# Patient Record
Sex: MALE | Race: BLACK OR AFRICAN AMERICAN | NOT HISPANIC OR LATINO | Employment: UNEMPLOYED | URBAN - METROPOLITAN AREA
[De-identification: names, ages, dates, MRNs, and addresses within clinical notes are randomized per-mention and may not be internally consistent; named-entity substitution may affect disease eponyms.]

---

## 2021-02-12 ENCOUNTER — HOSPITAL ENCOUNTER (INPATIENT)
Facility: HOSPITAL | Age: 41
LOS: 52 days | Discharge: HOME | DRG: 100 | End: 2021-04-05
Attending: EMERGENCY MEDICINE | Admitting: INTERNAL MEDICINE
Payer: MEDICAID

## 2021-02-12 DIAGNOSIS — I42.9 CARDIOMYOPATHY, UNSPECIFIED TYPE: ICD-10-CM

## 2021-02-12 DIAGNOSIS — R07.89 CHEST DISCOMFORT: ICD-10-CM

## 2021-02-12 DIAGNOSIS — R41.82 ALTERED MENTAL STATUS: ICD-10-CM

## 2021-02-12 DIAGNOSIS — R41.82 ALTERED MENTAL STATUS, UNSPECIFIED ALTERED MENTAL STATUS TYPE: ICD-10-CM

## 2021-02-12 DIAGNOSIS — Z23 NEED FOR VACCINATION: ICD-10-CM

## 2021-02-12 DIAGNOSIS — E43 SEVERE MALNUTRITION: ICD-10-CM

## 2021-02-12 DIAGNOSIS — E08.00 DIABETES MELLITUS DUE TO UNDERLYING CONDITION WITH HYPEROSMOLARITY WITHOUT COMA, WITHOUT LONG-TERM CURRENT USE OF INSULIN: ICD-10-CM

## 2021-02-12 DIAGNOSIS — Z86.73 HISTORY OF STROKE: ICD-10-CM

## 2021-02-12 DIAGNOSIS — E87.8 ELECTROLYTE ABNORMALITY: ICD-10-CM

## 2021-02-12 DIAGNOSIS — I95.9 HYPOTENSION, UNSPECIFIED HYPOTENSION TYPE: ICD-10-CM

## 2021-02-12 DIAGNOSIS — E13.9 SECONDARY DIABETES MELLITUS: ICD-10-CM

## 2021-02-12 DIAGNOSIS — R07.9 CHEST PAIN: ICD-10-CM

## 2021-02-12 DIAGNOSIS — F10.10 ETOH ABUSE: ICD-10-CM

## 2021-02-12 DIAGNOSIS — N30.00 ACUTE CYSTITIS WITHOUT HEMATURIA: ICD-10-CM

## 2021-02-12 DIAGNOSIS — G93.40 ENCEPHALOPATHY: ICD-10-CM

## 2021-02-12 DIAGNOSIS — T68.XXXA HYPOTHERMIA: ICD-10-CM

## 2021-02-12 DIAGNOSIS — R94.31 QT PROLONGATION: ICD-10-CM

## 2021-02-12 DIAGNOSIS — G93.40 ACUTE ENCEPHALOPATHY: ICD-10-CM

## 2021-02-12 DIAGNOSIS — R56.9 SEIZURE: Primary | ICD-10-CM

## 2021-02-12 DIAGNOSIS — G40.919 REFRACTORY SEIZURE: ICD-10-CM

## 2021-02-12 DIAGNOSIS — R06.02 SHORTNESS OF BREATH: ICD-10-CM

## 2021-02-12 PROBLEM — J42 CHRONIC BRONCHITIS: Status: ACTIVE | Noted: 2021-02-12

## 2021-02-12 PROBLEM — E11.9 DIABETES: Status: ACTIVE | Noted: 2021-02-12

## 2021-02-12 PROBLEM — E87.20 LACTIC ACIDOSIS: Status: ACTIVE | Noted: 2021-02-12

## 2021-02-12 PROBLEM — I10 HYPERTENSION: Status: ACTIVE | Noted: 2021-02-12

## 2021-02-12 PROBLEM — J40 BRONCHITIS: Status: ACTIVE | Noted: 2021-02-12

## 2021-02-12 LAB
ALBUMIN SERPL BCP-MCNC: 2.4 G/DL (ref 3.5–5.2)
ALLENS TEST: ABNORMAL
ALP SERPL-CCNC: 172 U/L (ref 55–135)
ALT SERPL W/O P-5'-P-CCNC: 51 U/L (ref 10–44)
AMMONIA PLAS-SCNC: 13 UMOL/L (ref 10–50)
AMPHET+METHAMPHET UR QL: NEGATIVE
ANION GAP SERPL CALC-SCNC: 18 MMOL/L (ref 8–16)
ASCENDING AORTA: 2.75 CM
AST SERPL-CCNC: 111 U/L (ref 10–40)
AV INDEX (PROSTH): 1.43
AV MEAN GRADIENT: 2 MMHG
AV PEAK GRADIENT: 3 MMHG
AV VALVE AREA: 4.52 CM2
AV VELOCITY RATIO: 0.92
BACTERIA #/AREA URNS AUTO: ABNORMAL /HPF
BARBITURATES UR QL SCN>200 NG/ML: NEGATIVE
BASOPHILS # BLD AUTO: 0.03 K/UL (ref 0–0.2)
BASOPHILS NFR BLD: 0.3 % (ref 0–1.9)
BENZODIAZ UR QL SCN>200 NG/ML: NEGATIVE
BILIRUB SERPL-MCNC: 1 MG/DL (ref 0.1–1)
BILIRUB UR QL STRIP: NEGATIVE
BNP SERPL-MCNC: 211 PG/ML (ref 0–99)
BUN SERPL-MCNC: 5 MG/DL (ref 6–30)
BUN SERPL-MCNC: 6 MG/DL (ref 6–20)
BZE UR QL SCN: NEGATIVE
CALCIUM SERPL-MCNC: 8.2 MG/DL (ref 8.7–10.5)
CANNABINOIDS UR QL SCN: NEGATIVE
CHLORIDE SERPL-SCNC: 104 MMOL/L (ref 95–110)
CHLORIDE SERPL-SCNC: 97 MMOL/L (ref 95–110)
CK SERPL-CCNC: 103 U/L (ref 20–200)
CLARITY UR REFRACT.AUTO: ABNORMAL
CO2 SERPL-SCNC: 20 MMOL/L (ref 23–29)
COLOR UR AUTO: YELLOW
CREAT SERPL-MCNC: 0.3 MG/DL (ref 0.5–1.4)
CREAT SERPL-MCNC: 0.6 MG/DL (ref 0.5–1.4)
CREAT UR-MCNC: 63 MG/DL (ref 23–375)
CTP QC/QA: YES
CV ECHO LV RWT: 0.27 CM
DIFFERENTIAL METHOD: ABNORMAL
DOP CALC AO PEAK VEL: 0.83 M/S
DOP CALC AO VTI: 11.18 CM
DOP CALC LVOT AREA: 3.2 CM2
DOP CALC LVOT DIAMETER: 2.01 CM
DOP CALC LVOT PEAK VEL: 0.76 M/S
DOP CALC LVOT STROKE VOLUME: 50.55 CM3
DOP CALCLVOT PEAK VEL VTI: 15.94 CM
ECHO LV POSTERIOR WALL: 0.62 CM (ref 0.6–1.1)
EOSINOPHIL # BLD AUTO: 0 K/UL (ref 0–0.5)
EOSINOPHIL NFR BLD: 0 % (ref 0–8)
ERYTHROCYTE [DISTWIDTH] IN BLOOD BY AUTOMATED COUNT: 15.5 % (ref 11.5–14.5)
EST. GFR  (AFRICAN AMERICAN): >60 ML/MIN/1.73 M^2
EST. GFR  (NON AFRICAN AMERICAN): >60 ML/MIN/1.73 M^2
ESTIMATED AVG GLUCOSE: 151 MG/DL (ref 68–131)
ETHANOL SERPL-MCNC: <10 MG/DL
ETHANOL UR-MCNC: <10 MG/DL
FOLATE SERPL-MCNC: 7.5 NG/ML (ref 4–24)
FRACTIONAL SHORTENING: 10 % (ref 28–44)
GLUCOSE SERPL-MCNC: 130 MG/DL (ref 70–110)
GLUCOSE SERPL-MCNC: 132 MG/DL (ref 70–110)
GLUCOSE UR QL STRIP: NEGATIVE
HAV IGM SERPL QL IA: NEGATIVE
HBA1C MFR BLD: 6.9 % (ref 4–5.6)
HBV CORE IGM SERPL QL IA: NEGATIVE
HBV SURFACE AG SERPL QL IA: NEGATIVE
HCO3 UR-SCNC: 26.3 MMOL/L (ref 24–28)
HCT VFR BLD AUTO: 38 % (ref 40–54)
HCT VFR BLD CALC: 40 %PCV (ref 36–54)
HCV AB SERPL QL IA: NEGATIVE
HGB BLD-MCNC: 12.9 G/DL (ref 14–18)
HGB UR QL STRIP: NEGATIVE
HIV 1+2 AB+HIV1 P24 AG SERPL QL IA: NEGATIVE
HYALINE CASTS UR QL AUTO: 37 /LPF
IMM GRANULOCYTES # BLD AUTO: 0.1 K/UL (ref 0–0.04)
IMM GRANULOCYTES NFR BLD AUTO: 1.1 % (ref 0–0.5)
INTERVENTRICULAR SEPTUM: 0.68 CM (ref 0.6–1.1)
KETONES UR QL STRIP: ABNORMAL
LA MAJOR: 2.66 CM
LA MINOR: 3.01 CM
LA WIDTH: 3.16 CM
LACTATE SERPL-SCNC: 1.7 MMOL/L (ref 0.5–2.2)
LACTATE SERPL-SCNC: 10.4 MMOL/L (ref 0.5–2.2)
LACTATE SERPL-SCNC: 3.7 MMOL/L (ref 0.5–2.2)
LEFT ATRIUM SIZE: 2.69 CM
LEFT ATRIUM VOLUME: 20.41 CM3
LEFT INTERNAL DIMENSION IN SYSTOLE: 4.11 CM (ref 2.1–4)
LEFT VENTRICLE DIASTOLIC VOLUME: 96.8 ML
LEFT VENTRICLE SYSTOLIC VOLUME: 74.46 ML
LEFT VENTRICULAR INTERNAL DIMENSION IN DIASTOLE: 4.59 CM (ref 3.5–6)
LEFT VENTRICULAR MASS: 90.15 G
LEUKOCYTE ESTERASE UR QL STRIP: ABNORMAL
LIPASE SERPL-CCNC: 15 U/L (ref 4–60)
LYMPHOCYTES # BLD AUTO: 1.4 K/UL (ref 1–4.8)
LYMPHOCYTES NFR BLD: 14.7 % (ref 18–48)
MAGNESIUM SERPL-MCNC: 1.6 MG/DL (ref 1.6–2.6)
MCH RBC QN AUTO: 33.2 PG (ref 27–31)
MCHC RBC AUTO-ENTMCNC: 33.9 G/DL (ref 32–36)
MCV RBC AUTO: 98 FL (ref 82–98)
METHADONE UR QL SCN>300 NG/ML: NEGATIVE
MICROSCOPIC COMMENT: ABNORMAL
MONOCYTES # BLD AUTO: 0.6 K/UL (ref 0.3–1)
MONOCYTES NFR BLD: 6.7 % (ref 4–15)
NEUTROPHILS # BLD AUTO: 7.1 K/UL (ref 1.8–7.7)
NEUTROPHILS NFR BLD: 77.2 % (ref 38–73)
NITRITE UR QL STRIP: POSITIVE
NON-SQ EPI CELLS #/AREA URNS AUTO: <1 /HPF
NRBC BLD-RTO: 0 /100 WBC
OPIATES UR QL SCN: NEGATIVE
PCO2 BLDA: 61.5 MMHG (ref 35–45)
PCP UR QL SCN>25 NG/ML: NEGATIVE
PH SMN: 7.24 [PH] (ref 7.35–7.45)
PH UR STRIP: 6 [PH] (ref 5–8)
PHOSPHATE SERPL-MCNC: 5.7 MG/DL (ref 2.7–4.5)
PLATELET # BLD AUTO: 252 K/UL (ref 150–350)
PMV BLD AUTO: 9.6 FL (ref 9.2–12.9)
PO2 BLDA: 27 MMHG (ref 40–60)
POC BE: -1 MMOL/L
POC IONIZED CALCIUM: 0.7 MMOL/L (ref 1.06–1.42)
POC SATURATED O2: 39 % (ref 95–100)
POC TCO2 (MEASURED): 17 MMOL/L (ref 23–29)
POC TCO2: 28 MMOL/L (ref 24–29)
POCT GLUCOSE: 64 MG/DL (ref 70–110)
POCT GLUCOSE: 78 MG/DL (ref 70–110)
POTASSIUM BLD-SCNC: 5.2 MMOL/L (ref 3.5–5.1)
POTASSIUM SERPL-SCNC: 5.3 MMOL/L (ref 3.5–5.1)
PROCALCITONIN SERPL IA-MCNC: 0.08 NG/ML
PROT SERPL-MCNC: 5.8 G/DL (ref 6–8.4)
PROT UR QL STRIP: ABNORMAL
RA MAJOR: 2.89 CM
RA PRESSURE: 3 MMHG
RA WIDTH: 2.63 CM
RBC # BLD AUTO: 3.89 M/UL (ref 4.6–6.2)
RBC #/AREA URNS AUTO: 2 /HPF (ref 0–4)
RIGHT VENTRICULAR END-DIASTOLIC DIMENSION: 2.92 CM
SAMPLE: ABNORMAL
SAMPLE: ABNORMAL
SARS-COV-2 RDRP RESP QL NAA+PROBE: NEGATIVE
SINUS: 2.69 CM
SITE: ABNORMAL
SODIUM BLD-SCNC: 126 MMOL/L (ref 136–145)
SODIUM SERPL-SCNC: 135 MMOL/L (ref 136–145)
SP GR UR STRIP: 1.01 (ref 1–1.03)
STJ: 3.19 CM
TOXICOLOGY INFORMATION: NORMAL
TROPONIN I SERPL DL<=0.01 NG/ML-MCNC: 0.15 NG/ML (ref 0–0.03)
TROPONIN I SERPL DL<=0.01 NG/ML-MCNC: 0.51 NG/ML (ref 0–0.03)
TROPONIN I SERPL DL<=0.01 NG/ML-MCNC: 0.76 NG/ML (ref 0–0.03)
TROPONIN I SERPL DL<=0.01 NG/ML-MCNC: 0.76 NG/ML (ref 0–0.03)
TSH SERPL DL<=0.005 MIU/L-ACNC: 3.19 UIU/ML (ref 0.4–4)
URN SPEC COLLECT METH UR: ABNORMAL
VIT B12 SERPL-MCNC: 1008 PG/ML (ref 210–950)
WBC # BLD AUTO: 9.16 K/UL (ref 3.9–12.7)
WBC #/AREA URNS AUTO: >100 /HPF (ref 0–5)
WBC CLUMPS UR QL AUTO: ABNORMAL
YEAST UR QL AUTO: ABNORMAL

## 2021-02-12 PROCEDURE — 83690 ASSAY OF LIPASE: CPT

## 2021-02-12 PROCEDURE — 63600175 PHARM REV CODE 636 W HCPCS: Performed by: EMERGENCY MEDICINE

## 2021-02-12 PROCEDURE — 82607 VITAMIN B-12: CPT

## 2021-02-12 PROCEDURE — 25000003 PHARM REV CODE 250: Performed by: EMERGENCY MEDICINE

## 2021-02-12 PROCEDURE — 82140 ASSAY OF AMMONIA: CPT

## 2021-02-12 PROCEDURE — 83605 ASSAY OF LACTIC ACID: CPT | Mod: 91

## 2021-02-12 PROCEDURE — 20600001 HC STEP DOWN PRIVATE ROOM

## 2021-02-12 PROCEDURE — 93010 ELECTROCARDIOGRAM REPORT: CPT | Mod: ,,, | Performed by: INTERNAL MEDICINE

## 2021-02-12 PROCEDURE — 82077 ASSAY SPEC XCP UR&BREATH IA: CPT

## 2021-02-12 PROCEDURE — 25000003 PHARM REV CODE 250: Performed by: STUDENT IN AN ORGANIZED HEALTH CARE EDUCATION/TRAINING PROGRAM

## 2021-02-12 PROCEDURE — 63600175 PHARM REV CODE 636 W HCPCS: Performed by: STUDENT IN AN ORGANIZED HEALTH CARE EDUCATION/TRAINING PROGRAM

## 2021-02-12 PROCEDURE — 93010 EKG 12-LEAD: ICD-10-PCS | Mod: ,,, | Performed by: INTERNAL MEDICINE

## 2021-02-12 PROCEDURE — 87040 BLOOD CULTURE FOR BACTERIA: CPT

## 2021-02-12 PROCEDURE — 84484 ASSAY OF TROPONIN QUANT: CPT

## 2021-02-12 PROCEDURE — 86703 HIV-1/HIV-2 1 RESULT ANTBDY: CPT

## 2021-02-12 PROCEDURE — 85025 COMPLETE CBC W/AUTO DIFF WBC: CPT

## 2021-02-12 PROCEDURE — 95714 VEEG EA 12-26 HR UNMNTR: CPT

## 2021-02-12 PROCEDURE — 80053 COMPREHEN METABOLIC PANEL: CPT

## 2021-02-12 PROCEDURE — 80307 DRUG TEST PRSMV CHEM ANLYZR: CPT

## 2021-02-12 PROCEDURE — 83605 ASSAY OF LACTIC ACID: CPT

## 2021-02-12 PROCEDURE — 84145 PROCALCITONIN (PCT): CPT

## 2021-02-12 PROCEDURE — 99900035 HC TECH TIME PER 15 MIN (STAT)

## 2021-02-12 PROCEDURE — 99291 PR CRITICAL CARE, E/M 30-74 MINUTES: ICD-10-PCS | Mod: ,,, | Performed by: EMERGENCY MEDICINE

## 2021-02-12 PROCEDURE — U0002 COVID-19 LAB TEST NON-CDC: HCPCS | Performed by: EMERGENCY MEDICINE

## 2021-02-12 PROCEDURE — 87522 HEPATITIS C REVRS TRNSCRPJ: CPT

## 2021-02-12 PROCEDURE — 96365 THER/PROPH/DIAG IV INF INIT: CPT | Mod: 59

## 2021-02-12 PROCEDURE — 80321 ALCOHOLS BIOMARKERS 1OR 2: CPT

## 2021-02-12 PROCEDURE — 80047 BASIC METABLC PNL IONIZED CA: CPT

## 2021-02-12 PROCEDURE — 84100 ASSAY OF PHOSPHORUS: CPT

## 2021-02-12 PROCEDURE — 96360 HYDRATION IV INFUSION INIT: CPT | Mod: 59

## 2021-02-12 PROCEDURE — 84443 ASSAY THYROID STIM HORMONE: CPT

## 2021-02-12 PROCEDURE — 83036 HEMOGLOBIN GLYCOSYLATED A1C: CPT

## 2021-02-12 PROCEDURE — 93005 ELECTROCARDIOGRAM TRACING: CPT

## 2021-02-12 PROCEDURE — 96366 THER/PROPH/DIAG IV INF ADDON: CPT | Mod: 59

## 2021-02-12 PROCEDURE — 83735 ASSAY OF MAGNESIUM: CPT

## 2021-02-12 PROCEDURE — 81001 URINALYSIS AUTO W/SCOPE: CPT

## 2021-02-12 PROCEDURE — 82803 BLOOD GASES ANY COMBINATION: CPT

## 2021-02-12 PROCEDURE — 82550 ASSAY OF CK (CPK): CPT

## 2021-02-12 PROCEDURE — 80074 ACUTE HEPATITIS PANEL: CPT

## 2021-02-12 PROCEDURE — 84484 ASSAY OF TROPONIN QUANT: CPT | Mod: 91

## 2021-02-12 PROCEDURE — 51702 INSERT TEMP BLADDER CATH: CPT

## 2021-02-12 PROCEDURE — 86480 TB TEST CELL IMMUN MEASURE: CPT

## 2021-02-12 PROCEDURE — 86592 SYPHILIS TEST NON-TREP QUAL: CPT

## 2021-02-12 PROCEDURE — 95700 EEG CONT REC W/VID EEG TECH: CPT

## 2021-02-12 PROCEDURE — 99291 CRITICAL CARE FIRST HOUR: CPT | Mod: ,,, | Performed by: EMERGENCY MEDICINE

## 2021-02-12 PROCEDURE — 99291 CRITICAL CARE FIRST HOUR: CPT | Mod: 25

## 2021-02-12 PROCEDURE — 25000003 PHARM REV CODE 250: Performed by: INTERNAL MEDICINE

## 2021-02-12 PROCEDURE — 87086 URINE CULTURE/COLONY COUNT: CPT

## 2021-02-12 PROCEDURE — 84425 ASSAY OF VITAMIN B-1: CPT

## 2021-02-12 PROCEDURE — 82746 ASSAY OF FOLIC ACID SERUM: CPT

## 2021-02-12 PROCEDURE — 83880 ASSAY OF NATRIURETIC PEPTIDE: CPT

## 2021-02-12 RX ORDER — PROCHLORPERAZINE MALEATE 5 MG
5 TABLET ORAL 3 TIMES DAILY PRN
Status: DISCONTINUED | OUTPATIENT
Start: 2021-02-12 | End: 2021-02-18

## 2021-02-12 RX ORDER — FOLIC ACID 1 MG/1
1 TABLET ORAL DAILY
Status: DISCONTINUED | OUTPATIENT
Start: 2021-02-12 | End: 2021-02-18

## 2021-02-12 RX ORDER — GLUCAGON 1 MG
1 KIT INJECTION
Status: DISCONTINUED | OUTPATIENT
Start: 2021-02-12 | End: 2021-02-23

## 2021-02-12 RX ORDER — TALC
6 POWDER (GRAM) TOPICAL NIGHTLY PRN
Status: DISCONTINUED | OUTPATIENT
Start: 2021-02-12 | End: 2021-02-18

## 2021-02-12 RX ORDER — ACETAMINOPHEN 325 MG/1
650 TABLET ORAL EVERY 8 HOURS PRN
Status: DISCONTINUED | OUTPATIENT
Start: 2021-02-12 | End: 2021-02-12

## 2021-02-12 RX ORDER — IBUPROFEN 200 MG
16 TABLET ORAL
Status: DISCONTINUED | OUTPATIENT
Start: 2021-02-12 | End: 2021-02-18

## 2021-02-12 RX ORDER — PROMETHAZINE HYDROCHLORIDE 12.5 MG/1
12.5 TABLET ORAL EVERY 6 HOURS PRN
Status: DISCONTINUED | OUTPATIENT
Start: 2021-02-12 | End: 2021-02-12

## 2021-02-12 RX ORDER — ONDANSETRON 2 MG/ML
4 INJECTION INTRAMUSCULAR; INTRAVENOUS EVERY 8 HOURS PRN
Status: DISCONTINUED | OUTPATIENT
Start: 2021-02-12 | End: 2021-02-12

## 2021-02-12 RX ORDER — ACETAMINOPHEN 325 MG/1
650 TABLET ORAL EVERY 6 HOURS PRN
Status: DISCONTINUED | OUTPATIENT
Start: 2021-02-12 | End: 2021-02-18

## 2021-02-12 RX ORDER — MAGNESIUM SULFATE HEPTAHYDRATE 40 MG/ML
2 INJECTION, SOLUTION INTRAVENOUS ONCE
Status: DISCONTINUED | OUTPATIENT
Start: 2021-02-12 | End: 2021-02-12

## 2021-02-12 RX ORDER — ACETAMINOPHEN 325 MG/1
650 TABLET ORAL EVERY 4 HOURS PRN
Status: DISCONTINUED | OUTPATIENT
Start: 2021-02-12 | End: 2021-02-12

## 2021-02-12 RX ORDER — VANCOMYCIN HCL IN 5 % DEXTROSE 1G/250ML
1000 PLASTIC BAG, INJECTION (ML) INTRAVENOUS ONCE
Status: COMPLETED | OUTPATIENT
Start: 2021-02-12 | End: 2021-02-12

## 2021-02-12 RX ORDER — IBUPROFEN 200 MG
24 TABLET ORAL
Status: DISCONTINUED | OUTPATIENT
Start: 2021-02-12 | End: 2021-02-18

## 2021-02-12 RX ORDER — SODIUM CHLORIDE 9 MG/ML
INJECTION, SOLUTION INTRAVENOUS CONTINUOUS
Status: DISCONTINUED | OUTPATIENT
Start: 2021-02-12 | End: 2021-02-13

## 2021-02-12 RX ORDER — ONDANSETRON 8 MG/1
8 TABLET, ORALLY DISINTEGRATING ORAL EVERY 8 HOURS PRN
Status: DISCONTINUED | OUTPATIENT
Start: 2021-02-12 | End: 2021-02-12

## 2021-02-12 RX ORDER — MAGNESIUM SULFATE HEPTAHYDRATE 40 MG/ML
2 INJECTION, SOLUTION INTRAVENOUS ONCE
Status: COMPLETED | OUTPATIENT
Start: 2021-02-12 | End: 2021-02-12

## 2021-02-12 RX ORDER — THIAMINE HCL 100 MG
100 TABLET ORAL DAILY
Status: DISCONTINUED | OUTPATIENT
Start: 2021-02-12 | End: 2021-02-13

## 2021-02-12 RX ORDER — MUPIROCIN 20 MG/G
OINTMENT TOPICAL 2 TIMES DAILY
Status: DISPENSED | OUTPATIENT
Start: 2021-02-12 | End: 2021-02-17

## 2021-02-12 RX ORDER — LORAZEPAM 1 MG/1
2 TABLET ORAL EVERY 4 HOURS PRN
Status: DISCONTINUED | OUTPATIENT
Start: 2021-02-12 | End: 2021-02-13

## 2021-02-12 RX ORDER — SODIUM CHLORIDE 0.9 % (FLUSH) 0.9 %
10 SYRINGE (ML) INJECTION
Status: DISCONTINUED | OUTPATIENT
Start: 2021-02-12 | End: 2021-04-05 | Stop reason: HOSPADM

## 2021-02-12 RX ORDER — ONDANSETRON 4 MG/1
4 TABLET, ORALLY DISINTEGRATING ORAL EVERY 8 HOURS PRN
Status: DISCONTINUED | OUTPATIENT
Start: 2021-02-12 | End: 2021-02-12

## 2021-02-12 RX ORDER — IPRATROPIUM BROMIDE AND ALBUTEROL SULFATE 2.5; .5 MG/3ML; MG/3ML
3 SOLUTION RESPIRATORY (INHALATION) EVERY 6 HOURS PRN
Status: DISCONTINUED | OUTPATIENT
Start: 2021-02-12 | End: 2021-03-31

## 2021-02-12 RX ORDER — INSULIN ASPART 100 [IU]/ML
0-5 INJECTION, SOLUTION INTRAVENOUS; SUBCUTANEOUS
Status: DISCONTINUED | OUTPATIENT
Start: 2021-02-12 | End: 2021-02-15

## 2021-02-12 RX ORDER — ASPIRIN 325 MG
325 TABLET ORAL
Status: COMPLETED | OUTPATIENT
Start: 2021-02-12 | End: 2021-02-12

## 2021-02-12 RX ADMIN — SODIUM CHLORIDE: 0.9 INJECTION, SOLUTION INTRAVENOUS at 08:02

## 2021-02-12 RX ADMIN — MAGNESIUM SULFATE 2 G: 2 INJECTION INTRAVENOUS at 11:02

## 2021-02-12 RX ADMIN — ACETAMINOPHEN 650 MG: 325 TABLET ORAL at 04:02

## 2021-02-12 RX ADMIN — ASPIRIN 325 MG ORAL TABLET 325 MG: 325 PILL ORAL at 10:02

## 2021-02-12 RX ADMIN — LORAZEPAM 2 MG: 1 TABLET ORAL at 11:02

## 2021-02-12 RX ADMIN — DEXTROSE MONOHYDRATE 12.5 G: 25 INJECTION, SOLUTION INTRAVENOUS at 11:02

## 2021-02-12 RX ADMIN — FOLIC ACID 1 MG: 1 TABLET ORAL at 11:02

## 2021-02-12 RX ADMIN — Medication 100 MG: at 11:02

## 2021-02-12 RX ADMIN — MUPIROCIN: 20 OINTMENT TOPICAL at 08:02

## 2021-02-12 RX ADMIN — SODIUM CHLORIDE: 0.9 INJECTION, SOLUTION INTRAVENOUS at 11:02

## 2021-02-12 RX ADMIN — SODIUM CHLORIDE 1362 ML: 0.9 INJECTION, SOLUTION INTRAVENOUS at 05:02

## 2021-02-12 RX ADMIN — VANCOMYCIN HYDROCHLORIDE 1000 MG: 1 INJECTION, POWDER, LYOPHILIZED, FOR SOLUTION INTRAVENOUS at 06:02

## 2021-02-12 RX ADMIN — PIPERACILLIN AND TAZOBACTAM 4.5 G: 4; .5 INJECTION, POWDER, LYOPHILIZED, FOR SOLUTION INTRAVENOUS; PARENTERAL at 06:02

## 2021-02-12 RX ADMIN — LORAZEPAM 2 MG: 1 TABLET ORAL at 04:02

## 2021-02-12 RX ADMIN — PIPERACILLIN AND TAZOBACTAM 4.5 G: 4; .5 INJECTION, POWDER, LYOPHILIZED, FOR SOLUTION INTRAVENOUS; PARENTERAL at 10:02

## 2021-02-12 RX ADMIN — PIPERACILLIN AND TAZOBACTAM 4.5 G: 4; .5 INJECTION, POWDER, LYOPHILIZED, FOR SOLUTION INTRAVENOUS; PARENTERAL at 01:02

## 2021-02-12 RX ADMIN — THERA TABS 1 TABLET: TAB at 11:02

## 2021-02-13 PROBLEM — E78.5 HYPERLIPIDEMIA: Status: ACTIVE | Noted: 2021-02-13

## 2021-02-13 PROBLEM — E87.8 REFEEDING SYNDROME: Status: ACTIVE | Noted: 2021-02-13

## 2021-02-13 PROBLEM — I42.9 CARDIOMYOPATHY: Status: ACTIVE | Noted: 2021-02-13

## 2021-02-13 PROBLEM — J44.9 COPD (CHRONIC OBSTRUCTIVE PULMONARY DISEASE): Status: ACTIVE | Noted: 2021-02-12

## 2021-02-13 LAB
ALBUMIN SERPL BCP-MCNC: 2 G/DL (ref 3.5–5.2)
ALBUMIN SERPL BCP-MCNC: 2.2 G/DL (ref 3.5–5.2)
ALP SERPL-CCNC: 139 U/L (ref 55–135)
ALP SERPL-CCNC: 145 U/L (ref 55–135)
ALT SERPL W/O P-5'-P-CCNC: 54 U/L (ref 10–44)
ALT SERPL W/O P-5'-P-CCNC: 55 U/L (ref 10–44)
ANION GAP SERPL CALC-SCNC: 13 MMOL/L (ref 8–16)
ANION GAP SERPL CALC-SCNC: 17 MMOL/L (ref 8–16)
AST SERPL-CCNC: 130 U/L (ref 10–40)
AST SERPL-CCNC: 154 U/L (ref 10–40)
BACTERIA UR CULT: NO GROWTH
BASOPHILS # BLD AUTO: 0.01 K/UL (ref 0–0.2)
BASOPHILS NFR BLD: 0.1 % (ref 0–1.9)
BILIRUB SERPL-MCNC: 0.9 MG/DL (ref 0.1–1)
BILIRUB SERPL-MCNC: 1 MG/DL (ref 0.1–1)
BUN SERPL-MCNC: 2 MG/DL (ref 6–20)
BUN SERPL-MCNC: 2 MG/DL (ref 6–20)
CALCIUM SERPL-MCNC: 7.2 MG/DL (ref 8.7–10.5)
CALCIUM SERPL-MCNC: 7.4 MG/DL (ref 8.7–10.5)
CHLORIDE SERPL-SCNC: 100 MMOL/L (ref 95–110)
CHLORIDE SERPL-SCNC: 98 MMOL/L (ref 95–110)
CHOLEST SERPL-MCNC: 159 MG/DL (ref 120–199)
CHOLEST/HDLC SERPL: 1.8 {RATIO} (ref 2–5)
CO2 SERPL-SCNC: 21 MMOL/L (ref 23–29)
CO2 SERPL-SCNC: 26 MMOL/L (ref 23–29)
CREAT SERPL-MCNC: 0.6 MG/DL (ref 0.5–1.4)
CREAT SERPL-MCNC: 0.6 MG/DL (ref 0.5–1.4)
DIFFERENTIAL METHOD: ABNORMAL
EOSINOPHIL # BLD AUTO: 0 K/UL (ref 0–0.5)
EOSINOPHIL NFR BLD: 0 % (ref 0–8)
ERYTHROCYTE [DISTWIDTH] IN BLOOD BY AUTOMATED COUNT: 15.5 % (ref 11.5–14.5)
EST. GFR  (AFRICAN AMERICAN): >60 ML/MIN/1.73 M^2
EST. GFR  (AFRICAN AMERICAN): >60 ML/MIN/1.73 M^2
EST. GFR  (NON AFRICAN AMERICAN): >60 ML/MIN/1.73 M^2
EST. GFR  (NON AFRICAN AMERICAN): >60 ML/MIN/1.73 M^2
GLUCOSE SERPL-MCNC: 137 MG/DL (ref 70–110)
GLUCOSE SERPL-MCNC: 193 MG/DL (ref 70–110)
HCT VFR BLD AUTO: 33.1 % (ref 40–54)
HDLC SERPL-MCNC: 89 MG/DL (ref 40–75)
HDLC SERPL: 56 % (ref 20–50)
HGB BLD-MCNC: 11.6 G/DL (ref 14–18)
IMM GRANULOCYTES # BLD AUTO: 0.04 K/UL (ref 0–0.04)
IMM GRANULOCYTES NFR BLD AUTO: 0.4 % (ref 0–0.5)
LDLC SERPL CALC-MCNC: 53.6 MG/DL (ref 63–159)
LYMPHOCYTES # BLD AUTO: 2 K/UL (ref 1–4.8)
LYMPHOCYTES NFR BLD: 21.6 % (ref 18–48)
MAGNESIUM SERPL-MCNC: 1.4 MG/DL (ref 1.6–2.6)
MCH RBC QN AUTO: 33.3 PG (ref 27–31)
MCHC RBC AUTO-ENTMCNC: 35 G/DL (ref 32–36)
MCV RBC AUTO: 95 FL (ref 82–98)
MONOCYTES # BLD AUTO: 0.7 K/UL (ref 0.3–1)
MONOCYTES NFR BLD: 7.9 % (ref 4–15)
NEUTROPHILS # BLD AUTO: 6.5 K/UL (ref 1.8–7.7)
NEUTROPHILS NFR BLD: 70 % (ref 38–73)
NONHDLC SERPL-MCNC: 70 MG/DL
NRBC BLD-RTO: 0 /100 WBC
PHOSPHATE SERPL-MCNC: 2.4 MG/DL (ref 2.7–4.5)
PLATELET # BLD AUTO: 196 K/UL (ref 150–350)
PMV BLD AUTO: 10.6 FL (ref 9.2–12.9)
POCT GLUCOSE: 119 MG/DL (ref 70–110)
POCT GLUCOSE: 122 MG/DL (ref 70–110)
POCT GLUCOSE: 124 MG/DL (ref 70–110)
POCT GLUCOSE: 141 MG/DL (ref 70–110)
POTASSIUM SERPL-SCNC: 2.6 MMOL/L (ref 3.5–5.1)
POTASSIUM SERPL-SCNC: 2.7 MMOL/L (ref 3.5–5.1)
PROT SERPL-MCNC: 5.1 G/DL (ref 6–8.4)
PROT SERPL-MCNC: 5.3 G/DL (ref 6–8.4)
RBC # BLD AUTO: 3.48 M/UL (ref 4.6–6.2)
RPR SER QL: NORMAL
SODIUM SERPL-SCNC: 136 MMOL/L (ref 136–145)
SODIUM SERPL-SCNC: 139 MMOL/L (ref 136–145)
TRIGL SERPL-MCNC: 82 MG/DL (ref 30–150)
WBC # BLD AUTO: 9.23 K/UL (ref 3.9–12.7)

## 2021-02-13 PROCEDURE — 63600175 PHARM REV CODE 636 W HCPCS: Performed by: STUDENT IN AN ORGANIZED HEALTH CARE EDUCATION/TRAINING PROGRAM

## 2021-02-13 PROCEDURE — 93005 ELECTROCARDIOGRAM TRACING: CPT

## 2021-02-13 PROCEDURE — 25000003 PHARM REV CODE 250: Performed by: INTERNAL MEDICINE

## 2021-02-13 PROCEDURE — 84100 ASSAY OF PHOSPHORUS: CPT

## 2021-02-13 PROCEDURE — 83735 ASSAY OF MAGNESIUM: CPT

## 2021-02-13 PROCEDURE — 80061 LIPID PANEL: CPT

## 2021-02-13 PROCEDURE — 93010 EKG 12-LEAD: ICD-10-PCS | Mod: ,,, | Performed by: INTERNAL MEDICINE

## 2021-02-13 PROCEDURE — 25000003 PHARM REV CODE 250: Performed by: STUDENT IN AN ORGANIZED HEALTH CARE EDUCATION/TRAINING PROGRAM

## 2021-02-13 PROCEDURE — 85025 COMPLETE CBC W/AUTO DIFF WBC: CPT

## 2021-02-13 PROCEDURE — 97802 MEDICAL NUTRITION INDIV IN: CPT

## 2021-02-13 PROCEDURE — 93010 ELECTROCARDIOGRAM REPORT: CPT | Mod: ,,, | Performed by: INTERNAL MEDICINE

## 2021-02-13 PROCEDURE — 20600001 HC STEP DOWN PRIVATE ROOM

## 2021-02-13 PROCEDURE — 99233 SBSQ HOSP IP/OBS HIGH 50: CPT | Mod: ,,, | Performed by: INTERNAL MEDICINE

## 2021-02-13 PROCEDURE — 25000003 PHARM REV CODE 250: Performed by: EMERGENCY MEDICINE

## 2021-02-13 PROCEDURE — 99233 PR SUBSEQUENT HOSPITAL CARE,LEVL III: ICD-10-PCS | Mod: ,,, | Performed by: INTERNAL MEDICINE

## 2021-02-13 PROCEDURE — S4991 NICOTINE PATCH NONLEGEND: HCPCS | Performed by: STUDENT IN AN ORGANIZED HEALTH CARE EDUCATION/TRAINING PROGRAM

## 2021-02-13 PROCEDURE — 36415 COLL VENOUS BLD VENIPUNCTURE: CPT

## 2021-02-13 PROCEDURE — 80053 COMPREHEN METABOLIC PANEL: CPT

## 2021-02-13 RX ORDER — POTASSIUM CHLORIDE 7.45 MG/ML
10 INJECTION INTRAVENOUS
Status: COMPLETED | OUTPATIENT
Start: 2021-02-13 | End: 2021-02-13

## 2021-02-13 RX ORDER — IBUPROFEN 200 MG
1 TABLET ORAL DAILY
Status: DISCONTINUED | OUTPATIENT
Start: 2021-02-13 | End: 2021-02-14

## 2021-02-13 RX ORDER — MAGNESIUM SULFATE HEPTAHYDRATE 40 MG/ML
2 INJECTION, SOLUTION INTRAVENOUS ONCE
Status: COMPLETED | OUTPATIENT
Start: 2021-02-13 | End: 2021-02-13

## 2021-02-13 RX ADMIN — LORAZEPAM 2 MG: 2 INJECTION INTRAMUSCULAR; INTRAVENOUS at 10:02

## 2021-02-13 RX ADMIN — POTASSIUM CHLORIDE 10 MEQ: 7.46 INJECTION, SOLUTION INTRAVENOUS at 02:02

## 2021-02-13 RX ADMIN — FOLIC ACID 1 MG: 1 TABLET ORAL at 08:02

## 2021-02-13 RX ADMIN — PIPERACILLIN AND TAZOBACTAM 4.5 G: 4; .5 INJECTION, POWDER, LYOPHILIZED, FOR SOLUTION INTRAVENOUS; PARENTERAL at 03:02

## 2021-02-13 RX ADMIN — SODIUM CHLORIDE: 0.9 INJECTION, SOLUTION INTRAVENOUS at 05:02

## 2021-02-13 RX ADMIN — PIPERACILLIN AND TAZOBACTAM 4.5 G: 4; .5 INJECTION, POWDER, LYOPHILIZED, FOR SOLUTION INTRAVENOUS; PARENTERAL at 10:02

## 2021-02-13 RX ADMIN — THIAMINE HYDROCHLORIDE 500 MG: 100 INJECTION, SOLUTION INTRAMUSCULAR; INTRAVENOUS at 04:02

## 2021-02-13 RX ADMIN — NICOTINE 1 PATCH: 14 PATCH TRANSDERMAL at 05:02

## 2021-02-13 RX ADMIN — PIPERACILLIN AND TAZOBACTAM 4.5 G: 4; .5 INJECTION, POWDER, LYOPHILIZED, FOR SOLUTION INTRAVENOUS; PARENTERAL at 05:02

## 2021-02-13 RX ADMIN — LORAZEPAM 2 MG: 1 TABLET ORAL at 02:02

## 2021-02-13 RX ADMIN — POTASSIUM CHLORIDE 10 MEQ: 7.46 INJECTION, SOLUTION INTRAVENOUS at 10:02

## 2021-02-13 RX ADMIN — Medication 100 MG: at 08:02

## 2021-02-13 RX ADMIN — MUPIROCIN: 20 OINTMENT TOPICAL at 09:02

## 2021-02-13 RX ADMIN — POTASSIUM CHLORIDE 10 MEQ: 7.46 INJECTION, SOLUTION INTRAVENOUS at 08:02

## 2021-02-13 RX ADMIN — THERA TABS 1 TABLET: TAB at 08:02

## 2021-02-13 RX ADMIN — MAGNESIUM SULFATE 2 G: 2 INJECTION INTRAVENOUS at 08:02

## 2021-02-13 RX ADMIN — MELATONIN TAB 3 MG 6 MG: 3 TAB at 08:02

## 2021-02-13 RX ADMIN — POTASSIUM BICARBONATE 25 MEQ: 977.5 TABLET, EFFERVESCENT ORAL at 08:02

## 2021-02-14 PROBLEM — Z91.81 RISK FOR FALLS: Status: ACTIVE | Noted: 2021-02-14

## 2021-02-14 LAB
ALBUMIN SERPL BCP-MCNC: 2.1 G/DL (ref 3.5–5.2)
ALP SERPL-CCNC: 155 U/L (ref 55–135)
ALT SERPL W/O P-5'-P-CCNC: 54 U/L (ref 10–44)
ANION GAP SERPL CALC-SCNC: 8 MMOL/L (ref 8–16)
AST SERPL-CCNC: 95 U/L (ref 10–40)
BASOPHILS # BLD AUTO: 0.01 K/UL (ref 0–0.2)
BASOPHILS NFR BLD: 0.1 % (ref 0–1.9)
BILIRUB SERPL-MCNC: 0.6 MG/DL (ref 0.1–1)
BUN SERPL-MCNC: 2 MG/DL (ref 6–20)
CALCIUM SERPL-MCNC: 7.6 MG/DL (ref 8.7–10.5)
CHLORIDE SERPL-SCNC: 100 MMOL/L (ref 95–110)
CO2 SERPL-SCNC: 30 MMOL/L (ref 23–29)
CREAT SERPL-MCNC: 0.7 MG/DL (ref 0.5–1.4)
DIFFERENTIAL METHOD: ABNORMAL
EOSINOPHIL # BLD AUTO: 0 K/UL (ref 0–0.5)
EOSINOPHIL NFR BLD: 0.3 % (ref 0–8)
ERYTHROCYTE [DISTWIDTH] IN BLOOD BY AUTOMATED COUNT: 15.5 % (ref 11.5–14.5)
EST. GFR  (AFRICAN AMERICAN): >60 ML/MIN/1.73 M^2
EST. GFR  (NON AFRICAN AMERICAN): >60 ML/MIN/1.73 M^2
GLUCOSE SERPL-MCNC: 334 MG/DL (ref 70–110)
HCT VFR BLD AUTO: 34.2 % (ref 40–54)
HGB BLD-MCNC: 12 G/DL (ref 14–18)
IMM GRANULOCYTES # BLD AUTO: 0.04 K/UL (ref 0–0.04)
IMM GRANULOCYTES NFR BLD AUTO: 0.6 % (ref 0–0.5)
LYMPHOCYTES # BLD AUTO: 1.7 K/UL (ref 1–4.8)
LYMPHOCYTES NFR BLD: 24.7 % (ref 18–48)
MAGNESIUM SERPL-MCNC: 1.9 MG/DL (ref 1.6–2.6)
MCH RBC QN AUTO: 33.5 PG (ref 27–31)
MCHC RBC AUTO-ENTMCNC: 35.1 G/DL (ref 32–36)
MCV RBC AUTO: 96 FL (ref 82–98)
MONOCYTES # BLD AUTO: 0.5 K/UL (ref 0.3–1)
MONOCYTES NFR BLD: 7 % (ref 4–15)
NEUTROPHILS # BLD AUTO: 4.5 K/UL (ref 1.8–7.7)
NEUTROPHILS NFR BLD: 67.3 % (ref 38–73)
NRBC BLD-RTO: 0 /100 WBC
PHOSPHATE SERPL-MCNC: 1.7 MG/DL (ref 2.7–4.5)
PLATELET # BLD AUTO: 162 K/UL (ref 150–350)
PMV BLD AUTO: 10.4 FL (ref 9.2–12.9)
POCT GLUCOSE: 108 MG/DL (ref 70–110)
POCT GLUCOSE: 276 MG/DL (ref 70–110)
POCT GLUCOSE: 356 MG/DL (ref 70–110)
POTASSIUM SERPL-SCNC: 2.8 MMOL/L (ref 3.5–5.1)
PROT SERPL-MCNC: 5.1 G/DL (ref 6–8.4)
RBC # BLD AUTO: 3.58 M/UL (ref 4.6–6.2)
SODIUM SERPL-SCNC: 138 MMOL/L (ref 136–145)
WBC # BLD AUTO: 6.69 K/UL (ref 3.9–12.7)

## 2021-02-14 PROCEDURE — 63600175 PHARM REV CODE 636 W HCPCS: Performed by: STUDENT IN AN ORGANIZED HEALTH CARE EDUCATION/TRAINING PROGRAM

## 2021-02-14 PROCEDURE — 25000003 PHARM REV CODE 250: Performed by: STUDENT IN AN ORGANIZED HEALTH CARE EDUCATION/TRAINING PROGRAM

## 2021-02-14 PROCEDURE — 99233 SBSQ HOSP IP/OBS HIGH 50: CPT | Mod: ,,, | Performed by: INTERNAL MEDICINE

## 2021-02-14 PROCEDURE — 97165 OT EVAL LOW COMPLEX 30 MIN: CPT

## 2021-02-14 PROCEDURE — 20600001 HC STEP DOWN PRIVATE ROOM

## 2021-02-14 PROCEDURE — 85025 COMPLETE CBC W/AUTO DIFF WBC: CPT

## 2021-02-14 PROCEDURE — 83735 ASSAY OF MAGNESIUM: CPT

## 2021-02-14 PROCEDURE — 84100 ASSAY OF PHOSPHORUS: CPT

## 2021-02-14 PROCEDURE — 97161 PT EVAL LOW COMPLEX 20 MIN: CPT

## 2021-02-14 PROCEDURE — 97530 THERAPEUTIC ACTIVITIES: CPT

## 2021-02-14 PROCEDURE — 99233 PR SUBSEQUENT HOSPITAL CARE,LEVL III: ICD-10-PCS | Mod: ,,, | Performed by: INTERNAL MEDICINE

## 2021-02-14 PROCEDURE — 97112 NEUROMUSCULAR REEDUCATION: CPT

## 2021-02-14 PROCEDURE — 36415 COLL VENOUS BLD VENIPUNCTURE: CPT

## 2021-02-14 PROCEDURE — 80053 COMPREHEN METABOLIC PANEL: CPT

## 2021-02-14 PROCEDURE — S4991 NICOTINE PATCH NONLEGEND: HCPCS | Performed by: STUDENT IN AN ORGANIZED HEALTH CARE EDUCATION/TRAINING PROGRAM

## 2021-02-14 RX ORDER — POTASSIUM CHLORIDE 750 MG/1
30 CAPSULE, EXTENDED RELEASE ORAL EVERY 4 HOURS
Status: COMPLETED | OUTPATIENT
Start: 2021-02-14 | End: 2021-02-15

## 2021-02-14 RX ORDER — IBUPROFEN 200 MG
1 TABLET ORAL DAILY
Status: DISCONTINUED | OUTPATIENT
Start: 2021-02-14 | End: 2021-02-23

## 2021-02-14 RX ORDER — MAGNESIUM SULFATE HEPTAHYDRATE 40 MG/ML
2 INJECTION, SOLUTION INTRAVENOUS ONCE
Status: COMPLETED | OUTPATIENT
Start: 2021-02-14 | End: 2021-02-14

## 2021-02-14 RX ORDER — POTASSIUM CHLORIDE 750 MG/1
30 CAPSULE, EXTENDED RELEASE ORAL 3 TIMES DAILY
Status: DISCONTINUED | OUTPATIENT
Start: 2021-02-14 | End: 2021-02-14

## 2021-02-14 RX ORDER — POTASSIUM CHLORIDE 750 MG/1
30 CAPSULE, EXTENDED RELEASE ORAL EVERY 4 HOURS
Status: DISCONTINUED | OUTPATIENT
Start: 2021-02-14 | End: 2021-02-14

## 2021-02-14 RX ADMIN — MUPIROCIN: 20 OINTMENT TOPICAL at 09:02

## 2021-02-14 RX ADMIN — THERA TABS 1 TABLET: TAB at 09:02

## 2021-02-14 RX ADMIN — LORAZEPAM 2 MG: 2 INJECTION INTRAMUSCULAR at 08:02

## 2021-02-14 RX ADMIN — NICOTINE 1 PATCH: 21 PATCH, EXTENDED RELEASE TRANSDERMAL at 03:02

## 2021-02-14 RX ADMIN — LORAZEPAM 2 MG: 2 INJECTION INTRAMUSCULAR; INTRAVENOUS at 11:02

## 2021-02-14 RX ADMIN — THIAMINE HYDROCHLORIDE 250 MG: 100 INJECTION, SOLUTION INTRAMUSCULAR; INTRAVENOUS at 09:02

## 2021-02-14 RX ADMIN — INSULIN ASPART 5 UNITS: 100 INJECTION, SOLUTION INTRAVENOUS; SUBCUTANEOUS at 09:02

## 2021-02-14 RX ADMIN — PIPERACILLIN AND TAZOBACTAM 4.5 G: 4; .5 INJECTION, POWDER, LYOPHILIZED, FOR SOLUTION INTRAVENOUS; PARENTERAL at 06:02

## 2021-02-14 RX ADMIN — POTASSIUM PHOSPHATE, MONOBASIC AND POTASSIUM PHOSPHATE, DIBASIC 30 MMOL: 224; 236 INJECTION, SOLUTION, CONCENTRATE INTRAVENOUS at 11:02

## 2021-02-14 RX ADMIN — MAGNESIUM SULFATE 2 G: 2 INJECTION INTRAVENOUS at 11:02

## 2021-02-14 RX ADMIN — POTASSIUM CHLORIDE 30 MEQ: 750 CAPSULE, EXTENDED RELEASE ORAL at 08:02

## 2021-02-14 RX ADMIN — POTASSIUM CHLORIDE 30 MEQ: 750 CAPSULE, EXTENDED RELEASE ORAL at 03:02

## 2021-02-14 RX ADMIN — INSULIN ASPART 3 UNITS: 100 INJECTION, SOLUTION INTRAVENOUS; SUBCUTANEOUS at 09:02

## 2021-02-14 RX ADMIN — FOLIC ACID 1 MG: 1 TABLET ORAL at 09:02

## 2021-02-14 RX ADMIN — MELATONIN TAB 3 MG 6 MG: 3 TAB at 08:02

## 2021-02-14 RX ADMIN — LORAZEPAM 2 MG: 2 INJECTION INTRAMUSCULAR; INTRAVENOUS at 12:02

## 2021-02-14 RX ADMIN — POTASSIUM CHLORIDE 30 MEQ: 750 CAPSULE, EXTENDED RELEASE ORAL at 11:02

## 2021-02-15 PROBLEM — R41.82 ALTERED MENTAL STATUS: Status: RESOLVED | Noted: 2021-02-12 | Resolved: 2021-02-15

## 2021-02-15 LAB
ALBUMIN SERPL BCP-MCNC: 2.5 G/DL (ref 3.5–5.2)
ALP SERPL-CCNC: 176 U/L (ref 55–135)
ALT SERPL W/O P-5'-P-CCNC: 73 U/L (ref 10–44)
ANION GAP SERPL CALC-SCNC: 10 MMOL/L (ref 8–16)
AST SERPL-CCNC: 137 U/L (ref 10–40)
BASOPHILS # BLD AUTO: 0.03 K/UL (ref 0–0.2)
BASOPHILS NFR BLD: 0.4 % (ref 0–1.9)
BILIRUB SERPL-MCNC: 0.6 MG/DL (ref 0.1–1)
BUN SERPL-MCNC: 3 MG/DL (ref 6–20)
CALCIUM SERPL-MCNC: 8.4 MG/DL (ref 8.7–10.5)
CHLORIDE SERPL-SCNC: 103 MMOL/L (ref 95–110)
CO2 SERPL-SCNC: 27 MMOL/L (ref 23–29)
CREAT SERPL-MCNC: 0.6 MG/DL (ref 0.5–1.4)
DIFFERENTIAL METHOD: ABNORMAL
EOSINOPHIL # BLD AUTO: 0 K/UL (ref 0–0.5)
EOSINOPHIL NFR BLD: 0.4 % (ref 0–8)
ERYTHROCYTE [DISTWIDTH] IN BLOOD BY AUTOMATED COUNT: 15.8 % (ref 11.5–14.5)
EST. GFR  (AFRICAN AMERICAN): >60 ML/MIN/1.73 M^2
EST. GFR  (NON AFRICAN AMERICAN): >60 ML/MIN/1.73 M^2
GAMMA INTERFERON BACKGROUND BLD IA-ACNC: 0.01 IU/ML
GLUCOSE SERPL-MCNC: 235 MG/DL (ref 70–110)
HCT VFR BLD AUTO: 37 % (ref 40–54)
HGB BLD-MCNC: 12.5 G/DL (ref 14–18)
IMM GRANULOCYTES # BLD AUTO: 0.03 K/UL (ref 0–0.04)
IMM GRANULOCYTES NFR BLD AUTO: 0.4 % (ref 0–0.5)
LYMPHOCYTES # BLD AUTO: 2 K/UL (ref 1–4.8)
LYMPHOCYTES NFR BLD: 29.2 % (ref 18–48)
M TB IFN-G CD4+ BCKGRND COR BLD-ACNC: 0.01 IU/ML
MAGNESIUM SERPL-MCNC: 2 MG/DL (ref 1.6–2.6)
MCH RBC QN AUTO: 33 PG (ref 27–31)
MCHC RBC AUTO-ENTMCNC: 33.8 G/DL (ref 32–36)
MCV RBC AUTO: 98 FL (ref 82–98)
MITOGEN IGNF BCKGRD COR BLD-ACNC: >10 IU/ML
MONOCYTES # BLD AUTO: 0.4 K/UL (ref 0.3–1)
MONOCYTES NFR BLD: 6.1 % (ref 4–15)
NEUTROPHILS # BLD AUTO: 4.4 K/UL (ref 1.8–7.7)
NEUTROPHILS NFR BLD: 63.5 % (ref 38–73)
NRBC BLD-RTO: 0 /100 WBC
PHOSPHATE SERPL-MCNC: 2.3 MG/DL (ref 2.7–4.5)
PLATELET # BLD AUTO: 173 K/UL (ref 150–350)
PMV BLD AUTO: 11 FL (ref 9.2–12.9)
POCT GLUCOSE: 136 MG/DL (ref 70–110)
POCT GLUCOSE: 151 MG/DL (ref 70–110)
POCT GLUCOSE: 376 MG/DL (ref 70–110)
POCT GLUCOSE: 417 MG/DL (ref 70–110)
POCT GLUCOSE: 468 MG/DL (ref 70–110)
POTASSIUM SERPL-SCNC: 3.6 MMOL/L (ref 3.5–5.1)
PROT SERPL-MCNC: 6.2 G/DL (ref 6–8.4)
RBC # BLD AUTO: 3.79 M/UL (ref 4.6–6.2)
SODIUM SERPL-SCNC: 140 MMOL/L (ref 136–145)
TB GOLD PLUS: NEGATIVE
TB2 - NIL: 0.01 IU/ML
WBC # BLD AUTO: 6.91 K/UL (ref 3.9–12.7)

## 2021-02-15 PROCEDURE — S4991 NICOTINE PATCH NONLEGEND: HCPCS | Performed by: STUDENT IN AN ORGANIZED HEALTH CARE EDUCATION/TRAINING PROGRAM

## 2021-02-15 PROCEDURE — 99232 PR SUBSEQUENT HOSPITAL CARE,LEVL II: ICD-10-PCS | Mod: ,,, | Performed by: INTERNAL MEDICINE

## 2021-02-15 PROCEDURE — 36415 COLL VENOUS BLD VENIPUNCTURE: CPT

## 2021-02-15 PROCEDURE — 25000003 PHARM REV CODE 250: Performed by: STUDENT IN AN ORGANIZED HEALTH CARE EDUCATION/TRAINING PROGRAM

## 2021-02-15 PROCEDURE — 80053 COMPREHEN METABOLIC PANEL: CPT

## 2021-02-15 PROCEDURE — 99232 SBSQ HOSP IP/OBS MODERATE 35: CPT | Mod: ,,, | Performed by: INTERNAL MEDICINE

## 2021-02-15 PROCEDURE — 63600175 PHARM REV CODE 636 W HCPCS: Performed by: STUDENT IN AN ORGANIZED HEALTH CARE EDUCATION/TRAINING PROGRAM

## 2021-02-15 PROCEDURE — 84100 ASSAY OF PHOSPHORUS: CPT

## 2021-02-15 PROCEDURE — 20600001 HC STEP DOWN PRIVATE ROOM

## 2021-02-15 PROCEDURE — 85025 COMPLETE CBC W/AUTO DIFF WBC: CPT

## 2021-02-15 PROCEDURE — 83735 ASSAY OF MAGNESIUM: CPT

## 2021-02-15 RX ORDER — LANOLIN ALCOHOL/MO/W.PET/CERES
100 CREAM (GRAM) TOPICAL DAILY
Qty: 30 TABLET | Refills: 11 | Status: SHIPPED | OUTPATIENT
Start: 2021-02-15 | End: 2021-03-17

## 2021-02-15 RX ORDER — METFORMIN HYDROCHLORIDE 500 MG/1
500 TABLET ORAL 2 TIMES DAILY WITH MEALS
Qty: 180 TABLET | Refills: 3 | Status: SHIPPED | OUTPATIENT
Start: 2021-02-15 | End: 2021-04-05 | Stop reason: HOSPADM

## 2021-02-15 RX ORDER — INSULIN ASPART 100 [IU]/ML
1-10 INJECTION, SOLUTION INTRAVENOUS; SUBCUTANEOUS
Status: DISCONTINUED | OUTPATIENT
Start: 2021-02-15 | End: 2021-02-18

## 2021-02-15 RX ORDER — FOLIC ACID 1 MG/1
1 TABLET ORAL DAILY
Qty: 30 TABLET | Refills: 11 | Status: SHIPPED | OUTPATIENT
Start: 2021-02-16 | End: 2022-03-30

## 2021-02-15 RX ADMIN — INSULIN ASPART 5 UNITS: 100 INJECTION, SOLUTION INTRAVENOUS; SUBCUTANEOUS at 05:02

## 2021-02-15 RX ADMIN — POTASSIUM CHLORIDE 30 MEQ: 750 CAPSULE, EXTENDED RELEASE ORAL at 12:02

## 2021-02-15 RX ADMIN — INSULIN ASPART 5 UNITS: 100 INJECTION, SOLUTION INTRAVENOUS; SUBCUTANEOUS at 04:02

## 2021-02-15 RX ADMIN — NICOTINE 1 PATCH: 21 PATCH, EXTENDED RELEASE TRANSDERMAL at 09:02

## 2021-02-15 RX ADMIN — POTASSIUM CHLORIDE 30 MEQ: 750 CAPSULE, EXTENDED RELEASE ORAL at 04:02

## 2021-02-15 RX ADMIN — POTASSIUM CHLORIDE 30 MEQ: 750 CAPSULE, EXTENDED RELEASE ORAL at 09:02

## 2021-02-15 RX ADMIN — THERA TABS 1 TABLET: TAB at 09:02

## 2021-02-15 RX ADMIN — MUPIROCIN: 20 OINTMENT TOPICAL at 09:02

## 2021-02-15 RX ADMIN — LORAZEPAM 2 MG: 2 INJECTION INTRAMUSCULAR at 12:02

## 2021-02-15 RX ADMIN — THIAMINE HYDROCHLORIDE 250 MG: 100 INJECTION, SOLUTION INTRAMUSCULAR; INTRAVENOUS at 11:02

## 2021-02-15 RX ADMIN — FOLIC ACID 1 MG: 1 TABLET ORAL at 09:02

## 2021-02-16 PROBLEM — Z02.9 DISCHARGE PLANNING ISSUES: Status: ACTIVE | Noted: 2021-02-16

## 2021-02-16 PROBLEM — Z75.8 DISCHARGE PLANNING ISSUES: Status: ACTIVE | Noted: 2021-02-16

## 2021-02-16 LAB
ALBUMIN SERPL BCP-MCNC: 2.3 G/DL (ref 3.5–5.2)
ALP SERPL-CCNC: 206 U/L (ref 55–135)
ALT SERPL W/O P-5'-P-CCNC: 75 U/L (ref 10–44)
ANION GAP SERPL CALC-SCNC: 7 MMOL/L (ref 8–16)
AST SERPL-CCNC: 111 U/L (ref 10–40)
BASOPHILS # BLD AUTO: 0.05 K/UL (ref 0–0.2)
BASOPHILS NFR BLD: 0.6 % (ref 0–1.9)
BILIRUB SERPL-MCNC: 0.3 MG/DL (ref 0.1–1)
BUN SERPL-MCNC: 7 MG/DL (ref 6–20)
CALCIUM SERPL-MCNC: 8.4 MG/DL (ref 8.7–10.5)
CHLORIDE SERPL-SCNC: 99 MMOL/L (ref 95–110)
CO2 SERPL-SCNC: 28 MMOL/L (ref 23–29)
CREAT SERPL-MCNC: 0.6 MG/DL (ref 0.5–1.4)
DIFFERENTIAL METHOD: ABNORMAL
EOSINOPHIL # BLD AUTO: 0 K/UL (ref 0–0.5)
EOSINOPHIL NFR BLD: 0.4 % (ref 0–8)
ERYTHROCYTE [DISTWIDTH] IN BLOOD BY AUTOMATED COUNT: 15.9 % (ref 11.5–14.5)
EST. GFR  (AFRICAN AMERICAN): >60 ML/MIN/1.73 M^2
EST. GFR  (NON AFRICAN AMERICAN): >60 ML/MIN/1.73 M^2
GLUCOSE SERPL-MCNC: 343 MG/DL (ref 70–110)
HCT VFR BLD AUTO: 32.5 % (ref 40–54)
HCV RNA SERPL NAA+PROBE-LOG IU: <1.08 LOG (10) IU/ML
HCV RNA SERPL QL NAA+PROBE: NOT DETECTED IU/ML
HCV RNA SPEC NAA+PROBE-ACNC: <12 IU/ML
HGB BLD-MCNC: 11.3 G/DL (ref 14–18)
IMM GRANULOCYTES # BLD AUTO: 0.06 K/UL (ref 0–0.04)
IMM GRANULOCYTES NFR BLD AUTO: 0.8 % (ref 0–0.5)
LYMPHOCYTES # BLD AUTO: 2.1 K/UL (ref 1–4.8)
LYMPHOCYTES NFR BLD: 26.7 % (ref 18–48)
MAGNESIUM SERPL-MCNC: 1.8 MG/DL (ref 1.6–2.6)
MCH RBC QN AUTO: 33.7 PG (ref 27–31)
MCHC RBC AUTO-ENTMCNC: 34.8 G/DL (ref 32–36)
MCV RBC AUTO: 97 FL (ref 82–98)
MONOCYTES # BLD AUTO: 0.6 K/UL (ref 0.3–1)
MONOCYTES NFR BLD: 8.2 % (ref 4–15)
NEUTROPHILS # BLD AUTO: 4.9 K/UL (ref 1.8–7.7)
NEUTROPHILS NFR BLD: 63.3 % (ref 38–73)
NRBC BLD-RTO: 0 /100 WBC
PHOSPHATE SERPL-MCNC: 2.5 MG/DL (ref 2.7–4.5)
PLATELET # BLD AUTO: 178 K/UL (ref 150–350)
PMV BLD AUTO: 10.6 FL (ref 9.2–12.9)
POCT GLUCOSE: 104 MG/DL (ref 70–110)
POCT GLUCOSE: 222 MG/DL (ref 70–110)
POCT GLUCOSE: 360 MG/DL (ref 70–110)
POCT GLUCOSE: 385 MG/DL (ref 70–110)
POTASSIUM SERPL-SCNC: 4.1 MMOL/L (ref 3.5–5.1)
PROT SERPL-MCNC: 5.4 G/DL (ref 6–8.4)
RBC # BLD AUTO: 3.35 M/UL (ref 4.6–6.2)
SODIUM SERPL-SCNC: 134 MMOL/L (ref 136–145)
WBC # BLD AUTO: 7.78 K/UL (ref 3.9–12.7)

## 2021-02-16 PROCEDURE — 99233 SBSQ HOSP IP/OBS HIGH 50: CPT | Mod: ,,, | Performed by: INTERNAL MEDICINE

## 2021-02-16 PROCEDURE — 85025 COMPLETE CBC W/AUTO DIFF WBC: CPT

## 2021-02-16 PROCEDURE — 25000003 PHARM REV CODE 250: Performed by: STUDENT IN AN ORGANIZED HEALTH CARE EDUCATION/TRAINING PROGRAM

## 2021-02-16 PROCEDURE — 63600175 PHARM REV CODE 636 W HCPCS: Performed by: STUDENT IN AN ORGANIZED HEALTH CARE EDUCATION/TRAINING PROGRAM

## 2021-02-16 PROCEDURE — C9399 UNCLASSIFIED DRUGS OR BIOLOG: HCPCS | Performed by: STUDENT IN AN ORGANIZED HEALTH CARE EDUCATION/TRAINING PROGRAM

## 2021-02-16 PROCEDURE — 83735 ASSAY OF MAGNESIUM: CPT

## 2021-02-16 PROCEDURE — 99233 PR SUBSEQUENT HOSPITAL CARE,LEVL III: ICD-10-PCS | Mod: ,,, | Performed by: INTERNAL MEDICINE

## 2021-02-16 PROCEDURE — 36415 COLL VENOUS BLD VENIPUNCTURE: CPT

## 2021-02-16 PROCEDURE — 80053 COMPREHEN METABOLIC PANEL: CPT

## 2021-02-16 PROCEDURE — S4991 NICOTINE PATCH NONLEGEND: HCPCS | Performed by: STUDENT IN AN ORGANIZED HEALTH CARE EDUCATION/TRAINING PROGRAM

## 2021-02-16 PROCEDURE — 84100 ASSAY OF PHOSPHORUS: CPT

## 2021-02-16 PROCEDURE — 20600001 HC STEP DOWN PRIVATE ROOM

## 2021-02-16 RX ORDER — ENOXAPARIN SODIUM 100 MG/ML
40 INJECTION SUBCUTANEOUS EVERY 24 HOURS
Status: DISCONTINUED | OUTPATIENT
Start: 2021-02-16 | End: 2021-02-18

## 2021-02-16 RX ADMIN — INSULIN ASPART 2 UNITS: 100 INJECTION, SOLUTION INTRAVENOUS; SUBCUTANEOUS at 09:02

## 2021-02-16 RX ADMIN — ENOXAPARIN SODIUM 40 MG: 40 INJECTION SUBCUTANEOUS at 04:02

## 2021-02-16 RX ADMIN — THERA TABS 1 TABLET: TAB at 09:02

## 2021-02-16 RX ADMIN — NICOTINE 1 PATCH: 21 PATCH, EXTENDED RELEASE TRANSDERMAL at 09:02

## 2021-02-16 RX ADMIN — FOLIC ACID 1 MG: 1 TABLET ORAL at 09:02

## 2021-02-16 RX ADMIN — THIAMINE HYDROCHLORIDE 250 MG: 100 INJECTION, SOLUTION INTRAMUSCULAR; INTRAVENOUS at 09:02

## 2021-02-16 RX ADMIN — SODIUM PHOSPHATE, MONOBASIC, MONOHYDRATE 15 MMOL: 276; 142 INJECTION, SOLUTION INTRAVENOUS at 09:02

## 2021-02-16 RX ADMIN — MUPIROCIN: 20 OINTMENT TOPICAL at 09:02

## 2021-02-16 RX ADMIN — INSULIN ASPART 10 UNITS: 100 INJECTION, SOLUTION INTRAVENOUS; SUBCUTANEOUS at 04:02

## 2021-02-16 RX ADMIN — INSULIN DETEMIR 5 UNITS: 100 INJECTION, SOLUTION SUBCUTANEOUS at 09:02

## 2021-02-17 LAB
ALBUMIN SERPL BCP-MCNC: 2.3 G/DL (ref 3.5–5.2)
ALP SERPL-CCNC: 141 U/L (ref 55–135)
ALT SERPL W/O P-5'-P-CCNC: 63 U/L (ref 10–44)
ANION GAP SERPL CALC-SCNC: 7 MMOL/L (ref 8–16)
AST SERPL-CCNC: 68 U/L (ref 10–40)
BACTERIA BLD CULT: NORMAL
BACTERIA BLD CULT: NORMAL
BASOPHILS # BLD AUTO: 0.06 K/UL (ref 0–0.2)
BASOPHILS NFR BLD: 0.8 % (ref 0–1.9)
BILIRUB SERPL-MCNC: 0.3 MG/DL (ref 0.1–1)
BUN SERPL-MCNC: 9 MG/DL (ref 6–20)
CALCIUM SERPL-MCNC: 8.2 MG/DL (ref 8.7–10.5)
CHLORIDE SERPL-SCNC: 100 MMOL/L (ref 95–110)
CO2 SERPL-SCNC: 27 MMOL/L (ref 23–29)
CREAT SERPL-MCNC: 0.6 MG/DL (ref 0.5–1.4)
DIFFERENTIAL METHOD: ABNORMAL
EOSINOPHIL # BLD AUTO: 0 K/UL (ref 0–0.5)
EOSINOPHIL NFR BLD: 0.4 % (ref 0–8)
ERYTHROCYTE [DISTWIDTH] IN BLOOD BY AUTOMATED COUNT: 15.9 % (ref 11.5–14.5)
EST. GFR  (AFRICAN AMERICAN): >60 ML/MIN/1.73 M^2
EST. GFR  (NON AFRICAN AMERICAN): >60 ML/MIN/1.73 M^2
GLUCOSE SERPL-MCNC: 297 MG/DL (ref 70–110)
HCT VFR BLD AUTO: 32.1 % (ref 40–54)
HGB BLD-MCNC: 11 G/DL (ref 14–18)
IMM GRANULOCYTES # BLD AUTO: 0.07 K/UL (ref 0–0.04)
IMM GRANULOCYTES NFR BLD AUTO: 0.9 % (ref 0–0.5)
LYMPHOCYTES # BLD AUTO: 2.5 K/UL (ref 1–4.8)
LYMPHOCYTES NFR BLD: 32.9 % (ref 18–48)
MAGNESIUM SERPL-MCNC: 1.7 MG/DL (ref 1.6–2.6)
MCH RBC QN AUTO: 33.5 PG (ref 27–31)
MCHC RBC AUTO-ENTMCNC: 34.3 G/DL (ref 32–36)
MCV RBC AUTO: 98 FL (ref 82–98)
MONOCYTES # BLD AUTO: 0.8 K/UL (ref 0.3–1)
MONOCYTES NFR BLD: 10.8 % (ref 4–15)
NEUTROPHILS # BLD AUTO: 4.2 K/UL (ref 1.8–7.7)
NEUTROPHILS NFR BLD: 54.2 % (ref 38–73)
NRBC BLD-RTO: 0 /100 WBC
PHOSPHATE SERPL-MCNC: 3.3 MG/DL (ref 2.7–4.5)
PLATELET # BLD AUTO: 199 K/UL (ref 150–350)
PMV BLD AUTO: 10.1 FL (ref 9.2–12.9)
POCT GLUCOSE: 112 MG/DL (ref 70–110)
POCT GLUCOSE: 180 MG/DL (ref 70–110)
POCT GLUCOSE: 180 MG/DL (ref 70–110)
POCT GLUCOSE: 238 MG/DL (ref 70–110)
POTASSIUM SERPL-SCNC: 3.7 MMOL/L (ref 3.5–5.1)
PROT SERPL-MCNC: 5.5 G/DL (ref 6–8.4)
RBC # BLD AUTO: 3.28 M/UL (ref 4.6–6.2)
SODIUM SERPL-SCNC: 134 MMOL/L (ref 136–145)
VIT B1 BLD-MCNC: 51 UG/L (ref 38–122)
WBC # BLD AUTO: 7.72 K/UL (ref 3.9–12.7)

## 2021-02-17 PROCEDURE — 93005 ELECTROCARDIOGRAM TRACING: CPT

## 2021-02-17 PROCEDURE — 36415 COLL VENOUS BLD VENIPUNCTURE: CPT

## 2021-02-17 PROCEDURE — 93010 ELECTROCARDIOGRAM REPORT: CPT | Mod: ,,, | Performed by: INTERNAL MEDICINE

## 2021-02-17 PROCEDURE — S4991 NICOTINE PATCH NONLEGEND: HCPCS | Performed by: STUDENT IN AN ORGANIZED HEALTH CARE EDUCATION/TRAINING PROGRAM

## 2021-02-17 PROCEDURE — 20600001 HC STEP DOWN PRIVATE ROOM

## 2021-02-17 PROCEDURE — 25000003 PHARM REV CODE 250: Performed by: STUDENT IN AN ORGANIZED HEALTH CARE EDUCATION/TRAINING PROGRAM

## 2021-02-17 PROCEDURE — 99233 SBSQ HOSP IP/OBS HIGH 50: CPT | Mod: ,,, | Performed by: INTERNAL MEDICINE

## 2021-02-17 PROCEDURE — 84100 ASSAY OF PHOSPHORUS: CPT

## 2021-02-17 PROCEDURE — 80053 COMPREHEN METABOLIC PANEL: CPT

## 2021-02-17 PROCEDURE — 97116 GAIT TRAINING THERAPY: CPT

## 2021-02-17 PROCEDURE — 85025 COMPLETE CBC W/AUTO DIFF WBC: CPT

## 2021-02-17 PROCEDURE — C9399 UNCLASSIFIED DRUGS OR BIOLOG: HCPCS | Performed by: STUDENT IN AN ORGANIZED HEALTH CARE EDUCATION/TRAINING PROGRAM

## 2021-02-17 PROCEDURE — 99233 PR SUBSEQUENT HOSPITAL CARE,LEVL III: ICD-10-PCS | Mod: ,,, | Performed by: INTERNAL MEDICINE

## 2021-02-17 PROCEDURE — 93010 EKG 12-LEAD: ICD-10-PCS | Mod: ,,, | Performed by: INTERNAL MEDICINE

## 2021-02-17 PROCEDURE — 97530 THERAPEUTIC ACTIVITIES: CPT

## 2021-02-17 PROCEDURE — 83735 ASSAY OF MAGNESIUM: CPT

## 2021-02-17 RX ADMIN — THERA TABS 1 TABLET: TAB at 08:02

## 2021-02-17 RX ADMIN — MUPIROCIN: 20 OINTMENT TOPICAL at 08:02

## 2021-02-17 RX ADMIN — INSULIN ASPART 2 UNITS: 100 INJECTION, SOLUTION INTRAVENOUS; SUBCUTANEOUS at 12:02

## 2021-02-17 RX ADMIN — INSULIN ASPART 2 UNITS: 100 INJECTION, SOLUTION INTRAVENOUS; SUBCUTANEOUS at 04:02

## 2021-02-17 RX ADMIN — INSULIN ASPART 4 UNITS: 100 INJECTION, SOLUTION INTRAVENOUS; SUBCUTANEOUS at 08:02

## 2021-02-17 RX ADMIN — INSULIN DETEMIR 8 UNITS: 100 INJECTION, SOLUTION SUBCUTANEOUS at 08:02

## 2021-02-17 RX ADMIN — FOLIC ACID 1 MG: 1 TABLET ORAL at 08:02

## 2021-02-17 RX ADMIN — NICOTINE 1 PATCH: 21 PATCH, EXTENDED RELEASE TRANSDERMAL at 08:02

## 2021-02-18 PROBLEM — R64 CACHECTIC: Status: ACTIVE | Noted: 2021-02-18

## 2021-02-18 PROBLEM — R40.2430 GLASGOW COMA SCALE TOTAL SCORE 3-8: Status: ACTIVE | Noted: 2021-02-18

## 2021-02-18 PROBLEM — R56.9 SEIZURE: Status: ACTIVE | Noted: 2021-02-18

## 2021-02-18 PROBLEM — J43.9 BLEB, LUNG: Status: ACTIVE | Noted: 2021-02-18

## 2021-02-18 LAB
ALBUMIN SERPL BCP-MCNC: 2.9 G/DL (ref 3.5–5.2)
ALLENS TEST: ABNORMAL
ALLENS TEST: ABNORMAL
ALP SERPL-CCNC: 141 U/L (ref 55–135)
ALT SERPL W/O P-5'-P-CCNC: 66 U/L (ref 10–44)
AMMONIA PLAS-SCNC: 35 UMOL/L (ref 10–50)
ANION GAP SERPL CALC-SCNC: 13 MMOL/L (ref 8–16)
AST SERPL-CCNC: 76 U/L (ref 10–40)
BASOPHILS # BLD AUTO: 0.03 K/UL (ref 0–0.2)
BASOPHILS NFR BLD: 0.3 % (ref 0–1.9)
BILIRUB SERPL-MCNC: 0.5 MG/DL (ref 0.1–1)
BUN SERPL-MCNC: 11 MG/DL (ref 6–20)
CALCIUM SERPL-MCNC: 9 MG/DL (ref 8.7–10.5)
CHLORIDE SERPL-SCNC: 97 MMOL/L (ref 95–110)
CK SERPL-CCNC: 175 U/L (ref 20–200)
CLARITY CSF: CLEAR
CLARITY CSF: CLEAR
CO2 SERPL-SCNC: 26 MMOL/L (ref 23–29)
COLOR CSF: COLORLESS
COLOR CSF: COLORLESS
CREAT SERPL-MCNC: 0.7 MG/DL (ref 0.5–1.4)
CSF, COMMENT: ABNORMAL
DELSYS: ABNORMAL
DELSYS: ABNORMAL
DIFFERENTIAL METHOD: ABNORMAL
EOSINOPHIL # BLD AUTO: 0 K/UL (ref 0–0.5)
EOSINOPHIL NFR BLD: 0.3 % (ref 0–8)
ERYTHROCYTE [DISTWIDTH] IN BLOOD BY AUTOMATED COUNT: 16.1 % (ref 11.5–14.5)
EST. GFR  (AFRICAN AMERICAN): >60 ML/MIN/1.73 M^2
EST. GFR  (NON AFRICAN AMERICAN): >60 ML/MIN/1.73 M^2
FACT X PPP CHRO-ACNC: <0.1 IU/ML (ref 0.3–0.7)
GLUCOSE CSF-MCNC: 108 MG/DL (ref 40–70)
GLUCOSE SERPL-MCNC: 234 MG/DL (ref 70–110)
HCO3 UR-SCNC: 29.6 MMOL/L (ref 24–28)
HCO3 UR-SCNC: 30.3 MMOL/L (ref 24–28)
HCT VFR BLD AUTO: 38.7 % (ref 40–54)
HGB BLD-MCNC: 12.9 G/DL (ref 14–18)
IMM GRANULOCYTES # BLD AUTO: 0.09 K/UL (ref 0–0.04)
IMM GRANULOCYTES NFR BLD AUTO: 1 % (ref 0–0.5)
LACTATE SERPL-SCNC: 1.4 MMOL/L (ref 0.5–2.2)
LACTATE SERPL-SCNC: 5.5 MMOL/L (ref 0.5–2.2)
LYMPHOCYTES # BLD AUTO: 2.6 K/UL (ref 1–4.8)
LYMPHOCYTES NFR BLD: 28.8 % (ref 18–48)
LYMPHOCYTES NFR CSF MANUAL: 100 % (ref 40–80)
MAGNESIUM SERPL-MCNC: 1.7 MG/DL (ref 1.6–2.6)
MCH RBC QN AUTO: 33.3 PG (ref 27–31)
MCHC RBC AUTO-ENTMCNC: 33.3 G/DL (ref 32–36)
MCV RBC AUTO: 100 FL (ref 82–98)
MODE: ABNORMAL
MONOCYTES # BLD AUTO: 1.2 K/UL (ref 0.3–1)
MONOCYTES NFR BLD: 12.8 % (ref 4–15)
NEUTROPHILS # BLD AUTO: 5.2 K/UL (ref 1.8–7.7)
NEUTROPHILS NFR BLD: 56.8 % (ref 38–73)
NRBC BLD-RTO: 0 /100 WBC
PCO2 BLDA: 43.6 MMHG (ref 35–45)
PCO2 BLDA: 69.9 MMHG (ref 35–45)
PH SMN: 7.24 [PH] (ref 7.35–7.45)
PH SMN: 7.45 [PH] (ref 7.35–7.45)
PLATELET # BLD AUTO: 231 K/UL (ref 150–350)
PMV BLD AUTO: 9.4 FL (ref 9.2–12.9)
PO2 BLDA: 34 MMHG (ref 40–60)
PO2 BLDA: 391 MMHG (ref 80–100)
POC BE: 2 MMOL/L
POC BE: 6 MMOL/L
POC SATURATED O2: 100 % (ref 95–100)
POC SATURATED O2: 53 % (ref 95–100)
POC TCO2: 32 MMOL/L (ref 23–27)
POC TCO2: 32 MMOL/L (ref 24–29)
POCT GLUCOSE: 140 MG/DL (ref 70–110)
POCT GLUCOSE: 144 MG/DL (ref 70–110)
POCT GLUCOSE: 148 MG/DL (ref 70–110)
POCT GLUCOSE: 165 MG/DL (ref 70–110)
POCT GLUCOSE: 216 MG/DL (ref 70–110)
POCT GLUCOSE: 220 MG/DL (ref 70–110)
POTASSIUM SERPL-SCNC: 4 MMOL/L (ref 3.5–5.1)
PROT CSF-MCNC: 54 MG/DL (ref 15–40)
PROT SERPL-MCNC: 6.8 G/DL (ref 6–8.4)
RBC # BLD AUTO: 3.87 M/UL (ref 4.6–6.2)
RBC # CSF: 5 /CU MM
RBC # CSF: 6 /CU MM
SAMPLE: ABNORMAL
SAMPLE: ABNORMAL
SITE: ABNORMAL
SITE: ABNORMAL
SODIUM SERPL-SCNC: 136 MMOL/L (ref 136–145)
SPECIMEN VOL CSF: 4 ML
SPECIMEN VOL CSF: 6 ML
T4 SERPL-MCNC: 4.8 UG/DL (ref 4.5–11.5)
WBC # BLD AUTO: 9.11 K/UL (ref 3.9–12.7)
WBC # CSF: 0 /CU MM (ref 0–5)
WBC # CSF: 0 /CU MM (ref 0–5)

## 2021-02-18 PROCEDURE — 83735 ASSAY OF MAGNESIUM: CPT

## 2021-02-18 PROCEDURE — 99900035 HC TECH TIME PER 15 MIN (STAT)

## 2021-02-18 PROCEDURE — 87529 HSV DNA AMP PROBE: CPT

## 2021-02-18 PROCEDURE — 62270 PR SPINAL PUNCTURE,LUMBAR,DIAGNOSTIC: ICD-10-PCS | Mod: ,,, | Performed by: PSYCHIATRY & NEUROLOGY

## 2021-02-18 PROCEDURE — 27000221 HC OXYGEN, UP TO 24 HOURS

## 2021-02-18 PROCEDURE — 80053 COMPREHEN METABOLIC PANEL: CPT

## 2021-02-18 PROCEDURE — 84436 ASSAY OF TOTAL THYROXINE: CPT

## 2021-02-18 PROCEDURE — 85520 HEPARIN ASSAY: CPT

## 2021-02-18 PROCEDURE — 95700 EEG CONT REC W/VID EEG TECH: CPT

## 2021-02-18 PROCEDURE — 82803 BLOOD GASES ANY COMBINATION: CPT

## 2021-02-18 PROCEDURE — 27100245 HC EEG CAPS, DISPOSABLE

## 2021-02-18 PROCEDURE — 62270 DX LMBR SPI PNXR: CPT | Mod: ,,, | Performed by: PSYCHIATRY & NEUROLOGY

## 2021-02-18 PROCEDURE — 95714 VEEG EA 12-26 HR UNMNTR: CPT

## 2021-02-18 PROCEDURE — S4991 NICOTINE PATCH NONLEGEND: HCPCS | Performed by: STUDENT IN AN ORGANIZED HEALTH CARE EDUCATION/TRAINING PROGRAM

## 2021-02-18 PROCEDURE — 95720 EEG PHY/QHP EA INCR W/VEEG: CPT | Mod: ,,, | Performed by: PSYCHIATRY & NEUROLOGY

## 2021-02-18 PROCEDURE — 36415 COLL VENOUS BLD VENIPUNCTURE: CPT

## 2021-02-18 PROCEDURE — 93010 EKG 12-LEAD: ICD-10-PCS | Mod: ,,, | Performed by: INTERNAL MEDICINE

## 2021-02-18 PROCEDURE — 87205 SMEAR GRAM STAIN: CPT

## 2021-02-18 PROCEDURE — 20000000 HC ICU ROOM

## 2021-02-18 PROCEDURE — 63600175 PHARM REV CODE 636 W HCPCS: Performed by: STUDENT IN AN ORGANIZED HEALTH CARE EDUCATION/TRAINING PROGRAM

## 2021-02-18 PROCEDURE — 87070 CULTURE OTHR SPECIMN AEROBIC: CPT

## 2021-02-18 PROCEDURE — 36600 WITHDRAWAL OF ARTERIAL BLOOD: CPT

## 2021-02-18 PROCEDURE — 25000003 PHARM REV CODE 250: Performed by: PSYCHIATRY & NEUROLOGY

## 2021-02-18 PROCEDURE — 99000 SPECIMEN HANDLING OFFICE-LAB: CPT

## 2021-02-18 PROCEDURE — 86255 FLUORESCENT ANTIBODY SCREEN: CPT | Mod: 91

## 2021-02-18 PROCEDURE — 83605 ASSAY OF LACTIC ACID: CPT

## 2021-02-18 PROCEDURE — 63600175 PHARM REV CODE 636 W HCPCS: Performed by: PSYCHIATRY & NEUROLOGY

## 2021-02-18 PROCEDURE — 94761 N-INVAS EAR/PLS OXIMETRY MLT: CPT

## 2021-02-18 PROCEDURE — 99291 CRITICAL CARE FIRST HOUR: CPT | Mod: ,,, | Performed by: NURSE PRACTITIONER

## 2021-02-18 PROCEDURE — 63600175 PHARM REV CODE 636 W HCPCS: Performed by: NURSE PRACTITIONER

## 2021-02-18 PROCEDURE — 95720 PR EEG, W/VIDEO, CONT RECORD, I&R, >12<26 HRS: ICD-10-PCS | Mod: ,,, | Performed by: PSYCHIATRY & NEUROLOGY

## 2021-02-18 PROCEDURE — 93010 ELECTROCARDIOGRAM REPORT: CPT | Mod: ,,, | Performed by: INTERNAL MEDICINE

## 2021-02-18 PROCEDURE — 85025 COMPLETE CBC W/AUTO DIFF WBC: CPT

## 2021-02-18 PROCEDURE — 87798 DETECT AGENT NOS DNA AMP: CPT

## 2021-02-18 PROCEDURE — 83605 ASSAY OF LACTIC ACID: CPT | Mod: 91

## 2021-02-18 PROCEDURE — 82140 ASSAY OF AMMONIA: CPT

## 2021-02-18 PROCEDURE — 63600175 PHARM REV CODE 636 W HCPCS

## 2021-02-18 PROCEDURE — 84157 ASSAY OF PROTEIN OTHER: CPT

## 2021-02-18 PROCEDURE — 99291 PR CRITICAL CARE, E/M 30-74 MINUTES: ICD-10-PCS | Mod: ,,, | Performed by: NURSE PRACTITIONER

## 2021-02-18 PROCEDURE — 25000003 PHARM REV CODE 250: Performed by: NURSE PRACTITIONER

## 2021-02-18 PROCEDURE — 93005 ELECTROCARDIOGRAM TRACING: CPT

## 2021-02-18 PROCEDURE — 82550 ASSAY OF CK (CPK): CPT

## 2021-02-18 PROCEDURE — 82945 GLUCOSE OTHER FLUID: CPT

## 2021-02-18 PROCEDURE — 89051 BODY FLUID CELL COUNT: CPT | Mod: 91

## 2021-02-18 PROCEDURE — 25000003 PHARM REV CODE 250: Performed by: STUDENT IN AN ORGANIZED HEALTH CARE EDUCATION/TRAINING PROGRAM

## 2021-02-18 PROCEDURE — 86592 SYPHILIS TEST NON-TREP QUAL: CPT

## 2021-02-18 RX ORDER — INSULIN ASPART 100 [IU]/ML
1-10 INJECTION, SOLUTION INTRAVENOUS; SUBCUTANEOUS EVERY 6 HOURS PRN
Status: DISCONTINUED | OUTPATIENT
Start: 2021-02-18 | End: 2021-02-19

## 2021-02-18 RX ORDER — FENTANYL CITRATE 50 UG/ML
12.5 INJECTION, SOLUTION INTRAMUSCULAR; INTRAVENOUS ONCE
Status: DISCONTINUED | OUTPATIENT
Start: 2021-02-18 | End: 2021-02-19

## 2021-02-18 RX ORDER — POTASSIUM CHLORIDE 7.45 MG/ML
60 INJECTION INTRAVENOUS
Status: DISCONTINUED | OUTPATIENT
Start: 2021-02-18 | End: 2021-02-25

## 2021-02-18 RX ORDER — MIDAZOLAM HYDROCHLORIDE 1 MG/ML
INJECTION INTRAMUSCULAR; INTRAVENOUS
Status: COMPLETED
Start: 2021-02-18 | End: 2021-02-18

## 2021-02-18 RX ORDER — POTASSIUM CHLORIDE 7.45 MG/ML
80 INJECTION INTRAVENOUS
Status: DISCONTINUED | OUTPATIENT
Start: 2021-02-18 | End: 2021-02-25

## 2021-02-18 RX ORDER — MAGNESIUM SULFATE HEPTAHYDRATE 40 MG/ML
2 INJECTION, SOLUTION INTRAVENOUS
Status: DISCONTINUED | OUTPATIENT
Start: 2021-02-18 | End: 2021-02-25

## 2021-02-18 RX ORDER — FOLIC ACID 1 MG/1
1 TABLET ORAL DAILY
Status: DISCONTINUED | OUTPATIENT
Start: 2021-02-19 | End: 2021-02-22

## 2021-02-18 RX ORDER — LEVETIRACETAM 15 MG/ML
1500 INJECTION INTRAVASCULAR ONCE
Status: DISCONTINUED | OUTPATIENT
Start: 2021-02-18 | End: 2021-02-18

## 2021-02-18 RX ORDER — MAGNESIUM SULFATE HEPTAHYDRATE 40 MG/ML
4 INJECTION, SOLUTION INTRAVENOUS
Status: DISCONTINUED | OUTPATIENT
Start: 2021-02-18 | End: 2021-02-25

## 2021-02-18 RX ORDER — THIAMINE HCL 100 MG
100 TABLET ORAL DAILY
Status: DISCONTINUED | OUTPATIENT
Start: 2021-02-18 | End: 2021-02-18

## 2021-02-18 RX ORDER — POTASSIUM CHLORIDE 7.45 MG/ML
40 INJECTION INTRAVENOUS
Status: DISCONTINUED | OUTPATIENT
Start: 2021-02-18 | End: 2021-02-25

## 2021-02-18 RX ORDER — ACETAMINOPHEN 325 MG/1
650 TABLET ORAL EVERY 6 HOURS PRN
Status: DISCONTINUED | OUTPATIENT
Start: 2021-02-18 | End: 2021-02-22

## 2021-02-18 RX ORDER — MIDAZOLAM HYDROCHLORIDE 1 MG/ML
0.5 INJECTION INTRAMUSCULAR; INTRAVENOUS ONCE
Status: COMPLETED | OUTPATIENT
Start: 2021-02-18 | End: 2021-02-18

## 2021-02-18 RX ORDER — THIAMINE HCL 100 MG
100 TABLET ORAL DAILY
Status: DISCONTINUED | OUTPATIENT
Start: 2021-02-19 | End: 2021-02-21

## 2021-02-18 RX ORDER — SODIUM CHLORIDE, SODIUM LACTATE, POTASSIUM CHLORIDE, CALCIUM CHLORIDE 600; 310; 30; 20 MG/100ML; MG/100ML; MG/100ML; MG/100ML
INJECTION, SOLUTION INTRAVENOUS CONTINUOUS
Status: DISCONTINUED | OUTPATIENT
Start: 2021-02-18 | End: 2021-02-19

## 2021-02-18 RX ORDER — FENTANYL CITRATE 50 UG/ML
INJECTION, SOLUTION INTRAMUSCULAR; INTRAVENOUS
Status: COMPLETED
Start: 2021-02-18 | End: 2021-02-18

## 2021-02-18 RX ADMIN — FENTANYL CITRATE 12.5 MCG: 50 INJECTION INTRAMUSCULAR; INTRAVENOUS at 02:02

## 2021-02-18 RX ADMIN — DEXTROSE 750 MG: 50 INJECTION, SOLUTION INTRAVENOUS at 08:02

## 2021-02-18 RX ADMIN — MIDAZOLAM 0.5 MG: 1 INJECTION INTRAMUSCULAR; INTRAVENOUS at 03:02

## 2021-02-18 RX ADMIN — LORAZEPAM 2 MG: 2 INJECTION INTRAMUSCULAR at 01:02

## 2021-02-18 RX ADMIN — DEXTROSE 750 MG: 50 INJECTION, SOLUTION INTRAVENOUS at 01:02

## 2021-02-18 RX ADMIN — MIDAZOLAM HYDROCHLORIDE 0.5 MG: 1 INJECTION INTRAMUSCULAR; INTRAVENOUS at 03:02

## 2021-02-18 RX ADMIN — SODIUM CHLORIDE, SODIUM LACTATE, POTASSIUM CHLORIDE, AND CALCIUM CHLORIDE: .6; .31; .03; .02 INJECTION, SOLUTION INTRAVENOUS at 02:02

## 2021-02-18 RX ADMIN — NICOTINE 1 PATCH: 21 PATCH, EXTENDED RELEASE TRANSDERMAL at 08:02

## 2021-02-18 RX ADMIN — THIAMINE HYDROCHLORIDE 500 MG: 100 INJECTION, SOLUTION INTRAMUSCULAR; INTRAVENOUS at 11:02

## 2021-02-19 PROBLEM — T68.XXXA HYPOTHERMIA: Status: RESOLVED | Noted: 2021-02-12 | Resolved: 2021-02-19

## 2021-02-19 PROBLEM — E43 SEVERE MALNUTRITION: Status: ACTIVE | Noted: 2021-02-19

## 2021-02-19 LAB
ALBUMIN SERPL BCP-MCNC: 2.6 G/DL (ref 3.5–5.2)
ALP SERPL-CCNC: 118 U/L (ref 55–135)
ALT SERPL W/O P-5'-P-CCNC: 58 U/L (ref 10–44)
ANION GAP SERPL CALC-SCNC: 12 MMOL/L (ref 8–16)
AST SERPL-CCNC: 61 U/L (ref 10–40)
BASOPHILS # BLD AUTO: 0.04 K/UL (ref 0–0.2)
BASOPHILS NFR BLD: 0.4 % (ref 0–1.9)
BILIRUB SERPL-MCNC: 0.6 MG/DL (ref 0.1–1)
BUN SERPL-MCNC: 7 MG/DL (ref 6–20)
CALCIUM SERPL-MCNC: 9.3 MG/DL (ref 8.7–10.5)
CHLORIDE SERPL-SCNC: 102 MMOL/L (ref 95–110)
CO2 SERPL-SCNC: 26 MMOL/L (ref 23–29)
CREAT SERPL-MCNC: 0.5 MG/DL (ref 0.5–1.4)
DIFFERENTIAL METHOD: ABNORMAL
EOSINOPHIL # BLD AUTO: 0.1 K/UL (ref 0–0.5)
EOSINOPHIL NFR BLD: 0.6 % (ref 0–8)
ERYTHROCYTE [DISTWIDTH] IN BLOOD BY AUTOMATED COUNT: 15.9 % (ref 11.5–14.5)
EST. GFR  (AFRICAN AMERICAN): >60 ML/MIN/1.73 M^2
EST. GFR  (NON AFRICAN AMERICAN): >60 ML/MIN/1.73 M^2
GLUCOSE SERPL-MCNC: 129 MG/DL (ref 70–110)
HCT VFR BLD AUTO: 34.8 % (ref 40–54)
HGB BLD-MCNC: 11.6 G/DL (ref 14–18)
IMM GRANULOCYTES # BLD AUTO: 0.05 K/UL (ref 0–0.04)
IMM GRANULOCYTES NFR BLD AUTO: 0.5 % (ref 0–0.5)
LYMPHOCYTES # BLD AUTO: 2.2 K/UL (ref 1–4.8)
LYMPHOCYTES NFR BLD: 23.4 % (ref 18–48)
MAGNESIUM SERPL-MCNC: 1.7 MG/DL (ref 1.6–2.6)
MCH RBC QN AUTO: 33.5 PG (ref 27–31)
MCHC RBC AUTO-ENTMCNC: 33.3 G/DL (ref 32–36)
MCV RBC AUTO: 101 FL (ref 82–98)
MONOCYTES # BLD AUTO: 1.8 K/UL (ref 0.3–1)
MONOCYTES NFR BLD: 18.4 % (ref 4–15)
NEUTROPHILS # BLD AUTO: 5.4 K/UL (ref 1.8–7.7)
NEUTROPHILS NFR BLD: 56.7 % (ref 38–73)
NRBC BLD-RTO: 0 /100 WBC
PHOSPHATE SERPL-MCNC: 3.5 MG/DL (ref 2.7–4.5)
PLATELET # BLD AUTO: 285 K/UL (ref 150–350)
PMV BLD AUTO: 9.7 FL (ref 9.2–12.9)
POCT GLUCOSE: 117 MG/DL (ref 70–110)
POCT GLUCOSE: 138 MG/DL (ref 70–110)
POCT GLUCOSE: 178 MG/DL (ref 70–110)
POTASSIUM SERPL-SCNC: 4.2 MMOL/L (ref 3.5–5.1)
PROT SERPL-MCNC: 6.5 G/DL (ref 6–8.4)
RBC # BLD AUTO: 3.46 M/UL (ref 4.6–6.2)
SODIUM SERPL-SCNC: 140 MMOL/L (ref 136–145)
WBC # BLD AUTO: 9.59 K/UL (ref 3.9–12.7)

## 2021-02-19 PROCEDURE — 99291 PR CRITICAL CARE, E/M 30-74 MINUTES: ICD-10-PCS | Mod: ,,, | Performed by: PSYCHIATRY & NEUROLOGY

## 2021-02-19 PROCEDURE — 95714 VEEG EA 12-26 HR UNMNTR: CPT

## 2021-02-19 PROCEDURE — 63600175 PHARM REV CODE 636 W HCPCS

## 2021-02-19 PROCEDURE — 20000000 HC ICU ROOM

## 2021-02-19 PROCEDURE — 99223 1ST HOSP IP/OBS HIGH 75: CPT | Mod: ,,, | Performed by: PSYCHIATRY & NEUROLOGY

## 2021-02-19 PROCEDURE — C9254 INJECTION, LACOSAMIDE: HCPCS | Performed by: PSYCHIATRY & NEUROLOGY

## 2021-02-19 PROCEDURE — 63600175 PHARM REV CODE 636 W HCPCS: Performed by: NURSE PRACTITIONER

## 2021-02-19 PROCEDURE — 85025 COMPLETE CBC W/AUTO DIFF WBC: CPT

## 2021-02-19 PROCEDURE — 95720 PR EEG, W/VIDEO, CONT RECORD, I&R, >12<26 HRS: ICD-10-PCS | Mod: ,,, | Performed by: PSYCHIATRY & NEUROLOGY

## 2021-02-19 PROCEDURE — 25000003 PHARM REV CODE 250: Performed by: PSYCHIATRY & NEUROLOGY

## 2021-02-19 PROCEDURE — 25000003 PHARM REV CODE 250: Performed by: STUDENT IN AN ORGANIZED HEALTH CARE EDUCATION/TRAINING PROGRAM

## 2021-02-19 PROCEDURE — 63600175 PHARM REV CODE 636 W HCPCS: Performed by: PSYCHIATRY & NEUROLOGY

## 2021-02-19 PROCEDURE — 25000003 PHARM REV CODE 250: Performed by: NURSE PRACTITIONER

## 2021-02-19 PROCEDURE — 80053 COMPREHEN METABOLIC PANEL: CPT

## 2021-02-19 PROCEDURE — 99223 PR INITIAL HOSPITAL CARE,LEVL III: ICD-10-PCS | Mod: ,,, | Performed by: PSYCHIATRY & NEUROLOGY

## 2021-02-19 PROCEDURE — 94761 N-INVAS EAR/PLS OXIMETRY MLT: CPT

## 2021-02-19 PROCEDURE — 83735 ASSAY OF MAGNESIUM: CPT

## 2021-02-19 PROCEDURE — 95720 EEG PHY/QHP EA INCR W/VEEG: CPT | Mod: ,,, | Performed by: PSYCHIATRY & NEUROLOGY

## 2021-02-19 PROCEDURE — 84100 ASSAY OF PHOSPHORUS: CPT

## 2021-02-19 PROCEDURE — S4991 NICOTINE PATCH NONLEGEND: HCPCS | Performed by: STUDENT IN AN ORGANIZED HEALTH CARE EDUCATION/TRAINING PROGRAM

## 2021-02-19 PROCEDURE — 99291 CRITICAL CARE FIRST HOUR: CPT | Mod: ,,, | Performed by: PSYCHIATRY & NEUROLOGY

## 2021-02-19 PROCEDURE — 63600175 PHARM REV CODE 636 W HCPCS: Performed by: STUDENT IN AN ORGANIZED HEALTH CARE EDUCATION/TRAINING PROGRAM

## 2021-02-19 RX ORDER — MIDAZOLAM HYDROCHLORIDE 1 MG/ML
INJECTION INTRAMUSCULAR; INTRAVENOUS
Status: COMPLETED
Start: 2021-02-19 | End: 2021-02-19

## 2021-02-19 RX ORDER — LORAZEPAM 2 MG/ML
1 INJECTION INTRAMUSCULAR ONCE
Status: COMPLETED | OUTPATIENT
Start: 2021-02-19 | End: 2021-02-19

## 2021-02-19 RX ORDER — LORAZEPAM 2 MG/ML
INJECTION INTRAMUSCULAR
Status: COMPLETED
Start: 2021-02-19 | End: 2021-02-19

## 2021-02-19 RX ORDER — ENOXAPARIN SODIUM 100 MG/ML
30 INJECTION SUBCUTANEOUS EVERY 24 HOURS
Status: DISCONTINUED | OUTPATIENT
Start: 2021-02-19 | End: 2021-03-22

## 2021-02-19 RX ORDER — DEXTROSE MONOHYDRATE 100 MG/ML
INJECTION, SOLUTION INTRAVENOUS CONTINUOUS PRN
Status: DISCONTINUED | OUTPATIENT
Start: 2021-02-19 | End: 2021-02-23

## 2021-02-19 RX ORDER — MIDAZOLAM HYDROCHLORIDE 1 MG/ML
2 INJECTION INTRAMUSCULAR; INTRAVENOUS ONCE
Status: COMPLETED | OUTPATIENT
Start: 2021-02-19 | End: 2021-02-19

## 2021-02-19 RX ORDER — LEVETIRACETAM 10 MG/ML
1000 INJECTION INTRAVASCULAR EVERY 12 HOURS
Status: DISCONTINUED | OUTPATIENT
Start: 2021-02-19 | End: 2021-02-22

## 2021-02-19 RX ORDER — INSULIN ASPART 100 [IU]/ML
1-10 INJECTION, SOLUTION INTRAVENOUS; SUBCUTANEOUS EVERY 4 HOURS PRN
Status: DISCONTINUED | OUTPATIENT
Start: 2021-02-19 | End: 2021-02-23

## 2021-02-19 RX ADMIN — LORAZEPAM 1 MG: 2 INJECTION INTRAMUSCULAR at 08:02

## 2021-02-19 RX ADMIN — MAGNESIUM SULFATE 2 G: 2 INJECTION INTRAVENOUS at 05:02

## 2021-02-19 RX ADMIN — INSULIN ASPART 2 UNITS: 100 INJECTION, SOLUTION INTRAVENOUS; SUBCUTANEOUS at 10:02

## 2021-02-19 RX ADMIN — LORAZEPAM 1 MG: 2 INJECTION INTRAMUSCULAR; INTRAVENOUS at 01:02

## 2021-02-19 RX ADMIN — SODIUM CHLORIDE 400 MG: 9 INJECTION, SOLUTION INTRAVENOUS at 01:02

## 2021-02-19 RX ADMIN — NICOTINE 1 PATCH: 21 PATCH, EXTENDED RELEASE TRANSDERMAL at 09:02

## 2021-02-19 RX ADMIN — LEVETIRACETAM INJECTION 1000 MG: 10 INJECTION INTRAVENOUS at 09:02

## 2021-02-19 RX ADMIN — SODIUM CHLORIDE 150 MG: 9 INJECTION, SOLUTION INTRAVENOUS at 09:02

## 2021-02-19 RX ADMIN — MIDAZOLAM 2 MG: 1 INJECTION INTRAMUSCULAR; INTRAVENOUS at 08:02

## 2021-02-19 RX ADMIN — DEXTROSE 750 MG: 50 INJECTION, SOLUTION INTRAVENOUS at 08:02

## 2021-02-19 RX ADMIN — MIDAZOLAM HYDROCHLORIDE 2 MG: 1 INJECTION INTRAMUSCULAR; INTRAVENOUS at 08:02

## 2021-02-19 RX ADMIN — THIAMINE HCL TAB 100 MG 100 MG: 100 TAB at 09:02

## 2021-02-19 RX ADMIN — ENOXAPARIN SODIUM 30 MG: 30 INJECTION SUBCUTANEOUS at 05:02

## 2021-02-19 RX ADMIN — DEXTROSE 750 MG: 50 INJECTION, SOLUTION INTRAVENOUS at 09:02

## 2021-02-19 RX ADMIN — FOLIC ACID 1 MG: 1 TABLET ORAL at 09:02

## 2021-02-19 RX ADMIN — LORAZEPAM 1 MG: 2 INJECTION INTRAMUSCULAR; INTRAVENOUS at 08:02

## 2021-02-20 LAB
ALBUMIN SERPL BCP-MCNC: 2.7 G/DL (ref 3.5–5.2)
ALP SERPL-CCNC: 113 U/L (ref 55–135)
ALT SERPL W/O P-5'-P-CCNC: 53 U/L (ref 10–44)
ANION GAP SERPL CALC-SCNC: 11 MMOL/L (ref 8–16)
AST SERPL-CCNC: 53 U/L (ref 10–40)
BASOPHILS # BLD AUTO: 0.05 K/UL (ref 0–0.2)
BASOPHILS NFR BLD: 0.6 % (ref 0–1.9)
BILIRUB SERPL-MCNC: 0.5 MG/DL (ref 0.1–1)
BUN SERPL-MCNC: 6 MG/DL (ref 6–20)
CALCIUM SERPL-MCNC: 9.1 MG/DL (ref 8.7–10.5)
CHLORIDE SERPL-SCNC: 101 MMOL/L (ref 95–110)
CO2 SERPL-SCNC: 26 MMOL/L (ref 23–29)
CREAT SERPL-MCNC: 0.6 MG/DL (ref 0.5–1.4)
DIFFERENTIAL METHOD: ABNORMAL
EOSINOPHIL # BLD AUTO: 0.1 K/UL (ref 0–0.5)
EOSINOPHIL NFR BLD: 0.6 % (ref 0–8)
ERYTHROCYTE [DISTWIDTH] IN BLOOD BY AUTOMATED COUNT: 15.8 % (ref 11.5–14.5)
EST. GFR  (AFRICAN AMERICAN): >60 ML/MIN/1.73 M^2
EST. GFR  (NON AFRICAN AMERICAN): >60 ML/MIN/1.73 M^2
GLUCOSE SERPL-MCNC: 145 MG/DL (ref 70–110)
HCT VFR BLD AUTO: 36.2 % (ref 40–54)
HGB BLD-MCNC: 12.3 G/DL (ref 14–18)
HSV1, PCR, CSF: NEGATIVE
HSV2, PCR, CSF: NEGATIVE
IMM GRANULOCYTES # BLD AUTO: 0.07 K/UL (ref 0–0.04)
IMM GRANULOCYTES NFR BLD AUTO: 0.8 % (ref 0–0.5)
LYMPHOCYTES # BLD AUTO: 1.8 K/UL (ref 1–4.8)
LYMPHOCYTES NFR BLD: 20.2 % (ref 18–48)
MAGNESIUM SERPL-MCNC: 1.9 MG/DL (ref 1.6–2.6)
MCH RBC QN AUTO: 33.2 PG (ref 27–31)
MCHC RBC AUTO-ENTMCNC: 34 G/DL (ref 32–36)
MCV RBC AUTO: 98 FL (ref 82–98)
MONOCYTES # BLD AUTO: 1.3 K/UL (ref 0.3–1)
MONOCYTES NFR BLD: 14.8 % (ref 4–15)
NEUTROPHILS # BLD AUTO: 5.7 K/UL (ref 1.8–7.7)
NEUTROPHILS NFR BLD: 63 % (ref 38–73)
NRBC BLD-RTO: 0 /100 WBC
PHOSPHATE SERPL-MCNC: 3.8 MG/DL (ref 2.7–4.5)
PLATELET # BLD AUTO: 374 K/UL (ref 150–350)
PMV BLD AUTO: 9 FL (ref 9.2–12.9)
POCT GLUCOSE: 125 MG/DL (ref 70–110)
POCT GLUCOSE: 136 MG/DL (ref 70–110)
POCT GLUCOSE: 145 MG/DL (ref 70–110)
POCT GLUCOSE: 147 MG/DL (ref 70–110)
POCT GLUCOSE: 153 MG/DL (ref 70–110)
POCT GLUCOSE: 159 MG/DL (ref 70–110)
POCT GLUCOSE: 172 MG/DL (ref 70–110)
POTASSIUM SERPL-SCNC: 3.8 MMOL/L (ref 3.5–5.1)
POTASSIUM SERPL-SCNC: 4 MMOL/L (ref 3.5–5.1)
PROT SERPL-MCNC: 6.6 G/DL (ref 6–8.4)
RBC # BLD AUTO: 3.71 M/UL (ref 4.6–6.2)
SODIUM SERPL-SCNC: 138 MMOL/L (ref 136–145)
WBC # BLD AUTO: 9.03 K/UL (ref 3.9–12.7)

## 2021-02-20 PROCEDURE — 84132 ASSAY OF SERUM POTASSIUM: CPT

## 2021-02-20 PROCEDURE — 95720 EEG PHY/QHP EA INCR W/VEEG: CPT | Mod: ,,, | Performed by: PSYCHIATRY & NEUROLOGY

## 2021-02-20 PROCEDURE — 83735 ASSAY OF MAGNESIUM: CPT

## 2021-02-20 PROCEDURE — 20000000 HC ICU ROOM

## 2021-02-20 PROCEDURE — 99233 PR SUBSEQUENT HOSPITAL CARE,LEVL III: ICD-10-PCS | Mod: ,,, | Performed by: PSYCHIATRY & NEUROLOGY

## 2021-02-20 PROCEDURE — 94761 N-INVAS EAR/PLS OXIMETRY MLT: CPT

## 2021-02-20 PROCEDURE — 63600175 PHARM REV CODE 636 W HCPCS: Performed by: NURSE PRACTITIONER

## 2021-02-20 PROCEDURE — 80053 COMPREHEN METABOLIC PANEL: CPT

## 2021-02-20 PROCEDURE — 95714 VEEG EA 12-26 HR UNMNTR: CPT

## 2021-02-20 PROCEDURE — 95720 PR EEG, W/VIDEO, CONT RECORD, I&R, >12<26 HRS: ICD-10-PCS | Mod: ,,, | Performed by: PSYCHIATRY & NEUROLOGY

## 2021-02-20 PROCEDURE — 25000003 PHARM REV CODE 250: Performed by: NURSE PRACTITIONER

## 2021-02-20 PROCEDURE — C9254 INJECTION, LACOSAMIDE: HCPCS | Performed by: PSYCHIATRY & NEUROLOGY

## 2021-02-20 PROCEDURE — S4991 NICOTINE PATCH NONLEGEND: HCPCS | Performed by: STUDENT IN AN ORGANIZED HEALTH CARE EDUCATION/TRAINING PROGRAM

## 2021-02-20 PROCEDURE — 25000003 PHARM REV CODE 250: Performed by: STUDENT IN AN ORGANIZED HEALTH CARE EDUCATION/TRAINING PROGRAM

## 2021-02-20 PROCEDURE — 99233 SBSQ HOSP IP/OBS HIGH 50: CPT | Mod: ,,, | Performed by: PSYCHIATRY & NEUROLOGY

## 2021-02-20 PROCEDURE — 63600175 PHARM REV CODE 636 W HCPCS: Performed by: PSYCHIATRY & NEUROLOGY

## 2021-02-20 PROCEDURE — 84100 ASSAY OF PHOSPHORUS: CPT

## 2021-02-20 PROCEDURE — 85025 COMPLETE CBC W/AUTO DIFF WBC: CPT

## 2021-02-20 PROCEDURE — 25000003 PHARM REV CODE 250: Performed by: PSYCHIATRY & NEUROLOGY

## 2021-02-20 RX ADMIN — POTASSIUM CHLORIDE 10 MEQ: 7.46 INJECTION, SOLUTION INTRAVENOUS at 06:02

## 2021-02-20 RX ADMIN — INSULIN ASPART 1 UNITS: 100 INJECTION, SOLUTION INTRAVENOUS; SUBCUTANEOUS at 10:02

## 2021-02-20 RX ADMIN — POTASSIUM CHLORIDE 10 MEQ: 7.46 INJECTION, SOLUTION INTRAVENOUS at 04:02

## 2021-02-20 RX ADMIN — THIAMINE HCL TAB 100 MG 100 MG: 100 TAB at 08:02

## 2021-02-20 RX ADMIN — INSULIN ASPART 2 UNITS: 100 INJECTION, SOLUTION INTRAVENOUS; SUBCUTANEOUS at 01:02

## 2021-02-20 RX ADMIN — POTASSIUM CHLORIDE 10 MEQ: 7.46 INJECTION, SOLUTION INTRAVENOUS at 05:02

## 2021-02-20 RX ADMIN — THIAMINE HYDROCHLORIDE 500 MG: 100 INJECTION, SOLUTION INTRAMUSCULAR; INTRAVENOUS at 10:02

## 2021-02-20 RX ADMIN — SODIUM CHLORIDE 150 MG: 9 INJECTION, SOLUTION INTRAVENOUS at 08:02

## 2021-02-20 RX ADMIN — LEVETIRACETAM INJECTION 1000 MG: 10 INJECTION INTRAVENOUS at 08:02

## 2021-02-20 RX ADMIN — POTASSIUM CHLORIDE 10 MEQ: 7.46 INJECTION, SOLUTION INTRAVENOUS at 03:02

## 2021-02-20 RX ADMIN — FOLIC ACID 1 MG: 1 TABLET ORAL at 08:02

## 2021-02-20 RX ADMIN — INSULIN ASPART 2 UNITS: 100 INJECTION, SOLUTION INTRAVENOUS; SUBCUTANEOUS at 06:02

## 2021-02-20 RX ADMIN — SODIUM CHLORIDE 150 MG: 9 INJECTION, SOLUTION INTRAVENOUS at 09:02

## 2021-02-20 RX ADMIN — NICOTINE 1 PATCH: 21 PATCH, EXTENDED RELEASE TRANSDERMAL at 08:02

## 2021-02-20 RX ADMIN — ENOXAPARIN SODIUM 30 MG: 30 INJECTION SUBCUTANEOUS at 05:02

## 2021-02-21 PROBLEM — R40.2430 GLASGOW COMA SCALE TOTAL SCORE 3-8: Status: RESOLVED | Noted: 2021-02-18 | Resolved: 2021-02-21

## 2021-02-21 PROBLEM — E87.20 LACTIC ACIDOSIS: Status: RESOLVED | Noted: 2021-02-12 | Resolved: 2021-02-21

## 2021-02-21 LAB
ALBUMIN SERPL BCP-MCNC: 2.6 G/DL (ref 3.5–5.2)
ALP SERPL-CCNC: 113 U/L (ref 55–135)
ALT SERPL W/O P-5'-P-CCNC: 46 U/L (ref 10–44)
ANION GAP SERPL CALC-SCNC: 10 MMOL/L (ref 8–16)
AST SERPL-CCNC: 40 U/L (ref 10–40)
BASOPHILS # BLD AUTO: 0.04 K/UL (ref 0–0.2)
BASOPHILS NFR BLD: 0.5 % (ref 0–1.9)
BILIRUB SERPL-MCNC: 0.5 MG/DL (ref 0.1–1)
BUN SERPL-MCNC: 6 MG/DL (ref 6–20)
CALCIUM SERPL-MCNC: 9.5 MG/DL (ref 8.7–10.5)
CHLORIDE SERPL-SCNC: 101 MMOL/L (ref 95–110)
CO2 SERPL-SCNC: 25 MMOL/L (ref 23–29)
CREAT SERPL-MCNC: 0.6 MG/DL (ref 0.5–1.4)
DIFFERENTIAL METHOD: ABNORMAL
EOSINOPHIL # BLD AUTO: 0 K/UL (ref 0–0.5)
EOSINOPHIL NFR BLD: 0.5 % (ref 0–8)
ERYTHROCYTE [DISTWIDTH] IN BLOOD BY AUTOMATED COUNT: 15.7 % (ref 11.5–14.5)
EST. GFR  (AFRICAN AMERICAN): >60 ML/MIN/1.73 M^2
EST. GFR  (NON AFRICAN AMERICAN): >60 ML/MIN/1.73 M^2
GLUCOSE SERPL-MCNC: 173 MG/DL (ref 70–110)
HCT VFR BLD AUTO: 35.9 % (ref 40–54)
HGB BLD-MCNC: 11.9 G/DL (ref 14–18)
IMM GRANULOCYTES # BLD AUTO: 0.04 K/UL (ref 0–0.04)
IMM GRANULOCYTES NFR BLD AUTO: 0.5 % (ref 0–0.5)
LYMPHOCYTES # BLD AUTO: 1.6 K/UL (ref 1–4.8)
LYMPHOCYTES NFR BLD: 18.5 % (ref 18–48)
MAGNESIUM SERPL-MCNC: 1.9 MG/DL (ref 1.6–2.6)
MCH RBC QN AUTO: 33.4 PG (ref 27–31)
MCHC RBC AUTO-ENTMCNC: 33.1 G/DL (ref 32–36)
MCV RBC AUTO: 101 FL (ref 82–98)
MONOCYTES # BLD AUTO: 1.4 K/UL (ref 0.3–1)
MONOCYTES NFR BLD: 15.4 % (ref 4–15)
NEUTROPHILS # BLD AUTO: 5.8 K/UL (ref 1.8–7.7)
NEUTROPHILS NFR BLD: 64.6 % (ref 38–73)
NRBC BLD-RTO: 0 /100 WBC
PHOSPHATE SERPL-MCNC: 4.6 MG/DL (ref 2.7–4.5)
PLATELET # BLD AUTO: 418 K/UL (ref 150–350)
PMV BLD AUTO: 9 FL (ref 9.2–12.9)
POCT GLUCOSE: 156 MG/DL (ref 70–110)
POCT GLUCOSE: 162 MG/DL (ref 70–110)
POCT GLUCOSE: 168 MG/DL (ref 70–110)
POCT GLUCOSE: 169 MG/DL (ref 70–110)
POCT GLUCOSE: 193 MG/DL (ref 70–110)
POCT GLUCOSE: 251 MG/DL (ref 70–110)
POTASSIUM SERPL-SCNC: 4.9 MMOL/L (ref 3.5–5.1)
PROT SERPL-MCNC: 6.5 G/DL (ref 6–8.4)
RBC # BLD AUTO: 3.56 M/UL (ref 4.6–6.2)
SODIUM SERPL-SCNC: 136 MMOL/L (ref 136–145)
SPECIMEN SOURCE: NORMAL
VARICELLA ZOSTER BY PCR RESULT: NEGATIVE
WBC # BLD AUTO: 8.88 K/UL (ref 3.9–12.7)

## 2021-02-21 PROCEDURE — 25000003 PHARM REV CODE 250: Performed by: STUDENT IN AN ORGANIZED HEALTH CARE EDUCATION/TRAINING PROGRAM

## 2021-02-21 PROCEDURE — C9254 INJECTION, LACOSAMIDE: HCPCS | Performed by: STUDENT IN AN ORGANIZED HEALTH CARE EDUCATION/TRAINING PROGRAM

## 2021-02-21 PROCEDURE — 94761 N-INVAS EAR/PLS OXIMETRY MLT: CPT

## 2021-02-21 PROCEDURE — 99291 PR CRITICAL CARE, E/M 30-74 MINUTES: ICD-10-PCS | Mod: ,,, | Performed by: PSYCHIATRY & NEUROLOGY

## 2021-02-21 PROCEDURE — 83735 ASSAY OF MAGNESIUM: CPT

## 2021-02-21 PROCEDURE — C9254 INJECTION, LACOSAMIDE: HCPCS | Performed by: PSYCHIATRY & NEUROLOGY

## 2021-02-21 PROCEDURE — 25000003 PHARM REV CODE 250: Performed by: PSYCHIATRY & NEUROLOGY

## 2021-02-21 PROCEDURE — 95714 VEEG EA 12-26 HR UNMNTR: CPT

## 2021-02-21 PROCEDURE — 20000000 HC ICU ROOM

## 2021-02-21 PROCEDURE — 95720 PR EEG, W/VIDEO, CONT RECORD, I&R, >12<26 HRS: ICD-10-PCS | Mod: ,,, | Performed by: PSYCHIATRY & NEUROLOGY

## 2021-02-21 PROCEDURE — 92610 EVALUATE SWALLOWING FUNCTION: CPT

## 2021-02-21 PROCEDURE — S4991 NICOTINE PATCH NONLEGEND: HCPCS | Performed by: STUDENT IN AN ORGANIZED HEALTH CARE EDUCATION/TRAINING PROGRAM

## 2021-02-21 PROCEDURE — 99291 CRITICAL CARE FIRST HOUR: CPT | Mod: ,,, | Performed by: PSYCHIATRY & NEUROLOGY

## 2021-02-21 PROCEDURE — 97535 SELF CARE MNGMENT TRAINING: CPT

## 2021-02-21 PROCEDURE — 95720 EEG PHY/QHP EA INCR W/VEEG: CPT | Mod: ,,, | Performed by: PSYCHIATRY & NEUROLOGY

## 2021-02-21 PROCEDURE — 25000003 PHARM REV CODE 250: Performed by: NURSE PRACTITIONER

## 2021-02-21 PROCEDURE — 63600175 PHARM REV CODE 636 W HCPCS: Performed by: NURSE PRACTITIONER

## 2021-02-21 PROCEDURE — 63600175 PHARM REV CODE 636 W HCPCS: Performed by: PSYCHIATRY & NEUROLOGY

## 2021-02-21 PROCEDURE — 84100 ASSAY OF PHOSPHORUS: CPT

## 2021-02-21 PROCEDURE — 63600175 PHARM REV CODE 636 W HCPCS: Performed by: STUDENT IN AN ORGANIZED HEALTH CARE EDUCATION/TRAINING PROGRAM

## 2021-02-21 PROCEDURE — 85025 COMPLETE CBC W/AUTO DIFF WBC: CPT

## 2021-02-21 PROCEDURE — 80053 COMPREHEN METABOLIC PANEL: CPT

## 2021-02-21 RX ORDER — LORAZEPAM 2 MG/ML
2 INJECTION INTRAMUSCULAR ONCE
Status: COMPLETED | OUTPATIENT
Start: 2021-02-21 | End: 2021-02-21

## 2021-02-21 RX ORDER — MIDAZOLAM HYDROCHLORIDE 1 MG/ML
4 INJECTION INTRAMUSCULAR; INTRAVENOUS
Status: DISCONTINUED | OUTPATIENT
Start: 2021-02-21 | End: 2021-02-25

## 2021-02-21 RX ADMIN — LEVETIRACETAM INJECTION 1000 MG: 10 INJECTION INTRAVENOUS at 09:02

## 2021-02-21 RX ADMIN — INSULIN ASPART 1 UNITS: 100 INJECTION, SOLUTION INTRAVENOUS; SUBCUTANEOUS at 09:02

## 2021-02-21 RX ADMIN — SODIUM CHLORIDE 150 MG: 9 INJECTION, SOLUTION INTRAVENOUS at 08:02

## 2021-02-21 RX ADMIN — INSULIN ASPART 2 UNITS: 100 INJECTION, SOLUTION INTRAVENOUS; SUBCUTANEOUS at 06:02

## 2021-02-21 RX ADMIN — THIAMINE HCL TAB 100 MG 100 MG: 100 TAB at 08:02

## 2021-02-21 RX ADMIN — FOLIC ACID 1 MG: 1 TABLET ORAL at 08:02

## 2021-02-21 RX ADMIN — LACOSAMIDE 200 MG: 10 INJECTION INTRAVENOUS at 02:02

## 2021-02-21 RX ADMIN — INSULIN ASPART 2 UNITS: 100 INJECTION, SOLUTION INTRAVENOUS; SUBCUTANEOUS at 01:02

## 2021-02-21 RX ADMIN — ENOXAPARIN SODIUM 30 MG: 30 INJECTION SUBCUTANEOUS at 05:02

## 2021-02-21 RX ADMIN — INSULIN ASPART 2 UNITS: 100 INJECTION, SOLUTION INTRAVENOUS; SUBCUTANEOUS at 02:02

## 2021-02-21 RX ADMIN — LACOSAMIDE 200 MG: 10 INJECTION INTRAVENOUS at 08:02

## 2021-02-21 RX ADMIN — INSULIN ASPART 6 UNITS: 100 INJECTION, SOLUTION INTRAVENOUS; SUBCUTANEOUS at 10:02

## 2021-02-21 RX ADMIN — THIAMINE HYDROCHLORIDE 500 MG: 100 INJECTION, SOLUTION INTRAMUSCULAR; INTRAVENOUS at 04:02

## 2021-02-21 RX ADMIN — INSULIN ASPART 2 UNITS: 100 INJECTION, SOLUTION INTRAVENOUS; SUBCUTANEOUS at 05:02

## 2021-02-21 RX ADMIN — LORAZEPAM 2 MG: 2 INJECTION INTRAMUSCULAR; INTRAVENOUS at 02:02

## 2021-02-21 RX ADMIN — NICOTINE 1 PATCH: 21 PATCH, EXTENDED RELEASE TRANSDERMAL at 08:02

## 2021-02-21 RX ADMIN — THIAMINE HYDROCHLORIDE 500 MG: 100 INJECTION, SOLUTION INTRAMUSCULAR; INTRAVENOUS at 09:02

## 2021-02-22 LAB
ALBUMIN SERPL BCP-MCNC: 2.5 G/DL (ref 3.5–5.2)
ALP SERPL-CCNC: 114 U/L (ref 55–135)
ALT SERPL W/O P-5'-P-CCNC: 36 U/L (ref 10–44)
ANION GAP SERPL CALC-SCNC: 8 MMOL/L (ref 8–16)
AST SERPL-CCNC: 27 U/L (ref 10–40)
BASOPHILS # BLD AUTO: 0.04 K/UL (ref 0–0.2)
BASOPHILS NFR BLD: 0.4 % (ref 0–1.9)
BILIRUB SERPL-MCNC: 0.3 MG/DL (ref 0.1–1)
BUN SERPL-MCNC: 6 MG/DL (ref 6–20)
CALCIUM SERPL-MCNC: 9.1 MG/DL (ref 8.7–10.5)
CHLORIDE SERPL-SCNC: 101 MMOL/L (ref 95–110)
CO2 SERPL-SCNC: 27 MMOL/L (ref 23–29)
CREAT SERPL-MCNC: 0.5 MG/DL (ref 0.5–1.4)
DIFFERENTIAL METHOD: ABNORMAL
EOSINOPHIL # BLD AUTO: 0.1 K/UL (ref 0–0.5)
EOSINOPHIL NFR BLD: 1 % (ref 0–8)
ERYTHROCYTE [DISTWIDTH] IN BLOOD BY AUTOMATED COUNT: 15.3 % (ref 11.5–14.5)
EST. GFR  (AFRICAN AMERICAN): >60 ML/MIN/1.73 M^2
EST. GFR  (NON AFRICAN AMERICAN): >60 ML/MIN/1.73 M^2
GLUCOSE SERPL-MCNC: 146 MG/DL (ref 70–110)
HCT VFR BLD AUTO: 33.9 % (ref 40–54)
HGB BLD-MCNC: 11.5 G/DL (ref 14–18)
IMM GRANULOCYTES # BLD AUTO: 0.03 K/UL (ref 0–0.04)
IMM GRANULOCYTES NFR BLD AUTO: 0.3 % (ref 0–0.5)
INR PPP: 0.9 (ref 0.8–1.2)
LYMPHOCYTES # BLD AUTO: 2.1 K/UL (ref 1–4.8)
LYMPHOCYTES NFR BLD: 22.9 % (ref 18–48)
MAGNESIUM SERPL-MCNC: 1.8 MG/DL (ref 1.6–2.6)
MAGNESIUM SERPL-MCNC: 2.2 MG/DL (ref 1.6–2.6)
MCH RBC QN AUTO: 33.4 PG (ref 27–31)
MCHC RBC AUTO-ENTMCNC: 33.9 G/DL (ref 32–36)
MCV RBC AUTO: 99 FL (ref 82–98)
MONOCYTES # BLD AUTO: 1.2 K/UL (ref 0.3–1)
MONOCYTES NFR BLD: 12.6 % (ref 4–15)
NEUTROPHILS # BLD AUTO: 5.8 K/UL (ref 1.8–7.7)
NEUTROPHILS NFR BLD: 62.8 % (ref 38–73)
NRBC BLD-RTO: 0 /100 WBC
PHOSPHATE SERPL-MCNC: 4.6 MG/DL (ref 2.7–4.5)
PHOSPHATIDYLETHANOL (PETH): 1654 NG/ML
PLATELET # BLD AUTO: 452 K/UL (ref 150–350)
PMV BLD AUTO: 9 FL (ref 9.2–12.9)
POCT GLUCOSE: 135 MG/DL (ref 70–110)
POCT GLUCOSE: 195 MG/DL (ref 70–110)
POCT GLUCOSE: 205 MG/DL (ref 70–110)
POCT GLUCOSE: 315 MG/DL (ref 70–110)
POTASSIUM SERPL-SCNC: 4.1 MMOL/L (ref 3.5–5.1)
PROT SERPL-MCNC: 6.2 G/DL (ref 6–8.4)
PROTHROMBIN TIME: 9.7 SEC (ref 9–12.5)
RBC # BLD AUTO: 3.44 M/UL (ref 4.6–6.2)
SODIUM SERPL-SCNC: 136 MMOL/L (ref 136–145)
WBC # BLD AUTO: 9.29 K/UL (ref 3.9–12.7)

## 2021-02-22 PROCEDURE — 85610 PROTHROMBIN TIME: CPT

## 2021-02-22 PROCEDURE — 63600175 PHARM REV CODE 636 W HCPCS: Performed by: STUDENT IN AN ORGANIZED HEALTH CARE EDUCATION/TRAINING PROGRAM

## 2021-02-22 PROCEDURE — 63600175 PHARM REV CODE 636 W HCPCS: Performed by: NURSE PRACTITIONER

## 2021-02-22 PROCEDURE — 83735 ASSAY OF MAGNESIUM: CPT | Mod: 91

## 2021-02-22 PROCEDURE — 95714 VEEG EA 12-26 HR UNMNTR: CPT

## 2021-02-22 PROCEDURE — 20000000 HC ICU ROOM

## 2021-02-22 PROCEDURE — 63600175 PHARM REV CODE 636 W HCPCS

## 2021-02-22 PROCEDURE — 25000003 PHARM REV CODE 250: Performed by: STUDENT IN AN ORGANIZED HEALTH CARE EDUCATION/TRAINING PROGRAM

## 2021-02-22 PROCEDURE — 95720 EEG PHY/QHP EA INCR W/VEEG: CPT | Mod: ,,, | Performed by: PSYCHIATRY & NEUROLOGY

## 2021-02-22 PROCEDURE — 80053 COMPREHEN METABOLIC PANEL: CPT

## 2021-02-22 PROCEDURE — 25000003 PHARM REV CODE 250: Performed by: NURSE PRACTITIONER

## 2021-02-22 PROCEDURE — S4991 NICOTINE PATCH NONLEGEND: HCPCS | Performed by: STUDENT IN AN ORGANIZED HEALTH CARE EDUCATION/TRAINING PROGRAM

## 2021-02-22 PROCEDURE — 83735 ASSAY OF MAGNESIUM: CPT

## 2021-02-22 PROCEDURE — 99233 SBSQ HOSP IP/OBS HIGH 50: CPT | Mod: ,,, | Performed by: PSYCHIATRY & NEUROLOGY

## 2021-02-22 PROCEDURE — 85025 COMPLETE CBC W/AUTO DIFF WBC: CPT

## 2021-02-22 PROCEDURE — 99233 PR SUBSEQUENT HOSPITAL CARE,LEVL III: ICD-10-PCS | Mod: ,,, | Performed by: PSYCHIATRY & NEUROLOGY

## 2021-02-22 PROCEDURE — 84100 ASSAY OF PHOSPHORUS: CPT

## 2021-02-22 PROCEDURE — 94761 N-INVAS EAR/PLS OXIMETRY MLT: CPT

## 2021-02-22 PROCEDURE — C9254 INJECTION, LACOSAMIDE: HCPCS | Performed by: STUDENT IN AN ORGANIZED HEALTH CARE EDUCATION/TRAINING PROGRAM

## 2021-02-22 PROCEDURE — 63600175 PHARM REV CODE 636 W HCPCS: Performed by: PSYCHIATRY & NEUROLOGY

## 2021-02-22 PROCEDURE — 95720 PR EEG, W/VIDEO, CONT RECORD, I&R, >12<26 HRS: ICD-10-PCS | Mod: ,,, | Performed by: PSYCHIATRY & NEUROLOGY

## 2021-02-22 RX ORDER — ACETAMINOPHEN 325 MG/1
650 TABLET ORAL EVERY 6 HOURS PRN
Status: DISCONTINUED | OUTPATIENT
Start: 2021-02-22 | End: 2021-03-22

## 2021-02-22 RX ORDER — AMOXICILLIN 250 MG
1 CAPSULE ORAL DAILY
Status: DISCONTINUED | OUTPATIENT
Start: 2021-02-22 | End: 2021-03-22

## 2021-02-22 RX ORDER — LORAZEPAM 2 MG/ML
INJECTION INTRAMUSCULAR
Status: COMPLETED
Start: 2021-02-22 | End: 2021-02-22

## 2021-02-22 RX ORDER — LORAZEPAM 2 MG/ML
INJECTION INTRAMUSCULAR
Status: DISPENSED
Start: 2021-02-22 | End: 2021-02-22

## 2021-02-22 RX ORDER — LEVETIRACETAM 15 MG/ML
1500 INJECTION INTRAVASCULAR EVERY 12 HOURS
Status: DISCONTINUED | OUTPATIENT
Start: 2021-02-22 | End: 2021-02-24

## 2021-02-22 RX ORDER — FOLIC ACID 1 MG/1
1 TABLET ORAL DAILY
Status: DISCONTINUED | OUTPATIENT
Start: 2021-02-23 | End: 2021-03-22

## 2021-02-22 RX ORDER — LEVETIRACETAM 5 MG/ML
500 INJECTION INTRAVASCULAR ONCE
Status: COMPLETED | OUTPATIENT
Start: 2021-02-22 | End: 2021-02-22

## 2021-02-22 RX ADMIN — LORAZEPAM 2 MG: 2 INJECTION INTRAMUSCULAR; INTRAVENOUS at 11:02

## 2021-02-22 RX ADMIN — INSULIN ASPART 8 UNITS: 100 INJECTION, SOLUTION INTRAVENOUS; SUBCUTANEOUS at 05:02

## 2021-02-22 RX ADMIN — LEVETIRACETAM 500 MG: 5 INJECTION INTRAVENOUS at 12:02

## 2021-02-22 RX ADMIN — INSULIN ASPART 2 UNITS: 100 INJECTION, SOLUTION INTRAVENOUS; SUBCUTANEOUS at 12:02

## 2021-02-22 RX ADMIN — LORAZEPAM 2 MG: 2 INJECTION INTRAMUSCULAR at 04:02

## 2021-02-22 RX ADMIN — THIAMINE HYDROCHLORIDE 500 MG: 100 INJECTION, SOLUTION INTRAMUSCULAR; INTRAVENOUS at 08:02

## 2021-02-22 RX ADMIN — LACOSAMIDE 200 MG: 10 INJECTION INTRAVENOUS at 11:02

## 2021-02-22 RX ADMIN — LEVETIRACETAM INJECTION 1000 MG: 10 INJECTION INTRAVENOUS at 08:02

## 2021-02-22 RX ADMIN — INSULIN ASPART 2 UNITS: 100 INJECTION, SOLUTION INTRAVENOUS; SUBCUTANEOUS at 08:02

## 2021-02-22 RX ADMIN — NICOTINE 1 PATCH: 21 PATCH, EXTENDED RELEASE TRANSDERMAL at 08:02

## 2021-02-22 RX ADMIN — LACOSAMIDE 200 MG: 10 INJECTION INTRAVENOUS at 08:02

## 2021-02-22 RX ADMIN — MAGNESIUM SULFATE 2 G: 2 INJECTION INTRAVENOUS at 05:02

## 2021-02-22 RX ADMIN — LORAZEPAM 2 MG: 2 INJECTION INTRAMUSCULAR; INTRAVENOUS at 07:02

## 2021-02-22 RX ADMIN — ENOXAPARIN SODIUM 30 MG: 30 INJECTION SUBCUTANEOUS at 06:02

## 2021-02-22 RX ADMIN — FOLIC ACID 1 MG: 1 TABLET ORAL at 08:02

## 2021-02-22 RX ADMIN — LEVETIRACETAM INJECTION 1500 MG: 15 INJECTION INTRAVENOUS at 08:02

## 2021-02-23 LAB
ALBUMIN SERPL BCP-MCNC: 2.4 G/DL (ref 3.5–5.2)
ALP SERPL-CCNC: 121 U/L (ref 55–135)
ALT SERPL W/O P-5'-P-CCNC: 31 U/L (ref 10–44)
ANION GAP SERPL CALC-SCNC: 7 MMOL/L (ref 8–16)
AST SERPL-CCNC: 26 U/L (ref 10–40)
BACTERIA CSF CULT: NO GROWTH
BASOPHILS # BLD AUTO: 0.08 K/UL (ref 0–0.2)
BASOPHILS NFR BLD: 0.8 % (ref 0–1.9)
BILIRUB SERPL-MCNC: 0.3 MG/DL (ref 0.1–1)
BUN SERPL-MCNC: 8 MG/DL (ref 6–20)
CALCIUM SERPL-MCNC: 8.7 MG/DL (ref 8.7–10.5)
CHLORIDE SERPL-SCNC: 105 MMOL/L (ref 95–110)
CO2 SERPL-SCNC: 28 MMOL/L (ref 23–29)
CREAT SERPL-MCNC: 0.5 MG/DL (ref 0.5–1.4)
DIFFERENTIAL METHOD: ABNORMAL
EOSINOPHIL # BLD AUTO: 0.1 K/UL (ref 0–0.5)
EOSINOPHIL NFR BLD: 1.3 % (ref 0–8)
ERYTHROCYTE [DISTWIDTH] IN BLOOD BY AUTOMATED COUNT: 15 % (ref 11.5–14.5)
EST. GFR  (AFRICAN AMERICAN): >60 ML/MIN/1.73 M^2
EST. GFR  (NON AFRICAN AMERICAN): >60 ML/MIN/1.73 M^2
GLUCOSE SERPL-MCNC: 171 MG/DL (ref 70–110)
GRAM STN SPEC: NORMAL
HCT VFR BLD AUTO: 32.7 % (ref 40–54)
HGB BLD-MCNC: 10.9 G/DL (ref 14–18)
IMM GRANULOCYTES # BLD AUTO: 0.06 K/UL (ref 0–0.04)
IMM GRANULOCYTES NFR BLD AUTO: 0.6 % (ref 0–0.5)
LYMPHOCYTES # BLD AUTO: 2 K/UL (ref 1–4.8)
LYMPHOCYTES NFR BLD: 19.1 % (ref 18–48)
MAGNESIUM SERPL-MCNC: 1.9 MG/DL (ref 1.6–2.6)
MCH RBC QN AUTO: 32.9 PG (ref 27–31)
MCHC RBC AUTO-ENTMCNC: 33.3 G/DL (ref 32–36)
MCV RBC AUTO: 99 FL (ref 82–98)
MONOCYTES # BLD AUTO: 0.9 K/UL (ref 0.3–1)
MONOCYTES NFR BLD: 8.8 % (ref 4–15)
NEUTROPHILS # BLD AUTO: 7.1 K/UL (ref 1.8–7.7)
NEUTROPHILS NFR BLD: 69.4 % (ref 38–73)
NRBC BLD-RTO: 0 /100 WBC
PHOSPHATE SERPL-MCNC: 3.4 MG/DL (ref 2.7–4.5)
PLATELET # BLD AUTO: 427 K/UL (ref 150–350)
PMV BLD AUTO: 9.1 FL (ref 9.2–12.9)
POCT GLUCOSE: 171 MG/DL (ref 70–110)
POCT GLUCOSE: 186 MG/DL (ref 70–110)
POCT GLUCOSE: 245 MG/DL (ref 70–110)
POCT GLUCOSE: 328 MG/DL (ref 70–110)
POTASSIUM SERPL-SCNC: 4 MMOL/L (ref 3.5–5.1)
PROT SERPL-MCNC: 5.9 G/DL (ref 6–8.4)
RBC # BLD AUTO: 3.31 M/UL (ref 4.6–6.2)
SODIUM SERPL-SCNC: 140 MMOL/L (ref 136–145)
WBC # BLD AUTO: 10.26 K/UL (ref 3.9–12.7)

## 2021-02-23 PROCEDURE — 97535 SELF CARE MNGMENT TRAINING: CPT

## 2021-02-23 PROCEDURE — 63600175 PHARM REV CODE 636 W HCPCS: Performed by: STUDENT IN AN ORGANIZED HEALTH CARE EDUCATION/TRAINING PROGRAM

## 2021-02-23 PROCEDURE — 97112 NEUROMUSCULAR REEDUCATION: CPT

## 2021-02-23 PROCEDURE — 25000003 PHARM REV CODE 250: Performed by: STUDENT IN AN ORGANIZED HEALTH CARE EDUCATION/TRAINING PROGRAM

## 2021-02-23 PROCEDURE — 20000000 HC ICU ROOM

## 2021-02-23 PROCEDURE — 94761 N-INVAS EAR/PLS OXIMETRY MLT: CPT

## 2021-02-23 PROCEDURE — 83735 ASSAY OF MAGNESIUM: CPT

## 2021-02-23 PROCEDURE — 63600175 PHARM REV CODE 636 W HCPCS: Performed by: PSYCHIATRY & NEUROLOGY

## 2021-02-23 PROCEDURE — 99233 PR SUBSEQUENT HOSPITAL CARE,LEVL III: ICD-10-PCS | Mod: ,,, | Performed by: PSYCHIATRY & NEUROLOGY

## 2021-02-23 PROCEDURE — 25000003 PHARM REV CODE 250: Performed by: PSYCHIATRY & NEUROLOGY

## 2021-02-23 PROCEDURE — 99900035 HC TECH TIME PER 15 MIN (STAT)

## 2021-02-23 PROCEDURE — 25000003 PHARM REV CODE 250: Performed by: NURSE PRACTITIONER

## 2021-02-23 PROCEDURE — 97168 OT RE-EVAL EST PLAN CARE: CPT

## 2021-02-23 PROCEDURE — 80053 COMPREHEN METABOLIC PANEL: CPT

## 2021-02-23 PROCEDURE — C9254 INJECTION, LACOSAMIDE: HCPCS | Performed by: STUDENT IN AN ORGANIZED HEALTH CARE EDUCATION/TRAINING PROGRAM

## 2021-02-23 PROCEDURE — 97530 THERAPEUTIC ACTIVITIES: CPT

## 2021-02-23 PROCEDURE — 84100 ASSAY OF PHOSPHORUS: CPT

## 2021-02-23 PROCEDURE — 97164 PT RE-EVAL EST PLAN CARE: CPT

## 2021-02-23 PROCEDURE — 63600175 PHARM REV CODE 636 W HCPCS: Performed by: NURSE PRACTITIONER

## 2021-02-23 PROCEDURE — 25000003 PHARM REV CODE 250

## 2021-02-23 PROCEDURE — 85025 COMPLETE CBC W/AUTO DIFF WBC: CPT

## 2021-02-23 PROCEDURE — 95718 PR EEG, W/VIDEO, CONT RECORD, I&R, 2-12 HRS: ICD-10-PCS | Mod: ,,, | Performed by: PSYCHIATRY & NEUROLOGY

## 2021-02-23 PROCEDURE — 25500020 PHARM REV CODE 255: Performed by: PSYCHIATRY & NEUROLOGY

## 2021-02-23 PROCEDURE — 99233 SBSQ HOSP IP/OBS HIGH 50: CPT | Mod: ,,, | Performed by: PSYCHIATRY & NEUROLOGY

## 2021-02-23 PROCEDURE — A9585 GADOBUTROL INJECTION: HCPCS | Performed by: PSYCHIATRY & NEUROLOGY

## 2021-02-23 PROCEDURE — 92523 SPEECH SOUND LANG COMPREHEN: CPT

## 2021-02-23 PROCEDURE — S4991 NICOTINE PATCH NONLEGEND: HCPCS | Performed by: STUDENT IN AN ORGANIZED HEALTH CARE EDUCATION/TRAINING PROGRAM

## 2021-02-23 PROCEDURE — 95718 EEG PHYS/QHP 2-12 HR W/VEEG: CPT | Mod: ,,, | Performed by: PSYCHIATRY & NEUROLOGY

## 2021-02-23 PROCEDURE — 25000003 PHARM REV CODE 250: Performed by: PHYSICIAN ASSISTANT

## 2021-02-23 RX ORDER — THIAMINE HCL 100 MG
100 TABLET ORAL DAILY
Status: DISCONTINUED | OUTPATIENT
Start: 2021-02-23 | End: 2021-03-22

## 2021-02-23 RX ORDER — LORAZEPAM 2 MG/ML
2 INJECTION INTRAMUSCULAR
Status: DISCONTINUED | OUTPATIENT
Start: 2021-02-23 | End: 2021-02-25

## 2021-02-23 RX ORDER — DIVALPROEX SODIUM 250 MG/1
500 TABLET, DELAYED RELEASE ORAL EVERY 8 HOURS
Status: DISCONTINUED | OUTPATIENT
Start: 2021-02-23 | End: 2021-02-24

## 2021-02-23 RX ORDER — IBUPROFEN 200 MG
16 TABLET ORAL
Status: DISCONTINUED | OUTPATIENT
Start: 2021-02-23 | End: 2021-03-20

## 2021-02-23 RX ORDER — GADOBUTROL 604.72 MG/ML
5 INJECTION INTRAVENOUS
Status: COMPLETED | OUTPATIENT
Start: 2021-02-23 | End: 2021-02-23

## 2021-02-23 RX ORDER — DEXMEDETOMIDINE HYDROCHLORIDE 4 UG/ML
INJECTION, SOLUTION INTRAVENOUS
Status: COMPLETED
Start: 2021-02-23 | End: 2021-02-23

## 2021-02-23 RX ORDER — INSULIN ASPART 100 [IU]/ML
1-10 INJECTION, SOLUTION INTRAVENOUS; SUBCUTANEOUS
Status: DISCONTINUED | OUTPATIENT
Start: 2021-02-23 | End: 2021-03-20

## 2021-02-23 RX ORDER — IBUPROFEN 200 MG
1 TABLET ORAL DAILY
Status: DISCONTINUED | OUTPATIENT
Start: 2021-02-24 | End: 2021-03-26

## 2021-02-23 RX ORDER — GLUCAGON 1 MG
1 KIT INJECTION
Status: DISCONTINUED | OUTPATIENT
Start: 2021-02-23 | End: 2021-03-20

## 2021-02-23 RX ORDER — IBUPROFEN 200 MG
24 TABLET ORAL
Status: DISCONTINUED | OUTPATIENT
Start: 2021-02-23 | End: 2021-03-20

## 2021-02-23 RX ORDER — DEXMEDETOMIDINE HYDROCHLORIDE 4 UG/ML
0-1.4 INJECTION, SOLUTION INTRAVENOUS CONTINUOUS
Status: DISCONTINUED | OUTPATIENT
Start: 2021-02-23 | End: 2021-02-23

## 2021-02-23 RX ADMIN — MIDAZOLAM 4 MG: 1 INJECTION INTRAMUSCULAR; INTRAVENOUS at 05:02

## 2021-02-23 RX ADMIN — LACOSAMIDE 200 MG: 10 INJECTION INTRAVENOUS at 09:02

## 2021-02-23 RX ADMIN — INSULIN ASPART 8 UNITS: 100 INJECTION, SOLUTION INTRAVENOUS; SUBCUTANEOUS at 12:02

## 2021-02-23 RX ADMIN — DIVALPROEX SODIUM 500 MG: 250 TABLET, DELAYED RELEASE ORAL at 09:02

## 2021-02-23 RX ADMIN — LORAZEPAM 2 MG: 2 INJECTION INTRAMUSCULAR; INTRAVENOUS at 05:02

## 2021-02-23 RX ADMIN — NICOTINE 1 PATCH: 21 PATCH, EXTENDED RELEASE TRANSDERMAL at 08:02

## 2021-02-23 RX ADMIN — ENOXAPARIN SODIUM 30 MG: 30 INJECTION SUBCUTANEOUS at 06:02

## 2021-02-23 RX ADMIN — INSULIN ASPART 2 UNITS: 100 INJECTION, SOLUTION INTRAVENOUS; SUBCUTANEOUS at 08:02

## 2021-02-23 RX ADMIN — DEXMEDETOMIDINE HYDROCHLORIDE 0.2 MCG/KG/HR: 4 INJECTION, SOLUTION INTRAVENOUS at 12:02

## 2021-02-23 RX ADMIN — LORAZEPAM 2 MG: 2 INJECTION INTRAMUSCULAR; INTRAVENOUS at 10:02

## 2021-02-23 RX ADMIN — LEVETIRACETAM INJECTION 1500 MG: 15 INJECTION INTRAVENOUS at 08:02

## 2021-02-23 RX ADMIN — GADOBUTROL 5 ML: 604.72 INJECTION INTRAVENOUS at 05:02

## 2021-02-23 RX ADMIN — Medication 100 MG: at 08:02

## 2021-02-23 RX ADMIN — INSULIN ASPART 4 UNITS: 100 INJECTION, SOLUTION INTRAVENOUS; SUBCUTANEOUS at 06:02

## 2021-02-23 RX ADMIN — INSULIN ASPART 1 UNITS: 100 INJECTION, SOLUTION INTRAVENOUS; SUBCUTANEOUS at 09:02

## 2021-02-23 RX ADMIN — DIVALPROEX SODIUM 500 MG: 250 TABLET, DELAYED RELEASE ORAL at 02:02

## 2021-02-23 RX ADMIN — FOLIC ACID 1 MG: 1 TABLET ORAL at 08:02

## 2021-02-24 PROBLEM — T68.XXXA HYPOTHERMIA: Status: ACTIVE | Noted: 2021-02-24

## 2021-02-24 LAB
ALBUMIN SERPL BCP-MCNC: 2.6 G/DL (ref 3.5–5.2)
ALP SERPL-CCNC: 120 U/L (ref 55–135)
ALT SERPL W/O P-5'-P-CCNC: 35 U/L (ref 10–44)
AMMONIA PLAS-SCNC: 41 UMOL/L (ref 10–50)
AMPHIPHYSIN AB TITR CSF: NEGATIVE TITER
ANION GAP SERPL CALC-SCNC: 4 MMOL/L (ref 8–16)
AST SERPL-CCNC: 30 U/L (ref 10–40)
BASOPHILS # BLD AUTO: 0.07 K/UL (ref 0–0.2)
BASOPHILS NFR BLD: 0.7 % (ref 0–1.9)
BILIRUB SERPL-MCNC: 0.3 MG/DL (ref 0.1–1)
BUN SERPL-MCNC: 8 MG/DL (ref 6–20)
CALCIUM SERPL-MCNC: 9.3 MG/DL (ref 8.7–10.5)
CHLORIDE SERPL-SCNC: 102 MMOL/L (ref 95–110)
CO2 SERPL-SCNC: 33 MMOL/L (ref 23–29)
CREAT SERPL-MCNC: 0.5 MG/DL (ref 0.5–1.4)
CV2 IGG TITR CSF: NEGATIVE TITER
DIFFERENTIAL METHOD: ABNORMAL
EOSINOPHIL # BLD AUTO: 0.1 K/UL (ref 0–0.5)
EOSINOPHIL NFR BLD: 1 % (ref 0–8)
ERYTHROCYTE [DISTWIDTH] IN BLOOD BY AUTOMATED COUNT: 17.3 % (ref 11.5–14.5)
EST. GFR  (AFRICAN AMERICAN): >60 ML/MIN/1.73 M^2
EST. GFR  (NON AFRICAN AMERICAN): >60 ML/MIN/1.73 M^2
FACT X PPP CHRO-ACNC: 0.46 IU/ML (ref 0.3–0.7)
GLIAL NUC TYPE 1 AB TITR CSF: NEGATIVE TITER
GLUCOSE SERPL-MCNC: 160 MG/DL (ref 70–110)
HCT VFR BLD AUTO: 33.2 % (ref 40–54)
HGB BLD-MCNC: 10.8 G/DL (ref 14–18)
HU1 AB TITR CSF IF: NEGATIVE TITER
HU2 AB TITR CSF IF: NEGATIVE TITER
HU3 AB TITR CSF: NEGATIVE TITER
IMM GRANULOCYTES # BLD AUTO: 0.08 K/UL (ref 0–0.04)
IMM GRANULOCYTES NFR BLD AUTO: 0.8 % (ref 0–0.5)
LYMPHOCYTES # BLD AUTO: 2.3 K/UL (ref 1–4.8)
LYMPHOCYTES NFR BLD: 22.9 % (ref 18–48)
MAGNESIUM SERPL-MCNC: 1.9 MG/DL (ref 1.6–2.6)
MCH RBC QN AUTO: 32.8 PG (ref 27–31)
MCHC RBC AUTO-ENTMCNC: 32.5 G/DL (ref 32–36)
MCV RBC AUTO: 101 FL (ref 82–98)
MONOCYTES # BLD AUTO: 0.7 K/UL (ref 0.3–1)
MONOCYTES NFR BLD: 6.5 % (ref 4–15)
NEUTROPHILS # BLD AUTO: 6.8 K/UL (ref 1.8–7.7)
NEUTROPHILS NFR BLD: 68.1 % (ref 38–73)
NRBC BLD-RTO: 0 /100 WBC
PARANEOPLASTIC INTERPRETATION,CSF: NORMAL
PCA-2 AB TITR CSF: NEGATIVE TITER
PCA-TR AB TITR CSF: NEGATIVE TITER
PHOSPHATE SERPL-MCNC: 3.2 MG/DL (ref 2.7–4.5)
PLATELET # BLD AUTO: 333 K/UL (ref 150–350)
PMV BLD AUTO: 9.6 FL (ref 9.2–12.9)
PNEOE REFLEX TEST ADDED: NORMAL
POCT GLUCOSE: 166 MG/DL (ref 70–110)
POCT GLUCOSE: 166 MG/DL (ref 70–110)
POCT GLUCOSE: 316 MG/DL (ref 70–110)
POCT GLUCOSE: 337 MG/DL (ref 70–110)
POTASSIUM SERPL-SCNC: 3.8 MMOL/L (ref 3.5–5.1)
PROT SERPL-MCNC: 6.2 G/DL (ref 6–8.4)
PURKINJE CELLS AB TITR CSF IF: NEGATIVE TITER
RBC # BLD AUTO: 3.29 M/UL (ref 4.6–6.2)
SODIUM SERPL-SCNC: 139 MMOL/L (ref 136–145)
WBC # BLD AUTO: 10.01 K/UL (ref 3.9–12.7)

## 2021-02-24 PROCEDURE — 99233 SBSQ HOSP IP/OBS HIGH 50: CPT | Mod: ,,, | Performed by: PSYCHIATRY & NEUROLOGY

## 2021-02-24 PROCEDURE — 63600175 PHARM REV CODE 636 W HCPCS: Performed by: NURSE PRACTITIONER

## 2021-02-24 PROCEDURE — 25000003 PHARM REV CODE 250: Performed by: STUDENT IN AN ORGANIZED HEALTH CARE EDUCATION/TRAINING PROGRAM

## 2021-02-24 PROCEDURE — 25000003 PHARM REV CODE 250: Performed by: NURSE PRACTITIONER

## 2021-02-24 PROCEDURE — 80053 COMPREHEN METABOLIC PANEL: CPT

## 2021-02-24 PROCEDURE — C9254 INJECTION, LACOSAMIDE: HCPCS | Performed by: STUDENT IN AN ORGANIZED HEALTH CARE EDUCATION/TRAINING PROGRAM

## 2021-02-24 PROCEDURE — 84100 ASSAY OF PHOSPHORUS: CPT

## 2021-02-24 PROCEDURE — 25000003 PHARM REV CODE 250: Performed by: PHYSICIAN ASSISTANT

## 2021-02-24 PROCEDURE — S4991 NICOTINE PATCH NONLEGEND: HCPCS | Performed by: PSYCHIATRY & NEUROLOGY

## 2021-02-24 PROCEDURE — 11000001 HC ACUTE MED/SURG PRIVATE ROOM

## 2021-02-24 PROCEDURE — 99223 1ST HOSP IP/OBS HIGH 75: CPT | Mod: ,,, | Performed by: PSYCHIATRY & NEUROLOGY

## 2021-02-24 PROCEDURE — 82140 ASSAY OF AMMONIA: CPT

## 2021-02-24 PROCEDURE — 63600175 PHARM REV CODE 636 W HCPCS: Performed by: STUDENT IN AN ORGANIZED HEALTH CARE EDUCATION/TRAINING PROGRAM

## 2021-02-24 PROCEDURE — 25000003 PHARM REV CODE 250: Performed by: PSYCHIATRY & NEUROLOGY

## 2021-02-24 PROCEDURE — 99233 PR SUBSEQUENT HOSPITAL CARE,LEVL III: ICD-10-PCS | Mod: ,,, | Performed by: PSYCHIATRY & NEUROLOGY

## 2021-02-24 PROCEDURE — 99223 PR INITIAL HOSPITAL CARE,LEVL III: ICD-10-PCS | Mod: ,,, | Performed by: PSYCHIATRY & NEUROLOGY

## 2021-02-24 PROCEDURE — 85520 HEPARIN ASSAY: CPT

## 2021-02-24 PROCEDURE — 83735 ASSAY OF MAGNESIUM: CPT

## 2021-02-24 PROCEDURE — 85025 COMPLETE CBC W/AUTO DIFF WBC: CPT

## 2021-02-24 RX ORDER — LACOSAMIDE 50 MG/1
200 TABLET ORAL EVERY 12 HOURS
Status: DISCONTINUED | OUTPATIENT
Start: 2021-02-24 | End: 2021-03-10

## 2021-02-24 RX ORDER — DIVALPROEX SODIUM 250 MG/1
250 TABLET, DELAYED RELEASE ORAL EVERY 8 HOURS
Status: DISCONTINUED | OUTPATIENT
Start: 2021-02-24 | End: 2021-03-08

## 2021-02-24 RX ORDER — LEVETIRACETAM 500 MG/1
1500 TABLET ORAL 2 TIMES DAILY
Status: DISCONTINUED | OUTPATIENT
Start: 2021-02-24 | End: 2021-03-14

## 2021-02-24 RX ADMIN — LACOSAMIDE 200 MG: 50 TABLET, FILM COATED ORAL at 09:02

## 2021-02-24 RX ADMIN — NICOTINE 1 PATCH: 21 PATCH, EXTENDED RELEASE TRANSDERMAL at 09:02

## 2021-02-24 RX ADMIN — INSULIN ASPART 8 UNITS: 100 INJECTION, SOLUTION INTRAVENOUS; SUBCUTANEOUS at 04:02

## 2021-02-24 RX ADMIN — DIVALPROEX SODIUM 250 MG: 250 TABLET, DELAYED RELEASE ORAL at 02:02

## 2021-02-24 RX ADMIN — ENOXAPARIN SODIUM 30 MG: 30 INJECTION SUBCUTANEOUS at 05:02

## 2021-02-24 RX ADMIN — INSULIN ASPART 2 UNITS: 100 INJECTION, SOLUTION INTRAVENOUS; SUBCUTANEOUS at 08:02

## 2021-02-24 RX ADMIN — DIVALPROEX SODIUM 500 MG: 250 TABLET, DELAYED RELEASE ORAL at 05:02

## 2021-02-24 RX ADMIN — LACOSAMIDE 200 MG: 10 INJECTION INTRAVENOUS at 09:02

## 2021-02-24 RX ADMIN — Medication 100 MG: at 09:02

## 2021-02-24 RX ADMIN — INSULIN ASPART 4 UNITS: 100 INJECTION, SOLUTION INTRAVENOUS; SUBCUTANEOUS at 09:02

## 2021-02-24 RX ADMIN — INSULIN ASPART 2 UNITS: 100 INJECTION, SOLUTION INTRAVENOUS; SUBCUTANEOUS at 12:02

## 2021-02-24 RX ADMIN — FOLIC ACID 1 MG: 1 TABLET ORAL at 09:02

## 2021-02-24 RX ADMIN — LEVETIRACETAM INJECTION 1500 MG: 15 INJECTION INTRAVENOUS at 09:02

## 2021-02-24 RX ADMIN — DIVALPROEX SODIUM 250 MG: 250 TABLET, DELAYED RELEASE ORAL at 09:02

## 2021-02-24 RX ADMIN — LEVETIRACETAM 1500 MG: 750 TABLET ORAL at 09:02

## 2021-02-24 RX ADMIN — LORAZEPAM 2 MG: 2 INJECTION INTRAMUSCULAR; INTRAVENOUS at 11:02

## 2021-02-24 RX ADMIN — LORAZEPAM 2 MG: 2 INJECTION INTRAMUSCULAR; INTRAVENOUS at 02:02

## 2021-02-25 ENCOUNTER — TELEPHONE (OUTPATIENT)
Dept: NEUROLOGY | Facility: CLINIC | Age: 41
End: 2021-02-25

## 2021-02-25 DIAGNOSIS — R56.9 SEIZURE: Primary | ICD-10-CM

## 2021-02-25 LAB
ALBUMIN SERPL BCP-MCNC: 2.7 G/DL (ref 3.5–5.2)
ALP SERPL-CCNC: 133 U/L (ref 55–135)
ALT SERPL W/O P-5'-P-CCNC: 35 U/L (ref 10–44)
ANION GAP SERPL CALC-SCNC: 11 MMOL/L (ref 8–16)
AST SERPL-CCNC: 29 U/L (ref 10–40)
BACTERIA #/AREA URNS AUTO: ABNORMAL /HPF
BASOPHILS # BLD AUTO: 0.06 K/UL (ref 0–0.2)
BASOPHILS NFR BLD: 0.5 % (ref 0–1.9)
BILIRUB SERPL-MCNC: 0.3 MG/DL (ref 0.1–1)
BILIRUB UR QL STRIP: NEGATIVE
BUN SERPL-MCNC: 9 MG/DL (ref 6–20)
CALCIUM SERPL-MCNC: 9.3 MG/DL (ref 8.7–10.5)
CHLORIDE SERPL-SCNC: 100 MMOL/L (ref 95–110)
CLARITY UR REFRACT.AUTO: ABNORMAL
CO2 SERPL-SCNC: 28 MMOL/L (ref 23–29)
COLOR UR AUTO: YELLOW
CREAT SERPL-MCNC: 0.5 MG/DL (ref 0.5–1.4)
DIFFERENTIAL METHOD: ABNORMAL
EOSINOPHIL # BLD AUTO: 0.1 K/UL (ref 0–0.5)
EOSINOPHIL NFR BLD: 0.4 % (ref 0–8)
ERYTHROCYTE [DISTWIDTH] IN BLOOD BY AUTOMATED COUNT: 15.1 % (ref 11.5–14.5)
EST. GFR  (AFRICAN AMERICAN): >60 ML/MIN/1.73 M^2
EST. GFR  (NON AFRICAN AMERICAN): >60 ML/MIN/1.73 M^2
GLUCOSE SERPL-MCNC: 292 MG/DL (ref 70–110)
GLUCOSE UR QL STRIP: ABNORMAL
HCT VFR BLD AUTO: 32.2 % (ref 40–54)
HGB BLD-MCNC: 10.7 G/DL (ref 14–18)
HGB UR QL STRIP: NEGATIVE
IMM GRANULOCYTES # BLD AUTO: 0.11 K/UL (ref 0–0.04)
IMM GRANULOCYTES NFR BLD AUTO: 0.8 % (ref 0–0.5)
KETONES UR QL STRIP: NEGATIVE
LACTATE SERPL-SCNC: 1.1 MMOL/L (ref 0.5–2.2)
LEUKOCYTE ESTERASE UR QL STRIP: ABNORMAL
LYMPHOCYTES # BLD AUTO: 2.3 K/UL (ref 1–4.8)
LYMPHOCYTES NFR BLD: 17.6 % (ref 18–48)
MAGNESIUM SERPL-MCNC: 1.6 MG/DL (ref 1.6–2.6)
MCH RBC QN AUTO: 32.4 PG (ref 27–31)
MCHC RBC AUTO-ENTMCNC: 33.2 G/DL (ref 32–36)
MCV RBC AUTO: 98 FL (ref 82–98)
MICROSCOPIC COMMENT: ABNORMAL
MONOCYTES # BLD AUTO: 1 K/UL (ref 0.3–1)
MONOCYTES NFR BLD: 7.2 % (ref 4–15)
NEUTROPHILS # BLD AUTO: 9.8 K/UL (ref 1.8–7.7)
NEUTROPHILS NFR BLD: 73.5 % (ref 38–73)
NITRITE UR QL STRIP: POSITIVE
NRBC BLD-RTO: 0 /100 WBC
PH UR STRIP: 7 [PH] (ref 5–8)
PHOSPHATE SERPL-MCNC: 3.3 MG/DL (ref 2.7–4.5)
PLATELET # BLD AUTO: 502 K/UL (ref 150–350)
PMV BLD AUTO: 8.9 FL (ref 9.2–12.9)
POCT GLUCOSE: 160 MG/DL (ref 70–110)
POCT GLUCOSE: 255 MG/DL (ref 70–110)
POCT GLUCOSE: 349 MG/DL (ref 70–110)
POCT GLUCOSE: 72 MG/DL (ref 70–110)
POTASSIUM SERPL-SCNC: 3.6 MMOL/L (ref 3.5–5.1)
PROT SERPL-MCNC: 6.5 G/DL (ref 6–8.4)
PROT UR QL STRIP: NEGATIVE
RBC # BLD AUTO: 3.3 M/UL (ref 4.6–6.2)
SODIUM SERPL-SCNC: 139 MMOL/L (ref 136–145)
SP GR UR STRIP: 1.01 (ref 1–1.03)
SQUAMOUS #/AREA URNS AUTO: 1 /HPF
URN SPEC COLLECT METH UR: ABNORMAL
WBC # BLD AUTO: 13.25 K/UL (ref 3.9–12.7)
WBC #/AREA URNS AUTO: 87 /HPF (ref 0–5)
WBC CLUMPS UR QL AUTO: ABNORMAL

## 2021-02-25 PROCEDURE — 63600175 PHARM REV CODE 636 W HCPCS: Performed by: NURSE PRACTITIONER

## 2021-02-25 PROCEDURE — 81001 URINALYSIS AUTO W/SCOPE: CPT

## 2021-02-25 PROCEDURE — 25000003 PHARM REV CODE 250: Performed by: PSYCHIATRY & NEUROLOGY

## 2021-02-25 PROCEDURE — 84100 ASSAY OF PHOSPHORUS: CPT

## 2021-02-25 PROCEDURE — S4991 NICOTINE PATCH NONLEGEND: HCPCS | Performed by: PSYCHIATRY & NEUROLOGY

## 2021-02-25 PROCEDURE — 87077 CULTURE AEROBIC IDENTIFY: CPT | Mod: 59

## 2021-02-25 PROCEDURE — 99222 1ST HOSP IP/OBS MODERATE 55: CPT | Mod: ,,, | Performed by: NURSE PRACTITIONER

## 2021-02-25 PROCEDURE — 25000003 PHARM REV CODE 250: Performed by: STUDENT IN AN ORGANIZED HEALTH CARE EDUCATION/TRAINING PROGRAM

## 2021-02-25 PROCEDURE — 99233 SBSQ HOSP IP/OBS HIGH 50: CPT | Mod: ,,, | Performed by: INTERNAL MEDICINE

## 2021-02-25 PROCEDURE — 80053 COMPREHEN METABOLIC PANEL: CPT

## 2021-02-25 PROCEDURE — 83735 ASSAY OF MAGNESIUM: CPT

## 2021-02-25 PROCEDURE — C9399 UNCLASSIFIED DRUGS OR BIOLOG: HCPCS | Performed by: STUDENT IN AN ORGANIZED HEALTH CARE EDUCATION/TRAINING PROGRAM

## 2021-02-25 PROCEDURE — 83605 ASSAY OF LACTIC ACID: CPT

## 2021-02-25 PROCEDURE — 99232 SBSQ HOSP IP/OBS MODERATE 35: CPT | Mod: ,,, | Performed by: PSYCHIATRY & NEUROLOGY

## 2021-02-25 PROCEDURE — 99222 PR INITIAL HOSPITAL CARE,LEVL II: ICD-10-PCS | Mod: ,,, | Performed by: NURSE PRACTITIONER

## 2021-02-25 PROCEDURE — 99233 PR SUBSEQUENT HOSPITAL CARE,LEVL III: ICD-10-PCS | Mod: ,,, | Performed by: INTERNAL MEDICINE

## 2021-02-25 PROCEDURE — 25000003 PHARM REV CODE 250: Performed by: PHYSICIAN ASSISTANT

## 2021-02-25 PROCEDURE — 87088 URINE BACTERIA CULTURE: CPT

## 2021-02-25 PROCEDURE — 99232 PR SUBSEQUENT HOSPITAL CARE,LEVL II: ICD-10-PCS | Mod: ,,, | Performed by: PSYCHIATRY & NEUROLOGY

## 2021-02-25 PROCEDURE — 85025 COMPLETE CBC W/AUTO DIFF WBC: CPT

## 2021-02-25 PROCEDURE — 87186 SC STD MICRODIL/AGAR DIL: CPT

## 2021-02-25 PROCEDURE — 87086 URINE CULTURE/COLONY COUNT: CPT

## 2021-02-25 PROCEDURE — 63600175 PHARM REV CODE 636 W HCPCS: Performed by: STUDENT IN AN ORGANIZED HEALTH CARE EDUCATION/TRAINING PROGRAM

## 2021-02-25 PROCEDURE — 11000001 HC ACUTE MED/SURG PRIVATE ROOM

## 2021-02-25 PROCEDURE — 36415 COLL VENOUS BLD VENIPUNCTURE: CPT

## 2021-02-25 RX ADMIN — LEVETIRACETAM 1500 MG: 750 TABLET ORAL at 08:02

## 2021-02-25 RX ADMIN — NICOTINE 1 PATCH: 21 PATCH, EXTENDED RELEASE TRANSDERMAL at 08:02

## 2021-02-25 RX ADMIN — LACOSAMIDE 200 MG: 50 TABLET, FILM COATED ORAL at 09:02

## 2021-02-25 RX ADMIN — Medication 100 MG: at 08:02

## 2021-02-25 RX ADMIN — FOLIC ACID 1 MG: 1 TABLET ORAL at 08:02

## 2021-02-25 RX ADMIN — LACOSAMIDE 200 MG: 50 TABLET, FILM COATED ORAL at 08:02

## 2021-02-25 RX ADMIN — DIVALPROEX SODIUM 250 MG: 250 TABLET, DELAYED RELEASE ORAL at 03:02

## 2021-02-25 RX ADMIN — INSULIN DETEMIR 5 UNITS: 100 INJECTION, SOLUTION SUBCUTANEOUS at 08:02

## 2021-02-25 RX ADMIN — INSULIN ASPART 5 UNITS: 100 INJECTION, SOLUTION INTRAVENOUS; SUBCUTANEOUS at 09:02

## 2021-02-25 RX ADMIN — ENOXAPARIN SODIUM 30 MG: 30 INJECTION SUBCUTANEOUS at 05:02

## 2021-02-25 RX ADMIN — DIVALPROEX SODIUM 250 MG: 250 TABLET, DELAYED RELEASE ORAL at 05:02

## 2021-02-25 RX ADMIN — LEVETIRACETAM 1500 MG: 750 TABLET ORAL at 09:02

## 2021-02-25 RX ADMIN — LORAZEPAM 2 MG: 2 INJECTION INTRAMUSCULAR; INTRAVENOUS at 02:02

## 2021-02-25 RX ADMIN — DIVALPROEX SODIUM 250 MG: 250 TABLET, DELAYED RELEASE ORAL at 09:02

## 2021-02-25 RX ADMIN — DOCUSATE SODIUM AND SENNOSIDES 1 TABLET: 8.6; 5 TABLET, FILM COATED ORAL at 08:02

## 2021-02-25 RX ADMIN — INSULIN ASPART 6 UNITS: 100 INJECTION, SOLUTION INTRAVENOUS; SUBCUTANEOUS at 08:02

## 2021-02-26 PROBLEM — N30.00 ACUTE CYSTITIS WITHOUT HEMATURIA: Status: ACTIVE | Noted: 2021-02-26

## 2021-02-26 LAB
ALBUMIN SERPL BCP-MCNC: 2.6 G/DL (ref 3.5–5.2)
ALP SERPL-CCNC: 109 U/L (ref 55–135)
ALT SERPL W/O P-5'-P-CCNC: 31 U/L (ref 10–44)
ANION GAP SERPL CALC-SCNC: 11 MMOL/L (ref 8–16)
AST SERPL-CCNC: 22 U/L (ref 10–40)
BILIRUB SERPL-MCNC: 0.2 MG/DL (ref 0.1–1)
BUN SERPL-MCNC: 9 MG/DL (ref 6–20)
CALCIUM SERPL-MCNC: 9.5 MG/DL (ref 8.7–10.5)
CHLORIDE SERPL-SCNC: 103 MMOL/L (ref 95–110)
CO2 SERPL-SCNC: 29 MMOL/L (ref 23–29)
CREAT SERPL-MCNC: 0.5 MG/DL (ref 0.5–1.4)
EST. GFR  (AFRICAN AMERICAN): >60 ML/MIN/1.73 M^2
EST. GFR  (NON AFRICAN AMERICAN): >60 ML/MIN/1.73 M^2
GLUCOSE SERPL-MCNC: 97 MG/DL (ref 70–110)
MAGNESIUM SERPL-MCNC: 1.8 MG/DL (ref 1.6–2.6)
POCT GLUCOSE: 111 MG/DL (ref 70–110)
POCT GLUCOSE: 222 MG/DL (ref 70–110)
POCT GLUCOSE: 265 MG/DL (ref 70–110)
POCT GLUCOSE: 275 MG/DL (ref 70–110)
POCT GLUCOSE: 278 MG/DL (ref 70–110)
POCT GLUCOSE: 351 MG/DL (ref 70–110)
POCT GLUCOSE: 359 MG/DL (ref 70–110)
POTASSIUM SERPL-SCNC: 3.4 MMOL/L (ref 3.5–5.1)
PROT SERPL-MCNC: 6.4 G/DL (ref 6–8.4)
SODIUM SERPL-SCNC: 143 MMOL/L (ref 136–145)
VALPROATE SERPL-MCNC: 49.2 UG/ML (ref 50–100)
VDRL CSF QL: NORMAL

## 2021-02-26 PROCEDURE — S4991 NICOTINE PATCH NONLEGEND: HCPCS | Performed by: PSYCHIATRY & NEUROLOGY

## 2021-02-26 PROCEDURE — 80053 COMPREHEN METABOLIC PANEL: CPT

## 2021-02-26 PROCEDURE — 83735 ASSAY OF MAGNESIUM: CPT

## 2021-02-26 PROCEDURE — 25000003 PHARM REV CODE 250: Performed by: PSYCHIATRY & NEUROLOGY

## 2021-02-26 PROCEDURE — 25000003 PHARM REV CODE 250: Performed by: STUDENT IN AN ORGANIZED HEALTH CARE EDUCATION/TRAINING PROGRAM

## 2021-02-26 PROCEDURE — 63600175 PHARM REV CODE 636 W HCPCS: Performed by: STUDENT IN AN ORGANIZED HEALTH CARE EDUCATION/TRAINING PROGRAM

## 2021-02-26 PROCEDURE — 92526 ORAL FUNCTION THERAPY: CPT

## 2021-02-26 PROCEDURE — 99232 SBSQ HOSP IP/OBS MODERATE 35: CPT | Mod: ,,, | Performed by: NURSE PRACTITIONER

## 2021-02-26 PROCEDURE — 36415 COLL VENOUS BLD VENIPUNCTURE: CPT

## 2021-02-26 PROCEDURE — 99233 PR SUBSEQUENT HOSPITAL CARE,LEVL III: ICD-10-PCS | Mod: ,,, | Performed by: INTERNAL MEDICINE

## 2021-02-26 PROCEDURE — 25000003 PHARM REV CODE 250: Performed by: PHYSICIAN ASSISTANT

## 2021-02-26 PROCEDURE — 92507 TX SP LANG VOICE COMM INDIV: CPT

## 2021-02-26 PROCEDURE — 63600175 PHARM REV CODE 636 W HCPCS: Performed by: NURSE PRACTITIONER

## 2021-02-26 PROCEDURE — 11000001 HC ACUTE MED/SURG PRIVATE ROOM

## 2021-02-26 PROCEDURE — 99232 PR SUBSEQUENT HOSPITAL CARE,LEVL II: ICD-10-PCS | Mod: ,,, | Performed by: NURSE PRACTITIONER

## 2021-02-26 PROCEDURE — 99233 SBSQ HOSP IP/OBS HIGH 50: CPT | Mod: ,,, | Performed by: INTERNAL MEDICINE

## 2021-02-26 PROCEDURE — 80164 ASSAY DIPROPYLACETIC ACD TOT: CPT

## 2021-02-26 RX ORDER — INSULIN ASPART 100 [IU]/ML
3 INJECTION, SOLUTION INTRAVENOUS; SUBCUTANEOUS ONCE
Status: COMPLETED | OUTPATIENT
Start: 2021-02-26 | End: 2021-02-26

## 2021-02-26 RX ORDER — INSULIN ASPART 100 [IU]/ML
5 INJECTION, SOLUTION INTRAVENOUS; SUBCUTANEOUS ONCE
Status: COMPLETED | OUTPATIENT
Start: 2021-02-26 | End: 2021-02-26

## 2021-02-26 RX ORDER — CEFTRIAXONE 1 G/1
1 INJECTION, POWDER, FOR SOLUTION INTRAMUSCULAR; INTRAVENOUS
Status: DISCONTINUED | OUTPATIENT
Start: 2021-02-26 | End: 2021-02-28

## 2021-02-26 RX ADMIN — FOLIC ACID 1 MG: 1 TABLET ORAL at 09:02

## 2021-02-26 RX ADMIN — INSULIN ASPART 3 UNITS: 100 INJECTION, SOLUTION INTRAVENOUS; SUBCUTANEOUS at 03:02

## 2021-02-26 RX ADMIN — DIVALPROEX SODIUM 250 MG: 250 TABLET, DELAYED RELEASE ORAL at 05:02

## 2021-02-26 RX ADMIN — DIVALPROEX SODIUM 250 MG: 250 TABLET, DELAYED RELEASE ORAL at 02:02

## 2021-02-26 RX ADMIN — LACOSAMIDE 200 MG: 50 TABLET, FILM COATED ORAL at 09:02

## 2021-02-26 RX ADMIN — CEFTRIAXONE 1 G: 1 INJECTION, POWDER, FOR SOLUTION INTRAMUSCULAR; INTRAVENOUS at 08:02

## 2021-02-26 RX ADMIN — DOCUSATE SODIUM AND SENNOSIDES 1 TABLET: 8.6; 5 TABLET, FILM COATED ORAL at 09:02

## 2021-02-26 RX ADMIN — INSULIN ASPART 6 UNITS: 100 INJECTION, SOLUTION INTRAVENOUS; SUBCUTANEOUS at 05:02

## 2021-02-26 RX ADMIN — NICOTINE 1 PATCH: 21 PATCH, EXTENDED RELEASE TRANSDERMAL at 09:02

## 2021-02-26 RX ADMIN — SODIUM CHLORIDE, SODIUM LACTATE, POTASSIUM CHLORIDE, AND CALCIUM CHLORIDE 500 ML: .6; .31; .03; .02 INJECTION, SOLUTION INTRAVENOUS at 12:02

## 2021-02-26 RX ADMIN — LEVETIRACETAM 1500 MG: 750 TABLET ORAL at 09:02

## 2021-02-26 RX ADMIN — ENOXAPARIN SODIUM 30 MG: 30 INJECTION SUBCUTANEOUS at 05:02

## 2021-02-26 RX ADMIN — INSULIN ASPART 5 UNITS: 100 INJECTION, SOLUTION INTRAVENOUS; SUBCUTANEOUS at 05:02

## 2021-02-26 RX ADMIN — DIVALPROEX SODIUM 250 MG: 250 TABLET, DELAYED RELEASE ORAL at 09:02

## 2021-02-26 RX ADMIN — Medication 100 MG: at 09:02

## 2021-02-26 RX ADMIN — INSULIN ASPART 10 UNITS: 100 INJECTION, SOLUTION INTRAVENOUS; SUBCUTANEOUS at 12:02

## 2021-02-26 RX ADMIN — INSULIN ASPART 2 UNITS: 100 INJECTION, SOLUTION INTRAVENOUS; SUBCUTANEOUS at 09:02

## 2021-02-27 LAB
ALBUMIN SERPL BCP-MCNC: 2.8 G/DL (ref 3.5–5.2)
ALP SERPL-CCNC: 118 U/L (ref 55–135)
ALT SERPL W/O P-5'-P-CCNC: 27 U/L (ref 10–44)
ANION GAP SERPL CALC-SCNC: 10 MMOL/L (ref 8–16)
AST SERPL-CCNC: 23 U/L (ref 10–40)
BASOPHILS # BLD AUTO: 0.06 K/UL (ref 0–0.2)
BASOPHILS NFR BLD: 0.5 % (ref 0–1.9)
BILIRUB SERPL-MCNC: 0.2 MG/DL (ref 0.1–1)
BUN SERPL-MCNC: 9 MG/DL (ref 6–20)
CALCIUM SERPL-MCNC: 9.5 MG/DL (ref 8.7–10.5)
CHLORIDE SERPL-SCNC: 100 MMOL/L (ref 95–110)
CO2 SERPL-SCNC: 27 MMOL/L (ref 23–29)
CREAT SERPL-MCNC: 0.6 MG/DL (ref 0.5–1.4)
DIFFERENTIAL METHOD: ABNORMAL
EOSINOPHIL # BLD AUTO: 0.1 K/UL (ref 0–0.5)
EOSINOPHIL NFR BLD: 0.7 % (ref 0–8)
ERYTHROCYTE [DISTWIDTH] IN BLOOD BY AUTOMATED COUNT: 15.5 % (ref 11.5–14.5)
EST. GFR  (AFRICAN AMERICAN): >60 ML/MIN/1.73 M^2
EST. GFR  (NON AFRICAN AMERICAN): >60 ML/MIN/1.73 M^2
GLUCOSE SERPL-MCNC: 237 MG/DL (ref 70–110)
HCT VFR BLD AUTO: 32.3 % (ref 40–54)
HGB BLD-MCNC: 10.8 G/DL (ref 14–18)
IMM GRANULOCYTES # BLD AUTO: 0.14 K/UL (ref 0–0.04)
IMM GRANULOCYTES NFR BLD AUTO: 1.2 % (ref 0–0.5)
LYMPHOCYTES # BLD AUTO: 2.9 K/UL (ref 1–4.8)
LYMPHOCYTES NFR BLD: 25.5 % (ref 18–48)
MAGNESIUM SERPL-MCNC: 1.8 MG/DL (ref 1.6–2.6)
MCH RBC QN AUTO: 32.6 PG (ref 27–31)
MCHC RBC AUTO-ENTMCNC: 33.4 G/DL (ref 32–36)
MCV RBC AUTO: 98 FL (ref 82–98)
MONOCYTES # BLD AUTO: 1.2 K/UL (ref 0.3–1)
MONOCYTES NFR BLD: 10.1 % (ref 4–15)
NEUTROPHILS # BLD AUTO: 7.1 K/UL (ref 1.8–7.7)
NEUTROPHILS NFR BLD: 62 % (ref 38–73)
NRBC BLD-RTO: 0 /100 WBC
PLATELET # BLD AUTO: 479 K/UL (ref 150–350)
PMV BLD AUTO: 9.1 FL (ref 9.2–12.9)
POCT GLUCOSE: 138 MG/DL (ref 70–110)
POCT GLUCOSE: 250 MG/DL (ref 70–110)
POCT GLUCOSE: 295 MG/DL (ref 70–110)
POTASSIUM SERPL-SCNC: 4.4 MMOL/L (ref 3.5–5.1)
PROT SERPL-MCNC: 7 G/DL (ref 6–8.4)
RBC # BLD AUTO: 3.31 M/UL (ref 4.6–6.2)
SODIUM SERPL-SCNC: 137 MMOL/L (ref 136–145)
WBC # BLD AUTO: 11.43 K/UL (ref 3.9–12.7)

## 2021-02-27 PROCEDURE — 99223 PR INITIAL HOSPITAL CARE,LEVL III: ICD-10-PCS | Mod: ,,, | Performed by: INTERNAL MEDICINE

## 2021-02-27 PROCEDURE — 63600175 PHARM REV CODE 636 W HCPCS: Performed by: STUDENT IN AN ORGANIZED HEALTH CARE EDUCATION/TRAINING PROGRAM

## 2021-02-27 PROCEDURE — 25000003 PHARM REV CODE 250: Performed by: PSYCHIATRY & NEUROLOGY

## 2021-02-27 PROCEDURE — 25000003 PHARM REV CODE 250: Performed by: STUDENT IN AN ORGANIZED HEALTH CARE EDUCATION/TRAINING PROGRAM

## 2021-02-27 PROCEDURE — 80053 COMPREHEN METABOLIC PANEL: CPT

## 2021-02-27 PROCEDURE — 11000001 HC ACUTE MED/SURG PRIVATE ROOM

## 2021-02-27 PROCEDURE — S4991 NICOTINE PATCH NONLEGEND: HCPCS | Performed by: PSYCHIATRY & NEUROLOGY

## 2021-02-27 PROCEDURE — 25000003 PHARM REV CODE 250: Performed by: PHYSICIAN ASSISTANT

## 2021-02-27 PROCEDURE — 63600175 PHARM REV CODE 636 W HCPCS: Performed by: NURSE PRACTITIONER

## 2021-02-27 PROCEDURE — 83735 ASSAY OF MAGNESIUM: CPT

## 2021-02-27 PROCEDURE — 85025 COMPLETE CBC W/AUTO DIFF WBC: CPT

## 2021-02-27 PROCEDURE — 99223 1ST HOSP IP/OBS HIGH 75: CPT | Mod: ,,, | Performed by: INTERNAL MEDICINE

## 2021-02-27 PROCEDURE — 36415 COLL VENOUS BLD VENIPUNCTURE: CPT

## 2021-02-27 RX ORDER — INSULIN ASPART 100 [IU]/ML
3 INJECTION, SOLUTION INTRAVENOUS; SUBCUTANEOUS
Status: DISCONTINUED | OUTPATIENT
Start: 2021-02-27 | End: 2021-03-01

## 2021-02-27 RX ORDER — LORAZEPAM 2 MG/ML
0.5 INJECTION INTRAMUSCULAR EVERY 8 HOURS PRN
Status: DISCONTINUED | OUTPATIENT
Start: 2021-02-27 | End: 2021-02-28

## 2021-02-27 RX ORDER — DICLOFENAC SODIUM 10 MG/G
2 GEL TOPICAL DAILY
Status: DISCONTINUED | OUTPATIENT
Start: 2021-02-27 | End: 2021-03-26

## 2021-02-27 RX ADMIN — NICOTINE 1 PATCH: 21 PATCH, EXTENDED RELEASE TRANSDERMAL at 09:02

## 2021-02-27 RX ADMIN — INSULIN ASPART 3 UNITS: 100 INJECTION, SOLUTION INTRAVENOUS; SUBCUTANEOUS at 12:02

## 2021-02-27 RX ADMIN — CEFTRIAXONE 1 G: 1 INJECTION, POWDER, FOR SOLUTION INTRAMUSCULAR; INTRAVENOUS at 09:02

## 2021-02-27 RX ADMIN — INSULIN ASPART 4 UNITS: 100 INJECTION, SOLUTION INTRAVENOUS; SUBCUTANEOUS at 09:02

## 2021-02-27 RX ADMIN — INSULIN ASPART 3 UNITS: 100 INJECTION, SOLUTION INTRAVENOUS; SUBCUTANEOUS at 05:02

## 2021-02-27 RX ADMIN — LEVETIRACETAM 1500 MG: 750 TABLET ORAL at 09:02

## 2021-02-27 RX ADMIN — DIVALPROEX SODIUM 250 MG: 250 TABLET, DELAYED RELEASE ORAL at 02:02

## 2021-02-27 RX ADMIN — Medication 100 MG: at 09:02

## 2021-02-27 RX ADMIN — INSULIN ASPART 6 UNITS: 100 INJECTION, SOLUTION INTRAVENOUS; SUBCUTANEOUS at 12:02

## 2021-02-27 RX ADMIN — FOLIC ACID 1 MG: 1 TABLET ORAL at 09:02

## 2021-02-27 RX ADMIN — ENOXAPARIN SODIUM 30 MG: 30 INJECTION SUBCUTANEOUS at 05:02

## 2021-02-27 RX ADMIN — DICLOFENAC SODIUM 2 G: 10 GEL TOPICAL at 11:02

## 2021-02-27 RX ADMIN — LACOSAMIDE 200 MG: 50 TABLET, FILM COATED ORAL at 09:02

## 2021-02-27 RX ADMIN — INSULIN ASPART 2 UNITS: 100 INJECTION, SOLUTION INTRAVENOUS; SUBCUTANEOUS at 09:02

## 2021-02-27 RX ADMIN — DIVALPROEX SODIUM 250 MG: 250 TABLET, DELAYED RELEASE ORAL at 05:02

## 2021-02-27 RX ADMIN — DOCUSATE SODIUM AND SENNOSIDES 1 TABLET: 8.6; 5 TABLET, FILM COATED ORAL at 09:02

## 2021-02-27 RX ADMIN — DIVALPROEX SODIUM 250 MG: 250 TABLET, DELAYED RELEASE ORAL at 09:02

## 2021-02-28 LAB
ALBUMIN SERPL BCP-MCNC: 2.8 G/DL (ref 3.5–5.2)
ALP SERPL-CCNC: 96 U/L (ref 55–135)
ALT SERPL W/O P-5'-P-CCNC: 22 U/L (ref 10–44)
ANION GAP SERPL CALC-SCNC: 10 MMOL/L (ref 8–16)
AST SERPL-CCNC: 21 U/L (ref 10–40)
BACTERIA UR CULT: ABNORMAL
BACTERIA UR CULT: ABNORMAL
BASOPHILS # BLD AUTO: 0.06 K/UL (ref 0–0.2)
BASOPHILS NFR BLD: 0.6 % (ref 0–1.9)
BILIRUB SERPL-MCNC: 0.2 MG/DL (ref 0.1–1)
BUN SERPL-MCNC: 8 MG/DL (ref 6–20)
CALCIUM SERPL-MCNC: 10.3 MG/DL (ref 8.7–10.5)
CHLORIDE SERPL-SCNC: 100 MMOL/L (ref 95–110)
CO2 SERPL-SCNC: 29 MMOL/L (ref 23–29)
CREAT SERPL-MCNC: 0.5 MG/DL (ref 0.5–1.4)
DIFFERENTIAL METHOD: ABNORMAL
EOSINOPHIL # BLD AUTO: 0.1 K/UL (ref 0–0.5)
EOSINOPHIL NFR BLD: 1.2 % (ref 0–8)
ERYTHROCYTE [DISTWIDTH] IN BLOOD BY AUTOMATED COUNT: 15 % (ref 11.5–14.5)
EST. GFR  (AFRICAN AMERICAN): >60 ML/MIN/1.73 M^2
EST. GFR  (NON AFRICAN AMERICAN): >60 ML/MIN/1.73 M^2
GLUCOSE SERPL-MCNC: 184 MG/DL (ref 70–110)
HCT VFR BLD AUTO: 31.6 % (ref 40–54)
HGB BLD-MCNC: 10.7 G/DL (ref 14–18)
IMM GRANULOCYTES # BLD AUTO: 0.14 K/UL (ref 0–0.04)
IMM GRANULOCYTES NFR BLD AUTO: 1.4 % (ref 0–0.5)
LYMPHOCYTES # BLD AUTO: 2.4 K/UL (ref 1–4.8)
LYMPHOCYTES NFR BLD: 23.5 % (ref 18–48)
MAGNESIUM SERPL-MCNC: 1.8 MG/DL (ref 1.6–2.6)
MCH RBC QN AUTO: 32.5 PG (ref 27–31)
MCHC RBC AUTO-ENTMCNC: 33.9 G/DL (ref 32–36)
MCV RBC AUTO: 96 FL (ref 82–98)
MONOCYTES # BLD AUTO: 0.9 K/UL (ref 0.3–1)
MONOCYTES NFR BLD: 8.6 % (ref 4–15)
NEUTROPHILS # BLD AUTO: 6.6 K/UL (ref 1.8–7.7)
NEUTROPHILS NFR BLD: 64.7 % (ref 38–73)
NRBC BLD-RTO: 0 /100 WBC
PLATELET # BLD AUTO: 480 K/UL (ref 150–350)
PMV BLD AUTO: 9.2 FL (ref 9.2–12.9)
POCT GLUCOSE: 114 MG/DL (ref 70–110)
POCT GLUCOSE: 166 MG/DL (ref 70–110)
POCT GLUCOSE: 190 MG/DL (ref 70–110)
POCT GLUCOSE: 293 MG/DL (ref 70–110)
POTASSIUM SERPL-SCNC: 4.1 MMOL/L (ref 3.5–5.1)
PROT SERPL-MCNC: 6.8 G/DL (ref 6–8.4)
RBC # BLD AUTO: 3.29 M/UL (ref 4.6–6.2)
SODIUM SERPL-SCNC: 139 MMOL/L (ref 136–145)
WBC # BLD AUTO: 10.18 K/UL (ref 3.9–12.7)

## 2021-02-28 PROCEDURE — 25000003 PHARM REV CODE 250: Performed by: PHYSICIAN ASSISTANT

## 2021-02-28 PROCEDURE — 25000003 PHARM REV CODE 250: Performed by: PSYCHIATRY & NEUROLOGY

## 2021-02-28 PROCEDURE — S4991 NICOTINE PATCH NONLEGEND: HCPCS | Performed by: PSYCHIATRY & NEUROLOGY

## 2021-02-28 PROCEDURE — 85025 COMPLETE CBC W/AUTO DIFF WBC: CPT

## 2021-02-28 PROCEDURE — 25000003 PHARM REV CODE 250: Performed by: STUDENT IN AN ORGANIZED HEALTH CARE EDUCATION/TRAINING PROGRAM

## 2021-02-28 PROCEDURE — 36415 COLL VENOUS BLD VENIPUNCTURE: CPT

## 2021-02-28 PROCEDURE — 99233 PR SUBSEQUENT HOSPITAL CARE,LEVL III: ICD-10-PCS | Mod: ,,, | Performed by: INTERNAL MEDICINE

## 2021-02-28 PROCEDURE — 63600175 PHARM REV CODE 636 W HCPCS: Performed by: NURSE PRACTITIONER

## 2021-02-28 PROCEDURE — 80053 COMPREHEN METABOLIC PANEL: CPT

## 2021-02-28 PROCEDURE — 83735 ASSAY OF MAGNESIUM: CPT

## 2021-02-28 PROCEDURE — 63600175 PHARM REV CODE 636 W HCPCS: Performed by: STUDENT IN AN ORGANIZED HEALTH CARE EDUCATION/TRAINING PROGRAM

## 2021-02-28 PROCEDURE — 11000001 HC ACUTE MED/SURG PRIVATE ROOM

## 2021-02-28 PROCEDURE — 99233 SBSQ HOSP IP/OBS HIGH 50: CPT | Mod: ,,, | Performed by: INTERNAL MEDICINE

## 2021-02-28 RX ORDER — AMLODIPINE BESYLATE 5 MG/1
5 TABLET ORAL DAILY
Status: DISCONTINUED | OUTPATIENT
Start: 2021-02-28 | End: 2021-02-28

## 2021-02-28 RX ORDER — AMOXICILLIN AND CLAVULANATE POTASSIUM 875; 125 MG/1; MG/1
1 TABLET, FILM COATED ORAL EVERY 12 HOURS
Status: COMPLETED | OUTPATIENT
Start: 2021-02-28 | End: 2021-03-06

## 2021-02-28 RX ORDER — HYDROCHLOROTHIAZIDE 12.5 MG/1
12.5 TABLET ORAL DAILY
Status: DISCONTINUED | OUTPATIENT
Start: 2021-02-28 | End: 2021-03-22

## 2021-02-28 RX ORDER — LORAZEPAM 2 MG/ML
1 INJECTION INTRAMUSCULAR EVERY 8 HOURS PRN
Status: DISCONTINUED | OUTPATIENT
Start: 2021-02-28 | End: 2021-03-07

## 2021-02-28 RX ADMIN — FOLIC ACID 1 MG: 1 TABLET ORAL at 09:02

## 2021-02-28 RX ADMIN — INSULIN ASPART 3 UNITS: 100 INJECTION, SOLUTION INTRAVENOUS; SUBCUTANEOUS at 09:02

## 2021-02-28 RX ADMIN — DIVALPROEX SODIUM 250 MG: 250 TABLET, DELAYED RELEASE ORAL at 09:02

## 2021-02-28 RX ADMIN — INSULIN ASPART 2 UNITS: 100 INJECTION, SOLUTION INTRAVENOUS; SUBCUTANEOUS at 09:02

## 2021-02-28 RX ADMIN — INSULIN ASPART 3 UNITS: 100 INJECTION, SOLUTION INTRAVENOUS; SUBCUTANEOUS at 04:02

## 2021-02-28 RX ADMIN — DOCUSATE SODIUM AND SENNOSIDES 1 TABLET: 8.6; 5 TABLET, FILM COATED ORAL at 09:02

## 2021-02-28 RX ADMIN — LORAZEPAM 1 MG: 2 INJECTION INTRAMUSCULAR; INTRAVENOUS at 10:02

## 2021-02-28 RX ADMIN — AMOXICILLIN AND CLAVULANATE POTASSIUM 1 TABLET: 875; 125 TABLET, FILM COATED ORAL at 09:02

## 2021-02-28 RX ADMIN — DICLOFENAC SODIUM 2 G: 10 GEL TOPICAL at 09:02

## 2021-02-28 RX ADMIN — LEVETIRACETAM 1500 MG: 750 TABLET ORAL at 09:02

## 2021-02-28 RX ADMIN — Medication 100 MG: at 09:02

## 2021-02-28 RX ADMIN — INSULIN ASPART 6 UNITS: 100 INJECTION, SOLUTION INTRAVENOUS; SUBCUTANEOUS at 04:02

## 2021-02-28 RX ADMIN — HYDROCHLOROTHIAZIDE 12.5 MG: 12.5 TABLET ORAL at 09:02

## 2021-02-28 RX ADMIN — LORAZEPAM 0.5 MG: 2 INJECTION INTRAMUSCULAR; INTRAVENOUS at 12:02

## 2021-02-28 RX ADMIN — NICOTINE 1 PATCH: 21 PATCH, EXTENDED RELEASE TRANSDERMAL at 09:02

## 2021-02-28 RX ADMIN — LACOSAMIDE 200 MG: 50 TABLET, FILM COATED ORAL at 09:02

## 2021-02-28 RX ADMIN — DIVALPROEX SODIUM 250 MG: 250 TABLET, DELAYED RELEASE ORAL at 06:02

## 2021-02-28 RX ADMIN — DIVALPROEX SODIUM 250 MG: 250 TABLET, DELAYED RELEASE ORAL at 02:02

## 2021-02-28 RX ADMIN — LORAZEPAM 1 MG: 2 INJECTION INTRAMUSCULAR; INTRAVENOUS at 07:02

## 2021-02-28 RX ADMIN — ENOXAPARIN SODIUM 30 MG: 30 INJECTION SUBCUTANEOUS at 04:02

## 2021-03-01 LAB
ALBUMIN SERPL BCP-MCNC: 3 G/DL (ref 3.5–5.2)
ALP SERPL-CCNC: 102 U/L (ref 55–135)
ALT SERPL W/O P-5'-P-CCNC: 29 U/L (ref 10–44)
ANION GAP SERPL CALC-SCNC: 10 MMOL/L (ref 8–16)
AST SERPL-CCNC: 31 U/L (ref 10–40)
BASOPHILS # BLD AUTO: 0.09 K/UL (ref 0–0.2)
BASOPHILS NFR BLD: 0.8 % (ref 0–1.9)
BILIRUB SERPL-MCNC: 0.3 MG/DL (ref 0.1–1)
BUN SERPL-MCNC: 7 MG/DL (ref 6–20)
CALCIUM SERPL-MCNC: 10.4 MG/DL (ref 8.7–10.5)
CHLORIDE SERPL-SCNC: 95 MMOL/L (ref 95–110)
CO2 SERPL-SCNC: 30 MMOL/L (ref 23–29)
CREAT SERPL-MCNC: 0.6 MG/DL (ref 0.5–1.4)
DIFFERENTIAL METHOD: ABNORMAL
EOSINOPHIL # BLD AUTO: 0.2 K/UL (ref 0–0.5)
EOSINOPHIL NFR BLD: 1.8 % (ref 0–8)
ERYTHROCYTE [DISTWIDTH] IN BLOOD BY AUTOMATED COUNT: 15.1 % (ref 11.5–14.5)
EST. GFR  (AFRICAN AMERICAN): >60 ML/MIN/1.73 M^2
EST. GFR  (NON AFRICAN AMERICAN): >60 ML/MIN/1.73 M^2
GLUCOSE SERPL-MCNC: 170 MG/DL (ref 70–110)
HCT VFR BLD AUTO: 35.1 % (ref 40–54)
HGB BLD-MCNC: 12 G/DL (ref 14–18)
IMM GRANULOCYTES # BLD AUTO: 0.22 K/UL (ref 0–0.04)
IMM GRANULOCYTES NFR BLD AUTO: 1.9 % (ref 0–0.5)
LYMPHOCYTES # BLD AUTO: 3.1 K/UL (ref 1–4.8)
LYMPHOCYTES NFR BLD: 26.9 % (ref 18–48)
MAGNESIUM SERPL-MCNC: 1.9 MG/DL (ref 1.6–2.6)
MCH RBC QN AUTO: 32.5 PG (ref 27–31)
MCHC RBC AUTO-ENTMCNC: 34.2 G/DL (ref 32–36)
MCV RBC AUTO: 95 FL (ref 82–98)
MONOCYTES # BLD AUTO: 1.2 K/UL (ref 0.3–1)
MONOCYTES NFR BLD: 10.4 % (ref 4–15)
NEUTROPHILS # BLD AUTO: 6.8 K/UL (ref 1.8–7.7)
NEUTROPHILS NFR BLD: 58.2 % (ref 38–73)
NRBC BLD-RTO: 0 /100 WBC
PLATELET # BLD AUTO: 496 K/UL (ref 150–350)
PMV BLD AUTO: 9.3 FL (ref 9.2–12.9)
POCT GLUCOSE: 187 MG/DL (ref 70–110)
POCT GLUCOSE: 201 MG/DL (ref 70–110)
POCT GLUCOSE: 230 MG/DL (ref 70–110)
POCT GLUCOSE: 236 MG/DL (ref 70–110)
POCT GLUCOSE: 322 MG/DL (ref 70–110)
POTASSIUM SERPL-SCNC: 4.3 MMOL/L (ref 3.5–5.1)
PROT SERPL-MCNC: 7.6 G/DL (ref 6–8.4)
RBC # BLD AUTO: 3.69 M/UL (ref 4.6–6.2)
SODIUM SERPL-SCNC: 135 MMOL/L (ref 136–145)
WBC # BLD AUTO: 11.65 K/UL (ref 3.9–12.7)

## 2021-03-01 PROCEDURE — 63600175 PHARM REV CODE 636 W HCPCS: Performed by: NURSE PRACTITIONER

## 2021-03-01 PROCEDURE — 25000003 PHARM REV CODE 250: Performed by: PHYSICIAN ASSISTANT

## 2021-03-01 PROCEDURE — S4991 NICOTINE PATCH NONLEGEND: HCPCS | Performed by: PSYCHIATRY & NEUROLOGY

## 2021-03-01 PROCEDURE — 99232 PR SUBSEQUENT HOSPITAL CARE,LEVL II: ICD-10-PCS | Mod: ,,, | Performed by: INTERNAL MEDICINE

## 2021-03-01 PROCEDURE — 25000003 PHARM REV CODE 250: Performed by: STUDENT IN AN ORGANIZED HEALTH CARE EDUCATION/TRAINING PROGRAM

## 2021-03-01 PROCEDURE — 99232 SBSQ HOSP IP/OBS MODERATE 35: CPT | Mod: ,,, | Performed by: INTERNAL MEDICINE

## 2021-03-01 PROCEDURE — 83735 ASSAY OF MAGNESIUM: CPT

## 2021-03-01 PROCEDURE — 80053 COMPREHEN METABOLIC PANEL: CPT

## 2021-03-01 PROCEDURE — 97535 SELF CARE MNGMENT TRAINING: CPT

## 2021-03-01 PROCEDURE — 97164 PT RE-EVAL EST PLAN CARE: CPT

## 2021-03-01 PROCEDURE — 85025 COMPLETE CBC W/AUTO DIFF WBC: CPT

## 2021-03-01 PROCEDURE — 99232 PR SUBSEQUENT HOSPITAL CARE,LEVL II: ICD-10-PCS | Mod: ,,, | Performed by: NURSE PRACTITIONER

## 2021-03-01 PROCEDURE — 11000001 HC ACUTE MED/SURG PRIVATE ROOM

## 2021-03-01 PROCEDURE — 97530 THERAPEUTIC ACTIVITIES: CPT

## 2021-03-01 PROCEDURE — 25000003 PHARM REV CODE 250: Performed by: PSYCHIATRY & NEUROLOGY

## 2021-03-01 PROCEDURE — 97116 GAIT TRAINING THERAPY: CPT

## 2021-03-01 PROCEDURE — 36415 COLL VENOUS BLD VENIPUNCTURE: CPT

## 2021-03-01 PROCEDURE — 99232 SBSQ HOSP IP/OBS MODERATE 35: CPT | Mod: ,,, | Performed by: NURSE PRACTITIONER

## 2021-03-01 PROCEDURE — 25000003 PHARM REV CODE 250: Performed by: INTERNAL MEDICINE

## 2021-03-01 PROCEDURE — 63600175 PHARM REV CODE 636 W HCPCS: Performed by: STUDENT IN AN ORGANIZED HEALTH CARE EDUCATION/TRAINING PROGRAM

## 2021-03-01 RX ORDER — INSULIN ASPART 100 [IU]/ML
5 INJECTION, SOLUTION INTRAVENOUS; SUBCUTANEOUS
Status: DISCONTINUED | OUTPATIENT
Start: 2021-03-01 | End: 2021-03-02

## 2021-03-01 RX ADMIN — DIVALPROEX SODIUM 250 MG: 250 TABLET, DELAYED RELEASE ORAL at 06:03

## 2021-03-01 RX ADMIN — INSULIN ASPART 2 UNITS: 100 INJECTION, SOLUTION INTRAVENOUS; SUBCUTANEOUS at 08:03

## 2021-03-01 RX ADMIN — DIVALPROEX SODIUM 250 MG: 250 TABLET, DELAYED RELEASE ORAL at 08:03

## 2021-03-01 RX ADMIN — NICOTINE 1 PATCH: 21 PATCH, EXTENDED RELEASE TRANSDERMAL at 08:03

## 2021-03-01 RX ADMIN — AMOXICILLIN AND CLAVULANATE POTASSIUM 1 TABLET: 875; 125 TABLET, FILM COATED ORAL at 08:03

## 2021-03-01 RX ADMIN — LACOSAMIDE 200 MG: 50 TABLET, FILM COATED ORAL at 08:03

## 2021-03-01 RX ADMIN — DOCUSATE SODIUM AND SENNOSIDES 1 TABLET: 8.6; 5 TABLET, FILM COATED ORAL at 08:03

## 2021-03-01 RX ADMIN — INSULIN ASPART 8 UNITS: 100 INJECTION, SOLUTION INTRAVENOUS; SUBCUTANEOUS at 12:03

## 2021-03-01 RX ADMIN — HYDROCHLOROTHIAZIDE 12.5 MG: 12.5 TABLET ORAL at 08:03

## 2021-03-01 RX ADMIN — Medication 100 MG: at 08:03

## 2021-03-01 RX ADMIN — FOLIC ACID 1 MG: 1 TABLET ORAL at 08:03

## 2021-03-01 RX ADMIN — DICLOFENAC SODIUM 2 G: 10 GEL TOPICAL at 08:03

## 2021-03-01 RX ADMIN — INSULIN ASPART 3 UNITS: 100 INJECTION, SOLUTION INTRAVENOUS; SUBCUTANEOUS at 12:03

## 2021-03-01 RX ADMIN — DIVALPROEX SODIUM 250 MG: 250 TABLET, DELAYED RELEASE ORAL at 03:03

## 2021-03-01 RX ADMIN — INSULIN ASPART 3 UNITS: 100 INJECTION, SOLUTION INTRAVENOUS; SUBCUTANEOUS at 08:03

## 2021-03-01 RX ADMIN — LORAZEPAM 1 MG: 2 INJECTION INTRAMUSCULAR; INTRAVENOUS at 02:03

## 2021-03-01 RX ADMIN — INSULIN ASPART 5 UNITS: 100 INJECTION, SOLUTION INTRAVENOUS; SUBCUTANEOUS at 04:03

## 2021-03-01 RX ADMIN — ENOXAPARIN SODIUM 30 MG: 30 INJECTION SUBCUTANEOUS at 04:03

## 2021-03-01 RX ADMIN — LEVETIRACETAM 1500 MG: 750 TABLET ORAL at 08:03

## 2021-03-01 RX ADMIN — INSULIN ASPART 4 UNITS: 100 INJECTION, SOLUTION INTRAVENOUS; SUBCUTANEOUS at 04:03

## 2021-03-02 LAB
ALBUMIN SERPL BCP-MCNC: 3.1 G/DL (ref 3.5–5.2)
ALP SERPL-CCNC: 84 U/L (ref 55–135)
ALT SERPL W/O P-5'-P-CCNC: 30 U/L (ref 10–44)
ANION GAP SERPL CALC-SCNC: 12 MMOL/L (ref 8–16)
AST SERPL-CCNC: 22 U/L (ref 10–40)
BASOPHILS # BLD AUTO: 0.08 K/UL (ref 0–0.2)
BASOPHILS NFR BLD: 0.8 % (ref 0–1.9)
BILIRUB SERPL-MCNC: 0.4 MG/DL (ref 0.1–1)
BUN SERPL-MCNC: 11 MG/DL (ref 6–20)
CALCIUM SERPL-MCNC: 10.3 MG/DL (ref 8.7–10.5)
CHLORIDE SERPL-SCNC: 92 MMOL/L (ref 95–110)
CO2 SERPL-SCNC: 31 MMOL/L (ref 23–29)
CREAT SERPL-MCNC: 0.6 MG/DL (ref 0.5–1.4)
DIFFERENTIAL METHOD: ABNORMAL
EOSINOPHIL # BLD AUTO: 0.1 K/UL (ref 0–0.5)
EOSINOPHIL NFR BLD: 0.9 % (ref 0–8)
ERYTHROCYTE [DISTWIDTH] IN BLOOD BY AUTOMATED COUNT: 14.6 % (ref 11.5–14.5)
EST. GFR  (AFRICAN AMERICAN): >60 ML/MIN/1.73 M^2
EST. GFR  (NON AFRICAN AMERICAN): >60 ML/MIN/1.73 M^2
GLUCOSE SERPL-MCNC: 292 MG/DL (ref 70–110)
HCT VFR BLD AUTO: 34.5 % (ref 40–54)
HGB BLD-MCNC: 11.7 G/DL (ref 14–18)
IMM GRANULOCYTES # BLD AUTO: 0.2 K/UL (ref 0–0.04)
IMM GRANULOCYTES NFR BLD AUTO: 1.9 % (ref 0–0.5)
LYMPHOCYTES # BLD AUTO: 2.7 K/UL (ref 1–4.8)
LYMPHOCYTES NFR BLD: 25.1 % (ref 18–48)
MAGNESIUM SERPL-MCNC: 1.9 MG/DL (ref 1.6–2.6)
MCH RBC QN AUTO: 32.6 PG (ref 27–31)
MCHC RBC AUTO-ENTMCNC: 33.9 G/DL (ref 32–36)
MCV RBC AUTO: 96 FL (ref 82–98)
MONOCYTES # BLD AUTO: 1.3 K/UL (ref 0.3–1)
MONOCYTES NFR BLD: 12 % (ref 4–15)
NEUTROPHILS # BLD AUTO: 6.3 K/UL (ref 1.8–7.7)
NEUTROPHILS NFR BLD: 59.3 % (ref 38–73)
NRBC BLD-RTO: 0 /100 WBC
PLATELET # BLD AUTO: 474 K/UL (ref 150–350)
PMV BLD AUTO: 9.3 FL (ref 9.2–12.9)
POCT GLUCOSE: 233 MG/DL (ref 70–110)
POCT GLUCOSE: 284 MG/DL (ref 70–110)
POCT GLUCOSE: 46 MG/DL (ref 70–110)
POCT GLUCOSE: 82 MG/DL (ref 70–110)
POTASSIUM SERPL-SCNC: 4.1 MMOL/L (ref 3.5–5.1)
PROT SERPL-MCNC: 7.3 G/DL (ref 6–8.4)
RBC # BLD AUTO: 3.59 M/UL (ref 4.6–6.2)
SODIUM SERPL-SCNC: 135 MMOL/L (ref 136–145)
WBC # BLD AUTO: 10.54 K/UL (ref 3.9–12.7)

## 2021-03-02 PROCEDURE — S4991 NICOTINE PATCH NONLEGEND: HCPCS | Performed by: PSYCHIATRY & NEUROLOGY

## 2021-03-02 PROCEDURE — 25000003 PHARM REV CODE 250: Performed by: PHYSICIAN ASSISTANT

## 2021-03-02 PROCEDURE — 25000003 PHARM REV CODE 250: Performed by: INTERNAL MEDICINE

## 2021-03-02 PROCEDURE — 25000003 PHARM REV CODE 250: Performed by: PSYCHIATRY & NEUROLOGY

## 2021-03-02 PROCEDURE — 63600175 PHARM REV CODE 636 W HCPCS: Performed by: STUDENT IN AN ORGANIZED HEALTH CARE EDUCATION/TRAINING PROGRAM

## 2021-03-02 PROCEDURE — 85025 COMPLETE CBC W/AUTO DIFF WBC: CPT

## 2021-03-02 PROCEDURE — 83735 ASSAY OF MAGNESIUM: CPT

## 2021-03-02 PROCEDURE — 11000001 HC ACUTE MED/SURG PRIVATE ROOM

## 2021-03-02 PROCEDURE — 97110 THERAPEUTIC EXERCISES: CPT

## 2021-03-02 PROCEDURE — 36415 COLL VENOUS BLD VENIPUNCTURE: CPT

## 2021-03-02 PROCEDURE — 63600175 PHARM REV CODE 636 W HCPCS: Performed by: NURSE PRACTITIONER

## 2021-03-02 PROCEDURE — 92526 ORAL FUNCTION THERAPY: CPT

## 2021-03-02 PROCEDURE — 97535 SELF CARE MNGMENT TRAINING: CPT

## 2021-03-02 PROCEDURE — 99232 PR SUBSEQUENT HOSPITAL CARE,LEVL II: ICD-10-PCS | Mod: ,,, | Performed by: INTERNAL MEDICINE

## 2021-03-02 PROCEDURE — 99232 SBSQ HOSP IP/OBS MODERATE 35: CPT | Mod: ,,, | Performed by: INTERNAL MEDICINE

## 2021-03-02 PROCEDURE — 80053 COMPREHEN METABOLIC PANEL: CPT

## 2021-03-02 PROCEDURE — 25000003 PHARM REV CODE 250: Performed by: STUDENT IN AN ORGANIZED HEALTH CARE EDUCATION/TRAINING PROGRAM

## 2021-03-02 PROCEDURE — 92507 TX SP LANG VOICE COMM INDIV: CPT

## 2021-03-02 RX ORDER — INSULIN ASPART 100 [IU]/ML
7 INJECTION, SOLUTION INTRAVENOUS; SUBCUTANEOUS
Status: DISCONTINUED | OUTPATIENT
Start: 2021-03-02 | End: 2021-03-02

## 2021-03-02 RX ORDER — INSULIN ASPART 100 [IU]/ML
10 INJECTION, SOLUTION INTRAVENOUS; SUBCUTANEOUS
Status: DISCONTINUED | OUTPATIENT
Start: 2021-03-02 | End: 2021-03-03

## 2021-03-02 RX ADMIN — FOLIC ACID 1 MG: 1 TABLET ORAL at 10:03

## 2021-03-02 RX ADMIN — DICLOFENAC SODIUM 2 G: 10 GEL TOPICAL at 10:03

## 2021-03-02 RX ADMIN — AMOXICILLIN AND CLAVULANATE POTASSIUM 1 TABLET: 875; 125 TABLET, FILM COATED ORAL at 10:03

## 2021-03-02 RX ADMIN — LACOSAMIDE 200 MG: 50 TABLET, FILM COATED ORAL at 10:03

## 2021-03-02 RX ADMIN — LEVETIRACETAM 1500 MG: 750 TABLET ORAL at 10:03

## 2021-03-02 RX ADMIN — INSULIN ASPART 4 UNITS: 100 INJECTION, SOLUTION INTRAVENOUS; SUBCUTANEOUS at 10:03

## 2021-03-02 RX ADMIN — INSULIN ASPART 6 UNITS: 100 INJECTION, SOLUTION INTRAVENOUS; SUBCUTANEOUS at 01:03

## 2021-03-02 RX ADMIN — DIVALPROEX SODIUM 250 MG: 250 TABLET, DELAYED RELEASE ORAL at 09:03

## 2021-03-02 RX ADMIN — LORAZEPAM 1 MG: 2 INJECTION INTRAMUSCULAR; INTRAVENOUS at 03:03

## 2021-03-02 RX ADMIN — INSULIN ASPART 5 UNITS: 100 INJECTION, SOLUTION INTRAVENOUS; SUBCUTANEOUS at 10:03

## 2021-03-02 RX ADMIN — DIVALPROEX SODIUM 250 MG: 250 TABLET, DELAYED RELEASE ORAL at 01:03

## 2021-03-02 RX ADMIN — Medication 100 MG: at 09:03

## 2021-03-02 RX ADMIN — NICOTINE 1 PATCH: 21 PATCH, EXTENDED RELEASE TRANSDERMAL at 10:03

## 2021-03-02 RX ADMIN — LACOSAMIDE 200 MG: 50 TABLET, FILM COATED ORAL at 09:03

## 2021-03-02 RX ADMIN — DEXTROSE MONOHYDRATE 25 G: 25 INJECTION, SOLUTION INTRAVENOUS at 09:03

## 2021-03-02 RX ADMIN — LORAZEPAM 1 MG: 2 INJECTION INTRAMUSCULAR; INTRAVENOUS at 01:03

## 2021-03-02 RX ADMIN — DIVALPROEX SODIUM 250 MG: 250 TABLET, DELAYED RELEASE ORAL at 05:03

## 2021-03-02 RX ADMIN — HYDROCHLOROTHIAZIDE 12.5 MG: 12.5 TABLET ORAL at 10:03

## 2021-03-02 RX ADMIN — ENOXAPARIN SODIUM 30 MG: 30 INJECTION SUBCUTANEOUS at 05:03

## 2021-03-02 RX ADMIN — AMOXICILLIN AND CLAVULANATE POTASSIUM 1 TABLET: 875; 125 TABLET, FILM COATED ORAL at 09:03

## 2021-03-02 RX ADMIN — INSULIN ASPART 5 UNITS: 100 INJECTION, SOLUTION INTRAVENOUS; SUBCUTANEOUS at 01:03

## 2021-03-02 RX ADMIN — INSULIN ASPART 10 UNITS: 100 INJECTION, SOLUTION INTRAVENOUS; SUBCUTANEOUS at 05:03

## 2021-03-02 RX ADMIN — DOCUSATE SODIUM AND SENNOSIDES 1 TABLET: 8.6; 5 TABLET, FILM COATED ORAL at 10:03

## 2021-03-02 RX ADMIN — LEVETIRACETAM 1500 MG: 750 TABLET ORAL at 09:03

## 2021-03-03 LAB
ALBUMIN SERPL BCP-MCNC: 2.7 G/DL (ref 3.5–5.2)
ALP SERPL-CCNC: 95 U/L (ref 55–135)
ALT SERPL W/O P-5'-P-CCNC: 33 U/L (ref 10–44)
ANION GAP SERPL CALC-SCNC: 14 MMOL/L (ref 8–16)
AST SERPL-CCNC: 34 U/L (ref 10–40)
BASOPHILS # BLD AUTO: 0.06 K/UL (ref 0–0.2)
BASOPHILS NFR BLD: 0.5 % (ref 0–1.9)
BILIRUB SERPL-MCNC: 0.2 MG/DL (ref 0.1–1)
BUN SERPL-MCNC: 10 MG/DL (ref 6–20)
CALCIUM SERPL-MCNC: 9.8 MG/DL (ref 8.7–10.5)
CHLORIDE SERPL-SCNC: 93 MMOL/L (ref 95–110)
CO2 SERPL-SCNC: 27 MMOL/L (ref 23–29)
CREAT SERPL-MCNC: 0.7 MG/DL (ref 0.5–1.4)
DIFFERENTIAL METHOD: ABNORMAL
EOSINOPHIL # BLD AUTO: 0.1 K/UL (ref 0–0.5)
EOSINOPHIL NFR BLD: 1 % (ref 0–8)
ERYTHROCYTE [DISTWIDTH] IN BLOOD BY AUTOMATED COUNT: 14.6 % (ref 11.5–14.5)
EST. GFR  (AFRICAN AMERICAN): >60 ML/MIN/1.73 M^2
EST. GFR  (NON AFRICAN AMERICAN): >60 ML/MIN/1.73 M^2
GLUCOSE SERPL-MCNC: 372 MG/DL (ref 70–110)
HCT VFR BLD AUTO: 29.9 % (ref 40–54)
HGB BLD-MCNC: 10.5 G/DL (ref 14–18)
IMM GRANULOCYTES # BLD AUTO: 0.18 K/UL (ref 0–0.04)
IMM GRANULOCYTES NFR BLD AUTO: 1.6 % (ref 0–0.5)
LYMPHOCYTES # BLD AUTO: 2.9 K/UL (ref 1–4.8)
LYMPHOCYTES NFR BLD: 25.6 % (ref 18–48)
MAGNESIUM SERPL-MCNC: 1.8 MG/DL (ref 1.6–2.6)
MCH RBC QN AUTO: 33.2 PG (ref 27–31)
MCHC RBC AUTO-ENTMCNC: 35.1 G/DL (ref 32–36)
MCV RBC AUTO: 95 FL (ref 82–98)
MONOCYTES # BLD AUTO: 1.2 K/UL (ref 0.3–1)
MONOCYTES NFR BLD: 10.1 % (ref 4–15)
NEUTROPHILS # BLD AUTO: 7 K/UL (ref 1.8–7.7)
NEUTROPHILS NFR BLD: 61.2 % (ref 38–73)
NRBC BLD-RTO: 0 /100 WBC
PLATELET # BLD AUTO: 421 K/UL (ref 150–350)
PMV BLD AUTO: 9.4 FL (ref 9.2–12.9)
POCT GLUCOSE: 200 MG/DL (ref 70–110)
POCT GLUCOSE: 275 MG/DL (ref 70–110)
POCT GLUCOSE: 346 MG/DL (ref 70–110)
POCT GLUCOSE: 499 MG/DL (ref 70–110)
POTASSIUM SERPL-SCNC: 4 MMOL/L (ref 3.5–5.1)
PROT SERPL-MCNC: 6.9 G/DL (ref 6–8.4)
RBC # BLD AUTO: 3.16 M/UL (ref 4.6–6.2)
SODIUM SERPL-SCNC: 134 MMOL/L (ref 136–145)
WBC # BLD AUTO: 11.39 K/UL (ref 3.9–12.7)

## 2021-03-03 PROCEDURE — 80053 COMPREHEN METABOLIC PANEL: CPT

## 2021-03-03 PROCEDURE — 83735 ASSAY OF MAGNESIUM: CPT

## 2021-03-03 PROCEDURE — 36415 COLL VENOUS BLD VENIPUNCTURE: CPT

## 2021-03-03 PROCEDURE — 25000003 PHARM REV CODE 250: Performed by: INTERNAL MEDICINE

## 2021-03-03 PROCEDURE — 63600175 PHARM REV CODE 636 W HCPCS: Performed by: NURSE PRACTITIONER

## 2021-03-03 PROCEDURE — 97116 GAIT TRAINING THERAPY: CPT | Mod: CQ

## 2021-03-03 PROCEDURE — 63600175 PHARM REV CODE 636 W HCPCS: Performed by: STUDENT IN AN ORGANIZED HEALTH CARE EDUCATION/TRAINING PROGRAM

## 2021-03-03 PROCEDURE — 25000003 PHARM REV CODE 250: Performed by: STUDENT IN AN ORGANIZED HEALTH CARE EDUCATION/TRAINING PROGRAM

## 2021-03-03 PROCEDURE — 11000001 HC ACUTE MED/SURG PRIVATE ROOM

## 2021-03-03 PROCEDURE — 25000003 PHARM REV CODE 250: Performed by: PHYSICIAN ASSISTANT

## 2021-03-03 PROCEDURE — 99233 PR SUBSEQUENT HOSPITAL CARE,LEVL III: ICD-10-PCS | Mod: ,,, | Performed by: INTERNAL MEDICINE

## 2021-03-03 PROCEDURE — S4991 NICOTINE PATCH NONLEGEND: HCPCS | Performed by: PSYCHIATRY & NEUROLOGY

## 2021-03-03 PROCEDURE — 85025 COMPLETE CBC W/AUTO DIFF WBC: CPT

## 2021-03-03 PROCEDURE — 25000003 PHARM REV CODE 250: Performed by: PSYCHIATRY & NEUROLOGY

## 2021-03-03 PROCEDURE — 99233 SBSQ HOSP IP/OBS HIGH 50: CPT | Mod: ,,, | Performed by: INTERNAL MEDICINE

## 2021-03-03 RX ORDER — INSULIN ASPART 100 [IU]/ML
5 INJECTION, SOLUTION INTRAVENOUS; SUBCUTANEOUS
Status: DISCONTINUED | OUTPATIENT
Start: 2021-03-03 | End: 2021-03-03

## 2021-03-03 RX ORDER — INSULIN ASPART 100 [IU]/ML
7 INJECTION, SOLUTION INTRAVENOUS; SUBCUTANEOUS
Status: DISCONTINUED | OUTPATIENT
Start: 2021-03-03 | End: 2021-03-08

## 2021-03-03 RX ADMIN — LEVETIRACETAM 1500 MG: 750 TABLET ORAL at 09:03

## 2021-03-03 RX ADMIN — FOLIC ACID 1 MG: 1 TABLET ORAL at 09:03

## 2021-03-03 RX ADMIN — NICOTINE 1 PATCH: 21 PATCH, EXTENDED RELEASE TRANSDERMAL at 09:03

## 2021-03-03 RX ADMIN — DIVALPROEX SODIUM 250 MG: 250 TABLET, DELAYED RELEASE ORAL at 09:03

## 2021-03-03 RX ADMIN — LEVETIRACETAM 1500 MG: 750 TABLET ORAL at 10:03

## 2021-03-03 RX ADMIN — DIVALPROEX SODIUM 250 MG: 250 TABLET, DELAYED RELEASE ORAL at 10:03

## 2021-03-03 RX ADMIN — AMOXICILLIN AND CLAVULANATE POTASSIUM 1 TABLET: 875; 125 TABLET, FILM COATED ORAL at 10:03

## 2021-03-03 RX ADMIN — DIVALPROEX SODIUM 250 MG: 250 TABLET, DELAYED RELEASE ORAL at 04:03

## 2021-03-03 RX ADMIN — LORAZEPAM 1 MG: 2 INJECTION INTRAMUSCULAR; INTRAVENOUS at 01:03

## 2021-03-03 RX ADMIN — LACOSAMIDE 200 MG: 50 TABLET, FILM COATED ORAL at 10:03

## 2021-03-03 RX ADMIN — HYDROCHLOROTHIAZIDE 12.5 MG: 12.5 TABLET ORAL at 09:03

## 2021-03-03 RX ADMIN — INSULIN ASPART 7 UNITS: 100 INJECTION, SOLUTION INTRAVENOUS; SUBCUTANEOUS at 10:03

## 2021-03-03 RX ADMIN — LACOSAMIDE 200 MG: 50 TABLET, FILM COATED ORAL at 09:03

## 2021-03-03 RX ADMIN — AMOXICILLIN AND CLAVULANATE POTASSIUM 1 TABLET: 875; 125 TABLET, FILM COATED ORAL at 09:03

## 2021-03-03 RX ADMIN — INSULIN ASPART 8 UNITS: 100 INJECTION, SOLUTION INTRAVENOUS; SUBCUTANEOUS at 05:03

## 2021-03-03 RX ADMIN — LORAZEPAM 1 MG: 2 INJECTION INTRAMUSCULAR; INTRAVENOUS at 09:03

## 2021-03-03 RX ADMIN — ENOXAPARIN SODIUM 30 MG: 30 INJECTION SUBCUTANEOUS at 04:03

## 2021-03-03 RX ADMIN — DOCUSATE SODIUM AND SENNOSIDES 1 TABLET: 8.6; 5 TABLET, FILM COATED ORAL at 09:03

## 2021-03-03 RX ADMIN — Medication 100 MG: at 09:03

## 2021-03-03 RX ADMIN — INSULIN ASPART 7 UNITS: 100 INJECTION, SOLUTION INTRAVENOUS; SUBCUTANEOUS at 05:03

## 2021-03-03 RX ADMIN — INSULIN ASPART 10 UNITS: 100 INJECTION, SOLUTION INTRAVENOUS; SUBCUTANEOUS at 01:03

## 2021-03-04 LAB
ALBUMIN SERPL BCP-MCNC: 2.8 G/DL (ref 3.5–5.2)
ALP SERPL-CCNC: 177 U/L (ref 55–135)
ALT SERPL W/O P-5'-P-CCNC: 30 U/L (ref 10–44)
ANION GAP SERPL CALC-SCNC: 7 MMOL/L (ref 8–16)
AST SERPL-CCNC: 23 U/L (ref 10–40)
BASOPHILS # BLD AUTO: 0.08 K/UL (ref 0–0.2)
BASOPHILS NFR BLD: 0.7 % (ref 0–1.9)
BILIRUB SERPL-MCNC: 0.2 MG/DL (ref 0.1–1)
BUN SERPL-MCNC: 14 MG/DL (ref 6–20)
CALCIUM SERPL-MCNC: 9.3 MG/DL (ref 8.7–10.5)
CHLORIDE SERPL-SCNC: 91 MMOL/L (ref 95–110)
CO2 SERPL-SCNC: 35 MMOL/L (ref 23–29)
CREAT SERPL-MCNC: 0.7 MG/DL (ref 0.5–1.4)
DIFFERENTIAL METHOD: ABNORMAL
EOSINOPHIL # BLD AUTO: 0.2 K/UL (ref 0–0.5)
EOSINOPHIL NFR BLD: 1.4 % (ref 0–8)
ERYTHROCYTE [DISTWIDTH] IN BLOOD BY AUTOMATED COUNT: 14.2 % (ref 11.5–14.5)
EST. GFR  (AFRICAN AMERICAN): >60 ML/MIN/1.73 M^2
EST. GFR  (NON AFRICAN AMERICAN): >60 ML/MIN/1.73 M^2
GLUCOSE SERPL-MCNC: 456 MG/DL (ref 70–110)
HCT VFR BLD AUTO: 28.2 % (ref 40–54)
HGB BLD-MCNC: 9.6 G/DL (ref 14–18)
IMM GRANULOCYTES # BLD AUTO: 0.19 K/UL (ref 0–0.04)
IMM GRANULOCYTES NFR BLD AUTO: 1.7 % (ref 0–0.5)
LYMPHOCYTES # BLD AUTO: 2.6 K/UL (ref 1–4.8)
LYMPHOCYTES NFR BLD: 23 % (ref 18–48)
MAGNESIUM SERPL-MCNC: 1.7 MG/DL (ref 1.6–2.6)
MCH RBC QN AUTO: 32.5 PG (ref 27–31)
MCHC RBC AUTO-ENTMCNC: 34 G/DL (ref 32–36)
MCV RBC AUTO: 96 FL (ref 82–98)
MONOCYTES # BLD AUTO: 1.2 K/UL (ref 0.3–1)
MONOCYTES NFR BLD: 11.1 % (ref 4–15)
NEUTROPHILS # BLD AUTO: 6.9 K/UL (ref 1.8–7.7)
NEUTROPHILS NFR BLD: 62.1 % (ref 38–73)
NRBC BLD-RTO: 0 /100 WBC
PLATELET # BLD AUTO: 391 K/UL (ref 150–350)
PMV BLD AUTO: 9.8 FL (ref 9.2–12.9)
POCT GLUCOSE: 168 MG/DL (ref 70–110)
POCT GLUCOSE: 198 MG/DL (ref 70–110)
POCT GLUCOSE: 400 MG/DL (ref 70–110)
POCT GLUCOSE: 434 MG/DL (ref 70–110)
POTASSIUM SERPL-SCNC: 3.8 MMOL/L (ref 3.5–5.1)
PROT SERPL-MCNC: 6.6 G/DL (ref 6–8.4)
RBC # BLD AUTO: 2.95 M/UL (ref 4.6–6.2)
SODIUM SERPL-SCNC: 133 MMOL/L (ref 136–145)
WBC # BLD AUTO: 11.16 K/UL (ref 3.9–12.7)

## 2021-03-04 PROCEDURE — 25000003 PHARM REV CODE 250: Performed by: STUDENT IN AN ORGANIZED HEALTH CARE EDUCATION/TRAINING PROGRAM

## 2021-03-04 PROCEDURE — 63600175 PHARM REV CODE 636 W HCPCS: Performed by: STUDENT IN AN ORGANIZED HEALTH CARE EDUCATION/TRAINING PROGRAM

## 2021-03-04 PROCEDURE — 25000003 PHARM REV CODE 250: Performed by: INTERNAL MEDICINE

## 2021-03-04 PROCEDURE — 99231 SBSQ HOSP IP/OBS SF/LOW 25: CPT | Mod: ,,, | Performed by: HOSPITALIST

## 2021-03-04 PROCEDURE — 11000001 HC ACUTE MED/SURG PRIVATE ROOM

## 2021-03-04 PROCEDURE — 36415 COLL VENOUS BLD VENIPUNCTURE: CPT | Performed by: NURSE PRACTITIONER

## 2021-03-04 PROCEDURE — 83735 ASSAY OF MAGNESIUM: CPT | Performed by: NURSE PRACTITIONER

## 2021-03-04 PROCEDURE — 25000003 PHARM REV CODE 250: Performed by: PHYSICIAN ASSISTANT

## 2021-03-04 PROCEDURE — 25000003 PHARM REV CODE 250: Performed by: PSYCHIATRY & NEUROLOGY

## 2021-03-04 PROCEDURE — 92507 TX SP LANG VOICE COMM INDIV: CPT

## 2021-03-04 PROCEDURE — 85025 COMPLETE CBC W/AUTO DIFF WBC: CPT | Performed by: STUDENT IN AN ORGANIZED HEALTH CARE EDUCATION/TRAINING PROGRAM

## 2021-03-04 PROCEDURE — S4991 NICOTINE PATCH NONLEGEND: HCPCS | Performed by: PSYCHIATRY & NEUROLOGY

## 2021-03-04 PROCEDURE — C9399 UNCLASSIFIED DRUGS OR BIOLOG: HCPCS | Performed by: STUDENT IN AN ORGANIZED HEALTH CARE EDUCATION/TRAINING PROGRAM

## 2021-03-04 PROCEDURE — 80053 COMPREHEN METABOLIC PANEL: CPT | Performed by: NURSE PRACTITIONER

## 2021-03-04 PROCEDURE — 97535 SELF CARE MNGMENT TRAINING: CPT

## 2021-03-04 PROCEDURE — 99231 PR SUBSEQUENT HOSPITAL CARE,LEVL I: ICD-10-PCS | Mod: ,,, | Performed by: HOSPITALIST

## 2021-03-04 RX ADMIN — AMOXICILLIN AND CLAVULANATE POTASSIUM 1 TABLET: 875; 125 TABLET, FILM COATED ORAL at 08:03

## 2021-03-04 RX ADMIN — DIVALPROEX SODIUM 250 MG: 250 TABLET, DELAYED RELEASE ORAL at 05:03

## 2021-03-04 RX ADMIN — INSULIN ASPART 6 UNITS: 100 INJECTION, SOLUTION INTRAVENOUS; SUBCUTANEOUS at 08:03

## 2021-03-04 RX ADMIN — INSULIN DETEMIR 20 UNITS: 100 INJECTION, SOLUTION SUBCUTANEOUS at 08:03

## 2021-03-04 RX ADMIN — FOLIC ACID 1 MG: 1 TABLET ORAL at 08:03

## 2021-03-04 RX ADMIN — INSULIN ASPART 7 UNITS: 100 INJECTION, SOLUTION INTRAVENOUS; SUBCUTANEOUS at 12:03

## 2021-03-04 RX ADMIN — LACOSAMIDE 200 MG: 50 TABLET, FILM COATED ORAL at 08:03

## 2021-03-04 RX ADMIN — NICOTINE 1 PATCH: 21 PATCH, EXTENDED RELEASE TRANSDERMAL at 08:03

## 2021-03-04 RX ADMIN — DOCUSATE SODIUM AND SENNOSIDES 1 TABLET: 8.6; 5 TABLET, FILM COATED ORAL at 08:03

## 2021-03-04 RX ADMIN — INSULIN ASPART 5 UNITS: 100 INJECTION, SOLUTION INTRAVENOUS; SUBCUTANEOUS at 08:03

## 2021-03-04 RX ADMIN — DIVALPROEX SODIUM 250 MG: 250 TABLET, DELAYED RELEASE ORAL at 09:03

## 2021-03-04 RX ADMIN — Medication 100 MG: at 08:03

## 2021-03-04 RX ADMIN — DIVALPROEX SODIUM 250 MG: 250 TABLET, DELAYED RELEASE ORAL at 01:03

## 2021-03-04 RX ADMIN — LEVETIRACETAM 1500 MG: 750 TABLET ORAL at 08:03

## 2021-03-04 RX ADMIN — HYDROCHLOROTHIAZIDE 12.5 MG: 12.5 TABLET ORAL at 08:03

## 2021-03-04 RX ADMIN — INSULIN ASPART 7 UNITS: 100 INJECTION, SOLUTION INTRAVENOUS; SUBCUTANEOUS at 08:03

## 2021-03-04 RX ADMIN — INSULIN ASPART 5 UNITS: 100 INJECTION, SOLUTION INTRAVENOUS; SUBCUTANEOUS at 04:03

## 2021-03-04 RX ADMIN — LORAZEPAM 1 MG: 2 INJECTION INTRAMUSCULAR; INTRAVENOUS at 07:03

## 2021-03-05 LAB
ALBUMIN SERPL BCP-MCNC: 3 G/DL (ref 3.5–5.2)
ALP SERPL-CCNC: 134 U/L (ref 55–135)
ALT SERPL W/O P-5'-P-CCNC: 27 U/L (ref 10–44)
ANION GAP SERPL CALC-SCNC: 14 MMOL/L (ref 8–16)
AST SERPL-CCNC: 24 U/L (ref 10–40)
BASOPHILS # BLD AUTO: 0.09 K/UL (ref 0–0.2)
BASOPHILS NFR BLD: 0.7 % (ref 0–1.9)
BILIRUB SERPL-MCNC: 0.1 MG/DL (ref 0.1–1)
BILIRUB UR QL STRIP: NEGATIVE
BUN SERPL-MCNC: 16 MG/DL (ref 6–20)
CALCIUM SERPL-MCNC: 9.6 MG/DL (ref 8.7–10.5)
CHLORIDE SERPL-SCNC: 94 MMOL/L (ref 95–110)
CLARITY UR REFRACT.AUTO: CLEAR
CO2 SERPL-SCNC: 26 MMOL/L (ref 23–29)
COLOR UR AUTO: YELLOW
CREAT SERPL-MCNC: 0.7 MG/DL (ref 0.5–1.4)
DIFFERENTIAL METHOD: ABNORMAL
EOSINOPHIL # BLD AUTO: 0.2 K/UL (ref 0–0.5)
EOSINOPHIL NFR BLD: 1.1 % (ref 0–8)
ERYTHROCYTE [DISTWIDTH] IN BLOOD BY AUTOMATED COUNT: 14.4 % (ref 11.5–14.5)
EST. GFR  (AFRICAN AMERICAN): >60 ML/MIN/1.73 M^2
EST. GFR  (NON AFRICAN AMERICAN): >60 ML/MIN/1.73 M^2
GLUCOSE SERPL-MCNC: 338 MG/DL (ref 70–110)
GLUCOSE UR QL STRIP: ABNORMAL
HCT VFR BLD AUTO: 29.6 % (ref 40–54)
HGB BLD-MCNC: 10.1 G/DL (ref 14–18)
HGB UR QL STRIP: NEGATIVE
IMM GRANULOCYTES # BLD AUTO: 0.17 K/UL (ref 0–0.04)
IMM GRANULOCYTES NFR BLD AUTO: 1.2 % (ref 0–0.5)
KETONES UR QL STRIP: NEGATIVE
LEUKOCYTE ESTERASE UR QL STRIP: NEGATIVE
LYMPHOCYTES # BLD AUTO: 2.7 K/UL (ref 1–4.8)
LYMPHOCYTES NFR BLD: 19.5 % (ref 18–48)
MAGNESIUM SERPL-MCNC: 1.6 MG/DL (ref 1.6–2.6)
MCH RBC QN AUTO: 32.2 PG (ref 27–31)
MCHC RBC AUTO-ENTMCNC: 34.1 G/DL (ref 32–36)
MCV RBC AUTO: 94 FL (ref 82–98)
MONOCYTES # BLD AUTO: 1.4 K/UL (ref 0.3–1)
MONOCYTES NFR BLD: 10.3 % (ref 4–15)
NEUTROPHILS # BLD AUTO: 9.2 K/UL (ref 1.8–7.7)
NEUTROPHILS NFR BLD: 67.2 % (ref 38–73)
NITRITE UR QL STRIP: NEGATIVE
NRBC BLD-RTO: 0 /100 WBC
PH UR STRIP: 5 [PH] (ref 5–8)
PLATELET # BLD AUTO: 460 K/UL (ref 150–350)
PMV BLD AUTO: 9.7 FL (ref 9.2–12.9)
POCT GLUCOSE: 157 MG/DL (ref 70–110)
POCT GLUCOSE: 270 MG/DL (ref 70–110)
POCT GLUCOSE: 339 MG/DL (ref 70–110)
POCT GLUCOSE: 375 MG/DL (ref 70–110)
POTASSIUM SERPL-SCNC: 3.9 MMOL/L (ref 3.5–5.1)
PROT SERPL-MCNC: 7.1 G/DL (ref 6–8.4)
PROT UR QL STRIP: NEGATIVE
RBC # BLD AUTO: 3.14 M/UL (ref 4.6–6.2)
SODIUM SERPL-SCNC: 134 MMOL/L (ref 136–145)
SP GR UR STRIP: 1.01 (ref 1–1.03)
URN SPEC COLLECT METH UR: ABNORMAL
WBC # BLD AUTO: 13.74 K/UL (ref 3.9–12.7)

## 2021-03-05 PROCEDURE — S4991 NICOTINE PATCH NONLEGEND: HCPCS | Performed by: PSYCHIATRY & NEUROLOGY

## 2021-03-05 PROCEDURE — 25000003 PHARM REV CODE 250: Performed by: STUDENT IN AN ORGANIZED HEALTH CARE EDUCATION/TRAINING PROGRAM

## 2021-03-05 PROCEDURE — 99233 SBSQ HOSP IP/OBS HIGH 50: CPT | Mod: ,,, | Performed by: HOSPITALIST

## 2021-03-05 PROCEDURE — 81003 URINALYSIS AUTO W/O SCOPE: CPT | Performed by: STUDENT IN AN ORGANIZED HEALTH CARE EDUCATION/TRAINING PROGRAM

## 2021-03-05 PROCEDURE — 63600175 PHARM REV CODE 636 W HCPCS: Performed by: STUDENT IN AN ORGANIZED HEALTH CARE EDUCATION/TRAINING PROGRAM

## 2021-03-05 PROCEDURE — 99233 PR SUBSEQUENT HOSPITAL CARE,LEVL III: ICD-10-PCS | Mod: ,,, | Performed by: HOSPITALIST

## 2021-03-05 PROCEDURE — 85025 COMPLETE CBC W/AUTO DIFF WBC: CPT | Performed by: STUDENT IN AN ORGANIZED HEALTH CARE EDUCATION/TRAINING PROGRAM

## 2021-03-05 PROCEDURE — 25000003 PHARM REV CODE 250: Performed by: PHYSICIAN ASSISTANT

## 2021-03-05 PROCEDURE — 25000003 PHARM REV CODE 250: Performed by: PSYCHIATRY & NEUROLOGY

## 2021-03-05 PROCEDURE — 80053 COMPREHEN METABOLIC PANEL: CPT | Performed by: NURSE PRACTITIONER

## 2021-03-05 PROCEDURE — 83735 ASSAY OF MAGNESIUM: CPT | Performed by: NURSE PRACTITIONER

## 2021-03-05 PROCEDURE — 11000001 HC ACUTE MED/SURG PRIVATE ROOM

## 2021-03-05 PROCEDURE — 97116 GAIT TRAINING THERAPY: CPT | Mod: CQ

## 2021-03-05 PROCEDURE — 97530 THERAPEUTIC ACTIVITIES: CPT | Mod: CQ

## 2021-03-05 PROCEDURE — 36415 COLL VENOUS BLD VENIPUNCTURE: CPT | Performed by: NURSE PRACTITIONER

## 2021-03-05 PROCEDURE — 25000003 PHARM REV CODE 250: Performed by: INTERNAL MEDICINE

## 2021-03-05 RX ORDER — OLANZAPINE 5 MG/1
5 TABLET ORAL EVERY 8 HOURS PRN
Status: DISCONTINUED | OUTPATIENT
Start: 2021-03-05 | End: 2021-03-07

## 2021-03-05 RX ORDER — OLANZAPINE 10 MG/2ML
5 INJECTION, POWDER, FOR SOLUTION INTRAMUSCULAR EVERY 6 HOURS PRN
Status: DISCONTINUED | OUTPATIENT
Start: 2021-03-05 | End: 2021-03-07

## 2021-03-05 RX ADMIN — LORAZEPAM 1 MG: 2 INJECTION INTRAMUSCULAR; INTRAVENOUS at 05:03

## 2021-03-05 RX ADMIN — INSULIN ASPART 7 UNITS: 100 INJECTION, SOLUTION INTRAVENOUS; SUBCUTANEOUS at 06:03

## 2021-03-05 RX ADMIN — HYDROCHLOROTHIAZIDE 12.5 MG: 12.5 TABLET ORAL at 08:03

## 2021-03-05 RX ADMIN — AMOXICILLIN AND CLAVULANATE POTASSIUM 1 TABLET: 875; 125 TABLET, FILM COATED ORAL at 08:03

## 2021-03-05 RX ADMIN — LEVETIRACETAM 1500 MG: 750 TABLET ORAL at 09:03

## 2021-03-05 RX ADMIN — INSULIN ASPART 7 UNITS: 100 INJECTION, SOLUTION INTRAVENOUS; SUBCUTANEOUS at 08:03

## 2021-03-05 RX ADMIN — DIVALPROEX SODIUM 250 MG: 250 TABLET, DELAYED RELEASE ORAL at 03:03

## 2021-03-05 RX ADMIN — DOCUSATE SODIUM AND SENNOSIDES 1 TABLET: 8.6; 5 TABLET, FILM COATED ORAL at 08:03

## 2021-03-05 RX ADMIN — INSULIN DETEMIR 20 UNITS: 100 INJECTION, SOLUTION SUBCUTANEOUS at 08:03

## 2021-03-05 RX ADMIN — NICOTINE 1 PATCH: 21 PATCH, EXTENDED RELEASE TRANSDERMAL at 08:03

## 2021-03-05 RX ADMIN — INSULIN ASPART 6 UNITS: 100 INJECTION, SOLUTION INTRAVENOUS; SUBCUTANEOUS at 11:03

## 2021-03-05 RX ADMIN — AMOXICILLIN AND CLAVULANATE POTASSIUM 1 TABLET: 875; 125 TABLET, FILM COATED ORAL at 09:03

## 2021-03-05 RX ADMIN — LACOSAMIDE 200 MG: 50 TABLET, FILM COATED ORAL at 08:03

## 2021-03-05 RX ADMIN — DIVALPROEX SODIUM 250 MG: 250 TABLET, DELAYED RELEASE ORAL at 05:03

## 2021-03-05 RX ADMIN — LORAZEPAM 1 MG: 2 INJECTION INTRAMUSCULAR; INTRAVENOUS at 07:03

## 2021-03-05 RX ADMIN — INSULIN ASPART 7 UNITS: 100 INJECTION, SOLUTION INTRAVENOUS; SUBCUTANEOUS at 11:03

## 2021-03-05 RX ADMIN — INSULIN ASPART 10 UNITS: 100 INJECTION, SOLUTION INTRAVENOUS; SUBCUTANEOUS at 06:03

## 2021-03-05 RX ADMIN — Medication 100 MG: at 08:03

## 2021-03-05 RX ADMIN — LEVETIRACETAM 1500 MG: 750 TABLET ORAL at 08:03

## 2021-03-05 RX ADMIN — LACOSAMIDE 200 MG: 50 TABLET, FILM COATED ORAL at 09:03

## 2021-03-05 RX ADMIN — FOLIC ACID 1 MG: 1 TABLET ORAL at 08:03

## 2021-03-05 RX ADMIN — DIVALPROEX SODIUM 250 MG: 250 TABLET, DELAYED RELEASE ORAL at 09:03

## 2021-03-05 RX ADMIN — INSULIN ASPART 8 UNITS: 100 INJECTION, SOLUTION INTRAVENOUS; SUBCUTANEOUS at 08:03

## 2021-03-06 LAB
ALBUMIN SERPL BCP-MCNC: 2.8 G/DL (ref 3.5–5.2)
ALP SERPL-CCNC: 108 U/L (ref 55–135)
ALT SERPL W/O P-5'-P-CCNC: 24 U/L (ref 10–44)
ANION GAP SERPL CALC-SCNC: 9 MMOL/L (ref 8–16)
AST SERPL-CCNC: 19 U/L (ref 10–40)
BASOPHILS # BLD AUTO: 0.08 K/UL (ref 0–0.2)
BASOPHILS NFR BLD: 0.6 % (ref 0–1.9)
BILIRUB SERPL-MCNC: 0.2 MG/DL (ref 0.1–1)
BUN SERPL-MCNC: 14 MG/DL (ref 6–20)
CALCIUM SERPL-MCNC: 9.4 MG/DL (ref 8.7–10.5)
CHLORIDE SERPL-SCNC: 96 MMOL/L (ref 95–110)
CO2 SERPL-SCNC: 29 MMOL/L (ref 23–29)
CREAT SERPL-MCNC: 0.7 MG/DL (ref 0.5–1.4)
DIFFERENTIAL METHOD: ABNORMAL
EOSINOPHIL # BLD AUTO: 0.1 K/UL (ref 0–0.5)
EOSINOPHIL NFR BLD: 1 % (ref 0–8)
ERYTHROCYTE [DISTWIDTH] IN BLOOD BY AUTOMATED COUNT: 14.4 % (ref 11.5–14.5)
EST. GFR  (AFRICAN AMERICAN): >60 ML/MIN/1.73 M^2
EST. GFR  (NON AFRICAN AMERICAN): >60 ML/MIN/1.73 M^2
GLUCOSE SERPL-MCNC: 347 MG/DL (ref 70–110)
HCT VFR BLD AUTO: 28.2 % (ref 40–54)
HGB BLD-MCNC: 9.6 G/DL (ref 14–18)
IMM GRANULOCYTES # BLD AUTO: 0.19 K/UL (ref 0–0.04)
IMM GRANULOCYTES NFR BLD AUTO: 1.5 % (ref 0–0.5)
LYMPHOCYTES # BLD AUTO: 3.2 K/UL (ref 1–4.8)
LYMPHOCYTES NFR BLD: 25.3 % (ref 18–48)
MAGNESIUM SERPL-MCNC: 1.7 MG/DL (ref 1.6–2.6)
MCH RBC QN AUTO: 32.3 PG (ref 27–31)
MCHC RBC AUTO-ENTMCNC: 34 G/DL (ref 32–36)
MCV RBC AUTO: 95 FL (ref 82–98)
MONOCYTES # BLD AUTO: 1.1 K/UL (ref 0.3–1)
MONOCYTES NFR BLD: 9.1 % (ref 4–15)
NEUTROPHILS # BLD AUTO: 7.8 K/UL (ref 1.8–7.7)
NEUTROPHILS NFR BLD: 62.5 % (ref 38–73)
NRBC BLD-RTO: 0 /100 WBC
PLATELET # BLD AUTO: 405 K/UL (ref 150–350)
PMV BLD AUTO: 9.6 FL (ref 9.2–12.9)
POCT GLUCOSE: 189 MG/DL (ref 70–110)
POCT GLUCOSE: 234 MG/DL (ref 70–110)
POCT GLUCOSE: 257 MG/DL (ref 70–110)
POCT GLUCOSE: 370 MG/DL (ref 70–110)
POTASSIUM SERPL-SCNC: 3.9 MMOL/L (ref 3.5–5.1)
PROT SERPL-MCNC: 6.5 G/DL (ref 6–8.4)
RBC # BLD AUTO: 2.97 M/UL (ref 4.6–6.2)
SODIUM SERPL-SCNC: 134 MMOL/L (ref 136–145)
WBC # BLD AUTO: 12.48 K/UL (ref 3.9–12.7)

## 2021-03-06 PROCEDURE — 80053 COMPREHEN METABOLIC PANEL: CPT | Performed by: STUDENT IN AN ORGANIZED HEALTH CARE EDUCATION/TRAINING PROGRAM

## 2021-03-06 PROCEDURE — 63600175 PHARM REV CODE 636 W HCPCS: Performed by: STUDENT IN AN ORGANIZED HEALTH CARE EDUCATION/TRAINING PROGRAM

## 2021-03-06 PROCEDURE — 83735 ASSAY OF MAGNESIUM: CPT | Performed by: STUDENT IN AN ORGANIZED HEALTH CARE EDUCATION/TRAINING PROGRAM

## 2021-03-06 PROCEDURE — 90792 PSYCH DIAG EVAL W/MED SRVCS: CPT | Mod: AF,HB,, | Performed by: PSYCHIATRY & NEUROLOGY

## 2021-03-06 PROCEDURE — 99233 PR SUBSEQUENT HOSPITAL CARE,LEVL III: ICD-10-PCS | Mod: ,,, | Performed by: HOSPITALIST

## 2021-03-06 PROCEDURE — 25000003 PHARM REV CODE 250: Performed by: STUDENT IN AN ORGANIZED HEALTH CARE EDUCATION/TRAINING PROGRAM

## 2021-03-06 PROCEDURE — 85025 COMPLETE CBC W/AUTO DIFF WBC: CPT | Performed by: STUDENT IN AN ORGANIZED HEALTH CARE EDUCATION/TRAINING PROGRAM

## 2021-03-06 PROCEDURE — 25000003 PHARM REV CODE 250: Performed by: HOSPITALIST

## 2021-03-06 PROCEDURE — 90792 PR PSYCHIATRIC DIAGNOSTIC EVALUATION W/MEDICAL SERVICES: ICD-10-PCS | Mod: AF,HB,, | Performed by: PSYCHIATRY & NEUROLOGY

## 2021-03-06 PROCEDURE — 99233 SBSQ HOSP IP/OBS HIGH 50: CPT | Mod: ,,, | Performed by: HOSPITALIST

## 2021-03-06 PROCEDURE — 25000003 PHARM REV CODE 250: Performed by: INTERNAL MEDICINE

## 2021-03-06 PROCEDURE — C9399 UNCLASSIFIED DRUGS OR BIOLOG: HCPCS | Performed by: STUDENT IN AN ORGANIZED HEALTH CARE EDUCATION/TRAINING PROGRAM

## 2021-03-06 PROCEDURE — 36415 COLL VENOUS BLD VENIPUNCTURE: CPT | Performed by: STUDENT IN AN ORGANIZED HEALTH CARE EDUCATION/TRAINING PROGRAM

## 2021-03-06 PROCEDURE — S0166 INJ OLANZAPINE 2.5MG: HCPCS | Performed by: HOSPITALIST

## 2021-03-06 PROCEDURE — S4991 NICOTINE PATCH NONLEGEND: HCPCS | Performed by: STUDENT IN AN ORGANIZED HEALTH CARE EDUCATION/TRAINING PROGRAM

## 2021-03-06 PROCEDURE — 11000001 HC ACUTE MED/SURG PRIVATE ROOM

## 2021-03-06 RX ADMIN — LORAZEPAM 1 MG: 2 INJECTION INTRAMUSCULAR; INTRAVENOUS at 04:03

## 2021-03-06 RX ADMIN — Medication 100 MG: at 09:03

## 2021-03-06 RX ADMIN — LORAZEPAM 1 MG: 2 INJECTION INTRAMUSCULAR; INTRAVENOUS at 08:03

## 2021-03-06 RX ADMIN — INSULIN ASPART 6 UNITS: 100 INJECTION, SOLUTION INTRAVENOUS; SUBCUTANEOUS at 06:03

## 2021-03-06 RX ADMIN — INSULIN DETEMIR 20 UNITS: 100 INJECTION, SOLUTION SUBCUTANEOUS at 09:03

## 2021-03-06 RX ADMIN — LEVETIRACETAM 1500 MG: 750 TABLET ORAL at 08:03

## 2021-03-06 RX ADMIN — INSULIN ASPART 2 UNITS: 100 INJECTION, SOLUTION INTRAVENOUS; SUBCUTANEOUS at 08:03

## 2021-03-06 RX ADMIN — FOLIC ACID 1 MG: 1 TABLET ORAL at 09:03

## 2021-03-06 RX ADMIN — DOCUSATE SODIUM AND SENNOSIDES 1 TABLET: 8.6; 5 TABLET, FILM COATED ORAL at 09:03

## 2021-03-06 RX ADMIN — LACOSAMIDE 200 MG: 50 TABLET, FILM COATED ORAL at 08:03

## 2021-03-06 RX ADMIN — INSULIN ASPART 10 UNITS: 100 INJECTION, SOLUTION INTRAVENOUS; SUBCUTANEOUS at 09:03

## 2021-03-06 RX ADMIN — LACOSAMIDE 200 MG: 50 TABLET, FILM COATED ORAL at 09:03

## 2021-03-06 RX ADMIN — DIVALPROEX SODIUM 250 MG: 250 TABLET, DELAYED RELEASE ORAL at 03:03

## 2021-03-06 RX ADMIN — AMOXICILLIN AND CLAVULANATE POTASSIUM 1 TABLET: 875; 125 TABLET, FILM COATED ORAL at 08:03

## 2021-03-06 RX ADMIN — INSULIN ASPART 7 UNITS: 100 INJECTION, SOLUTION INTRAVENOUS; SUBCUTANEOUS at 09:03

## 2021-03-06 RX ADMIN — DIVALPROEX SODIUM 250 MG: 250 TABLET, DELAYED RELEASE ORAL at 08:03

## 2021-03-06 RX ADMIN — NICOTINE 1 PATCH: 21 PATCH, EXTENDED RELEASE TRANSDERMAL at 09:03

## 2021-03-06 RX ADMIN — LEVETIRACETAM 1500 MG: 750 TABLET ORAL at 09:03

## 2021-03-06 RX ADMIN — ACETAMINOPHEN 650 MG: 325 TABLET ORAL at 02:03

## 2021-03-06 RX ADMIN — HYDROCHLOROTHIAZIDE 12.5 MG: 12.5 TABLET ORAL at 09:03

## 2021-03-06 RX ADMIN — AMOXICILLIN AND CLAVULANATE POTASSIUM 1 TABLET: 875; 125 TABLET, FILM COATED ORAL at 09:03

## 2021-03-06 RX ADMIN — OLANZAPINE 5 MG: 10 INJECTION, POWDER, FOR SOLUTION INTRAMUSCULAR at 11:03

## 2021-03-06 RX ADMIN — INSULIN ASPART 7 UNITS: 100 INJECTION, SOLUTION INTRAVENOUS; SUBCUTANEOUS at 12:03

## 2021-03-06 RX ADMIN — INSULIN ASPART 7 UNITS: 100 INJECTION, SOLUTION INTRAVENOUS; SUBCUTANEOUS at 06:03

## 2021-03-07 PROBLEM — G93.40 ACUTE ENCEPHALOPATHY: Status: ACTIVE | Noted: 2021-03-07

## 2021-03-07 LAB
ALBUMIN SERPL BCP-MCNC: 2.8 G/DL (ref 3.5–5.2)
ALP SERPL-CCNC: 93 U/L (ref 55–135)
ALT SERPL W/O P-5'-P-CCNC: 28 U/L (ref 10–44)
ANION GAP SERPL CALC-SCNC: 9 MMOL/L (ref 8–16)
AST SERPL-CCNC: 25 U/L (ref 10–40)
BASOPHILS # BLD AUTO: 0.13 K/UL (ref 0–0.2)
BASOPHILS NFR BLD: 1.1 % (ref 0–1.9)
BILIRUB SERPL-MCNC: 0.1 MG/DL (ref 0.1–1)
BUN SERPL-MCNC: 16 MG/DL (ref 6–20)
CALCIUM SERPL-MCNC: 9.7 MG/DL (ref 8.7–10.5)
CHLORIDE SERPL-SCNC: 99 MMOL/L (ref 95–110)
CO2 SERPL-SCNC: 29 MMOL/L (ref 23–29)
CREAT SERPL-MCNC: 0.7 MG/DL (ref 0.5–1.4)
DIFFERENTIAL METHOD: ABNORMAL
EOSINOPHIL # BLD AUTO: 0.1 K/UL (ref 0–0.5)
EOSINOPHIL NFR BLD: 1 % (ref 0–8)
ERYTHROCYTE [DISTWIDTH] IN BLOOD BY AUTOMATED COUNT: 14.5 % (ref 11.5–14.5)
EST. GFR  (AFRICAN AMERICAN): >60 ML/MIN/1.73 M^2
EST. GFR  (NON AFRICAN AMERICAN): >60 ML/MIN/1.73 M^2
GLUCOSE SERPL-MCNC: 364 MG/DL (ref 70–110)
HCT VFR BLD AUTO: 27.8 % (ref 40–54)
HGB BLD-MCNC: 9.7 G/DL (ref 14–18)
IMM GRANULOCYTES # BLD AUTO: 0.15 K/UL (ref 0–0.04)
IMM GRANULOCYTES NFR BLD AUTO: 1.3 % (ref 0–0.5)
LYMPHOCYTES # BLD AUTO: 2.8 K/UL (ref 1–4.8)
LYMPHOCYTES NFR BLD: 23 % (ref 18–48)
MAGNESIUM SERPL-MCNC: 1.6 MG/DL (ref 1.6–2.6)
MCH RBC QN AUTO: 33.6 PG (ref 27–31)
MCHC RBC AUTO-ENTMCNC: 34.9 G/DL (ref 32–36)
MCV RBC AUTO: 96 FL (ref 82–98)
MONOCYTES # BLD AUTO: 1.1 K/UL (ref 0.3–1)
MONOCYTES NFR BLD: 9.3 % (ref 4–15)
NEUTROPHILS # BLD AUTO: 7.7 K/UL (ref 1.8–7.7)
NEUTROPHILS NFR BLD: 64.3 % (ref 38–73)
NRBC BLD-RTO: 0 /100 WBC
PLATELET # BLD AUTO: 410 K/UL (ref 150–350)
PMV BLD AUTO: 9.9 FL (ref 9.2–12.9)
POCT GLUCOSE: 136 MG/DL (ref 70–110)
POCT GLUCOSE: 292 MG/DL (ref 70–110)
POCT GLUCOSE: 313 MG/DL (ref 70–110)
POCT GLUCOSE: 461 MG/DL (ref 70–110)
POTASSIUM SERPL-SCNC: 4 MMOL/L (ref 3.5–5.1)
PROT SERPL-MCNC: 6.6 G/DL (ref 6–8.4)
RBC # BLD AUTO: 2.89 M/UL (ref 4.6–6.2)
SODIUM SERPL-SCNC: 137 MMOL/L (ref 136–145)
WBC # BLD AUTO: 11.99 K/UL (ref 3.9–12.7)

## 2021-03-07 PROCEDURE — 63600175 PHARM REV CODE 636 W HCPCS: Performed by: STUDENT IN AN ORGANIZED HEALTH CARE EDUCATION/TRAINING PROGRAM

## 2021-03-07 PROCEDURE — 25000003 PHARM REV CODE 250: Performed by: STUDENT IN AN ORGANIZED HEALTH CARE EDUCATION/TRAINING PROGRAM

## 2021-03-07 PROCEDURE — 80053 COMPREHEN METABOLIC PANEL: CPT | Performed by: STUDENT IN AN ORGANIZED HEALTH CARE EDUCATION/TRAINING PROGRAM

## 2021-03-07 PROCEDURE — 99231 PR SUBSEQUENT HOSPITAL CARE,LEVL I: ICD-10-PCS | Mod: ,,, | Performed by: HOSPITALIST

## 2021-03-07 PROCEDURE — 85025 COMPLETE CBC W/AUTO DIFF WBC: CPT | Performed by: STUDENT IN AN ORGANIZED HEALTH CARE EDUCATION/TRAINING PROGRAM

## 2021-03-07 PROCEDURE — 99232 PR SUBSEQUENT HOSPITAL CARE,LEVL II: ICD-10-PCS | Mod: AF,HB,, | Performed by: PSYCHIATRY & NEUROLOGY

## 2021-03-07 PROCEDURE — 83735 ASSAY OF MAGNESIUM: CPT | Performed by: STUDENT IN AN ORGANIZED HEALTH CARE EDUCATION/TRAINING PROGRAM

## 2021-03-07 PROCEDURE — 99231 SBSQ HOSP IP/OBS SF/LOW 25: CPT | Mod: ,,, | Performed by: HOSPITALIST

## 2021-03-07 PROCEDURE — 11000001 HC ACUTE MED/SURG PRIVATE ROOM

## 2021-03-07 PROCEDURE — S4991 NICOTINE PATCH NONLEGEND: HCPCS | Performed by: STUDENT IN AN ORGANIZED HEALTH CARE EDUCATION/TRAINING PROGRAM

## 2021-03-07 PROCEDURE — 99232 SBSQ HOSP IP/OBS MODERATE 35: CPT | Mod: AF,HB,, | Performed by: PSYCHIATRY & NEUROLOGY

## 2021-03-07 RX ORDER — DIVALPROEX SODIUM 250 MG/1
250 TABLET, DELAYED RELEASE ORAL EVERY 8 HOURS
Qty: 90 TABLET | Refills: 11 | Status: SHIPPED | OUTPATIENT
Start: 2021-03-07 | End: 2021-03-16 | Stop reason: HOSPADM

## 2021-03-07 RX ORDER — LACOSAMIDE 50 MG/1
200 TABLET ORAL EVERY 12 HOURS
Qty: 240 TABLET | Refills: 0 | Status: SHIPPED | OUTPATIENT
Start: 2021-03-07 | End: 2021-03-10 | Stop reason: HOSPADM

## 2021-03-07 RX ORDER — QUETIAPINE FUMARATE 50 MG/1
50 TABLET, FILM COATED ORAL EVERY 8 HOURS PRN
Qty: 90 TABLET | Refills: 0 | Status: SHIPPED | OUTPATIENT
Start: 2021-03-07 | End: 2021-03-16 | Stop reason: HOSPADM

## 2021-03-07 RX ORDER — AMOXICILLIN AND CLAVULANATE POTASSIUM 250; 125 MG/1; MG/1
1 TABLET, FILM COATED ORAL EVERY 12 HOURS
Qty: 4 TABLET | Refills: 0 | Status: SHIPPED | OUTPATIENT
Start: 2021-03-07 | End: 2021-03-16 | Stop reason: HOSPADM

## 2021-03-07 RX ORDER — AMOXICILLIN AND CLAVULANATE POTASSIUM 250; 125 MG/1; MG/1
1 TABLET, FILM COATED ORAL EVERY 12 HOURS
Status: COMPLETED | OUTPATIENT
Start: 2021-03-07 | End: 2021-03-07

## 2021-03-07 RX ORDER — QUETIAPINE FUMARATE 25 MG/1
50 TABLET, FILM COATED ORAL ONCE AS NEEDED
Status: COMPLETED | OUTPATIENT
Start: 2021-03-07 | End: 2021-03-07

## 2021-03-07 RX ORDER — LORAZEPAM 2 MG/ML
1 INJECTION INTRAMUSCULAR EVERY 8 HOURS PRN
Status: DISCONTINUED | OUTPATIENT
Start: 2021-03-07 | End: 2021-03-07

## 2021-03-07 RX ORDER — QUETIAPINE FUMARATE 25 MG/1
50 TABLET, FILM COATED ORAL EVERY 8 HOURS PRN
Status: DISCONTINUED | OUTPATIENT
Start: 2021-03-07 | End: 2021-03-08

## 2021-03-07 RX ORDER — HYDROCHLOROTHIAZIDE 12.5 MG/1
12.5 TABLET ORAL DAILY
Qty: 30 TABLET | Refills: 11 | Status: SHIPPED | OUTPATIENT
Start: 2021-03-08 | End: 2021-03-26 | Stop reason: HOSPADM

## 2021-03-07 RX ORDER — LEVETIRACETAM 750 MG/1
1500 TABLET ORAL 2 TIMES DAILY
Qty: 120 TABLET | Refills: 0 | Status: SHIPPED | OUTPATIENT
Start: 2021-03-07 | End: 2021-03-26

## 2021-03-07 RX ADMIN — DIVALPROEX SODIUM 250 MG: 250 TABLET, DELAYED RELEASE ORAL at 05:03

## 2021-03-07 RX ADMIN — ACETAMINOPHEN 650 MG: 325 TABLET ORAL at 08:03

## 2021-03-07 RX ADMIN — FOLIC ACID 1 MG: 1 TABLET ORAL at 08:03

## 2021-03-07 RX ADMIN — INSULIN ASPART 7 UNITS: 100 INJECTION, SOLUTION INTRAVENOUS; SUBCUTANEOUS at 05:03

## 2021-03-07 RX ADMIN — INSULIN ASPART 10 UNITS: 100 INJECTION, SOLUTION INTRAVENOUS; SUBCUTANEOUS at 05:03

## 2021-03-07 RX ADMIN — INSULIN ASPART 7 UNITS: 100 INJECTION, SOLUTION INTRAVENOUS; SUBCUTANEOUS at 12:03

## 2021-03-07 RX ADMIN — LEVETIRACETAM 1500 MG: 750 TABLET ORAL at 08:03

## 2021-03-07 RX ADMIN — LACOSAMIDE 200 MG: 50 TABLET, FILM COATED ORAL at 08:03

## 2021-03-07 RX ADMIN — QUETIAPINE FUMARATE 50 MG: 25 TABLET ORAL at 09:03

## 2021-03-07 RX ADMIN — INSULIN ASPART 8 UNITS: 100 INJECTION, SOLUTION INTRAVENOUS; SUBCUTANEOUS at 08:03

## 2021-03-07 RX ADMIN — INSULIN ASPART 7 UNITS: 100 INJECTION, SOLUTION INTRAVENOUS; SUBCUTANEOUS at 08:03

## 2021-03-07 RX ADMIN — AMOXICILLIN AND CLAVULANATE POTASSIUM 1 TABLET: 250; 125 TABLET, FILM COATED ORAL at 08:03

## 2021-03-07 RX ADMIN — DIVALPROEX SODIUM 250 MG: 250 TABLET, DELAYED RELEASE ORAL at 09:03

## 2021-03-07 RX ADMIN — HYDROCHLOROTHIAZIDE 12.5 MG: 12.5 TABLET ORAL at 08:03

## 2021-03-07 RX ADMIN — QUETIAPINE FUMARATE 50 MG: 25 TABLET ORAL at 07:03

## 2021-03-07 RX ADMIN — AMOXICILLIN AND CLAVULANATE POTASSIUM 1 TABLET: 250; 125 TABLET, FILM COATED ORAL at 12:03

## 2021-03-07 RX ADMIN — NICOTINE 1 PATCH: 21 PATCH, EXTENDED RELEASE TRANSDERMAL at 08:03

## 2021-03-07 RX ADMIN — Medication 100 MG: at 08:03

## 2021-03-07 RX ADMIN — LORAZEPAM 1 MG: 2 INJECTION INTRAMUSCULAR; INTRAVENOUS at 04:03

## 2021-03-07 RX ADMIN — DIVALPROEX SODIUM 250 MG: 250 TABLET, DELAYED RELEASE ORAL at 03:03

## 2021-03-07 RX ADMIN — INSULIN ASPART 3 UNITS: 100 INJECTION, SOLUTION INTRAVENOUS; SUBCUTANEOUS at 08:03

## 2021-03-07 RX ADMIN — DOCUSATE SODIUM AND SENNOSIDES 1 TABLET: 8.6; 5 TABLET, FILM COATED ORAL at 08:03

## 2021-03-07 RX ADMIN — INSULIN DETEMIR 20 UNITS: 100 INJECTION, SOLUTION SUBCUTANEOUS at 08:03

## 2021-03-08 LAB
POCT GLUCOSE: 241 MG/DL (ref 70–110)
POCT GLUCOSE: 326 MG/DL (ref 70–110)
POCT GLUCOSE: 361 MG/DL (ref 70–110)
POCT GLUCOSE: 94 MG/DL (ref 70–110)
SARS-COV-2 RNA RESP QL NAA+PROBE: NOT DETECTED

## 2021-03-08 PROCEDURE — A9585 GADOBUTROL INJECTION: HCPCS | Performed by: HOSPITALIST

## 2021-03-08 PROCEDURE — 25000003 PHARM REV CODE 250: Performed by: STUDENT IN AN ORGANIZED HEALTH CARE EDUCATION/TRAINING PROGRAM

## 2021-03-08 PROCEDURE — 99232 PR SUBSEQUENT HOSPITAL CARE,LEVL II: ICD-10-PCS | Mod: ,,, | Performed by: HOSPITALIST

## 2021-03-08 PROCEDURE — 93010 EKG 12-LEAD: ICD-10-PCS | Mod: ,,, | Performed by: INTERNAL MEDICINE

## 2021-03-08 PROCEDURE — 99232 SBSQ HOSP IP/OBS MODERATE 35: CPT | Mod: ,,, | Performed by: HOSPITALIST

## 2021-03-08 PROCEDURE — U0005 INFEC AGEN DETEC AMPLI PROBE: HCPCS | Performed by: STUDENT IN AN ORGANIZED HEALTH CARE EDUCATION/TRAINING PROGRAM

## 2021-03-08 PROCEDURE — 80177 DRUG SCRN QUAN LEVETIRACETAM: CPT | Performed by: STUDENT IN AN ORGANIZED HEALTH CARE EDUCATION/TRAINING PROGRAM

## 2021-03-08 PROCEDURE — 63600175 PHARM REV CODE 636 W HCPCS: Performed by: STUDENT IN AN ORGANIZED HEALTH CARE EDUCATION/TRAINING PROGRAM

## 2021-03-08 PROCEDURE — U0003 INFECTIOUS AGENT DETECTION BY NUCLEIC ACID (DNA OR RNA); SEVERE ACUTE RESPIRATORY SYNDROME CORONAVIRUS 2 (SARS-COV-2) (CORONAVIRUS DISEASE [COVID-19]), AMPLIFIED PROBE TECHNIQUE, MAKING USE OF HIGH THROUGHPUT TECHNOLOGIES AS DESCRIBED BY CMS-2020-01-R: HCPCS | Performed by: STUDENT IN AN ORGANIZED HEALTH CARE EDUCATION/TRAINING PROGRAM

## 2021-03-08 PROCEDURE — 99233 PR SUBSEQUENT HOSPITAL CARE,LEVL III: ICD-10-PCS | Mod: AF,HB,, | Performed by: PSYCHIATRY & NEUROLOGY

## 2021-03-08 PROCEDURE — 93005 ELECTROCARDIOGRAM TRACING: CPT

## 2021-03-08 PROCEDURE — 11000001 HC ACUTE MED/SURG PRIVATE ROOM

## 2021-03-08 PROCEDURE — 99233 SBSQ HOSP IP/OBS HIGH 50: CPT | Mod: AF,HB,, | Performed by: PSYCHIATRY & NEUROLOGY

## 2021-03-08 PROCEDURE — 25500020 PHARM REV CODE 255: Performed by: HOSPITALIST

## 2021-03-08 PROCEDURE — C9399 UNCLASSIFIED DRUGS OR BIOLOG: HCPCS | Performed by: STUDENT IN AN ORGANIZED HEALTH CARE EDUCATION/TRAINING PROGRAM

## 2021-03-08 PROCEDURE — 93010 ELECTROCARDIOGRAM REPORT: CPT | Mod: ,,, | Performed by: INTERNAL MEDICINE

## 2021-03-08 PROCEDURE — 36415 COLL VENOUS BLD VENIPUNCTURE: CPT | Performed by: STUDENT IN AN ORGANIZED HEALTH CARE EDUCATION/TRAINING PROGRAM

## 2021-03-08 RX ORDER — DIVALPROEX SODIUM 250 MG/1
500 TABLET, DELAYED RELEASE ORAL EVERY 12 HOURS
Status: DISCONTINUED | OUTPATIENT
Start: 2021-03-08 | End: 2021-03-10

## 2021-03-08 RX ORDER — INSULIN ASPART 100 [IU]/ML
7 INJECTION, SOLUTION INTRAVENOUS; SUBCUTANEOUS
Status: DISCONTINUED | OUTPATIENT
Start: 2021-03-08 | End: 2021-03-08

## 2021-03-08 RX ORDER — HALOPERIDOL 5 MG/ML
1 INJECTION INTRAMUSCULAR EVERY 6 HOURS PRN
Status: DISCONTINUED | OUTPATIENT
Start: 2021-03-08 | End: 2021-03-09

## 2021-03-08 RX ORDER — AMOXICILLIN AND CLAVULANATE POTASSIUM 875; 125 MG/1; MG/1
1 TABLET, FILM COATED ORAL EVERY 12 HOURS
Status: DISCONTINUED | OUTPATIENT
Start: 2021-03-08 | End: 2021-03-08

## 2021-03-08 RX ORDER — DIVALPROEX SODIUM 500 MG/1
500 TABLET, DELAYED RELEASE ORAL EVERY 12 HOURS
Qty: 60 TABLET | Refills: 11 | OUTPATIENT
Start: 2021-03-08 | End: 2022-03-08

## 2021-03-08 RX ORDER — INSULIN ASPART 100 [IU]/ML
10 INJECTION, SOLUTION INTRAVENOUS; SUBCUTANEOUS
Status: DISCONTINUED | OUTPATIENT
Start: 2021-03-08 | End: 2021-03-08

## 2021-03-08 RX ORDER — QUETIAPINE FUMARATE 25 MG/1
50 TABLET, FILM COATED ORAL EVERY 8 HOURS PRN
Status: DISCONTINUED | OUTPATIENT
Start: 2021-03-08 | End: 2021-03-09

## 2021-03-08 RX ORDER — INSULIN ASPART 100 [IU]/ML
10 INJECTION, SOLUTION INTRAVENOUS; SUBCUTANEOUS
Status: DISCONTINUED | OUTPATIENT
Start: 2021-03-08 | End: 2021-03-10

## 2021-03-08 RX ORDER — GADOBUTROL 604.72 MG/ML
4 INJECTION INTRAVENOUS
Status: COMPLETED | OUTPATIENT
Start: 2021-03-08 | End: 2021-03-08

## 2021-03-08 RX ORDER — RISPERIDONE 1 MG/1
1 TABLET ORAL NIGHTLY
Status: DISCONTINUED | OUTPATIENT
Start: 2021-03-08 | End: 2021-03-09

## 2021-03-08 RX ORDER — AMOXICILLIN AND CLAVULANATE POTASSIUM 875; 125 MG/1; MG/1
1 TABLET, FILM COATED ORAL EVERY 12 HOURS
Status: COMPLETED | OUTPATIENT
Start: 2021-03-08 | End: 2021-03-09

## 2021-03-08 RX ADMIN — HALOPERIDOL LACTATE 1 MG: 5 INJECTION, SOLUTION INTRAMUSCULAR at 03:03

## 2021-03-08 RX ADMIN — FOLIC ACID 1 MG: 1 TABLET ORAL at 08:03

## 2021-03-08 RX ADMIN — LEVETIRACETAM 1500 MG: 750 TABLET ORAL at 09:03

## 2021-03-08 RX ADMIN — DIVALPROEX SODIUM 250 MG: 250 TABLET, DELAYED RELEASE ORAL at 05:03

## 2021-03-08 RX ADMIN — INSULIN ASPART 10 UNITS: 100 INJECTION, SOLUTION INTRAVENOUS; SUBCUTANEOUS at 01:03

## 2021-03-08 RX ADMIN — AMOXICILLIN AND CLAVULANATE POTASSIUM 1 TABLET: 875; 125 TABLET, FILM COATED ORAL at 03:03

## 2021-03-08 RX ADMIN — LEVETIRACETAM 1500 MG: 750 TABLET ORAL at 08:03

## 2021-03-08 RX ADMIN — HYDROCHLOROTHIAZIDE 12.5 MG: 12.5 TABLET ORAL at 08:03

## 2021-03-08 RX ADMIN — INSULIN DETEMIR 10 UNITS: 100 INJECTION, SOLUTION SUBCUTANEOUS at 09:03

## 2021-03-08 RX ADMIN — ENOXAPARIN SODIUM 30 MG: 30 INJECTION SUBCUTANEOUS at 03:03

## 2021-03-08 RX ADMIN — INSULIN ASPART 10 UNITS: 100 INJECTION, SOLUTION INTRAVENOUS; SUBCUTANEOUS at 08:03

## 2021-03-08 RX ADMIN — INSULIN ASPART 2 UNITS: 100 INJECTION, SOLUTION INTRAVENOUS; SUBCUTANEOUS at 09:03

## 2021-03-08 RX ADMIN — INSULIN ASPART 10 UNITS: 100 INJECTION, SOLUTION INTRAVENOUS; SUBCUTANEOUS at 07:03

## 2021-03-08 RX ADMIN — QUETIAPINE FUMARATE 50 MG: 25 TABLET ORAL at 05:03

## 2021-03-08 RX ADMIN — LACOSAMIDE 200 MG: 50 TABLET, FILM COATED ORAL at 09:03

## 2021-03-08 RX ADMIN — DICLOFENAC SODIUM 2 G: 10 GEL TOPICAL at 09:03

## 2021-03-08 RX ADMIN — Medication 100 MG: at 09:03

## 2021-03-08 RX ADMIN — GADOBUTROL 4 ML: 604.72 INJECTION INTRAVENOUS at 07:03

## 2021-03-08 RX ADMIN — DOCUSATE SODIUM AND SENNOSIDES 1 TABLET: 8.6; 5 TABLET, FILM COATED ORAL at 08:03

## 2021-03-08 RX ADMIN — DIVALPROEX SODIUM 500 MG: 250 TABLET, DELAYED RELEASE ORAL at 09:03

## 2021-03-08 RX ADMIN — LACOSAMIDE 200 MG: 50 TABLET, FILM COATED ORAL at 08:03

## 2021-03-08 RX ADMIN — RISPERIDONE 1 MG: 1 TABLET ORAL at 09:03

## 2021-03-09 LAB
POCT GLUCOSE: 132 MG/DL (ref 70–110)
POCT GLUCOSE: 200 MG/DL (ref 70–110)
POCT GLUCOSE: 263 MG/DL (ref 70–110)
POCT GLUCOSE: 396 MG/DL (ref 70–110)

## 2021-03-09 PROCEDURE — 99231 PR SUBSEQUENT HOSPITAL CARE,LEVL I: ICD-10-PCS | Mod: ,,, | Performed by: HOSPITALIST

## 2021-03-09 PROCEDURE — 93005 ELECTROCARDIOGRAM TRACING: CPT

## 2021-03-09 PROCEDURE — 93010 ELECTROCARDIOGRAM REPORT: CPT | Mod: ,,, | Performed by: INTERNAL MEDICINE

## 2021-03-09 PROCEDURE — 99234 PR OBSERV/HOSP SAME DATE,LEVL III: ICD-10-PCS | Mod: ,,, | Performed by: NEUROLOGICAL SURGERY

## 2021-03-09 PROCEDURE — 25000003 PHARM REV CODE 250: Performed by: STUDENT IN AN ORGANIZED HEALTH CARE EDUCATION/TRAINING PROGRAM

## 2021-03-09 PROCEDURE — 63600175 PHARM REV CODE 636 W HCPCS: Performed by: STUDENT IN AN ORGANIZED HEALTH CARE EDUCATION/TRAINING PROGRAM

## 2021-03-09 PROCEDURE — 99234 HOSP IP/OBS SM DT SF/LOW 45: CPT | Mod: ,,, | Performed by: NEUROLOGICAL SURGERY

## 2021-03-09 PROCEDURE — S4991 NICOTINE PATCH NONLEGEND: HCPCS | Performed by: STUDENT IN AN ORGANIZED HEALTH CARE EDUCATION/TRAINING PROGRAM

## 2021-03-09 PROCEDURE — 11000001 HC ACUTE MED/SURG PRIVATE ROOM

## 2021-03-09 PROCEDURE — C9399 UNCLASSIFIED DRUGS OR BIOLOG: HCPCS | Performed by: STUDENT IN AN ORGANIZED HEALTH CARE EDUCATION/TRAINING PROGRAM

## 2021-03-09 PROCEDURE — 99233 PR SUBSEQUENT HOSPITAL CARE,LEVL III: ICD-10-PCS | Mod: AF,HB,, | Performed by: PSYCHIATRY & NEUROLOGY

## 2021-03-09 PROCEDURE — 99231 SBSQ HOSP IP/OBS SF/LOW 25: CPT | Mod: ,,, | Performed by: HOSPITALIST

## 2021-03-09 PROCEDURE — 93010 EKG 12-LEAD: ICD-10-PCS | Mod: ,,, | Performed by: INTERNAL MEDICINE

## 2021-03-09 PROCEDURE — 99233 SBSQ HOSP IP/OBS HIGH 50: CPT | Mod: AF,HB,, | Performed by: PSYCHIATRY & NEUROLOGY

## 2021-03-09 PROCEDURE — 25000003 PHARM REV CODE 250: Performed by: PSYCHIATRY & NEUROLOGY

## 2021-03-09 RX ORDER — RISPERIDONE 1 MG/1
1 TABLET ORAL 2 TIMES DAILY
Status: DISCONTINUED | OUTPATIENT
Start: 2021-03-09 | End: 2021-03-22

## 2021-03-09 RX ORDER — HALOPERIDOL 5 MG/ML
2 INJECTION INTRAMUSCULAR EVERY 6 HOURS PRN
Status: DISCONTINUED | OUTPATIENT
Start: 2021-03-09 | End: 2021-03-31

## 2021-03-09 RX ORDER — AMOXICILLIN AND CLAVULANATE POTASSIUM 875; 125 MG/1; MG/1
1 TABLET, FILM COATED ORAL EVERY 12 HOURS
Status: COMPLETED | OUTPATIENT
Start: 2021-03-09 | End: 2021-03-10

## 2021-03-09 RX ADMIN — RISPERIDONE 1 MG: 1 TABLET ORAL at 08:03

## 2021-03-09 RX ADMIN — QUETIAPINE FUMARATE 50 MG: 25 TABLET ORAL at 10:03

## 2021-03-09 RX ADMIN — Medication 100 MG: at 09:03

## 2021-03-09 RX ADMIN — AMOXICILLIN AND CLAVULANATE POTASSIUM 1 TABLET: 875; 125 TABLET, FILM COATED ORAL at 03:03

## 2021-03-09 RX ADMIN — HYDROCHLOROTHIAZIDE 12.5 MG: 12.5 TABLET ORAL at 09:03

## 2021-03-09 RX ADMIN — FOLIC ACID 1 MG: 1 TABLET ORAL at 09:03

## 2021-03-09 RX ADMIN — INSULIN DETEMIR 20 UNITS: 100 INJECTION, SOLUTION SUBCUTANEOUS at 08:03

## 2021-03-09 RX ADMIN — DIVALPROEX SODIUM 500 MG: 250 TABLET, DELAYED RELEASE ORAL at 09:03

## 2021-03-09 RX ADMIN — LEVETIRACETAM 1500 MG: 750 TABLET ORAL at 08:03

## 2021-03-09 RX ADMIN — INSULIN ASPART 10 UNITS: 100 INJECTION, SOLUTION INTRAVENOUS; SUBCUTANEOUS at 01:03

## 2021-03-09 RX ADMIN — ENOXAPARIN SODIUM 30 MG: 30 INJECTION SUBCUTANEOUS at 05:03

## 2021-03-09 RX ADMIN — HALOPERIDOL LACTATE 2 MG: 5 INJECTION, SOLUTION INTRAMUSCULAR at 08:03

## 2021-03-09 RX ADMIN — LEVETIRACETAM 1500 MG: 750 TABLET ORAL at 09:03

## 2021-03-09 RX ADMIN — DIVALPROEX SODIUM 500 MG: 250 TABLET, DELAYED RELEASE ORAL at 08:03

## 2021-03-09 RX ADMIN — LACOSAMIDE 200 MG: 50 TABLET, FILM COATED ORAL at 08:03

## 2021-03-09 RX ADMIN — LACOSAMIDE 200 MG: 50 TABLET, FILM COATED ORAL at 09:03

## 2021-03-09 RX ADMIN — INSULIN DETEMIR 20 UNITS: 100 INJECTION, SOLUTION SUBCUTANEOUS at 09:03

## 2021-03-09 RX ADMIN — DICLOFENAC SODIUM 2 G: 10 GEL TOPICAL at 09:03

## 2021-03-09 RX ADMIN — RISPERIDONE 1 MG: 1 TABLET ORAL at 12:03

## 2021-03-09 RX ADMIN — INSULIN ASPART 10 UNITS: 100 INJECTION, SOLUTION INTRAVENOUS; SUBCUTANEOUS at 09:03

## 2021-03-09 RX ADMIN — DOCUSATE SODIUM AND SENNOSIDES 1 TABLET: 8.6; 5 TABLET, FILM COATED ORAL at 09:03

## 2021-03-09 RX ADMIN — INSULIN ASPART 10 UNITS: 100 INJECTION, SOLUTION INTRAVENOUS; SUBCUTANEOUS at 05:03

## 2021-03-09 RX ADMIN — AMOXICILLIN AND CLAVULANATE POTASSIUM 1 TABLET: 875; 125 TABLET, FILM COATED ORAL at 05:03

## 2021-03-09 RX ADMIN — NICOTINE 1 PATCH: 21 PATCH, EXTENDED RELEASE TRANSDERMAL at 09:03

## 2021-03-10 PROBLEM — T68.XXXA HYPOTHERMIA: Status: RESOLVED | Noted: 2021-02-24 | Resolved: 2021-03-10

## 2021-03-10 LAB
POCT GLUCOSE: 159 MG/DL (ref 70–110)
POCT GLUCOSE: 206 MG/DL (ref 70–110)
POCT GLUCOSE: 222 MG/DL (ref 70–110)
POCT GLUCOSE: 65 MG/DL (ref 70–110)

## 2021-03-10 PROCEDURE — C9399 UNCLASSIFIED DRUGS OR BIOLOG: HCPCS | Performed by: STUDENT IN AN ORGANIZED HEALTH CARE EDUCATION/TRAINING PROGRAM

## 2021-03-10 PROCEDURE — 93010 EKG 12-LEAD: ICD-10-PCS | Mod: ,,, | Performed by: INTERNAL MEDICINE

## 2021-03-10 PROCEDURE — 93005 ELECTROCARDIOGRAM TRACING: CPT

## 2021-03-10 PROCEDURE — 99232 PR SUBSEQUENT HOSPITAL CARE,LEVL II: ICD-10-PCS | Mod: ,,, | Performed by: HOSPITALIST

## 2021-03-10 PROCEDURE — 25000003 PHARM REV CODE 250: Performed by: PSYCHIATRY & NEUROLOGY

## 2021-03-10 PROCEDURE — 25000003 PHARM REV CODE 250: Performed by: STUDENT IN AN ORGANIZED HEALTH CARE EDUCATION/TRAINING PROGRAM

## 2021-03-10 PROCEDURE — 63600175 PHARM REV CODE 636 W HCPCS: Performed by: STUDENT IN AN ORGANIZED HEALTH CARE EDUCATION/TRAINING PROGRAM

## 2021-03-10 PROCEDURE — 93010 ELECTROCARDIOGRAM REPORT: CPT | Mod: ,,, | Performed by: INTERNAL MEDICINE

## 2021-03-10 PROCEDURE — S4991 NICOTINE PATCH NONLEGEND: HCPCS | Performed by: STUDENT IN AN ORGANIZED HEALTH CARE EDUCATION/TRAINING PROGRAM

## 2021-03-10 PROCEDURE — 97116 GAIT TRAINING THERAPY: CPT

## 2021-03-10 PROCEDURE — 11000001 HC ACUTE MED/SURG PRIVATE ROOM

## 2021-03-10 PROCEDURE — 97530 THERAPEUTIC ACTIVITIES: CPT

## 2021-03-10 PROCEDURE — 99232 SBSQ HOSP IP/OBS MODERATE 35: CPT | Mod: ,,, | Performed by: HOSPITALIST

## 2021-03-10 RX ORDER — CLOBAZAM 10 MG/1
10 TABLET ORAL DAILY
Qty: 30 EACH | Refills: 1 | Status: SHIPPED | OUTPATIENT
Start: 2021-03-10 | End: 2021-03-10

## 2021-03-10 RX ORDER — CLOBAZAM 10 MG/1
10 TABLET ORAL DAILY
Qty: 30 EACH | Refills: 1 | Status: SHIPPED | OUTPATIENT
Start: 2021-03-10 | End: 2021-03-16 | Stop reason: HOSPADM

## 2021-03-10 RX ORDER — INSULIN ASPART 100 [IU]/ML
7 INJECTION, SOLUTION INTRAVENOUS; SUBCUTANEOUS
Status: DISCONTINUED | OUTPATIENT
Start: 2021-03-10 | End: 2021-03-20

## 2021-03-10 RX ORDER — DIVALPROEX SODIUM 250 MG/1
500 TABLET, DELAYED RELEASE ORAL DAILY
Status: DISCONTINUED | OUTPATIENT
Start: 2021-03-11 | End: 2021-03-21

## 2021-03-10 RX ORDER — CLOBAZAM 10 MG/1
10 TABLET ORAL DAILY
Status: DISCONTINUED | OUTPATIENT
Start: 2021-03-10 | End: 2021-03-11

## 2021-03-10 RX ORDER — HALOPERIDOL 5 MG/ML
2 INJECTION INTRAMUSCULAR ONCE
Status: COMPLETED | OUTPATIENT
Start: 2021-03-10 | End: 2021-03-10

## 2021-03-10 RX ORDER — DIVALPROEX SODIUM 250 MG/1
1000 TABLET, DELAYED RELEASE ORAL NIGHTLY
Status: DISCONTINUED | OUTPATIENT
Start: 2021-03-10 | End: 2021-03-21

## 2021-03-10 RX ADMIN — LEVETIRACETAM 1500 MG: 750 TABLET ORAL at 08:03

## 2021-03-10 RX ADMIN — NICOTINE 1 PATCH: 21 PATCH, EXTENDED RELEASE TRANSDERMAL at 09:03

## 2021-03-10 RX ADMIN — DIVALPROEX SODIUM 500 MG: 250 TABLET, DELAYED RELEASE ORAL at 09:03

## 2021-03-10 RX ADMIN — HALOPERIDOL LACTATE 2 MG: 5 INJECTION, SOLUTION INTRAMUSCULAR at 04:03

## 2021-03-10 RX ADMIN — AMOXICILLIN AND CLAVULANATE POTASSIUM 1 TABLET: 875; 125 TABLET, FILM COATED ORAL at 09:03

## 2021-03-10 RX ADMIN — FOLIC ACID 1 MG: 1 TABLET ORAL at 09:03

## 2021-03-10 RX ADMIN — CLOBAZAM 10 MG: 10 TABLET ORAL at 03:03

## 2021-03-10 RX ADMIN — HYDROCHLOROTHIAZIDE 12.5 MG: 12.5 TABLET ORAL at 01:03

## 2021-03-10 RX ADMIN — DIVALPROEX SODIUM 1000 MG: 250 TABLET, DELAYED RELEASE ORAL at 08:03

## 2021-03-10 RX ADMIN — DICLOFENAC SODIUM 2 G: 10 GEL TOPICAL at 01:03

## 2021-03-10 RX ADMIN — INSULIN DETEMIR 20 UNITS: 100 INJECTION, SOLUTION SUBCUTANEOUS at 09:03

## 2021-03-10 RX ADMIN — INSULIN ASPART 7 UNITS: 100 INJECTION, SOLUTION INTRAVENOUS; SUBCUTANEOUS at 05:03

## 2021-03-10 RX ADMIN — INSULIN ASPART 4 UNITS: 100 INJECTION, SOLUTION INTRAVENOUS; SUBCUTANEOUS at 05:03

## 2021-03-10 RX ADMIN — HALOPERIDOL LACTATE 2 MG: 5 INJECTION, SOLUTION INTRAMUSCULAR at 07:03

## 2021-03-10 RX ADMIN — Medication 100 MG: at 09:03

## 2021-03-10 RX ADMIN — RISPERIDONE 1 MG: 1 TABLET ORAL at 08:03

## 2021-03-10 RX ADMIN — INSULIN ASPART 7 UNITS: 100 INJECTION, SOLUTION INTRAVENOUS; SUBCUTANEOUS at 01:03

## 2021-03-10 RX ADMIN — INSULIN ASPART 4 UNITS: 100 INJECTION, SOLUTION INTRAVENOUS; SUBCUTANEOUS at 01:03

## 2021-03-10 RX ADMIN — DOCUSATE SODIUM AND SENNOSIDES 1 TABLET: 8.6; 5 TABLET, FILM COATED ORAL at 09:03

## 2021-03-10 RX ADMIN — INSULIN DETEMIR 15 UNITS: 100 INJECTION, SOLUTION SUBCUTANEOUS at 08:03

## 2021-03-10 RX ADMIN — LEVETIRACETAM 1500 MG: 750 TABLET ORAL at 09:03

## 2021-03-10 RX ADMIN — RISPERIDONE 1 MG: 1 TABLET ORAL at 09:03

## 2021-03-10 RX ADMIN — LACOSAMIDE 200 MG: 50 TABLET, FILM COATED ORAL at 09:03

## 2021-03-11 LAB
LEVETIRACETAM SERPL-MCNC: 43.6 UG/ML (ref 3–60)
POCT GLUCOSE: 247 MG/DL (ref 70–110)
POCT GLUCOSE: 295 MG/DL (ref 70–110)
POCT GLUCOSE: 318 MG/DL (ref 70–110)
POCT GLUCOSE: 424 MG/DL (ref 70–110)

## 2021-03-11 PROCEDURE — 63600175 PHARM REV CODE 636 W HCPCS: Performed by: STUDENT IN AN ORGANIZED HEALTH CARE EDUCATION/TRAINING PROGRAM

## 2021-03-11 PROCEDURE — 92507 TX SP LANG VOICE COMM INDIV: CPT

## 2021-03-11 PROCEDURE — 99232 PR SUBSEQUENT HOSPITAL CARE,LEVL II: ICD-10-PCS | Mod: AF,HB,, | Performed by: PSYCHIATRY & NEUROLOGY

## 2021-03-11 PROCEDURE — 93010 ELECTROCARDIOGRAM REPORT: CPT | Mod: ,,, | Performed by: INTERNAL MEDICINE

## 2021-03-11 PROCEDURE — 93005 ELECTROCARDIOGRAM TRACING: CPT

## 2021-03-11 PROCEDURE — 93010 EKG 12-LEAD: ICD-10-PCS | Mod: ,,, | Performed by: INTERNAL MEDICINE

## 2021-03-11 PROCEDURE — 25000003 PHARM REV CODE 250: Performed by: STUDENT IN AN ORGANIZED HEALTH CARE EDUCATION/TRAINING PROGRAM

## 2021-03-11 PROCEDURE — 25000003 PHARM REV CODE 250: Performed by: PSYCHIATRY & NEUROLOGY

## 2021-03-11 PROCEDURE — 11000001 HC ACUTE MED/SURG PRIVATE ROOM

## 2021-03-11 PROCEDURE — 99232 SBSQ HOSP IP/OBS MODERATE 35: CPT | Mod: AF,HB,, | Performed by: PSYCHIATRY & NEUROLOGY

## 2021-03-11 PROCEDURE — 99231 SBSQ HOSP IP/OBS SF/LOW 25: CPT | Mod: ,,, | Performed by: HOSPITALIST

## 2021-03-11 PROCEDURE — 99231 PR SUBSEQUENT HOSPITAL CARE,LEVL I: ICD-10-PCS | Mod: ,,, | Performed by: HOSPITALIST

## 2021-03-11 PROCEDURE — S4991 NICOTINE PATCH NONLEGEND: HCPCS | Performed by: STUDENT IN AN ORGANIZED HEALTH CARE EDUCATION/TRAINING PROGRAM

## 2021-03-11 RX ORDER — LACOSAMIDE 50 MG/1
50 TABLET ORAL EVERY 12 HOURS
Status: COMPLETED | OUTPATIENT
Start: 2021-03-13 | End: 2021-03-13

## 2021-03-11 RX ORDER — CLOBAZAM 10 MG/1
10 TABLET ORAL 2 TIMES DAILY
Status: DISCONTINUED | OUTPATIENT
Start: 2021-03-11 | End: 2021-03-21

## 2021-03-11 RX ORDER — LACOSAMIDE 50 MG/1
100 TABLET ORAL EVERY 12 HOURS
Status: COMPLETED | OUTPATIENT
Start: 2021-03-12 | End: 2021-03-12

## 2021-03-11 RX ORDER — LACOSAMIDE 50 MG/1
150 TABLET ORAL EVERY 12 HOURS
Status: DISPENSED | OUTPATIENT
Start: 2021-03-11 | End: 2021-03-12

## 2021-03-11 RX ADMIN — INSULIN DETEMIR 15 UNITS: 100 INJECTION, SOLUTION SUBCUTANEOUS at 09:03

## 2021-03-11 RX ADMIN — ENOXAPARIN SODIUM 30 MG: 30 INJECTION SUBCUTANEOUS at 05:03

## 2021-03-11 RX ADMIN — CLOBAZAM 10 MG: 10 TABLET ORAL at 09:03

## 2021-03-11 RX ADMIN — INSULIN DETEMIR 20 UNITS: 100 INJECTION, SOLUTION SUBCUTANEOUS at 08:03

## 2021-03-11 RX ADMIN — LACOSAMIDE 150 MG: 50 TABLET, FILM COATED ORAL at 09:03

## 2021-03-11 RX ADMIN — INSULIN ASPART 5 UNITS: 100 INJECTION, SOLUTION INTRAVENOUS; SUBCUTANEOUS at 09:03

## 2021-03-11 RX ADMIN — INSULIN ASPART 7 UNITS: 100 INJECTION, SOLUTION INTRAVENOUS; SUBCUTANEOUS at 11:03

## 2021-03-11 RX ADMIN — HYDROCHLOROTHIAZIDE 12.5 MG: 12.5 TABLET ORAL at 11:03

## 2021-03-11 RX ADMIN — DICLOFENAC SODIUM 2 G: 10 GEL TOPICAL at 08:03

## 2021-03-11 RX ADMIN — DIVALPROEX SODIUM 1000 MG: 250 TABLET, DELAYED RELEASE ORAL at 09:03

## 2021-03-11 RX ADMIN — INSULIN ASPART 8 UNITS: 100 INJECTION, SOLUTION INTRAVENOUS; SUBCUTANEOUS at 05:03

## 2021-03-11 RX ADMIN — LEVETIRACETAM 1500 MG: 750 TABLET ORAL at 09:03

## 2021-03-11 RX ADMIN — INSULIN ASPART 7 UNITS: 100 INJECTION, SOLUTION INTRAVENOUS; SUBCUTANEOUS at 08:03

## 2021-03-11 RX ADMIN — NICOTINE 1 PATCH: 21 PATCH, EXTENDED RELEASE TRANSDERMAL at 08:03

## 2021-03-11 RX ADMIN — INSULIN ASPART 6 UNITS: 100 INJECTION, SOLUTION INTRAVENOUS; SUBCUTANEOUS at 08:03

## 2021-03-11 RX ADMIN — DIVALPROEX SODIUM 500 MG: 250 TABLET, DELAYED RELEASE ORAL at 08:03

## 2021-03-11 RX ADMIN — Medication 100 MG: at 09:03

## 2021-03-11 RX ADMIN — FOLIC ACID 1 MG: 1 TABLET ORAL at 08:03

## 2021-03-11 RX ADMIN — DOCUSATE SODIUM AND SENNOSIDES 1 TABLET: 8.6; 5 TABLET, FILM COATED ORAL at 08:03

## 2021-03-11 RX ADMIN — LEVETIRACETAM 1500 MG: 750 TABLET ORAL at 08:03

## 2021-03-11 RX ADMIN — CLOBAZAM 10 MG: 10 TABLET ORAL at 08:03

## 2021-03-11 RX ADMIN — RISPERIDONE 1 MG: 1 TABLET ORAL at 08:03

## 2021-03-11 RX ADMIN — INSULIN ASPART 7 UNITS: 100 INJECTION, SOLUTION INTRAVENOUS; SUBCUTANEOUS at 05:03

## 2021-03-11 RX ADMIN — RISPERIDONE 1 MG: 1 TABLET ORAL at 09:03

## 2021-03-11 RX ADMIN — INSULIN ASPART 4 UNITS: 100 INJECTION, SOLUTION INTRAVENOUS; SUBCUTANEOUS at 11:03

## 2021-03-12 LAB
POCT GLUCOSE: 169 MG/DL (ref 70–110)
POCT GLUCOSE: 220 MG/DL (ref 70–110)
POCT GLUCOSE: 231 MG/DL (ref 70–110)
POCT GLUCOSE: 248 MG/DL (ref 70–110)

## 2021-03-12 PROCEDURE — 99233 PR SUBSEQUENT HOSPITAL CARE,LEVL III: ICD-10-PCS | Mod: AF,HB,, | Performed by: PSYCHIATRY & NEUROLOGY

## 2021-03-12 PROCEDURE — 25000003 PHARM REV CODE 250: Performed by: STUDENT IN AN ORGANIZED HEALTH CARE EDUCATION/TRAINING PROGRAM

## 2021-03-12 PROCEDURE — 99233 SBSQ HOSP IP/OBS HIGH 50: CPT | Mod: AF,HB,, | Performed by: PSYCHIATRY & NEUROLOGY

## 2021-03-12 PROCEDURE — 11000001 HC ACUTE MED/SURG PRIVATE ROOM

## 2021-03-12 PROCEDURE — 25000003 PHARM REV CODE 250: Performed by: PSYCHIATRY & NEUROLOGY

## 2021-03-12 PROCEDURE — 93010 EKG 12-LEAD: ICD-10-PCS | Mod: ,,, | Performed by: INTERNAL MEDICINE

## 2021-03-12 PROCEDURE — 97535 SELF CARE MNGMENT TRAINING: CPT

## 2021-03-12 PROCEDURE — 93005 ELECTROCARDIOGRAM TRACING: CPT

## 2021-03-12 PROCEDURE — 93010 ELECTROCARDIOGRAM REPORT: CPT | Mod: ,,, | Performed by: INTERNAL MEDICINE

## 2021-03-12 PROCEDURE — 97530 THERAPEUTIC ACTIVITIES: CPT

## 2021-03-12 PROCEDURE — S4991 NICOTINE PATCH NONLEGEND: HCPCS | Performed by: STUDENT IN AN ORGANIZED HEALTH CARE EDUCATION/TRAINING PROGRAM

## 2021-03-12 PROCEDURE — 92507 TX SP LANG VOICE COMM INDIV: CPT

## 2021-03-12 RX ADMIN — LACOSAMIDE 100 MG: 50 TABLET, FILM COATED ORAL at 09:03

## 2021-03-12 RX ADMIN — CLOBAZAM 10 MG: 10 TABLET ORAL at 08:03

## 2021-03-12 RX ADMIN — LEVETIRACETAM 1500 MG: 750 TABLET ORAL at 09:03

## 2021-03-12 RX ADMIN — NICOTINE 1 PATCH: 21 PATCH, EXTENDED RELEASE TRANSDERMAL at 09:03

## 2021-03-12 RX ADMIN — RISPERIDONE 1 MG: 1 TABLET ORAL at 08:03

## 2021-03-12 RX ADMIN — LACOSAMIDE 100 MG: 50 TABLET, FILM COATED ORAL at 08:03

## 2021-03-12 RX ADMIN — DICLOFENAC SODIUM 2 G: 10 GEL TOPICAL at 09:03

## 2021-03-12 RX ADMIN — INSULIN ASPART 7 UNITS: 100 INJECTION, SOLUTION INTRAVENOUS; SUBCUTANEOUS at 11:03

## 2021-03-12 RX ADMIN — DIVALPROEX SODIUM 500 MG: 250 TABLET, DELAYED RELEASE ORAL at 09:03

## 2021-03-12 RX ADMIN — CLOBAZAM 10 MG: 10 TABLET ORAL at 09:03

## 2021-03-12 RX ADMIN — Medication 100 MG: at 09:03

## 2021-03-12 RX ADMIN — RISPERIDONE 1 MG: 1 TABLET ORAL at 09:03

## 2021-03-12 RX ADMIN — INSULIN ASPART 4 UNITS: 100 INJECTION, SOLUTION INTRAVENOUS; SUBCUTANEOUS at 11:03

## 2021-03-12 RX ADMIN — INSULIN ASPART 4 UNITS: 100 INJECTION, SOLUTION INTRAVENOUS; SUBCUTANEOUS at 06:03

## 2021-03-12 RX ADMIN — INSULIN DETEMIR 15 UNITS: 100 INJECTION, SOLUTION SUBCUTANEOUS at 08:03

## 2021-03-12 RX ADMIN — DIVALPROEX SODIUM 1000 MG: 250 TABLET, DELAYED RELEASE ORAL at 08:03

## 2021-03-12 RX ADMIN — HYDROCHLOROTHIAZIDE 12.5 MG: 12.5 TABLET ORAL at 09:03

## 2021-03-12 RX ADMIN — LEVETIRACETAM 1500 MG: 750 TABLET ORAL at 08:03

## 2021-03-12 RX ADMIN — FOLIC ACID 1 MG: 1 TABLET ORAL at 09:03

## 2021-03-12 RX ADMIN — DOCUSATE SODIUM AND SENNOSIDES 1 TABLET: 8.6; 5 TABLET, FILM COATED ORAL at 09:03

## 2021-03-12 RX ADMIN — INSULIN DETEMIR 20 UNITS: 100 INJECTION, SOLUTION SUBCUTANEOUS at 09:03

## 2021-03-12 RX ADMIN — INSULIN ASPART 7 UNITS: 100 INJECTION, SOLUTION INTRAVENOUS; SUBCUTANEOUS at 06:03

## 2021-03-12 RX ADMIN — INSULIN ASPART 7 UNITS: 100 INJECTION, SOLUTION INTRAVENOUS; SUBCUTANEOUS at 09:03

## 2021-03-13 LAB
BASOPHILS # BLD AUTO: 0.06 K/UL (ref 0–0.2)
BASOPHILS NFR BLD: 0.5 % (ref 0–1.9)
DIFFERENTIAL METHOD: ABNORMAL
EOSINOPHIL # BLD AUTO: 0.2 K/UL (ref 0–0.5)
EOSINOPHIL NFR BLD: 1.4 % (ref 0–8)
ERYTHROCYTE [DISTWIDTH] IN BLOOD BY AUTOMATED COUNT: 15.2 % (ref 11.5–14.5)
HCT VFR BLD AUTO: 30.5 % (ref 40–54)
HGB BLD-MCNC: 10 G/DL (ref 14–18)
IMM GRANULOCYTES # BLD AUTO: 0.2 K/UL (ref 0–0.04)
IMM GRANULOCYTES NFR BLD AUTO: 1.5 % (ref 0–0.5)
LYMPHOCYTES # BLD AUTO: 3 K/UL (ref 1–4.8)
LYMPHOCYTES NFR BLD: 22.4 % (ref 18–48)
MCH RBC QN AUTO: 32.5 PG (ref 27–31)
MCHC RBC AUTO-ENTMCNC: 32.8 G/DL (ref 32–36)
MCV RBC AUTO: 99 FL (ref 82–98)
MONOCYTES # BLD AUTO: 1.3 K/UL (ref 0.3–1)
MONOCYTES NFR BLD: 10 % (ref 4–15)
NEUTROPHILS # BLD AUTO: 8.5 K/UL (ref 1.8–7.7)
NEUTROPHILS NFR BLD: 64.2 % (ref 38–73)
NRBC BLD-RTO: 0 /100 WBC
PLATELET # BLD AUTO: 469 K/UL (ref 150–350)
PMV BLD AUTO: 10 FL (ref 9.2–12.9)
POCT GLUCOSE: 188 MG/DL (ref 70–110)
POCT GLUCOSE: 237 MG/DL (ref 70–110)
POCT GLUCOSE: 252 MG/DL (ref 70–110)
POCT GLUCOSE: 307 MG/DL (ref 70–110)
RBC # BLD AUTO: 3.08 M/UL (ref 4.6–6.2)
WBC # BLD AUTO: 13.17 K/UL (ref 3.9–12.7)

## 2021-03-13 PROCEDURE — 11000001 HC ACUTE MED/SURG PRIVATE ROOM

## 2021-03-13 PROCEDURE — 93005 ELECTROCARDIOGRAM TRACING: CPT

## 2021-03-13 PROCEDURE — 25000003 PHARM REV CODE 250: Performed by: STUDENT IN AN ORGANIZED HEALTH CARE EDUCATION/TRAINING PROGRAM

## 2021-03-13 PROCEDURE — 36415 COLL VENOUS BLD VENIPUNCTURE: CPT | Performed by: STUDENT IN AN ORGANIZED HEALTH CARE EDUCATION/TRAINING PROGRAM

## 2021-03-13 PROCEDURE — 87040 BLOOD CULTURE FOR BACTERIA: CPT | Performed by: STUDENT IN AN ORGANIZED HEALTH CARE EDUCATION/TRAINING PROGRAM

## 2021-03-13 PROCEDURE — 93010 EKG 12-LEAD: ICD-10-PCS | Mod: ,,, | Performed by: INTERNAL MEDICINE

## 2021-03-13 PROCEDURE — 85025 COMPLETE CBC W/AUTO DIFF WBC: CPT | Performed by: STUDENT IN AN ORGANIZED HEALTH CARE EDUCATION/TRAINING PROGRAM

## 2021-03-13 PROCEDURE — 25000003 PHARM REV CODE 250: Performed by: PSYCHIATRY & NEUROLOGY

## 2021-03-13 PROCEDURE — 99232 SBSQ HOSP IP/OBS MODERATE 35: CPT | Mod: ,,, | Performed by: HOSPITALIST

## 2021-03-13 PROCEDURE — 93010 ELECTROCARDIOGRAM REPORT: CPT | Mod: ,,, | Performed by: INTERNAL MEDICINE

## 2021-03-13 PROCEDURE — 63600175 PHARM REV CODE 636 W HCPCS: Performed by: STUDENT IN AN ORGANIZED HEALTH CARE EDUCATION/TRAINING PROGRAM

## 2021-03-13 PROCEDURE — 99232 SBSQ HOSP IP/OBS MODERATE 35: CPT | Mod: ,,, | Performed by: PSYCHIATRY & NEUROLOGY

## 2021-03-13 PROCEDURE — 99232 PR SUBSEQUENT HOSPITAL CARE,LEVL II: ICD-10-PCS | Mod: ,,, | Performed by: HOSPITALIST

## 2021-03-13 PROCEDURE — 99232 PR SUBSEQUENT HOSPITAL CARE,LEVL II: ICD-10-PCS | Mod: ,,, | Performed by: PSYCHIATRY & NEUROLOGY

## 2021-03-13 RX ADMIN — INSULIN ASPART 7 UNITS: 100 INJECTION, SOLUTION INTRAVENOUS; SUBCUTANEOUS at 09:03

## 2021-03-13 RX ADMIN — LEVETIRACETAM 1500 MG: 750 TABLET ORAL at 09:03

## 2021-03-13 RX ADMIN — LACOSAMIDE 50 MG: 50 TABLET, FILM COATED ORAL at 09:03

## 2021-03-13 RX ADMIN — DIVALPROEX SODIUM 1000 MG: 250 TABLET, DELAYED RELEASE ORAL at 09:03

## 2021-03-13 RX ADMIN — RISPERIDONE 1 MG: 1 TABLET ORAL at 09:03

## 2021-03-13 RX ADMIN — INSULIN DETEMIR 15 UNITS: 100 INJECTION, SOLUTION SUBCUTANEOUS at 09:03

## 2021-03-13 RX ADMIN — DICLOFENAC SODIUM 2 G: 10 GEL TOPICAL at 12:03

## 2021-03-13 RX ADMIN — INSULIN ASPART 7 UNITS: 100 INJECTION, SOLUTION INTRAVENOUS; SUBCUTANEOUS at 12:03

## 2021-03-13 RX ADMIN — HYDROCHLOROTHIAZIDE 12.5 MG: 12.5 TABLET ORAL at 09:03

## 2021-03-13 RX ADMIN — INSULIN ASPART 4 UNITS: 100 INJECTION, SOLUTION INTRAVENOUS; SUBCUTANEOUS at 05:03

## 2021-03-13 RX ADMIN — CLOBAZAM 10 MG: 10 TABLET ORAL at 09:03

## 2021-03-13 RX ADMIN — DIVALPROEX SODIUM 500 MG: 250 TABLET, DELAYED RELEASE ORAL at 09:03

## 2021-03-13 RX ADMIN — ENOXAPARIN SODIUM 30 MG: 30 INJECTION SUBCUTANEOUS at 05:03

## 2021-03-13 RX ADMIN — FOLIC ACID 1 MG: 1 TABLET ORAL at 09:03

## 2021-03-13 RX ADMIN — Medication 100 MG: at 09:03

## 2021-03-13 RX ADMIN — INSULIN DETEMIR 20 UNITS: 100 INJECTION, SOLUTION SUBCUTANEOUS at 09:03

## 2021-03-13 RX ADMIN — INSULIN ASPART 7 UNITS: 100 INJECTION, SOLUTION INTRAVENOUS; SUBCUTANEOUS at 05:03

## 2021-03-13 RX ADMIN — DOCUSATE SODIUM AND SENNOSIDES 1 TABLET: 8.6; 5 TABLET, FILM COATED ORAL at 09:03

## 2021-03-14 PROBLEM — D72.829 LEUKOCYTOSIS: Status: ACTIVE | Noted: 2021-03-14

## 2021-03-14 LAB
ANISOCYTOSIS BLD QL SMEAR: SLIGHT
BASOPHILS # BLD AUTO: 0.06 K/UL (ref 0–0.2)
BASOPHILS NFR BLD: 0.6 % (ref 0–1.9)
BILIRUB UR QL STRIP: NEGATIVE
CLARITY UR REFRACT.AUTO: CLEAR
COLOR UR AUTO: NORMAL
DIFFERENTIAL METHOD: ABNORMAL
EOSINOPHIL # BLD AUTO: 0.2 K/UL (ref 0–0.5)
EOSINOPHIL NFR BLD: 1.7 % (ref 0–8)
ERYTHROCYTE [DISTWIDTH] IN BLOOD BY AUTOMATED COUNT: 15.1 % (ref 11.5–14.5)
GLUCOSE UR QL STRIP: NEGATIVE
HCT VFR BLD AUTO: 31.7 % (ref 40–54)
HGB BLD-MCNC: 10.2 G/DL (ref 14–18)
HGB UR QL STRIP: NEGATIVE
IMM GRANULOCYTES # BLD AUTO: 0.22 K/UL (ref 0–0.04)
IMM GRANULOCYTES NFR BLD AUTO: 2 % (ref 0–0.5)
KETONES UR QL STRIP: NEGATIVE
LEUKOCYTE ESTERASE UR QL STRIP: NEGATIVE
LYMPHOCYTES # BLD AUTO: 2.7 K/UL (ref 1–4.8)
LYMPHOCYTES NFR BLD: 24.7 % (ref 18–48)
MCH RBC QN AUTO: 32.2 PG (ref 27–31)
MCHC RBC AUTO-ENTMCNC: 32.2 G/DL (ref 32–36)
MCV RBC AUTO: 100 FL (ref 82–98)
MONOCYTES # BLD AUTO: 1.5 K/UL (ref 0.3–1)
MONOCYTES NFR BLD: 14.2 % (ref 4–15)
NEUTROPHILS # BLD AUTO: 6.1 K/UL (ref 1.8–7.7)
NEUTROPHILS NFR BLD: 56.8 % (ref 38–73)
NITRITE UR QL STRIP: NEGATIVE
NRBC BLD-RTO: 0 /100 WBC
PH UR STRIP: 7 [PH] (ref 5–8)
PLATELET # BLD AUTO: 444 K/UL (ref 150–350)
PLATELET BLD QL SMEAR: ABNORMAL
PMV BLD AUTO: 9.6 FL (ref 9.2–12.9)
POCT GLUCOSE: 158 MG/DL (ref 70–110)
POCT GLUCOSE: 182 MG/DL (ref 70–110)
POCT GLUCOSE: 186 MG/DL (ref 70–110)
POCT GLUCOSE: 287 MG/DL (ref 70–110)
POIKILOCYTOSIS BLD QL SMEAR: SLIGHT
PROT UR QL STRIP: NEGATIVE
RBC # BLD AUTO: 3.17 M/UL (ref 4.6–6.2)
SP GR UR STRIP: 1.01 (ref 1–1.03)
URN SPEC COLLECT METH UR: NORMAL
WBC # BLD AUTO: 10.76 K/UL (ref 3.9–12.7)

## 2021-03-14 PROCEDURE — 93010 ELECTROCARDIOGRAM REPORT: CPT | Mod: ,,, | Performed by: INTERNAL MEDICINE

## 2021-03-14 PROCEDURE — 25000003 PHARM REV CODE 250: Performed by: STUDENT IN AN ORGANIZED HEALTH CARE EDUCATION/TRAINING PROGRAM

## 2021-03-14 PROCEDURE — 81003 URINALYSIS AUTO W/O SCOPE: CPT | Performed by: STUDENT IN AN ORGANIZED HEALTH CARE EDUCATION/TRAINING PROGRAM

## 2021-03-14 PROCEDURE — 85025 COMPLETE CBC W/AUTO DIFF WBC: CPT | Performed by: STUDENT IN AN ORGANIZED HEALTH CARE EDUCATION/TRAINING PROGRAM

## 2021-03-14 PROCEDURE — S4991 NICOTINE PATCH NONLEGEND: HCPCS | Performed by: STUDENT IN AN ORGANIZED HEALTH CARE EDUCATION/TRAINING PROGRAM

## 2021-03-14 PROCEDURE — 99231 SBSQ HOSP IP/OBS SF/LOW 25: CPT | Mod: ,,, | Performed by: HOSPITALIST

## 2021-03-14 PROCEDURE — 36415 COLL VENOUS BLD VENIPUNCTURE: CPT | Performed by: STUDENT IN AN ORGANIZED HEALTH CARE EDUCATION/TRAINING PROGRAM

## 2021-03-14 PROCEDURE — 63600175 PHARM REV CODE 636 W HCPCS: Performed by: STUDENT IN AN ORGANIZED HEALTH CARE EDUCATION/TRAINING PROGRAM

## 2021-03-14 PROCEDURE — 25000003 PHARM REV CODE 250: Performed by: PSYCHIATRY & NEUROLOGY

## 2021-03-14 PROCEDURE — 11000001 HC ACUTE MED/SURG PRIVATE ROOM

## 2021-03-14 PROCEDURE — 99231 PR SUBSEQUENT HOSPITAL CARE,LEVL I: ICD-10-PCS | Mod: ,,, | Performed by: HOSPITALIST

## 2021-03-14 PROCEDURE — 93010 EKG 12-LEAD: ICD-10-PCS | Mod: ,,, | Performed by: INTERNAL MEDICINE

## 2021-03-14 PROCEDURE — 93005 ELECTROCARDIOGRAM TRACING: CPT

## 2021-03-14 RX ORDER — LEVETIRACETAM 500 MG/1
500 TABLET ORAL 2 TIMES DAILY
Status: COMPLETED | OUTPATIENT
Start: 2021-03-17 | End: 2021-03-19

## 2021-03-14 RX ORDER — LEVETIRACETAM 500 MG/1
1000 TABLET ORAL 2 TIMES DAILY
Status: COMPLETED | OUTPATIENT
Start: 2021-03-14 | End: 2021-03-16

## 2021-03-14 RX ORDER — CLOBAZAM 10 MG/1
10 TABLET ORAL 2 TIMES DAILY
Qty: 60 EACH | Refills: 5 | Status: SHIPPED | OUTPATIENT
Start: 2021-03-14 | End: 2021-03-26

## 2021-03-14 RX ORDER — LEVETIRACETAM 500 MG/1
500 TABLET ORAL DAILY
Status: DISCONTINUED | OUTPATIENT
Start: 2021-03-20 | End: 2021-03-21

## 2021-03-14 RX ORDER — LEVETIRACETAM 500 MG/1
1000 TABLET ORAL 2 TIMES DAILY
Status: DISCONTINUED | OUTPATIENT
Start: 2021-03-14 | End: 2021-03-14

## 2021-03-14 RX ADMIN — FOLIC ACID 1 MG: 1 TABLET ORAL at 08:03

## 2021-03-14 RX ADMIN — INSULIN ASPART 6 UNITS: 100 INJECTION, SOLUTION INTRAVENOUS; SUBCUTANEOUS at 08:03

## 2021-03-14 RX ADMIN — LEVETIRACETAM 1000 MG: 500 TABLET ORAL at 01:03

## 2021-03-14 RX ADMIN — HYDROCHLOROTHIAZIDE 12.5 MG: 12.5 TABLET ORAL at 08:03

## 2021-03-14 RX ADMIN — DIVALPROEX SODIUM 500 MG: 250 TABLET, DELAYED RELEASE ORAL at 08:03

## 2021-03-14 RX ADMIN — Medication 100 MG: at 08:03

## 2021-03-14 RX ADMIN — INSULIN ASPART 7 UNITS: 100 INJECTION, SOLUTION INTRAVENOUS; SUBCUTANEOUS at 05:03

## 2021-03-14 RX ADMIN — LEVETIRACETAM 1000 MG: 500 TABLET ORAL at 09:03

## 2021-03-14 RX ADMIN — RISPERIDONE 1 MG: 1 TABLET ORAL at 08:03

## 2021-03-14 RX ADMIN — CLOBAZAM 10 MG: 10 TABLET ORAL at 08:03

## 2021-03-14 RX ADMIN — DOCUSATE SODIUM AND SENNOSIDES 1 TABLET: 8.6; 5 TABLET, FILM COATED ORAL at 08:03

## 2021-03-14 RX ADMIN — NICOTINE 1 PATCH: 21 PATCH, EXTENDED RELEASE TRANSDERMAL at 08:03

## 2021-03-14 RX ADMIN — ENOXAPARIN SODIUM 30 MG: 30 INJECTION SUBCUTANEOUS at 05:03

## 2021-03-14 RX ADMIN — DICLOFENAC SODIUM 2 G: 10 GEL TOPICAL at 08:03

## 2021-03-14 RX ADMIN — INSULIN ASPART 7 UNITS: 100 INJECTION, SOLUTION INTRAVENOUS; SUBCUTANEOUS at 12:03

## 2021-03-14 RX ADMIN — DIVALPROEX SODIUM 1000 MG: 250 TABLET, DELAYED RELEASE ORAL at 09:03

## 2021-03-14 RX ADMIN — RISPERIDONE 1 MG: 1 TABLET ORAL at 09:03

## 2021-03-14 RX ADMIN — INSULIN DETEMIR 20 UNITS: 100 INJECTION, SOLUTION SUBCUTANEOUS at 08:03

## 2021-03-14 RX ADMIN — CLOBAZAM 10 MG: 10 TABLET ORAL at 09:03

## 2021-03-14 RX ADMIN — INSULIN ASPART 7 UNITS: 100 INJECTION, SOLUTION INTRAVENOUS; SUBCUTANEOUS at 08:03

## 2021-03-14 RX ADMIN — LEVETIRACETAM 1500 MG: 750 TABLET ORAL at 08:03

## 2021-03-14 RX ADMIN — INSULIN ASPART 2 UNITS: 100 INJECTION, SOLUTION INTRAVENOUS; SUBCUTANEOUS at 12:03

## 2021-03-15 LAB
POCT GLUCOSE: 245 MG/DL (ref 70–110)
POCT GLUCOSE: 253 MG/DL (ref 70–110)
POCT GLUCOSE: 275 MG/DL (ref 70–110)
POCT GLUCOSE: 288 MG/DL (ref 70–110)
POCT GLUCOSE: 430 MG/DL (ref 70–110)
POCT GLUCOSE: 478 MG/DL (ref 70–110)

## 2021-03-15 PROCEDURE — 99233 SBSQ HOSP IP/OBS HIGH 50: CPT | Mod: AF,HB,, | Performed by: PSYCHIATRY & NEUROLOGY

## 2021-03-15 PROCEDURE — 97535 SELF CARE MNGMENT TRAINING: CPT

## 2021-03-15 PROCEDURE — 92507 TX SP LANG VOICE COMM INDIV: CPT

## 2021-03-15 PROCEDURE — 25000003 PHARM REV CODE 250: Performed by: PSYCHIATRY & NEUROLOGY

## 2021-03-15 PROCEDURE — 25000003 PHARM REV CODE 250: Performed by: STUDENT IN AN ORGANIZED HEALTH CARE EDUCATION/TRAINING PROGRAM

## 2021-03-15 PROCEDURE — 11000001 HC ACUTE MED/SURG PRIVATE ROOM

## 2021-03-15 PROCEDURE — 99233 PR SUBSEQUENT HOSPITAL CARE,LEVL III: ICD-10-PCS | Mod: ,,, | Performed by: INTERNAL MEDICINE

## 2021-03-15 PROCEDURE — C9399 UNCLASSIFIED DRUGS OR BIOLOG: HCPCS | Performed by: STUDENT IN AN ORGANIZED HEALTH CARE EDUCATION/TRAINING PROGRAM

## 2021-03-15 PROCEDURE — 97530 THERAPEUTIC ACTIVITIES: CPT

## 2021-03-15 PROCEDURE — 99233 SBSQ HOSP IP/OBS HIGH 50: CPT | Mod: ,,, | Performed by: INTERNAL MEDICINE

## 2021-03-15 PROCEDURE — 63600175 PHARM REV CODE 636 W HCPCS: Performed by: STUDENT IN AN ORGANIZED HEALTH CARE EDUCATION/TRAINING PROGRAM

## 2021-03-15 PROCEDURE — 99233 PR SUBSEQUENT HOSPITAL CARE,LEVL III: ICD-10-PCS | Mod: AF,HB,, | Performed by: PSYCHIATRY & NEUROLOGY

## 2021-03-15 RX ADMIN — DIVALPROEX SODIUM 500 MG: 250 TABLET, DELAYED RELEASE ORAL at 08:03

## 2021-03-15 RX ADMIN — CLOBAZAM 10 MG: 10 TABLET ORAL at 09:03

## 2021-03-15 RX ADMIN — LEVETIRACETAM 1000 MG: 500 TABLET ORAL at 09:03

## 2021-03-15 RX ADMIN — RISPERIDONE 1 MG: 1 TABLET ORAL at 08:03

## 2021-03-15 RX ADMIN — DICLOFENAC SODIUM 2 G: 10 GEL TOPICAL at 09:03

## 2021-03-15 RX ADMIN — HYDROCHLOROTHIAZIDE 12.5 MG: 12.5 TABLET ORAL at 08:03

## 2021-03-15 RX ADMIN — FOLIC ACID 1 MG: 1 TABLET ORAL at 08:03

## 2021-03-15 RX ADMIN — CLOBAZAM 10 MG: 10 TABLET ORAL at 08:03

## 2021-03-15 RX ADMIN — LEVETIRACETAM 1000 MG: 500 TABLET ORAL at 08:03

## 2021-03-15 RX ADMIN — Medication 100 MG: at 08:03

## 2021-03-15 RX ADMIN — INSULIN ASPART 6 UNITS: 100 INJECTION, SOLUTION INTRAVENOUS; SUBCUTANEOUS at 04:03

## 2021-03-15 RX ADMIN — RISPERIDONE 1 MG: 1 TABLET ORAL at 09:03

## 2021-03-15 RX ADMIN — INSULIN ASPART 7 UNITS: 100 INJECTION, SOLUTION INTRAVENOUS; SUBCUTANEOUS at 08:03

## 2021-03-15 RX ADMIN — DIVALPROEX SODIUM 1000 MG: 250 TABLET, DELAYED RELEASE ORAL at 09:03

## 2021-03-15 RX ADMIN — INSULIN ASPART 10 UNITS: 100 INJECTION, SOLUTION INTRAVENOUS; SUBCUTANEOUS at 12:03

## 2021-03-15 RX ADMIN — INSULIN DETEMIR 20 UNITS: 100 INJECTION, SOLUTION SUBCUTANEOUS at 11:03

## 2021-03-16 PROBLEM — D72.829 LEUKOCYTOSIS: Status: RESOLVED | Noted: 2021-03-14 | Resolved: 2021-03-16

## 2021-03-16 LAB
POCT GLUCOSE: 214 MG/DL (ref 70–110)
POCT GLUCOSE: 247 MG/DL (ref 70–110)
POCT GLUCOSE: 302 MG/DL (ref 70–110)
POCT GLUCOSE: 339 MG/DL (ref 70–110)
POCT GLUCOSE: 447 MG/DL (ref 70–110)
POCT GLUCOSE: 50 MG/DL (ref 70–110)
POCT GLUCOSE: 51 MG/DL (ref 70–110)

## 2021-03-16 PROCEDURE — 93010 ELECTROCARDIOGRAM REPORT: CPT | Mod: ,,, | Performed by: INTERNAL MEDICINE

## 2021-03-16 PROCEDURE — 99233 SBSQ HOSP IP/OBS HIGH 50: CPT | Mod: AF,HB,, | Performed by: PSYCHIATRY & NEUROLOGY

## 2021-03-16 PROCEDURE — 63600175 PHARM REV CODE 636 W HCPCS: Performed by: STUDENT IN AN ORGANIZED HEALTH CARE EDUCATION/TRAINING PROGRAM

## 2021-03-16 PROCEDURE — 25000003 PHARM REV CODE 250: Performed by: PSYCHIATRY & NEUROLOGY

## 2021-03-16 PROCEDURE — 99233 PR SUBSEQUENT HOSPITAL CARE,LEVL III: ICD-10-PCS | Mod: AF,HB,, | Performed by: PSYCHIATRY & NEUROLOGY

## 2021-03-16 PROCEDURE — 93010 EKG 12-LEAD: ICD-10-PCS | Mod: ,,, | Performed by: INTERNAL MEDICINE

## 2021-03-16 PROCEDURE — 99232 SBSQ HOSP IP/OBS MODERATE 35: CPT | Mod: ,,, | Performed by: INTERNAL MEDICINE

## 2021-03-16 PROCEDURE — 25000003 PHARM REV CODE 250: Performed by: STUDENT IN AN ORGANIZED HEALTH CARE EDUCATION/TRAINING PROGRAM

## 2021-03-16 PROCEDURE — 99232 PR SUBSEQUENT HOSPITAL CARE,LEVL II: ICD-10-PCS | Mod: ,,, | Performed by: INTERNAL MEDICINE

## 2021-03-16 PROCEDURE — 11000001 HC ACUTE MED/SURG PRIVATE ROOM

## 2021-03-16 PROCEDURE — 93005 ELECTROCARDIOGRAM TRACING: CPT

## 2021-03-16 RX ORDER — DIVALPROEX SODIUM 500 MG/1
TABLET, DELAYED RELEASE ORAL
Qty: 90 TABLET | Refills: 2 | Status: SHIPPED | OUTPATIENT
Start: 2021-03-16 | End: 2021-03-26

## 2021-03-16 RX ORDER — RISPERIDONE 1 MG/1
1 TABLET ORAL 2 TIMES DAILY
Qty: 60 TABLET | Refills: 2 | Status: SHIPPED | OUTPATIENT
Start: 2021-03-16 | End: 2021-04-05 | Stop reason: HOSPADM

## 2021-03-16 RX ADMIN — RISPERIDONE 1 MG: 1 TABLET ORAL at 09:03

## 2021-03-16 RX ADMIN — FOLIC ACID 1 MG: 1 TABLET ORAL at 09:03

## 2021-03-16 RX ADMIN — INSULIN ASPART 7 UNITS: 100 INJECTION, SOLUTION INTRAVENOUS; SUBCUTANEOUS at 05:03

## 2021-03-16 RX ADMIN — HYDROCHLOROTHIAZIDE 12.5 MG: 12.5 TABLET ORAL at 09:03

## 2021-03-16 RX ADMIN — LEVETIRACETAM 1000 MG: 500 TABLET ORAL at 09:03

## 2021-03-16 RX ADMIN — INSULIN DETEMIR 20 UNITS: 100 INJECTION, SOLUTION SUBCUTANEOUS at 09:03

## 2021-03-16 RX ADMIN — INSULIN ASPART 8 UNITS: 100 INJECTION, SOLUTION INTRAVENOUS; SUBCUTANEOUS at 10:03

## 2021-03-16 RX ADMIN — INSULIN DETEMIR 15 UNITS: 100 INJECTION, SOLUTION SUBCUTANEOUS at 09:03

## 2021-03-16 RX ADMIN — CLOBAZAM 10 MG: 10 TABLET ORAL at 09:03

## 2021-03-16 RX ADMIN — ENOXAPARIN SODIUM 30 MG: 30 INJECTION SUBCUTANEOUS at 06:03

## 2021-03-16 RX ADMIN — INSULIN ASPART 4 UNITS: 100 INJECTION, SOLUTION INTRAVENOUS; SUBCUTANEOUS at 12:03

## 2021-03-16 RX ADMIN — INSULIN ASPART 7 UNITS: 100 INJECTION, SOLUTION INTRAVENOUS; SUBCUTANEOUS at 09:03

## 2021-03-16 RX ADMIN — DOCUSATE SODIUM AND SENNOSIDES 1 TABLET: 8.6; 5 TABLET, FILM COATED ORAL at 09:03

## 2021-03-16 RX ADMIN — HALOPERIDOL LACTATE 2 MG: 5 INJECTION, SOLUTION INTRAMUSCULAR at 08:03

## 2021-03-16 RX ADMIN — DEXTROSE MONOHYDRATE 12.5 G: 25 INJECTION, SOLUTION INTRAVENOUS at 04:03

## 2021-03-16 RX ADMIN — DIVALPROEX SODIUM 500 MG: 250 TABLET, DELAYED RELEASE ORAL at 09:03

## 2021-03-16 RX ADMIN — DIVALPROEX SODIUM 1000 MG: 250 TABLET, DELAYED RELEASE ORAL at 09:03

## 2021-03-16 RX ADMIN — INSULIN ASPART 5 UNITS: 100 INJECTION, SOLUTION INTRAVENOUS; SUBCUTANEOUS at 09:03

## 2021-03-16 RX ADMIN — DICLOFENAC SODIUM 2 G: 10 GEL TOPICAL at 09:03

## 2021-03-16 RX ADMIN — Medication 100 MG: at 09:03

## 2021-03-16 RX ADMIN — INSULIN ASPART 7 UNITS: 100 INJECTION, SOLUTION INTRAVENOUS; SUBCUTANEOUS at 12:03

## 2021-03-17 ENCOUNTER — TELEPHONE (OUTPATIENT)
Dept: PHARMACY | Facility: CLINIC | Age: 41
End: 2021-03-17

## 2021-03-17 LAB
ALBUMIN SERPL BCP-MCNC: 2.8 G/DL (ref 3.5–5.2)
ALP SERPL-CCNC: 100 U/L (ref 55–135)
ALT SERPL W/O P-5'-P-CCNC: 15 U/L (ref 10–44)
ANION GAP SERPL CALC-SCNC: 10 MMOL/L (ref 8–16)
ANISOCYTOSIS BLD QL SMEAR: SLIGHT
AST SERPL-CCNC: 15 U/L (ref 10–40)
BASOPHILS # BLD AUTO: 0.05 K/UL (ref 0–0.2)
BASOPHILS NFR BLD: 0.5 % (ref 0–1.9)
BILIRUB SERPL-MCNC: 0.2 MG/DL (ref 0.1–1)
BUN SERPL-MCNC: 18 MG/DL (ref 6–20)
CALCIUM SERPL-MCNC: 9.1 MG/DL (ref 8.7–10.5)
CHLORIDE SERPL-SCNC: 99 MMOL/L (ref 95–110)
CO2 SERPL-SCNC: 28 MMOL/L (ref 23–29)
CREAT SERPL-MCNC: 0.7 MG/DL (ref 0.5–1.4)
DIFFERENTIAL METHOD: ABNORMAL
EOSINOPHIL # BLD AUTO: 0.1 K/UL (ref 0–0.5)
EOSINOPHIL NFR BLD: 1.5 % (ref 0–8)
ERYTHROCYTE [DISTWIDTH] IN BLOOD BY AUTOMATED COUNT: 14.7 % (ref 11.5–14.5)
EST. GFR  (AFRICAN AMERICAN): >60 ML/MIN/1.73 M^2
EST. GFR  (NON AFRICAN AMERICAN): >60 ML/MIN/1.73 M^2
GLUCOSE SERPL-MCNC: 350 MG/DL (ref 70–110)
HCT VFR BLD AUTO: 27.5 % (ref 40–54)
HGB BLD-MCNC: 9.2 G/DL (ref 14–18)
HYPOCHROMIA BLD QL SMEAR: ABNORMAL
IMM GRANULOCYTES # BLD AUTO: 0.18 K/UL (ref 0–0.04)
IMM GRANULOCYTES NFR BLD AUTO: 1.9 % (ref 0–0.5)
LYMPHOCYTES # BLD AUTO: 2.1 K/UL (ref 1–4.8)
LYMPHOCYTES NFR BLD: 23.2 % (ref 18–48)
MCH RBC QN AUTO: 31.6 PG (ref 27–31)
MCHC RBC AUTO-ENTMCNC: 33.5 G/DL (ref 32–36)
MCV RBC AUTO: 95 FL (ref 82–98)
MONOCYTES # BLD AUTO: 1.4 K/UL (ref 0.3–1)
MONOCYTES NFR BLD: 15.4 % (ref 4–15)
NEUTROPHILS # BLD AUTO: 5.3 K/UL (ref 1.8–7.7)
NEUTROPHILS NFR BLD: 57.5 % (ref 38–73)
NRBC BLD-RTO: 0 /100 WBC
OVALOCYTES BLD QL SMEAR: ABNORMAL
PLATELET # BLD AUTO: 378 K/UL (ref 150–350)
PLATELET BLD QL SMEAR: ABNORMAL
PMV BLD AUTO: 9.8 FL (ref 9.2–12.9)
POCT GLUCOSE: 211 MG/DL (ref 70–110)
POCT GLUCOSE: 347 MG/DL (ref 70–110)
POCT GLUCOSE: 464 MG/DL (ref 70–110)
POCT GLUCOSE: >500 MG/DL (ref 70–110)
POCT GLUCOSE: >500 MG/DL (ref 70–110)
POIKILOCYTOSIS BLD QL SMEAR: SLIGHT
POLYCHROMASIA BLD QL SMEAR: ABNORMAL
POTASSIUM SERPL-SCNC: 4.2 MMOL/L (ref 3.5–5.1)
PROT SERPL-MCNC: 6 G/DL (ref 6–8.4)
RBC # BLD AUTO: 2.91 M/UL (ref 4.6–6.2)
SODIUM SERPL-SCNC: 137 MMOL/L (ref 136–145)
WBC # BLD AUTO: 9.24 K/UL (ref 3.9–12.7)

## 2021-03-17 PROCEDURE — 99232 PR SUBSEQUENT HOSPITAL CARE,LEVL II: ICD-10-PCS | Mod: ,,, | Performed by: INTERNAL MEDICINE

## 2021-03-17 PROCEDURE — 99232 SBSQ HOSP IP/OBS MODERATE 35: CPT | Mod: ,,, | Performed by: INTERNAL MEDICINE

## 2021-03-17 PROCEDURE — 63600175 PHARM REV CODE 636 W HCPCS: Performed by: STUDENT IN AN ORGANIZED HEALTH CARE EDUCATION/TRAINING PROGRAM

## 2021-03-17 PROCEDURE — 93010 EKG 12-LEAD: ICD-10-PCS | Mod: ,,, | Performed by: INTERNAL MEDICINE

## 2021-03-17 PROCEDURE — 25000003 PHARM REV CODE 250: Performed by: STUDENT IN AN ORGANIZED HEALTH CARE EDUCATION/TRAINING PROGRAM

## 2021-03-17 PROCEDURE — 85025 COMPLETE CBC W/AUTO DIFF WBC: CPT | Performed by: STUDENT IN AN ORGANIZED HEALTH CARE EDUCATION/TRAINING PROGRAM

## 2021-03-17 PROCEDURE — 36415 COLL VENOUS BLD VENIPUNCTURE: CPT | Performed by: STUDENT IN AN ORGANIZED HEALTH CARE EDUCATION/TRAINING PROGRAM

## 2021-03-17 PROCEDURE — 93010 ELECTROCARDIOGRAM REPORT: CPT | Mod: ,,, | Performed by: INTERNAL MEDICINE

## 2021-03-17 PROCEDURE — 80053 COMPREHEN METABOLIC PANEL: CPT | Performed by: STUDENT IN AN ORGANIZED HEALTH CARE EDUCATION/TRAINING PROGRAM

## 2021-03-17 PROCEDURE — 25000003 PHARM REV CODE 250: Performed by: PSYCHIATRY & NEUROLOGY

## 2021-03-17 PROCEDURE — 99233 SBSQ HOSP IP/OBS HIGH 50: CPT | Mod: AF,HB,, | Performed by: PSYCHIATRY & NEUROLOGY

## 2021-03-17 PROCEDURE — 93005 ELECTROCARDIOGRAM TRACING: CPT

## 2021-03-17 PROCEDURE — 11000001 HC ACUTE MED/SURG PRIVATE ROOM

## 2021-03-17 PROCEDURE — 99233 PR SUBSEQUENT HOSPITAL CARE,LEVL III: ICD-10-PCS | Mod: AF,HB,, | Performed by: PSYCHIATRY & NEUROLOGY

## 2021-03-17 RX ADMIN — Medication 100 MG: at 09:03

## 2021-03-17 RX ADMIN — INSULIN ASPART 8 UNITS: 100 INJECTION, SOLUTION INTRAVENOUS; SUBCUTANEOUS at 08:03

## 2021-03-17 RX ADMIN — LEVETIRACETAM 500 MG: 500 TABLET ORAL at 09:03

## 2021-03-17 RX ADMIN — INSULIN ASPART 7 UNITS: 100 INJECTION, SOLUTION INTRAVENOUS; SUBCUTANEOUS at 08:03

## 2021-03-17 RX ADMIN — CLOBAZAM 10 MG: 10 TABLET ORAL at 08:03

## 2021-03-17 RX ADMIN — FOLIC ACID 1 MG: 1 TABLET ORAL at 09:03

## 2021-03-17 RX ADMIN — INSULIN ASPART 10 UNITS: 100 INJECTION, SOLUTION INTRAVENOUS; SUBCUTANEOUS at 04:03

## 2021-03-17 RX ADMIN — INSULIN ASPART 10 UNITS: 100 INJECTION, SOLUTION INTRAVENOUS; SUBCUTANEOUS at 12:03

## 2021-03-17 RX ADMIN — RISPERIDONE 1 MG: 1 TABLET ORAL at 08:03

## 2021-03-17 RX ADMIN — DICLOFENAC SODIUM 2 G: 10 GEL TOPICAL at 09:03

## 2021-03-17 RX ADMIN — DOCUSATE SODIUM AND SENNOSIDES 1 TABLET: 8.6; 5 TABLET, FILM COATED ORAL at 09:03

## 2021-03-17 RX ADMIN — INSULIN DETEMIR 20 UNITS: 100 INJECTION, SOLUTION SUBCUTANEOUS at 08:03

## 2021-03-17 RX ADMIN — ENOXAPARIN SODIUM 30 MG: 30 INJECTION SUBCUTANEOUS at 04:03

## 2021-03-17 RX ADMIN — INSULIN ASPART 2 UNITS: 100 INJECTION, SOLUTION INTRAVENOUS; SUBCUTANEOUS at 08:03

## 2021-03-17 RX ADMIN — HYDROCHLOROTHIAZIDE 12.5 MG: 12.5 TABLET ORAL at 09:03

## 2021-03-17 RX ADMIN — INSULIN ASPART 7 UNITS: 100 INJECTION, SOLUTION INTRAVENOUS; SUBCUTANEOUS at 04:03

## 2021-03-17 RX ADMIN — INSULIN ASPART 7 UNITS: 100 INJECTION, SOLUTION INTRAVENOUS; SUBCUTANEOUS at 12:03

## 2021-03-17 RX ADMIN — RISPERIDONE 1 MG: 1 TABLET ORAL at 09:03

## 2021-03-17 RX ADMIN — DIVALPROEX SODIUM 1000 MG: 250 TABLET, DELAYED RELEASE ORAL at 08:03

## 2021-03-17 RX ADMIN — DIVALPROEX SODIUM 500 MG: 250 TABLET, DELAYED RELEASE ORAL at 09:03

## 2021-03-17 RX ADMIN — CLOBAZAM 10 MG: 10 TABLET ORAL at 09:03

## 2021-03-17 RX ADMIN — LEVETIRACETAM 500 MG: 500 TABLET ORAL at 08:03

## 2021-03-18 LAB
BACTERIA BLD CULT: NORMAL
BACTERIA BLD CULT: NORMAL
POCT GLUCOSE: 165 MG/DL (ref 70–110)
POCT GLUCOSE: 238 MG/DL (ref 70–110)
POCT GLUCOSE: 257 MG/DL (ref 70–110)
POCT GLUCOSE: 289 MG/DL (ref 70–110)

## 2021-03-18 PROCEDURE — 11000001 HC ACUTE MED/SURG PRIVATE ROOM

## 2021-03-18 PROCEDURE — 93010 EKG 12-LEAD: ICD-10-PCS | Mod: ,,, | Performed by: INTERNAL MEDICINE

## 2021-03-18 PROCEDURE — 63600175 PHARM REV CODE 636 W HCPCS: Performed by: STUDENT IN AN ORGANIZED HEALTH CARE EDUCATION/TRAINING PROGRAM

## 2021-03-18 PROCEDURE — 99231 PR SUBSEQUENT HOSPITAL CARE,LEVL I: ICD-10-PCS | Mod: ,,, | Performed by: HOSPITALIST

## 2021-03-18 PROCEDURE — 25000003 PHARM REV CODE 250: Performed by: STUDENT IN AN ORGANIZED HEALTH CARE EDUCATION/TRAINING PROGRAM

## 2021-03-18 PROCEDURE — C9399 UNCLASSIFIED DRUGS OR BIOLOG: HCPCS | Performed by: STUDENT IN AN ORGANIZED HEALTH CARE EDUCATION/TRAINING PROGRAM

## 2021-03-18 PROCEDURE — 25000003 PHARM REV CODE 250: Performed by: PSYCHIATRY & NEUROLOGY

## 2021-03-18 PROCEDURE — S4991 NICOTINE PATCH NONLEGEND: HCPCS | Performed by: STUDENT IN AN ORGANIZED HEALTH CARE EDUCATION/TRAINING PROGRAM

## 2021-03-18 PROCEDURE — 93010 ELECTROCARDIOGRAM REPORT: CPT | Mod: ,,, | Performed by: INTERNAL MEDICINE

## 2021-03-18 PROCEDURE — 93005 ELECTROCARDIOGRAM TRACING: CPT

## 2021-03-18 PROCEDURE — 99231 SBSQ HOSP IP/OBS SF/LOW 25: CPT | Mod: ,,, | Performed by: HOSPITALIST

## 2021-03-18 RX ADMIN — CLOBAZAM 10 MG: 10 TABLET ORAL at 08:03

## 2021-03-18 RX ADMIN — LEVETIRACETAM 500 MG: 500 TABLET ORAL at 09:03

## 2021-03-18 RX ADMIN — LEVETIRACETAM 500 MG: 500 TABLET ORAL at 08:03

## 2021-03-18 RX ADMIN — INSULIN ASPART 6 UNITS: 100 INJECTION, SOLUTION INTRAVENOUS; SUBCUTANEOUS at 01:03

## 2021-03-18 RX ADMIN — DIVALPROEX SODIUM 500 MG: 250 TABLET, DELAYED RELEASE ORAL at 09:03

## 2021-03-18 RX ADMIN — CLOBAZAM 10 MG: 10 TABLET ORAL at 09:03

## 2021-03-18 RX ADMIN — DICLOFENAC SODIUM 2 G: 10 GEL TOPICAL at 09:03

## 2021-03-18 RX ADMIN — INSULIN ASPART 7 UNITS: 100 INJECTION, SOLUTION INTRAVENOUS; SUBCUTANEOUS at 01:03

## 2021-03-18 RX ADMIN — INSULIN ASPART 3 UNITS: 100 INJECTION, SOLUTION INTRAVENOUS; SUBCUTANEOUS at 08:03

## 2021-03-18 RX ADMIN — RISPERIDONE 1 MG: 1 TABLET ORAL at 09:03

## 2021-03-18 RX ADMIN — DOCUSATE SODIUM AND SENNOSIDES 1 TABLET: 8.6; 5 TABLET, FILM COATED ORAL at 09:03

## 2021-03-18 RX ADMIN — Medication 100 MG: at 09:03

## 2021-03-18 RX ADMIN — RISPERIDONE 1 MG: 1 TABLET ORAL at 08:03

## 2021-03-18 RX ADMIN — NICOTINE 1 PATCH: 21 PATCH, EXTENDED RELEASE TRANSDERMAL at 09:03

## 2021-03-18 RX ADMIN — ENOXAPARIN SODIUM 30 MG: 30 INJECTION SUBCUTANEOUS at 06:03

## 2021-03-18 RX ADMIN — INSULIN ASPART 7 UNITS: 100 INJECTION, SOLUTION INTRAVENOUS; SUBCUTANEOUS at 09:03

## 2021-03-18 RX ADMIN — DIVALPROEX SODIUM 1000 MG: 250 TABLET, DELAYED RELEASE ORAL at 08:03

## 2021-03-18 RX ADMIN — HALOPERIDOL LACTATE 2 MG: 5 INJECTION, SOLUTION INTRAMUSCULAR at 11:03

## 2021-03-18 RX ADMIN — INSULIN ASPART 7 UNITS: 100 INJECTION, SOLUTION INTRAVENOUS; SUBCUTANEOUS at 06:03

## 2021-03-18 RX ADMIN — INSULIN DETEMIR 20 UNITS: 100 INJECTION, SOLUTION SUBCUTANEOUS at 09:03

## 2021-03-18 RX ADMIN — FOLIC ACID 1 MG: 1 TABLET ORAL at 09:03

## 2021-03-18 RX ADMIN — HYDROCHLOROTHIAZIDE 12.5 MG: 12.5 TABLET ORAL at 09:03

## 2021-03-19 ENCOUNTER — TELEPHONE (OUTPATIENT)
Dept: NEUROLOGY | Facility: CLINIC | Age: 41
End: 2021-03-19

## 2021-03-19 PROBLEM — R41.9 NEUROCOGNITIVE DISORDER: Status: ACTIVE | Noted: 2021-03-19

## 2021-03-19 LAB
POCT GLUCOSE: 176 MG/DL (ref 70–110)
POCT GLUCOSE: 311 MG/DL (ref 70–110)
POCT GLUCOSE: 343 MG/DL (ref 70–110)

## 2021-03-19 PROCEDURE — 25000003 PHARM REV CODE 250: Performed by: STUDENT IN AN ORGANIZED HEALTH CARE EDUCATION/TRAINING PROGRAM

## 2021-03-19 PROCEDURE — S4991 NICOTINE PATCH NONLEGEND: HCPCS | Performed by: STUDENT IN AN ORGANIZED HEALTH CARE EDUCATION/TRAINING PROGRAM

## 2021-03-19 PROCEDURE — 99231 PR SUBSEQUENT HOSPITAL CARE,LEVL I: ICD-10-PCS | Mod: ,,, | Performed by: HOSPITALIST

## 2021-03-19 PROCEDURE — 63600175 PHARM REV CODE 636 W HCPCS: Performed by: STUDENT IN AN ORGANIZED HEALTH CARE EDUCATION/TRAINING PROGRAM

## 2021-03-19 PROCEDURE — C9399 UNCLASSIFIED DRUGS OR BIOLOG: HCPCS | Performed by: STUDENT IN AN ORGANIZED HEALTH CARE EDUCATION/TRAINING PROGRAM

## 2021-03-19 PROCEDURE — 99231 SBSQ HOSP IP/OBS SF/LOW 25: CPT | Mod: ,,, | Performed by: HOSPITALIST

## 2021-03-19 PROCEDURE — 11000001 HC ACUTE MED/SURG PRIVATE ROOM

## 2021-03-19 PROCEDURE — 25000003 PHARM REV CODE 250: Performed by: PSYCHIATRY & NEUROLOGY

## 2021-03-19 RX ADMIN — FOLIC ACID 1 MG: 1 TABLET ORAL at 09:03

## 2021-03-19 RX ADMIN — Medication 100 MG: at 09:03

## 2021-03-19 RX ADMIN — CLOBAZAM 10 MG: 10 TABLET ORAL at 09:03

## 2021-03-19 RX ADMIN — INSULIN ASPART 7 UNITS: 100 INJECTION, SOLUTION INTRAVENOUS; SUBCUTANEOUS at 05:03

## 2021-03-19 RX ADMIN — DIVALPROEX SODIUM 1000 MG: 250 TABLET, DELAYED RELEASE ORAL at 09:03

## 2021-03-19 RX ADMIN — INSULIN DETEMIR 20 UNITS: 100 INJECTION, SOLUTION SUBCUTANEOUS at 09:03

## 2021-03-19 RX ADMIN — ENOXAPARIN SODIUM 30 MG: 30 INJECTION SUBCUTANEOUS at 05:03

## 2021-03-19 RX ADMIN — RISPERIDONE 1 MG: 1 TABLET ORAL at 09:03

## 2021-03-19 RX ADMIN — HYDROCHLOROTHIAZIDE 12.5 MG: 12.5 TABLET ORAL at 09:03

## 2021-03-19 RX ADMIN — LEVETIRACETAM 500 MG: 500 TABLET ORAL at 09:03

## 2021-03-19 RX ADMIN — DICLOFENAC SODIUM 2 G: 10 GEL TOPICAL at 09:03

## 2021-03-19 RX ADMIN — NICOTINE 1 PATCH: 21 PATCH, EXTENDED RELEASE TRANSDERMAL at 09:03

## 2021-03-19 RX ADMIN — DIVALPROEX SODIUM 500 MG: 250 TABLET, DELAYED RELEASE ORAL at 09:03

## 2021-03-19 RX ADMIN — DOCUSATE SODIUM AND SENNOSIDES 1 TABLET: 8.6; 5 TABLET, FILM COATED ORAL at 09:03

## 2021-03-19 RX ADMIN — ACETAMINOPHEN 650 MG: 325 TABLET ORAL at 09:03

## 2021-03-19 RX ADMIN — INSULIN ASPART 7 UNITS: 100 INJECTION, SOLUTION INTRAVENOUS; SUBCUTANEOUS at 09:03

## 2021-03-20 PROBLEM — T68.XXXA HYPOTHERMIA: Status: ACTIVE | Noted: 2021-03-20

## 2021-03-20 LAB
ANION GAP SERPL CALC-SCNC: 11 MMOL/L (ref 8–16)
B-OH-BUTYR BLD STRIP-SCNC: 0.2 MMOL/L (ref 0–0.5)
BUN SERPL-MCNC: 25 MG/DL (ref 6–20)
CALCIUM SERPL-MCNC: 9.4 MG/DL (ref 8.7–10.5)
CHLORIDE SERPL-SCNC: 96 MMOL/L (ref 95–110)
CO2 SERPL-SCNC: 26 MMOL/L (ref 23–29)
CREAT SERPL-MCNC: 1 MG/DL (ref 0.5–1.4)
EST. GFR  (AFRICAN AMERICAN): >60 ML/MIN/1.73 M^2
EST. GFR  (NON AFRICAN AMERICAN): >60 ML/MIN/1.73 M^2
GLUCOSE SERPL-MCNC: 573 MG/DL (ref 70–110)
POCT GLUCOSE: 421 MG/DL (ref 70–110)
POCT GLUCOSE: >500 MG/DL (ref 70–110)
POCT GLUCOSE: >500 MG/DL (ref 70–110)
POTASSIUM SERPL-SCNC: 4.3 MMOL/L (ref 3.5–5.1)
SODIUM SERPL-SCNC: 133 MMOL/L (ref 136–145)

## 2021-03-20 PROCEDURE — 25000003 PHARM REV CODE 250: Performed by: STUDENT IN AN ORGANIZED HEALTH CARE EDUCATION/TRAINING PROGRAM

## 2021-03-20 PROCEDURE — 82010 KETONE BODYS QUAN: CPT | Performed by: STUDENT IN AN ORGANIZED HEALTH CARE EDUCATION/TRAINING PROGRAM

## 2021-03-20 PROCEDURE — 63600175 PHARM REV CODE 636 W HCPCS: Performed by: STUDENT IN AN ORGANIZED HEALTH CARE EDUCATION/TRAINING PROGRAM

## 2021-03-20 PROCEDURE — 80048 BASIC METABOLIC PNL TOTAL CA: CPT | Performed by: STUDENT IN AN ORGANIZED HEALTH CARE EDUCATION/TRAINING PROGRAM

## 2021-03-20 PROCEDURE — S4991 NICOTINE PATCH NONLEGEND: HCPCS | Performed by: STUDENT IN AN ORGANIZED HEALTH CARE EDUCATION/TRAINING PROGRAM

## 2021-03-20 PROCEDURE — 20600001 HC STEP DOWN PRIVATE ROOM

## 2021-03-20 PROCEDURE — 36415 COLL VENOUS BLD VENIPUNCTURE: CPT | Performed by: STUDENT IN AN ORGANIZED HEALTH CARE EDUCATION/TRAINING PROGRAM

## 2021-03-20 PROCEDURE — 99232 PR SUBSEQUENT HOSPITAL CARE,LEVL II: ICD-10-PCS | Mod: ,,, | Performed by: HOSPITALIST

## 2021-03-20 PROCEDURE — 99232 SBSQ HOSP IP/OBS MODERATE 35: CPT | Mod: ,,, | Performed by: HOSPITALIST

## 2021-03-20 PROCEDURE — 25000003 PHARM REV CODE 250: Performed by: PSYCHIATRY & NEUROLOGY

## 2021-03-20 PROCEDURE — C9399 UNCLASSIFIED DRUGS OR BIOLOG: HCPCS | Performed by: STUDENT IN AN ORGANIZED HEALTH CARE EDUCATION/TRAINING PROGRAM

## 2021-03-20 RX ORDER — DEXTROSE MONOHYDRATE 100 MG/ML
INJECTION, SOLUTION INTRAVENOUS
Status: DISCONTINUED | OUTPATIENT
Start: 2021-03-20 | End: 2021-03-23

## 2021-03-20 RX ORDER — SODIUM CHLORIDE 0.9 % (FLUSH) 0.9 %
10 SYRINGE (ML) INJECTION
Status: DISCONTINUED | OUTPATIENT
Start: 2021-03-20 | End: 2021-03-25

## 2021-03-20 RX ADMIN — INSULIN ASPART 7 UNITS: 100 INJECTION, SOLUTION INTRAVENOUS; SUBCUTANEOUS at 11:03

## 2021-03-20 RX ADMIN — SODIUM CHLORIDE 1 UNITS/HR: 9 INJECTION, SOLUTION INTRAVENOUS at 11:03

## 2021-03-20 RX ADMIN — ACETAMINOPHEN 650 MG: 325 TABLET ORAL at 04:03

## 2021-03-20 RX ADMIN — INSULIN ASPART 5 UNITS: 100 INJECTION, SOLUTION INTRAVENOUS; SUBCUTANEOUS at 10:03

## 2021-03-20 RX ADMIN — DICLOFENAC SODIUM 2 G: 10 GEL TOPICAL at 09:03

## 2021-03-20 RX ADMIN — DIVALPROEX SODIUM 500 MG: 250 TABLET, DELAYED RELEASE ORAL at 09:03

## 2021-03-20 RX ADMIN — RISPERIDONE 1 MG: 1 TABLET ORAL at 09:03

## 2021-03-20 RX ADMIN — ENOXAPARIN SODIUM 30 MG: 30 INJECTION SUBCUTANEOUS at 05:03

## 2021-03-20 RX ADMIN — HYDROCHLOROTHIAZIDE 12.5 MG: 12.5 TABLET ORAL at 09:03

## 2021-03-20 RX ADMIN — DOCUSATE SODIUM AND SENNOSIDES 1 TABLET: 8.6; 5 TABLET, FILM COATED ORAL at 09:03

## 2021-03-20 RX ADMIN — Medication 100 MG: at 09:03

## 2021-03-20 RX ADMIN — INSULIN ASPART 7 UNITS: 100 INJECTION, SOLUTION INTRAVENOUS; SUBCUTANEOUS at 04:03

## 2021-03-20 RX ADMIN — LEVETIRACETAM 500 MG: 500 TABLET ORAL at 09:03

## 2021-03-20 RX ADMIN — INSULIN DETEMIR 20 UNITS: 100 INJECTION, SOLUTION SUBCUTANEOUS at 08:03

## 2021-03-20 RX ADMIN — FOLIC ACID 1 MG: 1 TABLET ORAL at 09:03

## 2021-03-20 RX ADMIN — DIVALPROEX SODIUM 1000 MG: 250 TABLET, DELAYED RELEASE ORAL at 09:03

## 2021-03-20 RX ADMIN — CLOBAZAM 10 MG: 10 TABLET ORAL at 09:03

## 2021-03-20 RX ADMIN — NICOTINE 1 PATCH: 21 PATCH, EXTENDED RELEASE TRANSDERMAL at 09:03

## 2021-03-20 RX ADMIN — INSULIN ASPART 7 UNITS: 100 INJECTION, SOLUTION INTRAVENOUS; SUBCUTANEOUS at 07:03

## 2021-03-20 RX ADMIN — INSULIN ASPART 10 UNITS: 100 INJECTION, SOLUTION INTRAVENOUS; SUBCUTANEOUS at 09:03

## 2021-03-21 PROBLEM — G40.201 COMPLEX PARTIAL STATUS EPILEPTICUS: Status: ACTIVE | Noted: 2021-03-21

## 2021-03-21 PROBLEM — E16.2 HYPOGLYCEMIA: Status: ACTIVE | Noted: 2021-03-21

## 2021-03-21 LAB
ANION GAP SERPL CALC-SCNC: 10 MMOL/L (ref 8–16)
ANION GAP SERPL CALC-SCNC: 12 MMOL/L (ref 8–16)
ANION GAP SERPL CALC-SCNC: 8 MMOL/L (ref 8–16)
BILIRUB UR QL STRIP: NEGATIVE
BUN SERPL-MCNC: 17 MG/DL (ref 6–20)
BUN SERPL-MCNC: 19 MG/DL (ref 6–20)
BUN SERPL-MCNC: 22 MG/DL (ref 6–20)
BUN SERPL-MCNC: 22 MG/DL (ref 6–20)
BUN SERPL-MCNC: 26 MG/DL (ref 6–20)
C PEPTIDE SERPL-MCNC: 0.58 NG/ML (ref 0.78–5.19)
CALCIUM SERPL-MCNC: 10 MG/DL (ref 8.7–10.5)
CALCIUM SERPL-MCNC: 10.1 MG/DL (ref 8.7–10.5)
CALCIUM SERPL-MCNC: 9.6 MG/DL (ref 8.7–10.5)
CALCIUM SERPL-MCNC: 9.6 MG/DL (ref 8.7–10.5)
CALCIUM SERPL-MCNC: 9.7 MG/DL (ref 8.7–10.5)
CHLORIDE SERPL-SCNC: 96 MMOL/L (ref 95–110)
CHLORIDE SERPL-SCNC: 98 MMOL/L (ref 95–110)
CHLORIDE SERPL-SCNC: 99 MMOL/L (ref 95–110)
CLARITY UR REFRACT.AUTO: ABNORMAL
CO2 SERPL-SCNC: 29 MMOL/L (ref 23–29)
CO2 SERPL-SCNC: 30 MMOL/L (ref 23–29)
CO2 SERPL-SCNC: 31 MMOL/L (ref 23–29)
COLOR UR AUTO: YELLOW
CREAT SERPL-MCNC: 0.6 MG/DL (ref 0.5–1.4)
CREAT SERPL-MCNC: 0.6 MG/DL (ref 0.5–1.4)
CREAT SERPL-MCNC: 0.7 MG/DL (ref 0.5–1.4)
EST. GFR  (AFRICAN AMERICAN): >60 ML/MIN/1.73 M^2
EST. GFR  (NON AFRICAN AMERICAN): >60 ML/MIN/1.73 M^2
GLUCOSE SERPL-MCNC: 142 MG/DL (ref 70–110)
GLUCOSE SERPL-MCNC: 166 MG/DL (ref 70–110)
GLUCOSE SERPL-MCNC: 218 MG/DL (ref 70–110)
GLUCOSE SERPL-MCNC: 70 MG/DL (ref 70–110)
GLUCOSE SERPL-MCNC: 84 MG/DL (ref 70–110)
GLUCOSE UR QL STRIP: NEGATIVE
HGB UR QL STRIP: NEGATIVE
KETONES UR QL STRIP: NEGATIVE
LEUKOCYTE ESTERASE UR QL STRIP: NEGATIVE
NITRITE UR QL STRIP: NEGATIVE
PH UR STRIP: 8 [PH] (ref 5–8)
POCT GLUCOSE: 140 MG/DL (ref 70–110)
POCT GLUCOSE: 149 MG/DL (ref 70–110)
POCT GLUCOSE: 150 MG/DL (ref 70–110)
POCT GLUCOSE: 162 MG/DL (ref 70–110)
POCT GLUCOSE: 163 MG/DL (ref 70–110)
POCT GLUCOSE: 177 MG/DL (ref 70–110)
POCT GLUCOSE: 191 MG/DL (ref 70–110)
POCT GLUCOSE: 212 MG/DL (ref 70–110)
POCT GLUCOSE: 220 MG/DL (ref 70–110)
POCT GLUCOSE: 241 MG/DL (ref 70–110)
POCT GLUCOSE: 282 MG/DL (ref 70–110)
POCT GLUCOSE: 305 MG/DL (ref 70–110)
POCT GLUCOSE: 488 MG/DL (ref 70–110)
POCT GLUCOSE: 56 MG/DL (ref 70–110)
POCT GLUCOSE: 98 MG/DL (ref 70–110)
POTASSIUM SERPL-SCNC: 3.5 MMOL/L (ref 3.5–5.1)
POTASSIUM SERPL-SCNC: 3.7 MMOL/L (ref 3.5–5.1)
POTASSIUM SERPL-SCNC: 3.7 MMOL/L (ref 3.5–5.1)
POTASSIUM SERPL-SCNC: 3.8 MMOL/L (ref 3.5–5.1)
POTASSIUM SERPL-SCNC: 3.9 MMOL/L (ref 3.5–5.1)
PROT UR QL STRIP: NEGATIVE
SODIUM SERPL-SCNC: 135 MMOL/L (ref 136–145)
SODIUM SERPL-SCNC: 137 MMOL/L (ref 136–145)
SODIUM SERPL-SCNC: 139 MMOL/L (ref 136–145)
SODIUM SERPL-SCNC: 140 MMOL/L (ref 136–145)
SODIUM SERPL-SCNC: 140 MMOL/L (ref 136–145)
SP GR UR STRIP: 1.01 (ref 1–1.03)
URN SPEC COLLECT METH UR: ABNORMAL
VALPROATE SERPL-MCNC: 39.8 UG/ML (ref 50–100)

## 2021-03-21 PROCEDURE — 25000003 PHARM REV CODE 250: Performed by: STUDENT IN AN ORGANIZED HEALTH CARE EDUCATION/TRAINING PROGRAM

## 2021-03-21 PROCEDURE — 83525 ASSAY OF INSULIN: CPT | Performed by: STUDENT IN AN ORGANIZED HEALTH CARE EDUCATION/TRAINING PROGRAM

## 2021-03-21 PROCEDURE — C9399 UNCLASSIFIED DRUGS OR BIOLOG: HCPCS | Performed by: STUDENT IN AN ORGANIZED HEALTH CARE EDUCATION/TRAINING PROGRAM

## 2021-03-21 PROCEDURE — 63600175 PHARM REV CODE 636 W HCPCS: Performed by: NURSE PRACTITIONER

## 2021-03-21 PROCEDURE — 63600175 PHARM REV CODE 636 W HCPCS: Performed by: STUDENT IN AN ORGANIZED HEALTH CARE EDUCATION/TRAINING PROGRAM

## 2021-03-21 PROCEDURE — 36415 COLL VENOUS BLD VENIPUNCTURE: CPT | Performed by: NURSE PRACTITIONER

## 2021-03-21 PROCEDURE — 63600175 PHARM REV CODE 636 W HCPCS

## 2021-03-21 PROCEDURE — 84681 ASSAY OF C-PEPTIDE: CPT | Performed by: STUDENT IN AN ORGANIZED HEALTH CARE EDUCATION/TRAINING PROGRAM

## 2021-03-21 PROCEDURE — 99233 SBSQ HOSP IP/OBS HIGH 50: CPT | Mod: ,,, | Performed by: HOSPITALIST

## 2021-03-21 PROCEDURE — 80177 DRUG SCRN QUAN LEVETIRACETAM: CPT | Performed by: STUDENT IN AN ORGANIZED HEALTH CARE EDUCATION/TRAINING PROGRAM

## 2021-03-21 PROCEDURE — 95720 EEG PHY/QHP EA INCR W/VEEG: CPT | Mod: ,,, | Performed by: PSYCHIATRY & NEUROLOGY

## 2021-03-21 PROCEDURE — 80048 BASIC METABOLIC PNL TOTAL CA: CPT | Mod: 91 | Performed by: STUDENT IN AN ORGANIZED HEALTH CARE EDUCATION/TRAINING PROGRAM

## 2021-03-21 PROCEDURE — 25000003 PHARM REV CODE 250: Performed by: NURSE PRACTITIONER

## 2021-03-21 PROCEDURE — 99900035 HC TECH TIME PER 15 MIN (STAT)

## 2021-03-21 PROCEDURE — 36415 COLL VENOUS BLD VENIPUNCTURE: CPT | Performed by: HOSPITALIST

## 2021-03-21 PROCEDURE — 25000003 PHARM REV CODE 250: Performed by: PSYCHIATRY & NEUROLOGY

## 2021-03-21 PROCEDURE — 99291 PR CRITICAL CARE, E/M 30-74 MINUTES: ICD-10-PCS | Mod: ,,, | Performed by: NURSE PRACTITIONER

## 2021-03-21 PROCEDURE — 99233 PR SUBSEQUENT HOSPITAL CARE,LEVL III: ICD-10-PCS | Mod: ,,, | Performed by: HOSPITALIST

## 2021-03-21 PROCEDURE — 86341 ISLET CELL ANTIBODY: CPT | Performed by: STUDENT IN AN ORGANIZED HEALTH CARE EDUCATION/TRAINING PROGRAM

## 2021-03-21 PROCEDURE — 81003 URINALYSIS AUTO W/O SCOPE: CPT | Performed by: NURSE PRACTITIONER

## 2021-03-21 PROCEDURE — 20600001 HC STEP DOWN PRIVATE ROOM

## 2021-03-21 PROCEDURE — 80164 ASSAY DIPROPYLACETIC ACD TOT: CPT | Performed by: NURSE PRACTITIONER

## 2021-03-21 PROCEDURE — 85025 COMPLETE CBC W/AUTO DIFF WBC: CPT | Performed by: NURSE PRACTITIONER

## 2021-03-21 PROCEDURE — 94761 N-INVAS EAR/PLS OXIMETRY MLT: CPT

## 2021-03-21 PROCEDURE — 99291 CRITICAL CARE FIRST HOUR: CPT | Mod: ,,, | Performed by: NURSE PRACTITIONER

## 2021-03-21 PROCEDURE — 95720 PR EEG, W/VIDEO, CONT RECORD, I&R, >12<26 HRS: ICD-10-PCS | Mod: ,,, | Performed by: PSYCHIATRY & NEUROLOGY

## 2021-03-21 PROCEDURE — 36415 COLL VENOUS BLD VENIPUNCTURE: CPT | Performed by: STUDENT IN AN ORGANIZED HEALTH CARE EDUCATION/TRAINING PROGRAM

## 2021-03-21 PROCEDURE — 80048 BASIC METABOLIC PNL TOTAL CA: CPT | Mod: 91 | Performed by: HOSPITALIST

## 2021-03-21 RX ORDER — QUETIAPINE FUMARATE 25 MG/1
50 TABLET, FILM COATED ORAL ONCE
Status: COMPLETED | OUTPATIENT
Start: 2021-03-21 | End: 2021-03-21

## 2021-03-21 RX ORDER — DEXTROSE MONOHYDRATE, SODIUM CHLORIDE, AND POTASSIUM CHLORIDE 50; 1.49; 4.5 G/1000ML; G/1000ML; G/1000ML
INJECTION, SOLUTION INTRAVENOUS CONTINUOUS
Status: DISCONTINUED | OUTPATIENT
Start: 2021-03-21 | End: 2021-03-21

## 2021-03-21 RX ORDER — DEXTROSE MONOHYDRATE AND SODIUM CHLORIDE 5; .9 G/100ML; G/100ML
INJECTION, SOLUTION INTRAVENOUS CONTINUOUS
Status: DISCONTINUED | OUTPATIENT
Start: 2021-03-21 | End: 2021-03-22

## 2021-03-21 RX ORDER — LEVETIRACETAM 15 MG/ML
1500 INJECTION INTRAVASCULAR EVERY 12 HOURS
Status: DISCONTINUED | OUTPATIENT
Start: 2021-03-21 | End: 2021-03-24

## 2021-03-21 RX ORDER — LORAZEPAM 2 MG/ML
INJECTION INTRAMUSCULAR
Status: COMPLETED
Start: 2021-03-21 | End: 2021-03-21

## 2021-03-21 RX ORDER — CLOBAZAM 10 MG/1
10 TABLET ORAL DAILY
Status: DISCONTINUED | OUTPATIENT
Start: 2021-03-22 | End: 2021-03-22

## 2021-03-21 RX ORDER — LORAZEPAM 2 MG/ML
2 INJECTION INTRAMUSCULAR
Status: DISCONTINUED | OUTPATIENT
Start: 2021-03-21 | End: 2021-03-21

## 2021-03-21 RX ORDER — INSULIN ASPART 100 [IU]/ML
7 INJECTION, SOLUTION INTRAVENOUS; SUBCUTANEOUS
Status: DISCONTINUED | OUTPATIENT
Start: 2021-03-21 | End: 2021-03-21

## 2021-03-21 RX ORDER — CLOBAZAM 10 MG/1
20 TABLET ORAL NIGHTLY
Status: DISCONTINUED | OUTPATIENT
Start: 2021-03-21 | End: 2021-03-22

## 2021-03-21 RX ADMIN — LEVETIRACETAM 500 MG: 500 TABLET ORAL at 08:03

## 2021-03-21 RX ADMIN — POTASSIUM CHLORIDE, DEXTROSE MONOHYDRATE AND SODIUM CHLORIDE: 150; 5; 450 INJECTION, SOLUTION INTRAVENOUS at 01:03

## 2021-03-21 RX ADMIN — LEVETIRACETAM INJECTION 1500 MG: 15 INJECTION INTRAVENOUS at 09:03

## 2021-03-21 RX ADMIN — ACETAMINOPHEN 650 MG: 325 TABLET ORAL at 10:03

## 2021-03-21 RX ADMIN — LORAZEPAM 1 MG: 2 INJECTION INTRAMUSCULAR; INTRAVENOUS at 07:03

## 2021-03-21 RX ADMIN — INSULIN DETEMIR 20 UNITS: 100 INJECTION, SOLUTION SUBCUTANEOUS at 08:03

## 2021-03-21 RX ADMIN — RISPERIDONE 1 MG: 1 TABLET ORAL at 08:03

## 2021-03-21 RX ADMIN — DIVALPROEX SODIUM 500 MG: 250 TABLET, DELAYED RELEASE ORAL at 08:03

## 2021-03-21 RX ADMIN — QUETIAPINE FUMARATE 50 MG: 25 TABLET ORAL at 04:03

## 2021-03-21 RX ADMIN — ENOXAPARIN SODIUM 30 MG: 30 INJECTION SUBCUTANEOUS at 04:03

## 2021-03-21 RX ADMIN — DOCUSATE SODIUM AND SENNOSIDES 1 TABLET: 8.6; 5 TABLET, FILM COATED ORAL at 08:03

## 2021-03-21 RX ADMIN — DEXTROSE 500 MG: 50 INJECTION, SOLUTION INTRAVENOUS at 10:03

## 2021-03-21 RX ADMIN — INSULIN ASPART 7 UNITS: 100 INJECTION, SOLUTION INTRAVENOUS; SUBCUTANEOUS at 11:03

## 2021-03-21 RX ADMIN — CLOBAZAM 10 MG: 10 TABLET ORAL at 10:03

## 2021-03-21 RX ADMIN — INSULIN ASPART 7 UNITS: 100 INJECTION, SOLUTION INTRAVENOUS; SUBCUTANEOUS at 08:03

## 2021-03-21 RX ADMIN — HYDROCHLOROTHIAZIDE 12.5 MG: 12.5 TABLET ORAL at 08:03

## 2021-03-21 RX ADMIN — Medication 100 MG: at 08:03

## 2021-03-21 RX ADMIN — HALOPERIDOL LACTATE 2 MG: 5 INJECTION, SOLUTION INTRAMUSCULAR at 04:03

## 2021-03-21 RX ADMIN — INSULIN ASPART 7 UNITS: 100 INJECTION, SOLUTION INTRAVENOUS; SUBCUTANEOUS at 04:03

## 2021-03-21 RX ADMIN — DEXTROSE AND SODIUM CHLORIDE: 5; .9 INJECTION, SOLUTION INTRAVENOUS at 09:03

## 2021-03-21 RX ADMIN — FOLIC ACID 1 MG: 1 TABLET ORAL at 08:03

## 2021-03-21 RX ADMIN — DEXTROSE MONOHYDRATE 12.5 G: 25 INJECTION, SOLUTION INTRAVENOUS at 07:03

## 2021-03-22 LAB
ALBUMIN SERPL BCP-MCNC: 2.7 G/DL (ref 3.5–5.2)
ALP SERPL-CCNC: 62 U/L (ref 55–135)
ALT SERPL W/O P-5'-P-CCNC: 24 U/L (ref 10–44)
ANION GAP SERPL CALC-SCNC: 10 MMOL/L (ref 8–16)
ANION GAP SERPL CALC-SCNC: 5 MMOL/L (ref 8–16)
ANISOCYTOSIS BLD QL SMEAR: SLIGHT
AST SERPL-CCNC: 47 U/L (ref 10–40)
BASO STIPL BLD QL SMEAR: ABNORMAL
BASOPHILS # BLD AUTO: 0.04 K/UL (ref 0–0.2)
BASOPHILS # BLD AUTO: 0.05 K/UL (ref 0–0.2)
BASOPHILS NFR BLD: 0.5 % (ref 0–1.9)
BASOPHILS NFR BLD: 0.5 % (ref 0–1.9)
BILIRUB SERPL-MCNC: 0.4 MG/DL (ref 0.1–1)
BUN SERPL-MCNC: 11 MG/DL (ref 6–20)
BUN SERPL-MCNC: 15 MG/DL (ref 6–20)
CALCIUM SERPL-MCNC: 8.7 MG/DL (ref 8.7–10.5)
CALCIUM SERPL-MCNC: 9.3 MG/DL (ref 8.7–10.5)
CHLORIDE SERPL-SCNC: 102 MMOL/L (ref 95–110)
CHLORIDE SERPL-SCNC: 99 MMOL/L (ref 95–110)
CO2 SERPL-SCNC: 29 MMOL/L (ref 23–29)
CO2 SERPL-SCNC: 29 MMOL/L (ref 23–29)
CREAT SERPL-MCNC: 0.6 MG/DL (ref 0.5–1.4)
CREAT SERPL-MCNC: 0.6 MG/DL (ref 0.5–1.4)
DIFFERENTIAL METHOD: ABNORMAL
DIFFERENTIAL METHOD: ABNORMAL
EOSINOPHIL # BLD AUTO: 0.1 K/UL (ref 0–0.5)
EOSINOPHIL # BLD AUTO: 0.2 K/UL (ref 0–0.5)
EOSINOPHIL NFR BLD: 1.3 % (ref 0–8)
EOSINOPHIL NFR BLD: 1.7 % (ref 0–8)
ERYTHROCYTE [DISTWIDTH] IN BLOOD BY AUTOMATED COUNT: 14 % (ref 11.5–14.5)
ERYTHROCYTE [DISTWIDTH] IN BLOOD BY AUTOMATED COUNT: 14.2 % (ref 11.5–14.5)
EST. GFR  (AFRICAN AMERICAN): >60 ML/MIN/1.73 M^2
EST. GFR  (AFRICAN AMERICAN): >60 ML/MIN/1.73 M^2
EST. GFR  (NON AFRICAN AMERICAN): >60 ML/MIN/1.73 M^2
EST. GFR  (NON AFRICAN AMERICAN): >60 ML/MIN/1.73 M^2
GLUCOSE SERPL-MCNC: 130 MG/DL (ref 70–110)
GLUCOSE SERPL-MCNC: 149 MG/DL (ref 70–110)
HCT VFR BLD AUTO: 31.2 % (ref 40–54)
HCT VFR BLD AUTO: 31.3 % (ref 40–54)
HGB BLD-MCNC: 10.2 G/DL (ref 14–18)
HGB BLD-MCNC: 10.3 G/DL (ref 14–18)
HYPOCHROMIA BLD QL SMEAR: ABNORMAL
IMM GRANULOCYTES # BLD AUTO: 0.09 K/UL (ref 0–0.04)
IMM GRANULOCYTES # BLD AUTO: 0.11 K/UL (ref 0–0.04)
IMM GRANULOCYTES NFR BLD AUTO: 1.1 % (ref 0–0.5)
IMM GRANULOCYTES NFR BLD AUTO: 1.1 % (ref 0–0.5)
INSULIN COLLECTION INTERVAL: ABNORMAL
INSULIN SERPL-ACNC: 147.3 UU/ML
LYMPHOCYTES # BLD AUTO: 1.9 K/UL (ref 1–4.8)
LYMPHOCYTES # BLD AUTO: 2.7 K/UL (ref 1–4.8)
LYMPHOCYTES NFR BLD: 24.5 % (ref 18–48)
LYMPHOCYTES NFR BLD: 26.9 % (ref 18–48)
MAGNESIUM SERPL-MCNC: 1.6 MG/DL (ref 1.6–2.6)
MAGNESIUM SERPL-MCNC: 1.7 MG/DL (ref 1.6–2.6)
MCH RBC QN AUTO: 31.3 PG (ref 27–31)
MCH RBC QN AUTO: 31.7 PG (ref 27–31)
MCHC RBC AUTO-ENTMCNC: 32.6 G/DL (ref 32–36)
MCHC RBC AUTO-ENTMCNC: 33 G/DL (ref 32–36)
MCV RBC AUTO: 96 FL (ref 82–98)
MCV RBC AUTO: 96 FL (ref 82–98)
MONOCYTES # BLD AUTO: 1.4 K/UL (ref 0.3–1)
MONOCYTES # BLD AUTO: 2 K/UL (ref 0.3–1)
MONOCYTES NFR BLD: 17.5 % (ref 4–15)
MONOCYTES NFR BLD: 19.8 % (ref 4–15)
NEUTROPHILS # BLD AUTO: 4.3 K/UL (ref 1.8–7.7)
NEUTROPHILS # BLD AUTO: 5.1 K/UL (ref 1.8–7.7)
NEUTROPHILS NFR BLD: 50 % (ref 38–73)
NEUTROPHILS NFR BLD: 55.1 % (ref 38–73)
NRBC BLD-RTO: 0 /100 WBC
NRBC BLD-RTO: 0 /100 WBC
PHOSPHATE SERPL-MCNC: 4.4 MG/DL (ref 2.7–4.5)
PHOSPHATE SERPL-MCNC: 5.7 MG/DL (ref 2.7–4.5)
PLATELET # BLD AUTO: 269 K/UL (ref 150–350)
PLATELET # BLD AUTO: 326 K/UL (ref 150–350)
PLATELET BLD QL SMEAR: ABNORMAL
PMV BLD AUTO: 9.7 FL (ref 9.2–12.9)
PMV BLD AUTO: 9.9 FL (ref 9.2–12.9)
POCT GLUCOSE: 106 MG/DL (ref 70–110)
POCT GLUCOSE: 109 MG/DL (ref 70–110)
POCT GLUCOSE: 111 MG/DL (ref 70–110)
POCT GLUCOSE: 118 MG/DL (ref 70–110)
POCT GLUCOSE: 121 MG/DL (ref 70–110)
POCT GLUCOSE: 134 MG/DL (ref 70–110)
POCT GLUCOSE: 142 MG/DL (ref 70–110)
POCT GLUCOSE: 143 MG/DL (ref 70–110)
POCT GLUCOSE: 153 MG/DL (ref 70–110)
POCT GLUCOSE: 162 MG/DL (ref 70–110)
POCT GLUCOSE: 177 MG/DL (ref 70–110)
POCT GLUCOSE: 186 MG/DL (ref 70–110)
POCT GLUCOSE: 194 MG/DL (ref 70–110)
POCT GLUCOSE: 75 MG/DL (ref 70–110)
POLYCHROMASIA BLD QL SMEAR: ABNORMAL
POTASSIUM SERPL-SCNC: 3.7 MMOL/L (ref 3.5–5.1)
POTASSIUM SERPL-SCNC: 3.8 MMOL/L (ref 3.5–5.1)
PROT SERPL-MCNC: 5.6 G/DL (ref 6–8.4)
RBC # BLD AUTO: 3.25 M/UL (ref 4.6–6.2)
RBC # BLD AUTO: 3.26 M/UL (ref 4.6–6.2)
SODIUM SERPL-SCNC: 136 MMOL/L (ref 136–145)
SODIUM SERPL-SCNC: 138 MMOL/L (ref 136–145)
TARGETS BLD QL SMEAR: ABNORMAL
WBC # BLD AUTO: 10.2 K/UL (ref 3.9–12.7)
WBC # BLD AUTO: 7.83 K/UL (ref 3.9–12.7)

## 2021-03-22 PROCEDURE — 99233 SBSQ HOSP IP/OBS HIGH 50: CPT | Mod: ,,, | Performed by: PSYCHIATRY & NEUROLOGY

## 2021-03-22 PROCEDURE — 86255 FLUORESCENT ANTIBODY SCREEN: CPT | Mod: 59 | Performed by: PSYCHIATRY & NEUROLOGY

## 2021-03-22 PROCEDURE — 84100 ASSAY OF PHOSPHORUS: CPT | Mod: 91 | Performed by: STUDENT IN AN ORGANIZED HEALTH CARE EDUCATION/TRAINING PROGRAM

## 2021-03-22 PROCEDURE — 93010 ELECTROCARDIOGRAM REPORT: CPT | Mod: ,,, | Performed by: INTERNAL MEDICINE

## 2021-03-22 PROCEDURE — 80053 COMPREHEN METABOLIC PANEL: CPT | Performed by: STUDENT IN AN ORGANIZED HEALTH CARE EDUCATION/TRAINING PROGRAM

## 2021-03-22 PROCEDURE — 25000003 PHARM REV CODE 250: Performed by: STUDENT IN AN ORGANIZED HEALTH CARE EDUCATION/TRAINING PROGRAM

## 2021-03-22 PROCEDURE — 63600175 PHARM REV CODE 636 W HCPCS

## 2021-03-22 PROCEDURE — 84100 ASSAY OF PHOSPHORUS: CPT | Performed by: HOSPITALIST

## 2021-03-22 PROCEDURE — 25000003 PHARM REV CODE 250

## 2021-03-22 PROCEDURE — 63600175 PHARM REV CODE 636 W HCPCS: Performed by: NURSE PRACTITIONER

## 2021-03-22 PROCEDURE — 85025 COMPLETE CBC W/AUTO DIFF WBC: CPT | Performed by: STUDENT IN AN ORGANIZED HEALTH CARE EDUCATION/TRAINING PROGRAM

## 2021-03-22 PROCEDURE — 25000003 PHARM REV CODE 250: Performed by: PHYSICIAN ASSISTANT

## 2021-03-22 PROCEDURE — 99233 PR SUBSEQUENT HOSPITAL CARE,LEVL III: ICD-10-PCS | Mod: ,,, | Performed by: PSYCHIATRY & NEUROLOGY

## 2021-03-22 PROCEDURE — 80048 BASIC METABOLIC PNL TOTAL CA: CPT | Performed by: STUDENT IN AN ORGANIZED HEALTH CARE EDUCATION/TRAINING PROGRAM

## 2021-03-22 PROCEDURE — 63600175 PHARM REV CODE 636 W HCPCS: Performed by: PSYCHIATRY & NEUROLOGY

## 2021-03-22 PROCEDURE — 25000003 PHARM REV CODE 250: Performed by: PSYCHIATRY & NEUROLOGY

## 2021-03-22 PROCEDURE — 99291 CRITICAL CARE FIRST HOUR: CPT | Mod: ,,, | Performed by: PSYCHIATRY & NEUROLOGY

## 2021-03-22 PROCEDURE — 20000000 HC ICU ROOM

## 2021-03-22 PROCEDURE — 63600175 PHARM REV CODE 636 W HCPCS: Performed by: STUDENT IN AN ORGANIZED HEALTH CARE EDUCATION/TRAINING PROGRAM

## 2021-03-22 PROCEDURE — 83735 ASSAY OF MAGNESIUM: CPT | Performed by: HOSPITALIST

## 2021-03-22 PROCEDURE — 83735 ASSAY OF MAGNESIUM: CPT | Mod: 91 | Performed by: STUDENT IN AN ORGANIZED HEALTH CARE EDUCATION/TRAINING PROGRAM

## 2021-03-22 PROCEDURE — 93005 ELECTROCARDIOGRAM TRACING: CPT

## 2021-03-22 PROCEDURE — 94761 N-INVAS EAR/PLS OXIMETRY MLT: CPT

## 2021-03-22 PROCEDURE — 25000003 PHARM REV CODE 250: Performed by: NURSE PRACTITIONER

## 2021-03-22 PROCEDURE — 99291 PR CRITICAL CARE, E/M 30-74 MINUTES: ICD-10-PCS | Mod: ,,, | Performed by: PSYCHIATRY & NEUROLOGY

## 2021-03-22 PROCEDURE — S4991 NICOTINE PATCH NONLEGEND: HCPCS | Performed by: STUDENT IN AN ORGANIZED HEALTH CARE EDUCATION/TRAINING PROGRAM

## 2021-03-22 PROCEDURE — 95714 VEEG EA 12-26 HR UNMNTR: CPT

## 2021-03-22 PROCEDURE — 93010 EKG 12-LEAD: ICD-10-PCS | Mod: ,,, | Performed by: INTERNAL MEDICINE

## 2021-03-22 PROCEDURE — 95720 EEG PHY/QHP EA INCR W/VEEG: CPT | Mod: ,,, | Performed by: PSYCHIATRY & NEUROLOGY

## 2021-03-22 PROCEDURE — 95720 PR EEG, W/VIDEO, CONT RECORD, I&R, >12<26 HRS: ICD-10-PCS | Mod: ,,, | Performed by: PSYCHIATRY & NEUROLOGY

## 2021-03-22 RX ORDER — DEXMEDETOMIDINE HYDROCHLORIDE 4 UG/ML
0-1.4 INJECTION, SOLUTION INTRAVENOUS CONTINUOUS
Status: DISCONTINUED | OUTPATIENT
Start: 2021-03-22 | End: 2021-03-23

## 2021-03-22 RX ORDER — THIAMINE HCL 100 MG
100 TABLET ORAL DAILY
Status: DISCONTINUED | OUTPATIENT
Start: 2021-03-23 | End: 2021-03-26

## 2021-03-22 RX ORDER — CLOBAZAM 10 MG/1
10 TABLET ORAL DAILY
Status: DISCONTINUED | OUTPATIENT
Start: 2021-03-23 | End: 2021-03-26

## 2021-03-22 RX ORDER — HEPARIN SODIUM 5000 [USP'U]/ML
5000 INJECTION, SOLUTION INTRAVENOUS; SUBCUTANEOUS EVERY 8 HOURS
Status: DISCONTINUED | OUTPATIENT
Start: 2021-03-22 | End: 2021-03-29

## 2021-03-22 RX ORDER — MAGNESIUM SULFATE/D5W 2 G/50 ML
2 INTRAVENOUS SOLUTION, PIGGYBACK (ML) INTRAVENOUS ONCE
Status: DISCONTINUED | OUTPATIENT
Start: 2021-03-22 | End: 2021-03-22

## 2021-03-22 RX ORDER — INSULIN ASPART 100 [IU]/ML
1-10 INJECTION, SOLUTION INTRAVENOUS; SUBCUTANEOUS EVERY 4 HOURS PRN
Status: DISCONTINUED | OUTPATIENT
Start: 2021-03-22 | End: 2021-03-25

## 2021-03-22 RX ORDER — CLOBAZAM 10 MG/1
20 TABLET ORAL NIGHTLY
Status: DISCONTINUED | OUTPATIENT
Start: 2021-03-22 | End: 2021-03-26

## 2021-03-22 RX ORDER — MAGNESIUM SULFATE HEPTAHYDRATE 40 MG/ML
2 INJECTION, SOLUTION INTRAVENOUS ONCE
Status: COMPLETED | OUTPATIENT
Start: 2021-03-22 | End: 2021-03-22

## 2021-03-22 RX ORDER — LORAZEPAM 2 MG/ML
INJECTION INTRAMUSCULAR
Status: COMPLETED
Start: 2021-03-22 | End: 2021-03-22

## 2021-03-22 RX ORDER — AMOXICILLIN 250 MG
1 CAPSULE ORAL DAILY
Status: DISCONTINUED | OUTPATIENT
Start: 2021-03-23 | End: 2021-03-26

## 2021-03-22 RX ORDER — DEXTROSE MONOHYDRATE 100 MG/ML
INJECTION, SOLUTION INTRAVENOUS CONTINUOUS PRN
Status: DISCONTINUED | OUTPATIENT
Start: 2021-03-22 | End: 2021-03-25

## 2021-03-22 RX ORDER — DEXMEDETOMIDINE HYDROCHLORIDE 4 UG/ML
INJECTION, SOLUTION INTRAVENOUS
Status: COMPLETED
Start: 2021-03-22 | End: 2021-03-22

## 2021-03-22 RX ORDER — ACETAMINOPHEN 325 MG/1
650 TABLET ORAL EVERY 6 HOURS PRN
Status: DISCONTINUED | OUTPATIENT
Start: 2021-03-22 | End: 2021-03-26

## 2021-03-22 RX ORDER — GLUCAGON 1 MG
1 KIT INJECTION
Status: DISCONTINUED | OUTPATIENT
Start: 2021-03-22 | End: 2021-03-25

## 2021-03-22 RX ORDER — FOLIC ACID 1 MG/1
1 TABLET ORAL DAILY
Status: DISCONTINUED | OUTPATIENT
Start: 2021-03-23 | End: 2021-03-26

## 2021-03-22 RX ORDER — DEXMEDETOMIDINE HYDROCHLORIDE 4 UG/ML
0-1.4 INJECTION, SOLUTION INTRAVENOUS CONTINUOUS
Status: DISCONTINUED | OUTPATIENT
Start: 2021-03-22 | End: 2021-03-22

## 2021-03-22 RX ORDER — LORAZEPAM 2 MG/ML
2 INJECTION INTRAMUSCULAR ONCE
Status: COMPLETED | OUTPATIENT
Start: 2021-03-22 | End: 2021-03-22

## 2021-03-22 RX ORDER — RISPERIDONE 1 MG/1
1 TABLET ORAL 2 TIMES DAILY
Status: DISCONTINUED | OUTPATIENT
Start: 2021-03-22 | End: 2021-03-26

## 2021-03-22 RX ADMIN — DEXMEDETOMIDINE HYDROCHLORIDE 0.2 MCG/KG/HR: 4 INJECTION, SOLUTION INTRAVENOUS at 03:03

## 2021-03-22 RX ADMIN — HEPARIN SODIUM 5000 UNITS: 5000 INJECTION INTRAVENOUS; SUBCUTANEOUS at 10:03

## 2021-03-22 RX ADMIN — CLOBAZAM 20 MG: 10 TABLET ORAL at 08:03

## 2021-03-22 RX ADMIN — DEXTROSE 500 MG: 50 INJECTION, SOLUTION INTRAVENOUS at 06:03

## 2021-03-22 RX ADMIN — LORAZEPAM 2 MG: 2 INJECTION INTRAMUSCULAR at 04:03

## 2021-03-22 RX ADMIN — LEVETIRACETAM INJECTION 1500 MG: 15 INJECTION INTRAVENOUS at 08:03

## 2021-03-22 RX ADMIN — INSULIN ASPART 2 UNITS: 100 INJECTION, SOLUTION INTRAVENOUS; SUBCUTANEOUS at 09:03

## 2021-03-22 RX ADMIN — CLOBAZAM 20 MG: 10 TABLET ORAL at 01:03

## 2021-03-22 RX ADMIN — DOCUSATE SODIUM AND SENNOSIDES 1 TABLET: 8.6; 5 TABLET, FILM COATED ORAL at 08:03

## 2021-03-22 RX ADMIN — CLOBAZAM 10 MG: 10 TABLET ORAL at 08:03

## 2021-03-22 RX ADMIN — MAGNESIUM SULFATE 2 G: 2 INJECTION INTRAVENOUS at 10:03

## 2021-03-22 RX ADMIN — Medication 100 MG: at 08:03

## 2021-03-22 RX ADMIN — DEXTROSE 500 MG: 50 INJECTION, SOLUTION INTRAVENOUS at 09:03

## 2021-03-22 RX ADMIN — FOLIC ACID 1 MG: 1 TABLET ORAL at 08:03

## 2021-03-22 RX ADMIN — LORAZEPAM 2 MG: 2 INJECTION INTRAMUSCULAR; INTRAVENOUS at 04:03

## 2021-03-22 RX ADMIN — RISPERIDONE 1 MG: 1 TABLET ORAL at 09:03

## 2021-03-22 RX ADMIN — DEXTROSE 500 MG: 50 INJECTION, SOLUTION INTRAVENOUS at 01:03

## 2021-03-22 RX ADMIN — INSULIN ASPART 2 UNITS: 100 INJECTION, SOLUTION INTRAVENOUS; SUBCUTANEOUS at 01:03

## 2021-03-22 RX ADMIN — DEXTROSE 500 MG: 50 INJECTION, SOLUTION INTRAVENOUS at 12:03

## 2021-03-22 RX ADMIN — NICOTINE 1 PATCH: 21 PATCH, EXTENDED RELEASE TRANSDERMAL at 08:03

## 2021-03-22 RX ADMIN — DICLOFENAC SODIUM 2 G: 10 GEL TOPICAL at 10:03

## 2021-03-23 LAB
ALBUMIN SERPL BCP-MCNC: 3 G/DL (ref 3.5–5.2)
ALP SERPL-CCNC: 75 U/L (ref 55–135)
ALT SERPL W/O P-5'-P-CCNC: 26 U/L (ref 10–44)
ANION GAP SERPL CALC-SCNC: 5 MMOL/L (ref 8–16)
AST SERPL-CCNC: 39 U/L (ref 10–40)
BASOPHILS # BLD AUTO: 0.04 K/UL (ref 0–0.2)
BASOPHILS NFR BLD: 0.4 % (ref 0–1.9)
BILIRUB SERPL-MCNC: 0.5 MG/DL (ref 0.1–1)
BUN SERPL-MCNC: 11 MG/DL (ref 6–20)
CALCIUM SERPL-MCNC: 9.4 MG/DL (ref 8.7–10.5)
CHLORIDE SERPL-SCNC: 103 MMOL/L (ref 95–110)
CO2 SERPL-SCNC: 30 MMOL/L (ref 23–29)
CREAT SERPL-MCNC: 0.6 MG/DL (ref 0.5–1.4)
DIFFERENTIAL METHOD: ABNORMAL
EOSINOPHIL # BLD AUTO: 0.1 K/UL (ref 0–0.5)
EOSINOPHIL NFR BLD: 0.6 % (ref 0–8)
ERYTHROCYTE [DISTWIDTH] IN BLOOD BY AUTOMATED COUNT: 13.9 % (ref 11.5–14.5)
EST. GFR  (AFRICAN AMERICAN): >60 ML/MIN/1.73 M^2
EST. GFR  (NON AFRICAN AMERICAN): >60 ML/MIN/1.73 M^2
GLUCOSE SERPL-MCNC: 134 MG/DL (ref 70–110)
HCT VFR BLD AUTO: 34.4 % (ref 40–54)
HGB BLD-MCNC: 11.3 G/DL (ref 14–18)
IMM GRANULOCYTES # BLD AUTO: 0.06 K/UL (ref 0–0.04)
IMM GRANULOCYTES NFR BLD AUTO: 0.6 % (ref 0–0.5)
LYMPHOCYTES # BLD AUTO: 2 K/UL (ref 1–4.8)
LYMPHOCYTES NFR BLD: 19.2 % (ref 18–48)
MAGNESIUM SERPL-MCNC: 1.9 MG/DL (ref 1.6–2.6)
MCH RBC QN AUTO: 31.2 PG (ref 27–31)
MCHC RBC AUTO-ENTMCNC: 32.8 G/DL (ref 32–36)
MCV RBC AUTO: 95 FL (ref 82–98)
MONOCYTES # BLD AUTO: 1.6 K/UL (ref 0.3–1)
MONOCYTES NFR BLD: 15.4 % (ref 4–15)
NEUTROPHILS # BLD AUTO: 6.7 K/UL (ref 1.8–7.7)
NEUTROPHILS NFR BLD: 63.8 % (ref 38–73)
NRBC BLD-RTO: 0 /100 WBC
PHOSPHATE SERPL-MCNC: 4.7 MG/DL (ref 2.7–4.5)
PLATELET # BLD AUTO: 321 K/UL (ref 150–350)
PMV BLD AUTO: 9.9 FL (ref 9.2–12.9)
POCT GLUCOSE: 141 MG/DL (ref 70–110)
POCT GLUCOSE: 142 MG/DL (ref 70–110)
POCT GLUCOSE: 147 MG/DL (ref 70–110)
POCT GLUCOSE: 157 MG/DL (ref 70–110)
POCT GLUCOSE: 206 MG/DL (ref 70–110)
POCT GLUCOSE: 222 MG/DL (ref 70–110)
POTASSIUM SERPL-SCNC: 4 MMOL/L (ref 3.5–5.1)
PROT SERPL-MCNC: 6.3 G/DL (ref 6–8.4)
RBC # BLD AUTO: 3.62 M/UL (ref 4.6–6.2)
SODIUM SERPL-SCNC: 138 MMOL/L (ref 136–145)
WBC # BLD AUTO: 10.56 K/UL (ref 3.9–12.7)

## 2021-03-23 PROCEDURE — 95714 VEEG EA 12-26 HR UNMNTR: CPT

## 2021-03-23 PROCEDURE — 85025 COMPLETE CBC W/AUTO DIFF WBC: CPT | Performed by: STUDENT IN AN ORGANIZED HEALTH CARE EDUCATION/TRAINING PROGRAM

## 2021-03-23 PROCEDURE — S4991 NICOTINE PATCH NONLEGEND: HCPCS | Performed by: STUDENT IN AN ORGANIZED HEALTH CARE EDUCATION/TRAINING PROGRAM

## 2021-03-23 PROCEDURE — 83735 ASSAY OF MAGNESIUM: CPT | Performed by: STUDENT IN AN ORGANIZED HEALTH CARE EDUCATION/TRAINING PROGRAM

## 2021-03-23 PROCEDURE — 99900035 HC TECH TIME PER 15 MIN (STAT)

## 2021-03-23 PROCEDURE — 63600175 PHARM REV CODE 636 W HCPCS: Performed by: STUDENT IN AN ORGANIZED HEALTH CARE EDUCATION/TRAINING PROGRAM

## 2021-03-23 PROCEDURE — 25000003 PHARM REV CODE 250: Performed by: PHYSICIAN ASSISTANT

## 2021-03-23 PROCEDURE — 25000003 PHARM REV CODE 250: Performed by: NURSE PRACTITIONER

## 2021-03-23 PROCEDURE — 84100 ASSAY OF PHOSPHORUS: CPT | Performed by: STUDENT IN AN ORGANIZED HEALTH CARE EDUCATION/TRAINING PROGRAM

## 2021-03-23 PROCEDURE — 99291 CRITICAL CARE FIRST HOUR: CPT | Mod: ,,, | Performed by: PSYCHIATRY & NEUROLOGY

## 2021-03-23 PROCEDURE — 80053 COMPREHEN METABOLIC PANEL: CPT | Performed by: STUDENT IN AN ORGANIZED HEALTH CARE EDUCATION/TRAINING PROGRAM

## 2021-03-23 PROCEDURE — 99233 PR SUBSEQUENT HOSPITAL CARE,LEVL III: ICD-10-PCS | Mod: ,,, | Performed by: PSYCHIATRY & NEUROLOGY

## 2021-03-23 PROCEDURE — 95720 PR EEG, W/VIDEO, CONT RECORD, I&R, >12<26 HRS: ICD-10-PCS | Mod: ,,, | Performed by: PSYCHIATRY & NEUROLOGY

## 2021-03-23 PROCEDURE — 94761 N-INVAS EAR/PLS OXIMETRY MLT: CPT

## 2021-03-23 PROCEDURE — 99291 PR CRITICAL CARE, E/M 30-74 MINUTES: ICD-10-PCS | Mod: ,,, | Performed by: PSYCHIATRY & NEUROLOGY

## 2021-03-23 PROCEDURE — 25000003 PHARM REV CODE 250: Performed by: STUDENT IN AN ORGANIZED HEALTH CARE EDUCATION/TRAINING PROGRAM

## 2021-03-23 PROCEDURE — 99233 SBSQ HOSP IP/OBS HIGH 50: CPT | Mod: ,,, | Performed by: PSYCHIATRY & NEUROLOGY

## 2021-03-23 PROCEDURE — 43752 NASAL/OROGASTRIC W/TUBE PLMT: CPT

## 2021-03-23 PROCEDURE — 95720 EEG PHY/QHP EA INCR W/VEEG: CPT | Mod: ,,, | Performed by: PSYCHIATRY & NEUROLOGY

## 2021-03-23 PROCEDURE — 20000000 HC ICU ROOM

## 2021-03-23 RX ORDER — DEXMEDETOMIDINE HYDROCHLORIDE 4 UG/ML
0-1.4 INJECTION, SOLUTION INTRAVENOUS CONTINUOUS
Status: DISCONTINUED | OUTPATIENT
Start: 2021-03-23 | End: 2021-03-25

## 2021-03-23 RX ADMIN — CLOBAZAM 20 MG: 10 TABLET ORAL at 11:03

## 2021-03-23 RX ADMIN — DICLOFENAC SODIUM 2 G: 10 GEL TOPICAL at 10:03

## 2021-03-23 RX ADMIN — CLOBAZAM 10 MG: 10 TABLET ORAL at 08:03

## 2021-03-23 RX ADMIN — VALPROATE SODIUM 1000 MG: 100 INJECTION, SOLUTION INTRAVENOUS at 01:03

## 2021-03-23 RX ADMIN — Medication 100 MG: at 08:03

## 2021-03-23 RX ADMIN — VALPROATE SODIUM 1000 MG: 100 INJECTION, SOLUTION INTRAVENOUS at 11:03

## 2021-03-23 RX ADMIN — DEXMEDETOMIDINE HYDROCHLORIDE 0.2 MCG/KG/HR: 4 INJECTION, SOLUTION INTRAVENOUS at 07:03

## 2021-03-23 RX ADMIN — HEPARIN SODIUM 5000 UNITS: 5000 INJECTION INTRAVENOUS; SUBCUTANEOUS at 01:03

## 2021-03-23 RX ADMIN — HALOPERIDOL LACTATE 2 MG: 5 INJECTION, SOLUTION INTRAMUSCULAR at 08:03

## 2021-03-23 RX ADMIN — LEVETIRACETAM INJECTION 1500 MG: 15 INJECTION INTRAVENOUS at 08:03

## 2021-03-23 RX ADMIN — HEPARIN SODIUM 5000 UNITS: 5000 INJECTION INTRAVENOUS; SUBCUTANEOUS at 10:03

## 2021-03-23 RX ADMIN — LEVETIRACETAM INJECTION 1500 MG: 15 INJECTION INTRAVENOUS at 10:03

## 2021-03-23 RX ADMIN — DEXTROSE 500 MG: 50 INJECTION, SOLUTION INTRAVENOUS at 06:03

## 2021-03-23 RX ADMIN — NICOTINE 1 PATCH: 21 PATCH, EXTENDED RELEASE TRANSDERMAL at 08:03

## 2021-03-23 RX ADMIN — FOLIC ACID 1 MG: 1 TABLET ORAL at 08:03

## 2021-03-23 RX ADMIN — HALOPERIDOL LACTATE 2 MG: 5 INJECTION, SOLUTION INTRAMUSCULAR at 02:03

## 2021-03-23 RX ADMIN — DOCUSATE SODIUM 50MG AND SENNOSIDES 8.6MG 1 TABLET: 8.6; 5 TABLET, FILM COATED ORAL at 08:03

## 2021-03-23 RX ADMIN — RISPERIDONE 1 MG: 1 TABLET ORAL at 12:03

## 2021-03-23 RX ADMIN — RISPERIDONE 1 MG: 1 TABLET ORAL at 11:03

## 2021-03-24 LAB
ALBUMIN SERPL BCP-MCNC: 3 G/DL (ref 3.5–5.2)
ALP SERPL-CCNC: 76 U/L (ref 55–135)
ALT SERPL W/O P-5'-P-CCNC: 19 U/L (ref 10–44)
ANION GAP SERPL CALC-SCNC: 10 MMOL/L (ref 8–16)
AST SERPL-CCNC: 21 U/L (ref 10–40)
BASOPHILS # BLD AUTO: 0.03 K/UL (ref 0–0.2)
BASOPHILS NFR BLD: 0.4 % (ref 0–1.9)
BILIRUB SERPL-MCNC: 0.3 MG/DL (ref 0.1–1)
BUN SERPL-MCNC: 9 MG/DL (ref 6–20)
CALCIUM SERPL-MCNC: 9.8 MG/DL (ref 8.7–10.5)
CHLORIDE SERPL-SCNC: 101 MMOL/L (ref 95–110)
CO2 SERPL-SCNC: 29 MMOL/L (ref 23–29)
CREAT SERPL-MCNC: 0.6 MG/DL (ref 0.5–1.4)
DIFFERENTIAL METHOD: ABNORMAL
EOSINOPHIL # BLD AUTO: 0.1 K/UL (ref 0–0.5)
EOSINOPHIL NFR BLD: 1.3 % (ref 0–8)
ERYTHROCYTE [DISTWIDTH] IN BLOOD BY AUTOMATED COUNT: 13.3 % (ref 11.5–14.5)
EST. GFR  (AFRICAN AMERICAN): >60 ML/MIN/1.73 M^2
EST. GFR  (NON AFRICAN AMERICAN): >60 ML/MIN/1.73 M^2
GLUCOSE SERPL-MCNC: 146 MG/DL (ref 70–110)
HCT VFR BLD AUTO: 34 % (ref 40–54)
HGB BLD-MCNC: 11.3 G/DL (ref 14–18)
IMM GRANULOCYTES # BLD AUTO: 0.04 K/UL (ref 0–0.04)
IMM GRANULOCYTES NFR BLD AUTO: 0.5 % (ref 0–0.5)
LEVETIRACETAM SERPL-MCNC: 4.8 UG/ML (ref 3–60)
LYMPHOCYTES # BLD AUTO: 2 K/UL (ref 1–4.8)
LYMPHOCYTES NFR BLD: 26.1 % (ref 18–48)
MAGNESIUM SERPL-MCNC: 1.7 MG/DL (ref 1.6–2.6)
MCH RBC QN AUTO: 31 PG (ref 27–31)
MCHC RBC AUTO-ENTMCNC: 33.2 G/DL (ref 32–36)
MCV RBC AUTO: 93 FL (ref 82–98)
MONOCYTES # BLD AUTO: 1.2 K/UL (ref 0.3–1)
MONOCYTES NFR BLD: 15.2 % (ref 4–15)
NEUTROPHILS # BLD AUTO: 4.4 K/UL (ref 1.8–7.7)
NEUTROPHILS NFR BLD: 56.5 % (ref 38–73)
NRBC BLD-RTO: 0 /100 WBC
PHOSPHATE SERPL-MCNC: 4.6 MG/DL (ref 2.7–4.5)
PLATELET # BLD AUTO: 309 K/UL (ref 150–350)
PMV BLD AUTO: 10.1 FL (ref 9.2–12.9)
POCT GLUCOSE: 151 MG/DL (ref 70–110)
POCT GLUCOSE: 153 MG/DL (ref 70–110)
POCT GLUCOSE: 169 MG/DL (ref 70–110)
POCT GLUCOSE: 250 MG/DL (ref 70–110)
POCT GLUCOSE: 327 MG/DL (ref 70–110)
POCT GLUCOSE: 376 MG/DL (ref 70–110)
POTASSIUM SERPL-SCNC: 3.7 MMOL/L (ref 3.5–5.1)
PROT SERPL-MCNC: 6.5 G/DL (ref 6–8.4)
RBC # BLD AUTO: 3.65 M/UL (ref 4.6–6.2)
SODIUM SERPL-SCNC: 140 MMOL/L (ref 136–145)
VALPROATE SERPL-MCNC: 68.8 UG/ML (ref 50–100)
VALPROATE SERPL-MCNC: 97.9 UG/ML (ref 50–100)
WBC # BLD AUTO: 7.78 K/UL (ref 3.9–12.7)

## 2021-03-24 PROCEDURE — 80164 ASSAY DIPROPYLACETIC ACD TOT: CPT | Mod: 91 | Performed by: PHYSICIAN ASSISTANT

## 2021-03-24 PROCEDURE — 20000000 HC ICU ROOM

## 2021-03-24 PROCEDURE — 25000003 PHARM REV CODE 250: Performed by: STUDENT IN AN ORGANIZED HEALTH CARE EDUCATION/TRAINING PROGRAM

## 2021-03-24 PROCEDURE — 63600175 PHARM REV CODE 636 W HCPCS: Performed by: PHYSICIAN ASSISTANT

## 2021-03-24 PROCEDURE — 63600175 PHARM REV CODE 636 W HCPCS: Performed by: STUDENT IN AN ORGANIZED HEALTH CARE EDUCATION/TRAINING PROGRAM

## 2021-03-24 PROCEDURE — 25000003 PHARM REV CODE 250: Performed by: PHYSICIAN ASSISTANT

## 2021-03-24 PROCEDURE — 83735 ASSAY OF MAGNESIUM: CPT | Performed by: STUDENT IN AN ORGANIZED HEALTH CARE EDUCATION/TRAINING PROGRAM

## 2021-03-24 PROCEDURE — S4991 NICOTINE PATCH NONLEGEND: HCPCS | Performed by: STUDENT IN AN ORGANIZED HEALTH CARE EDUCATION/TRAINING PROGRAM

## 2021-03-24 PROCEDURE — 99291 CRITICAL CARE FIRST HOUR: CPT | Mod: ,,, | Performed by: PSYCHIATRY & NEUROLOGY

## 2021-03-24 PROCEDURE — 85025 COMPLETE CBC W/AUTO DIFF WBC: CPT | Performed by: STUDENT IN AN ORGANIZED HEALTH CARE EDUCATION/TRAINING PROGRAM

## 2021-03-24 PROCEDURE — 84100 ASSAY OF PHOSPHORUS: CPT | Performed by: STUDENT IN AN ORGANIZED HEALTH CARE EDUCATION/TRAINING PROGRAM

## 2021-03-24 PROCEDURE — 25000003 PHARM REV CODE 250: Performed by: NURSE PRACTITIONER

## 2021-03-24 PROCEDURE — 80053 COMPREHEN METABOLIC PANEL: CPT | Performed by: STUDENT IN AN ORGANIZED HEALTH CARE EDUCATION/TRAINING PROGRAM

## 2021-03-24 PROCEDURE — 99291 PR CRITICAL CARE, E/M 30-74 MINUTES: ICD-10-PCS | Mod: ,,, | Performed by: PSYCHIATRY & NEUROLOGY

## 2021-03-24 PROCEDURE — 80164 ASSAY DIPROPYLACETIC ACD TOT: CPT | Performed by: PHYSICIAN ASSISTANT

## 2021-03-24 PROCEDURE — 94761 N-INVAS EAR/PLS OXIMETRY MLT: CPT

## 2021-03-24 RX ADMIN — RISPERIDONE 1 MG: 1 TABLET ORAL at 05:03

## 2021-03-24 RX ADMIN — INSULIN ASPART 8 UNITS: 100 INJECTION, SOLUTION INTRAVENOUS; SUBCUTANEOUS at 06:03

## 2021-03-24 RX ADMIN — DICLOFENAC SODIUM 2 G: 10 GEL TOPICAL at 09:03

## 2021-03-24 RX ADMIN — RISPERIDONE 1 MG: 1 TABLET ORAL at 12:03

## 2021-03-24 RX ADMIN — HEPARIN SODIUM 5000 UNITS: 5000 INJECTION INTRAVENOUS; SUBCUTANEOUS at 05:03

## 2021-03-24 RX ADMIN — DEXMEDETOMIDINE HYDROCHLORIDE 0.4 MCG/KG/HR: 4 INJECTION, SOLUTION INTRAVENOUS at 12:03

## 2021-03-24 RX ADMIN — FOLIC ACID 1 MG: 1 TABLET ORAL at 09:03

## 2021-03-24 RX ADMIN — HEPARIN SODIUM 5000 UNITS: 5000 INJECTION INTRAVENOUS; SUBCUTANEOUS at 09:03

## 2021-03-24 RX ADMIN — CLOBAZAM 10 MG: 10 TABLET ORAL at 09:03

## 2021-03-24 RX ADMIN — HEPARIN SODIUM 5000 UNITS: 5000 INJECTION INTRAVENOUS; SUBCUTANEOUS at 03:03

## 2021-03-24 RX ADMIN — VALPROATE SODIUM 1000 MG: 100 INJECTION, SOLUTION INTRAVENOUS at 05:03

## 2021-03-24 RX ADMIN — INSULIN ASPART 1 UNITS: 100 INJECTION, SOLUTION INTRAVENOUS; SUBCUTANEOUS at 02:03

## 2021-03-24 RX ADMIN — Medication 100 MG: at 09:03

## 2021-03-24 RX ADMIN — DOCUSATE SODIUM 50MG AND SENNOSIDES 8.6MG 1 TABLET: 8.6; 5 TABLET, FILM COATED ORAL at 09:03

## 2021-03-24 RX ADMIN — VALPROATE SODIUM 1000 MG: 100 INJECTION, SOLUTION INTRAVENOUS at 09:03

## 2021-03-24 RX ADMIN — VALPROATE SODIUM 1000 MG: 100 INJECTION, SOLUTION INTRAVENOUS at 03:03

## 2021-03-24 RX ADMIN — INSULIN ASPART 4 UNITS: 100 INJECTION, SOLUTION INTRAVENOUS; SUBCUTANEOUS at 01:03

## 2021-03-24 RX ADMIN — CLOBAZAM 20 MG: 10 TABLET ORAL at 09:03

## 2021-03-24 RX ADMIN — SODIUM CHLORIDE 500 ML: 0.9 INJECTION, SOLUTION INTRAVENOUS at 09:03

## 2021-03-24 RX ADMIN — INSULIN ASPART 5 UNITS: 100 INJECTION, SOLUTION INTRAVENOUS; SUBCUTANEOUS at 09:03

## 2021-03-24 RX ADMIN — LEVETIRACETAM 750 MG: 100 INJECTION, SOLUTION, CONCENTRATE INTRAVENOUS at 09:03

## 2021-03-24 RX ADMIN — INSULIN ASPART 2 UNITS: 100 INJECTION, SOLUTION INTRAVENOUS; SUBCUTANEOUS at 05:03

## 2021-03-24 RX ADMIN — NICOTINE 1 PATCH: 21 PATCH, EXTENDED RELEASE TRANSDERMAL at 09:03

## 2021-03-24 RX ADMIN — LEVETIRACETAM INJECTION 1500 MG: 15 INJECTION INTRAVENOUS at 09:03

## 2021-03-25 LAB
ALBUMIN SERPL BCP-MCNC: 2.7 G/DL (ref 3.5–5.2)
ALP SERPL-CCNC: 77 U/L (ref 55–135)
ALT SERPL W/O P-5'-P-CCNC: 14 U/L (ref 10–44)
ANION GAP SERPL CALC-SCNC: 9 MMOL/L (ref 8–16)
AST SERPL-CCNC: 13 U/L (ref 10–40)
BASOPHILS # BLD AUTO: 0.04 K/UL (ref 0–0.2)
BASOPHILS NFR BLD: 0.3 % (ref 0–1.9)
BILIRUB SERPL-MCNC: 0.2 MG/DL (ref 0.1–1)
BUN SERPL-MCNC: 13 MG/DL (ref 6–20)
CALCIUM SERPL-MCNC: 8.8 MG/DL (ref 8.7–10.5)
CHLORIDE SERPL-SCNC: 98 MMOL/L (ref 95–110)
CO2 SERPL-SCNC: 27 MMOL/L (ref 23–29)
CREAT SERPL-MCNC: 0.7 MG/DL (ref 0.5–1.4)
DIFFERENTIAL METHOD: ABNORMAL
EOSINOPHIL # BLD AUTO: 0 K/UL (ref 0–0.5)
EOSINOPHIL NFR BLD: 0.3 % (ref 0–8)
ERYTHROCYTE [DISTWIDTH] IN BLOOD BY AUTOMATED COUNT: 13.4 % (ref 11.5–14.5)
EST. GFR  (AFRICAN AMERICAN): >60 ML/MIN/1.73 M^2
EST. GFR  (NON AFRICAN AMERICAN): >60 ML/MIN/1.73 M^2
GLUCOSE SERPL-MCNC: 291 MG/DL (ref 70–110)
HCT VFR BLD AUTO: 29.6 % (ref 40–54)
HGB BLD-MCNC: 9.9 G/DL (ref 14–18)
IMM GRANULOCYTES # BLD AUTO: 0.07 K/UL (ref 0–0.04)
IMM GRANULOCYTES NFR BLD AUTO: 0.5 % (ref 0–0.5)
LYMPHOCYTES # BLD AUTO: 2.4 K/UL (ref 1–4.8)
LYMPHOCYTES NFR BLD: 16.5 % (ref 18–48)
MAGNESIUM SERPL-MCNC: 1.7 MG/DL (ref 1.6–2.6)
MCH RBC QN AUTO: 31 PG (ref 27–31)
MCHC RBC AUTO-ENTMCNC: 33.4 G/DL (ref 32–36)
MCV RBC AUTO: 93 FL (ref 82–98)
MONOCYTES # BLD AUTO: 1.5 K/UL (ref 0.3–1)
MONOCYTES NFR BLD: 10.7 % (ref 4–15)
NEUTROPHILS # BLD AUTO: 10.2 K/UL (ref 1.8–7.7)
NEUTROPHILS NFR BLD: 71.7 % (ref 38–73)
NRBC BLD-RTO: 0 /100 WBC
PHOSPHATE SERPL-MCNC: 3.8 MG/DL (ref 2.7–4.5)
PLATELET # BLD AUTO: 305 K/UL (ref 150–350)
PMV BLD AUTO: 9.9 FL (ref 9.2–12.9)
POCT GLUCOSE: 284 MG/DL (ref 70–110)
POCT GLUCOSE: 299 MG/DL (ref 70–110)
POCT GLUCOSE: 344 MG/DL (ref 70–110)
POCT GLUCOSE: 360 MG/DL (ref 70–110)
POCT GLUCOSE: 466 MG/DL (ref 70–110)
POTASSIUM SERPL-SCNC: 3.8 MMOL/L (ref 3.5–5.1)
PROT SERPL-MCNC: 6.2 G/DL (ref 6–8.4)
RBC # BLD AUTO: 3.19 M/UL (ref 4.6–6.2)
SODIUM SERPL-SCNC: 134 MMOL/L (ref 136–145)
VALPROATE SERPL-MCNC: 87.1 UG/ML (ref 50–100)
WBC # BLD AUTO: 14.25 K/UL (ref 3.9–12.7)

## 2021-03-25 PROCEDURE — 25000003 PHARM REV CODE 250: Performed by: PHYSICIAN ASSISTANT

## 2021-03-25 PROCEDURE — 99233 PR SUBSEQUENT HOSPITAL CARE,LEVL III: ICD-10-PCS | Mod: ,,, | Performed by: PSYCHIATRY & NEUROLOGY

## 2021-03-25 PROCEDURE — 80053 COMPREHEN METABOLIC PANEL: CPT | Performed by: STUDENT IN AN ORGANIZED HEALTH CARE EDUCATION/TRAINING PROGRAM

## 2021-03-25 PROCEDURE — 80164 ASSAY DIPROPYLACETIC ACD TOT: CPT | Performed by: PSYCHIATRY & NEUROLOGY

## 2021-03-25 PROCEDURE — 85025 COMPLETE CBC W/AUTO DIFF WBC: CPT | Performed by: STUDENT IN AN ORGANIZED HEALTH CARE EDUCATION/TRAINING PROGRAM

## 2021-03-25 PROCEDURE — 27000221 HC OXYGEN, UP TO 24 HOURS

## 2021-03-25 PROCEDURE — 63600175 PHARM REV CODE 636 W HCPCS: Performed by: PSYCHIATRY & NEUROLOGY

## 2021-03-25 PROCEDURE — 83735 ASSAY OF MAGNESIUM: CPT | Performed by: STUDENT IN AN ORGANIZED HEALTH CARE EDUCATION/TRAINING PROGRAM

## 2021-03-25 PROCEDURE — 63600175 PHARM REV CODE 636 W HCPCS: Performed by: PHYSICIAN ASSISTANT

## 2021-03-25 PROCEDURE — 63600175 PHARM REV CODE 636 W HCPCS: Performed by: STUDENT IN AN ORGANIZED HEALTH CARE EDUCATION/TRAINING PROGRAM

## 2021-03-25 PROCEDURE — 84100 ASSAY OF PHOSPHORUS: CPT | Performed by: STUDENT IN AN ORGANIZED HEALTH CARE EDUCATION/TRAINING PROGRAM

## 2021-03-25 PROCEDURE — 94761 N-INVAS EAR/PLS OXIMETRY MLT: CPT

## 2021-03-25 PROCEDURE — 99900035 HC TECH TIME PER 15 MIN (STAT)

## 2021-03-25 PROCEDURE — 25000003 PHARM REV CODE 250: Performed by: STUDENT IN AN ORGANIZED HEALTH CARE EDUCATION/TRAINING PROGRAM

## 2021-03-25 PROCEDURE — S4991 NICOTINE PATCH NONLEGEND: HCPCS | Performed by: STUDENT IN AN ORGANIZED HEALTH CARE EDUCATION/TRAINING PROGRAM

## 2021-03-25 PROCEDURE — 92610 EVALUATE SWALLOWING FUNCTION: CPT

## 2021-03-25 PROCEDURE — 20600001 HC STEP DOWN PRIVATE ROOM

## 2021-03-25 PROCEDURE — 99233 SBSQ HOSP IP/OBS HIGH 50: CPT | Mod: ,,, | Performed by: PSYCHIATRY & NEUROLOGY

## 2021-03-25 RX ORDER — IBUPROFEN 200 MG
16 TABLET ORAL
Status: DISCONTINUED | OUTPATIENT
Start: 2021-03-25 | End: 2021-04-05 | Stop reason: HOSPADM

## 2021-03-25 RX ORDER — FOLIC ACID 1 MG/1
1 TABLET ORAL DAILY
Status: CANCELLED | OUTPATIENT
Start: 2021-03-26

## 2021-03-25 RX ORDER — AMOXICILLIN 250 MG
1 CAPSULE ORAL DAILY
Status: CANCELLED | OUTPATIENT
Start: 2021-03-26

## 2021-03-25 RX ORDER — CLOBAZAM 10 MG/1
10 TABLET ORAL DAILY
Status: CANCELLED | OUTPATIENT
Start: 2021-03-26

## 2021-03-25 RX ORDER — IBUPROFEN 200 MG
24 TABLET ORAL
Status: DISCONTINUED | OUTPATIENT
Start: 2021-03-25 | End: 2021-04-05 | Stop reason: HOSPADM

## 2021-03-25 RX ORDER — ACETAMINOPHEN 325 MG/1
650 TABLET ORAL EVERY 6 HOURS PRN
Status: CANCELLED | OUTPATIENT
Start: 2021-03-25

## 2021-03-25 RX ORDER — INSULIN ASPART 100 [IU]/ML
3 INJECTION, SOLUTION INTRAVENOUS; SUBCUTANEOUS
Status: DISCONTINUED | OUTPATIENT
Start: 2021-03-25 | End: 2021-03-26

## 2021-03-25 RX ORDER — RISPERIDONE 1 MG/1
1 TABLET ORAL 2 TIMES DAILY
Status: CANCELLED | OUTPATIENT
Start: 2021-03-25

## 2021-03-25 RX ORDER — GLUCAGON 1 MG
1 KIT INJECTION
Status: DISCONTINUED | OUTPATIENT
Start: 2021-03-25 | End: 2021-04-05 | Stop reason: HOSPADM

## 2021-03-25 RX ORDER — THIAMINE HCL 100 MG
100 TABLET ORAL DAILY
Status: CANCELLED | OUTPATIENT
Start: 2021-03-26

## 2021-03-25 RX ORDER — CLOBAZAM 10 MG/1
20 TABLET ORAL NIGHTLY
Status: CANCELLED | OUTPATIENT
Start: 2021-03-25

## 2021-03-25 RX ORDER — INSULIN ASPART 100 [IU]/ML
1-10 INJECTION, SOLUTION INTRAVENOUS; SUBCUTANEOUS
Status: DISCONTINUED | OUTPATIENT
Start: 2021-03-25 | End: 2021-03-28

## 2021-03-25 RX ADMIN — VALPROATE SODIUM 1000 MG: 100 INJECTION, SOLUTION INTRAVENOUS at 03:03

## 2021-03-25 RX ADMIN — INSULIN ASPART 8 UNITS: 100 INJECTION, SOLUTION INTRAVENOUS; SUBCUTANEOUS at 02:03

## 2021-03-25 RX ADMIN — INSULIN ASPART 5 UNITS: 100 INJECTION, SOLUTION INTRAVENOUS; SUBCUTANEOUS at 11:03

## 2021-03-25 RX ADMIN — INSULIN ASPART 10 UNITS: 100 INJECTION, SOLUTION INTRAVENOUS; SUBCUTANEOUS at 06:03

## 2021-03-25 RX ADMIN — FOLIC ACID 1 MG: 1 TABLET ORAL at 08:03

## 2021-03-25 RX ADMIN — CLOBAZAM 20 MG: 10 TABLET ORAL at 09:03

## 2021-03-25 RX ADMIN — LEVETIRACETAM 750 MG: 100 INJECTION, SOLUTION, CONCENTRATE INTRAVENOUS at 09:03

## 2021-03-25 RX ADMIN — RISPERIDONE 1 MG: 1 TABLET ORAL at 06:03

## 2021-03-25 RX ADMIN — HEPARIN SODIUM 5000 UNITS: 5000 INJECTION INTRAVENOUS; SUBCUTANEOUS at 02:03

## 2021-03-25 RX ADMIN — CLOBAZAM 10 MG: 10 TABLET ORAL at 08:03

## 2021-03-25 RX ADMIN — INSULIN ASPART 3 UNITS: 100 INJECTION, SOLUTION INTRAVENOUS; SUBCUTANEOUS at 11:03

## 2021-03-25 RX ADMIN — VALPROATE SODIUM 1000 MG: 100 INJECTION, SOLUTION INTRAVENOUS at 05:03

## 2021-03-25 RX ADMIN — HEPARIN SODIUM 5000 UNITS: 5000 INJECTION INTRAVENOUS; SUBCUTANEOUS at 05:03

## 2021-03-25 RX ADMIN — INSULIN ASPART 6 UNITS: 100 INJECTION, SOLUTION INTRAVENOUS; SUBCUTANEOUS at 05:03

## 2021-03-25 RX ADMIN — INSULIN ASPART 3 UNITS: 100 INJECTION, SOLUTION INTRAVENOUS; SUBCUTANEOUS at 05:03

## 2021-03-25 RX ADMIN — INSULIN ASPART 3 UNITS: 100 INJECTION, SOLUTION INTRAVENOUS; SUBCUTANEOUS at 02:03

## 2021-03-25 RX ADMIN — VALPROATE SODIUM 1000 MG: 100 INJECTION, SOLUTION INTRAVENOUS at 10:03

## 2021-03-25 RX ADMIN — INSULIN ASPART 10 UNITS: 100 INJECTION, SOLUTION INTRAVENOUS; SUBCUTANEOUS at 10:03

## 2021-03-25 RX ADMIN — Medication 100 MG: at 08:03

## 2021-03-25 RX ADMIN — LEVETIRACETAM 750 MG: 100 INJECTION, SOLUTION, CONCENTRATE INTRAVENOUS at 10:03

## 2021-03-25 RX ADMIN — DOCUSATE SODIUM 50MG AND SENNOSIDES 8.6MG 1 TABLET: 8.6; 5 TABLET, FILM COATED ORAL at 08:03

## 2021-03-25 RX ADMIN — INSULIN ASPART 3 UNITS: 100 INJECTION, SOLUTION INTRAVENOUS; SUBCUTANEOUS at 01:03

## 2021-03-25 RX ADMIN — RISPERIDONE 1 MG: 1 TABLET ORAL at 05:03

## 2021-03-25 RX ADMIN — HEPARIN SODIUM 5000 UNITS: 5000 INJECTION INTRAVENOUS; SUBCUTANEOUS at 09:03

## 2021-03-25 RX ADMIN — DICLOFENAC SODIUM 2 G: 10 GEL TOPICAL at 08:03

## 2021-03-26 PROBLEM — N30.00 ACUTE CYSTITIS WITHOUT HEMATURIA: Status: RESOLVED | Noted: 2021-02-26 | Resolved: 2021-03-26

## 2021-03-26 PROBLEM — T68.XXXA HYPOTHERMIA: Status: RESOLVED | Noted: 2021-03-20 | Resolved: 2021-03-26

## 2021-03-26 PROBLEM — E87.8 REFEEDING SYNDROME: Status: RESOLVED | Noted: 2021-02-13 | Resolved: 2021-03-26

## 2021-03-26 PROBLEM — E16.2 HYPOGLYCEMIA: Status: RESOLVED | Noted: 2021-03-21 | Resolved: 2021-03-26

## 2021-03-26 LAB
ALBUMIN SERPL BCP-MCNC: 2.7 G/DL (ref 3.5–5.2)
ALP SERPL-CCNC: 98 U/L (ref 55–135)
ALT SERPL W/O P-5'-P-CCNC: 13 U/L (ref 10–44)
ANION GAP SERPL CALC-SCNC: 11 MMOL/L (ref 8–16)
AST SERPL-CCNC: 15 U/L (ref 10–40)
BASOPHILS # BLD AUTO: 0.05 K/UL (ref 0–0.2)
BASOPHILS NFR BLD: 0.7 % (ref 0–1.9)
BILIRUB SERPL-MCNC: 0.1 MG/DL (ref 0.1–1)
BUN SERPL-MCNC: 17 MG/DL (ref 6–20)
CALCIUM SERPL-MCNC: 9 MG/DL (ref 8.7–10.5)
CHLORIDE SERPL-SCNC: 101 MMOL/L (ref 95–110)
CO2 SERPL-SCNC: 27 MMOL/L (ref 23–29)
CREAT SERPL-MCNC: 0.7 MG/DL (ref 0.5–1.4)
DIFFERENTIAL METHOD: ABNORMAL
EOSINOPHIL # BLD AUTO: 0.1 K/UL (ref 0–0.5)
EOSINOPHIL NFR BLD: 1.9 % (ref 0–8)
ERYTHROCYTE [DISTWIDTH] IN BLOOD BY AUTOMATED COUNT: 13.4 % (ref 11.5–14.5)
EST. GFR  (AFRICAN AMERICAN): >60 ML/MIN/1.73 M^2
EST. GFR  (NON AFRICAN AMERICAN): >60 ML/MIN/1.73 M^2
GAD65 AB SER-SCNC: 0.02 NMOL/L
GLUCOSE SERPL-MCNC: 237 MG/DL (ref 70–110)
HCT VFR BLD AUTO: 27.4 % (ref 40–54)
HGB BLD-MCNC: 9.2 G/DL (ref 14–18)
IMM GRANULOCYTES # BLD AUTO: 0.09 K/UL (ref 0–0.04)
IMM GRANULOCYTES NFR BLD AUTO: 1.2 % (ref 0–0.5)
LYMPHOCYTES # BLD AUTO: 2.4 K/UL (ref 1–4.8)
LYMPHOCYTES NFR BLD: 31.8 % (ref 18–48)
MAGNESIUM SERPL-MCNC: 1.6 MG/DL (ref 1.6–2.6)
MCH RBC QN AUTO: 31.5 PG (ref 27–31)
MCHC RBC AUTO-ENTMCNC: 33.6 G/DL (ref 32–36)
MCV RBC AUTO: 94 FL (ref 82–98)
MONOCYTES # BLD AUTO: 1.3 K/UL (ref 0.3–1)
MONOCYTES NFR BLD: 17.4 % (ref 4–15)
NEUTROPHILS # BLD AUTO: 3.5 K/UL (ref 1.8–7.7)
NEUTROPHILS NFR BLD: 47 % (ref 38–73)
NRBC BLD-RTO: 0 /100 WBC
PHOSPHATE SERPL-MCNC: 3.2 MG/DL (ref 2.7–4.5)
PLATELET # BLD AUTO: 290 K/UL (ref 150–350)
PMV BLD AUTO: 10.6 FL (ref 9.2–12.9)
POCT GLUCOSE: 268 MG/DL (ref 70–110)
POCT GLUCOSE: 290 MG/DL (ref 70–110)
POCT GLUCOSE: 361 MG/DL (ref 70–110)
POTASSIUM SERPL-SCNC: 3.5 MMOL/L (ref 3.5–5.1)
PROT SERPL-MCNC: 5.9 G/DL (ref 6–8.4)
RBC # BLD AUTO: 2.92 M/UL (ref 4.6–6.2)
SODIUM SERPL-SCNC: 139 MMOL/L (ref 136–145)
WBC # BLD AUTO: 7.48 K/UL (ref 3.9–12.7)

## 2021-03-26 PROCEDURE — 97535 SELF CARE MNGMENT TRAINING: CPT

## 2021-03-26 PROCEDURE — 97530 THERAPEUTIC ACTIVITIES: CPT

## 2021-03-26 PROCEDURE — 63600175 PHARM REV CODE 636 W HCPCS: Performed by: STUDENT IN AN ORGANIZED HEALTH CARE EDUCATION/TRAINING PROGRAM

## 2021-03-26 PROCEDURE — 85025 COMPLETE CBC W/AUTO DIFF WBC: CPT | Performed by: INTERNAL MEDICINE

## 2021-03-26 PROCEDURE — 99222 PR INITIAL HOSPITAL CARE,LEVL II: ICD-10-PCS | Mod: ,,, | Performed by: NURSE PRACTITIONER

## 2021-03-26 PROCEDURE — 97116 GAIT TRAINING THERAPY: CPT

## 2021-03-26 PROCEDURE — C9399 UNCLASSIFIED DRUGS OR BIOLOG: HCPCS | Performed by: STUDENT IN AN ORGANIZED HEALTH CARE EDUCATION/TRAINING PROGRAM

## 2021-03-26 PROCEDURE — 25000003 PHARM REV CODE 250: Performed by: INTERNAL MEDICINE

## 2021-03-26 PROCEDURE — 80053 COMPREHEN METABOLIC PANEL: CPT | Performed by: INTERNAL MEDICINE

## 2021-03-26 PROCEDURE — 93010 ELECTROCARDIOGRAM REPORT: CPT | Mod: ,,, | Performed by: INTERNAL MEDICINE

## 2021-03-26 PROCEDURE — 97161 PT EVAL LOW COMPLEX 20 MIN: CPT

## 2021-03-26 PROCEDURE — 36415 COLL VENOUS BLD VENIPUNCTURE: CPT | Performed by: INTERNAL MEDICINE

## 2021-03-26 PROCEDURE — 97168 OT RE-EVAL EST PLAN CARE: CPT

## 2021-03-26 PROCEDURE — 93005 ELECTROCARDIOGRAM TRACING: CPT

## 2021-03-26 PROCEDURE — 25000003 PHARM REV CODE 250: Performed by: PHYSICIAN ASSISTANT

## 2021-03-26 PROCEDURE — 63600175 PHARM REV CODE 636 W HCPCS: Performed by: PHYSICIAN ASSISTANT

## 2021-03-26 PROCEDURE — 99233 SBSQ HOSP IP/OBS HIGH 50: CPT | Mod: ,,, | Performed by: INTERNAL MEDICINE

## 2021-03-26 PROCEDURE — 99233 PR SUBSEQUENT HOSPITAL CARE,LEVL III: ICD-10-PCS | Mod: ,,, | Performed by: INTERNAL MEDICINE

## 2021-03-26 PROCEDURE — 20600001 HC STEP DOWN PRIVATE ROOM

## 2021-03-26 PROCEDURE — 25000003 PHARM REV CODE 250: Performed by: STUDENT IN AN ORGANIZED HEALTH CARE EDUCATION/TRAINING PROGRAM

## 2021-03-26 PROCEDURE — 93010 EKG 12-LEAD: ICD-10-PCS | Mod: ,,, | Performed by: INTERNAL MEDICINE

## 2021-03-26 PROCEDURE — 84100 ASSAY OF PHOSPHORUS: CPT | Performed by: INTERNAL MEDICINE

## 2021-03-26 PROCEDURE — 99222 1ST HOSP IP/OBS MODERATE 55: CPT | Mod: ,,, | Performed by: NURSE PRACTITIONER

## 2021-03-26 PROCEDURE — 83735 ASSAY OF MAGNESIUM: CPT | Performed by: INTERNAL MEDICINE

## 2021-03-26 RX ORDER — RISPERIDONE 1 MG/1
1 TABLET ORAL 2 TIMES DAILY
Status: DISCONTINUED | OUTPATIENT
Start: 2021-03-26 | End: 2021-04-01

## 2021-03-26 RX ORDER — CLOBAZAM 10 MG/1
TABLET ORAL
Qty: 90 EACH | Refills: 2 | Status: SHIPPED | OUTPATIENT
Start: 2021-03-26 | End: 2021-03-26 | Stop reason: HOSPADM

## 2021-03-26 RX ORDER — IBUPROFEN 200 MG
1 TABLET ORAL DAILY
Status: DISCONTINUED | OUTPATIENT
Start: 2021-03-26 | End: 2021-03-28

## 2021-03-26 RX ORDER — CLOBAZAM 10 MG/1
20 TABLET ORAL NIGHTLY
Status: DISCONTINUED | OUTPATIENT
Start: 2021-03-26 | End: 2021-04-05 | Stop reason: HOSPADM

## 2021-03-26 RX ORDER — CLOBAZAM 10 MG/1
10 TABLET ORAL DAILY
Qty: 30 EACH | Refills: 2 | Status: CANCELLED | OUTPATIENT
Start: 2021-03-27 | End: 2021-09-23

## 2021-03-26 RX ORDER — INSULIN ASPART 100 [IU]/ML
9 INJECTION, SOLUTION INTRAVENOUS; SUBCUTANEOUS
Status: DISCONTINUED | OUTPATIENT
Start: 2021-03-26 | End: 2021-03-27

## 2021-03-26 RX ORDER — CLOBAZAM 20 MG/1
20 TABLET ORAL NIGHTLY
Qty: 30 TABLET | Refills: 2 | Status: CANCELLED | OUTPATIENT
Start: 2021-03-26 | End: 2021-09-22

## 2021-03-26 RX ORDER — FOLIC ACID 1 MG/1
1 TABLET ORAL DAILY
Status: DISCONTINUED | OUTPATIENT
Start: 2021-03-27 | End: 2021-04-05 | Stop reason: HOSPADM

## 2021-03-26 RX ORDER — LEVETIRACETAM 750 MG/1
750 TABLET ORAL 2 TIMES DAILY
Status: DISCONTINUED | OUTPATIENT
Start: 2021-03-26 | End: 2021-04-05 | Stop reason: HOSPADM

## 2021-03-26 RX ORDER — AMOXICILLIN 250 MG
1 CAPSULE ORAL DAILY
Status: DISCONTINUED | OUTPATIENT
Start: 2021-03-27 | End: 2021-04-05 | Stop reason: HOSPADM

## 2021-03-26 RX ORDER — LEVETIRACETAM 750 MG/1
750 TABLET ORAL 2 TIMES DAILY
Qty: 60 TABLET | Refills: 2 | Status: SHIPPED | OUTPATIENT
Start: 2021-03-26 | End: 2021-05-18 | Stop reason: SDUPTHER

## 2021-03-26 RX ORDER — DIVALPROEX SODIUM 500 MG/1
1000 TABLET, DELAYED RELEASE ORAL EVERY 8 HOURS
Qty: 180 TABLET | Refills: 2 | Status: SHIPPED | OUTPATIENT
Start: 2021-03-26 | End: 2021-03-29

## 2021-03-26 RX ORDER — ACETAMINOPHEN 325 MG/1
650 TABLET ORAL EVERY 6 HOURS PRN
Status: DISCONTINUED | OUTPATIENT
Start: 2021-03-26 | End: 2021-04-05 | Stop reason: HOSPADM

## 2021-03-26 RX ORDER — CLOBAZAM 10 MG/1
10 TABLET ORAL DAILY
Qty: 30 EACH | Refills: 2 | Status: SHIPPED | OUTPATIENT
Start: 2021-03-27 | End: 2021-06-24

## 2021-03-26 RX ORDER — DIVALPROEX SODIUM 125 MG/1
1000 CAPSULE, COATED PELLETS ORAL EVERY 8 HOURS
Status: DISCONTINUED | OUTPATIENT
Start: 2021-03-26 | End: 2021-03-28

## 2021-03-26 RX ORDER — THIAMINE HCL 100 MG
100 TABLET ORAL DAILY
Status: DISCONTINUED | OUTPATIENT
Start: 2021-03-27 | End: 2021-04-05 | Stop reason: HOSPADM

## 2021-03-26 RX ORDER — INSULIN ASPART 100 [IU]/ML
8 INJECTION, SOLUTION INTRAVENOUS; SUBCUTANEOUS
Status: DISCONTINUED | OUTPATIENT
Start: 2021-03-26 | End: 2021-03-26

## 2021-03-26 RX ORDER — CLOBAZAM 20 MG/1
20 TABLET ORAL NIGHTLY
Qty: 30 TABLET | Refills: 2 | Status: SHIPPED | OUTPATIENT
Start: 2021-03-26 | End: 2021-06-24

## 2021-03-26 RX ORDER — CLOBAZAM 10 MG/1
10 TABLET ORAL DAILY
Status: DISCONTINUED | OUTPATIENT
Start: 2021-03-27 | End: 2021-04-05 | Stop reason: HOSPADM

## 2021-03-26 RX ORDER — CLOBAZAM 10 MG/1
TABLET ORAL
Qty: 90 EACH | Refills: 2 | Status: CANCELLED | OUTPATIENT
Start: 2021-03-26 | End: 2021-09-22

## 2021-03-26 RX ADMIN — CLOBAZAM 10 MG: 10 TABLET ORAL at 08:03

## 2021-03-26 RX ADMIN — INSULIN ASPART 10 UNITS: 100 INJECTION, SOLUTION INTRAVENOUS; SUBCUTANEOUS at 11:03

## 2021-03-26 RX ADMIN — VALPROATE SODIUM 1000 MG: 100 INJECTION, SOLUTION INTRAVENOUS at 06:03

## 2021-03-26 RX ADMIN — INSULIN ASPART 4 UNITS: 100 INJECTION, SOLUTION INTRAVENOUS; SUBCUTANEOUS at 04:03

## 2021-03-26 RX ADMIN — HEPARIN SODIUM 5000 UNITS: 5000 INJECTION INTRAVENOUS; SUBCUTANEOUS at 02:03

## 2021-03-26 RX ADMIN — DIVALPROEX SODIUM 1000 MG: 125 CAPSULE, COATED PELLETS ORAL at 02:03

## 2021-03-26 RX ADMIN — RISPERIDONE 1 MG: 1 TABLET ORAL at 04:03

## 2021-03-26 RX ADMIN — INSULIN ASPART 9 UNITS: 100 INJECTION, SOLUTION INTRAVENOUS; SUBCUTANEOUS at 04:03

## 2021-03-26 RX ADMIN — CLOBAZAM 20 MG: 10 TABLET ORAL at 08:03

## 2021-03-26 RX ADMIN — INSULIN ASPART 6 UNITS: 100 INJECTION, SOLUTION INTRAVENOUS; SUBCUTANEOUS at 08:03

## 2021-03-26 RX ADMIN — LEVETIRACETAM 750 MG: 100 INJECTION, SOLUTION, CONCENTRATE INTRAVENOUS at 08:03

## 2021-03-26 RX ADMIN — INSULIN ASPART 3 UNITS: 100 INJECTION, SOLUTION INTRAVENOUS; SUBCUTANEOUS at 08:03

## 2021-03-26 RX ADMIN — DIVALPROEX SODIUM 1000 MG: 125 CAPSULE, COATED PELLETS ORAL at 08:03

## 2021-03-26 RX ADMIN — RISPERIDONE 1 MG: 1 TABLET ORAL at 06:03

## 2021-03-26 RX ADMIN — FOLIC ACID 1 MG: 1 TABLET ORAL at 08:03

## 2021-03-26 RX ADMIN — INSULIN ASPART 3 UNITS: 100 INJECTION, SOLUTION INTRAVENOUS; SUBCUTANEOUS at 11:03

## 2021-03-26 RX ADMIN — DICLOFENAC SODIUM 2 G: 10 GEL TOPICAL at 08:03

## 2021-03-26 RX ADMIN — Medication 100 MG: at 08:03

## 2021-03-26 RX ADMIN — LEVETIRACETAM 750 MG: 750 TABLET ORAL at 08:03

## 2021-03-26 RX ADMIN — INSULIN DETEMIR 14 UNITS: 100 INJECTION, SOLUTION SUBCUTANEOUS at 08:03

## 2021-03-26 RX ADMIN — HEPARIN SODIUM 5000 UNITS: 5000 INJECTION INTRAVENOUS; SUBCUTANEOUS at 08:03

## 2021-03-26 RX ADMIN — DOCUSATE SODIUM 50MG AND SENNOSIDES 8.6MG 1 TABLET: 8.6; 5 TABLET, FILM COATED ORAL at 08:03

## 2021-03-26 RX ADMIN — HEPARIN SODIUM 5000 UNITS: 5000 INJECTION INTRAVENOUS; SUBCUTANEOUS at 06:03

## 2021-03-27 PROBLEM — G40.919 REFRACTORY SEIZURE: Status: ACTIVE | Noted: 2021-02-18

## 2021-03-27 LAB
ALBUMIN SERPL BCP-MCNC: 2.6 G/DL (ref 3.5–5.2)
ALP SERPL-CCNC: 113 U/L (ref 55–135)
ALT SERPL W/O P-5'-P-CCNC: 12 U/L (ref 10–44)
ANION GAP SERPL CALC-SCNC: 8 MMOL/L (ref 8–16)
AST SERPL-CCNC: 13 U/L (ref 10–40)
BASOPHILS # BLD AUTO: 0.04 K/UL (ref 0–0.2)
BASOPHILS NFR BLD: 0.6 % (ref 0–1.9)
BILIRUB SERPL-MCNC: 0.2 MG/DL (ref 0.1–1)
BUN SERPL-MCNC: 19 MG/DL (ref 6–20)
CALCIUM SERPL-MCNC: 9.1 MG/DL (ref 8.7–10.5)
CHLORIDE SERPL-SCNC: 98 MMOL/L (ref 95–110)
CO2 SERPL-SCNC: 31 MMOL/L (ref 23–29)
CREAT SERPL-MCNC: 0.7 MG/DL (ref 0.5–1.4)
DIFFERENTIAL METHOD: ABNORMAL
EOSINOPHIL # BLD AUTO: 0.2 K/UL (ref 0–0.5)
EOSINOPHIL NFR BLD: 2.5 % (ref 0–8)
ERYTHROCYTE [DISTWIDTH] IN BLOOD BY AUTOMATED COUNT: 13.3 % (ref 11.5–14.5)
EST. GFR  (AFRICAN AMERICAN): >60 ML/MIN/1.73 M^2
EST. GFR  (NON AFRICAN AMERICAN): >60 ML/MIN/1.73 M^2
GLUCOSE SERPL-MCNC: 373 MG/DL (ref 70–110)
HCT VFR BLD AUTO: 28.6 % (ref 40–54)
HGB BLD-MCNC: 9.5 G/DL (ref 14–18)
IMM GRANULOCYTES # BLD AUTO: 0.07 K/UL (ref 0–0.04)
IMM GRANULOCYTES NFR BLD AUTO: 1 % (ref 0–0.5)
LYMPHOCYTES # BLD AUTO: 2.2 K/UL (ref 1–4.8)
LYMPHOCYTES NFR BLD: 32.5 % (ref 18–48)
MAGNESIUM SERPL-MCNC: 1.6 MG/DL (ref 1.6–2.6)
MCH RBC QN AUTO: 31.4 PG (ref 27–31)
MCHC RBC AUTO-ENTMCNC: 33.2 G/DL (ref 32–36)
MCV RBC AUTO: 94 FL (ref 82–98)
MONOCYTES # BLD AUTO: 1.1 K/UL (ref 0.3–1)
MONOCYTES NFR BLD: 16.5 % (ref 4–15)
NEUTROPHILS # BLD AUTO: 3.1 K/UL (ref 1.8–7.7)
NEUTROPHILS NFR BLD: 46.9 % (ref 38–73)
NRBC BLD-RTO: 0 /100 WBC
PHOSPHATE SERPL-MCNC: 3 MG/DL (ref 2.7–4.5)
PLATELET # BLD AUTO: 276 K/UL (ref 150–350)
PMV BLD AUTO: 10.6 FL (ref 9.2–12.9)
POCT GLUCOSE: 161 MG/DL (ref 70–110)
POCT GLUCOSE: 331 MG/DL (ref 70–110)
POCT GLUCOSE: 351 MG/DL (ref 70–110)
POCT GLUCOSE: 413 MG/DL (ref 70–110)
POCT GLUCOSE: 86 MG/DL (ref 70–110)
POTASSIUM SERPL-SCNC: 3.6 MMOL/L (ref 3.5–5.1)
PROT SERPL-MCNC: 5.8 G/DL (ref 6–8.4)
RBC # BLD AUTO: 3.03 M/UL (ref 4.6–6.2)
SODIUM SERPL-SCNC: 137 MMOL/L (ref 136–145)
VALPROATE SERPL-MCNC: 108.5 UG/ML (ref 50–100)
WBC # BLD AUTO: 6.68 K/UL (ref 3.9–12.7)

## 2021-03-27 PROCEDURE — 83735 ASSAY OF MAGNESIUM: CPT | Performed by: INTERNAL MEDICINE

## 2021-03-27 PROCEDURE — 36415 COLL VENOUS BLD VENIPUNCTURE: CPT | Performed by: STUDENT IN AN ORGANIZED HEALTH CARE EDUCATION/TRAINING PROGRAM

## 2021-03-27 PROCEDURE — 20600001 HC STEP DOWN PRIVATE ROOM

## 2021-03-27 PROCEDURE — 63600175 PHARM REV CODE 636 W HCPCS: Performed by: STUDENT IN AN ORGANIZED HEALTH CARE EDUCATION/TRAINING PROGRAM

## 2021-03-27 PROCEDURE — 36415 COLL VENOUS BLD VENIPUNCTURE: CPT | Performed by: INTERNAL MEDICINE

## 2021-03-27 PROCEDURE — 84100 ASSAY OF PHOSPHORUS: CPT | Performed by: INTERNAL MEDICINE

## 2021-03-27 PROCEDURE — 99233 PR SUBSEQUENT HOSPITAL CARE,LEVL III: ICD-10-PCS | Mod: ,,, | Performed by: INTERNAL MEDICINE

## 2021-03-27 PROCEDURE — 25500020 PHARM REV CODE 255: Performed by: INTERNAL MEDICINE

## 2021-03-27 PROCEDURE — 80164 ASSAY DIPROPYLACETIC ACD TOT: CPT | Performed by: STUDENT IN AN ORGANIZED HEALTH CARE EDUCATION/TRAINING PROGRAM

## 2021-03-27 PROCEDURE — 93010 EKG 12-LEAD: ICD-10-PCS | Mod: ,,, | Performed by: INTERNAL MEDICINE

## 2021-03-27 PROCEDURE — 25000003 PHARM REV CODE 250: Performed by: INTERNAL MEDICINE

## 2021-03-27 PROCEDURE — 25000003 PHARM REV CODE 250: Performed by: STUDENT IN AN ORGANIZED HEALTH CARE EDUCATION/TRAINING PROGRAM

## 2021-03-27 PROCEDURE — 93010 ELECTROCARDIOGRAM REPORT: CPT | Mod: ,,, | Performed by: INTERNAL MEDICINE

## 2021-03-27 PROCEDURE — 93005 ELECTROCARDIOGRAM TRACING: CPT

## 2021-03-27 PROCEDURE — 85025 COMPLETE CBC W/AUTO DIFF WBC: CPT | Performed by: INTERNAL MEDICINE

## 2021-03-27 PROCEDURE — 99233 SBSQ HOSP IP/OBS HIGH 50: CPT | Mod: ,,, | Performed by: INTERNAL MEDICINE

## 2021-03-27 PROCEDURE — A9585 GADOBUTROL INJECTION: HCPCS | Performed by: INTERNAL MEDICINE

## 2021-03-27 PROCEDURE — 80053 COMPREHEN METABOLIC PANEL: CPT | Performed by: INTERNAL MEDICINE

## 2021-03-27 RX ORDER — GADOBUTROL 604.72 MG/ML
6 INJECTION INTRAVENOUS
Status: COMPLETED | OUTPATIENT
Start: 2021-03-27 | End: 2021-03-27

## 2021-03-27 RX ORDER — INSULIN ASPART 100 [IU]/ML
12 INJECTION, SOLUTION INTRAVENOUS; SUBCUTANEOUS
Status: DISCONTINUED | OUTPATIENT
Start: 2021-03-27 | End: 2021-03-29

## 2021-03-27 RX ADMIN — CLOBAZAM 10 MG: 10 TABLET ORAL at 08:03

## 2021-03-27 RX ADMIN — HEPARIN SODIUM 5000 UNITS: 5000 INJECTION INTRAVENOUS; SUBCUTANEOUS at 02:03

## 2021-03-27 RX ADMIN — DIVALPROEX SODIUM 1000 MG: 125 CAPSULE, COATED PELLETS ORAL at 02:03

## 2021-03-27 RX ADMIN — FOLIC ACID 1 MG: 1 TABLET ORAL at 08:03

## 2021-03-27 RX ADMIN — HEPARIN SODIUM 5000 UNITS: 5000 INJECTION INTRAVENOUS; SUBCUTANEOUS at 05:03

## 2021-03-27 RX ADMIN — DIVALPROEX SODIUM 1000 MG: 125 CAPSULE, COATED PELLETS ORAL at 05:03

## 2021-03-27 RX ADMIN — INSULIN ASPART 12 UNITS: 100 INJECTION, SOLUTION INTRAVENOUS; SUBCUTANEOUS at 11:03

## 2021-03-27 RX ADMIN — INSULIN ASPART 12 UNITS: 100 INJECTION, SOLUTION INTRAVENOUS; SUBCUTANEOUS at 08:03

## 2021-03-27 RX ADMIN — RISPERIDONE 1 MG: 1 TABLET ORAL at 05:03

## 2021-03-27 RX ADMIN — DOCUSATE SODIUM 50MG AND SENNOSIDES 8.6MG 1 TABLET: 8.6; 5 TABLET, FILM COATED ORAL at 08:03

## 2021-03-27 RX ADMIN — HEPARIN SODIUM 5000 UNITS: 5000 INJECTION INTRAVENOUS; SUBCUTANEOUS at 09:03

## 2021-03-27 RX ADMIN — INSULIN ASPART 5 UNITS: 100 INJECTION, SOLUTION INTRAVENOUS; SUBCUTANEOUS at 09:03

## 2021-03-27 RX ADMIN — LEVETIRACETAM 750 MG: 750 TABLET ORAL at 08:03

## 2021-03-27 RX ADMIN — CLOBAZAM 20 MG: 10 TABLET ORAL at 08:03

## 2021-03-27 RX ADMIN — INSULIN ASPART 8 UNITS: 100 INJECTION, SOLUTION INTRAVENOUS; SUBCUTANEOUS at 08:03

## 2021-03-27 RX ADMIN — Medication 100 MG: at 08:03

## 2021-03-27 RX ADMIN — GADOBUTROL 6 ML: 604.72 INJECTION INTRAVENOUS at 03:03

## 2021-03-27 RX ADMIN — DIVALPROEX SODIUM 1000 MG: 125 CAPSULE, COATED PELLETS ORAL at 09:03

## 2021-03-27 RX ADMIN — INSULIN ASPART 2 UNITS: 100 INJECTION, SOLUTION INTRAVENOUS; SUBCUTANEOUS at 11:03

## 2021-03-28 LAB
BLOOD COLLECTION FOR MS PROFILE: NORMAL
POCT GLUCOSE: 168 MG/DL (ref 70–110)
POCT GLUCOSE: 235 MG/DL (ref 70–110)
POCT GLUCOSE: 236 MG/DL (ref 70–110)
POCT GLUCOSE: 298 MG/DL (ref 70–110)
POCT GLUCOSE: 84 MG/DL (ref 70–110)

## 2021-03-28 PROCEDURE — 99232 PR SUBSEQUENT HOSPITAL CARE,LEVL II: ICD-10-PCS | Mod: ,,, | Performed by: PSYCHIATRY & NEUROLOGY

## 2021-03-28 PROCEDURE — 63600175 PHARM REV CODE 636 W HCPCS: Performed by: STUDENT IN AN ORGANIZED HEALTH CARE EDUCATION/TRAINING PROGRAM

## 2021-03-28 PROCEDURE — S4991 NICOTINE PATCH NONLEGEND: HCPCS | Performed by: STUDENT IN AN ORGANIZED HEALTH CARE EDUCATION/TRAINING PROGRAM

## 2021-03-28 PROCEDURE — 99233 SBSQ HOSP IP/OBS HIGH 50: CPT | Mod: AF,HB,, | Performed by: PSYCHIATRY & NEUROLOGY

## 2021-03-28 PROCEDURE — 93010 EKG 12-LEAD: ICD-10-PCS | Mod: ,,, | Performed by: INTERNAL MEDICINE

## 2021-03-28 PROCEDURE — 99233 PR SUBSEQUENT HOSPITAL CARE,LEVL III: ICD-10-PCS | Mod: ,,, | Performed by: INTERNAL MEDICINE

## 2021-03-28 PROCEDURE — 93010 ELECTROCARDIOGRAM REPORT: CPT | Mod: ,,, | Performed by: INTERNAL MEDICINE

## 2021-03-28 PROCEDURE — 25000003 PHARM REV CODE 250: Performed by: STUDENT IN AN ORGANIZED HEALTH CARE EDUCATION/TRAINING PROGRAM

## 2021-03-28 PROCEDURE — 36415 COLL VENOUS BLD VENIPUNCTURE: CPT | Performed by: STUDENT IN AN ORGANIZED HEALTH CARE EDUCATION/TRAINING PROGRAM

## 2021-03-28 PROCEDURE — 20600001 HC STEP DOWN PRIVATE ROOM

## 2021-03-28 PROCEDURE — 93005 ELECTROCARDIOGRAM TRACING: CPT

## 2021-03-28 PROCEDURE — 99233 PR SUBSEQUENT HOSPITAL CARE,LEVL III: ICD-10-PCS | Mod: AF,HB,, | Performed by: PSYCHIATRY & NEUROLOGY

## 2021-03-28 PROCEDURE — 63600175 PHARM REV CODE 636 W HCPCS: Performed by: NURSE PRACTITIONER

## 2021-03-28 PROCEDURE — 99232 SBSQ HOSP IP/OBS MODERATE 35: CPT | Mod: ,,, | Performed by: PSYCHIATRY & NEUROLOGY

## 2021-03-28 PROCEDURE — 99233 SBSQ HOSP IP/OBS HIGH 50: CPT | Mod: ,,, | Performed by: INTERNAL MEDICINE

## 2021-03-28 PROCEDURE — 25000003 PHARM REV CODE 250: Performed by: INTERNAL MEDICINE

## 2021-03-28 RX ORDER — DIVALPROEX SODIUM 125 MG/1
1500 CAPSULE, COATED PELLETS ORAL NIGHTLY
Status: DISCONTINUED | OUTPATIENT
Start: 2021-03-28 | End: 2021-04-05 | Stop reason: HOSPADM

## 2021-03-28 RX ORDER — DIVALPROEX SODIUM 125 MG/1
1000 CAPSULE, COATED PELLETS ORAL DAILY
Status: DISCONTINUED | OUTPATIENT
Start: 2021-03-29 | End: 2021-04-05 | Stop reason: HOSPADM

## 2021-03-28 RX ORDER — INSULIN ASPART 100 [IU]/ML
0-5 INJECTION, SOLUTION INTRAVENOUS; SUBCUTANEOUS
Status: DISCONTINUED | OUTPATIENT
Start: 2021-03-28 | End: 2021-03-30

## 2021-03-28 RX ORDER — INSULIN ASPART 100 [IU]/ML
26 INJECTION, SUSPENSION SUBCUTANEOUS
Qty: 15 ML | Refills: 3 | Status: SHIPPED | OUTPATIENT
Start: 2021-03-28 | End: 2021-04-05 | Stop reason: HOSPADM

## 2021-03-28 RX ORDER — PEN NEEDLE, DIABETIC 30 GX3/16"
NEEDLE, DISPOSABLE MISCELLANEOUS
Qty: 60 EACH | Refills: 3 | Status: ON HOLD | OUTPATIENT
Start: 2021-03-28 | End: 2022-05-25 | Stop reason: SDUPTHER

## 2021-03-28 RX ADMIN — LEVETIRACETAM 750 MG: 750 TABLET ORAL at 09:03

## 2021-03-28 RX ADMIN — RISPERIDONE 1 MG: 1 TABLET ORAL at 05:03

## 2021-03-28 RX ADMIN — INSULIN ASPART 12 UNITS: 100 INJECTION, SOLUTION INTRAVENOUS; SUBCUTANEOUS at 05:03

## 2021-03-28 RX ADMIN — DIVALPROEX SODIUM 1000 MG: 125 CAPSULE, COATED PELLETS ORAL at 06:03

## 2021-03-28 RX ADMIN — HEPARIN SODIUM 5000 UNITS: 5000 INJECTION INTRAVENOUS; SUBCUTANEOUS at 06:03

## 2021-03-28 RX ADMIN — DOCUSATE SODIUM 50MG AND SENNOSIDES 8.6MG 1 TABLET: 8.6; 5 TABLET, FILM COATED ORAL at 08:03

## 2021-03-28 RX ADMIN — CLOBAZAM 20 MG: 10 TABLET ORAL at 09:03

## 2021-03-28 RX ADMIN — FOLIC ACID 1 MG: 1 TABLET ORAL at 08:03

## 2021-03-28 RX ADMIN — HEPARIN SODIUM 5000 UNITS: 5000 INJECTION INTRAVENOUS; SUBCUTANEOUS at 01:03

## 2021-03-28 RX ADMIN — INSULIN ASPART 6 UNITS: 100 INJECTION, SOLUTION INTRAVENOUS; SUBCUTANEOUS at 08:03

## 2021-03-28 RX ADMIN — CLOBAZAM 10 MG: 10 TABLET ORAL at 08:03

## 2021-03-28 RX ADMIN — DIVALPROEX SODIUM 1500 MG: 125 CAPSULE, COATED PELLETS ORAL at 10:03

## 2021-03-28 RX ADMIN — Medication 100 MG: at 08:03

## 2021-03-28 RX ADMIN — RISPERIDONE 1 MG: 1 TABLET ORAL at 06:03

## 2021-03-28 RX ADMIN — INSULIN ASPART 12 UNITS: 100 INJECTION, SOLUTION INTRAVENOUS; SUBCUTANEOUS at 08:03

## 2021-03-28 RX ADMIN — INSULIN ASPART 1 UNITS: 100 INJECTION, SOLUTION INTRAVENOUS; SUBCUTANEOUS at 09:03

## 2021-03-28 RX ADMIN — INSULIN ASPART 12 UNITS: 100 INJECTION, SOLUTION INTRAVENOUS; SUBCUTANEOUS at 12:03

## 2021-03-28 RX ADMIN — HEPARIN SODIUM 5000 UNITS: 5000 INJECTION INTRAVENOUS; SUBCUTANEOUS at 09:03

## 2021-03-28 RX ADMIN — LEVETIRACETAM 750 MG: 750 TABLET ORAL at 08:03

## 2021-03-29 PROBLEM — F19.950: Status: ACTIVE | Noted: 2021-03-29

## 2021-03-29 PROBLEM — F10.10 ETOH ABUSE: Status: RESOLVED | Noted: 2021-02-12 | Resolved: 2021-03-29

## 2021-03-29 LAB
ALBUMIN SERPL BCP-MCNC: 2.8 G/DL (ref 3.5–5.2)
ALP SERPL-CCNC: 83 U/L (ref 55–135)
ALT SERPL W/O P-5'-P-CCNC: 10 U/L (ref 10–44)
ANION GAP SERPL CALC-SCNC: 9 MMOL/L (ref 8–16)
ANISOCYTOSIS BLD QL SMEAR: SLIGHT
AST SERPL-CCNC: 12 U/L (ref 10–40)
BASO STIPL BLD QL SMEAR: ABNORMAL
BASOPHILS # BLD AUTO: 0.05 K/UL (ref 0–0.2)
BASOPHILS NFR BLD: 0.5 % (ref 0–1.9)
BILIRUB SERPL-MCNC: 0.2 MG/DL (ref 0.1–1)
BUN SERPL-MCNC: 18 MG/DL (ref 6–20)
CALCIUM SERPL-MCNC: 9.1 MG/DL (ref 8.7–10.5)
CHLORIDE SERPL-SCNC: 99 MMOL/L (ref 95–110)
CO2 SERPL-SCNC: 28 MMOL/L (ref 23–29)
CREAT SERPL-MCNC: 0.7 MG/DL (ref 0.5–1.4)
DIFFERENTIAL METHOD: ABNORMAL
EOSINOPHIL # BLD AUTO: 0.2 K/UL (ref 0–0.5)
EOSINOPHIL NFR BLD: 1.8 % (ref 0–8)
ERYTHROCYTE [DISTWIDTH] IN BLOOD BY AUTOMATED COUNT: 13.9 % (ref 11.5–14.5)
EST. GFR  (AFRICAN AMERICAN): >60 ML/MIN/1.73 M^2
EST. GFR  (NON AFRICAN AMERICAN): >60 ML/MIN/1.73 M^2
GLUCOSE SERPL-MCNC: 345 MG/DL (ref 70–110)
HCT VFR BLD AUTO: 28.7 % (ref 40–54)
HGB BLD-MCNC: 9.2 G/DL (ref 14–18)
HYPOCHROMIA BLD QL SMEAR: ABNORMAL
IMM GRANULOCYTES # BLD AUTO: 0.16 K/UL (ref 0–0.04)
IMM GRANULOCYTES NFR BLD AUTO: 1.6 % (ref 0–0.5)
INR PPP: 0.9 (ref 0.8–1.2)
LYMPHOCYTES # BLD AUTO: 2.8 K/UL (ref 1–4.8)
LYMPHOCYTES NFR BLD: 27.3 % (ref 18–48)
MAGNESIUM SERPL-MCNC: 1.6 MG/DL (ref 1.6–2.6)
MCH RBC QN AUTO: 30.2 PG (ref 27–31)
MCHC RBC AUTO-ENTMCNC: 32.1 G/DL (ref 32–36)
MCV RBC AUTO: 94 FL (ref 82–98)
MONOCYTES # BLD AUTO: 1.4 K/UL (ref 0.3–1)
MONOCYTES NFR BLD: 13.6 % (ref 4–15)
NEUTROPHILS # BLD AUTO: 5.6 K/UL (ref 1.8–7.7)
NEUTROPHILS NFR BLD: 55.2 % (ref 38–73)
NRBC BLD-RTO: 0 /100 WBC
OVALOCYTES BLD QL SMEAR: ABNORMAL
PHOSPHATE SERPL-MCNC: 4 MG/DL (ref 2.7–4.5)
PLATELET # BLD AUTO: 310 K/UL (ref 150–450)
PLATELET BLD QL SMEAR: ABNORMAL
PMV BLD AUTO: 10.4 FL (ref 9.2–12.9)
POCT GLUCOSE: 162 MG/DL (ref 70–110)
POCT GLUCOSE: 259 MG/DL (ref 70–110)
POCT GLUCOSE: 280 MG/DL (ref 70–110)
POCT GLUCOSE: 369 MG/DL (ref 70–110)
POIKILOCYTOSIS BLD QL SMEAR: SLIGHT
POLYCHROMASIA BLD QL SMEAR: ABNORMAL
POTASSIUM SERPL-SCNC: 3.8 MMOL/L (ref 3.5–5.1)
PROT SERPL-MCNC: 6.1 G/DL (ref 6–8.4)
PROTHROMBIN TIME: 10.3 SEC (ref 9–12.5)
RBC # BLD AUTO: 3.05 M/UL (ref 4.6–6.2)
SODIUM SERPL-SCNC: 136 MMOL/L (ref 136–145)
WBC # BLD AUTO: 10.09 K/UL (ref 3.9–12.7)

## 2021-03-29 PROCEDURE — 93005 ELECTROCARDIOGRAM TRACING: CPT

## 2021-03-29 PROCEDURE — 93010 EKG 12-LEAD: ICD-10-PCS | Mod: ,,, | Performed by: INTERNAL MEDICINE

## 2021-03-29 PROCEDURE — 36415 COLL VENOUS BLD VENIPUNCTURE: CPT | Performed by: STUDENT IN AN ORGANIZED HEALTH CARE EDUCATION/TRAINING PROGRAM

## 2021-03-29 PROCEDURE — 93010 ELECTROCARDIOGRAM REPORT: CPT | Mod: ,,, | Performed by: INTERNAL MEDICINE

## 2021-03-29 PROCEDURE — 80053 COMPREHEN METABOLIC PANEL: CPT | Performed by: INTERNAL MEDICINE

## 2021-03-29 PROCEDURE — 25000003 PHARM REV CODE 250: Performed by: STUDENT IN AN ORGANIZED HEALTH CARE EDUCATION/TRAINING PROGRAM

## 2021-03-29 PROCEDURE — 63600175 PHARM REV CODE 636 W HCPCS: Performed by: STUDENT IN AN ORGANIZED HEALTH CARE EDUCATION/TRAINING PROGRAM

## 2021-03-29 PROCEDURE — 85610 PROTHROMBIN TIME: CPT | Performed by: STUDENT IN AN ORGANIZED HEALTH CARE EDUCATION/TRAINING PROGRAM

## 2021-03-29 PROCEDURE — 99232 SBSQ HOSP IP/OBS MODERATE 35: CPT | Mod: ,,, | Performed by: INTERNAL MEDICINE

## 2021-03-29 PROCEDURE — 83735 ASSAY OF MAGNESIUM: CPT | Performed by: INTERNAL MEDICINE

## 2021-03-29 PROCEDURE — 99233 PR SUBSEQUENT HOSPITAL CARE,LEVL III: ICD-10-PCS | Mod: AF,HB,, | Performed by: PSYCHIATRY & NEUROLOGY

## 2021-03-29 PROCEDURE — 99233 SBSQ HOSP IP/OBS HIGH 50: CPT | Mod: AF,HB,, | Performed by: PSYCHIATRY & NEUROLOGY

## 2021-03-29 PROCEDURE — 99232 PR SUBSEQUENT HOSPITAL CARE,LEVL II: ICD-10-PCS | Mod: ,,, | Performed by: INTERNAL MEDICINE

## 2021-03-29 PROCEDURE — 85025 COMPLETE CBC W/AUTO DIFF WBC: CPT | Performed by: INTERNAL MEDICINE

## 2021-03-29 PROCEDURE — 36415 COLL VENOUS BLD VENIPUNCTURE: CPT | Performed by: INTERNAL MEDICINE

## 2021-03-29 PROCEDURE — 25000003 PHARM REV CODE 250: Performed by: INTERNAL MEDICINE

## 2021-03-29 PROCEDURE — 97530 THERAPEUTIC ACTIVITIES: CPT

## 2021-03-29 PROCEDURE — 84100 ASSAY OF PHOSPHORUS: CPT | Performed by: INTERNAL MEDICINE

## 2021-03-29 PROCEDURE — 20600001 HC STEP DOWN PRIVATE ROOM

## 2021-03-29 PROCEDURE — 97535 SELF CARE MNGMENT TRAINING: CPT

## 2021-03-29 RX ORDER — MAGNESIUM SULFATE HEPTAHYDRATE 40 MG/ML
2 INJECTION, SOLUTION INTRAVENOUS ONCE
Status: COMPLETED | OUTPATIENT
Start: 2021-03-29 | End: 2021-03-29

## 2021-03-29 RX ORDER — INSULIN ASPART 100 [IU]/ML
10 INJECTION, SOLUTION INTRAVENOUS; SUBCUTANEOUS
Status: DISCONTINUED | OUTPATIENT
Start: 2021-03-29 | End: 2021-03-30

## 2021-03-29 RX ORDER — DIVALPROEX SODIUM 500 MG/1
TABLET, DELAYED RELEASE ORAL
Qty: 150 TABLET | Refills: 2 | Status: ON HOLD | OUTPATIENT
Start: 2021-03-29 | End: 2021-06-04 | Stop reason: HOSPADM

## 2021-03-29 RX ADMIN — MAGNESIUM SULFATE IN WATER 2 G: 40 INJECTION, SOLUTION INTRAVENOUS at 09:03

## 2021-03-29 RX ADMIN — FOLIC ACID 1 MG: 1 TABLET ORAL at 09:03

## 2021-03-29 RX ADMIN — RISPERIDONE 1 MG: 1 TABLET ORAL at 06:03

## 2021-03-29 RX ADMIN — DOCUSATE SODIUM 50MG AND SENNOSIDES 8.6MG 1 TABLET: 8.6; 5 TABLET, FILM COATED ORAL at 09:03

## 2021-03-29 RX ADMIN — CLOBAZAM 10 MG: 10 TABLET ORAL at 09:03

## 2021-03-29 RX ADMIN — LEVETIRACETAM 750 MG: 750 TABLET ORAL at 09:03

## 2021-03-29 RX ADMIN — INSULIN ASPART 10 UNITS: 100 INJECTION, SOLUTION INTRAVENOUS; SUBCUTANEOUS at 05:03

## 2021-03-29 RX ADMIN — Medication 100 MG: at 09:03

## 2021-03-29 RX ADMIN — DIVALPROEX SODIUM 1500 MG: 125 CAPSULE, COATED PELLETS ORAL at 09:03

## 2021-03-29 RX ADMIN — DIVALPROEX SODIUM 1000 MG: 125 CAPSULE, COATED PELLETS ORAL at 09:03

## 2021-03-29 RX ADMIN — INSULIN ASPART 4 UNITS: 100 INJECTION, SOLUTION INTRAVENOUS; SUBCUTANEOUS at 09:03

## 2021-03-29 RX ADMIN — HEPARIN SODIUM 5000 UNITS: 5000 INJECTION INTRAVENOUS; SUBCUTANEOUS at 06:03

## 2021-03-29 RX ADMIN — RISPERIDONE 1 MG: 1 TABLET ORAL at 05:03

## 2021-03-29 RX ADMIN — CLOBAZAM 20 MG: 10 TABLET ORAL at 09:03

## 2021-03-30 PROBLEM — E11.65 TYPE 2 DIABETES MELLITUS WITH HYPERGLYCEMIA: Status: ACTIVE | Noted: 2021-03-30

## 2021-03-30 LAB
ALBUMIN SERPL BCP-MCNC: 2.8 G/DL (ref 3.5–5.2)
ALP SERPL-CCNC: 61 U/L (ref 55–135)
ALT SERPL W/O P-5'-P-CCNC: 9 U/L (ref 10–44)
ANION GAP SERPL CALC-SCNC: 10 MMOL/L (ref 8–16)
ANISOCYTOSIS BLD QL SMEAR: SLIGHT
AST SERPL-CCNC: 12 U/L (ref 10–40)
BASOPHILS # BLD AUTO: 0.05 K/UL (ref 0–0.2)
BASOPHILS NFR BLD: 0.7 % (ref 0–1.9)
BILIRUB SERPL-MCNC: 0.2 MG/DL (ref 0.1–1)
BUN SERPL-MCNC: 13 MG/DL (ref 6–20)
CALCIUM SERPL-MCNC: 9.4 MG/DL (ref 8.7–10.5)
CHLORIDE SERPL-SCNC: 104 MMOL/L (ref 95–110)
CO2 SERPL-SCNC: 29 MMOL/L (ref 23–29)
CREAT SERPL-MCNC: 0.6 MG/DL (ref 0.5–1.4)
DIFFERENTIAL METHOD: ABNORMAL
EOSINOPHIL # BLD AUTO: 0.2 K/UL (ref 0–0.5)
EOSINOPHIL NFR BLD: 2 % (ref 0–8)
ERYTHROCYTE [DISTWIDTH] IN BLOOD BY AUTOMATED COUNT: 14.3 % (ref 11.5–14.5)
EST. GFR  (AFRICAN AMERICAN): >60 ML/MIN/1.73 M^2
EST. GFR  (NON AFRICAN AMERICAN): >60 ML/MIN/1.73 M^2
GLUCOSE SERPL-MCNC: 117 MG/DL (ref 70–110)
HCT VFR BLD AUTO: 29 % (ref 40–54)
HGB BLD-MCNC: 9.6 G/DL (ref 14–18)
HYPOCHROMIA BLD QL SMEAR: ABNORMAL
IMM GRANULOCYTES # BLD AUTO: 0.14 K/UL (ref 0–0.04)
IMM GRANULOCYTES NFR BLD AUTO: 1.9 % (ref 0–0.5)
LYMPHOCYTES # BLD AUTO: 2.6 K/UL (ref 1–4.8)
LYMPHOCYTES NFR BLD: 35.2 % (ref 18–48)
MAGNESIUM SERPL-MCNC: 1.8 MG/DL (ref 1.6–2.6)
MCH RBC QN AUTO: 30.7 PG (ref 27–31)
MCHC RBC AUTO-ENTMCNC: 33.1 G/DL (ref 32–36)
MCV RBC AUTO: 93 FL (ref 82–98)
MONOCYTES # BLD AUTO: 1.2 K/UL (ref 0.3–1)
MONOCYTES NFR BLD: 15.7 % (ref 4–15)
NEUTROPHILS # BLD AUTO: 3.4 K/UL (ref 1.8–7.7)
NEUTROPHILS NFR BLD: 44.5 % (ref 38–73)
NRBC BLD-RTO: 0 /100 WBC
OVALOCYTES BLD QL SMEAR: ABNORMAL
PHOSPHATE SERPL-MCNC: 5.3 MG/DL (ref 2.7–4.5)
PLATELET # BLD AUTO: 313 K/UL (ref 150–450)
PMV BLD AUTO: 10.2 FL (ref 9.2–12.9)
POCT GLUCOSE: 130 MG/DL (ref 70–110)
POCT GLUCOSE: 153 MG/DL (ref 70–110)
POCT GLUCOSE: 269 MG/DL (ref 70–110)
POCT GLUCOSE: 360 MG/DL (ref 70–110)
POCT GLUCOSE: 456 MG/DL (ref 70–110)
POIKILOCYTOSIS BLD QL SMEAR: SLIGHT
POLYCHROMASIA BLD QL SMEAR: ABNORMAL
POTASSIUM SERPL-SCNC: 4 MMOL/L (ref 3.5–5.1)
PROT SERPL-MCNC: 5.9 G/DL (ref 6–8.4)
RBC # BLD AUTO: 3.13 M/UL (ref 4.6–6.2)
SODIUM SERPL-SCNC: 143 MMOL/L (ref 136–145)
WBC # BLD AUTO: 7.51 K/UL (ref 3.9–12.7)

## 2021-03-30 PROCEDURE — 84100 ASSAY OF PHOSPHORUS: CPT | Performed by: INTERNAL MEDICINE

## 2021-03-30 PROCEDURE — 97530 THERAPEUTIC ACTIVITIES: CPT

## 2021-03-30 PROCEDURE — 36415 COLL VENOUS BLD VENIPUNCTURE: CPT | Performed by: INTERNAL MEDICINE

## 2021-03-30 PROCEDURE — 83735 ASSAY OF MAGNESIUM: CPT | Performed by: INTERNAL MEDICINE

## 2021-03-30 PROCEDURE — 85025 COMPLETE CBC W/AUTO DIFF WBC: CPT | Performed by: INTERNAL MEDICINE

## 2021-03-30 PROCEDURE — 25000003 PHARM REV CODE 250: Performed by: INTERNAL MEDICINE

## 2021-03-30 PROCEDURE — 25000003 PHARM REV CODE 250: Performed by: STUDENT IN AN ORGANIZED HEALTH CARE EDUCATION/TRAINING PROGRAM

## 2021-03-30 PROCEDURE — 20600001 HC STEP DOWN PRIVATE ROOM

## 2021-03-30 PROCEDURE — 97116 GAIT TRAINING THERAPY: CPT

## 2021-03-30 PROCEDURE — 93005 ELECTROCARDIOGRAM TRACING: CPT

## 2021-03-30 PROCEDURE — 99232 SBSQ HOSP IP/OBS MODERATE 35: CPT | Mod: ,,, | Performed by: INTERNAL MEDICINE

## 2021-03-30 PROCEDURE — 99233 PR SUBSEQUENT HOSPITAL CARE,LEVL III: ICD-10-PCS | Mod: ,,, | Performed by: INTERNAL MEDICINE

## 2021-03-30 PROCEDURE — 99232 PR SUBSEQUENT HOSPITAL CARE,LEVL II: ICD-10-PCS | Mod: ,,, | Performed by: INTERNAL MEDICINE

## 2021-03-30 PROCEDURE — 93010 ELECTROCARDIOGRAM REPORT: CPT | Mod: ,,, | Performed by: INTERNAL MEDICINE

## 2021-03-30 PROCEDURE — 80053 COMPREHEN METABOLIC PANEL: CPT | Performed by: INTERNAL MEDICINE

## 2021-03-30 PROCEDURE — 99233 SBSQ HOSP IP/OBS HIGH 50: CPT | Mod: ,,, | Performed by: INTERNAL MEDICINE

## 2021-03-30 PROCEDURE — 93010 EKG 12-LEAD: ICD-10-PCS | Mod: ,,, | Performed by: INTERNAL MEDICINE

## 2021-03-30 RX ORDER — INSULIN ASPART 100 [IU]/ML
7 INJECTION, SOLUTION INTRAVENOUS; SUBCUTANEOUS
Status: DISCONTINUED | OUTPATIENT
Start: 2021-03-30 | End: 2021-03-31

## 2021-03-30 RX ORDER — INSULIN ASPART 100 [IU]/ML
0-5 INJECTION, SOLUTION INTRAVENOUS; SUBCUTANEOUS EVERY 4 HOURS PRN
Status: DISCONTINUED | OUTPATIENT
Start: 2021-03-30 | End: 2021-03-31

## 2021-03-30 RX ADMIN — INSULIN ASPART 5 UNITS: 100 INJECTION, SOLUTION INTRAVENOUS; SUBCUTANEOUS at 04:03

## 2021-03-30 RX ADMIN — INSULIN ASPART 7 UNITS: 100 INJECTION, SOLUTION INTRAVENOUS; SUBCUTANEOUS at 02:03

## 2021-03-30 RX ADMIN — CLOBAZAM 20 MG: 10 TABLET ORAL at 08:03

## 2021-03-30 RX ADMIN — LEVETIRACETAM 750 MG: 750 TABLET ORAL at 09:03

## 2021-03-30 RX ADMIN — RISPERIDONE 1 MG: 1 TABLET ORAL at 04:03

## 2021-03-30 RX ADMIN — LEVETIRACETAM 750 MG: 750 TABLET ORAL at 08:03

## 2021-03-30 RX ADMIN — RISPERIDONE 1 MG: 1 TABLET ORAL at 07:03

## 2021-03-30 RX ADMIN — FOLIC ACID 1 MG: 1 TABLET ORAL at 09:03

## 2021-03-30 RX ADMIN — DIVALPROEX SODIUM 1000 MG: 125 CAPSULE, COATED PELLETS ORAL at 09:03

## 2021-03-30 RX ADMIN — Medication 100 MG: at 09:03

## 2021-03-30 RX ADMIN — DIVALPROEX SODIUM 1500 MG: 125 CAPSULE, COATED PELLETS ORAL at 09:03

## 2021-03-30 RX ADMIN — CLOBAZAM 10 MG: 10 TABLET ORAL at 09:03

## 2021-03-30 RX ADMIN — DOCUSATE SODIUM 50MG AND SENNOSIDES 8.6MG 1 TABLET: 8.6; 5 TABLET, FILM COATED ORAL at 09:03

## 2021-03-30 RX ADMIN — INSULIN ASPART 3 UNITS: 100 INJECTION, SOLUTION INTRAVENOUS; SUBCUTANEOUS at 02:03

## 2021-03-30 RX ADMIN — INSULIN ASPART 7 UNITS: 100 INJECTION, SOLUTION INTRAVENOUS; SUBCUTANEOUS at 04:03

## 2021-03-31 ENCOUNTER — ANESTHESIA EVENT (OUTPATIENT)
Dept: ENDOSCOPY | Facility: HOSPITAL | Age: 41
DRG: 100 | End: 2021-03-31
Payer: MEDICAID

## 2021-03-31 LAB
ALBUMIN SERPL BCP-MCNC: 3.1 G/DL (ref 3.5–5.2)
ALP SERPL-CCNC: 97 U/L (ref 55–135)
ALT SERPL W/O P-5'-P-CCNC: 7 U/L (ref 10–44)
ANION GAP SERPL CALC-SCNC: 10 MMOL/L (ref 8–16)
AST SERPL-CCNC: 13 U/L (ref 10–40)
BASOPHILS # BLD AUTO: 0.06 K/UL (ref 0–0.2)
BASOPHILS NFR BLD: 0.8 % (ref 0–1.9)
BILIRUB SERPL-MCNC: <0.1 MG/DL (ref 0.1–1)
BUN SERPL-MCNC: 16 MG/DL (ref 6–20)
CALCIUM SERPL-MCNC: 9.6 MG/DL (ref 8.7–10.5)
CHLORIDE SERPL-SCNC: 96 MMOL/L (ref 95–110)
CO2 SERPL-SCNC: 29 MMOL/L (ref 23–29)
CREAT SERPL-MCNC: 0.8 MG/DL (ref 0.5–1.4)
DIFFERENTIAL METHOD: ABNORMAL
EOSINOPHIL # BLD AUTO: 0.1 K/UL (ref 0–0.5)
EOSINOPHIL NFR BLD: 1.6 % (ref 0–8)
ERYTHROCYTE [DISTWIDTH] IN BLOOD BY AUTOMATED COUNT: 14.3 % (ref 11.5–14.5)
EST. GFR  (AFRICAN AMERICAN): >60 ML/MIN/1.73 M^2
EST. GFR  (NON AFRICAN AMERICAN): >60 ML/MIN/1.73 M^2
GLUCOSE SERPL-MCNC: 408 MG/DL (ref 70–110)
HCT VFR BLD AUTO: 32.6 % (ref 40–54)
HGB BLD-MCNC: 10.3 G/DL (ref 14–18)
IMM GRANULOCYTES # BLD AUTO: 0.13 K/UL (ref 0–0.04)
IMM GRANULOCYTES NFR BLD AUTO: 1.6 % (ref 0–0.5)
LYMPHOCYTES # BLD AUTO: 2.2 K/UL (ref 1–4.8)
LYMPHOCYTES NFR BLD: 27.3 % (ref 18–48)
MAGNESIUM SERPL-MCNC: 1.9 MG/DL (ref 1.6–2.6)
MCH RBC QN AUTO: 30.3 PG (ref 27–31)
MCHC RBC AUTO-ENTMCNC: 31.6 G/DL (ref 32–36)
MCV RBC AUTO: 96 FL (ref 82–98)
MONOCYTES # BLD AUTO: 1.4 K/UL (ref 0.3–1)
MONOCYTES NFR BLD: 17.3 % (ref 4–15)
NEUTROPHILS # BLD AUTO: 4.1 K/UL (ref 1.8–7.7)
NEUTROPHILS NFR BLD: 51.4 % (ref 38–73)
NRBC BLD-RTO: 0 /100 WBC
PHOSPHATE SERPL-MCNC: 4.5 MG/DL (ref 2.7–4.5)
PLATELET # BLD AUTO: 290 K/UL (ref 150–450)
PMV BLD AUTO: 10.3 FL (ref 9.2–12.9)
POCT GLUCOSE: 153 MG/DL (ref 70–110)
POCT GLUCOSE: 202 MG/DL (ref 70–110)
POCT GLUCOSE: 353 MG/DL (ref 70–110)
POCT GLUCOSE: 388 MG/DL (ref 70–110)
POCT GLUCOSE: 416 MG/DL (ref 70–110)
POTASSIUM SERPL-SCNC: 4.4 MMOL/L (ref 3.5–5.1)
PROT SERPL-MCNC: 6.5 G/DL (ref 6–8.4)
RBC # BLD AUTO: 3.4 M/UL (ref 4.6–6.2)
SODIUM SERPL-SCNC: 135 MMOL/L (ref 136–145)
VALPROATE SERPL-MCNC: 97 UG/ML (ref 50–100)
WBC # BLD AUTO: 7.88 K/UL (ref 3.9–12.7)

## 2021-03-31 PROCEDURE — 63600175 PHARM REV CODE 636 W HCPCS: Performed by: PSYCHIATRY & NEUROLOGY

## 2021-03-31 PROCEDURE — 93005 ELECTROCARDIOGRAM TRACING: CPT

## 2021-03-31 PROCEDURE — 99232 PR SUBSEQUENT HOSPITAL CARE,LEVL II: ICD-10-PCS | Mod: ,,, | Performed by: INTERNAL MEDICINE

## 2021-03-31 PROCEDURE — 99233 SBSQ HOSP IP/OBS HIGH 50: CPT | Mod: AF,HB,, | Performed by: PSYCHIATRY & NEUROLOGY

## 2021-03-31 PROCEDURE — 83735 ASSAY OF MAGNESIUM: CPT | Performed by: INTERNAL MEDICINE

## 2021-03-31 PROCEDURE — 30200315 PPD INTRADERMAL TEST REV CODE 302: Performed by: INTERNAL MEDICINE

## 2021-03-31 PROCEDURE — 25000003 PHARM REV CODE 250: Performed by: STUDENT IN AN ORGANIZED HEALTH CARE EDUCATION/TRAINING PROGRAM

## 2021-03-31 PROCEDURE — 99232 SBSQ HOSP IP/OBS MODERATE 35: CPT | Mod: ,,, | Performed by: INTERNAL MEDICINE

## 2021-03-31 PROCEDURE — 99233 PR SUBSEQUENT HOSPITAL CARE,LEVL III: ICD-10-PCS | Mod: AF,HB,, | Performed by: PSYCHIATRY & NEUROLOGY

## 2021-03-31 PROCEDURE — 63600175 PHARM REV CODE 636 W HCPCS: Performed by: STUDENT IN AN ORGANIZED HEALTH CARE EDUCATION/TRAINING PROGRAM

## 2021-03-31 PROCEDURE — 86580 TB INTRADERMAL TEST: CPT | Performed by: INTERNAL MEDICINE

## 2021-03-31 PROCEDURE — 85025 COMPLETE CBC W/AUTO DIFF WBC: CPT | Performed by: INTERNAL MEDICINE

## 2021-03-31 PROCEDURE — 25000003 PHARM REV CODE 250: Performed by: INTERNAL MEDICINE

## 2021-03-31 PROCEDURE — 93010 ELECTROCARDIOGRAM REPORT: CPT | Mod: ,,, | Performed by: INTERNAL MEDICINE

## 2021-03-31 PROCEDURE — 84100 ASSAY OF PHOSPHORUS: CPT | Performed by: INTERNAL MEDICINE

## 2021-03-31 PROCEDURE — 80053 COMPREHEN METABOLIC PANEL: CPT | Performed by: INTERNAL MEDICINE

## 2021-03-31 PROCEDURE — 80164 ASSAY DIPROPYLACETIC ACD TOT: CPT | Performed by: STUDENT IN AN ORGANIZED HEALTH CARE EDUCATION/TRAINING PROGRAM

## 2021-03-31 PROCEDURE — 20600001 HC STEP DOWN PRIVATE ROOM

## 2021-03-31 PROCEDURE — 93010 EKG 12-LEAD: ICD-10-PCS | Mod: ,,, | Performed by: INTERNAL MEDICINE

## 2021-03-31 RX ORDER — INSULIN ASPART 100 [IU]/ML
1-10 INJECTION, SOLUTION INTRAVENOUS; SUBCUTANEOUS
Status: DISCONTINUED | OUTPATIENT
Start: 2021-03-31 | End: 2021-04-01

## 2021-03-31 RX ORDER — HALOPERIDOL 5 MG/ML
1 INJECTION INTRAMUSCULAR EVERY 6 HOURS PRN
Status: DISCONTINUED | OUTPATIENT
Start: 2021-03-31 | End: 2021-04-05 | Stop reason: HOSPADM

## 2021-03-31 RX ORDER — INSULIN ASPART 100 [IU]/ML
10 INJECTION, SOLUTION INTRAVENOUS; SUBCUTANEOUS
Status: DISCONTINUED | OUTPATIENT
Start: 2021-03-31 | End: 2021-04-03

## 2021-03-31 RX ADMIN — HALOPERIDOL LACTATE 2 MG: 5 INJECTION, SOLUTION INTRAMUSCULAR at 03:03

## 2021-03-31 RX ADMIN — DOCUSATE SODIUM 50MG AND SENNOSIDES 8.6MG 1 TABLET: 8.6; 5 TABLET, FILM COATED ORAL at 08:03

## 2021-03-31 RX ADMIN — LEVETIRACETAM 750 MG: 750 TABLET ORAL at 09:03

## 2021-03-31 RX ADMIN — INSULIN ASPART 10 UNITS: 100 INJECTION, SOLUTION INTRAVENOUS; SUBCUTANEOUS at 11:03

## 2021-03-31 RX ADMIN — DIVALPROEX SODIUM 1000 MG: 125 CAPSULE, COATED PELLETS ORAL at 08:03

## 2021-03-31 RX ADMIN — CLOBAZAM 20 MG: 10 TABLET ORAL at 09:03

## 2021-03-31 RX ADMIN — INSULIN ASPART 10 UNITS: 100 INJECTION, SOLUTION INTRAVENOUS; SUBCUTANEOUS at 04:03

## 2021-03-31 RX ADMIN — FOLIC ACID 1 MG: 1 TABLET ORAL at 08:03

## 2021-03-31 RX ADMIN — RISPERIDONE 1 MG: 1 TABLET ORAL at 04:03

## 2021-03-31 RX ADMIN — INSULIN ASPART 2 UNITS: 100 INJECTION, SOLUTION INTRAVENOUS; SUBCUTANEOUS at 09:03

## 2021-03-31 RX ADMIN — Medication 100 MG: at 08:03

## 2021-03-31 RX ADMIN — CLOBAZAM 10 MG: 10 TABLET ORAL at 08:03

## 2021-03-31 RX ADMIN — RISPERIDONE 1 MG: 1 TABLET ORAL at 05:03

## 2021-03-31 RX ADMIN — LEVETIRACETAM 750 MG: 750 TABLET ORAL at 08:03

## 2021-03-31 RX ADMIN — TUBERCULIN PURIFIED PROTEIN DERIVATIVE 5 UNITS: 5 INJECTION, SOLUTION INTRADERMAL at 05:03

## 2021-03-31 RX ADMIN — HALOPERIDOL LACTATE 1 MG: 5 INJECTION, SOLUTION INTRAMUSCULAR at 09:03

## 2021-03-31 RX ADMIN — DIVALPROEX SODIUM 1500 MG: 125 CAPSULE, COATED PELLETS ORAL at 09:03

## 2021-04-01 ENCOUNTER — ANESTHESIA (OUTPATIENT)
Dept: ENDOSCOPY | Facility: HOSPITAL | Age: 41
DRG: 100 | End: 2021-04-01
Payer: MEDICAID

## 2021-04-01 PROBLEM — T68.XXXA HYPOTHERMIA: Status: ACTIVE | Noted: 2021-04-01

## 2021-04-01 LAB
ALBUMIN SERPL BCP-MCNC: 2.9 G/DL (ref 3.5–5.2)
ALP SERPL-CCNC: 66 U/L (ref 55–135)
ALT SERPL W/O P-5'-P-CCNC: 6 U/L (ref 10–44)
AMPA-R AB CBA, SERUM: NEGATIVE
AMPHIPHYSIN AB TITR SER: NEGATIVE TITER
ANION GAP SERPL CALC-SCNC: 6 MMOL/L (ref 8–16)
AST SERPL-CCNC: 12 U/L (ref 10–40)
BASOPHILS # BLD AUTO: 0.03 K/UL (ref 0–0.2)
BASOPHILS NFR BLD: 0.3 % (ref 0–1.9)
BILIRUB SERPL-MCNC: 0.2 MG/DL (ref 0.1–1)
BUN SERPL-MCNC: 14 MG/DL (ref 6–20)
CALCIUM SERPL-MCNC: 9.4 MG/DL (ref 8.7–10.5)
CASPR2-IGG CBA: NEGATIVE
CHLORIDE SERPL-SCNC: 101 MMOL/L (ref 95–110)
CLARITY CSF: ABNORMAL
CO2 SERPL-SCNC: 32 MMOL/L (ref 23–29)
COLOR CSF: ABNORMAL
CREAT SERPL-MCNC: 0.6 MG/DL (ref 0.5–1.4)
CV2 IGG TITR SER: NEGATIVE TITER
DIFFERENTIAL METHOD: ABNORMAL
DPPIS AB IFA, SERUM: NEGATIVE
EOSINOPHIL # BLD AUTO: 0.2 K/UL (ref 0–0.5)
EOSINOPHIL NFR BLD: 2 % (ref 0–8)
EOSINOPHIL NFR CSF MANUAL: 1 %
ERYTHROCYTE [DISTWIDTH] IN BLOOD BY AUTOMATED COUNT: 14.2 % (ref 11.5–14.5)
EST. GFR  (AFRICAN AMERICAN): >60 ML/MIN/1.73 M^2
EST. GFR  (NON AFRICAN AMERICAN): >60 ML/MIN/1.73 M^2
GABA-B-R AB CBA, SERUM: NEGATIVE
GAD65 AB SER-SCNC: 0 NMOL/L
GFAP IFA, SERUM: NEGATIVE
GLIAL NUC TYPE 1 AB TITR SER: NEGATIVE TITER
GLUCOSE CSF-MCNC: 78 MG/DL (ref 40–70)
GLUCOSE SERPL-MCNC: 165 MG/DL (ref 70–110)
HCT VFR BLD AUTO: 31.9 % (ref 40–54)
HGB BLD-MCNC: 10.6 G/DL (ref 14–18)
HU1 AB TITR SER: NEGATIVE TITER
HU2 AB TITR SER IF: NEGATIVE TITER
HU3 AB TITR SER: NEGATIVE TITER
IGLON5 IFA, S: NEGATIVE
IMM GRANULOCYTES # BLD AUTO: 0.06 K/UL (ref 0–0.04)
IMM GRANULOCYTES NFR BLD AUTO: 0.6 % (ref 0–0.5)
IMMUNOLOGIST REVIEW: NORMAL
LGI1-IGG CBA: NEGATIVE
LYMPHOCYTES # BLD AUTO: 2.6 K/UL (ref 1–4.8)
LYMPHOCYTES NFR BLD: 26 % (ref 18–48)
LYMPHOCYTES NFR CSF MANUAL: 45 % (ref 40–80)
MAGNESIUM SERPL-MCNC: 1.7 MG/DL (ref 1.6–2.6)
MCH RBC QN AUTO: 31.5 PG (ref 27–31)
MCHC RBC AUTO-ENTMCNC: 33.2 G/DL (ref 32–36)
MCV RBC AUTO: 95 FL (ref 82–98)
MGLUR1 AB IFA, SERUM: NEGATIVE
MONOCYTES # BLD AUTO: 1.2 K/UL (ref 0.3–1)
MONOCYTES NFR BLD: 11.8 % (ref 4–15)
MONOS+MACROS NFR CSF MANUAL: 11 % (ref 15–45)
NACHR AB SER-SCNC: 0 NMOL/L
NEUTROPHILS # BLD AUTO: 6 K/UL (ref 1.8–7.7)
NEUTROPHILS NFR BLD: 59.3 % (ref 38–73)
NEUTROPHILS NFR CSF MANUAL: 43 % (ref 0–6)
NIF IFA, S: NEGATIVE
NMDA-R AB CBA, SERUM: NEGATIVE
NRBC BLD-RTO: 0 /100 WBC
PAVAL REFLEX TEST ADDED: NORMAL
PCA-1 AB TITR SER: NEGATIVE TITER
PCA-2 AB TITR SER: NEGATIVE TITER
PCA-TR AB TITR SER: NEGATIVE TITER
PHOSPHATE SERPL-MCNC: 5.2 MG/DL (ref 2.7–4.5)
PLATELET # BLD AUTO: 311 K/UL (ref 150–450)
PMV BLD AUTO: 9.8 FL (ref 9.2–12.9)
POCT GLUCOSE: 106 MG/DL (ref 70–110)
POCT GLUCOSE: 137 MG/DL (ref 70–110)
POCT GLUCOSE: 143 MG/DL (ref 70–110)
POCT GLUCOSE: 163 MG/DL (ref 70–110)
POCT GLUCOSE: 177 MG/DL (ref 70–110)
POCT GLUCOSE: 62 MG/DL (ref 70–110)
POCT GLUCOSE: 63 MG/DL (ref 70–110)
POTASSIUM SERPL-SCNC: 4 MMOL/L (ref 3.5–5.1)
PROT CSF-MCNC: 61 MG/DL (ref 15–40)
PROT SERPL-MCNC: 6.2 G/DL (ref 6–8.4)
RBC # BLD AUTO: 3.37 M/UL (ref 4.6–6.2)
RBC # CSF: 5000 /CU MM
SARS-COV-2 RDRP RESP QL NAA+PROBE: NEGATIVE
SARS-COV-2 RNA RESP QL NAA+PROBE: NOT DETECTED
SODIUM SERPL-SCNC: 139 MMOL/L (ref 136–145)
SPECIMEN VOL CSF: 4 ML
VGCC-N BIND AB SER-SCNC: 0 NMOL/L
VGCC-P/Q BIND AB SER-SCNC: 0 NMOL/L
WBC # BLD AUTO: 10.04 K/UL (ref 3.9–12.7)
WBC # CSF: 7 /CU MM (ref 0–5)

## 2021-04-01 PROCEDURE — 63600175 PHARM REV CODE 636 W HCPCS: Performed by: NURSE ANESTHETIST, CERTIFIED REGISTERED

## 2021-04-01 PROCEDURE — 20600001 HC STEP DOWN PRIVATE ROOM

## 2021-04-01 PROCEDURE — 80053 COMPREHEN METABOLIC PANEL: CPT | Performed by: INTERNAL MEDICINE

## 2021-04-01 PROCEDURE — 83735 ASSAY OF MAGNESIUM: CPT | Performed by: INTERNAL MEDICINE

## 2021-04-01 PROCEDURE — 25000003 PHARM REV CODE 250: Performed by: PSYCHIATRY & NEUROLOGY

## 2021-04-01 PROCEDURE — 82945 GLUCOSE OTHER FLUID: CPT | Performed by: INTERNAL MEDICINE

## 2021-04-01 PROCEDURE — 89051 BODY FLUID CELL COUNT: CPT | Performed by: INTERNAL MEDICINE

## 2021-04-01 PROCEDURE — U0002 COVID-19 LAB TEST NON-CDC: HCPCS | Performed by: INTERNAL MEDICINE

## 2021-04-01 PROCEDURE — D9220A PRA ANESTHESIA: ICD-10-PCS | Mod: CRNA,,, | Performed by: NURSE ANESTHETIST, CERTIFIED REGISTERED

## 2021-04-01 PROCEDURE — D9220A PRA ANESTHESIA: ICD-10-PCS | Mod: ANES,,, | Performed by: STUDENT IN AN ORGANIZED HEALTH CARE EDUCATION/TRAINING PROGRAM

## 2021-04-01 PROCEDURE — 93010 ELECTROCARDIOGRAM REPORT: CPT | Mod: ,,, | Performed by: INTERNAL MEDICINE

## 2021-04-01 PROCEDURE — 93005 ELECTROCARDIOGRAM TRACING: CPT

## 2021-04-01 PROCEDURE — 99233 PR SUBSEQUENT HOSPITAL CARE,LEVL III: ICD-10-PCS | Mod: ,,, | Performed by: INTERNAL MEDICINE

## 2021-04-01 PROCEDURE — 99000 SPECIMEN HANDLING OFFICE-LAB: CPT | Performed by: STUDENT IN AN ORGANIZED HEALTH CARE EDUCATION/TRAINING PROGRAM

## 2021-04-01 PROCEDURE — 99233 SBSQ HOSP IP/OBS HIGH 50: CPT | Mod: ,,, | Performed by: INTERNAL MEDICINE

## 2021-04-01 PROCEDURE — 37000008 HC ANESTHESIA 1ST 15 MINUTES

## 2021-04-01 PROCEDURE — 25000003 PHARM REV CODE 250: Performed by: NURSE ANESTHETIST, CERTIFIED REGISTERED

## 2021-04-01 PROCEDURE — 25000003 PHARM REV CODE 250: Performed by: INTERNAL MEDICINE

## 2021-04-01 PROCEDURE — 87070 CULTURE OTHR SPECIMN AEROBIC: CPT | Performed by: INTERNAL MEDICINE

## 2021-04-01 PROCEDURE — U0005 INFEC AGEN DETEC AMPLI PROBE: HCPCS | Performed by: STUDENT IN AN ORGANIZED HEALTH CARE EDUCATION/TRAINING PROGRAM

## 2021-04-01 PROCEDURE — 37000009 HC ANESTHESIA EA ADD 15 MINS

## 2021-04-01 PROCEDURE — 83883 ASSAY NEPHELOMETRY NOT SPEC: CPT | Performed by: STUDENT IN AN ORGANIZED HEALTH CARE EDUCATION/TRAINING PROGRAM

## 2021-04-01 PROCEDURE — 25000003 PHARM REV CODE 250: Performed by: STUDENT IN AN ORGANIZED HEALTH CARE EDUCATION/TRAINING PROGRAM

## 2021-04-01 PROCEDURE — 36415 COLL VENOUS BLD VENIPUNCTURE: CPT | Performed by: INTERNAL MEDICINE

## 2021-04-01 PROCEDURE — 99232 SBSQ HOSP IP/OBS MODERATE 35: CPT | Mod: ,,, | Performed by: INTERNAL MEDICINE

## 2021-04-01 PROCEDURE — 99233 SBSQ HOSP IP/OBS HIGH 50: CPT | Mod: ,,, | Performed by: PSYCHIATRY & NEUROLOGY

## 2021-04-01 PROCEDURE — 87529 HSV DNA AMP PROBE: CPT

## 2021-04-01 PROCEDURE — 84157 ASSAY OF PROTEIN OTHER: CPT | Performed by: INTERNAL MEDICINE

## 2021-04-01 PROCEDURE — 84100 ASSAY OF PHOSPHORUS: CPT | Performed by: INTERNAL MEDICINE

## 2021-04-01 PROCEDURE — 63600175 PHARM REV CODE 636 W HCPCS: Performed by: PSYCHIATRY & NEUROLOGY

## 2021-04-01 PROCEDURE — 99232 PR SUBSEQUENT HOSPITAL CARE,LEVL II: ICD-10-PCS | Mod: ,,, | Performed by: INTERNAL MEDICINE

## 2021-04-01 PROCEDURE — 99233 PR SUBSEQUENT HOSPITAL CARE,LEVL III: ICD-10-PCS | Mod: ,,, | Performed by: PSYCHIATRY & NEUROLOGY

## 2021-04-01 PROCEDURE — 85025 COMPLETE CBC W/AUTO DIFF WBC: CPT | Performed by: INTERNAL MEDICINE

## 2021-04-01 PROCEDURE — D9220A PRA ANESTHESIA: Mod: ANES,,, | Performed by: STUDENT IN AN ORGANIZED HEALTH CARE EDUCATION/TRAINING PROGRAM

## 2021-04-01 PROCEDURE — U0003 INFECTIOUS AGENT DETECTION BY NUCLEIC ACID (DNA OR RNA); SEVERE ACUTE RESPIRATORY SYNDROME CORONAVIRUS 2 (SARS-COV-2) (CORONAVIRUS DISEASE [COVID-19]), AMPLIFIED PROBE TECHNIQUE, MAKING USE OF HIGH THROUGHPUT TECHNOLOGIES AS DESCRIBED BY CMS-2020-01-R: HCPCS | Performed by: STUDENT IN AN ORGANIZED HEALTH CARE EDUCATION/TRAINING PROGRAM

## 2021-04-01 PROCEDURE — 87205 SMEAR GRAM STAIN: CPT | Performed by: INTERNAL MEDICINE

## 2021-04-01 PROCEDURE — D9220A PRA ANESTHESIA: Mod: CRNA,,, | Performed by: NURSE ANESTHETIST, CERTIFIED REGISTERED

## 2021-04-01 PROCEDURE — 71000033 HC RECOVERY, INTIAL HOUR

## 2021-04-01 PROCEDURE — 93010 EKG 12-LEAD: ICD-10-PCS | Mod: ,,, | Performed by: INTERNAL MEDICINE

## 2021-04-01 PROCEDURE — 63600175 PHARM REV CODE 636 W HCPCS: Performed by: STUDENT IN AN ORGANIZED HEALTH CARE EDUCATION/TRAINING PROGRAM

## 2021-04-01 PROCEDURE — 82962 GLUCOSE BLOOD TEST: CPT

## 2021-04-01 PROCEDURE — 86255 FLUORESCENT ANTIBODY SCREEN: CPT | Mod: 59 | Performed by: STUDENT IN AN ORGANIZED HEALTH CARE EDUCATION/TRAINING PROGRAM

## 2021-04-01 RX ORDER — PHENYLEPHRINE HYDROCHLORIDE 10 MG/ML
INJECTION INTRAVENOUS
Status: DISCONTINUED | OUTPATIENT
Start: 2021-04-01 | End: 2021-04-01

## 2021-04-01 RX ORDER — RISPERIDONE 1 MG/1
1 TABLET ORAL 3 TIMES DAILY
Status: DISCONTINUED | OUTPATIENT
Start: 2021-04-01 | End: 2021-04-05 | Stop reason: HOSPADM

## 2021-04-01 RX ORDER — SODIUM CHLORIDE 9 MG/ML
INJECTION, SOLUTION INTRAVENOUS CONTINUOUS
Status: ACTIVE | OUTPATIENT
Start: 2021-04-01 | End: 2021-04-01

## 2021-04-01 RX ORDER — LIDOCAINE HCL/PF 100 MG/5ML
SYRINGE (ML) INTRAVENOUS
Status: DISCONTINUED | OUTPATIENT
Start: 2021-04-01 | End: 2021-04-01

## 2021-04-01 RX ORDER — INSULIN ASPART 100 [IU]/ML
0-5 INJECTION, SOLUTION INTRAVENOUS; SUBCUTANEOUS
Status: DISCONTINUED | OUTPATIENT
Start: 2021-04-01 | End: 2021-04-03

## 2021-04-01 RX ORDER — SODIUM CHLORIDE 0.9 % (FLUSH) 0.9 %
10 SYRINGE (ML) INJECTION
Status: DISCONTINUED | OUTPATIENT
Start: 2021-04-01 | End: 2021-04-01 | Stop reason: HOSPADM

## 2021-04-01 RX ORDER — LIDOCAINE HYDROCHLORIDE 10 MG/ML
1 INJECTION INFILTRATION; PERINEURAL ONCE
Status: COMPLETED | OUTPATIENT
Start: 2021-04-01 | End: 2021-04-01

## 2021-04-01 RX ORDER — PROPOFOL 10 MG/ML
VIAL (ML) INTRAVENOUS
Status: DISCONTINUED | OUTPATIENT
Start: 2021-04-01 | End: 2021-04-01

## 2021-04-01 RX ADMIN — DIVALPROEX SODIUM 1500 MG: 125 CAPSULE, COATED PELLETS ORAL at 10:04

## 2021-04-01 RX ADMIN — PROPOFOL 30 MG: 10 INJECTION, EMULSION INTRAVENOUS at 04:04

## 2021-04-01 RX ADMIN — PROPOFOL 40 MG: 10 INJECTION, EMULSION INTRAVENOUS at 04:04

## 2021-04-01 RX ADMIN — LEVETIRACETAM 750 MG: 750 TABLET ORAL at 08:04

## 2021-04-01 RX ADMIN — LIDOCAINE HYDROCHLORIDE 3 ML: 10 INJECTION, SOLUTION EPIDURAL; INFILTRATION; INTRACAUDAL; PERINEURAL at 03:04

## 2021-04-01 RX ADMIN — PROPOFOL 20 MG: 10 INJECTION, EMULSION INTRAVENOUS at 03:04

## 2021-04-01 RX ADMIN — SODIUM CHLORIDE: 0.9 INJECTION, SOLUTION INTRAVENOUS at 09:04

## 2021-04-01 RX ADMIN — DOCUSATE SODIUM 50MG AND SENNOSIDES 8.6MG 1 TABLET: 8.6; 5 TABLET, FILM COATED ORAL at 09:04

## 2021-04-01 RX ADMIN — LEVETIRACETAM 750 MG: 750 TABLET ORAL at 09:04

## 2021-04-01 RX ADMIN — DIVALPROEX SODIUM 1000 MG: 125 CAPSULE, COATED PELLETS ORAL at 12:04

## 2021-04-01 RX ADMIN — CLOBAZAM 20 MG: 10 TABLET ORAL at 08:04

## 2021-04-01 RX ADMIN — LIDOCAINE HYDROCHLORIDE 60 MG: 20 INJECTION, SOLUTION INTRAVENOUS at 03:04

## 2021-04-01 RX ADMIN — CLOBAZAM 10 MG: 10 TABLET ORAL at 09:04

## 2021-04-01 RX ADMIN — RISPERIDONE 1 MG: 1 TABLET ORAL at 05:04

## 2021-04-01 RX ADMIN — RISPERIDONE 1 MG: 1 TABLET ORAL at 08:04

## 2021-04-01 RX ADMIN — Medication 16 G: at 04:04

## 2021-04-01 RX ADMIN — FOLIC ACID 1 MG: 1 TABLET ORAL at 09:04

## 2021-04-01 RX ADMIN — HALOPERIDOL LACTATE 1 MG: 5 INJECTION, SOLUTION INTRAMUSCULAR at 10:04

## 2021-04-01 RX ADMIN — INSULIN ASPART 3 UNITS: 100 INJECTION, SOLUTION INTRAVENOUS; SUBCUTANEOUS at 09:04

## 2021-04-01 RX ADMIN — Medication 100 MG: at 09:04

## 2021-04-01 RX ADMIN — PHENYLEPHRINE HYDROCHLORIDE 100 MCG: 10 INJECTION INTRAVENOUS at 04:04

## 2021-04-01 RX ADMIN — RISPERIDONE 1 MG: 1 TABLET ORAL at 09:04

## 2021-04-01 RX ADMIN — INSULIN ASPART 10 UNITS: 100 INJECTION, SOLUTION INTRAVENOUS; SUBCUTANEOUS at 05:04

## 2021-04-02 LAB
ALBUMIN SERPL BCP-MCNC: 2.8 G/DL (ref 3.5–5.2)
ALP SERPL-CCNC: 63 U/L (ref 55–135)
ALT SERPL W/O P-5'-P-CCNC: 6 U/L (ref 10–44)
ANION GAP SERPL CALC-SCNC: 9 MMOL/L (ref 8–16)
ANISOCYTOSIS BLD QL SMEAR: SLIGHT
AST SERPL-CCNC: 12 U/L (ref 10–40)
BASOPHILS # BLD AUTO: 0.04 K/UL (ref 0–0.2)
BASOPHILS NFR BLD: 0.6 % (ref 0–1.9)
BILIRUB SERPL-MCNC: 0.1 MG/DL (ref 0.1–1)
BUN SERPL-MCNC: 16 MG/DL (ref 6–20)
CALCIUM SERPL-MCNC: 9.3 MG/DL (ref 8.7–10.5)
CHLORIDE SERPL-SCNC: 99 MMOL/L (ref 95–110)
CO2 SERPL-SCNC: 29 MMOL/L (ref 23–29)
CREAT SERPL-MCNC: 0.7 MG/DL (ref 0.5–1.4)
DIFFERENTIAL METHOD: ABNORMAL
EOSINOPHIL # BLD AUTO: 0.1 K/UL (ref 0–0.5)
EOSINOPHIL NFR BLD: 1.7 % (ref 0–8)
ERYTHROCYTE [DISTWIDTH] IN BLOOD BY AUTOMATED COUNT: 14.1 % (ref 11.5–14.5)
EST. GFR  (AFRICAN AMERICAN): >60 ML/MIN/1.73 M^2
EST. GFR  (NON AFRICAN AMERICAN): >60 ML/MIN/1.73 M^2
GLUCOSE SERPL-MCNC: 223 MG/DL (ref 70–110)
HCT VFR BLD AUTO: 29 % (ref 40–54)
HGB BLD-MCNC: 9.4 G/DL (ref 14–18)
HYPOCHROMIA BLD QL SMEAR: ABNORMAL
IMM GRANULOCYTES # BLD AUTO: 0.06 K/UL (ref 0–0.04)
IMM GRANULOCYTES NFR BLD AUTO: 0.9 % (ref 0–0.5)
LYMPHOCYTES # BLD AUTO: 2.2 K/UL (ref 1–4.8)
LYMPHOCYTES NFR BLD: 32.2 % (ref 18–48)
MAGNESIUM SERPL-MCNC: 1.7 MG/DL (ref 1.6–2.6)
MCH RBC QN AUTO: 30.8 PG (ref 27–31)
MCHC RBC AUTO-ENTMCNC: 32.4 G/DL (ref 32–36)
MCV RBC AUTO: 95 FL (ref 82–98)
MONOCYTES # BLD AUTO: 0.9 K/UL (ref 0.3–1)
MONOCYTES NFR BLD: 12.8 % (ref 4–15)
NEUTROPHILS # BLD AUTO: 3.6 K/UL (ref 1.8–7.7)
NEUTROPHILS NFR BLD: 51.8 % (ref 38–73)
NRBC BLD-RTO: 0 /100 WBC
PHOSPHATE SERPL-MCNC: 4.8 MG/DL (ref 2.7–4.5)
PLATELET # BLD AUTO: 304 K/UL (ref 150–450)
PLATELET BLD QL SMEAR: ABNORMAL
PMV BLD AUTO: 10.2 FL (ref 9.2–12.9)
POCT GLUCOSE: 114 MG/DL (ref 70–110)
POCT GLUCOSE: 194 MG/DL (ref 70–110)
POCT GLUCOSE: 198 MG/DL (ref 70–110)
POCT GLUCOSE: 215 MG/DL (ref 70–110)
POCT GLUCOSE: 225 MG/DL (ref 70–110)
POCT GLUCOSE: 308 MG/DL (ref 70–110)
POLYCHROMASIA BLD QL SMEAR: ABNORMAL
POTASSIUM SERPL-SCNC: 4.1 MMOL/L (ref 3.5–5.1)
PROT SERPL-MCNC: 6.1 G/DL (ref 6–8.4)
RBC # BLD AUTO: 3.05 M/UL (ref 4.6–6.2)
SODIUM SERPL-SCNC: 137 MMOL/L (ref 136–145)
WBC # BLD AUTO: 6.86 K/UL (ref 3.9–12.7)

## 2021-04-02 PROCEDURE — 25000003 PHARM REV CODE 250: Performed by: STUDENT IN AN ORGANIZED HEALTH CARE EDUCATION/TRAINING PROGRAM

## 2021-04-02 PROCEDURE — 63600175 PHARM REV CODE 636 W HCPCS: Performed by: INTERNAL MEDICINE

## 2021-04-02 PROCEDURE — 99232 SBSQ HOSP IP/OBS MODERATE 35: CPT | Mod: ,,, | Performed by: INTERNAL MEDICINE

## 2021-04-02 PROCEDURE — 99231 PR SUBSEQUENT HOSPITAL CARE,LEVL I: ICD-10-PCS | Mod: AF,HB,, | Performed by: PSYCHIATRY & NEUROLOGY

## 2021-04-02 PROCEDURE — 80053 COMPREHEN METABOLIC PANEL: CPT | Performed by: INTERNAL MEDICINE

## 2021-04-02 PROCEDURE — 36415 COLL VENOUS BLD VENIPUNCTURE: CPT | Performed by: INTERNAL MEDICINE

## 2021-04-02 PROCEDURE — 63600175 PHARM REV CODE 636 W HCPCS: Performed by: STUDENT IN AN ORGANIZED HEALTH CARE EDUCATION/TRAINING PROGRAM

## 2021-04-02 PROCEDURE — 85025 COMPLETE CBC W/AUTO DIFF WBC: CPT | Performed by: INTERNAL MEDICINE

## 2021-04-02 PROCEDURE — 99232 PR SUBSEQUENT HOSPITAL CARE,LEVL II: ICD-10-PCS | Mod: ,,, | Performed by: INTERNAL MEDICINE

## 2021-04-02 PROCEDURE — 93010 EKG 12-LEAD: ICD-10-PCS | Mod: ,,, | Performed by: INTERNAL MEDICINE

## 2021-04-02 PROCEDURE — 83735 ASSAY OF MAGNESIUM: CPT | Performed by: INTERNAL MEDICINE

## 2021-04-02 PROCEDURE — 20600001 HC STEP DOWN PRIVATE ROOM

## 2021-04-02 PROCEDURE — 93010 ELECTROCARDIOGRAM REPORT: CPT | Mod: ,,, | Performed by: INTERNAL MEDICINE

## 2021-04-02 PROCEDURE — 93005 ELECTROCARDIOGRAM TRACING: CPT

## 2021-04-02 PROCEDURE — C9399 UNCLASSIFIED DRUGS OR BIOLOG: HCPCS | Performed by: STUDENT IN AN ORGANIZED HEALTH CARE EDUCATION/TRAINING PROGRAM

## 2021-04-02 PROCEDURE — 99231 SBSQ HOSP IP/OBS SF/LOW 25: CPT | Mod: AF,HB,, | Performed by: PSYCHIATRY & NEUROLOGY

## 2021-04-02 PROCEDURE — 25000003 PHARM REV CODE 250: Performed by: INTERNAL MEDICINE

## 2021-04-02 PROCEDURE — 84100 ASSAY OF PHOSPHORUS: CPT | Performed by: INTERNAL MEDICINE

## 2021-04-02 RX ADMIN — CLOBAZAM 10 MG: 10 TABLET ORAL at 09:04

## 2021-04-02 RX ADMIN — INSULIN DETEMIR 16 UNITS: 100 INJECTION, SOLUTION SUBCUTANEOUS at 08:04

## 2021-04-02 RX ADMIN — SODIUM CHLORIDE, SODIUM LACTATE, POTASSIUM CHLORIDE, AND CALCIUM CHLORIDE 500 ML: .6; .31; .03; .02 INJECTION, SOLUTION INTRAVENOUS at 11:04

## 2021-04-02 RX ADMIN — INSULIN ASPART 10 UNITS: 100 INJECTION, SOLUTION INTRAVENOUS; SUBCUTANEOUS at 04:04

## 2021-04-02 RX ADMIN — RISPERIDONE 1 MG: 1 TABLET ORAL at 08:04

## 2021-04-02 RX ADMIN — INSULIN ASPART 3 UNITS: 100 INJECTION, SOLUTION INTRAVENOUS; SUBCUTANEOUS at 08:04

## 2021-04-02 RX ADMIN — DIVALPROEX SODIUM 1500 MG: 125 CAPSULE, COATED PELLETS ORAL at 09:04

## 2021-04-02 RX ADMIN — RISPERIDONE 1 MG: 1 TABLET ORAL at 02:04

## 2021-04-02 RX ADMIN — INSULIN ASPART 10 UNITS: 100 INJECTION, SOLUTION INTRAVENOUS; SUBCUTANEOUS at 08:04

## 2021-04-02 RX ADMIN — DIVALPROEX SODIUM 1000 MG: 125 CAPSULE, COATED PELLETS ORAL at 08:04

## 2021-04-02 RX ADMIN — Medication 100 MG: at 08:04

## 2021-04-02 RX ADMIN — LEVETIRACETAM 750 MG: 750 TABLET ORAL at 08:04

## 2021-04-02 RX ADMIN — INSULIN ASPART 10 UNITS: 100 INJECTION, SOLUTION INTRAVENOUS; SUBCUTANEOUS at 12:04

## 2021-04-02 RX ADMIN — CLOBAZAM 20 MG: 10 TABLET ORAL at 08:04

## 2021-04-02 RX ADMIN — DOCUSATE SODIUM 50MG AND SENNOSIDES 8.6MG 1 TABLET: 8.6; 5 TABLET, FILM COATED ORAL at 08:04

## 2021-04-02 RX ADMIN — FOLIC ACID 1 MG: 1 TABLET ORAL at 08:04

## 2021-04-03 LAB
HSV1, PCR, CSF: NEGATIVE
HSV2, PCR, CSF: NEGATIVE
POCT GLUCOSE: 189 MG/DL (ref 70–110)
POCT GLUCOSE: 229 MG/DL (ref 70–110)
POCT GLUCOSE: 242 MG/DL (ref 70–110)
POCT GLUCOSE: 284 MG/DL (ref 70–110)
POCT GLUCOSE: 368 MG/DL (ref 70–110)
POCT GLUCOSE: 67 MG/DL (ref 70–110)
POCT GLUCOSE: 78 MG/DL (ref 70–110)

## 2021-04-03 PROCEDURE — 99232 SBSQ HOSP IP/OBS MODERATE 35: CPT | Mod: ,,, | Performed by: INTERNAL MEDICINE

## 2021-04-03 PROCEDURE — 25000003 PHARM REV CODE 250: Performed by: INTERNAL MEDICINE

## 2021-04-03 PROCEDURE — 25000003 PHARM REV CODE 250: Performed by: STUDENT IN AN ORGANIZED HEALTH CARE EDUCATION/TRAINING PROGRAM

## 2021-04-03 PROCEDURE — 93010 ELECTROCARDIOGRAM REPORT: CPT | Mod: ,,, | Performed by: INTERNAL MEDICINE

## 2021-04-03 PROCEDURE — 93005 ELECTROCARDIOGRAM TRACING: CPT

## 2021-04-03 PROCEDURE — 63600175 PHARM REV CODE 636 W HCPCS: Performed by: STUDENT IN AN ORGANIZED HEALTH CARE EDUCATION/TRAINING PROGRAM

## 2021-04-03 PROCEDURE — 99232 PR SUBSEQUENT HOSPITAL CARE,LEVL II: ICD-10-PCS | Mod: ,,, | Performed by: INTERNAL MEDICINE

## 2021-04-03 PROCEDURE — 93010 EKG 12-LEAD: ICD-10-PCS | Mod: ,,, | Performed by: INTERNAL MEDICINE

## 2021-04-03 PROCEDURE — 20600001 HC STEP DOWN PRIVATE ROOM

## 2021-04-03 RX ORDER — INSULIN ASPART 100 [IU]/ML
5 INJECTION, SOLUTION INTRAVENOUS; SUBCUTANEOUS
Status: DISCONTINUED | OUTPATIENT
Start: 2021-04-03 | End: 2021-04-03

## 2021-04-03 RX ORDER — INSULIN ASPART 100 [IU]/ML
12 INJECTION, SOLUTION INTRAVENOUS; SUBCUTANEOUS
Status: DISCONTINUED | OUTPATIENT
Start: 2021-04-04 | End: 2021-04-03

## 2021-04-03 RX ORDER — INSULIN ASPART 100 [IU]/ML
10 INJECTION, SOLUTION INTRAVENOUS; SUBCUTANEOUS
Status: DISCONTINUED | OUTPATIENT
Start: 2021-04-04 | End: 2021-04-05 | Stop reason: HOSPADM

## 2021-04-03 RX ORDER — INSULIN ASPART 100 [IU]/ML
10 INJECTION, SOLUTION INTRAVENOUS; SUBCUTANEOUS
Status: DISCONTINUED | OUTPATIENT
Start: 2021-04-03 | End: 2021-04-03

## 2021-04-03 RX ORDER — INSULIN ASPART 100 [IU]/ML
1-10 INJECTION, SOLUTION INTRAVENOUS; SUBCUTANEOUS
Status: DISCONTINUED | OUTPATIENT
Start: 2021-04-03 | End: 2021-04-03

## 2021-04-03 RX ORDER — INSULIN ASPART 100 [IU]/ML
12 INJECTION, SOLUTION INTRAVENOUS; SUBCUTANEOUS
Status: DISCONTINUED | OUTPATIENT
Start: 2021-04-03 | End: 2021-04-03

## 2021-04-03 RX ORDER — INSULIN ASPART 100 [IU]/ML
10 INJECTION, SOLUTION INTRAVENOUS; SUBCUTANEOUS
Status: DISCONTINUED | OUTPATIENT
Start: 2021-04-03 | End: 2021-04-05 | Stop reason: HOSPADM

## 2021-04-03 RX ORDER — INSULIN ASPART 100 [IU]/ML
0-5 INJECTION, SOLUTION INTRAVENOUS; SUBCUTANEOUS
Status: DISCONTINUED | OUTPATIENT
Start: 2021-04-03 | End: 2021-04-05 | Stop reason: HOSPADM

## 2021-04-03 RX ORDER — INSULIN ASPART 100 [IU]/ML
13 INJECTION, SOLUTION INTRAVENOUS; SUBCUTANEOUS
Status: DISCONTINUED | OUTPATIENT
Start: 2021-04-03 | End: 2021-04-03

## 2021-04-03 RX ORDER — INSULIN ASPART 100 [IU]/ML
3 INJECTION, SOLUTION INTRAVENOUS; SUBCUTANEOUS
Status: DISCONTINUED | OUTPATIENT
Start: 2021-04-03 | End: 2021-04-05 | Stop reason: HOSPADM

## 2021-04-03 RX ADMIN — INSULIN ASPART 10 UNITS: 100 INJECTION, SOLUTION INTRAVENOUS; SUBCUTANEOUS at 08:04

## 2021-04-03 RX ADMIN — INSULIN ASPART 4 UNITS: 100 INJECTION, SOLUTION INTRAVENOUS; SUBCUTANEOUS at 08:04

## 2021-04-03 RX ADMIN — Medication 100 MG: at 08:04

## 2021-04-03 RX ADMIN — LEVETIRACETAM 750 MG: 750 TABLET ORAL at 08:04

## 2021-04-03 RX ADMIN — INSULIN ASPART 10 UNITS: 100 INJECTION, SOLUTION INTRAVENOUS; SUBCUTANEOUS at 04:04

## 2021-04-03 RX ADMIN — DIVALPROEX SODIUM 1500 MG: 125 CAPSULE, COATED PELLETS ORAL at 10:04

## 2021-04-03 RX ADMIN — DIVALPROEX SODIUM 1000 MG: 125 CAPSULE, COATED PELLETS ORAL at 08:04

## 2021-04-03 RX ADMIN — CLOBAZAM 20 MG: 10 TABLET ORAL at 09:04

## 2021-04-03 RX ADMIN — INSULIN ASPART 12 UNITS: 100 INJECTION, SOLUTION INTRAVENOUS; SUBCUTANEOUS at 11:04

## 2021-04-03 RX ADMIN — DOCUSATE SODIUM 50MG AND SENNOSIDES 8.6MG 1 TABLET: 8.6; 5 TABLET, FILM COATED ORAL at 08:04

## 2021-04-03 RX ADMIN — RISPERIDONE 1 MG: 1 TABLET ORAL at 08:04

## 2021-04-03 RX ADMIN — INSULIN ASPART 1 UNITS: 100 INJECTION, SOLUTION INTRAVENOUS; SUBCUTANEOUS at 10:04

## 2021-04-03 RX ADMIN — CLOBAZAM 10 MG: 10 TABLET ORAL at 10:04

## 2021-04-03 RX ADMIN — LEVETIRACETAM 750 MG: 750 TABLET ORAL at 09:04

## 2021-04-03 RX ADMIN — INSULIN ASPART 1 UNITS: 100 INJECTION, SOLUTION INTRAVENOUS; SUBCUTANEOUS at 04:04

## 2021-04-03 RX ADMIN — FOLIC ACID 1 MG: 1 TABLET ORAL at 08:04

## 2021-04-03 RX ADMIN — RISPERIDONE 1 MG: 1 TABLET ORAL at 09:04

## 2021-04-03 RX ADMIN — RISPERIDONE 1 MG: 1 TABLET ORAL at 03:04

## 2021-04-03 RX ADMIN — INSULIN DETEMIR 16 UNITS: 100 INJECTION, SOLUTION SUBCUTANEOUS at 10:04

## 2021-04-04 LAB
POCT GLUCOSE: 165 MG/DL (ref 70–110)
POCT GLUCOSE: 228 MG/DL (ref 70–110)
POCT GLUCOSE: 254 MG/DL (ref 70–110)
POCT GLUCOSE: 89 MG/DL (ref 70–110)
SARS-COV-2 RNA RESP QL NAA+PROBE: NOT DETECTED

## 2021-04-04 PROCEDURE — 25000003 PHARM REV CODE 250: Performed by: INTERNAL MEDICINE

## 2021-04-04 PROCEDURE — 20600001 HC STEP DOWN PRIVATE ROOM

## 2021-04-04 PROCEDURE — U0005 INFEC AGEN DETEC AMPLI PROBE: HCPCS | Performed by: STUDENT IN AN ORGANIZED HEALTH CARE EDUCATION/TRAINING PROGRAM

## 2021-04-04 PROCEDURE — 99232 PR SUBSEQUENT HOSPITAL CARE,LEVL II: ICD-10-PCS | Mod: ,,, | Performed by: INTERNAL MEDICINE

## 2021-04-04 PROCEDURE — 25000003 PHARM REV CODE 250: Performed by: STUDENT IN AN ORGANIZED HEALTH CARE EDUCATION/TRAINING PROGRAM

## 2021-04-04 PROCEDURE — S4991 NICOTINE PATCH NONLEGEND: HCPCS | Performed by: STUDENT IN AN ORGANIZED HEALTH CARE EDUCATION/TRAINING PROGRAM

## 2021-04-04 PROCEDURE — U0003 INFECTIOUS AGENT DETECTION BY NUCLEIC ACID (DNA OR RNA); SEVERE ACUTE RESPIRATORY SYNDROME CORONAVIRUS 2 (SARS-COV-2) (CORONAVIRUS DISEASE [COVID-19]), AMPLIFIED PROBE TECHNIQUE, MAKING USE OF HIGH THROUGHPUT TECHNOLOGIES AS DESCRIBED BY CMS-2020-01-R: HCPCS | Performed by: STUDENT IN AN ORGANIZED HEALTH CARE EDUCATION/TRAINING PROGRAM

## 2021-04-04 PROCEDURE — 99232 SBSQ HOSP IP/OBS MODERATE 35: CPT | Mod: ,,, | Performed by: INTERNAL MEDICINE

## 2021-04-04 RX ORDER — INSULIN ASPART 100 [IU]/ML
23 INJECTION, SUSPENSION SUBCUTANEOUS
Qty: 165.6 ML | Refills: 0 | Status: CANCELLED | OUTPATIENT
Start: 2021-04-04 | End: 2022-04-04

## 2021-04-04 RX ORDER — IBUPROFEN 200 MG
1 TABLET ORAL DAILY
Status: DISCONTINUED | OUTPATIENT
Start: 2021-04-04 | End: 2021-04-05 | Stop reason: HOSPADM

## 2021-04-04 RX ADMIN — INSULIN ASPART 1 UNITS: 100 INJECTION, SOLUTION INTRAVENOUS; SUBCUTANEOUS at 09:04

## 2021-04-04 RX ADMIN — RISPERIDONE 1 MG: 1 TABLET ORAL at 08:04

## 2021-04-04 RX ADMIN — CLOBAZAM 10 MG: 10 TABLET ORAL at 08:04

## 2021-04-04 RX ADMIN — Medication 100 MG: at 08:04

## 2021-04-04 RX ADMIN — LEVETIRACETAM 750 MG: 750 TABLET ORAL at 08:04

## 2021-04-04 RX ADMIN — RISPERIDONE 1 MG: 1 TABLET ORAL at 09:04

## 2021-04-04 RX ADMIN — DIVALPROEX SODIUM 1000 MG: 125 CAPSULE, COATED PELLETS ORAL at 08:04

## 2021-04-04 RX ADMIN — INSULIN ASPART 10 UNITS: 100 INJECTION, SOLUTION INTRAVENOUS; SUBCUTANEOUS at 08:04

## 2021-04-04 RX ADMIN — INSULIN ASPART 10 UNITS: 100 INJECTION, SOLUTION INTRAVENOUS; SUBCUTANEOUS at 01:04

## 2021-04-04 RX ADMIN — INSULIN ASPART 2 UNITS: 100 INJECTION, SOLUTION INTRAVENOUS; SUBCUTANEOUS at 08:04

## 2021-04-04 RX ADMIN — FOLIC ACID 1 MG: 1 TABLET ORAL at 08:04

## 2021-04-04 RX ADMIN — DOCUSATE SODIUM 50MG AND SENNOSIDES 8.6MG 1 TABLET: 8.6; 5 TABLET, FILM COATED ORAL at 08:04

## 2021-04-04 RX ADMIN — NICOTINE 1 PATCH: 14 PATCH TRANSDERMAL at 07:04

## 2021-04-04 RX ADMIN — LEVETIRACETAM 750 MG: 750 TABLET ORAL at 09:04

## 2021-04-04 RX ADMIN — CLOBAZAM 20 MG: 10 TABLET ORAL at 09:04

## 2021-04-04 RX ADMIN — INSULIN ASPART 10 UNITS: 100 INJECTION, SOLUTION INTRAVENOUS; SUBCUTANEOUS at 05:04

## 2021-04-04 RX ADMIN — DIVALPROEX SODIUM 1500 MG: 125 CAPSULE, COATED PELLETS ORAL at 09:04

## 2021-04-04 RX ADMIN — RISPERIDONE 1 MG: 1 TABLET ORAL at 03:04

## 2021-04-04 RX ADMIN — INSULIN DETEMIR 16 UNITS: 100 INJECTION, SOLUTION SUBCUTANEOUS at 08:04

## 2021-04-05 VITALS
WEIGHT: 120.56 LBS | OXYGEN SATURATION: 94 % | DIASTOLIC BLOOD PRESSURE: 72 MMHG | SYSTOLIC BLOOD PRESSURE: 110 MMHG | TEMPERATURE: 98 F | RESPIRATION RATE: 18 BRPM | HEIGHT: 69 IN | BODY MASS INDEX: 17.86 KG/M2 | HEART RATE: 110 BPM

## 2021-04-05 LAB
ALBUMIN SERPL BCP-MCNC: 3.3 G/DL (ref 3.5–5.2)
ALP SERPL-CCNC: 74 U/L (ref 55–135)
ALT SERPL W/O P-5'-P-CCNC: 8 U/L (ref 10–44)
ANION GAP SERPL CALC-SCNC: 7 MMOL/L (ref 8–16)
ANISOCYTOSIS BLD QL SMEAR: SLIGHT
AST SERPL-CCNC: 15 U/L (ref 10–40)
BASOPHILS # BLD AUTO: 0.06 K/UL (ref 0–0.2)
BASOPHILS NFR BLD: 0.7 % (ref 0–1.9)
BILIRUB SERPL-MCNC: 0.3 MG/DL (ref 0.1–1)
BUN SERPL-MCNC: 20 MG/DL (ref 6–20)
CALCIUM SERPL-MCNC: 10.1 MG/DL (ref 8.7–10.5)
CHLORIDE SERPL-SCNC: 97 MMOL/L (ref 95–110)
CO2 SERPL-SCNC: 32 MMOL/L (ref 23–29)
CREAT SERPL-MCNC: 0.7 MG/DL (ref 0.5–1.4)
DIFFERENTIAL METHOD: ABNORMAL
EOSINOPHIL # BLD AUTO: 0.2 K/UL (ref 0–0.5)
EOSINOPHIL NFR BLD: 2.1 % (ref 0–8)
ERYTHROCYTE [DISTWIDTH] IN BLOOD BY AUTOMATED COUNT: 13.8 % (ref 11.5–14.5)
EST. GFR  (AFRICAN AMERICAN): >60 ML/MIN/1.73 M^2
EST. GFR  (NON AFRICAN AMERICAN): >60 ML/MIN/1.73 M^2
GLUCOSE SERPL-MCNC: 200 MG/DL (ref 70–110)
HCT VFR BLD AUTO: 31.3 % (ref 40–54)
HGB BLD-MCNC: 10.5 G/DL (ref 14–18)
IMM GRANULOCYTES # BLD AUTO: 0.06 K/UL (ref 0–0.04)
IMM GRANULOCYTES NFR BLD AUTO: 0.7 % (ref 0–0.5)
KAPPA LC FREE CSF-MCNC: 0.03 MG/DL
LYMPHOCYTES # BLD AUTO: 2.2 K/UL (ref 1–4.8)
LYMPHOCYTES NFR BLD: 24.3 % (ref 18–48)
MAGNESIUM SERPL-MCNC: 1.8 MG/DL (ref 1.6–2.6)
MCH RBC QN AUTO: 31.3 PG (ref 27–31)
MCHC RBC AUTO-ENTMCNC: 33.5 G/DL (ref 32–36)
MCV RBC AUTO: 93 FL (ref 82–98)
MONOCYTES # BLD AUTO: 1.1 K/UL (ref 0.3–1)
MONOCYTES NFR BLD: 12.6 % (ref 4–15)
NEUTROPHILS # BLD AUTO: 5.3 K/UL (ref 1.8–7.7)
NEUTROPHILS NFR BLD: 59.6 % (ref 38–73)
NRBC BLD-RTO: 0 /100 WBC
PHOSPHATE SERPL-MCNC: 4.4 MG/DL (ref 2.7–4.5)
PLATELET # BLD AUTO: 334 K/UL (ref 150–450)
PLATELET BLD QL SMEAR: ABNORMAL
PMV BLD AUTO: 9.8 FL (ref 9.2–12.9)
POCT GLUCOSE: 166 MG/DL (ref 70–110)
POCT GLUCOSE: 204 MG/DL (ref 70–110)
POLYCHROMASIA BLD QL SMEAR: ABNORMAL
POTASSIUM SERPL-SCNC: 4.1 MMOL/L (ref 3.5–5.1)
PROT SERPL-MCNC: 6.9 G/DL (ref 6–8.4)
RBC # BLD AUTO: 3.35 M/UL (ref 4.6–6.2)
SODIUM SERPL-SCNC: 136 MMOL/L (ref 136–145)
WBC # BLD AUTO: 8.94 K/UL (ref 3.9–12.7)

## 2021-04-05 PROCEDURE — 83735 ASSAY OF MAGNESIUM: CPT | Performed by: STUDENT IN AN ORGANIZED HEALTH CARE EDUCATION/TRAINING PROGRAM

## 2021-04-05 PROCEDURE — 63600175 PHARM REV CODE 636 W HCPCS: Performed by: STUDENT IN AN ORGANIZED HEALTH CARE EDUCATION/TRAINING PROGRAM

## 2021-04-05 PROCEDURE — 25000003 PHARM REV CODE 250: Performed by: STUDENT IN AN ORGANIZED HEALTH CARE EDUCATION/TRAINING PROGRAM

## 2021-04-05 PROCEDURE — 99239 PR HOSPITAL DISCHARGE DAY,>30 MIN: ICD-10-PCS | Mod: ,,, | Performed by: INTERNAL MEDICINE

## 2021-04-05 PROCEDURE — 99239 HOSP IP/OBS DSCHRG MGMT >30: CPT | Mod: ,,, | Performed by: INTERNAL MEDICINE

## 2021-04-05 PROCEDURE — 36415 COLL VENOUS BLD VENIPUNCTURE: CPT | Performed by: STUDENT IN AN ORGANIZED HEALTH CARE EDUCATION/TRAINING PROGRAM

## 2021-04-05 PROCEDURE — 99233 PR SUBSEQUENT HOSPITAL CARE,LEVL III: ICD-10-PCS | Mod: AF,HB,, | Performed by: PSYCHIATRY & NEUROLOGY

## 2021-04-05 PROCEDURE — 94761 N-INVAS EAR/PLS OXIMETRY MLT: CPT

## 2021-04-05 PROCEDURE — 85025 COMPLETE CBC W/AUTO DIFF WBC: CPT | Performed by: STUDENT IN AN ORGANIZED HEALTH CARE EDUCATION/TRAINING PROGRAM

## 2021-04-05 PROCEDURE — 80053 COMPREHEN METABOLIC PANEL: CPT | Performed by: STUDENT IN AN ORGANIZED HEALTH CARE EDUCATION/TRAINING PROGRAM

## 2021-04-05 PROCEDURE — 97116 GAIT TRAINING THERAPY: CPT | Mod: CQ

## 2021-04-05 PROCEDURE — 25000003 PHARM REV CODE 250: Performed by: INTERNAL MEDICINE

## 2021-04-05 PROCEDURE — 99233 SBSQ HOSP IP/OBS HIGH 50: CPT | Mod: AF,HB,, | Performed by: PSYCHIATRY & NEUROLOGY

## 2021-04-05 PROCEDURE — 84100 ASSAY OF PHOSPHORUS: CPT | Performed by: STUDENT IN AN ORGANIZED HEALTH CARE EDUCATION/TRAINING PROGRAM

## 2021-04-05 RX ORDER — BLOOD-GLUCOSE CONTROL, NORMAL
1 EACH MISCELLANEOUS 2 TIMES DAILY WITH MEALS
Qty: 100 EACH | Refills: 11 | Status: SHIPPED | OUTPATIENT
Start: 2021-04-05 | End: 2021-07-20 | Stop reason: SDUPTHER

## 2021-04-05 RX ORDER — RISPERIDONE 1 MG/1
1 TABLET ORAL 3 TIMES DAILY
Qty: 90 TABLET | Refills: 11
Start: 2021-04-05 | End: 2021-07-20 | Stop reason: SDUPTHER

## 2021-04-05 RX ORDER — DEXTROSE 4 G
TABLET,CHEWABLE ORAL
Qty: 1 EACH | Refills: 0 | Status: ON HOLD | OUTPATIENT
Start: 2021-04-05 | End: 2021-06-04 | Stop reason: HOSPADM

## 2021-04-05 RX ORDER — BLOOD-GLUCOSE METER
1 EACH MISCELLANEOUS 2 TIMES DAILY WITH MEALS
Qty: 100 EACH | Refills: 11 | Status: SHIPPED | OUTPATIENT
Start: 2021-04-05 | End: 2021-07-20 | Stop reason: SDUPTHER

## 2021-04-05 RX ORDER — INSULIN ASPART 100 [IU]/ML
25 INJECTION, SUSPENSION SUBCUTANEOUS
Qty: 22.5 ML | Refills: 3 | Status: SHIPPED | OUTPATIENT
Start: 2021-04-05 | End: 2021-07-20 | Stop reason: SDUPTHER

## 2021-04-05 RX ORDER — LANCING DEVICE
1 EACH MISCELLANEOUS 2 TIMES DAILY WITH MEALS
Qty: 1 EACH | Refills: 0 | Status: ON HOLD | OUTPATIENT
Start: 2021-04-05 | End: 2021-06-04 | Stop reason: HOSPADM

## 2021-04-05 RX ORDER — INSULIN ASPART 100 [IU]/ML
15 INJECTION, SUSPENSION SUBCUTANEOUS
Qty: 13.5 ML | Refills: 3 | Status: SHIPPED | OUTPATIENT
Start: 2021-04-05 | End: 2021-04-05 | Stop reason: HOSPADM

## 2021-04-05 RX ADMIN — LEVETIRACETAM 750 MG: 750 TABLET ORAL at 08:04

## 2021-04-05 RX ADMIN — FOLIC ACID 1 MG: 1 TABLET ORAL at 09:04

## 2021-04-05 RX ADMIN — Medication 100 MG: at 09:04

## 2021-04-05 RX ADMIN — CLOBAZAM 10 MG: 10 TABLET ORAL at 08:04

## 2021-04-05 RX ADMIN — RISPERIDONE 1 MG: 1 TABLET ORAL at 08:04

## 2021-04-05 RX ADMIN — DIVALPROEX SODIUM 1000 MG: 125 CAPSULE, COATED PELLETS ORAL at 08:04

## 2021-04-05 RX ADMIN — INSULIN DETEMIR 16 UNITS: 100 INJECTION, SOLUTION SUBCUTANEOUS at 08:04

## 2021-04-05 RX ADMIN — INSULIN ASPART 10 UNITS: 100 INJECTION, SOLUTION INTRAVENOUS; SUBCUTANEOUS at 12:04

## 2021-04-05 RX ADMIN — INSULIN ASPART 10 UNITS: 100 INJECTION, SOLUTION INTRAVENOUS; SUBCUTANEOUS at 08:04

## 2021-04-05 RX ADMIN — DOCUSATE SODIUM 50MG AND SENNOSIDES 8.6MG 1 TABLET: 8.6; 5 TABLET, FILM COATED ORAL at 08:04

## 2021-04-05 RX ADMIN — INSULIN ASPART 2 UNITS: 100 INJECTION, SOLUTION INTRAVENOUS; SUBCUTANEOUS at 08:04

## 2021-04-05 RX ADMIN — RISPERIDONE 1 MG: 1 TABLET ORAL at 02:04

## 2021-04-06 LAB
BACTERIA CSF CULT: NO GROWTH
GRAM STN SPEC: NORMAL
GRAM STN SPEC: NORMAL

## 2021-04-07 LAB
AMPA-R AB CBA, CSF: NEGATIVE
AMPHIPHYSIN AB TITR CSF: NEGATIVE TITER
CASPR2-IGG CBA, CSF: NEGATIVE
CV2 IGG TITR CSF: NEGATIVE TITER
DPPX AB IFA, CSF: NEGATIVE
GABA-B-R AB, CBA, CSF: NEGATIVE
GAD65 AB CSF-SCNC: 0 NMOL/L
GFAP IFA, CSF: NEGATIVE
GLIAL NUC TYPE 1 AB TITR CSF: NEGATIVE TITER
HU1 AB TITR CSF IF: NEGATIVE TITER
HU2 AB TITR CSF IF: NEGATIVE TITER
HU3 AB TITR CSF: NEGATIVE TITER
IGLON5 IFA, CSF: NEGATIVE
IMMUNOLOGIST REVIEW: NORMAL
LGI1-IGG CBA,CSF: NEGATIVE
MGLUR1 AB IFA, CSF: NEGATIVE
NIF IFA, CSF: NEGATIVE
NMDAR1 AB, CBA, CSF: NEGATIVE
PCA-2 AB TITR CSF: NEGATIVE TITER
PCA-TR AB TITR CSF: NEGATIVE TITER
PNEOE REFLEX TEST ADDED: NORMAL
PURKINJE CELLS AB TITR CSF IF: NEGATIVE TITER

## 2021-04-13 ENCOUNTER — TELEPHONE (OUTPATIENT)
Dept: NEUROLOGY | Facility: CLINIC | Age: 41
End: 2021-04-13

## 2021-04-18 ENCOUNTER — NURSE TRIAGE (OUTPATIENT)
Dept: ADMINISTRATIVE | Facility: CLINIC | Age: 41
End: 2021-04-18

## 2021-05-18 ENCOUNTER — TELEPHONE (OUTPATIENT)
Dept: INTERNAL MEDICINE | Facility: CLINIC | Age: 41
End: 2021-05-18

## 2021-05-18 RX ORDER — LEVETIRACETAM 750 MG/1
750 TABLET ORAL 2 TIMES DAILY
Qty: 60 TABLET | Refills: 0 | Status: SHIPPED | OUTPATIENT
Start: 2021-05-18 | End: 2021-06-24

## 2021-05-20 ENCOUNTER — TELEPHONE (OUTPATIENT)
Dept: NEUROLOGY | Facility: CLINIC | Age: 41
End: 2021-05-20

## 2021-05-20 NOTE — TELEPHONE ENCOUNTER
----- Message from Mariah Loyola MA sent at 5/20/2021  4:48 PM CDT -----  Contact: @[phone]  Patient has not seen Dr Nichols yet but has seen Dr Wolfe. Can we get him a bridge fill until June 11 when he see Dr Nichols   ----- Message -----  From: Galindo Lucero  Sent: 5/20/2021   4:33 PM CDT  To: Simone MONROE Staff    Rx Refill/Request     Is this a Refill or New Rx:  Yes   Rx Name and Strength:  ( levETIRAcetam (KEPPRA) 750 MG Tab) (cloBAZam (ONFI) 10 mg Tab ) (cloBAZam (ONFI) 20 mg Tab ) ( divalproex (DEPAKOTE) 500 MG TbEC)   Preferred Pharmacy with phone number:     [id number] - [place], [place] - [address] AT [facility] [address]  [address]  [place] [address]  Phone: [phone] Fax: [phone]

## 2021-05-23 ENCOUNTER — HOSPITAL ENCOUNTER (INPATIENT)
Facility: HOSPITAL | Age: 41
LOS: 12 days | Discharge: HOME | DRG: 056 | End: 2021-06-04
Attending: EMERGENCY MEDICINE | Admitting: HOSPITALIST
Payer: MEDICAID

## 2021-05-23 DIAGNOSIS — R07.81 RIB PAIN ON LEFT SIDE: ICD-10-CM

## 2021-05-23 DIAGNOSIS — R00.0 TACHYCARDIA: ICD-10-CM

## 2021-05-23 DIAGNOSIS — R41.9 NEUROCOGNITIVE DISORDER: ICD-10-CM

## 2021-05-23 DIAGNOSIS — E87.29 INCREASED ANION GAP METABOLIC ACIDOSIS: ICD-10-CM

## 2021-05-23 DIAGNOSIS — F10.931 ALCOHOL WITHDRAWAL SYNDROME, WITH DELIRIUM: ICD-10-CM

## 2021-05-23 DIAGNOSIS — G93.41 ENCEPHALOPATHY, METABOLIC: ICD-10-CM

## 2021-05-23 DIAGNOSIS — I33.0 MITRAL VALVE VEGETATION: Primary | ICD-10-CM

## 2021-05-23 DIAGNOSIS — Z75.8 DISCHARGE PLANNING ISSUES: ICD-10-CM

## 2021-05-23 DIAGNOSIS — I50.20 HFREF (HEART FAILURE WITH REDUCED EJECTION FRACTION): ICD-10-CM

## 2021-05-23 DIAGNOSIS — G40.919 REFRACTORY SEIZURE: ICD-10-CM

## 2021-05-23 DIAGNOSIS — F10.920 ALCOHOLIC INTOXICATION WITHOUT COMPLICATION: Chronic | ICD-10-CM

## 2021-05-23 DIAGNOSIS — G93.40 ENCEPHALOPATHY: ICD-10-CM

## 2021-05-23 DIAGNOSIS — I63.311 CEREBROVASCULAR ACCIDENT (CVA) DUE TO THROMBOSIS OF RIGHT MIDDLE CEREBRAL ARTERY: ICD-10-CM

## 2021-05-23 DIAGNOSIS — F10.20 ALCOHOL USE DISORDER, SEVERE, DEPENDENCE: ICD-10-CM

## 2021-05-23 DIAGNOSIS — R07.9 CHEST PAIN: ICD-10-CM

## 2021-05-23 DIAGNOSIS — G93.40 ACUTE ENCEPHALOPATHY: ICD-10-CM

## 2021-05-23 DIAGNOSIS — Z91.81 RISK FOR FALLS: ICD-10-CM

## 2021-05-23 DIAGNOSIS — R46.2 BIZARRE BEHAVIOR: ICD-10-CM

## 2021-05-23 DIAGNOSIS — G40.201 PARTIAL SYMPTOMATIC EPILEPSY WITH COMPLEX PARTIAL SEIZURES, NOT INTRACTABLE, WITH STATUS EPILEPTICUS: ICD-10-CM

## 2021-05-23 PROBLEM — E86.0 HYPOVOLEMIA DUE TO DEHYDRATION: Status: ACTIVE | Noted: 2021-05-23

## 2021-05-23 PROBLEM — E83.42 HYPOMAGNESEMIA: Chronic | Status: ACTIVE | Noted: 2021-05-23

## 2021-05-23 PROBLEM — E86.1 HYPOVOLEMIA DUE TO DEHYDRATION: Status: ACTIVE | Noted: 2021-05-23

## 2021-05-23 LAB
ALBUMIN SERPL BCP-MCNC: 2.3 G/DL (ref 3.5–5.2)
ALBUMIN SERPL BCP-MCNC: 3.3 G/DL (ref 3.5–5.2)
ALP SERPL-CCNC: 100 U/L (ref 55–135)
ALP SERPL-CCNC: 68 U/L (ref 55–135)
ALT SERPL W/O P-5'-P-CCNC: 18 U/L (ref 10–44)
ALT SERPL W/O P-5'-P-CCNC: 25 U/L (ref 10–44)
ANION GAP SERPL CALC-SCNC: 11 MMOL/L (ref 8–16)
ANION GAP SERPL CALC-SCNC: 18 MMOL/L (ref 8–16)
AST SERPL-CCNC: 32 U/L (ref 10–40)
AST SERPL-CCNC: 35 U/L (ref 10–40)
BASOPHILS # BLD AUTO: 0.04 K/UL (ref 0–0.2)
BASOPHILS NFR BLD: 0.7 % (ref 0–1.9)
BILIRUB SERPL-MCNC: 0.1 MG/DL (ref 0.1–1)
BILIRUB SERPL-MCNC: 0.2 MG/DL (ref 0.1–1)
BUN SERPL-MCNC: 7 MG/DL (ref 6–20)
BUN SERPL-MCNC: 7 MG/DL (ref 6–30)
BUN SERPL-MCNC: 8 MG/DL (ref 6–20)
CALCIUM SERPL-MCNC: 5.6 MG/DL (ref 8.7–10.5)
CALCIUM SERPL-MCNC: 8.7 MG/DL (ref 8.7–10.5)
CHLORIDE SERPL-SCNC: 100 MMOL/L (ref 95–110)
CHLORIDE SERPL-SCNC: 102 MMOL/L (ref 95–110)
CHLORIDE SERPL-SCNC: 116 MMOL/L (ref 95–110)
CO2 SERPL-SCNC: 16 MMOL/L (ref 23–29)
CO2 SERPL-SCNC: 19 MMOL/L (ref 23–29)
CREAT SERPL-MCNC: 0.5 MG/DL (ref 0.5–1.4)
CREAT SERPL-MCNC: 0.6 MG/DL (ref 0.5–1.4)
CREAT SERPL-MCNC: 0.9 MG/DL (ref 0.5–1.4)
CTP QC/QA: YES
DIFFERENTIAL METHOD: ABNORMAL
EOSINOPHIL # BLD AUTO: 0 K/UL (ref 0–0.5)
EOSINOPHIL NFR BLD: 0.4 % (ref 0–8)
ERYTHROCYTE [DISTWIDTH] IN BLOOD BY AUTOMATED COUNT: 15.2 % (ref 11.5–14.5)
EST. GFR  (AFRICAN AMERICAN): >60 ML/MIN/1.73 M^2
EST. GFR  (AFRICAN AMERICAN): >60 ML/MIN/1.73 M^2
EST. GFR  (NON AFRICAN AMERICAN): >60 ML/MIN/1.73 M^2
EST. GFR  (NON AFRICAN AMERICAN): >60 ML/MIN/1.73 M^2
ETHANOL SERPL-MCNC: 269 MG/DL
GLUCOSE SERPL-MCNC: 125 MG/DL (ref 70–110)
GLUCOSE SERPL-MCNC: 129 MG/DL (ref 70–110)
GLUCOSE SERPL-MCNC: 98 MG/DL (ref 70–110)
HCT VFR BLD AUTO: 38.7 % (ref 40–54)
HCT VFR BLD CALC: 40 %PCV (ref 36–54)
HGB BLD-MCNC: 13 G/DL (ref 14–18)
IMM GRANULOCYTES # BLD AUTO: 0.02 K/UL (ref 0–0.04)
IMM GRANULOCYTES NFR BLD AUTO: 0.4 % (ref 0–0.5)
LYMPHOCYTES # BLD AUTO: 2.6 K/UL (ref 1–4.8)
LYMPHOCYTES NFR BLD: 45.5 % (ref 18–48)
MAGNESIUM SERPL-MCNC: 1.1 MG/DL (ref 1.6–2.6)
MCH RBC QN AUTO: 29.5 PG (ref 27–31)
MCHC RBC AUTO-ENTMCNC: 33.6 G/DL (ref 32–36)
MCV RBC AUTO: 88 FL (ref 82–98)
MONOCYTES # BLD AUTO: 0.5 K/UL (ref 0.3–1)
MONOCYTES NFR BLD: 8.5 % (ref 4–15)
NEUTROPHILS # BLD AUTO: 2.5 K/UL (ref 1.8–7.7)
NEUTROPHILS NFR BLD: 44.5 % (ref 38–73)
NRBC BLD-RTO: 0 /100 WBC
PHOSPHATE SERPL-MCNC: 3.8 MG/DL (ref 2.7–4.5)
PLATELET # BLD AUTO: 178 K/UL (ref 150–450)
PMV BLD AUTO: 10.4 FL (ref 9.2–12.9)
POC IONIZED CALCIUM: 1.07 MMOL/L (ref 1.06–1.42)
POC TCO2 (MEASURED): 25 MMOL/L (ref 23–29)
POTASSIUM BLD-SCNC: 3.4 MMOL/L (ref 3.5–5.1)
POTASSIUM SERPL-SCNC: 3.6 MMOL/L (ref 3.5–5.1)
POTASSIUM SERPL-SCNC: 4 MMOL/L (ref 3.5–5.1)
PROT SERPL-MCNC: 4.7 G/DL (ref 6–8.4)
PROT SERPL-MCNC: 6.2 G/DL (ref 6–8.4)
RBC # BLD AUTO: 4.4 M/UL (ref 4.6–6.2)
SAMPLE: ABNORMAL
SARS-COV-2 RDRP RESP QL NAA+PROBE: NEGATIVE
SODIUM BLD-SCNC: 137 MMOL/L (ref 136–145)
SODIUM SERPL-SCNC: 139 MMOL/L (ref 136–145)
SODIUM SERPL-SCNC: 143 MMOL/L (ref 136–145)
WBC # BLD AUTO: 5.63 K/UL (ref 3.9–12.7)

## 2021-05-23 PROCEDURE — 63600175 PHARM REV CODE 636 W HCPCS: Performed by: NURSE PRACTITIONER

## 2021-05-23 PROCEDURE — G0378 HOSPITAL OBSERVATION PER HR: HCPCS

## 2021-05-23 PROCEDURE — 99285 EMERGENCY DEPT VISIT HI MDM: CPT | Mod: CS,,, | Performed by: PHYSICIAN ASSISTANT

## 2021-05-23 PROCEDURE — 99285 PR EMERGENCY DEPT VISIT,LEVEL V: ICD-10-PCS | Mod: CS,,, | Performed by: PHYSICIAN ASSISTANT

## 2021-05-23 PROCEDURE — 99285 EMERGENCY DEPT VISIT HI MDM: CPT | Mod: 25

## 2021-05-23 PROCEDURE — 96365 THER/PROPH/DIAG IV INF INIT: CPT

## 2021-05-23 PROCEDURE — 80053 COMPREHEN METABOLIC PANEL: CPT | Performed by: PHYSICIAN ASSISTANT

## 2021-05-23 PROCEDURE — 82077 ASSAY SPEC XCP UR&BREATH IA: CPT | Performed by: PHYSICIAN ASSISTANT

## 2021-05-23 PROCEDURE — 96366 THER/PROPH/DIAG IV INF ADDON: CPT

## 2021-05-23 PROCEDURE — U0002 COVID-19 LAB TEST NON-CDC: HCPCS | Performed by: EMERGENCY MEDICINE

## 2021-05-23 PROCEDURE — 99220 PR INITIAL OBSERVATION CARE,LEVL III: CPT | Mod: ,,, | Performed by: NURSE PRACTITIONER

## 2021-05-23 PROCEDURE — 96367 TX/PROPH/DG ADDL SEQ IV INF: CPT

## 2021-05-23 PROCEDURE — 93005 ELECTROCARDIOGRAM TRACING: CPT

## 2021-05-23 PROCEDURE — 96376 TX/PRO/DX INJ SAME DRUG ADON: CPT

## 2021-05-23 PROCEDURE — 25000003 PHARM REV CODE 250: Performed by: PHYSICIAN ASSISTANT

## 2021-05-23 PROCEDURE — 96361 HYDRATE IV INFUSION ADD-ON: CPT

## 2021-05-23 PROCEDURE — 93010 EKG 12-LEAD: ICD-10-PCS | Mod: ,,, | Performed by: INTERNAL MEDICINE

## 2021-05-23 PROCEDURE — 93010 ELECTROCARDIOGRAM REPORT: CPT | Mod: ,,, | Performed by: INTERNAL MEDICINE

## 2021-05-23 PROCEDURE — 25000003 PHARM REV CODE 250: Performed by: NURSE PRACTITIONER

## 2021-05-23 PROCEDURE — 83735 ASSAY OF MAGNESIUM: CPT | Performed by: PHYSICIAN ASSISTANT

## 2021-05-23 PROCEDURE — 80047 BASIC METABLC PNL IONIZED CA: CPT

## 2021-05-23 PROCEDURE — 63600175 PHARM REV CODE 636 W HCPCS: Performed by: PHYSICIAN ASSISTANT

## 2021-05-23 PROCEDURE — 11000001 HC ACUTE MED/SURG PRIVATE ROOM

## 2021-05-23 PROCEDURE — 85025 COMPLETE CBC W/AUTO DIFF WBC: CPT | Performed by: PHYSICIAN ASSISTANT

## 2021-05-23 PROCEDURE — 84100 ASSAY OF PHOSPHORUS: CPT | Performed by: PHYSICIAN ASSISTANT

## 2021-05-23 PROCEDURE — 99220 PR INITIAL OBSERVATION CARE,LEVL III: ICD-10-PCS | Mod: ,,, | Performed by: NURSE PRACTITIONER

## 2021-05-23 RX ORDER — POTASSIUM CHLORIDE 750 MG/1
40 CAPSULE, EXTENDED RELEASE ORAL ONCE
Status: COMPLETED | OUTPATIENT
Start: 2021-05-23 | End: 2021-05-23

## 2021-05-23 RX ORDER — HALOPERIDOL 5 MG/ML
5 INJECTION INTRAMUSCULAR ONCE
Status: COMPLETED | OUTPATIENT
Start: 2021-05-24 | End: 2021-05-24

## 2021-05-23 RX ORDER — THIAMINE HCL 100 MG
100 TABLET ORAL
Status: COMPLETED | OUTPATIENT
Start: 2021-05-23 | End: 2021-05-23

## 2021-05-23 RX ORDER — ONDANSETRON 2 MG/ML
4 INJECTION INTRAMUSCULAR; INTRAVENOUS EVERY 8 HOURS PRN
Status: DISCONTINUED | OUTPATIENT
Start: 2021-05-24 | End: 2021-06-04 | Stop reason: HOSPADM

## 2021-05-23 RX ORDER — SODIUM CHLORIDE, SODIUM LACTATE, POTASSIUM CHLORIDE, CALCIUM CHLORIDE 600; 310; 30; 20 MG/100ML; MG/100ML; MG/100ML; MG/100ML
INJECTION, SOLUTION INTRAVENOUS CONTINUOUS
Status: ACTIVE | OUTPATIENT
Start: 2021-05-23 | End: 2021-05-24

## 2021-05-23 RX ORDER — SODIUM BICARBONATE 650 MG/1
650 TABLET ORAL 2 TIMES DAILY
Status: DISCONTINUED | OUTPATIENT
Start: 2021-05-23 | End: 2021-05-24

## 2021-05-23 RX ORDER — ACETAMINOPHEN 500 MG
1000 TABLET ORAL EVERY 8 HOURS
Status: DISCONTINUED | OUTPATIENT
Start: 2021-05-23 | End: 2021-05-24

## 2021-05-23 RX ORDER — FOLIC ACID 1 MG/1
1 TABLET ORAL
Status: COMPLETED | OUTPATIENT
Start: 2021-05-23 | End: 2021-05-23

## 2021-05-23 RX ORDER — CLOBAZAM 10 MG/1
10 TABLET ORAL DAILY
Status: DISCONTINUED | OUTPATIENT
Start: 2021-05-24 | End: 2021-05-24

## 2021-05-23 RX ORDER — IBUPROFEN 200 MG
24 TABLET ORAL
Status: DISCONTINUED | OUTPATIENT
Start: 2021-05-24 | End: 2021-06-04 | Stop reason: HOSPADM

## 2021-05-23 RX ORDER — DIAZEPAM 5 MG/1
5 TABLET ORAL EVERY 8 HOURS
Status: DISCONTINUED | OUTPATIENT
Start: 2021-05-24 | End: 2021-05-24

## 2021-05-23 RX ORDER — ONDANSETRON 2 MG/ML
8 INJECTION INTRAMUSCULAR; INTRAVENOUS EVERY 8 HOURS PRN
Status: DISCONTINUED | OUTPATIENT
Start: 2021-05-24 | End: 2021-05-24

## 2021-05-23 RX ORDER — LEVETIRACETAM 750 MG/1
750 TABLET ORAL 2 TIMES DAILY
Status: DISCONTINUED | OUTPATIENT
Start: 2021-05-24 | End: 2021-05-24

## 2021-05-23 RX ORDER — CLOBAZAM 10 MG/1
20 TABLET ORAL NIGHTLY
Refills: 2 | Status: DISCONTINUED | OUTPATIENT
Start: 2021-05-24 | End: 2021-05-24

## 2021-05-23 RX ORDER — INSULIN ASPART 100 [IU]/ML
0-5 INJECTION, SOLUTION INTRAVENOUS; SUBCUTANEOUS
Status: DISCONTINUED | OUTPATIENT
Start: 2021-05-24 | End: 2021-05-26

## 2021-05-23 RX ORDER — FOLIC ACID 1 MG/1
1 TABLET ORAL DAILY
Status: DISCONTINUED | OUTPATIENT
Start: 2021-05-24 | End: 2021-05-24

## 2021-05-23 RX ORDER — KETOROLAC TROMETHAMINE 30 MG/ML
15 INJECTION, SOLUTION INTRAMUSCULAR; INTRAVENOUS EVERY 6 HOURS
Status: DISCONTINUED | OUTPATIENT
Start: 2021-05-23 | End: 2021-05-24

## 2021-05-23 RX ORDER — DIAZEPAM 5 MG/1
10 TABLET ORAL EVERY 6 HOURS PRN
Status: DISCONTINUED | OUTPATIENT
Start: 2021-05-24 | End: 2021-05-24

## 2021-05-23 RX ORDER — TALC
6 POWDER (GRAM) TOPICAL NIGHTLY PRN
Status: DISCONTINUED | OUTPATIENT
Start: 2021-05-24 | End: 2021-05-24

## 2021-05-23 RX ORDER — POLYETHYLENE GLYCOL 3350 17 G/17G
17 POWDER, FOR SOLUTION ORAL DAILY
Status: DISCONTINUED | OUTPATIENT
Start: 2021-05-24 | End: 2021-05-24

## 2021-05-23 RX ORDER — RISPERIDONE 1 MG/1
1 TABLET ORAL 3 TIMES DAILY
Status: DISCONTINUED | OUTPATIENT
Start: 2021-05-23 | End: 2021-05-24

## 2021-05-23 RX ORDER — IBUPROFEN 200 MG
1 TABLET ORAL DAILY
Status: DISCONTINUED | OUTPATIENT
Start: 2021-05-24 | End: 2021-06-04 | Stop reason: HOSPADM

## 2021-05-23 RX ORDER — SODIUM CHLORIDE 0.9 % (FLUSH) 0.9 %
10 SYRINGE (ML) INJECTION
Status: DISCONTINUED | OUTPATIENT
Start: 2021-05-24 | End: 2021-06-04 | Stop reason: HOSPADM

## 2021-05-23 RX ORDER — MAGNESIUM SULFATE HEPTAHYDRATE 40 MG/ML
2 INJECTION, SOLUTION INTRAVENOUS
Status: COMPLETED | OUTPATIENT
Start: 2021-05-23 | End: 2021-05-23

## 2021-05-23 RX ORDER — IBUPROFEN 200 MG
16 TABLET ORAL
Status: DISCONTINUED | OUTPATIENT
Start: 2021-05-24 | End: 2021-06-04 | Stop reason: HOSPADM

## 2021-05-23 RX ORDER — THIAMINE HCL 100 MG
100 TABLET ORAL DAILY
Status: DISCONTINUED | OUTPATIENT
Start: 2021-05-24 | End: 2021-05-24

## 2021-05-23 RX ORDER — DIVALPROEX SODIUM 250 MG/1
1500 TABLET, DELAYED RELEASE ORAL NIGHTLY
Status: DISCONTINUED | OUTPATIENT
Start: 2021-05-24 | End: 2021-05-24 | Stop reason: SDUPTHER

## 2021-05-23 RX ORDER — DIVALPROEX SODIUM 250 MG/1
1000 TABLET, DELAYED RELEASE ORAL DAILY
Status: DISCONTINUED | OUTPATIENT
Start: 2021-05-24 | End: 2021-05-24 | Stop reason: SDUPTHER

## 2021-05-23 RX ORDER — MAGNESIUM SULFATE HEPTAHYDRATE 40 MG/ML
2 INJECTION, SOLUTION INTRAVENOUS
Status: COMPLETED | OUTPATIENT
Start: 2021-05-23 | End: 2021-05-24

## 2021-05-23 RX ORDER — GLUCAGON 1 MG
1 KIT INJECTION
Status: DISCONTINUED | OUTPATIENT
Start: 2021-05-24 | End: 2021-05-26

## 2021-05-23 RX ADMIN — SODIUM BICARBONATE 650 MG TABLET 650 MG: at 11:05

## 2021-05-23 RX ADMIN — MAGNESIUM SULFATE 2 G: 2 INJECTION INTRAVENOUS at 11:05

## 2021-05-23 RX ADMIN — Medication 100 MG: at 06:05

## 2021-05-23 RX ADMIN — SODIUM CHLORIDE 1000 ML: 0.9 INJECTION, SOLUTION INTRAVENOUS at 05:05

## 2021-05-23 RX ADMIN — CALCIUM GLUCONATE 2 G: 98 INJECTION, SOLUTION INTRAVENOUS at 06:05

## 2021-05-23 RX ADMIN — MAGNESIUM SULFATE 2 G: 2 INJECTION INTRAVENOUS at 08:05

## 2021-05-23 RX ADMIN — POTASSIUM CHLORIDE 40 MEQ: 10 CAPSULE, COATED, EXTENDED RELEASE ORAL at 11:05

## 2021-05-23 RX ADMIN — FOLIC ACID 1 MG: 1 TABLET ORAL at 06:05

## 2021-05-24 PROBLEM — G93.41 ENCEPHALOPATHY, METABOLIC: Status: ACTIVE | Noted: 2021-05-24

## 2021-05-24 PROBLEM — R00.0 TACHYCARDIA: Status: ACTIVE | Noted: 2021-05-24

## 2021-05-24 PROBLEM — F10.931 ALCOHOL WITHDRAWAL SYNDROME, WITH DELIRIUM: Status: ACTIVE | Noted: 2021-05-24

## 2021-05-24 PROBLEM — E87.29 INCREASED ANION GAP METABOLIC ACIDOSIS: Status: ACTIVE | Noted: 2021-05-24

## 2021-05-24 PROBLEM — G93.40 ENCEPHALOPATHY: Status: ACTIVE | Noted: 2021-05-24

## 2021-05-24 PROBLEM — Z79.4 TYPE 2 DIABETES MELLITUS, WITH LONG-TERM CURRENT USE OF INSULIN: Status: ACTIVE | Noted: 2021-02-12

## 2021-05-24 LAB
ALBUMIN SERPL BCP-MCNC: 3.9 G/DL (ref 3.5–5.2)
ALBUMIN SERPL BCP-MCNC: 4.2 G/DL (ref 3.5–5.2)
ALP SERPL-CCNC: 121 U/L (ref 55–135)
ALP SERPL-CCNC: 121 U/L (ref 55–135)
ALT SERPL W/O P-5'-P-CCNC: 30 U/L (ref 10–44)
ALT SERPL W/O P-5'-P-CCNC: 31 U/L (ref 10–44)
ANION GAP SERPL CALC-SCNC: 21 MMOL/L (ref 8–16)
ANION GAP SERPL CALC-SCNC: 21 MMOL/L (ref 8–16)
AST SERPL-CCNC: 43 U/L (ref 10–40)
AST SERPL-CCNC: 53 U/L (ref 10–40)
B-OH-BUTYR BLD STRIP-SCNC: 1.8 MMOL/L (ref 0–0.5)
BACTERIA #/AREA URNS AUTO: NORMAL /HPF
BASOPHILS # BLD AUTO: 0.03 K/UL (ref 0–0.2)
BASOPHILS # BLD AUTO: 0.05 K/UL (ref 0–0.2)
BASOPHILS NFR BLD: 0.4 % (ref 0–1.9)
BASOPHILS NFR BLD: 0.6 % (ref 0–1.9)
BILIRUB SERPL-MCNC: 0.5 MG/DL (ref 0.1–1)
BILIRUB SERPL-MCNC: 0.8 MG/DL (ref 0.1–1)
BILIRUB UR QL STRIP: NEGATIVE
BUN SERPL-MCNC: 7 MG/DL (ref 6–20)
BUN SERPL-MCNC: 8 MG/DL (ref 6–20)
CALCIUM SERPL-MCNC: 10.8 MG/DL (ref 8.7–10.5)
CALCIUM SERPL-MCNC: 9.9 MG/DL (ref 8.7–10.5)
CHLORIDE SERPL-SCNC: 104 MMOL/L (ref 95–110)
CHLORIDE SERPL-SCNC: 99 MMOL/L (ref 95–110)
CLARITY UR REFRACT.AUTO: CLEAR
CO2 SERPL-SCNC: 18 MMOL/L (ref 23–29)
CO2 SERPL-SCNC: 18 MMOL/L (ref 23–29)
COLOR UR AUTO: ABNORMAL
CREAT SERPL-MCNC: 0.8 MG/DL (ref 0.5–1.4)
CREAT SERPL-MCNC: 0.8 MG/DL (ref 0.5–1.4)
DIFFERENTIAL METHOD: ABNORMAL
DIFFERENTIAL METHOD: ABNORMAL
EOSINOPHIL # BLD AUTO: 0 K/UL (ref 0–0.5)
EOSINOPHIL # BLD AUTO: 0 K/UL (ref 0–0.5)
EOSINOPHIL NFR BLD: 0 % (ref 0–8)
EOSINOPHIL NFR BLD: 0 % (ref 0–8)
ERYTHROCYTE [DISTWIDTH] IN BLOOD BY AUTOMATED COUNT: 15.2 % (ref 11.5–14.5)
ERYTHROCYTE [DISTWIDTH] IN BLOOD BY AUTOMATED COUNT: 15.2 % (ref 11.5–14.5)
EST. GFR  (AFRICAN AMERICAN): >60 ML/MIN/1.73 M^2
EST. GFR  (AFRICAN AMERICAN): >60 ML/MIN/1.73 M^2
EST. GFR  (NON AFRICAN AMERICAN): >60 ML/MIN/1.73 M^2
EST. GFR  (NON AFRICAN AMERICAN): >60 ML/MIN/1.73 M^2
ESTIMATED AVG GLUCOSE: 157 MG/DL (ref 68–131)
GLUCOSE SERPL-MCNC: 128 MG/DL (ref 70–110)
GLUCOSE SERPL-MCNC: 200 MG/DL (ref 70–110)
GLUCOSE UR QL STRIP: ABNORMAL
HBA1C MFR BLD: 7.1 % (ref 4–5.6)
HCT VFR BLD AUTO: 41 % (ref 40–54)
HCT VFR BLD AUTO: 42 % (ref 40–54)
HGB BLD-MCNC: 14 G/DL (ref 14–18)
HGB BLD-MCNC: 14.3 G/DL (ref 14–18)
HGB UR QL STRIP: ABNORMAL
IMM GRANULOCYTES # BLD AUTO: 0.03 K/UL (ref 0–0.04)
IMM GRANULOCYTES # BLD AUTO: 0.04 K/UL (ref 0–0.04)
IMM GRANULOCYTES NFR BLD AUTO: 0.3 % (ref 0–0.5)
IMM GRANULOCYTES NFR BLD AUTO: 0.6 % (ref 0–0.5)
KETONES UR QL STRIP: ABNORMAL
LACTATE SERPL-SCNC: 3.4 MMOL/L (ref 0.5–2.2)
LACTATE SERPL-SCNC: 4 MMOL/L (ref 0.5–2.2)
LEUKOCYTE ESTERASE UR QL STRIP: NEGATIVE
LYMPHOCYTES # BLD AUTO: 0.6 K/UL (ref 1–4.8)
LYMPHOCYTES # BLD AUTO: 2.1 K/UL (ref 1–4.8)
LYMPHOCYTES NFR BLD: 24.5 % (ref 18–48)
LYMPHOCYTES NFR BLD: 8.8 % (ref 18–48)
MAGNESIUM SERPL-MCNC: 1.7 MG/DL (ref 1.6–2.6)
MAGNESIUM SERPL-MCNC: 2.3 MG/DL (ref 1.6–2.6)
MCH RBC QN AUTO: 28.7 PG (ref 27–31)
MCH RBC QN AUTO: 29.8 PG (ref 27–31)
MCHC RBC AUTO-ENTMCNC: 33.3 G/DL (ref 32–36)
MCHC RBC AUTO-ENTMCNC: 34.9 G/DL (ref 32–36)
MCV RBC AUTO: 85 FL (ref 82–98)
MCV RBC AUTO: 86 FL (ref 82–98)
MICROSCOPIC COMMENT: NORMAL
MONOCYTES # BLD AUTO: 0.5 K/UL (ref 0.3–1)
MONOCYTES # BLD AUTO: 1.3 K/UL (ref 0.3–1)
MONOCYTES NFR BLD: 15.3 % (ref 4–15)
MONOCYTES NFR BLD: 7.9 % (ref 4–15)
NEUTROPHILS # BLD AUTO: 5.2 K/UL (ref 1.8–7.7)
NEUTROPHILS # BLD AUTO: 5.5 K/UL (ref 1.8–7.7)
NEUTROPHILS NFR BLD: 59.3 % (ref 38–73)
NEUTROPHILS NFR BLD: 82.3 % (ref 38–73)
NITRITE UR QL STRIP: NEGATIVE
NRBC BLD-RTO: 0 /100 WBC
NRBC BLD-RTO: 0 /100 WBC
PH UR STRIP: 6 [PH] (ref 5–8)
PHOSPHATE SERPL-MCNC: 2.2 MG/DL (ref 2.7–4.5)
PHOSPHATE SERPL-MCNC: 3.3 MG/DL (ref 2.7–4.5)
PLATELET # BLD AUTO: 196 K/UL (ref 150–450)
PLATELET # BLD AUTO: 207 K/UL (ref 150–450)
PMV BLD AUTO: 10.2 FL (ref 9.2–12.9)
PMV BLD AUTO: 9.5 FL (ref 9.2–12.9)
POCT GLUCOSE: 130 MG/DL (ref 70–110)
POCT GLUCOSE: 146 MG/DL (ref 70–110)
POCT GLUCOSE: 96 MG/DL (ref 70–110)
POTASSIUM SERPL-SCNC: 4 MMOL/L (ref 3.5–5.1)
POTASSIUM SERPL-SCNC: 4.8 MMOL/L (ref 3.5–5.1)
PROT SERPL-MCNC: 7.5 G/DL (ref 6–8.4)
PROT SERPL-MCNC: 8.1 G/DL (ref 6–8.4)
PROT UR QL STRIP: NEGATIVE
RBC # BLD AUTO: 4.8 M/UL (ref 4.6–6.2)
RBC # BLD AUTO: 4.87 M/UL (ref 4.6–6.2)
RBC #/AREA URNS AUTO: 1 /HPF (ref 0–4)
SODIUM SERPL-SCNC: 138 MMOL/L (ref 136–145)
SODIUM SERPL-SCNC: 143 MMOL/L (ref 136–145)
SP GR UR STRIP: 1.01 (ref 1–1.03)
SQUAMOUS #/AREA URNS AUTO: 0 /HPF
URN SPEC COLLECT METH UR: ABNORMAL
VALPROATE SERPL-MCNC: <12.5 UG/ML (ref 50–100)
WBC # BLD AUTO: 6.71 K/UL (ref 3.9–12.7)
WBC # BLD AUTO: 8.75 K/UL (ref 3.9–12.7)
WBC #/AREA URNS AUTO: 1 /HPF (ref 0–5)

## 2021-05-24 PROCEDURE — 85025 COMPLETE CBC W/AUTO DIFF WBC: CPT | Performed by: NURSE PRACTITIONER

## 2021-05-24 PROCEDURE — 83036 HEMOGLOBIN GLYCOSYLATED A1C: CPT | Performed by: NURSE PRACTITIONER

## 2021-05-24 PROCEDURE — 96375 TX/PRO/DX INJ NEW DRUG ADDON: CPT

## 2021-05-24 PROCEDURE — 63600175 PHARM REV CODE 636 W HCPCS: Performed by: PHYSICIAN ASSISTANT

## 2021-05-24 PROCEDURE — 99222 PR INITIAL HOSPITAL CARE,LEVL II: ICD-10-PCS | Mod: ,,, | Performed by: PSYCHIATRY & NEUROLOGY

## 2021-05-24 PROCEDURE — 80164 ASSAY DIPROPYLACETIC ACD TOT: CPT | Performed by: NURSE PRACTITIONER

## 2021-05-24 PROCEDURE — 82010 KETONE BODYS QUAN: CPT | Performed by: PHYSICIAN ASSISTANT

## 2021-05-24 PROCEDURE — 96366 THER/PROPH/DIAG IV INF ADDON: CPT

## 2021-05-24 PROCEDURE — 20000000 HC ICU ROOM

## 2021-05-24 PROCEDURE — 36415 COLL VENOUS BLD VENIPUNCTURE: CPT | Performed by: PHYSICIAN ASSISTANT

## 2021-05-24 PROCEDURE — 36415 COLL VENOUS BLD VENIPUNCTURE: CPT | Performed by: NURSE PRACTITIONER

## 2021-05-24 PROCEDURE — 87040 BLOOD CULTURE FOR BACTERIA: CPT | Performed by: PHYSICIAN ASSISTANT

## 2021-05-24 PROCEDURE — 85025 COMPLETE CBC W/AUTO DIFF WBC: CPT | Mod: 91 | Performed by: PHYSICIAN ASSISTANT

## 2021-05-24 PROCEDURE — 84100 ASSAY OF PHOSPHORUS: CPT | Mod: 91 | Performed by: PHYSICIAN ASSISTANT

## 2021-05-24 PROCEDURE — 63600175 PHARM REV CODE 636 W HCPCS: Performed by: NURSE PRACTITIONER

## 2021-05-24 PROCEDURE — 99291 CRITICAL CARE FIRST HOUR: CPT | Mod: ,,, | Performed by: PHYSICIAN ASSISTANT

## 2021-05-24 PROCEDURE — 83605 ASSAY OF LACTIC ACID: CPT | Mod: 91 | Performed by: HOSPITALIST

## 2021-05-24 PROCEDURE — S4991 NICOTINE PATCH NONLEGEND: HCPCS | Performed by: NURSE PRACTITIONER

## 2021-05-24 PROCEDURE — 87040 BLOOD CULTURE FOR BACTERIA: CPT | Mod: 59 | Performed by: HOSPITALIST

## 2021-05-24 PROCEDURE — 83605 ASSAY OF LACTIC ACID: CPT | Performed by: PHYSICIAN ASSISTANT

## 2021-05-24 PROCEDURE — 80053 COMPREHEN METABOLIC PANEL: CPT | Performed by: NURSE PRACTITIONER

## 2021-05-24 PROCEDURE — 25000003 PHARM REV CODE 250: Performed by: NURSE PRACTITIONER

## 2021-05-24 PROCEDURE — 80339 ANTIEPILEPTICS NOS 1-3: CPT | Performed by: STUDENT IN AN ORGANIZED HEALTH CARE EDUCATION/TRAINING PROGRAM

## 2021-05-24 PROCEDURE — 63600175 PHARM REV CODE 636 W HCPCS: Performed by: HOSPITALIST

## 2021-05-24 PROCEDURE — 81001 URINALYSIS AUTO W/SCOPE: CPT | Performed by: NURSE PRACTITIONER

## 2021-05-24 PROCEDURE — 99291 PR CRITICAL CARE, E/M 30-74 MINUTES: ICD-10-PCS | Mod: ,,, | Performed by: PHYSICIAN ASSISTANT

## 2021-05-24 PROCEDURE — 25000003 PHARM REV CODE 250: Performed by: HOSPITALIST

## 2021-05-24 PROCEDURE — 96367 TX/PROPH/DG ADDL SEQ IV INF: CPT

## 2021-05-24 PROCEDURE — 96372 THER/PROPH/DIAG INJ SC/IM: CPT

## 2021-05-24 PROCEDURE — 36415 COLL VENOUS BLD VENIPUNCTURE: CPT | Performed by: STUDENT IN AN ORGANIZED HEALTH CARE EDUCATION/TRAINING PROGRAM

## 2021-05-24 PROCEDURE — 99222 1ST HOSP IP/OBS MODERATE 55: CPT | Mod: ,,, | Performed by: PSYCHIATRY & NEUROLOGY

## 2021-05-24 PROCEDURE — 83735 ASSAY OF MAGNESIUM: CPT | Mod: 91 | Performed by: PHYSICIAN ASSISTANT

## 2021-05-24 PROCEDURE — 96376 TX/PRO/DX INJ SAME DRUG ADON: CPT

## 2021-05-24 PROCEDURE — 25000003 PHARM REV CODE 250: Performed by: STUDENT IN AN ORGANIZED HEALTH CARE EDUCATION/TRAINING PROGRAM

## 2021-05-24 PROCEDURE — 90792 PSYCH DIAG EVAL W/MED SRVCS: CPT | Mod: AF,HB,, | Performed by: PSYCHIATRY & NEUROLOGY

## 2021-05-24 PROCEDURE — 83735 ASSAY OF MAGNESIUM: CPT | Performed by: NURSE PRACTITIONER

## 2021-05-24 PROCEDURE — 84100 ASSAY OF PHOSPHORUS: CPT | Performed by: NURSE PRACTITIONER

## 2021-05-24 PROCEDURE — 80053 COMPREHEN METABOLIC PANEL: CPT | Mod: 91 | Performed by: PHYSICIAN ASSISTANT

## 2021-05-24 PROCEDURE — 90792 PR PSYCHIATRIC DIAGNOSTIC EVALUATION W/MEDICAL SERVICES: ICD-10-PCS | Mod: AF,HB,, | Performed by: PSYCHIATRY & NEUROLOGY

## 2021-05-24 PROCEDURE — 25000003 PHARM REV CODE 250: Performed by: PHYSICIAN ASSISTANT

## 2021-05-24 PROCEDURE — 63600175 PHARM REV CODE 636 W HCPCS

## 2021-05-24 PROCEDURE — 80177 DRUG SCRN QUAN LEVETIRACETAM: CPT | Performed by: NURSE PRACTITIONER

## 2021-05-24 RX ORDER — ENOXAPARIN SODIUM 100 MG/ML
40 INJECTION SUBCUTANEOUS EVERY 24 HOURS
Status: DISCONTINUED | OUTPATIENT
Start: 2021-05-24 | End: 2021-05-25

## 2021-05-24 RX ORDER — INSULIN ASPART 100 [IU]/ML
4 INJECTION, SOLUTION INTRAVENOUS; SUBCUTANEOUS
Status: DISCONTINUED | OUTPATIENT
Start: 2021-05-24 | End: 2021-05-24

## 2021-05-24 RX ORDER — ACETAMINOPHEN 325 MG/1
650 TABLET ORAL EVERY 6 HOURS PRN
Status: DISCONTINUED | OUTPATIENT
Start: 2021-05-24 | End: 2021-06-04 | Stop reason: HOSPADM

## 2021-05-24 RX ORDER — HALOPERIDOL 5 MG/ML
2 INJECTION INTRAMUSCULAR EVERY 6 HOURS PRN
Status: DISCONTINUED | OUTPATIENT
Start: 2021-05-24 | End: 2021-05-24

## 2021-05-24 RX ORDER — NAPROXEN SODIUM 220 MG/1
81 TABLET, FILM COATED ORAL DAILY
Status: DISCONTINUED | OUTPATIENT
Start: 2021-05-24 | End: 2021-05-24

## 2021-05-24 RX ORDER — LORAZEPAM 2 MG/ML
INJECTION INTRAMUSCULAR
Status: COMPLETED
Start: 2021-05-24 | End: 2021-05-24

## 2021-05-24 RX ORDER — HALOPERIDOL 5 MG/ML
5 INJECTION INTRAMUSCULAR EVERY 6 HOURS PRN
Status: DISCONTINUED | OUTPATIENT
Start: 2021-05-24 | End: 2021-05-24

## 2021-05-24 RX ORDER — CLONIDINE 0.2 MG/24H
1 PATCH, EXTENDED RELEASE TRANSDERMAL
Status: DISCONTINUED | OUTPATIENT
Start: 2021-05-24 | End: 2021-05-31

## 2021-05-24 RX ORDER — SODIUM CHLORIDE, SODIUM LACTATE, POTASSIUM CHLORIDE, CALCIUM CHLORIDE 600; 310; 30; 20 MG/100ML; MG/100ML; MG/100ML; MG/100ML
INJECTION, SOLUTION INTRAVENOUS CONTINUOUS
Status: DISCONTINUED | OUTPATIENT
Start: 2021-05-24 | End: 2021-05-24

## 2021-05-24 RX ORDER — ALBUTEROL SULFATE 2.5 MG/.5ML
2.5 SOLUTION RESPIRATORY (INHALATION) EVERY 4 HOURS PRN
Status: DISCONTINUED | OUTPATIENT
Start: 2021-05-24 | End: 2021-06-04 | Stop reason: HOSPADM

## 2021-05-24 RX ORDER — SODIUM BICARBONATE 650 MG/1
650 TABLET ORAL 3 TIMES DAILY
Status: DISCONTINUED | OUTPATIENT
Start: 2021-05-24 | End: 2021-05-24

## 2021-05-24 RX ORDER — IBUPROFEN 200 MG
400 TABLET ORAL EVERY 6 HOURS PRN
Status: DISCONTINUED | OUTPATIENT
Start: 2021-05-24 | End: 2021-05-24

## 2021-05-24 RX ORDER — LORAZEPAM 2 MG/ML
2 INJECTION INTRAMUSCULAR EVERY 4 HOURS PRN
Status: DISCONTINUED | OUTPATIENT
Start: 2021-05-24 | End: 2021-05-26

## 2021-05-24 RX ORDER — DEXMEDETOMIDINE HYDROCHLORIDE 4 UG/ML
0-1.4 INJECTION, SOLUTION INTRAVENOUS CONTINUOUS
Status: DISCONTINUED | OUTPATIENT
Start: 2021-05-24 | End: 2021-05-25

## 2021-05-24 RX ORDER — LORAZEPAM 2 MG/ML
2 INJECTION INTRAMUSCULAR EVERY 6 HOURS PRN
Status: DISCONTINUED | OUTPATIENT
Start: 2021-05-24 | End: 2021-05-24

## 2021-05-24 RX ORDER — DIAZEPAM 10 MG/2ML
10 INJECTION INTRAMUSCULAR EVERY 8 HOURS
Status: DISCONTINUED | OUTPATIENT
Start: 2021-05-24 | End: 2021-05-25

## 2021-05-24 RX ADMIN — FOLIC ACID: 5 INJECTION, SOLUTION INTRAMUSCULAR; INTRAVENOUS; SUBCUTANEOUS at 11:05

## 2021-05-24 RX ADMIN — VALPROATE SODIUM 800 MG: 100 INJECTION, SOLUTION INTRAVENOUS at 02:05

## 2021-05-24 RX ADMIN — DIAZEPAM 10 MG: 10 INJECTION, SOLUTION INTRAMUSCULAR; INTRAVENOUS at 11:05

## 2021-05-24 RX ADMIN — VALPROATE SODIUM 1000 MG: 100 INJECTION, SOLUTION INTRAVENOUS at 06:05

## 2021-05-24 RX ADMIN — HALOPERIDOL LACTATE 2 MG: 5 INJECTION, SOLUTION INTRAMUSCULAR at 08:05

## 2021-05-24 RX ADMIN — DEXTROSE 750 MG: 50 INJECTION, SOLUTION INTRAVENOUS at 08:05

## 2021-05-24 RX ADMIN — VALPROATE SODIUM 800 MG: 100 INJECTION, SOLUTION INTRAVENOUS at 11:05

## 2021-05-24 RX ADMIN — KETOROLAC TROMETHAMINE 15 MG: 30 INJECTION, SOLUTION INTRAMUSCULAR; INTRAVENOUS at 06:05

## 2021-05-24 RX ADMIN — LORAZEPAM 2 MG: 2 INJECTION INTRAMUSCULAR; INTRAVENOUS at 01:05

## 2021-05-24 RX ADMIN — MAGNESIUM SULFATE 2 G: 2 INJECTION INTRAVENOUS at 01:05

## 2021-05-24 RX ADMIN — HALOPERIDOL LACTATE 5 MG: 5 INJECTION, SOLUTION INTRAMUSCULAR at 12:05

## 2021-05-24 RX ADMIN — THIAMINE HYDROCHLORIDE 500 MG: 100 INJECTION, SOLUTION INTRAMUSCULAR; INTRAVENOUS at 03:05

## 2021-05-24 RX ADMIN — PIPERACILLIN SODIUM AND TAZOBACTAM SODIUM 4.5 G: 4; .5 INJECTION, POWDER, FOR SOLUTION INTRAVENOUS at 06:05

## 2021-05-24 RX ADMIN — VANCOMYCIN HYDROCHLORIDE 1250 MG: 1.25 INJECTION, POWDER, LYOPHILIZED, FOR SOLUTION INTRAVENOUS at 07:05

## 2021-05-24 RX ADMIN — DIAZEPAM 10 MG: 10 INJECTION, SOLUTION INTRAMUSCULAR; INTRAVENOUS at 09:05

## 2021-05-24 RX ADMIN — CLONIDINE 1 PATCH: 0.2 PATCH TRANSDERMAL at 03:05

## 2021-05-24 RX ADMIN — Medication 1 PATCH: at 10:05

## 2021-05-24 RX ADMIN — ENOXAPARIN SODIUM 40 MG: 40 INJECTION SUBCUTANEOUS at 05:05

## 2021-05-24 RX ADMIN — SODIUM CHLORIDE, SODIUM LACTATE, POTASSIUM CHLORIDE, AND CALCIUM CHLORIDE: .6; .31; .03; .02 INJECTION, SOLUTION INTRAVENOUS at 06:05

## 2021-05-24 RX ADMIN — LORAZEPAM 2 MG: 2 INJECTION INTRAMUSCULAR; INTRAVENOUS at 05:05

## 2021-05-24 RX ADMIN — FOLIC ACID 1 MG: 5 INJECTION, SOLUTION INTRAMUSCULAR; INTRAVENOUS; SUBCUTANEOUS at 10:05

## 2021-05-25 LAB
ALBUMIN SERPL BCP-MCNC: 3.5 G/DL (ref 3.5–5.2)
ALLENS TEST: ABNORMAL
ALP SERPL-CCNC: 107 U/L (ref 55–135)
ALT SERPL W/O P-5'-P-CCNC: 28 U/L (ref 10–44)
AMPHET+METHAMPHET UR QL: NEGATIVE
ANION GAP SERPL CALC-SCNC: 17 MMOL/L (ref 8–16)
ASCENDING AORTA: 2.85 CM
AST SERPL-CCNC: 45 U/L (ref 10–40)
AV INDEX (PROSTH): 1.13
AV MEAN GRADIENT: 3 MMHG
AV PEAK GRADIENT: 3 MMHG
AV VALVE AREA: 3.16 CM2
AV VELOCITY RATIO: 0.97
BARBITURATES UR QL SCN>200 NG/ML: NEGATIVE
BASOPHILS # BLD AUTO: 0.03 K/UL (ref 0–0.2)
BASOPHILS # BLD AUTO: 0.03 K/UL (ref 0–0.2)
BASOPHILS NFR BLD: 0.5 % (ref 0–1.9)
BASOPHILS NFR BLD: 0.7 % (ref 0–1.9)
BENZODIAZ UR QL SCN>200 NG/ML: NORMAL
BILIRUB SERPL-MCNC: 0.6 MG/DL (ref 0.1–1)
BSA FOR ECHO PROCEDURE: 1.53 M2
BUN SERPL-MCNC: 8 MG/DL (ref 6–20)
BZE UR QL SCN: NEGATIVE
CALCIUM SERPL-MCNC: 10.2 MG/DL (ref 8.7–10.5)
CANNABINOIDS UR QL SCN: NEGATIVE
CHLORIDE SERPL-SCNC: 102 MMOL/L (ref 95–110)
CLOBAZAM SERPL-MCNC: 47.4 NG/ML (ref 30–300)
CO2 SERPL-SCNC: 22 MMOL/L (ref 23–29)
CREAT SERPL-MCNC: 0.8 MG/DL (ref 0.5–1.4)
CREAT UR-MCNC: 132 MG/DL (ref 23–375)
CV ECHO LV RWT: 0.46 CM
DELSYS: ABNORMAL
DIFFERENTIAL METHOD: ABNORMAL
DIFFERENTIAL METHOD: ABNORMAL
DOP CALC AO PEAK VEL: 0.92 M/S
DOP CALC AO VTI: 13.21 CM
DOP CALC LVOT AREA: 2.8 CM2
DOP CALC LVOT DIAMETER: 1.89 CM
DOP CALC LVOT PEAK VEL: 0.89 M/S
DOP CALC LVOT STROKE VOLUME: 41.81 CM3
DOP CALCLVOT PEAK VEL VTI: 14.91 CM
E WAVE DECELERATION TIME: 179.56 MSEC
E/A RATIO: 0.93
E/E' RATIO: 6.8 M/S
ECHO LV POSTERIOR WALL: 0.87 CM (ref 0.6–1.1)
EJECTION FRACTION: 50 %
EOSINOPHIL # BLD AUTO: 0 K/UL (ref 0–0.5)
EOSINOPHIL # BLD AUTO: 0.1 K/UL (ref 0–0.5)
EOSINOPHIL NFR BLD: 0.5 % (ref 0–8)
EOSINOPHIL NFR BLD: 1.7 % (ref 0–8)
ERYTHROCYTE [DISTWIDTH] IN BLOOD BY AUTOMATED COUNT: 15.1 % (ref 11.5–14.5)
ERYTHROCYTE [DISTWIDTH] IN BLOOD BY AUTOMATED COUNT: 15.2 % (ref 11.5–14.5)
EST. GFR  (AFRICAN AMERICAN): >60 ML/MIN/1.73 M^2
EST. GFR  (NON AFRICAN AMERICAN): >60 ML/MIN/1.73 M^2
ETHANOL UR-MCNC: <10 MG/DL
FRACTIONAL SHORTENING: 38 % (ref 28–44)
GLUCOSE SERPL-MCNC: 128 MG/DL (ref 70–110)
HCO3 UR-SCNC: 22.7 MMOL/L (ref 24–28)
HCT VFR BLD AUTO: 39.8 % (ref 40–54)
HCT VFR BLD AUTO: 40.4 % (ref 40–54)
HGB BLD-MCNC: 13.7 G/DL (ref 14–18)
HGB BLD-MCNC: 13.8 G/DL (ref 14–18)
IMM GRANULOCYTES # BLD AUTO: 0.01 K/UL (ref 0–0.04)
IMM GRANULOCYTES # BLD AUTO: 0.04 K/UL (ref 0–0.04)
IMM GRANULOCYTES NFR BLD AUTO: 0.2 % (ref 0–0.5)
IMM GRANULOCYTES NFR BLD AUTO: 0.7 % (ref 0–0.5)
INTERVENTRICULAR SEPTUM: 0.73 CM (ref 0.6–1.1)
LA MAJOR: 3.92 CM
LA MINOR: 3.38 CM
LA WIDTH: 3.05 CM
LACTATE SERPL-SCNC: 1.4 MMOL/L (ref 0.5–2.2)
LACTATE SERPL-SCNC: 1.8 MMOL/L (ref 0.5–2.2)
LEFT ATRIUM SIZE: 2.5 CM
LEFT ATRIUM VOLUME INDEX: 14.9 ML/M2
LEFT ATRIUM VOLUME: 23.53 CM3
LEFT INTERNAL DIMENSION IN SYSTOLE: 2.35 CM (ref 2.1–4)
LEFT VENTRICLE DIASTOLIC VOLUME INDEX: 38.99 ML/M2
LEFT VENTRICLE DIASTOLIC VOLUME: 61.6 ML
LEFT VENTRICLE MASS INDEX: 54 G/M2
LEFT VENTRICLE SYSTOLIC VOLUME INDEX: 12.1 ML/M2
LEFT VENTRICLE SYSTOLIC VOLUME: 19.19 ML
LEFT VENTRICULAR INTERNAL DIMENSION IN DIASTOLE: 3.79 CM (ref 3.5–6)
LEFT VENTRICULAR MASS: 85.59 G
LV LATERAL E/E' RATIO: 6.38 M/S
LV SEPTAL E/E' RATIO: 7.29 M/S
LYMPHOCYTES # BLD AUTO: 1.5 K/UL (ref 1–4.8)
LYMPHOCYTES # BLD AUTO: 1.9 K/UL (ref 1–4.8)
LYMPHOCYTES NFR BLD: 32.3 % (ref 18–48)
LYMPHOCYTES NFR BLD: 36.3 % (ref 18–48)
MAGNESIUM SERPL-MCNC: 1.8 MG/DL (ref 1.6–2.6)
MCH RBC QN AUTO: 29.9 PG (ref 27–31)
MCH RBC QN AUTO: 29.9 PG (ref 27–31)
MCHC RBC AUTO-ENTMCNC: 34.2 G/DL (ref 32–36)
MCHC RBC AUTO-ENTMCNC: 34.4 G/DL (ref 32–36)
MCV RBC AUTO: 87 FL (ref 82–98)
MCV RBC AUTO: 88 FL (ref 82–98)
METHADONE UR QL SCN>300 NG/ML: NEGATIVE
MODE: ABNORMAL
MONOCYTES # BLD AUTO: 0.6 K/UL (ref 0.3–1)
MONOCYTES # BLD AUTO: 0.8 K/UL (ref 0.3–1)
MONOCYTES NFR BLD: 14.1 % (ref 4–15)
MONOCYTES NFR BLD: 15.2 % (ref 4–15)
MV PEAK A VEL: 0.55 M/S
MV PEAK E VEL: 0.51 M/S
MV STENOSIS PRESSURE HALF TIME: 52.07 MS
MV VALVE AREA P 1/2 METHOD: 4.23 CM2
NEUTROPHILS # BLD AUTO: 1.9 K/UL (ref 1.8–7.7)
NEUTROPHILS # BLD AUTO: 3 K/UL (ref 1.8–7.7)
NEUTROPHILS NFR BLD: 47.1 % (ref 38–73)
NEUTROPHILS NFR BLD: 50.7 % (ref 38–73)
NORCLOBAZAM SERPL-MCNC: 502 NG/ML (ref 300–3000)
NRBC BLD-RTO: 0 /100 WBC
NRBC BLD-RTO: 0 /100 WBC
OPIATES UR QL SCN: NEGATIVE
PCO2 BLDA: 42.3 MMHG (ref 35–45)
PCP UR QL SCN>25 NG/ML: NEGATIVE
PH SMN: 7.34 [PH] (ref 7.35–7.45)
PHOSPHATE SERPL-MCNC: 3 MG/DL (ref 2.7–4.5)
PLATELET # BLD AUTO: 176 K/UL (ref 150–450)
PLATELET # BLD AUTO: 186 K/UL (ref 150–450)
PMV BLD AUTO: 10.4 FL (ref 9.2–12.9)
PMV BLD AUTO: 10.8 FL (ref 9.2–12.9)
PO2 BLDA: 25 MMHG (ref 40–60)
POC BE: -3 MMOL/L
POC SATURATED O2: 42 % (ref 95–100)
POC TCO2: 24 MMOL/L (ref 24–29)
POCT GLUCOSE: 132 MG/DL (ref 70–110)
POCT GLUCOSE: 165 MG/DL (ref 70–110)
POCT GLUCOSE: 180 MG/DL (ref 70–110)
POTASSIUM SERPL-SCNC: 3.2 MMOL/L (ref 3.5–5.1)
PROT SERPL-MCNC: 7.4 G/DL (ref 6–8.4)
RA MAJOR: 4.62 CM
RA PRESSURE: 8 MMHG
RA WIDTH: 3.56 CM
RBC # BLD AUTO: 4.58 M/UL (ref 4.6–6.2)
RBC # BLD AUTO: 4.61 M/UL (ref 4.6–6.2)
RIGHT VENTRICULAR END-DIASTOLIC DIMENSION: 2.85 CM
SAMPLE: ABNORMAL
SINUS: 3.03 CM
SITE: ABNORMAL
SODIUM SERPL-SCNC: 141 MMOL/L (ref 136–145)
STJ: 3.16 CM
TDI LATERAL: 0.08 M/S
TDI SEPTAL: 0.07 M/S
TDI: 0.08 M/S
TOXICOLOGY INFORMATION: NORMAL
TRICUSPID ANNULAR PLANE SYSTOLIC EXCURSION: 1.3 CM
WBC # BLD AUTO: 4.08 K/UL (ref 3.9–12.7)
WBC # BLD AUTO: 5.88 K/UL (ref 3.9–12.7)

## 2021-05-25 PROCEDURE — 25000003 PHARM REV CODE 250: Performed by: PHYSICIAN ASSISTANT

## 2021-05-25 PROCEDURE — 25000003 PHARM REV CODE 250: Performed by: STUDENT IN AN ORGANIZED HEALTH CARE EDUCATION/TRAINING PROGRAM

## 2021-05-25 PROCEDURE — 82803 BLOOD GASES ANY COMBINATION: CPT

## 2021-05-25 PROCEDURE — 25000003 PHARM REV CODE 250: Performed by: HOSPITALIST

## 2021-05-25 PROCEDURE — 99233 PR SUBSEQUENT HOSPITAL CARE,LEVL III: ICD-10-PCS | Mod: ,,, | Performed by: PHYSICIAN ASSISTANT

## 2021-05-25 PROCEDURE — 63600175 PHARM REV CODE 636 W HCPCS: Performed by: PHYSICIAN ASSISTANT

## 2021-05-25 PROCEDURE — 63600175 PHARM REV CODE 636 W HCPCS: Performed by: HOSPITALIST

## 2021-05-25 PROCEDURE — 83605 ASSAY OF LACTIC ACID: CPT | Performed by: PHYSICIAN ASSISTANT

## 2021-05-25 PROCEDURE — 80307 DRUG TEST PRSMV CHEM ANLYZR: CPT | Performed by: PHYSICIAN ASSISTANT

## 2021-05-25 PROCEDURE — 84100 ASSAY OF PHOSPHORUS: CPT | Performed by: INTERNAL MEDICINE

## 2021-05-25 PROCEDURE — 80053 COMPREHEN METABOLIC PANEL: CPT | Performed by: INTERNAL MEDICINE

## 2021-05-25 PROCEDURE — 20000000 HC ICU ROOM

## 2021-05-25 PROCEDURE — 85025 COMPLETE CBC W/AUTO DIFF WBC: CPT | Mod: 91 | Performed by: INTERNAL MEDICINE

## 2021-05-25 PROCEDURE — 99900035 HC TECH TIME PER 15 MIN (STAT)

## 2021-05-25 PROCEDURE — 63600175 PHARM REV CODE 636 W HCPCS: Performed by: STUDENT IN AN ORGANIZED HEALTH CARE EDUCATION/TRAINING PROGRAM

## 2021-05-25 PROCEDURE — 85025 COMPLETE CBC W/AUTO DIFF WBC: CPT | Performed by: NURSE PRACTITIONER

## 2021-05-25 PROCEDURE — S4991 NICOTINE PATCH NONLEGEND: HCPCS | Performed by: NURSE PRACTITIONER

## 2021-05-25 PROCEDURE — 94761 N-INVAS EAR/PLS OXIMETRY MLT: CPT

## 2021-05-25 PROCEDURE — 25000003 PHARM REV CODE 250: Performed by: NURSE PRACTITIONER

## 2021-05-25 PROCEDURE — 99233 SBSQ HOSP IP/OBS HIGH 50: CPT | Mod: ,,, | Performed by: PHYSICIAN ASSISTANT

## 2021-05-25 PROCEDURE — 83735 ASSAY OF MAGNESIUM: CPT | Performed by: INTERNAL MEDICINE

## 2021-05-25 PROCEDURE — 25000003 PHARM REV CODE 250: Performed by: INTERNAL MEDICINE

## 2021-05-25 RX ORDER — DIAZEPAM 10 MG/2ML
5 INJECTION INTRAMUSCULAR EVERY 8 HOURS
Status: DISCONTINUED | OUTPATIENT
Start: 2021-05-25 | End: 2021-05-27

## 2021-05-25 RX ORDER — ENOXAPARIN SODIUM 100 MG/ML
30 INJECTION SUBCUTANEOUS EVERY 24 HOURS
Status: DISCONTINUED | OUTPATIENT
Start: 2021-05-25 | End: 2021-05-27

## 2021-05-25 RX ORDER — POTASSIUM CHLORIDE 7.45 MG/ML
10 INJECTION INTRAVENOUS
Status: COMPLETED | OUTPATIENT
Start: 2021-05-25 | End: 2021-05-25

## 2021-05-25 RX ORDER — MAGNESIUM SULFATE HEPTAHYDRATE 40 MG/ML
2 INJECTION, SOLUTION INTRAVENOUS ONCE
Status: COMPLETED | OUTPATIENT
Start: 2021-05-25 | End: 2021-05-25

## 2021-05-25 RX ORDER — SODIUM CHLORIDE 9 MG/ML
INJECTION, SOLUTION INTRAVENOUS CONTINUOUS
Status: DISCONTINUED | OUTPATIENT
Start: 2021-05-25 | End: 2021-05-27

## 2021-05-25 RX ADMIN — VANCOMYCIN HYDROCHLORIDE 750 MG: 750 INJECTION, POWDER, LYOPHILIZED, FOR SOLUTION INTRAVENOUS at 05:05

## 2021-05-25 RX ADMIN — POTASSIUM CHLORIDE 10 MEQ: 7.46 INJECTION, SOLUTION INTRAVENOUS at 08:05

## 2021-05-25 RX ADMIN — DIAZEPAM 10 MG: 10 INJECTION, SOLUTION INTRAMUSCULAR; INTRAVENOUS at 05:05

## 2021-05-25 RX ADMIN — SODIUM CHLORIDE: 0.9 INJECTION, SOLUTION INTRAVENOUS at 05:05

## 2021-05-25 RX ADMIN — PIPERACILLIN SODIUM AND TAZOBACTAM SODIUM 4.5 G: 4; .5 INJECTION, POWDER, FOR SOLUTION INTRAVENOUS at 05:05

## 2021-05-25 RX ADMIN — ENOXAPARIN SODIUM 30 MG: 30 INJECTION SUBCUTANEOUS at 05:05

## 2021-05-25 RX ADMIN — Medication 1 PATCH: at 09:05

## 2021-05-25 RX ADMIN — VALPROATE SODIUM 800 MG: 100 INJECTION, SOLUTION INTRAVENOUS at 09:05

## 2021-05-25 RX ADMIN — LORAZEPAM 2 MG: 2 INJECTION INTRAMUSCULAR; INTRAVENOUS at 08:05

## 2021-05-25 RX ADMIN — VALPROATE SODIUM 800 MG: 100 INJECTION, SOLUTION INTRAVENOUS at 03:05

## 2021-05-25 RX ADMIN — POTASSIUM CHLORIDE 10 MEQ: 7.46 INJECTION, SOLUTION INTRAVENOUS at 07:05

## 2021-05-25 RX ADMIN — VALPROATE SODIUM 800 MG: 100 INJECTION, SOLUTION INTRAVENOUS at 11:05

## 2021-05-25 RX ADMIN — FOLIC ACID 1 MG: 5 INJECTION, SOLUTION INTRAMUSCULAR; INTRAVENOUS; SUBCUTANEOUS at 09:05

## 2021-05-25 RX ADMIN — DEXTROSE 750 MG: 50 INJECTION, SOLUTION INTRAVENOUS at 08:05

## 2021-05-25 RX ADMIN — PIPERACILLIN SODIUM AND TAZOBACTAM SODIUM 4.5 G: 4; .5 INJECTION, POWDER, FOR SOLUTION INTRAVENOUS at 11:05

## 2021-05-25 RX ADMIN — VANCOMYCIN HYDROCHLORIDE 750 MG: 750 INJECTION, POWDER, LYOPHILIZED, FOR SOLUTION INTRAVENOUS at 06:05

## 2021-05-25 RX ADMIN — POTASSIUM CHLORIDE 10 MEQ: 7.46 INJECTION, SOLUTION INTRAVENOUS at 05:05

## 2021-05-25 RX ADMIN — DEXMEDETOMIDINE HYDROCHLORIDE 0.6 MCG/KG/HR: 4 INJECTION, SOLUTION INTRAVENOUS at 12:05

## 2021-05-25 RX ADMIN — DEXTROSE 750 MG: 50 INJECTION, SOLUTION INTRAVENOUS at 09:05

## 2021-05-25 RX ADMIN — THIAMINE HYDROCHLORIDE 500 MG: 100 INJECTION, SOLUTION INTRAMUSCULAR; INTRAVENOUS at 09:05

## 2021-05-25 RX ADMIN — POTASSIUM CHLORIDE 10 MEQ: 7.46 INJECTION, SOLUTION INTRAVENOUS at 06:05

## 2021-05-25 RX ADMIN — PIPERACILLIN SODIUM AND TAZOBACTAM SODIUM 4.5 G: 4; .5 INJECTION, POWDER, FOR SOLUTION INTRAVENOUS at 02:05

## 2021-05-25 RX ADMIN — DIAZEPAM 10 MG: 10 INJECTION, SOLUTION INTRAMUSCULAR; INTRAVENOUS at 03:05

## 2021-05-25 RX ADMIN — MAGNESIUM SULFATE 2 G: 2 INJECTION INTRAVENOUS at 06:05

## 2021-05-26 PROBLEM — I63.311 CEREBROVASCULAR ACCIDENT (CVA) DUE TO THROMBOSIS OF RIGHT MIDDLE CEREBRAL ARTERY: Status: ACTIVE | Noted: 2021-05-26

## 2021-05-26 LAB
ALBUMIN SERPL BCP-MCNC: 2.9 G/DL (ref 3.5–5.2)
ALP SERPL-CCNC: 86 U/L (ref 55–135)
ALT SERPL W/O P-5'-P-CCNC: 37 U/L (ref 10–44)
ANION GAP SERPL CALC-SCNC: 16 MMOL/L (ref 8–16)
AST SERPL-CCNC: 58 U/L (ref 10–40)
BASOPHILS # BLD AUTO: 0.02 K/UL (ref 0–0.2)
BASOPHILS NFR BLD: 0.5 % (ref 0–1.9)
BILIRUB SERPL-MCNC: 0.4 MG/DL (ref 0.1–1)
BUN SERPL-MCNC: 8 MG/DL (ref 6–20)
CALCIUM SERPL-MCNC: 9.7 MG/DL (ref 8.7–10.5)
CHLORIDE SERPL-SCNC: 99 MMOL/L (ref 95–110)
CO2 SERPL-SCNC: 22 MMOL/L (ref 23–29)
CREAT SERPL-MCNC: 0.7 MG/DL (ref 0.5–1.4)
DIFFERENTIAL METHOD: ABNORMAL
EOSINOPHIL # BLD AUTO: 0 K/UL (ref 0–0.5)
EOSINOPHIL NFR BLD: 0.9 % (ref 0–8)
ERYTHROCYTE [DISTWIDTH] IN BLOOD BY AUTOMATED COUNT: 15.8 % (ref 11.5–14.5)
EST. GFR  (AFRICAN AMERICAN): >60 ML/MIN/1.73 M^2
EST. GFR  (NON AFRICAN AMERICAN): >60 ML/MIN/1.73 M^2
GLUCOSE SERPL-MCNC: 117 MG/DL (ref 70–110)
HCT VFR BLD AUTO: 38.4 % (ref 40–54)
HGB BLD-MCNC: 12.7 G/DL (ref 14–18)
IMM GRANULOCYTES # BLD AUTO: 0.02 K/UL (ref 0–0.04)
IMM GRANULOCYTES NFR BLD AUTO: 0.5 % (ref 0–0.5)
LEVETIRACETAM SERPL-MCNC: <1 UG/ML (ref 3–60)
LYMPHOCYTES # BLD AUTO: 1.7 K/UL (ref 1–4.8)
LYMPHOCYTES NFR BLD: 40.3 % (ref 18–48)
MAGNESIUM SERPL-MCNC: 1.7 MG/DL (ref 1.6–2.6)
MCH RBC QN AUTO: 29.7 PG (ref 27–31)
MCHC RBC AUTO-ENTMCNC: 33.1 G/DL (ref 32–36)
MCV RBC AUTO: 90 FL (ref 82–98)
MONOCYTES # BLD AUTO: 0.5 K/UL (ref 0.3–1)
MONOCYTES NFR BLD: 12.1 % (ref 4–15)
NEUTROPHILS # BLD AUTO: 2 K/UL (ref 1.8–7.7)
NEUTROPHILS NFR BLD: 45.7 % (ref 38–73)
NRBC BLD-RTO: 0 /100 WBC
PHOSPHATE SERPL-MCNC: 3.5 MG/DL (ref 2.7–4.5)
PLATELET # BLD AUTO: 145 K/UL (ref 150–450)
PMV BLD AUTO: 11.1 FL (ref 9.2–12.9)
POCT GLUCOSE: 149 MG/DL (ref 70–110)
POCT GLUCOSE: 176 MG/DL (ref 70–110)
POCT GLUCOSE: 232 MG/DL (ref 70–110)
POCT GLUCOSE: 275 MG/DL (ref 70–110)
POTASSIUM SERPL-SCNC: 3.8 MMOL/L (ref 3.5–5.1)
PROT SERPL-MCNC: 6.3 G/DL (ref 6–8.4)
RBC # BLD AUTO: 4.28 M/UL (ref 4.6–6.2)
SODIUM SERPL-SCNC: 137 MMOL/L (ref 136–145)
VANCOMYCIN TROUGH SERPL-MCNC: 5.9 UG/ML (ref 10–22)
WBC # BLD AUTO: 4.29 K/UL (ref 3.9–12.7)

## 2021-05-26 PROCEDURE — 97165 OT EVAL LOW COMPLEX 30 MIN: CPT

## 2021-05-26 PROCEDURE — 63600175 PHARM REV CODE 636 W HCPCS: Performed by: HOSPITALIST

## 2021-05-26 PROCEDURE — 25000003 PHARM REV CODE 250: Performed by: PHYSICIAN ASSISTANT

## 2021-05-26 PROCEDURE — 80053 COMPREHEN METABOLIC PANEL: CPT | Performed by: INTERNAL MEDICINE

## 2021-05-26 PROCEDURE — 99223 1ST HOSP IP/OBS HIGH 75: CPT | Mod: ,,, | Performed by: PSYCHIATRY & NEUROLOGY

## 2021-05-26 PROCEDURE — 25000003 PHARM REV CODE 250: Performed by: STUDENT IN AN ORGANIZED HEALTH CARE EDUCATION/TRAINING PROGRAM

## 2021-05-26 PROCEDURE — 97112 NEUROMUSCULAR REEDUCATION: CPT

## 2021-05-26 PROCEDURE — 99233 SBSQ HOSP IP/OBS HIGH 50: CPT | Mod: ,,, | Performed by: INTERNAL MEDICINE

## 2021-05-26 PROCEDURE — 85025 COMPLETE CBC W/AUTO DIFF WBC: CPT | Performed by: NURSE PRACTITIONER

## 2021-05-26 PROCEDURE — 94761 N-INVAS EAR/PLS OXIMETRY MLT: CPT

## 2021-05-26 PROCEDURE — 84100 ASSAY OF PHOSPHORUS: CPT | Performed by: INTERNAL MEDICINE

## 2021-05-26 PROCEDURE — 97535 SELF CARE MNGMENT TRAINING: CPT

## 2021-05-26 PROCEDURE — 63600175 PHARM REV CODE 636 W HCPCS: Performed by: PHYSICIAN ASSISTANT

## 2021-05-26 PROCEDURE — 99223 PR INITIAL HOSPITAL CARE,LEVL III: ICD-10-PCS | Mod: ,,, | Performed by: PSYCHIATRY & NEUROLOGY

## 2021-05-26 PROCEDURE — 99900035 HC TECH TIME PER 15 MIN (STAT)

## 2021-05-26 PROCEDURE — 11000001 HC ACUTE MED/SURG PRIVATE ROOM

## 2021-05-26 PROCEDURE — 80202 ASSAY OF VANCOMYCIN: CPT | Performed by: HOSPITALIST

## 2021-05-26 PROCEDURE — 25000003 PHARM REV CODE 250: Performed by: HOSPITALIST

## 2021-05-26 PROCEDURE — 25000003 PHARM REV CODE 250: Performed by: CLINICAL NURSE SPECIALIST

## 2021-05-26 PROCEDURE — 97161 PT EVAL LOW COMPLEX 20 MIN: CPT

## 2021-05-26 PROCEDURE — 83735 ASSAY OF MAGNESIUM: CPT | Performed by: INTERNAL MEDICINE

## 2021-05-26 PROCEDURE — 92610 EVALUATE SWALLOWING FUNCTION: CPT

## 2021-05-26 PROCEDURE — 99233 PR SUBSEQUENT HOSPITAL CARE,LEVL III: ICD-10-PCS | Mod: ,,, | Performed by: INTERNAL MEDICINE

## 2021-05-26 PROCEDURE — 63600175 PHARM REV CODE 636 W HCPCS: Performed by: STUDENT IN AN ORGANIZED HEALTH CARE EDUCATION/TRAINING PROGRAM

## 2021-05-26 RX ORDER — MAGNESIUM SULFATE HEPTAHYDRATE 40 MG/ML
2 INJECTION, SOLUTION INTRAVENOUS ONCE
Status: COMPLETED | OUTPATIENT
Start: 2021-05-26 | End: 2021-05-26

## 2021-05-26 RX ORDER — INSULIN ASPART 100 [IU]/ML
1-10 INJECTION, SOLUTION INTRAVENOUS; SUBCUTANEOUS EVERY 6 HOURS PRN
Status: DISCONTINUED | OUTPATIENT
Start: 2021-05-26 | End: 2021-05-29

## 2021-05-26 RX ORDER — CLOBAZAM 10 MG/1
10 TABLET ORAL DAILY
Status: CANCELLED | OUTPATIENT
Start: 2021-05-27

## 2021-05-26 RX ORDER — POTASSIUM CHLORIDE 7.45 MG/ML
10 INJECTION INTRAVENOUS
Status: COMPLETED | OUTPATIENT
Start: 2021-05-26 | End: 2021-05-26

## 2021-05-26 RX ORDER — RISPERIDONE 1 MG/1
1 TABLET ORAL 3 TIMES DAILY
Status: DISCONTINUED | OUTPATIENT
Start: 2021-05-26 | End: 2021-06-04 | Stop reason: HOSPADM

## 2021-05-26 RX ORDER — ASPIRIN 81 MG/1
81 TABLET ORAL DAILY
Status: DISCONTINUED | OUTPATIENT
Start: 2021-05-27 | End: 2021-06-04 | Stop reason: HOSPADM

## 2021-05-26 RX ORDER — GLUCAGON 1 MG
1 KIT INJECTION
Status: DISCONTINUED | OUTPATIENT
Start: 2021-05-26 | End: 2021-05-29

## 2021-05-26 RX ORDER — CLOBAZAM 10 MG/1
20 TABLET ORAL NIGHTLY
Status: CANCELLED | OUTPATIENT
Start: 2021-05-27

## 2021-05-26 RX ADMIN — INSULIN ASPART 2 UNITS: 100 INJECTION, SOLUTION INTRAVENOUS; SUBCUTANEOUS at 05:05

## 2021-05-26 RX ADMIN — DEXTROSE 750 MG: 50 INJECTION, SOLUTION INTRAVENOUS at 09:05

## 2021-05-26 RX ADMIN — DIAZEPAM 5 MG: 10 INJECTION, SOLUTION INTRAMUSCULAR; INTRAVENOUS at 09:05

## 2021-05-26 RX ADMIN — POTASSIUM CHLORIDE 10 MEQ: 7.46 INJECTION, SOLUTION INTRAVENOUS at 11:05

## 2021-05-26 RX ADMIN — LORAZEPAM 2 MG: 2 INJECTION INTRAMUSCULAR; INTRAVENOUS at 04:05

## 2021-05-26 RX ADMIN — PIPERACILLIN SODIUM AND TAZOBACTAM SODIUM 4.5 G: 4; .5 INJECTION, POWDER, FOR SOLUTION INTRAVENOUS at 02:05

## 2021-05-26 RX ADMIN — PIPERACILLIN SODIUM AND TAZOBACTAM SODIUM 4.5 G: 4; .5 INJECTION, POWDER, FOR SOLUTION INTRAVENOUS at 09:05

## 2021-05-26 RX ADMIN — PIPERACILLIN SODIUM AND TAZOBACTAM SODIUM 4.5 G: 4; .5 INJECTION, POWDER, FOR SOLUTION INTRAVENOUS at 05:05

## 2021-05-26 RX ADMIN — VALPROATE SODIUM 800 MG: 100 INJECTION, SOLUTION INTRAVENOUS at 02:05

## 2021-05-26 RX ADMIN — THIAMINE HYDROCHLORIDE 500 MG: 100 INJECTION, SOLUTION INTRAMUSCULAR; INTRAVENOUS at 10:05

## 2021-05-26 RX ADMIN — VALPROATE SODIUM 800 MG: 100 INJECTION, SOLUTION INTRAVENOUS at 10:05

## 2021-05-26 RX ADMIN — VALPROATE SODIUM 800 MG: 100 INJECTION, SOLUTION INTRAVENOUS at 06:05

## 2021-05-26 RX ADMIN — INSULIN ASPART 4 UNITS: 100 INJECTION, SOLUTION INTRAVENOUS; SUBCUTANEOUS at 11:05

## 2021-05-26 RX ADMIN — DIAZEPAM 5 MG: 10 INJECTION, SOLUTION INTRAMUSCULAR; INTRAVENOUS at 02:05

## 2021-05-26 RX ADMIN — ENOXAPARIN SODIUM 30 MG: 30 INJECTION SUBCUTANEOUS at 05:05

## 2021-05-26 RX ADMIN — POTASSIUM CHLORIDE 10 MEQ: 7.46 INJECTION, SOLUTION INTRAVENOUS at 09:05

## 2021-05-26 RX ADMIN — RISPERIDONE 1 MG: 1 TABLET ORAL at 09:05

## 2021-05-26 RX ADMIN — MAGNESIUM SULFATE 2 G: 2 INJECTION INTRAVENOUS at 09:05

## 2021-05-26 RX ADMIN — FOLIC ACID 1 MG: 5 INJECTION, SOLUTION INTRAMUSCULAR; INTRAVENOUS; SUBCUTANEOUS at 09:05

## 2021-05-26 RX ADMIN — VANCOMYCIN HYDROCHLORIDE 750 MG: 750 INJECTION, POWDER, LYOPHILIZED, FOR SOLUTION INTRAVENOUS at 05:05

## 2021-05-26 RX ADMIN — INSULIN ASPART 3 UNITS: 100 INJECTION, SOLUTION INTRAVENOUS; SUBCUTANEOUS at 10:05

## 2021-05-27 LAB
ALBUMIN SERPL BCP-MCNC: 3.1 G/DL (ref 3.5–5.2)
ALP SERPL-CCNC: 83 U/L (ref 55–135)
ALT SERPL W/O P-5'-P-CCNC: 37 U/L (ref 10–44)
ANION GAP SERPL CALC-SCNC: 14 MMOL/L (ref 8–16)
AST SERPL-CCNC: 60 U/L (ref 10–40)
BASOPHILS # BLD AUTO: 0.02 K/UL (ref 0–0.2)
BASOPHILS NFR BLD: 0.4 % (ref 0–1.9)
BILIRUB SERPL-MCNC: 0.3 MG/DL (ref 0.1–1)
BUN SERPL-MCNC: 6 MG/DL (ref 6–20)
CALCIUM SERPL-MCNC: 9.6 MG/DL (ref 8.7–10.5)
CHLORIDE SERPL-SCNC: 98 MMOL/L (ref 95–110)
CO2 SERPL-SCNC: 25 MMOL/L (ref 23–29)
CREAT SERPL-MCNC: 0.7 MG/DL (ref 0.5–1.4)
DIFFERENTIAL METHOD: ABNORMAL
EOSINOPHIL # BLD AUTO: 0.1 K/UL (ref 0–0.5)
EOSINOPHIL NFR BLD: 0.9 % (ref 0–8)
ERYTHROCYTE [DISTWIDTH] IN BLOOD BY AUTOMATED COUNT: 14.9 % (ref 11.5–14.5)
EST. GFR  (AFRICAN AMERICAN): >60 ML/MIN/1.73 M^2
EST. GFR  (NON AFRICAN AMERICAN): >60 ML/MIN/1.73 M^2
GLUCOSE SERPL-MCNC: 192 MG/DL (ref 70–110)
HCT VFR BLD AUTO: 41.1 % (ref 40–54)
HGB BLD-MCNC: 13.8 G/DL (ref 14–18)
IMM GRANULOCYTES # BLD AUTO: 0.02 K/UL (ref 0–0.04)
IMM GRANULOCYTES NFR BLD AUTO: 0.4 % (ref 0–0.5)
LYMPHOCYTES # BLD AUTO: 1.9 K/UL (ref 1–4.8)
LYMPHOCYTES NFR BLD: 33.8 % (ref 18–48)
MAGNESIUM SERPL-MCNC: 1.6 MG/DL (ref 1.6–2.6)
MCH RBC QN AUTO: 29.5 PG (ref 27–31)
MCHC RBC AUTO-ENTMCNC: 33.6 G/DL (ref 32–36)
MCV RBC AUTO: 88 FL (ref 82–98)
MONOCYTES # BLD AUTO: 0.5 K/UL (ref 0.3–1)
MONOCYTES NFR BLD: 9.8 % (ref 4–15)
NEUTROPHILS # BLD AUTO: 3 K/UL (ref 1.8–7.7)
NEUTROPHILS NFR BLD: 54.7 % (ref 38–73)
NRBC BLD-RTO: 0 /100 WBC
PHOSPHATE SERPL-MCNC: 3.2 MG/DL (ref 2.7–4.5)
PLATELET # BLD AUTO: 148 K/UL (ref 150–450)
PMV BLD AUTO: 11.1 FL (ref 9.2–12.9)
POCT GLUCOSE: 168 MG/DL (ref 70–110)
POCT GLUCOSE: 219 MG/DL (ref 70–110)
POCT GLUCOSE: 368 MG/DL (ref 70–110)
POTASSIUM SERPL-SCNC: 3.3 MMOL/L (ref 3.5–5.1)
PROT SERPL-MCNC: 6.5 G/DL (ref 6–8.4)
RBC # BLD AUTO: 4.68 M/UL (ref 4.6–6.2)
SODIUM SERPL-SCNC: 137 MMOL/L (ref 136–145)
WBC # BLD AUTO: 5.5 K/UL (ref 3.9–12.7)

## 2021-05-27 PROCEDURE — 25000003 PHARM REV CODE 250: Performed by: PHYSICIAN ASSISTANT

## 2021-05-27 PROCEDURE — S4991 NICOTINE PATCH NONLEGEND: HCPCS | Performed by: NURSE PRACTITIONER

## 2021-05-27 PROCEDURE — 83735 ASSAY OF MAGNESIUM: CPT | Performed by: NURSE PRACTITIONER

## 2021-05-27 PROCEDURE — 36415 COLL VENOUS BLD VENIPUNCTURE: CPT | Performed by: PHYSICIAN ASSISTANT

## 2021-05-27 PROCEDURE — 25000003 PHARM REV CODE 250: Performed by: HOSPITALIST

## 2021-05-27 PROCEDURE — 25000003 PHARM REV CODE 250: Performed by: STUDENT IN AN ORGANIZED HEALTH CARE EDUCATION/TRAINING PROGRAM

## 2021-05-27 PROCEDURE — 25000003 PHARM REV CODE 250: Performed by: CLINICAL NURSE SPECIALIST

## 2021-05-27 PROCEDURE — 63600175 PHARM REV CODE 636 W HCPCS: Performed by: PHYSICIAN ASSISTANT

## 2021-05-27 PROCEDURE — 25000003 PHARM REV CODE 250: Performed by: NURSE PRACTITIONER

## 2021-05-27 PROCEDURE — 80053 COMPREHEN METABOLIC PANEL: CPT | Performed by: NURSE PRACTITIONER

## 2021-05-27 PROCEDURE — 63600175 PHARM REV CODE 636 W HCPCS: Performed by: HOSPITALIST

## 2021-05-27 PROCEDURE — 85025 COMPLETE CBC W/AUTO DIFF WBC: CPT | Performed by: NURSE PRACTITIONER

## 2021-05-27 PROCEDURE — 84100 ASSAY OF PHOSPHORUS: CPT | Performed by: NURSE PRACTITIONER

## 2021-05-27 PROCEDURE — 83701 LIPOPROTEIN BLD HR FRACTION: CPT | Performed by: PHYSICIAN ASSISTANT

## 2021-05-27 PROCEDURE — 11000001 HC ACUTE MED/SURG PRIVATE ROOM

## 2021-05-27 RX ORDER — SODIUM CHLORIDE 9 MG/ML
INJECTION, SOLUTION INTRAVENOUS
Status: DISCONTINUED | OUTPATIENT
Start: 2021-05-27 | End: 2021-06-04 | Stop reason: HOSPADM

## 2021-05-27 RX ORDER — SODIUM CHLORIDE 0.9 % (FLUSH) 0.9 %
3 SYRINGE (ML) INJECTION
Status: DISCONTINUED | OUTPATIENT
Start: 2021-05-27 | End: 2021-06-04 | Stop reason: HOSPADM

## 2021-05-27 RX ORDER — INSULIN ASPART 100 [IU]/ML
5 INJECTION, SOLUTION INTRAVENOUS; SUBCUTANEOUS
Status: DISCONTINUED | OUTPATIENT
Start: 2021-05-28 | End: 2021-05-28

## 2021-05-27 RX ORDER — ENOXAPARIN SODIUM 100 MG/ML
40 INJECTION SUBCUTANEOUS EVERY 24 HOURS
Status: DISCONTINUED | OUTPATIENT
Start: 2021-05-27 | End: 2021-06-04 | Stop reason: HOSPADM

## 2021-05-27 RX ORDER — DIVALPROEX SODIUM 250 MG/1
800 TABLET, DELAYED RELEASE ORAL EVERY 8 HOURS
Status: DISCONTINUED | OUTPATIENT
Start: 2021-05-27 | End: 2021-05-31

## 2021-05-27 RX ORDER — DIAZEPAM 5 MG/1
5 TABLET ORAL EVERY 8 HOURS
Status: DISCONTINUED | OUTPATIENT
Start: 2021-05-27 | End: 2021-05-28

## 2021-05-27 RX ORDER — LEVETIRACETAM 750 MG/1
750 TABLET ORAL 2 TIMES DAILY
Status: DISCONTINUED | OUTPATIENT
Start: 2021-05-27 | End: 2021-06-04 | Stop reason: HOSPADM

## 2021-05-27 RX ADMIN — DIAZEPAM 5 MG: 10 INJECTION, SOLUTION INTRAMUSCULAR; INTRAVENOUS at 05:05

## 2021-05-27 RX ADMIN — SODIUM CHLORIDE: 0.9 INJECTION, SOLUTION INTRAVENOUS at 11:05

## 2021-05-27 RX ADMIN — ENOXAPARIN SODIUM 40 MG: 40 INJECTION SUBCUTANEOUS at 05:05

## 2021-05-27 RX ADMIN — THIAMINE HYDROCHLORIDE 500 MG: 100 INJECTION, SOLUTION INTRAMUSCULAR; INTRAVENOUS at 10:05

## 2021-05-27 RX ADMIN — Medication 1 PATCH: at 09:05

## 2021-05-27 RX ADMIN — DIAZEPAM 5 MG: 5 TABLET ORAL at 04:05

## 2021-05-27 RX ADMIN — RISPERIDONE 1 MG: 1 TABLET ORAL at 09:05

## 2021-05-27 RX ADMIN — DIVALPROEX SODIUM 750 MG: 250 TABLET, DELAYED RELEASE ORAL at 09:05

## 2021-05-27 RX ADMIN — INSULIN ASPART 10 UNITS: 100 INJECTION, SOLUTION INTRAVENOUS; SUBCUTANEOUS at 05:05

## 2021-05-27 RX ADMIN — VALPROATE SODIUM 800 MG: 100 INJECTION, SOLUTION INTRAVENOUS at 06:05

## 2021-05-27 RX ADMIN — INSULIN ASPART 4 UNITS: 100 INJECTION, SOLUTION INTRAVENOUS; SUBCUTANEOUS at 09:05

## 2021-05-27 RX ADMIN — ASPIRIN 81 MG: 81 TABLET, COATED ORAL at 09:05

## 2021-05-27 RX ADMIN — PIPERACILLIN SODIUM AND TAZOBACTAM SODIUM 4.5 G: 4; .5 INJECTION, POWDER, FOR SOLUTION INTRAVENOUS at 01:05

## 2021-05-27 RX ADMIN — SODIUM CHLORIDE: 0.9 INJECTION, SOLUTION INTRAVENOUS at 10:05

## 2021-05-27 RX ADMIN — RISPERIDONE 1 MG: 1 TABLET ORAL at 04:05

## 2021-05-27 RX ADMIN — LEVETIRACETAM 750 MG: 750 TABLET ORAL at 09:05

## 2021-05-27 RX ADMIN — POTASSIUM BICARBONATE 50 MEQ: 978 TABLET, EFFERVESCENT ORAL at 11:05

## 2021-05-27 RX ADMIN — DIAZEPAM 5 MG: 5 TABLET ORAL at 09:05

## 2021-05-27 RX ADMIN — DEXTROSE 750 MG: 50 INJECTION, SOLUTION INTRAVENOUS at 10:05

## 2021-05-27 RX ADMIN — INSULIN ASPART 2 UNITS: 100 INJECTION, SOLUTION INTRAVENOUS; SUBCUTANEOUS at 12:05

## 2021-05-28 LAB
ALBUMIN SERPL BCP-MCNC: 3 G/DL (ref 3.5–5.2)
ALP SERPL-CCNC: 87 U/L (ref 55–135)
ALT SERPL W/O P-5'-P-CCNC: 37 U/L (ref 10–44)
ANION GAP SERPL CALC-SCNC: 12 MMOL/L (ref 8–16)
AST SERPL-CCNC: 57 U/L (ref 10–40)
BASOPHILS # BLD AUTO: 0.02 K/UL (ref 0–0.2)
BASOPHILS NFR BLD: 0.4 % (ref 0–1.9)
BILIRUB SERPL-MCNC: 0.2 MG/DL (ref 0.1–1)
BUN SERPL-MCNC: 8 MG/DL (ref 6–20)
CALCIUM SERPL-MCNC: 10.1 MG/DL (ref 8.7–10.5)
CHLORIDE SERPL-SCNC: 101 MMOL/L (ref 95–110)
CO2 SERPL-SCNC: 24 MMOL/L (ref 23–29)
CREAT SERPL-MCNC: 0.7 MG/DL (ref 0.5–1.4)
DIFFERENTIAL METHOD: ABNORMAL
EOSINOPHIL # BLD AUTO: 0.1 K/UL (ref 0–0.5)
EOSINOPHIL NFR BLD: 1.1 % (ref 0–8)
ERYTHROCYTE [DISTWIDTH] IN BLOOD BY AUTOMATED COUNT: 15.3 % (ref 11.5–14.5)
EST. GFR  (AFRICAN AMERICAN): >60 ML/MIN/1.73 M^2
EST. GFR  (NON AFRICAN AMERICAN): >60 ML/MIN/1.73 M^2
GLUCOSE SERPL-MCNC: 234 MG/DL (ref 70–110)
HCT VFR BLD AUTO: 43.5 % (ref 40–54)
HGB BLD-MCNC: 14.8 G/DL (ref 14–18)
IMM GRANULOCYTES # BLD AUTO: 0.02 K/UL (ref 0–0.04)
IMM GRANULOCYTES NFR BLD AUTO: 0.4 % (ref 0–0.5)
LYMPHOCYTES # BLD AUTO: 2 K/UL (ref 1–4.8)
LYMPHOCYTES NFR BLD: 41.5 % (ref 18–48)
MAGNESIUM SERPL-MCNC: 1.6 MG/DL (ref 1.6–2.6)
MCH RBC QN AUTO: 29.6 PG (ref 27–31)
MCHC RBC AUTO-ENTMCNC: 34 G/DL (ref 32–36)
MCV RBC AUTO: 87 FL (ref 82–98)
MONOCYTES # BLD AUTO: 0.4 K/UL (ref 0.3–1)
MONOCYTES NFR BLD: 8.2 % (ref 4–15)
NEUTROPHILS # BLD AUTO: 2.3 K/UL (ref 1.8–7.7)
NEUTROPHILS NFR BLD: 48.4 % (ref 38–73)
NRBC BLD-RTO: 0 /100 WBC
PHOSPHATE SERPL-MCNC: 4.1 MG/DL (ref 2.7–4.5)
PLATELET # BLD AUTO: 123 K/UL (ref 150–450)
PMV BLD AUTO: 10.7 FL (ref 9.2–12.9)
POCT GLUCOSE: 184 MG/DL (ref 70–110)
POCT GLUCOSE: 250 MG/DL (ref 70–110)
POCT GLUCOSE: 261 MG/DL (ref 70–110)
POCT GLUCOSE: 94 MG/DL (ref 70–110)
POTASSIUM SERPL-SCNC: 4.4 MMOL/L (ref 3.5–5.1)
PROT SERPL-MCNC: 6.5 G/DL (ref 6–8.4)
RBC # BLD AUTO: 5 M/UL (ref 4.6–6.2)
SODIUM SERPL-SCNC: 137 MMOL/L (ref 136–145)
WBC # BLD AUTO: 4.75 K/UL (ref 3.9–12.7)

## 2021-05-28 PROCEDURE — 25000003 PHARM REV CODE 250: Performed by: NURSE PRACTITIONER

## 2021-05-28 PROCEDURE — S4991 NICOTINE PATCH NONLEGEND: HCPCS | Performed by: NURSE PRACTITIONER

## 2021-05-28 PROCEDURE — 83735 ASSAY OF MAGNESIUM: CPT | Performed by: NURSE PRACTITIONER

## 2021-05-28 PROCEDURE — 85025 COMPLETE CBC W/AUTO DIFF WBC: CPT | Performed by: NURSE PRACTITIONER

## 2021-05-28 PROCEDURE — 80053 COMPREHEN METABOLIC PANEL: CPT | Performed by: NURSE PRACTITIONER

## 2021-05-28 PROCEDURE — 99232 SBSQ HOSP IP/OBS MODERATE 35: CPT | Mod: ,,, | Performed by: HOSPITALIST

## 2021-05-28 PROCEDURE — 25000003 PHARM REV CODE 250: Performed by: CLINICAL NURSE SPECIALIST

## 2021-05-28 PROCEDURE — 63600175 PHARM REV CODE 636 W HCPCS: Performed by: HOSPITALIST

## 2021-05-28 PROCEDURE — 84100 ASSAY OF PHOSPHORUS: CPT | Performed by: NURSE PRACTITIONER

## 2021-05-28 PROCEDURE — C9399 UNCLASSIFIED DRUGS OR BIOLOG: HCPCS | Performed by: HOSPITALIST

## 2021-05-28 PROCEDURE — 25000003 PHARM REV CODE 250: Performed by: PHYSICIAN ASSISTANT

## 2021-05-28 PROCEDURE — 99232 PR SUBSEQUENT HOSPITAL CARE,LEVL II: ICD-10-PCS | Mod: ,,, | Performed by: HOSPITALIST

## 2021-05-28 PROCEDURE — 25000003 PHARM REV CODE 250: Performed by: HOSPITALIST

## 2021-05-28 PROCEDURE — 36415 COLL VENOUS BLD VENIPUNCTURE: CPT | Performed by: NURSE PRACTITIONER

## 2021-05-28 PROCEDURE — 92526 ORAL FUNCTION THERAPY: CPT

## 2021-05-28 PROCEDURE — 11000001 HC ACUTE MED/SURG PRIVATE ROOM

## 2021-05-28 RX ORDER — INSULIN ASPART 100 [IU]/ML
8 INJECTION, SOLUTION INTRAVENOUS; SUBCUTANEOUS
Status: DISCONTINUED | OUTPATIENT
Start: 2021-05-28 | End: 2021-05-31

## 2021-05-28 RX ORDER — DIAZEPAM 5 MG/1
5 TABLET ORAL EVERY 12 HOURS
Status: DISCONTINUED | OUTPATIENT
Start: 2021-05-28 | End: 2021-05-29

## 2021-05-28 RX ADMIN — INSULIN ASPART 8 UNITS: 100 INJECTION, SOLUTION INTRAVENOUS; SUBCUTANEOUS at 12:05

## 2021-05-28 RX ADMIN — ENOXAPARIN SODIUM 40 MG: 40 INJECTION SUBCUTANEOUS at 04:05

## 2021-05-28 RX ADMIN — INSULIN ASPART 5 UNITS: 100 INJECTION, SOLUTION INTRAVENOUS; SUBCUTANEOUS at 08:05

## 2021-05-28 RX ADMIN — LEVETIRACETAM 750 MG: 750 TABLET ORAL at 11:05

## 2021-05-28 RX ADMIN — DIVALPROEX SODIUM 750 MG: 250 TABLET, DELAYED RELEASE ORAL at 06:05

## 2021-05-28 RX ADMIN — RISPERIDONE 1 MG: 1 TABLET ORAL at 08:05

## 2021-05-28 RX ADMIN — DIAZEPAM 5 MG: 5 TABLET ORAL at 11:05

## 2021-05-28 RX ADMIN — INSULIN ASPART 6 UNITS: 100 INJECTION, SOLUTION INTRAVENOUS; SUBCUTANEOUS at 12:05

## 2021-05-28 RX ADMIN — DIVALPROEX SODIUM 750 MG: 250 TABLET, DELAYED RELEASE ORAL at 11:05

## 2021-05-28 RX ADMIN — Medication 1 PATCH: at 08:05

## 2021-05-28 RX ADMIN — RISPERIDONE 1 MG: 1 TABLET ORAL at 11:05

## 2021-05-28 RX ADMIN — THIAMINE HYDROCHLORIDE 500 MG: 100 INJECTION, SOLUTION INTRAMUSCULAR; INTRAVENOUS at 09:05

## 2021-05-28 RX ADMIN — ASPIRIN 81 MG: 81 TABLET, COATED ORAL at 08:05

## 2021-05-28 RX ADMIN — INSULIN ASPART 4 UNITS: 100 INJECTION, SOLUTION INTRAVENOUS; SUBCUTANEOUS at 08:05

## 2021-05-28 RX ADMIN — INSULIN DETEMIR 8 UNITS: 100 INJECTION, SOLUTION SUBCUTANEOUS at 11:05

## 2021-05-28 RX ADMIN — RISPERIDONE 1 MG: 1 TABLET ORAL at 03:05

## 2021-05-28 RX ADMIN — DIAZEPAM 5 MG: 5 TABLET ORAL at 06:05

## 2021-05-28 RX ADMIN — LEVETIRACETAM 750 MG: 750 TABLET ORAL at 08:05

## 2021-05-29 LAB
BACTERIA BLD CULT: NORMAL
POCT GLUCOSE: 105 MG/DL (ref 70–110)
POCT GLUCOSE: 146 MG/DL (ref 70–110)
POCT GLUCOSE: 208 MG/DL (ref 70–110)
POCT GLUCOSE: 224 MG/DL (ref 70–110)

## 2021-05-29 PROCEDURE — 25000003 PHARM REV CODE 250: Performed by: HOSPITALIST

## 2021-05-29 PROCEDURE — 25000003 PHARM REV CODE 250: Performed by: PHYSICIAN ASSISTANT

## 2021-05-29 PROCEDURE — 11000001 HC ACUTE MED/SURG PRIVATE ROOM

## 2021-05-29 PROCEDURE — 99232 SBSQ HOSP IP/OBS MODERATE 35: CPT | Mod: ,,, | Performed by: INTERNAL MEDICINE

## 2021-05-29 PROCEDURE — 63600175 PHARM REV CODE 636 W HCPCS: Performed by: HOSPITALIST

## 2021-05-29 PROCEDURE — 25000003 PHARM REV CODE 250: Performed by: CLINICAL NURSE SPECIALIST

## 2021-05-29 PROCEDURE — 63600175 PHARM REV CODE 636 W HCPCS: Performed by: INTERNAL MEDICINE

## 2021-05-29 PROCEDURE — 99232 PR SUBSEQUENT HOSPITAL CARE,LEVL II: ICD-10-PCS | Mod: ,,, | Performed by: INTERNAL MEDICINE

## 2021-05-29 PROCEDURE — 25000003 PHARM REV CODE 250: Performed by: NURSE PRACTITIONER

## 2021-05-29 PROCEDURE — S4991 NICOTINE PATCH NONLEGEND: HCPCS | Performed by: NURSE PRACTITIONER

## 2021-05-29 RX ORDER — INSULIN ASPART 100 [IU]/ML
1-10 INJECTION, SOLUTION INTRAVENOUS; SUBCUTANEOUS
Status: DISCONTINUED | OUTPATIENT
Start: 2021-05-29 | End: 2021-06-04 | Stop reason: HOSPADM

## 2021-05-29 RX ORDER — DIAZEPAM 2 MG/1
2 TABLET ORAL DAILY
Status: DISCONTINUED | OUTPATIENT
Start: 2021-05-30 | End: 2021-05-30

## 2021-05-29 RX ORDER — THIAMINE HCL 100 MG
100 TABLET ORAL DAILY
Status: DISCONTINUED | OUTPATIENT
Start: 2021-05-30 | End: 2021-06-04 | Stop reason: HOSPADM

## 2021-05-29 RX ORDER — GLUCAGON 1 MG
1 KIT INJECTION
Status: DISCONTINUED | OUTPATIENT
Start: 2021-05-29 | End: 2021-06-04 | Stop reason: HOSPADM

## 2021-05-29 RX ADMIN — LEVETIRACETAM 750 MG: 750 TABLET ORAL at 08:05

## 2021-05-29 RX ADMIN — ASPIRIN 81 MG: 81 TABLET, COATED ORAL at 09:05

## 2021-05-29 RX ADMIN — LEVETIRACETAM 750 MG: 750 TABLET ORAL at 09:05

## 2021-05-29 RX ADMIN — DIAZEPAM 5 MG: 5 TABLET ORAL at 09:05

## 2021-05-29 RX ADMIN — DIVALPROEX SODIUM 750 MG: 250 TABLET, DELAYED RELEASE ORAL at 01:05

## 2021-05-29 RX ADMIN — RISPERIDONE 1 MG: 1 TABLET ORAL at 08:05

## 2021-05-29 RX ADMIN — DIVALPROEX SODIUM 750 MG: 250 TABLET, DELAYED RELEASE ORAL at 05:05

## 2021-05-29 RX ADMIN — DIVALPROEX SODIUM 750 MG: 250 TABLET, DELAYED RELEASE ORAL at 09:05

## 2021-05-29 RX ADMIN — THIAMINE HYDROCHLORIDE 500 MG: 100 INJECTION, SOLUTION INTRAMUSCULAR; INTRAVENOUS at 09:05

## 2021-05-29 RX ADMIN — RISPERIDONE 1 MG: 1 TABLET ORAL at 09:05

## 2021-05-29 RX ADMIN — RISPERIDONE 1 MG: 1 TABLET ORAL at 04:05

## 2021-05-29 RX ADMIN — ENOXAPARIN SODIUM 40 MG: 40 INJECTION SUBCUTANEOUS at 05:05

## 2021-05-29 RX ADMIN — INSULIN ASPART 8 UNITS: 100 INJECTION, SOLUTION INTRAVENOUS; SUBCUTANEOUS at 01:05

## 2021-05-29 RX ADMIN — INSULIN ASPART 8 UNITS: 100 INJECTION, SOLUTION INTRAVENOUS; SUBCUTANEOUS at 05:05

## 2021-05-29 RX ADMIN — INSULIN ASPART 4 UNITS: 100 INJECTION, SOLUTION INTRAVENOUS; SUBCUTANEOUS at 09:05

## 2021-05-29 RX ADMIN — INSULIN ASPART 8 UNITS: 100 INJECTION, SOLUTION INTRAVENOUS; SUBCUTANEOUS at 09:05

## 2021-05-29 RX ADMIN — Medication 1 PATCH: at 09:05

## 2021-05-29 RX ADMIN — INSULIN ASPART 2 UNITS: 100 INJECTION, SOLUTION INTRAVENOUS; SUBCUTANEOUS at 08:05

## 2021-05-29 RX ADMIN — INSULIN DETEMIR 8 UNITS: 100 INJECTION, SOLUTION SUBCUTANEOUS at 08:05

## 2021-05-30 LAB
BACTERIA BLD CULT: NORMAL
BACTERIA BLD CULT: NORMAL
LDLC SERPL-MCNC: 115 MG/DL
POCT GLUCOSE: 220 MG/DL (ref 70–110)
POCT GLUCOSE: 256 MG/DL (ref 70–110)
POCT GLUCOSE: 370 MG/DL (ref 70–110)
POCT GLUCOSE: 81 MG/DL (ref 70–110)
VALPROATE SERPL-MCNC: 114.4 UG/ML (ref 50–100)

## 2021-05-30 PROCEDURE — 25000003 PHARM REV CODE 250: Performed by: INTERNAL MEDICINE

## 2021-05-30 PROCEDURE — 99232 PR SUBSEQUENT HOSPITAL CARE,LEVL II: ICD-10-PCS | Mod: ,,, | Performed by: INTERNAL MEDICINE

## 2021-05-30 PROCEDURE — 99232 PR SUBSEQUENT HOSPITAL CARE,LEVL II: ICD-10-PCS | Mod: AF,HB,, | Performed by: PSYCHIATRY & NEUROLOGY

## 2021-05-30 PROCEDURE — 63600175 PHARM REV CODE 636 W HCPCS: Performed by: HOSPITALIST

## 2021-05-30 PROCEDURE — 25000003 PHARM REV CODE 250: Performed by: HOSPITALIST

## 2021-05-30 PROCEDURE — 80164 ASSAY DIPROPYLACETIC ACD TOT: CPT | Performed by: PSYCHIATRY & NEUROLOGY

## 2021-05-30 PROCEDURE — 25000003 PHARM REV CODE 250: Performed by: CLINICAL NURSE SPECIALIST

## 2021-05-30 PROCEDURE — 11000001 HC ACUTE MED/SURG PRIVATE ROOM

## 2021-05-30 PROCEDURE — 99232 SBSQ HOSP IP/OBS MODERATE 35: CPT | Mod: AF,HB,, | Performed by: PSYCHIATRY & NEUROLOGY

## 2021-05-30 PROCEDURE — 92523 SPEECH SOUND LANG COMPREHEN: CPT

## 2021-05-30 PROCEDURE — 25000003 PHARM REV CODE 250: Performed by: NURSE PRACTITIONER

## 2021-05-30 PROCEDURE — S4991 NICOTINE PATCH NONLEGEND: HCPCS | Performed by: NURSE PRACTITIONER

## 2021-05-30 PROCEDURE — 25000003 PHARM REV CODE 250: Performed by: PHYSICIAN ASSISTANT

## 2021-05-30 PROCEDURE — 99232 SBSQ HOSP IP/OBS MODERATE 35: CPT | Mod: ,,, | Performed by: INTERNAL MEDICINE

## 2021-05-30 PROCEDURE — 36415 COLL VENOUS BLD VENIPUNCTURE: CPT | Performed by: PSYCHIATRY & NEUROLOGY

## 2021-05-30 RX ADMIN — INSULIN ASPART 3 UNITS: 100 INJECTION, SOLUTION INTRAVENOUS; SUBCUTANEOUS at 10:05

## 2021-05-30 RX ADMIN — RISPERIDONE 1 MG: 1 TABLET ORAL at 09:05

## 2021-05-30 RX ADMIN — ENOXAPARIN SODIUM 40 MG: 40 INJECTION SUBCUTANEOUS at 04:05

## 2021-05-30 RX ADMIN — INSULIN DETEMIR 8 UNITS: 100 INJECTION, SOLUTION SUBCUTANEOUS at 10:05

## 2021-05-30 RX ADMIN — DIVALPROEX SODIUM 750 MG: 250 TABLET, DELAYED RELEASE ORAL at 01:05

## 2021-05-30 RX ADMIN — INSULIN ASPART 8 UNITS: 100 INJECTION, SOLUTION INTRAVENOUS; SUBCUTANEOUS at 04:05

## 2021-05-30 RX ADMIN — INSULIN ASPART 4 UNITS: 100 INJECTION, SOLUTION INTRAVENOUS; SUBCUTANEOUS at 09:05

## 2021-05-30 RX ADMIN — DIVALPROEX SODIUM 750 MG: 250 TABLET, DELAYED RELEASE ORAL at 05:05

## 2021-05-30 RX ADMIN — DIVALPROEX SODIUM 750 MG: 250 TABLET, DELAYED RELEASE ORAL at 10:05

## 2021-05-30 RX ADMIN — RISPERIDONE 1 MG: 1 TABLET ORAL at 04:05

## 2021-05-30 RX ADMIN — LEVETIRACETAM 750 MG: 750 TABLET ORAL at 10:05

## 2021-05-30 RX ADMIN — DIAZEPAM 2 MG: 2 TABLET ORAL at 09:05

## 2021-05-30 RX ADMIN — Medication 1 PATCH: at 09:05

## 2021-05-30 RX ADMIN — ASPIRIN 81 MG: 81 TABLET, COATED ORAL at 09:05

## 2021-05-30 RX ADMIN — LEVETIRACETAM 750 MG: 750 TABLET ORAL at 09:05

## 2021-05-30 RX ADMIN — Medication 100 MG: at 09:05

## 2021-05-30 RX ADMIN — INSULIN ASPART 10 UNITS: 100 INJECTION, SOLUTION INTRAVENOUS; SUBCUTANEOUS at 04:05

## 2021-05-30 RX ADMIN — RISPERIDONE 1 MG: 1 TABLET ORAL at 10:05

## 2021-05-30 RX ADMIN — INSULIN ASPART 8 UNITS: 100 INJECTION, SOLUTION INTRAVENOUS; SUBCUTANEOUS at 09:05

## 2021-05-31 ENCOUNTER — ANESTHESIA EVENT (OUTPATIENT)
Dept: MEDSURG UNIT | Facility: HOSPITAL | Age: 41
DRG: 056 | End: 2021-05-31
Payer: MEDICAID

## 2021-05-31 LAB
ALBUMIN SERPL BCP-MCNC: 3.4 G/DL (ref 3.5–5.2)
ALP SERPL-CCNC: 73 U/L (ref 55–135)
ALT SERPL W/O P-5'-P-CCNC: 35 U/L (ref 10–44)
ANION GAP SERPL CALC-SCNC: 11 MMOL/L (ref 8–16)
AST SERPL-CCNC: 27 U/L (ref 10–40)
BASOPHILS # BLD AUTO: 0.04 K/UL (ref 0–0.2)
BASOPHILS NFR BLD: 0.7 % (ref 0–1.9)
BILIRUB SERPL-MCNC: 0.2 MG/DL (ref 0.1–1)
BUN SERPL-MCNC: 13 MG/DL (ref 6–20)
CALCIUM SERPL-MCNC: 10.2 MG/DL (ref 8.7–10.5)
CHLORIDE SERPL-SCNC: 99 MMOL/L (ref 95–110)
CO2 SERPL-SCNC: 24 MMOL/L (ref 23–29)
CREAT SERPL-MCNC: 0.8 MG/DL (ref 0.5–1.4)
DIFFERENTIAL METHOD: ABNORMAL
EOSINOPHIL # BLD AUTO: 0.1 K/UL (ref 0–0.5)
EOSINOPHIL NFR BLD: 1.6 % (ref 0–8)
ERYTHROCYTE [DISTWIDTH] IN BLOOD BY AUTOMATED COUNT: 15.4 % (ref 11.5–14.5)
EST. GFR  (AFRICAN AMERICAN): >60 ML/MIN/1.73 M^2
EST. GFR  (NON AFRICAN AMERICAN): >60 ML/MIN/1.73 M^2
GLUCOSE SERPL-MCNC: 192 MG/DL (ref 70–110)
HCT VFR BLD AUTO: 40 % (ref 40–54)
HGB BLD-MCNC: 13.3 G/DL (ref 14–18)
IMM GRANULOCYTES # BLD AUTO: 0.07 K/UL (ref 0–0.04)
IMM GRANULOCYTES NFR BLD AUTO: 1.3 % (ref 0–0.5)
LYMPHOCYTES # BLD AUTO: 2.2 K/UL (ref 1–4.8)
LYMPHOCYTES NFR BLD: 39.9 % (ref 18–48)
MCH RBC QN AUTO: 28.9 PG (ref 27–31)
MCHC RBC AUTO-ENTMCNC: 33.3 G/DL (ref 32–36)
MCV RBC AUTO: 87 FL (ref 82–98)
MONOCYTES # BLD AUTO: 0.8 K/UL (ref 0.3–1)
MONOCYTES NFR BLD: 13.6 % (ref 4–15)
NEUTROPHILS # BLD AUTO: 2.4 K/UL (ref 1.8–7.7)
NEUTROPHILS NFR BLD: 42.9 % (ref 38–73)
NRBC BLD-RTO: 0 /100 WBC
PLATELET # BLD AUTO: 175 K/UL (ref 150–450)
PMV BLD AUTO: 11 FL (ref 9.2–12.9)
POCT GLUCOSE: 156 MG/DL (ref 70–110)
POCT GLUCOSE: 235 MG/DL (ref 70–110)
POCT GLUCOSE: 341 MG/DL (ref 70–110)
POCT GLUCOSE: 416 MG/DL (ref 70–110)
POTASSIUM SERPL-SCNC: 4.4 MMOL/L (ref 3.5–5.1)
PROT SERPL-MCNC: 7.1 G/DL (ref 6–8.4)
RBC # BLD AUTO: 4.6 M/UL (ref 4.6–6.2)
SODIUM SERPL-SCNC: 134 MMOL/L (ref 136–145)
WBC # BLD AUTO: 5.57 K/UL (ref 3.9–12.7)

## 2021-05-31 PROCEDURE — 99232 PR SUBSEQUENT HOSPITAL CARE,LEVL II: ICD-10-PCS | Mod: ,,, | Performed by: INTERNAL MEDICINE

## 2021-05-31 PROCEDURE — 80053 COMPREHEN METABOLIC PANEL: CPT | Performed by: STUDENT IN AN ORGANIZED HEALTH CARE EDUCATION/TRAINING PROGRAM

## 2021-05-31 PROCEDURE — 25000003 PHARM REV CODE 250: Performed by: INTERNAL MEDICINE

## 2021-05-31 PROCEDURE — 97112 NEUROMUSCULAR REEDUCATION: CPT | Mod: CQ

## 2021-05-31 PROCEDURE — 85025 COMPLETE CBC W/AUTO DIFF WBC: CPT | Performed by: STUDENT IN AN ORGANIZED HEALTH CARE EDUCATION/TRAINING PROGRAM

## 2021-05-31 PROCEDURE — 11000001 HC ACUTE MED/SURG PRIVATE ROOM

## 2021-05-31 PROCEDURE — 25000003 PHARM REV CODE 250: Performed by: CLINICAL NURSE SPECIALIST

## 2021-05-31 PROCEDURE — S4991 NICOTINE PATCH NONLEGEND: HCPCS | Performed by: NURSE PRACTITIONER

## 2021-05-31 PROCEDURE — 36415 COLL VENOUS BLD VENIPUNCTURE: CPT | Performed by: STUDENT IN AN ORGANIZED HEALTH CARE EDUCATION/TRAINING PROGRAM

## 2021-05-31 PROCEDURE — 99232 SBSQ HOSP IP/OBS MODERATE 35: CPT | Mod: ,,, | Performed by: INTERNAL MEDICINE

## 2021-05-31 PROCEDURE — 25000003 PHARM REV CODE 250: Performed by: PHYSICIAN ASSISTANT

## 2021-05-31 PROCEDURE — 25000003 PHARM REV CODE 250: Performed by: NURSE PRACTITIONER

## 2021-05-31 PROCEDURE — 63600175 PHARM REV CODE 636 W HCPCS: Performed by: HOSPITALIST

## 2021-05-31 PROCEDURE — 97535 SELF CARE MNGMENT TRAINING: CPT

## 2021-05-31 PROCEDURE — 97530 THERAPEUTIC ACTIVITIES: CPT

## 2021-05-31 PROCEDURE — 25000003 PHARM REV CODE 250: Performed by: HOSPITALIST

## 2021-05-31 PROCEDURE — 97116 GAIT TRAINING THERAPY: CPT | Mod: CQ

## 2021-05-31 RX ORDER — CLOBAZAM 10 MG/1
20 TABLET ORAL NIGHTLY
Status: DISCONTINUED | OUTPATIENT
Start: 2021-06-01 | End: 2021-06-04 | Stop reason: HOSPADM

## 2021-05-31 RX ORDER — CLOBAZAM 10 MG/1
10 TABLET ORAL DAILY
Status: DISCONTINUED | OUTPATIENT
Start: 2021-06-01 | End: 2021-06-04 | Stop reason: HOSPADM

## 2021-05-31 RX ORDER — DIVALPROEX SODIUM 250 MG/1
750 TABLET, DELAYED RELEASE ORAL EVERY 8 HOURS
Status: DISCONTINUED | OUTPATIENT
Start: 2021-06-01 | End: 2021-05-31

## 2021-05-31 RX ORDER — DIVALPROEX SODIUM 250 MG/1
500 TABLET, DELAYED RELEASE ORAL EVERY 8 HOURS
Status: DISCONTINUED | OUTPATIENT
Start: 2021-06-01 | End: 2021-05-31

## 2021-05-31 RX ORDER — EPINEPHRINE 1 MG/ML
INJECTION, SOLUTION INTRACARDIAC; INTRAMUSCULAR; INTRAVENOUS; SUBCUTANEOUS
Status: DISPENSED
Start: 2021-05-31 | End: 2021-05-31

## 2021-05-31 RX ORDER — CLONIDINE HYDROCHLORIDE 0.1 MG/1
0.1 TABLET ORAL 3 TIMES DAILY
Status: DISCONTINUED | OUTPATIENT
Start: 2021-06-01 | End: 2021-06-01

## 2021-05-31 RX ORDER — INSULIN ASPART 100 [IU]/ML
11 INJECTION, SOLUTION INTRAVENOUS; SUBCUTANEOUS
Status: DISCONTINUED | OUTPATIENT
Start: 2021-06-01 | End: 2021-06-02

## 2021-05-31 RX ORDER — DIVALPROEX SODIUM 500 MG/1
1000 TABLET, FILM COATED, EXTENDED RELEASE ORAL EVERY 12 HOURS
Status: DISCONTINUED | OUTPATIENT
Start: 2021-06-01 | End: 2021-06-04 | Stop reason: HOSPADM

## 2021-05-31 RX ADMIN — DIVALPROEX SODIUM 750 MG: 250 TABLET, DELAYED RELEASE ORAL at 09:05

## 2021-05-31 RX ADMIN — LEVETIRACETAM 750 MG: 750 TABLET ORAL at 08:05

## 2021-05-31 RX ADMIN — DIVALPROEX SODIUM 750 MG: 250 TABLET, DELAYED RELEASE ORAL at 03:05

## 2021-05-31 RX ADMIN — RISPERIDONE 1 MG: 1 TABLET ORAL at 09:05

## 2021-05-31 RX ADMIN — Medication 100 MG: at 08:05

## 2021-05-31 RX ADMIN — INSULIN ASPART 4 UNITS: 100 INJECTION, SOLUTION INTRAVENOUS; SUBCUTANEOUS at 11:05

## 2021-05-31 RX ADMIN — ENOXAPARIN SODIUM 40 MG: 40 INJECTION SUBCUTANEOUS at 05:05

## 2021-05-31 RX ADMIN — INSULIN ASPART 4 UNITS: 100 INJECTION, SOLUTION INTRAVENOUS; SUBCUTANEOUS at 08:05

## 2021-05-31 RX ADMIN — ASPIRIN 81 MG: 81 TABLET, COATED ORAL at 08:05

## 2021-05-31 RX ADMIN — INSULIN ASPART 8 UNITS: 100 INJECTION, SOLUTION INTRAVENOUS; SUBCUTANEOUS at 05:05

## 2021-05-31 RX ADMIN — DIVALPROEX SODIUM 750 MG: 250 TABLET, DELAYED RELEASE ORAL at 05:05

## 2021-05-31 RX ADMIN — RISPERIDONE 1 MG: 1 TABLET ORAL at 02:05

## 2021-05-31 RX ADMIN — Medication 1 PATCH: at 08:05

## 2021-05-31 RX ADMIN — RISPERIDONE 1 MG: 1 TABLET ORAL at 08:05

## 2021-05-31 RX ADMIN — LEVETIRACETAM 750 MG: 750 TABLET ORAL at 09:05

## 2021-06-01 ENCOUNTER — ANESTHESIA (OUTPATIENT)
Dept: MEDSURG UNIT | Facility: HOSPITAL | Age: 41
DRG: 056 | End: 2021-06-01
Payer: MEDICAID

## 2021-06-01 PROBLEM — Q23.8 ABNORMALITY OF MITRAL VALVE ANNULUS: Status: ACTIVE | Noted: 2021-06-01

## 2021-06-01 PROBLEM — R00.0 TACHYCARDIA: Status: ACTIVE | Noted: 2021-06-01

## 2021-06-01 LAB
BSA FOR ECHO PROCEDURE: 1.53 M2
EJECTION FRACTION: 65 %
POCT GLUCOSE: 102 MG/DL (ref 70–110)
POCT GLUCOSE: 104 MG/DL (ref 70–110)
POCT GLUCOSE: 191 MG/DL (ref 70–110)
POCT GLUCOSE: 212 MG/DL (ref 70–110)
SARS-COV-2 RNA RESP QL NAA+PROBE: NOT DETECTED

## 2021-06-01 PROCEDURE — 99232 SBSQ HOSP IP/OBS MODERATE 35: CPT | Mod: ,,, | Performed by: INTERNAL MEDICINE

## 2021-06-01 PROCEDURE — D9220A PRA ANESTHESIA: ICD-10-PCS | Mod: CRNA,,, | Performed by: NURSE ANESTHETIST, CERTIFIED REGISTERED

## 2021-06-01 PROCEDURE — 82962 GLUCOSE BLOOD TEST: CPT

## 2021-06-01 PROCEDURE — 63600175 PHARM REV CODE 636 W HCPCS: Performed by: NURSE ANESTHETIST, CERTIFIED REGISTERED

## 2021-06-01 PROCEDURE — 11000001 HC ACUTE MED/SURG PRIVATE ROOM

## 2021-06-01 PROCEDURE — 25000003 PHARM REV CODE 250: Performed by: NURSE ANESTHETIST, CERTIFIED REGISTERED

## 2021-06-01 PROCEDURE — 25000003 PHARM REV CODE 250: Performed by: INTERNAL MEDICINE

## 2021-06-01 PROCEDURE — U0005 INFEC AGEN DETEC AMPLI PROBE: HCPCS | Performed by: INTERNAL MEDICINE

## 2021-06-01 PROCEDURE — 25000003 PHARM REV CODE 250: Performed by: HOSPITALIST

## 2021-06-01 PROCEDURE — 25000003 PHARM REV CODE 250: Performed by: PHYSICIAN ASSISTANT

## 2021-06-01 PROCEDURE — 63600175 PHARM REV CODE 636 W HCPCS: Performed by: HOSPITALIST

## 2021-06-01 PROCEDURE — 99232 PR SUBSEQUENT HOSPITAL CARE,LEVL II: ICD-10-PCS | Mod: ,,, | Performed by: INTERNAL MEDICINE

## 2021-06-01 PROCEDURE — 25000003 PHARM REV CODE 250: Performed by: NURSE PRACTITIONER

## 2021-06-01 PROCEDURE — 37000008 HC ANESTHESIA 1ST 15 MINUTES

## 2021-06-01 PROCEDURE — D9220A PRA ANESTHESIA: Mod: CRNA,,, | Performed by: NURSE ANESTHETIST, CERTIFIED REGISTERED

## 2021-06-01 PROCEDURE — D9220A PRA ANESTHESIA: Mod: ANES,,, | Performed by: ANESTHESIOLOGY

## 2021-06-01 PROCEDURE — 25000003 PHARM REV CODE 250: Performed by: CLINICAL NURSE SPECIALIST

## 2021-06-01 PROCEDURE — 37000009 HC ANESTHESIA EA ADD 15 MINS

## 2021-06-01 PROCEDURE — D9220A PRA ANESTHESIA: ICD-10-PCS | Mod: ANES,,, | Performed by: ANESTHESIOLOGY

## 2021-06-01 PROCEDURE — S4991 NICOTINE PATCH NONLEGEND: HCPCS | Performed by: NURSE PRACTITIONER

## 2021-06-01 PROCEDURE — U0003 INFECTIOUS AGENT DETECTION BY NUCLEIC ACID (DNA OR RNA); SEVERE ACUTE RESPIRATORY SYNDROME CORONAVIRUS 2 (SARS-COV-2) (CORONAVIRUS DISEASE [COVID-19]), AMPLIFIED PROBE TECHNIQUE, MAKING USE OF HIGH THROUGHPUT TECHNOLOGIES AS DESCRIBED BY CMS-2020-01-R: HCPCS | Performed by: INTERNAL MEDICINE

## 2021-06-01 RX ORDER — PROPOFOL 10 MG/ML
VIAL (ML) INTRAVENOUS CONTINUOUS PRN
Status: DISCONTINUED | OUTPATIENT
Start: 2021-06-01 | End: 2021-06-01

## 2021-06-01 RX ORDER — PHENYLEPHRINE HYDROCHLORIDE 10 MG/ML
INJECTION INTRAVENOUS
Status: DISCONTINUED | OUTPATIENT
Start: 2021-06-01 | End: 2021-06-01

## 2021-06-01 RX ORDER — PROPOFOL 10 MG/ML
VIAL (ML) INTRAVENOUS
Status: DISCONTINUED | OUTPATIENT
Start: 2021-06-01 | End: 2021-06-01

## 2021-06-01 RX ORDER — LIDOCAINE HYDROCHLORIDE 20 MG/ML
INJECTION, SOLUTION EPIDURAL; INFILTRATION; INTRACAUDAL; PERINEURAL
Status: DISCONTINUED | OUTPATIENT
Start: 2021-06-01 | End: 2021-06-01

## 2021-06-01 RX ORDER — SODIUM CHLORIDE 0.9 % (FLUSH) 0.9 %
3 SYRINGE (ML) INJECTION
Status: DISCONTINUED | OUTPATIENT
Start: 2021-06-01 | End: 2021-06-04 | Stop reason: HOSPADM

## 2021-06-01 RX ADMIN — PHENYLEPHRINE HYDROCHLORIDE 100 MCG: 10 INJECTION, SOLUTION INTRAMUSCULAR; INTRAVENOUS; SUBCUTANEOUS at 02:06

## 2021-06-01 RX ADMIN — Medication 1 PATCH: at 09:06

## 2021-06-01 RX ADMIN — INSULIN ASPART 2 UNITS: 100 INJECTION, SOLUTION INTRAVENOUS; SUBCUTANEOUS at 12:06

## 2021-06-01 RX ADMIN — RISPERIDONE 1 MG: 1 TABLET ORAL at 04:06

## 2021-06-01 RX ADMIN — RISPERIDONE 1 MG: 1 TABLET ORAL at 10:06

## 2021-06-01 RX ADMIN — Medication 50 MG: at 02:06

## 2021-06-01 RX ADMIN — LEVETIRACETAM 750 MG: 750 TABLET ORAL at 10:06

## 2021-06-01 RX ADMIN — INSULIN ASPART 2 UNITS: 100 INJECTION, SOLUTION INTRAVENOUS; SUBCUTANEOUS at 09:06

## 2021-06-01 RX ADMIN — INSULIN ASPART 11 UNITS: 100 INJECTION, SOLUTION INTRAVENOUS; SUBCUTANEOUS at 05:06

## 2021-06-01 RX ADMIN — CLOBAZAM 10 MG: 10 TABLET ORAL at 09:06

## 2021-06-01 RX ADMIN — DIVALPROEX SODIUM 1000 MG: 500 TABLET, EXTENDED RELEASE ORAL at 09:06

## 2021-06-01 RX ADMIN — SODIUM CHLORIDE: 9 INJECTION, SOLUTION INTRAVENOUS at 01:06

## 2021-06-01 RX ADMIN — CLOBAZAM 20 MG: 10 TABLET ORAL at 10:06

## 2021-06-01 RX ADMIN — ASPIRIN 81 MG: 81 TABLET, COATED ORAL at 09:06

## 2021-06-01 RX ADMIN — RISPERIDONE 1 MG: 1 TABLET ORAL at 09:06

## 2021-06-01 RX ADMIN — LIDOCAINE HYDROCHLORIDE 80 MG: 20 INJECTION, SOLUTION EPIDURAL; INFILTRATION; INTRACAUDAL at 02:06

## 2021-06-01 RX ADMIN — CLONIDINE HYDROCHLORIDE 0.1 MG: 0.1 TABLET ORAL at 09:06

## 2021-06-01 RX ADMIN — Medication 25 MG: at 02:06

## 2021-06-01 RX ADMIN — PROPOFOL 175 MCG/KG/MIN: 10 INJECTION, EMULSION INTRAVENOUS at 02:06

## 2021-06-01 RX ADMIN — INSULIN ASPART 5 UNITS: 100 INJECTION, SOLUTION INTRAVENOUS; SUBCUTANEOUS at 10:06

## 2021-06-01 RX ADMIN — ENOXAPARIN SODIUM 40 MG: 40 INJECTION SUBCUTANEOUS at 04:06

## 2021-06-01 RX ADMIN — TOPICAL ANESTHETIC 1 EACH: 200 SPRAY DENTAL; PERIODONTAL at 01:06

## 2021-06-01 RX ADMIN — LEVETIRACETAM 750 MG: 750 TABLET ORAL at 09:06

## 2021-06-01 RX ADMIN — Medication 100 MG: at 09:06

## 2021-06-02 PROBLEM — I33.0 MITRAL VALVE VEGETATION: Status: ACTIVE | Noted: 2021-06-02

## 2021-06-02 LAB
POCT GLUCOSE: 113 MG/DL (ref 70–110)
POCT GLUCOSE: 124 MG/DL (ref 70–110)
POCT GLUCOSE: 163 MG/DL (ref 70–110)
POCT GLUCOSE: 193 MG/DL (ref 70–110)
POCT GLUCOSE: 319 MG/DL (ref 70–110)
POCT GLUCOSE: 368 MG/DL (ref 70–110)
POCT GLUCOSE: 55 MG/DL (ref 70–110)
POCT GLUCOSE: 65 MG/DL (ref 70–110)
POCT GLUCOSE: >500 MG/DL (ref 70–110)

## 2021-06-02 PROCEDURE — S4991 NICOTINE PATCH NONLEGEND: HCPCS | Performed by: NURSE PRACTITIONER

## 2021-06-02 PROCEDURE — 25000003 PHARM REV CODE 250: Performed by: HOSPITALIST

## 2021-06-02 PROCEDURE — 63600175 PHARM REV CODE 636 W HCPCS: Performed by: HOSPITALIST

## 2021-06-02 PROCEDURE — 25000003 PHARM REV CODE 250: Performed by: INTERNAL MEDICINE

## 2021-06-02 PROCEDURE — 99232 SBSQ HOSP IP/OBS MODERATE 35: CPT | Mod: ,,, | Performed by: INTERNAL MEDICINE

## 2021-06-02 PROCEDURE — 25000003 PHARM REV CODE 250: Performed by: PHYSICIAN ASSISTANT

## 2021-06-02 PROCEDURE — 11000001 HC ACUTE MED/SURG PRIVATE ROOM

## 2021-06-02 PROCEDURE — 99232 PR SUBSEQUENT HOSPITAL CARE,LEVL II: ICD-10-PCS | Mod: ,,, | Performed by: INTERNAL MEDICINE

## 2021-06-02 PROCEDURE — 25000003 PHARM REV CODE 250: Performed by: CLINICAL NURSE SPECIALIST

## 2021-06-02 PROCEDURE — 25000003 PHARM REV CODE 250: Performed by: NURSE PRACTITIONER

## 2021-06-02 RX ORDER — INSULIN ASPART 100 [IU]/ML
6 INJECTION, SOLUTION INTRAVENOUS; SUBCUTANEOUS
Status: DISCONTINUED | OUTPATIENT
Start: 2021-06-02 | End: 2021-06-04 | Stop reason: HOSPADM

## 2021-06-02 RX ORDER — FOLIC ACID 1 MG/1
1 TABLET ORAL DAILY
Status: DISCONTINUED | OUTPATIENT
Start: 2021-06-02 | End: 2021-06-04 | Stop reason: HOSPADM

## 2021-06-02 RX ADMIN — DIVALPROEX SODIUM 1000 MG: 500 TABLET, EXTENDED RELEASE ORAL at 08:06

## 2021-06-02 RX ADMIN — LEVETIRACETAM 750 MG: 750 TABLET ORAL at 08:06

## 2021-06-02 RX ADMIN — DIVALPROEX SODIUM 1000 MG: 500 TABLET, EXTENDED RELEASE ORAL at 09:06

## 2021-06-02 RX ADMIN — RISPERIDONE 1 MG: 1 TABLET ORAL at 09:06

## 2021-06-02 RX ADMIN — INSULIN ASPART 11 UNITS: 100 INJECTION, SOLUTION INTRAVENOUS; SUBCUTANEOUS at 09:06

## 2021-06-02 RX ADMIN — INSULIN ASPART 6 UNITS: 100 INJECTION, SOLUTION INTRAVENOUS; SUBCUTANEOUS at 04:06

## 2021-06-02 RX ADMIN — ASPIRIN 81 MG: 81 TABLET, COATED ORAL at 09:06

## 2021-06-02 RX ADMIN — FOLIC ACID 1 MG: 1 TABLET ORAL at 12:06

## 2021-06-02 RX ADMIN — Medication 16 G: at 12:06

## 2021-06-02 RX ADMIN — RISPERIDONE 1 MG: 1 TABLET ORAL at 04:06

## 2021-06-02 RX ADMIN — RISPERIDONE 1 MG: 1 TABLET ORAL at 08:06

## 2021-06-02 RX ADMIN — CLOBAZAM 20 MG: 10 TABLET ORAL at 08:06

## 2021-06-02 RX ADMIN — Medication 1 PATCH: at 09:06

## 2021-06-02 RX ADMIN — LEVETIRACETAM 750 MG: 750 TABLET ORAL at 09:06

## 2021-06-02 RX ADMIN — Medication 100 MG: at 09:06

## 2021-06-02 RX ADMIN — INSULIN ASPART 10 UNITS: 100 INJECTION, SOLUTION INTRAVENOUS; SUBCUTANEOUS at 04:06

## 2021-06-02 RX ADMIN — ENOXAPARIN SODIUM 40 MG: 40 INJECTION SUBCUTANEOUS at 04:06

## 2021-06-02 RX ADMIN — CLOBAZAM 10 MG: 10 TABLET ORAL at 09:06

## 2021-06-02 RX ADMIN — THERA TABS 1 TABLET: TAB at 12:06

## 2021-06-03 LAB
POCT GLUCOSE: 122 MG/DL (ref 70–110)
POCT GLUCOSE: 232 MG/DL (ref 70–110)
POCT GLUCOSE: 245 MG/DL (ref 70–110)
POCT GLUCOSE: 338 MG/DL (ref 70–110)
POCT GLUCOSE: 350 MG/DL (ref 70–110)

## 2021-06-03 PROCEDURE — 25000003 PHARM REV CODE 250: Performed by: PHYSICIAN ASSISTANT

## 2021-06-03 PROCEDURE — S4991 NICOTINE PATCH NONLEGEND: HCPCS | Performed by: NURSE PRACTITIONER

## 2021-06-03 PROCEDURE — 25000003 PHARM REV CODE 250: Performed by: NURSE PRACTITIONER

## 2021-06-03 PROCEDURE — 97116 GAIT TRAINING THERAPY: CPT

## 2021-06-03 PROCEDURE — 97530 THERAPEUTIC ACTIVITIES: CPT

## 2021-06-03 PROCEDURE — 11000001 HC ACUTE MED/SURG PRIVATE ROOM

## 2021-06-03 PROCEDURE — 25000003 PHARM REV CODE 250: Performed by: INTERNAL MEDICINE

## 2021-06-03 PROCEDURE — 63600175 PHARM REV CODE 636 W HCPCS: Performed by: HOSPITALIST

## 2021-06-03 PROCEDURE — 25000003 PHARM REV CODE 250: Performed by: HOSPITALIST

## 2021-06-03 PROCEDURE — 25000003 PHARM REV CODE 250: Performed by: CLINICAL NURSE SPECIALIST

## 2021-06-03 PROCEDURE — 99232 SBSQ HOSP IP/OBS MODERATE 35: CPT | Mod: ,,, | Performed by: INTERNAL MEDICINE

## 2021-06-03 PROCEDURE — 99232 PR SUBSEQUENT HOSPITAL CARE,LEVL II: ICD-10-PCS | Mod: ,,, | Performed by: INTERNAL MEDICINE

## 2021-06-03 RX ADMIN — INSULIN ASPART 6 UNITS: 100 INJECTION, SOLUTION INTRAVENOUS; SUBCUTANEOUS at 12:06

## 2021-06-03 RX ADMIN — INSULIN ASPART 8 UNITS: 100 INJECTION, SOLUTION INTRAVENOUS; SUBCUTANEOUS at 12:06

## 2021-06-03 RX ADMIN — DIVALPROEX SODIUM 1000 MG: 500 TABLET, EXTENDED RELEASE ORAL at 10:06

## 2021-06-03 RX ADMIN — LEVETIRACETAM 750 MG: 750 TABLET ORAL at 10:06

## 2021-06-03 RX ADMIN — ENOXAPARIN SODIUM 40 MG: 40 INJECTION SUBCUTANEOUS at 05:06

## 2021-06-03 RX ADMIN — FOLIC ACID 1 MG: 1 TABLET ORAL at 10:06

## 2021-06-03 RX ADMIN — CLOBAZAM 10 MG: 10 TABLET ORAL at 10:06

## 2021-06-03 RX ADMIN — ASPIRIN 81 MG: 81 TABLET, COATED ORAL at 10:06

## 2021-06-03 RX ADMIN — Medication 1 PATCH: at 10:06

## 2021-06-03 RX ADMIN — RISPERIDONE 1 MG: 1 TABLET ORAL at 10:06

## 2021-06-03 RX ADMIN — CLOBAZAM 20 MG: 10 TABLET ORAL at 10:06

## 2021-06-03 RX ADMIN — INSULIN ASPART 6 UNITS: 100 INJECTION, SOLUTION INTRAVENOUS; SUBCUTANEOUS at 05:06

## 2021-06-03 RX ADMIN — THERA TABS 1 TABLET: TAB at 10:06

## 2021-06-03 RX ADMIN — INSULIN ASPART 6 UNITS: 100 INJECTION, SOLUTION INTRAVENOUS; SUBCUTANEOUS at 08:06

## 2021-06-03 RX ADMIN — INSULIN ASPART 4 UNITS: 100 INJECTION, SOLUTION INTRAVENOUS; SUBCUTANEOUS at 08:06

## 2021-06-03 RX ADMIN — RISPERIDONE 1 MG: 1 TABLET ORAL at 05:06

## 2021-06-03 RX ADMIN — Medication 100 MG: at 10:06

## 2021-06-04 VITALS
SYSTOLIC BLOOD PRESSURE: 99 MMHG | BODY MASS INDEX: 15.7 KG/M2 | DIASTOLIC BLOOD PRESSURE: 56 MMHG | HEIGHT: 69 IN | WEIGHT: 106 LBS | RESPIRATION RATE: 18 BRPM | OXYGEN SATURATION: 98 % | TEMPERATURE: 98 F | HEART RATE: 83 BPM

## 2021-06-04 LAB
POCT GLUCOSE: 160 MG/DL (ref 70–110)
POCT GLUCOSE: 367 MG/DL (ref 70–110)

## 2021-06-04 PROCEDURE — 25000003 PHARM REV CODE 250: Performed by: NURSE PRACTITIONER

## 2021-06-04 PROCEDURE — 25000003 PHARM REV CODE 250: Performed by: HOSPITALIST

## 2021-06-04 PROCEDURE — 99239 PR HOSPITAL DISCHARGE DAY,>30 MIN: ICD-10-PCS | Mod: ,,, | Performed by: INTERNAL MEDICINE

## 2021-06-04 PROCEDURE — 99239 HOSP IP/OBS DSCHRG MGMT >30: CPT | Mod: ,,, | Performed by: INTERNAL MEDICINE

## 2021-06-04 PROCEDURE — 63600175 PHARM REV CODE 636 W HCPCS: Performed by: HOSPITALIST

## 2021-06-04 PROCEDURE — 25000003 PHARM REV CODE 250: Performed by: INTERNAL MEDICINE

## 2021-06-04 PROCEDURE — 97535 SELF CARE MNGMENT TRAINING: CPT

## 2021-06-04 PROCEDURE — 25000003 PHARM REV CODE 250: Performed by: CLINICAL NURSE SPECIALIST

## 2021-06-04 PROCEDURE — S4991 NICOTINE PATCH NONLEGEND: HCPCS | Performed by: NURSE PRACTITIONER

## 2021-06-04 PROCEDURE — 25000003 PHARM REV CODE 250: Performed by: PHYSICIAN ASSISTANT

## 2021-06-04 RX ORDER — ASPIRIN 81 MG/1
81 TABLET ORAL DAILY
Qty: 90 TABLET | Refills: 3 | Status: SHIPPED | OUTPATIENT
Start: 2021-06-05 | End: 2022-03-30

## 2021-06-04 RX ORDER — LANOLIN ALCOHOL/MO/W.PET/CERES
100 CREAM (GRAM) TOPICAL DAILY
Qty: 90 TABLET | Refills: 2 | Status: SHIPPED | OUTPATIENT
Start: 2021-06-05 | End: 2022-02-22 | Stop reason: SDUPTHER

## 2021-06-04 RX ORDER — DIVALPROEX SODIUM 500 MG/1
1000 TABLET, FILM COATED, EXTENDED RELEASE ORAL EVERY 12 HOURS
Qty: 120 TABLET | Refills: 11 | Status: SHIPPED | OUTPATIENT
Start: 2021-06-04 | End: 2021-06-24

## 2021-06-04 RX ADMIN — INSULIN ASPART 10 UNITS: 100 INJECTION, SOLUTION INTRAVENOUS; SUBCUTANEOUS at 01:06

## 2021-06-04 RX ADMIN — INSULIN ASPART 6 UNITS: 100 INJECTION, SOLUTION INTRAVENOUS; SUBCUTANEOUS at 01:06

## 2021-06-04 RX ADMIN — LEVETIRACETAM 750 MG: 750 TABLET ORAL at 10:06

## 2021-06-04 RX ADMIN — RISPERIDONE 1 MG: 1 TABLET ORAL at 10:06

## 2021-06-04 RX ADMIN — INSULIN ASPART 6 UNITS: 100 INJECTION, SOLUTION INTRAVENOUS; SUBCUTANEOUS at 10:06

## 2021-06-04 RX ADMIN — INSULIN ASPART 6 UNITS: 100 INJECTION, SOLUTION INTRAVENOUS; SUBCUTANEOUS at 06:06

## 2021-06-04 RX ADMIN — Medication 1 PATCH: at 10:06

## 2021-06-04 RX ADMIN — DIVALPROEX SODIUM 1000 MG: 500 TABLET, EXTENDED RELEASE ORAL at 10:06

## 2021-06-04 RX ADMIN — ASPIRIN 81 MG: 81 TABLET, COATED ORAL at 10:06

## 2021-06-04 RX ADMIN — INSULIN ASPART 2 UNITS: 100 INJECTION, SOLUTION INTRAVENOUS; SUBCUTANEOUS at 10:06

## 2021-06-04 RX ADMIN — RISPERIDONE 1 MG: 1 TABLET ORAL at 04:06

## 2021-06-04 RX ADMIN — FOLIC ACID 1 MG: 1 TABLET ORAL at 10:06

## 2021-06-04 RX ADMIN — Medication 100 MG: at 10:06

## 2021-06-04 RX ADMIN — CLOBAZAM 10 MG: 10 TABLET ORAL at 10:06

## 2021-06-04 RX ADMIN — ENOXAPARIN SODIUM 40 MG: 40 INJECTION SUBCUTANEOUS at 04:06

## 2021-06-04 RX ADMIN — THERA TABS 1 TABLET: TAB at 10:06

## 2021-06-07 ENCOUNTER — HOSPITAL ENCOUNTER (OUTPATIENT)
Facility: HOSPITAL | Age: 41
Discharge: HOME | End: 2021-06-10
Attending: EMERGENCY MEDICINE | Admitting: EMERGENCY MEDICINE
Payer: MEDICAID

## 2021-06-07 DIAGNOSIS — G40.909 SEIZURE DISORDER: ICD-10-CM

## 2021-06-07 DIAGNOSIS — S14.152A: ICD-10-CM

## 2021-06-07 DIAGNOSIS — F19.950: ICD-10-CM

## 2021-06-07 DIAGNOSIS — Z75.8 DISCHARGE PLANNING ISSUES: ICD-10-CM

## 2021-06-07 DIAGNOSIS — R41.9 NEUROCOGNITIVE DISORDER: ICD-10-CM

## 2021-06-07 DIAGNOSIS — F10.931 ALCOHOL WITHDRAWAL SYNDROME, WITH DELIRIUM: ICD-10-CM

## 2021-06-07 DIAGNOSIS — I33.0 MITRAL VALVE VEGETATION: ICD-10-CM

## 2021-06-07 DIAGNOSIS — Z79.4 TYPE 2 DIABETES MELLITUS WITH OTHER SPECIFIED COMPLICATION, WITH LONG-TERM CURRENT USE OF INSULIN: ICD-10-CM

## 2021-06-07 DIAGNOSIS — R68.89 MULTIPLE COMPLAINTS: ICD-10-CM

## 2021-06-07 DIAGNOSIS — Z79.4 TYPE 2 DIABETES MELLITUS WITH HYPERGLYCEMIA, WITH LONG-TERM CURRENT USE OF INSULIN: ICD-10-CM

## 2021-06-07 DIAGNOSIS — F10.20 ALCOHOL USE DISORDER, SEVERE, DEPENDENCE: Primary | ICD-10-CM

## 2021-06-07 DIAGNOSIS — G40.919 REFRACTORY SEIZURE: ICD-10-CM

## 2021-06-07 DIAGNOSIS — J43.9 BLEB, LUNG: ICD-10-CM

## 2021-06-07 DIAGNOSIS — E11.69 TYPE 2 DIABETES MELLITUS WITH OTHER SPECIFIED COMPLICATION, WITH LONG-TERM CURRENT USE OF INSULIN: ICD-10-CM

## 2021-06-07 DIAGNOSIS — R20.0 NUMBNESS OF UPPER EXTREMITY: ICD-10-CM

## 2021-06-07 DIAGNOSIS — E11.65 TYPE 2 DIABETES MELLITUS WITH HYPERGLYCEMIA, WITH LONG-TERM CURRENT USE OF INSULIN: ICD-10-CM

## 2021-06-07 LAB
ALBUMIN SERPL BCP-MCNC: 3.8 G/DL (ref 3.5–5.2)
ALP SERPL-CCNC: 77 U/L (ref 55–135)
ALT SERPL W/O P-5'-P-CCNC: 30 U/L (ref 10–44)
ANION GAP SERPL CALC-SCNC: 10 MMOL/L (ref 8–16)
AST SERPL-CCNC: 20 U/L (ref 10–40)
BASOPHILS # BLD AUTO: 0.06 K/UL (ref 0–0.2)
BASOPHILS NFR BLD: 0.8 % (ref 0–1.9)
BILIRUB SERPL-MCNC: 0.2 MG/DL (ref 0.1–1)
BUN SERPL-MCNC: 7 MG/DL (ref 6–20)
CALCIUM SERPL-MCNC: 10.2 MG/DL (ref 8.7–10.5)
CHLORIDE SERPL-SCNC: 100 MMOL/L (ref 95–110)
CO2 SERPL-SCNC: 25 MMOL/L (ref 23–29)
CREAT SERPL-MCNC: 0.9 MG/DL (ref 0.5–1.4)
DIFFERENTIAL METHOD: ABNORMAL
EOSINOPHIL # BLD AUTO: 0.1 K/UL (ref 0–0.5)
EOSINOPHIL NFR BLD: 1.1 % (ref 0–8)
ERYTHROCYTE [DISTWIDTH] IN BLOOD BY AUTOMATED COUNT: 15.5 % (ref 11.5–14.5)
EST. GFR  (AFRICAN AMERICAN): >60 ML/MIN/1.73 M^2
EST. GFR  (NON AFRICAN AMERICAN): >60 ML/MIN/1.73 M^2
GLUCOSE SERPL-MCNC: 290 MG/DL (ref 70–110)
HCT VFR BLD AUTO: 37 % (ref 40–54)
HGB BLD-MCNC: 12.4 G/DL (ref 14–18)
IMM GRANULOCYTES # BLD AUTO: 0.02 K/UL (ref 0–0.04)
IMM GRANULOCYTES NFR BLD AUTO: 0.3 % (ref 0–0.5)
LYMPHOCYTES # BLD AUTO: 2.7 K/UL (ref 1–4.8)
LYMPHOCYTES NFR BLD: 37.4 % (ref 18–48)
MAGNESIUM SERPL-MCNC: 1.7 MG/DL (ref 1.6–2.6)
MCH RBC QN AUTO: 29.5 PG (ref 27–31)
MCHC RBC AUTO-ENTMCNC: 33.5 G/DL (ref 32–36)
MCV RBC AUTO: 88 FL (ref 82–98)
MONOCYTES # BLD AUTO: 1 K/UL (ref 0.3–1)
MONOCYTES NFR BLD: 13.3 % (ref 4–15)
NEUTROPHILS # BLD AUTO: 3.4 K/UL (ref 1.8–7.7)
NEUTROPHILS NFR BLD: 47.1 % (ref 38–73)
NRBC BLD-RTO: 0 /100 WBC
PLATELET # BLD AUTO: 440 K/UL (ref 150–450)
PMV BLD AUTO: 9.3 FL (ref 9.2–12.9)
POTASSIUM SERPL-SCNC: 4.2 MMOL/L (ref 3.5–5.1)
PROT SERPL-MCNC: 7.5 G/DL (ref 6–8.4)
RBC # BLD AUTO: 4.21 M/UL (ref 4.6–6.2)
SODIUM SERPL-SCNC: 135 MMOL/L (ref 136–145)
WBC # BLD AUTO: 7.2 K/UL (ref 3.9–12.7)

## 2021-06-07 PROCEDURE — 93010 ELECTROCARDIOGRAM REPORT: CPT | Mod: ,,, | Performed by: INTERNAL MEDICINE

## 2021-06-07 PROCEDURE — 83735 ASSAY OF MAGNESIUM: CPT | Performed by: EMERGENCY MEDICINE

## 2021-06-07 PROCEDURE — 99285 EMERGENCY DEPT VISIT HI MDM: CPT | Mod: 25

## 2021-06-07 PROCEDURE — G0378 HOSPITAL OBSERVATION PER HR: HCPCS

## 2021-06-07 PROCEDURE — 85025 COMPLETE CBC W/AUTO DIFF WBC: CPT | Performed by: EMERGENCY MEDICINE

## 2021-06-07 PROCEDURE — 80053 COMPREHEN METABOLIC PANEL: CPT | Performed by: EMERGENCY MEDICINE

## 2021-06-07 PROCEDURE — 96372 THER/PROPH/DIAG INJ SC/IM: CPT | Mod: 59

## 2021-06-07 PROCEDURE — 99285 EMERGENCY DEPT VISIT HI MDM: CPT | Mod: CS,,, | Performed by: EMERGENCY MEDICINE

## 2021-06-07 PROCEDURE — 82962 GLUCOSE BLOOD TEST: CPT

## 2021-06-07 PROCEDURE — 93005 ELECTROCARDIOGRAM TRACING: CPT

## 2021-06-07 PROCEDURE — U0002 COVID-19 LAB TEST NON-CDC: HCPCS | Performed by: EMERGENCY MEDICINE

## 2021-06-07 PROCEDURE — 99285 PR EMERGENCY DEPT VISIT,LEVEL V: ICD-10-PCS | Mod: CS,,, | Performed by: EMERGENCY MEDICINE

## 2021-06-07 PROCEDURE — 93010 EKG 12-LEAD: ICD-10-PCS | Mod: ,,, | Performed by: INTERNAL MEDICINE

## 2021-06-08 PROBLEM — Z02.9 DISCHARGE PLANNING ISSUES: Status: RESOLVED | Noted: 2021-02-16 | Resolved: 2021-06-08

## 2021-06-08 PROBLEM — E87.29 INCREASED ANION GAP METABOLIC ACIDOSIS: Status: RESOLVED | Noted: 2021-05-24 | Resolved: 2021-06-08

## 2021-06-08 PROBLEM — R20.0 NUMBNESS OF UPPER EXTREMITY: Status: ACTIVE | Noted: 2021-06-08

## 2021-06-08 PROBLEM — G93.40 ENCEPHALOPATHY: Status: RESOLVED | Noted: 2021-05-24 | Resolved: 2021-06-08

## 2021-06-08 PROBLEM — F10.931 ALCOHOL WITHDRAWAL SYNDROME, WITH DELIRIUM: Status: RESOLVED | Noted: 2021-05-24 | Resolved: 2021-06-08

## 2021-06-08 PROBLEM — Z75.8 DISCHARGE PLANNING ISSUES: Status: RESOLVED | Noted: 2021-02-16 | Resolved: 2021-06-08

## 2021-06-08 PROBLEM — G93.40 ACUTE ENCEPHALOPATHY: Status: RESOLVED | Noted: 2021-03-07 | Resolved: 2021-06-08

## 2021-06-08 PROBLEM — G93.41 ENCEPHALOPATHY, METABOLIC: Status: RESOLVED | Noted: 2021-05-24 | Resolved: 2021-06-08

## 2021-06-08 PROBLEM — T68.XXXA HYPOTHERMIA: Status: RESOLVED | Noted: 2021-04-01 | Resolved: 2021-06-08

## 2021-06-08 PROBLEM — G40.909 SEIZURE DISORDER: Status: ACTIVE | Noted: 2021-06-08

## 2021-06-08 LAB
25(OH)D3+25(OH)D2 SERPL-MCNC: 23 NG/ML (ref 30–96)
AMPHET+METHAMPHET UR QL: NEGATIVE
ANION GAP SERPL CALC-SCNC: 12 MMOL/L (ref 8–16)
BARBITURATES UR QL SCN>200 NG/ML: NEGATIVE
BASOPHILS # BLD AUTO: 0.07 K/UL (ref 0–0.2)
BASOPHILS NFR BLD: 1.2 % (ref 0–1.9)
BENZODIAZ UR QL SCN>200 NG/ML: NORMAL
BLOOD COLLECTION FOR MS PROFILE: NORMAL
BUN SERPL-MCNC: 10 MG/DL (ref 6–20)
BZE UR QL SCN: NEGATIVE
CALCIUM SERPL-MCNC: 10.4 MG/DL (ref 8.7–10.5)
CANNABINOIDS UR QL SCN: NEGATIVE
CHLORIDE SERPL-SCNC: 99 MMOL/L (ref 95–110)
CO2 SERPL-SCNC: 25 MMOL/L (ref 23–29)
CREAT SERPL-MCNC: 0.8 MG/DL (ref 0.5–1.4)
CREAT UR-MCNC: 45 MG/DL (ref 23–375)
CRP SERPL-MCNC: 2 MG/L (ref 0–8.2)
CTP QC/QA: YES
DIFFERENTIAL METHOD: ABNORMAL
EOSINOPHIL # BLD AUTO: 0.1 K/UL (ref 0–0.5)
EOSINOPHIL NFR BLD: 2.2 % (ref 0–8)
ERYTHROCYTE [DISTWIDTH] IN BLOOD BY AUTOMATED COUNT: 15.6 % (ref 11.5–14.5)
ERYTHROCYTE [SEDIMENTATION RATE] IN BLOOD BY WESTERGREN METHOD: 16 MM/HR (ref 0–23)
EST. GFR  (AFRICAN AMERICAN): >60 ML/MIN/1.73 M^2
EST. GFR  (NON AFRICAN AMERICAN): >60 ML/MIN/1.73 M^2
ETHANOL SERPL-MCNC: <10 MG/DL
ETHANOL UR-MCNC: <10 MG/DL
GLUCOSE SERPL-MCNC: 274 MG/DL (ref 70–110)
HCT VFR BLD AUTO: 40.3 % (ref 40–54)
HGB BLD-MCNC: 13.1 G/DL (ref 14–18)
IMM GRANULOCYTES # BLD AUTO: 0.02 K/UL (ref 0–0.04)
IMM GRANULOCYTES NFR BLD AUTO: 0.3 % (ref 0–0.5)
LYMPHOCYTES # BLD AUTO: 2.6 K/UL (ref 1–4.8)
LYMPHOCYTES NFR BLD: 43.5 % (ref 18–48)
MCH RBC QN AUTO: 28.3 PG (ref 27–31)
MCHC RBC AUTO-ENTMCNC: 32.5 G/DL (ref 32–36)
MCV RBC AUTO: 87 FL (ref 82–98)
METHADONE UR QL SCN>300 NG/ML: NEGATIVE
MONOCYTES # BLD AUTO: 0.8 K/UL (ref 0.3–1)
MONOCYTES NFR BLD: 14 % (ref 4–15)
NEUTROPHILS # BLD AUTO: 2.3 K/UL (ref 1.8–7.7)
NEUTROPHILS NFR BLD: 38.8 % (ref 38–73)
NRBC BLD-RTO: 0 /100 WBC
OPIATES UR QL SCN: NEGATIVE
PCP UR QL SCN>25 NG/ML: NEGATIVE
PLATELET # BLD AUTO: 473 K/UL (ref 150–450)
PMV BLD AUTO: 9.5 FL (ref 9.2–12.9)
POCT GLUCOSE: 126 MG/DL (ref 70–110)
POCT GLUCOSE: 277 MG/DL (ref 70–110)
POCT GLUCOSE: 405 MG/DL (ref 70–110)
POTASSIUM SERPL-SCNC: 4.6 MMOL/L (ref 3.5–5.1)
RBC # BLD AUTO: 4.63 M/UL (ref 4.6–6.2)
RHEUMATOID FACT SERPL-ACNC: <10 IU/ML (ref 0–15)
SARS-COV-2 RDRP RESP QL NAA+PROBE: NEGATIVE
SODIUM SERPL-SCNC: 136 MMOL/L (ref 136–145)
TOXICOLOGY INFORMATION: NORMAL
VIT B12 SERPL-MCNC: 827 PG/ML (ref 210–950)
WBC # BLD AUTO: 6.02 K/UL (ref 3.9–12.7)

## 2021-06-08 PROCEDURE — 25000003 PHARM REV CODE 250: Performed by: INTERNAL MEDICINE

## 2021-06-08 PROCEDURE — 82306 VITAMIN D 25 HYDROXY: CPT | Performed by: STUDENT IN AN ORGANIZED HEALTH CARE EDUCATION/TRAINING PROGRAM

## 2021-06-08 PROCEDURE — 86255 FLUORESCENT ANTIBODY SCREEN: CPT | Mod: 59 | Performed by: STUDENT IN AN ORGANIZED HEALTH CARE EDUCATION/TRAINING PROGRAM

## 2021-06-08 PROCEDURE — 86703 HIV-1/HIV-2 1 RESULT ANTBDY: CPT | Performed by: STUDENT IN AN ORGANIZED HEALTH CARE EDUCATION/TRAINING PROGRAM

## 2021-06-08 PROCEDURE — 82607 VITAMIN B-12: CPT | Performed by: PHYSICIAN ASSISTANT

## 2021-06-08 PROCEDURE — 83520 IMMUNOASSAY QUANT NOS NONAB: CPT | Performed by: STUDENT IN AN ORGANIZED HEALTH CARE EDUCATION/TRAINING PROGRAM

## 2021-06-08 PROCEDURE — 99220 PR INITIAL OBSERVATION CARE,LEVL III: ICD-10-PCS | Mod: ,,, | Performed by: INTERNAL MEDICINE

## 2021-06-08 PROCEDURE — G0378 HOSPITAL OBSERVATION PER HR: HCPCS

## 2021-06-08 PROCEDURE — 86255 FLUORESCENT ANTIBODY SCREEN: CPT | Performed by: STUDENT IN AN ORGANIZED HEALTH CARE EDUCATION/TRAINING PROGRAM

## 2021-06-08 PROCEDURE — 97165 OT EVAL LOW COMPLEX 30 MIN: CPT

## 2021-06-08 PROCEDURE — 86687 HTLV-I ANTIBODY: CPT | Performed by: STUDENT IN AN ORGANIZED HEALTH CARE EDUCATION/TRAINING PROGRAM

## 2021-06-08 PROCEDURE — 83921 ORGANIC ACID SINGLE QUANT: CPT | Performed by: PHYSICIAN ASSISTANT

## 2021-06-08 PROCEDURE — 86235 NUCLEAR ANTIGEN ANTIBODY: CPT | Mod: 59 | Performed by: STUDENT IN AN ORGANIZED HEALTH CARE EDUCATION/TRAINING PROGRAM

## 2021-06-08 PROCEDURE — 63600175 PHARM REV CODE 636 W HCPCS: Performed by: INTERNAL MEDICINE

## 2021-06-08 PROCEDURE — 99214 PR OFFICE/OUTPT VISIT, EST, LEVL IV, 30-39 MIN: ICD-10-PCS | Mod: ,,, | Performed by: PSYCHIATRY & NEUROLOGY

## 2021-06-08 PROCEDURE — 25500020 PHARM REV CODE 255: Performed by: INTERNAL MEDICINE

## 2021-06-08 PROCEDURE — 80048 BASIC METABOLIC PNL TOTAL CA: CPT | Performed by: PHYSICIAN ASSISTANT

## 2021-06-08 PROCEDURE — S4991 NICOTINE PATCH NONLEGEND: HCPCS | Performed by: INTERNAL MEDICINE

## 2021-06-08 PROCEDURE — 85025 COMPLETE CBC W/AUTO DIFF WBC: CPT | Performed by: PHYSICIAN ASSISTANT

## 2021-06-08 PROCEDURE — C9399 UNCLASSIFIED DRUGS OR BIOLOG: HCPCS | Performed by: INTERNAL MEDICINE

## 2021-06-08 PROCEDURE — 86431 RHEUMATOID FACTOR QUANT: CPT | Performed by: STUDENT IN AN ORGANIZED HEALTH CARE EDUCATION/TRAINING PROGRAM

## 2021-06-08 PROCEDURE — 86592 SYPHILIS TEST NON-TREP QUAL: CPT | Performed by: STUDENT IN AN ORGANIZED HEALTH CARE EDUCATION/TRAINING PROGRAM

## 2021-06-08 PROCEDURE — 84207 ASSAY OF VITAMIN B-6: CPT | Performed by: STUDENT IN AN ORGANIZED HEALTH CARE EDUCATION/TRAINING PROGRAM

## 2021-06-08 PROCEDURE — 86038 ANTINUCLEAR ANTIBODIES: CPT | Performed by: STUDENT IN AN ORGANIZED HEALTH CARE EDUCATION/TRAINING PROGRAM

## 2021-06-08 PROCEDURE — 99220 PR INITIAL OBSERVATION CARE,LEVL III: CPT | Mod: ,,, | Performed by: INTERNAL MEDICINE

## 2021-06-08 PROCEDURE — 85652 RBC SED RATE AUTOMATED: CPT | Performed by: STUDENT IN AN ORGANIZED HEALTH CARE EDUCATION/TRAINING PROGRAM

## 2021-06-08 PROCEDURE — 86140 C-REACTIVE PROTEIN: CPT | Performed by: STUDENT IN AN ORGANIZED HEALTH CARE EDUCATION/TRAINING PROGRAM

## 2021-06-08 PROCEDURE — 86618 LYME DISEASE ANTIBODY: CPT | Performed by: STUDENT IN AN ORGANIZED HEALTH CARE EDUCATION/TRAINING PROGRAM

## 2021-06-08 PROCEDURE — A9585 GADOBUTROL INJECTION: HCPCS | Performed by: INTERNAL MEDICINE

## 2021-06-08 PROCEDURE — 97535 SELF CARE MNGMENT TRAINING: CPT

## 2021-06-08 PROCEDURE — 86235 NUCLEAR ANTIGEN ANTIBODY: CPT | Performed by: STUDENT IN AN ORGANIZED HEALTH CARE EDUCATION/TRAINING PROGRAM

## 2021-06-08 PROCEDURE — 80307 DRUG TEST PRSMV CHEM ANLYZR: CPT | Performed by: PHYSICIAN ASSISTANT

## 2021-06-08 PROCEDURE — 99214 OFFICE O/P EST MOD 30 MIN: CPT | Mod: ,,, | Performed by: PSYCHIATRY & NEUROLOGY

## 2021-06-08 PROCEDURE — 96372 THER/PROPH/DIAG INJ SC/IM: CPT | Mod: 59

## 2021-06-08 PROCEDURE — 82164 ANGIOTENSIN I ENZYME TEST: CPT | Performed by: STUDENT IN AN ORGANIZED HEALTH CARE EDUCATION/TRAINING PROGRAM

## 2021-06-08 PROCEDURE — 82077 ASSAY SPEC XCP UR&BREATH IA: CPT | Performed by: PHYSICIAN ASSISTANT

## 2021-06-08 RX ORDER — DIVALPROEX SODIUM 500 MG/1
1000 TABLET, FILM COATED, EXTENDED RELEASE ORAL EVERY 12 HOURS
Status: DISCONTINUED | OUTPATIENT
Start: 2021-06-08 | End: 2021-06-10 | Stop reason: HOSPADM

## 2021-06-08 RX ORDER — CLOBAZAM 10 MG/1
20 TABLET ORAL NIGHTLY
Status: DISCONTINUED | OUTPATIENT
Start: 2021-06-08 | End: 2021-06-10 | Stop reason: HOSPADM

## 2021-06-08 RX ORDER — THIAMINE HCL 100 MG
100 TABLET ORAL DAILY
Status: DISCONTINUED | OUTPATIENT
Start: 2021-06-08 | End: 2021-06-10 | Stop reason: HOSPADM

## 2021-06-08 RX ORDER — CLOBAZAM 10 MG/1
10 TABLET ORAL DAILY
Status: DISCONTINUED | OUTPATIENT
Start: 2021-06-08 | End: 2021-06-10 | Stop reason: HOSPADM

## 2021-06-08 RX ORDER — SODIUM CHLORIDE 9 MG/ML
INJECTION, SOLUTION INTRAVENOUS
Status: DISCONTINUED | OUTPATIENT
Start: 2021-06-08 | End: 2021-06-10 | Stop reason: HOSPADM

## 2021-06-08 RX ORDER — INSULIN ASPART 100 [IU]/ML
1-10 INJECTION, SOLUTION INTRAVENOUS; SUBCUTANEOUS
Status: DISCONTINUED | OUTPATIENT
Start: 2021-06-08 | End: 2021-06-10 | Stop reason: HOSPADM

## 2021-06-08 RX ORDER — ACETAMINOPHEN 325 MG/1
650 TABLET ORAL EVERY 6 HOURS PRN
Status: DISCONTINUED | OUTPATIENT
Start: 2021-06-08 | End: 2021-06-10 | Stop reason: HOSPADM

## 2021-06-08 RX ORDER — GLUCAGON 1 MG
1 KIT INJECTION
Status: DISCONTINUED | OUTPATIENT
Start: 2021-06-08 | End: 2021-06-10 | Stop reason: HOSPADM

## 2021-06-08 RX ORDER — FOLIC ACID 1 MG/1
1 TABLET ORAL DAILY
Status: DISCONTINUED | OUTPATIENT
Start: 2021-06-08 | End: 2021-06-10 | Stop reason: HOSPADM

## 2021-06-08 RX ORDER — IBUPROFEN 200 MG
1 TABLET ORAL DAILY
Status: DISCONTINUED | OUTPATIENT
Start: 2021-06-08 | End: 2021-06-10 | Stop reason: HOSPADM

## 2021-06-08 RX ORDER — ALBUTEROL SULFATE 2.5 MG/.5ML
2.5 SOLUTION RESPIRATORY (INHALATION) EVERY 4 HOURS PRN
Status: DISCONTINUED | OUTPATIENT
Start: 2021-06-08 | End: 2021-06-10 | Stop reason: HOSPADM

## 2021-06-08 RX ORDER — ENOXAPARIN SODIUM 100 MG/ML
40 INJECTION SUBCUTANEOUS EVERY 24 HOURS
Status: DISCONTINUED | OUTPATIENT
Start: 2021-06-08 | End: 2021-06-10 | Stop reason: HOSPADM

## 2021-06-08 RX ORDER — ONDANSETRON 2 MG/ML
4 INJECTION INTRAMUSCULAR; INTRAVENOUS EVERY 8 HOURS PRN
Status: DISCONTINUED | OUTPATIENT
Start: 2021-06-08 | End: 2021-06-10 | Stop reason: HOSPADM

## 2021-06-08 RX ORDER — GADOBUTROL 604.72 MG/ML
5 INJECTION INTRAVENOUS
Status: COMPLETED | OUTPATIENT
Start: 2021-06-08 | End: 2021-06-08

## 2021-06-08 RX ORDER — IBUPROFEN 200 MG
16 TABLET ORAL
Status: DISCONTINUED | OUTPATIENT
Start: 2021-06-08 | End: 2021-06-10 | Stop reason: HOSPADM

## 2021-06-08 RX ORDER — IBUPROFEN 200 MG
24 TABLET ORAL
Status: DISCONTINUED | OUTPATIENT
Start: 2021-06-08 | End: 2021-06-10 | Stop reason: HOSPADM

## 2021-06-08 RX ORDER — TALC
6 POWDER (GRAM) TOPICAL NIGHTLY PRN
Status: DISCONTINUED | OUTPATIENT
Start: 2021-06-08 | End: 2021-06-10 | Stop reason: HOSPADM

## 2021-06-08 RX ORDER — SODIUM CHLORIDE 0.9 % (FLUSH) 0.9 %
10 SYRINGE (ML) INJECTION
Status: DISCONTINUED | OUTPATIENT
Start: 2021-06-08 | End: 2021-06-10 | Stop reason: HOSPADM

## 2021-06-08 RX ORDER — ASPIRIN 81 MG/1
81 TABLET ORAL DAILY
Status: DISCONTINUED | OUTPATIENT
Start: 2021-06-08 | End: 2021-06-10 | Stop reason: HOSPADM

## 2021-06-08 RX ORDER — LEVETIRACETAM 750 MG/1
750 TABLET ORAL 2 TIMES DAILY
Status: DISCONTINUED | OUTPATIENT
Start: 2021-06-08 | End: 2021-06-10 | Stop reason: HOSPADM

## 2021-06-08 RX ORDER — RISPERIDONE 0.25 MG/1
1 TABLET ORAL 3 TIMES DAILY
Status: DISCONTINUED | OUTPATIENT
Start: 2021-06-08 | End: 2021-06-10 | Stop reason: HOSPADM

## 2021-06-08 RX ORDER — INSULIN ASPART 100 [IU]/ML
6 INJECTION, SOLUTION INTRAVENOUS; SUBCUTANEOUS
Status: DISCONTINUED | OUTPATIENT
Start: 2021-06-08 | End: 2021-06-09

## 2021-06-08 RX ADMIN — CLOBAZAM 20 MG: 10 TABLET ORAL at 09:06

## 2021-06-08 RX ADMIN — CLOBAZAM 20 MG: 10 TABLET ORAL at 01:06

## 2021-06-08 RX ADMIN — DIVALPROEX SODIUM 1000 MG: 500 TABLET, EXTENDED RELEASE ORAL at 09:06

## 2021-06-08 RX ADMIN — Medication 100 MG: at 09:06

## 2021-06-08 RX ADMIN — INSULIN DETEMIR 18 UNITS: 100 INJECTION, SOLUTION SUBCUTANEOUS at 09:06

## 2021-06-08 RX ADMIN — FOLIC ACID 1 MG: 1 TABLET ORAL at 09:06

## 2021-06-08 RX ADMIN — DIVALPROEX SODIUM 1000 MG: 500 TABLET, EXTENDED RELEASE ORAL at 01:06

## 2021-06-08 RX ADMIN — INSULIN ASPART 6 UNITS: 100 INJECTION, SOLUTION INTRAVENOUS; SUBCUTANEOUS at 09:06

## 2021-06-08 RX ADMIN — ENOXAPARIN SODIUM 40 MG: 40 INJECTION SUBCUTANEOUS at 04:06

## 2021-06-08 RX ADMIN — ASPIRIN 81 MG: 81 TABLET, COATED ORAL at 09:06

## 2021-06-08 RX ADMIN — LEVETIRACETAM 750 MG: 750 TABLET ORAL at 09:06

## 2021-06-08 RX ADMIN — RISPERIDONE 1 MG: 0.25 TABLET ORAL at 09:06

## 2021-06-08 RX ADMIN — THERA TABS 1 TABLET: TAB at 09:06

## 2021-06-08 RX ADMIN — GADOBUTROL 5 ML: 604.72 INJECTION INTRAVENOUS at 09:06

## 2021-06-08 RX ADMIN — LEVETIRACETAM 750 MG: 750 TABLET ORAL at 01:06

## 2021-06-08 RX ADMIN — NICOTINE 1 PATCH: 21 PATCH, EXTENDED RELEASE TRANSDERMAL at 09:06

## 2021-06-08 RX ADMIN — RISPERIDONE 1 MG: 0.25 TABLET ORAL at 04:06

## 2021-06-08 RX ADMIN — ACETAMINOPHEN 650 MG: 325 TABLET ORAL at 09:06

## 2021-06-08 RX ADMIN — INSULIN DETEMIR 18 UNITS: 100 INJECTION, SOLUTION SUBCUTANEOUS at 01:06

## 2021-06-08 RX ADMIN — CLOBAZAM 10 MG: 10 TABLET ORAL at 09:06

## 2021-06-09 PROBLEM — F10.20 ALCOHOL USE DISORDER, SEVERE, DEPENDENCE: Status: ACTIVE | Noted: 2021-06-09

## 2021-06-09 PROBLEM — R53.1 WEAKNESS: Status: ACTIVE | Noted: 2021-06-09

## 2021-06-09 PROBLEM — S14.152A: Status: ACTIVE | Noted: 2021-06-09

## 2021-06-09 PROBLEM — M54.9 BACK PAIN: Status: ACTIVE | Noted: 2021-06-09

## 2021-06-09 LAB
ANA SER QL IF: NORMAL
ANION GAP SERPL CALC-SCNC: 10 MMOL/L (ref 8–16)
ANISOCYTOSIS BLD QL SMEAR: SLIGHT
ANTI-SSA ANTIBODY: 0.1 RATIO (ref 0–0.99)
ANTI-SSA INTERPRETATION: NEGATIVE
ANTI-SSB ANTIBODY: 0.07 RATIO (ref 0–0.99)
ANTI-SSB INTERPRETATION: NEGATIVE
BASOPHILS # BLD AUTO: 0.09 K/UL (ref 0–0.2)
BASOPHILS NFR BLD: 1.4 % (ref 0–1.9)
BUN SERPL-MCNC: 14 MG/DL (ref 6–20)
CALCIUM SERPL-MCNC: 9.6 MG/DL (ref 8.7–10.5)
CHLORIDE SERPL-SCNC: 101 MMOL/L (ref 95–110)
CO2 SERPL-SCNC: 24 MMOL/L (ref 23–29)
CREAT SERPL-MCNC: 0.7 MG/DL (ref 0.5–1.4)
DACRYOCYTES BLD QL SMEAR: ABNORMAL
DIFFERENTIAL METHOD: ABNORMAL
EOSINOPHIL # BLD AUTO: 0.2 K/UL (ref 0–0.5)
EOSINOPHIL NFR BLD: 2.4 % (ref 0–8)
ERYTHROCYTE [DISTWIDTH] IN BLOOD BY AUTOMATED COUNT: 15.1 % (ref 11.5–14.5)
EST. GFR  (AFRICAN AMERICAN): >60 ML/MIN/1.73 M^2
EST. GFR  (NON AFRICAN AMERICAN): >60 ML/MIN/1.73 M^2
GLUCOSE SERPL-MCNC: 148 MG/DL (ref 70–110)
HCT VFR BLD AUTO: 35.4 % (ref 40–54)
HGB BLD-MCNC: 11.9 G/DL (ref 14–18)
HIV 1+2 AB+HIV1 P24 AG SERPL QL IA: NEGATIVE
HYPOCHROMIA BLD QL SMEAR: ABNORMAL
IMM GRANULOCYTES # BLD AUTO: 0.03 K/UL (ref 0–0.04)
IMM GRANULOCYTES NFR BLD AUTO: 0.5 % (ref 0–0.5)
LYMPHOCYTES # BLD AUTO: 3 K/UL (ref 1–4.8)
LYMPHOCYTES NFR BLD: 47.3 % (ref 18–48)
MCH RBC QN AUTO: 29.2 PG (ref 27–31)
MCHC RBC AUTO-ENTMCNC: 33.6 G/DL (ref 32–36)
MCV RBC AUTO: 87 FL (ref 82–98)
MONOCYTES # BLD AUTO: 0.8 K/UL (ref 0.3–1)
MONOCYTES NFR BLD: 12.6 % (ref 4–15)
NEUTROPHILS # BLD AUTO: 2.3 K/UL (ref 1.8–7.7)
NEUTROPHILS NFR BLD: 35.8 % (ref 38–73)
NRBC BLD-RTO: 0 /100 WBC
OVALOCYTES BLD QL SMEAR: ABNORMAL
PLATELET # BLD AUTO: 394 K/UL (ref 150–450)
PMV BLD AUTO: 9.2 FL (ref 9.2–12.9)
POCT GLUCOSE: 145 MG/DL (ref 70–110)
POCT GLUCOSE: 175 MG/DL (ref 70–110)
POCT GLUCOSE: 213 MG/DL (ref 70–110)
POCT GLUCOSE: 215 MG/DL (ref 70–110)
POCT GLUCOSE: 267 MG/DL (ref 70–110)
POIKILOCYTOSIS BLD QL SMEAR: SLIGHT
POLYCHROMASIA BLD QL SMEAR: ABNORMAL
POTASSIUM SERPL-SCNC: 4 MMOL/L (ref 3.5–5.1)
RBC # BLD AUTO: 4.07 M/UL (ref 4.6–6.2)
RPR SER QL: NORMAL
SODIUM SERPL-SCNC: 135 MMOL/L (ref 136–145)
WBC # BLD AUTO: 6.37 K/UL (ref 3.9–12.7)

## 2021-06-09 PROCEDURE — 25000003 PHARM REV CODE 250: Performed by: INTERNAL MEDICINE

## 2021-06-09 PROCEDURE — 63600175 PHARM REV CODE 636 W HCPCS: Performed by: INTERNAL MEDICINE

## 2021-06-09 PROCEDURE — A9585 GADOBUTROL INJECTION: HCPCS | Performed by: INTERNAL MEDICINE

## 2021-06-09 PROCEDURE — 80048 BASIC METABOLIC PNL TOTAL CA: CPT | Performed by: PHYSICIAN ASSISTANT

## 2021-06-09 PROCEDURE — 25500020 PHARM REV CODE 255: Performed by: INTERNAL MEDICINE

## 2021-06-09 PROCEDURE — 99214 PR OFFICE/OUTPT VISIT, EST, LEVL IV, 30-39 MIN: ICD-10-PCS | Mod: ,,, | Performed by: PSYCHIATRY & NEUROLOGY

## 2021-06-09 PROCEDURE — G0378 HOSPITAL OBSERVATION PER HR: HCPCS

## 2021-06-09 PROCEDURE — 36415 COLL VENOUS BLD VENIPUNCTURE: CPT | Performed by: PHYSICIAN ASSISTANT

## 2021-06-09 PROCEDURE — 99214 OFFICE O/P EST MOD 30 MIN: CPT | Mod: ,,, | Performed by: PSYCHIATRY & NEUROLOGY

## 2021-06-09 PROCEDURE — 85025 COMPLETE CBC W/AUTO DIFF WBC: CPT | Performed by: PHYSICIAN ASSISTANT

## 2021-06-09 PROCEDURE — S4991 NICOTINE PATCH NONLEGEND: HCPCS | Performed by: INTERNAL MEDICINE

## 2021-06-09 PROCEDURE — 99226 PR SUBSEQUENT OBSERVATION CARE,LEVEL III: ICD-10-PCS | Mod: ,,, | Performed by: PHYSICIAN ASSISTANT

## 2021-06-09 PROCEDURE — 96372 THER/PROPH/DIAG INJ SC/IM: CPT | Mod: 59

## 2021-06-09 PROCEDURE — 99226 PR SUBSEQUENT OBSERVATION CARE,LEVEL III: CPT | Mod: ,,, | Performed by: PHYSICIAN ASSISTANT

## 2021-06-09 RX ORDER — INSULIN ASPART 100 [IU]/ML
9 INJECTION, SOLUTION INTRAVENOUS; SUBCUTANEOUS
Status: DISCONTINUED | OUTPATIENT
Start: 2021-06-09 | End: 2021-06-10 | Stop reason: HOSPADM

## 2021-06-09 RX ORDER — GADOBUTROL 604.72 MG/ML
6 INJECTION INTRAVENOUS
Status: COMPLETED | OUTPATIENT
Start: 2021-06-09 | End: 2021-06-09

## 2021-06-09 RX ADMIN — INSULIN ASPART 9 UNITS: 100 INJECTION, SOLUTION INTRAVENOUS; SUBCUTANEOUS at 05:06

## 2021-06-09 RX ADMIN — FOLIC ACID 1 MG: 1 TABLET ORAL at 08:06

## 2021-06-09 RX ADMIN — THERA TABS 1 TABLET: TAB at 08:06

## 2021-06-09 RX ADMIN — INSULIN ASPART 2 UNITS: 100 INJECTION, SOLUTION INTRAVENOUS; SUBCUTANEOUS at 09:06

## 2021-06-09 RX ADMIN — RISPERIDONE 1 MG: 0.25 TABLET ORAL at 05:06

## 2021-06-09 RX ADMIN — INSULIN ASPART 6 UNITS: 100 INJECTION, SOLUTION INTRAVENOUS; SUBCUTANEOUS at 12:06

## 2021-06-09 RX ADMIN — ASPIRIN 81 MG: 81 TABLET, COATED ORAL at 08:06

## 2021-06-09 RX ADMIN — Medication 100 MG: at 08:06

## 2021-06-09 RX ADMIN — CLOBAZAM 20 MG: 10 TABLET ORAL at 09:06

## 2021-06-09 RX ADMIN — DIVALPROEX SODIUM 1000 MG: 500 TABLET, EXTENDED RELEASE ORAL at 08:06

## 2021-06-09 RX ADMIN — GADOBUTROL 6 ML: 604.72 INJECTION INTRAVENOUS at 03:06

## 2021-06-09 RX ADMIN — NICOTINE 1 PATCH: 21 PATCH, EXTENDED RELEASE TRANSDERMAL at 08:06

## 2021-06-09 RX ADMIN — ENOXAPARIN SODIUM 40 MG: 40 INJECTION SUBCUTANEOUS at 05:06

## 2021-06-09 RX ADMIN — CLOBAZAM 10 MG: 10 TABLET ORAL at 08:06

## 2021-06-09 RX ADMIN — RISPERIDONE 1 MG: 0.25 TABLET ORAL at 10:06

## 2021-06-09 RX ADMIN — LEVETIRACETAM 750 MG: 750 TABLET ORAL at 08:06

## 2021-06-09 RX ADMIN — DIVALPROEX SODIUM 1000 MG: 500 TABLET, EXTENDED RELEASE ORAL at 09:06

## 2021-06-09 RX ADMIN — RISPERIDONE 1 MG: 0.25 TABLET ORAL at 08:06

## 2021-06-09 RX ADMIN — INSULIN ASPART 6 UNITS: 100 INJECTION, SOLUTION INTRAVENOUS; SUBCUTANEOUS at 08:06

## 2021-06-09 RX ADMIN — LEVETIRACETAM 750 MG: 750 TABLET ORAL at 09:06

## 2021-06-09 RX ADMIN — INSULIN ASPART 9 UNITS: 100 INJECTION, SOLUTION INTRAVENOUS; SUBCUTANEOUS at 12:06

## 2021-06-10 VITALS
HEART RATE: 99 BPM | SYSTOLIC BLOOD PRESSURE: 105 MMHG | BODY MASS INDEX: 15.7 KG/M2 | TEMPERATURE: 97 F | OXYGEN SATURATION: 100 % | RESPIRATION RATE: 16 BRPM | DIASTOLIC BLOOD PRESSURE: 79 MMHG | HEIGHT: 69 IN | WEIGHT: 106 LBS

## 2021-06-10 PROBLEM — R33.9 URINARY RETENTION: Status: ACTIVE | Noted: 2021-06-10

## 2021-06-10 LAB
ACE SERPL-CCNC: 56 U/L (ref 16–85)
ANION GAP SERPL CALC-SCNC: 14 MMOL/L (ref 8–16)
BASOPHILS # BLD AUTO: 0.11 K/UL (ref 0–0.2)
BASOPHILS NFR BLD: 1.4 % (ref 0–1.9)
BUN SERPL-MCNC: 18 MG/DL (ref 6–20)
CALCIUM SERPL-MCNC: 10 MG/DL (ref 8.7–10.5)
CHLORIDE SERPL-SCNC: 103 MMOL/L (ref 95–110)
CO2 SERPL-SCNC: 22 MMOL/L (ref 23–29)
CREAT SERPL-MCNC: 0.7 MG/DL (ref 0.5–1.4)
DIFFERENTIAL METHOD: ABNORMAL
EOSINOPHIL # BLD AUTO: 0.2 K/UL (ref 0–0.5)
EOSINOPHIL NFR BLD: 2.1 % (ref 0–8)
ERYTHROCYTE [DISTWIDTH] IN BLOOD BY AUTOMATED COUNT: 15.3 % (ref 11.5–14.5)
EST. GFR  (AFRICAN AMERICAN): >60 ML/MIN/1.73 M^2
EST. GFR  (NON AFRICAN AMERICAN): >60 ML/MIN/1.73 M^2
GLUCOSE SERPL-MCNC: 93 MG/DL (ref 70–110)
HCT VFR BLD AUTO: 34.1 % (ref 40–54)
HGB BLD-MCNC: 11.8 G/DL (ref 14–18)
IMM GRANULOCYTES # BLD AUTO: 0.04 K/UL (ref 0–0.04)
IMM GRANULOCYTES NFR BLD AUTO: 0.5 % (ref 0–0.5)
LYMPHOCYTES # BLD AUTO: 3.3 K/UL (ref 1–4.8)
LYMPHOCYTES NFR BLD: 43.7 % (ref 18–48)
MCH RBC QN AUTO: 29.8 PG (ref 27–31)
MCHC RBC AUTO-ENTMCNC: 34.6 G/DL (ref 32–36)
MCV RBC AUTO: 86 FL (ref 82–98)
MONOCYTES # BLD AUTO: 0.8 K/UL (ref 0.3–1)
MONOCYTES NFR BLD: 10.8 % (ref 4–15)
NEUTROPHILS # BLD AUTO: 3.2 K/UL (ref 1.8–7.7)
NEUTROPHILS NFR BLD: 41.5 % (ref 38–73)
NRBC BLD-RTO: 0 /100 WBC
PLATELET # BLD AUTO: 370 K/UL (ref 150–450)
PMV BLD AUTO: 9.4 FL (ref 9.2–12.9)
POCT GLUCOSE: 106 MG/DL (ref 70–110)
POCT GLUCOSE: 111 MG/DL (ref 70–110)
POCT GLUCOSE: 209 MG/DL (ref 70–110)
POTASSIUM SERPL-SCNC: 4.1 MMOL/L (ref 3.5–5.1)
RBC # BLD AUTO: 3.96 M/UL (ref 4.6–6.2)
SODIUM SERPL-SCNC: 139 MMOL/L (ref 136–145)
WBC # BLD AUTO: 7.6 K/UL (ref 3.9–12.7)

## 2021-06-10 PROCEDURE — 25000003 PHARM REV CODE 250: Performed by: INTERNAL MEDICINE

## 2021-06-10 PROCEDURE — 97161 PT EVAL LOW COMPLEX 20 MIN: CPT

## 2021-06-10 PROCEDURE — 99214 PR OFFICE/OUTPT VISIT, EST, LEVL IV, 30-39 MIN: ICD-10-PCS | Mod: ,,, | Performed by: PSYCHIATRY & NEUROLOGY

## 2021-06-10 PROCEDURE — 80048 BASIC METABOLIC PNL TOTAL CA: CPT | Performed by: PHYSICIAN ASSISTANT

## 2021-06-10 PROCEDURE — 86225 DNA ANTIBODY NATIVE: CPT | Performed by: PSYCHIATRY & NEUROLOGY

## 2021-06-10 PROCEDURE — 36415 COLL VENOUS BLD VENIPUNCTURE: CPT | Performed by: PSYCHIATRY & NEUROLOGY

## 2021-06-10 PROCEDURE — 97116 GAIT TRAINING THERAPY: CPT

## 2021-06-10 PROCEDURE — 99226 PR SUBSEQUENT OBSERVATION CARE,LEVEL III: ICD-10-PCS | Mod: ,,, | Performed by: NURSE PRACTITIONER

## 2021-06-10 PROCEDURE — G0378 HOSPITAL OBSERVATION PER HR: HCPCS

## 2021-06-10 PROCEDURE — 97530 THERAPEUTIC ACTIVITIES: CPT

## 2021-06-10 PROCEDURE — S4991 NICOTINE PATCH NONLEGEND: HCPCS | Performed by: INTERNAL MEDICINE

## 2021-06-10 PROCEDURE — 99226 PR SUBSEQUENT OBSERVATION CARE,LEVEL III: CPT | Mod: ,,, | Performed by: NURSE PRACTITIONER

## 2021-06-10 PROCEDURE — 85025 COMPLETE CBC W/AUTO DIFF WBC: CPT | Performed by: PHYSICIAN ASSISTANT

## 2021-06-10 PROCEDURE — 96372 THER/PROPH/DIAG INJ SC/IM: CPT | Mod: 59

## 2021-06-10 PROCEDURE — 25000003 PHARM REV CODE 250: Performed by: NURSE PRACTITIONER

## 2021-06-10 PROCEDURE — 99214 OFFICE O/P EST MOD 30 MIN: CPT | Mod: ,,, | Performed by: PSYCHIATRY & NEUROLOGY

## 2021-06-10 RX ORDER — CHOLECALCIFEROL (VITAMIN D3) 25 MCG
4000 TABLET ORAL DAILY
Status: DISCONTINUED | OUTPATIENT
Start: 2021-06-10 | End: 2021-06-10 | Stop reason: HOSPADM

## 2021-06-10 RX ORDER — LANCETS
1 EACH MISCELLANEOUS 2 TIMES DAILY WITH MEALS
Qty: 100 EACH | Refills: 11 | Status: ON HOLD | OUTPATIENT
Start: 2021-06-10 | End: 2022-03-31 | Stop reason: HOSPADM

## 2021-06-10 RX ORDER — CHOLECALCIFEROL (VITAMIN D3) 25 MCG
4000 TABLET ORAL DAILY
Qty: 360 TABLET | Refills: 0 | Status: SHIPPED | OUTPATIENT
Start: 2021-06-11 | End: 2021-09-09

## 2021-06-10 RX ORDER — LANCING DEVICE
1 EACH MISCELLANEOUS 2 TIMES DAILY WITH MEALS
Qty: 1 EACH | Refills: 0 | Status: ON HOLD | OUTPATIENT
Start: 2021-06-10 | End: 2022-05-17 | Stop reason: SDUPTHER

## 2021-06-10 RX ORDER — INSULIN PUMP SYRINGE, 3 ML
EACH MISCELLANEOUS
Qty: 1 EACH | Refills: 0 | Status: ON HOLD | OUTPATIENT
Start: 2021-06-10 | End: 2022-05-25 | Stop reason: SDUPTHER

## 2021-06-10 RX ADMIN — INSULIN ASPART 9 UNITS: 100 INJECTION, SOLUTION INTRAVENOUS; SUBCUTANEOUS at 12:06

## 2021-06-10 RX ADMIN — NICOTINE 1 PATCH: 21 PATCH, EXTENDED RELEASE TRANSDERMAL at 08:06

## 2021-06-10 RX ADMIN — INSULIN ASPART 9 UNITS: 100 INJECTION, SOLUTION INTRAVENOUS; SUBCUTANEOUS at 08:06

## 2021-06-10 RX ADMIN — RISPERIDONE 1 MG: 0.25 TABLET ORAL at 10:06

## 2021-06-10 RX ADMIN — Medication 100 MG: at 08:06

## 2021-06-10 RX ADMIN — ASPIRIN 81 MG: 81 TABLET, COATED ORAL at 08:06

## 2021-06-10 RX ADMIN — FOLIC ACID 1 MG: 1 TABLET ORAL at 08:06

## 2021-06-10 RX ADMIN — DIVALPROEX SODIUM 1000 MG: 500 TABLET, EXTENDED RELEASE ORAL at 08:06

## 2021-06-10 RX ADMIN — LEVETIRACETAM 750 MG: 750 TABLET ORAL at 08:06

## 2021-06-10 RX ADMIN — THERA TABS 1 TABLET: TAB at 08:06

## 2021-06-10 RX ADMIN — CHOLECALCIFEROL (VITAMIN D3) 25 MCG (1,000 UNIT) TABLET 4000 UNITS: TABLET at 10:06

## 2021-06-10 RX ADMIN — CLOBAZAM 10 MG: 10 TABLET ORAL at 08:06

## 2021-06-11 ENCOUNTER — TELEPHONE (OUTPATIENT)
Dept: NEUROLOGY | Facility: CLINIC | Age: 41
End: 2021-06-11

## 2021-06-11 LAB
B BURGDOR AB SER IA-ACNC: 0.11 INDEX VALUE
DSDNA AB SER-ACNC: NORMAL [IU]/ML
HTLV I/II ANTIBODY, DONOR EVAL (BLOOD EVAL): NORMAL
METHYLMALONATE SERPL-SCNC: 0.16 UMOL/L

## 2021-06-12 LAB — SOL IL2 RECEP SERPL-MCNC: 316.7 PG/ML (ref 175.3–858.2)

## 2021-06-13 LAB — MOG-IGG1: NEGATIVE

## 2021-06-14 LAB — NMO/AQP4 FACS,S: NEGATIVE

## 2021-06-19 ENCOUNTER — HOSPITAL ENCOUNTER (EMERGENCY)
Facility: HOSPITAL | Age: 41
Discharge: HOME | End: 2021-06-19
Attending: EMERGENCY MEDICINE
Payer: MEDICAID

## 2021-06-19 VITALS
OXYGEN SATURATION: 98 % | TEMPERATURE: 98 F | SYSTOLIC BLOOD PRESSURE: 116 MMHG | DIASTOLIC BLOOD PRESSURE: 76 MMHG | HEART RATE: 82 BPM | RESPIRATION RATE: 18 BRPM

## 2021-06-19 DIAGNOSIS — F10.920 ALCOHOLIC INTOXICATION WITHOUT COMPLICATION: Primary | ICD-10-CM

## 2021-06-19 DIAGNOSIS — R42 DIZZINESS: ICD-10-CM

## 2021-06-19 LAB
ALBUMIN SERPL BCP-MCNC: 4 G/DL (ref 3.5–5.2)
ALP SERPL-CCNC: 81 U/L (ref 55–135)
ALT SERPL W/O P-5'-P-CCNC: 16 U/L (ref 10–44)
AMPHET+METHAMPHET UR QL: NEGATIVE
ANION GAP SERPL CALC-SCNC: 16 MMOL/L (ref 8–16)
AST SERPL-CCNC: 22 U/L (ref 10–40)
BACTERIA #/AREA URNS AUTO: NORMAL /HPF
BARBITURATES UR QL SCN>200 NG/ML: NEGATIVE
BASOPHILS # BLD AUTO: 0.06 K/UL (ref 0–0.2)
BASOPHILS NFR BLD: 0.8 % (ref 0–1.9)
BENZODIAZ UR QL SCN>200 NG/ML: NEGATIVE
BILIRUB SERPL-MCNC: 0.2 MG/DL (ref 0.1–1)
BILIRUB UR QL STRIP: NEGATIVE
BUN SERPL-MCNC: 12 MG/DL (ref 6–20)
BZE UR QL SCN: NEGATIVE
CALCIUM SERPL-MCNC: 10.3 MG/DL (ref 8.7–10.5)
CANNABINOIDS UR QL SCN: NEGATIVE
CHLORIDE SERPL-SCNC: 101 MMOL/L (ref 95–110)
CLARITY UR REFRACT.AUTO: CLEAR
CO2 SERPL-SCNC: 25 MMOL/L (ref 23–29)
COLOR UR AUTO: YELLOW
CREAT SERPL-MCNC: 0.7 MG/DL (ref 0.5–1.4)
CREAT UR-MCNC: 68 MG/DL (ref 23–375)
DIFFERENTIAL METHOD: ABNORMAL
EOSINOPHIL # BLD AUTO: 0.1 K/UL (ref 0–0.5)
EOSINOPHIL NFR BLD: 1.8 % (ref 0–8)
ERYTHROCYTE [DISTWIDTH] IN BLOOD BY AUTOMATED COUNT: 16.9 % (ref 11.5–14.5)
EST. GFR  (AFRICAN AMERICAN): >60 ML/MIN/1.73 M^2
EST. GFR  (NON AFRICAN AMERICAN): >60 ML/MIN/1.73 M^2
ETHANOL SERPL-MCNC: 312 MG/DL
ETHANOL UR-MCNC: 320 MG/DL
GLUCOSE SERPL-MCNC: 165 MG/DL (ref 70–110)
GLUCOSE UR QL STRIP: NEGATIVE
HCT VFR BLD AUTO: 40.1 % (ref 40–54)
HGB BLD-MCNC: 13.9 G/DL (ref 14–18)
HGB UR QL STRIP: NEGATIVE
HYALINE CASTS UR QL AUTO: 0 /LPF
IMM GRANULOCYTES # BLD AUTO: 0.03 K/UL (ref 0–0.04)
IMM GRANULOCYTES NFR BLD AUTO: 0.4 % (ref 0–0.5)
KETONES UR QL STRIP: ABNORMAL
LEUKOCYTE ESTERASE UR QL STRIP: NEGATIVE
LYMPHOCYTES # BLD AUTO: 2.5 K/UL (ref 1–4.8)
LYMPHOCYTES NFR BLD: 34.7 % (ref 18–48)
MAGNESIUM SERPL-MCNC: 1.8 MG/DL (ref 1.6–2.6)
MCH RBC QN AUTO: 29.5 PG (ref 27–31)
MCHC RBC AUTO-ENTMCNC: 34.7 G/DL (ref 32–36)
MCV RBC AUTO: 85 FL (ref 82–98)
METHADONE UR QL SCN>300 NG/ML: NEGATIVE
MICROSCOPIC COMMENT: NORMAL
MONOCYTES # BLD AUTO: 0.6 K/UL (ref 0.3–1)
MONOCYTES NFR BLD: 7.9 % (ref 4–15)
NEUTROPHILS # BLD AUTO: 3.9 K/UL (ref 1.8–7.7)
NEUTROPHILS NFR BLD: 54.4 % (ref 38–73)
NITRITE UR QL STRIP: NEGATIVE
NRBC BLD-RTO: 0 /100 WBC
OPIATES UR QL SCN: NEGATIVE
PCP UR QL SCN>25 NG/ML: NEGATIVE
PH UR STRIP: 6 [PH] (ref 5–8)
PLATELET # BLD AUTO: 319 K/UL (ref 150–450)
PMV BLD AUTO: 8.9 FL (ref 9.2–12.9)
POTASSIUM SERPL-SCNC: 4.4 MMOL/L (ref 3.5–5.1)
PROT SERPL-MCNC: 8 G/DL (ref 6–8.4)
PROT UR QL STRIP: ABNORMAL
RBC # BLD AUTO: 4.71 M/UL (ref 4.6–6.2)
RBC #/AREA URNS AUTO: 1 /HPF (ref 0–4)
SODIUM SERPL-SCNC: 142 MMOL/L (ref 136–145)
SP GR UR STRIP: 1.01 (ref 1–1.03)
SQUAMOUS #/AREA URNS AUTO: 0 /HPF
TOXICOLOGY INFORMATION: ABNORMAL
URN SPEC COLLECT METH UR: ABNORMAL
VALPROATE SERPL-MCNC: 36.5 UG/ML (ref 50–100)
WBC # BLD AUTO: 7.21 K/UL (ref 3.9–12.7)
WBC #/AREA URNS AUTO: 1 /HPF (ref 0–5)

## 2021-06-19 PROCEDURE — 80307 DRUG TEST PRSMV CHEM ANLYZR: CPT | Performed by: STUDENT IN AN ORGANIZED HEALTH CARE EDUCATION/TRAINING PROGRAM

## 2021-06-19 PROCEDURE — 81001 URINALYSIS AUTO W/SCOPE: CPT | Performed by: STUDENT IN AN ORGANIZED HEALTH CARE EDUCATION/TRAINING PROGRAM

## 2021-06-19 PROCEDURE — 93005 ELECTROCARDIOGRAM TRACING: CPT

## 2021-06-19 PROCEDURE — 83735 ASSAY OF MAGNESIUM: CPT | Performed by: STUDENT IN AN ORGANIZED HEALTH CARE EDUCATION/TRAINING PROGRAM

## 2021-06-19 PROCEDURE — 12002 RPR S/N/AX/GEN/TRNK2.6-7.5CM: CPT | Mod: ,,, | Performed by: EMERGENCY MEDICINE

## 2021-06-19 PROCEDURE — 80053 COMPREHEN METABOLIC PANEL: CPT | Performed by: STUDENT IN AN ORGANIZED HEALTH CARE EDUCATION/TRAINING PROGRAM

## 2021-06-19 PROCEDURE — 99285 PR EMERGENCY DEPT VISIT,LEVEL V: ICD-10-PCS | Mod: 25,,, | Performed by: EMERGENCY MEDICINE

## 2021-06-19 PROCEDURE — 85025 COMPLETE CBC W/AUTO DIFF WBC: CPT | Performed by: STUDENT IN AN ORGANIZED HEALTH CARE EDUCATION/TRAINING PROGRAM

## 2021-06-19 PROCEDURE — 93010 ELECTROCARDIOGRAM REPORT: CPT | Mod: ,,, | Performed by: INTERNAL MEDICINE

## 2021-06-19 PROCEDURE — 93010 EKG 12-LEAD: ICD-10-PCS | Mod: ,,, | Performed by: INTERNAL MEDICINE

## 2021-06-19 PROCEDURE — 80177 DRUG SCRN QUAN LEVETIRACETAM: CPT | Performed by: STUDENT IN AN ORGANIZED HEALTH CARE EDUCATION/TRAINING PROGRAM

## 2021-06-19 PROCEDURE — 80164 ASSAY DIPROPYLACETIC ACD TOT: CPT | Performed by: STUDENT IN AN ORGANIZED HEALTH CARE EDUCATION/TRAINING PROGRAM

## 2021-06-19 PROCEDURE — 82077 ASSAY SPEC XCP UR&BREATH IA: CPT | Performed by: STUDENT IN AN ORGANIZED HEALTH CARE EDUCATION/TRAINING PROGRAM

## 2021-06-19 PROCEDURE — 99285 EMERGENCY DEPT VISIT HI MDM: CPT | Mod: 25

## 2021-06-19 PROCEDURE — 99285 EMERGENCY DEPT VISIT HI MDM: CPT | Mod: 25,,, | Performed by: EMERGENCY MEDICINE

## 2021-06-19 PROCEDURE — 12002 PR RESUP NPTERF WND BODY 2.6-7.5 CM: ICD-10-PCS | Mod: ,,, | Performed by: EMERGENCY MEDICINE

## 2021-06-19 RX ORDER — CHLORDIAZEPOXIDE HYDROCHLORIDE 25 MG/1
25 CAPSULE, GELATIN COATED ORAL EVERY 6 HOURS PRN
Qty: 16 CAPSULE | Refills: 0 | Status: SHIPPED | OUTPATIENT
Start: 2021-06-19 | End: 2022-03-30

## 2021-06-23 LAB — LEVETIRACETAM SERPL-MCNC: <1 UG/ML (ref 3–60)

## 2021-06-24 ENCOUNTER — OFFICE VISIT (OUTPATIENT)
Dept: NEUROLOGY | Facility: CLINIC | Age: 41
End: 2021-06-24
Payer: MEDICAID

## 2021-06-24 VITALS
HEIGHT: 69 IN | SYSTOLIC BLOOD PRESSURE: 134 MMHG | BODY MASS INDEX: 17.78 KG/M2 | WEIGHT: 120.06 LBS | DIASTOLIC BLOOD PRESSURE: 85 MMHG | HEART RATE: 115 BPM

## 2021-06-24 DIAGNOSIS — R20.0 NUMBNESS OF UPPER EXTREMITY: ICD-10-CM

## 2021-06-24 DIAGNOSIS — R56.9 SEIZURE: ICD-10-CM

## 2021-06-24 DIAGNOSIS — F10.26 ALCOHOL DEPENDENCE WITH ALCOHOL-INDUCED PERSISTING AMNESTIC DISORDER: ICD-10-CM

## 2021-06-24 DIAGNOSIS — Z74.09 IMPAIRED FUNCTIONAL MOBILITY, BALANCE, GAIT, AND ENDURANCE: ICD-10-CM

## 2021-06-24 DIAGNOSIS — R41.9 NEUROCOGNITIVE DISORDER: Primary | ICD-10-CM

## 2021-06-24 DIAGNOSIS — Z91.81 RISK FOR FALLS: ICD-10-CM

## 2021-06-24 PROCEDURE — 99999 PR PBB SHADOW E&M-EST. PATIENT-LVL V: CPT | Mod: PBBFAC,,, | Performed by: PSYCHIATRY & NEUROLOGY

## 2021-06-24 PROCEDURE — 99999 PR PBB SHADOW E&M-EST. PATIENT-LVL V: ICD-10-PCS | Mod: PBBFAC,,, | Performed by: PSYCHIATRY & NEUROLOGY

## 2021-06-24 PROCEDURE — 99417 PROLNG OP E/M EACH 15 MIN: CPT | Mod: S$PBB,,, | Performed by: PSYCHIATRY & NEUROLOGY

## 2021-06-24 PROCEDURE — 99215 PR OFFICE/OUTPT VISIT, EST, LEVL V, 40-54 MIN: ICD-10-PCS | Mod: S$PBB,,, | Performed by: PSYCHIATRY & NEUROLOGY

## 2021-06-24 PROCEDURE — 99417 PR PROLONGED SVC, OUTPT, W/WO DIRECT PT CONTACT,  EA ADDTL 15 MIN: ICD-10-PCS | Mod: S$PBB,,, | Performed by: PSYCHIATRY & NEUROLOGY

## 2021-06-24 PROCEDURE — 99215 OFFICE O/P EST HI 40 MIN: CPT | Mod: PBBFAC | Performed by: PSYCHIATRY & NEUROLOGY

## 2021-06-24 PROCEDURE — 99215 OFFICE O/P EST HI 40 MIN: CPT | Mod: S$PBB,,, | Performed by: PSYCHIATRY & NEUROLOGY

## 2021-06-24 RX ORDER — DIVALPROEX SODIUM 500 MG/1
1000 TABLET, FILM COATED, EXTENDED RELEASE ORAL EVERY 12 HOURS
Qty: 120 TABLET | Refills: 5 | Status: SHIPPED | OUTPATIENT
Start: 2021-06-24 | End: 2021-07-20 | Stop reason: SDUPTHER

## 2021-06-24 RX ORDER — CLOBAZAM 10 MG/1
TABLET ORAL
Qty: 90 EACH | Refills: 5 | Status: SHIPPED | OUTPATIENT
Start: 2021-06-24 | End: 2021-07-20 | Stop reason: SDUPTHER

## 2021-06-24 RX ORDER — LEVETIRACETAM 750 MG/1
750 TABLET ORAL 2 TIMES DAILY
Qty: 60 TABLET | Refills: 5 | Status: SHIPPED | OUTPATIENT
Start: 2021-06-24 | End: 2021-07-20 | Stop reason: SDUPTHER

## 2021-06-25 ENCOUNTER — HOSPITAL ENCOUNTER (EMERGENCY)
Facility: HOSPITAL | Age: 41
Discharge: HOME | End: 2021-06-26
Attending: EMERGENCY MEDICINE
Payer: MEDICAID

## 2021-06-25 DIAGNOSIS — R42 DIZZY: Primary | ICD-10-CM

## 2021-06-25 LAB
ALBUMIN SERPL BCP-MCNC: 3.6 G/DL (ref 3.5–5.2)
ALP SERPL-CCNC: 83 U/L (ref 55–135)
ALT SERPL W/O P-5'-P-CCNC: 11 U/L (ref 10–44)
AMPHET+METHAMPHET UR QL: NEGATIVE
ANION GAP SERPL CALC-SCNC: 16 MMOL/L (ref 8–16)
AST SERPL-CCNC: 16 U/L (ref 10–40)
BACTERIA #/AREA URNS AUTO: ABNORMAL /HPF
BARBITURATES UR QL SCN>200 NG/ML: NEGATIVE
BASOPHILS # BLD AUTO: 0.04 K/UL (ref 0–0.2)
BASOPHILS NFR BLD: 0.6 % (ref 0–1.9)
BENZODIAZ UR QL SCN>200 NG/ML: NEGATIVE
BILIRUB SERPL-MCNC: 0.2 MG/DL (ref 0.1–1)
BILIRUB UR QL STRIP: NEGATIVE
BUN SERPL-MCNC: 9 MG/DL (ref 6–20)
BZE UR QL SCN: NEGATIVE
CALCIUM SERPL-MCNC: 9.1 MG/DL (ref 8.7–10.5)
CANNABINOIDS UR QL SCN: NEGATIVE
CHLORIDE SERPL-SCNC: 103 MMOL/L (ref 95–110)
CLARITY UR REFRACT.AUTO: CLEAR
CO2 SERPL-SCNC: 23 MMOL/L (ref 23–29)
COLOR UR AUTO: YELLOW
CREAT SERPL-MCNC: 0.8 MG/DL (ref 0.5–1.4)
CREAT UR-MCNC: 131 MG/DL (ref 23–375)
DIFFERENTIAL METHOD: ABNORMAL
EOSINOPHIL # BLD AUTO: 0.1 K/UL (ref 0–0.5)
EOSINOPHIL NFR BLD: 1.9 % (ref 0–8)
ERYTHROCYTE [DISTWIDTH] IN BLOOD BY AUTOMATED COUNT: 17.3 % (ref 11.5–14.5)
EST. GFR  (AFRICAN AMERICAN): >60 ML/MIN/1.73 M^2
EST. GFR  (NON AFRICAN AMERICAN): >60 ML/MIN/1.73 M^2
GLUCOSE SERPL-MCNC: 240 MG/DL (ref 70–110)
GLUCOSE UR QL STRIP: ABNORMAL
HCT VFR BLD AUTO: 35.2 % (ref 40–54)
HGB BLD-MCNC: 11.9 G/DL (ref 14–18)
HGB UR QL STRIP: NEGATIVE
HYALINE CASTS UR QL AUTO: 3 /LPF
IMM GRANULOCYTES # BLD AUTO: 0.01 K/UL (ref 0–0.04)
IMM GRANULOCYTES NFR BLD AUTO: 0.2 % (ref 0–0.5)
KETONES UR QL STRIP: ABNORMAL
LACTATE SERPL-SCNC: 2.8 MMOL/L (ref 0.5–2.2)
LEUKOCYTE ESTERASE UR QL STRIP: NEGATIVE
LYMPHOCYTES # BLD AUTO: 2.5 K/UL (ref 1–4.8)
LYMPHOCYTES NFR BLD: 40.4 % (ref 18–48)
MCH RBC QN AUTO: 28.7 PG (ref 27–31)
MCHC RBC AUTO-ENTMCNC: 33.8 G/DL (ref 32–36)
MCV RBC AUTO: 85 FL (ref 82–98)
METHADONE UR QL SCN>300 NG/ML: NEGATIVE
MICROSCOPIC COMMENT: ABNORMAL
MONOCYTES # BLD AUTO: 0.6 K/UL (ref 0.3–1)
MONOCYTES NFR BLD: 9.3 % (ref 4–15)
NEUTROPHILS # BLD AUTO: 3 K/UL (ref 1.8–7.7)
NEUTROPHILS NFR BLD: 47.6 % (ref 38–73)
NITRITE UR QL STRIP: NEGATIVE
NRBC BLD-RTO: 0 /100 WBC
OPIATES UR QL SCN: NEGATIVE
PCP UR QL SCN>25 NG/ML: NEGATIVE
PH UR STRIP: 5 [PH] (ref 5–8)
PLATELET # BLD AUTO: 214 K/UL (ref 150–450)
PMV BLD AUTO: 8.9 FL (ref 9.2–12.9)
POCT GLUCOSE: 258 MG/DL (ref 70–110)
POTASSIUM SERPL-SCNC: 3.6 MMOL/L (ref 3.5–5.1)
PROT SERPL-MCNC: 6.8 G/DL (ref 6–8.4)
PROT UR QL STRIP: ABNORMAL
RBC # BLD AUTO: 4.15 M/UL (ref 4.6–6.2)
RBC #/AREA URNS AUTO: 1 /HPF (ref 0–4)
SODIUM SERPL-SCNC: 142 MMOL/L (ref 136–145)
SP GR UR STRIP: 1.01 (ref 1–1.03)
SQUAMOUS #/AREA URNS AUTO: 1 /HPF
TOXICOLOGY INFORMATION: NORMAL
TSH SERPL DL<=0.005 MIU/L-ACNC: 0.55 UIU/ML (ref 0.4–4)
URN SPEC COLLECT METH UR: ABNORMAL
WBC # BLD AUTO: 6.24 K/UL (ref 3.9–12.7)
WBC #/AREA URNS AUTO: 2 /HPF (ref 0–5)

## 2021-06-25 PROCEDURE — 99285 EMERGENCY DEPT VISIT HI MDM: CPT | Mod: ,,, | Performed by: EMERGENCY MEDICINE

## 2021-06-25 PROCEDURE — 85025 COMPLETE CBC W/AUTO DIFF WBC: CPT | Performed by: EMERGENCY MEDICINE

## 2021-06-25 PROCEDURE — 99285 PR EMERGENCY DEPT VISIT,LEVEL V: ICD-10-PCS | Mod: ,,, | Performed by: EMERGENCY MEDICINE

## 2021-06-25 PROCEDURE — 99284 EMERGENCY DEPT VISIT MOD MDM: CPT | Mod: 25

## 2021-06-25 PROCEDURE — 80053 COMPREHEN METABOLIC PANEL: CPT | Performed by: EMERGENCY MEDICINE

## 2021-06-25 PROCEDURE — 84443 ASSAY THYROID STIM HORMONE: CPT | Performed by: EMERGENCY MEDICINE

## 2021-06-25 PROCEDURE — 93010 ELECTROCARDIOGRAM REPORT: CPT | Mod: ,,, | Performed by: INTERNAL MEDICINE

## 2021-06-25 PROCEDURE — 93010 EKG 12-LEAD: ICD-10-PCS | Mod: ,,, | Performed by: INTERNAL MEDICINE

## 2021-06-25 PROCEDURE — 83605 ASSAY OF LACTIC ACID: CPT | Performed by: EMERGENCY MEDICINE

## 2021-06-25 PROCEDURE — 81001 URINALYSIS AUTO W/SCOPE: CPT | Mod: 59 | Performed by: EMERGENCY MEDICINE

## 2021-06-25 PROCEDURE — 93005 ELECTROCARDIOGRAM TRACING: CPT

## 2021-06-25 PROCEDURE — 80307 DRUG TEST PRSMV CHEM ANLYZR: CPT | Performed by: EMERGENCY MEDICINE

## 2021-06-25 PROCEDURE — 82962 GLUCOSE BLOOD TEST: CPT

## 2021-06-25 PROCEDURE — 96360 HYDRATION IV INFUSION INIT: CPT

## 2021-06-26 VITALS
DIASTOLIC BLOOD PRESSURE: 70 MMHG | OXYGEN SATURATION: 98 % | RESPIRATION RATE: 16 BRPM | TEMPERATURE: 98 F | SYSTOLIC BLOOD PRESSURE: 112 MMHG | HEART RATE: 78 BPM

## 2021-06-28 ENCOUNTER — HOSPITAL ENCOUNTER (EMERGENCY)
Facility: HOSPITAL | Age: 41
Discharge: HOME | End: 2021-06-29
Attending: EMERGENCY MEDICINE
Payer: MEDICAID

## 2021-06-28 DIAGNOSIS — R07.89 CHEST WALL PAIN: ICD-10-CM

## 2021-06-28 DIAGNOSIS — R41.82 ALTERED MENTAL STATUS, UNSPECIFIED ALTERED MENTAL STATUS TYPE: ICD-10-CM

## 2021-06-28 DIAGNOSIS — F10.921 ACUTE ALCOHOLIC INTOXICATION WITH DELIRIUM: Primary | ICD-10-CM

## 2021-06-28 LAB
ALBUMIN SERPL BCP-MCNC: 3.6 G/DL (ref 3.5–5.2)
ALP SERPL-CCNC: 78 U/L (ref 55–135)
ALT SERPL W/O P-5'-P-CCNC: 17 U/L (ref 10–44)
ANION GAP SERPL CALC-SCNC: 18 MMOL/L (ref 8–16)
APAP SERPL-MCNC: <3 UG/ML (ref 10–20)
AST SERPL-CCNC: 26 U/L (ref 10–40)
BASOPHILS # BLD AUTO: 0.04 K/UL (ref 0–0.2)
BASOPHILS NFR BLD: 0.6 % (ref 0–1.9)
BILIRUB SERPL-MCNC: 0.2 MG/DL (ref 0.1–1)
BUN SERPL-MCNC: 6 MG/DL (ref 6–20)
CALCIUM SERPL-MCNC: 8.8 MG/DL (ref 8.7–10.5)
CHLORIDE SERPL-SCNC: 107 MMOL/L (ref 95–110)
CO2 SERPL-SCNC: 19 MMOL/L (ref 23–29)
CREAT SERPL-MCNC: 0.6 MG/DL (ref 0.5–1.4)
CTP QC/QA: YES
DIFFERENTIAL METHOD: ABNORMAL
EOSINOPHIL # BLD AUTO: 0.1 K/UL (ref 0–0.5)
EOSINOPHIL NFR BLD: 1.4 % (ref 0–8)
ERYTHROCYTE [DISTWIDTH] IN BLOOD BY AUTOMATED COUNT: 18 % (ref 11.5–14.5)
EST. GFR  (AFRICAN AMERICAN): >60 ML/MIN/1.73 M^2
EST. GFR  (NON AFRICAN AMERICAN): >60 ML/MIN/1.73 M^2
ETHANOL SERPL-MCNC: 443 MG/DL
GLUCOSE SERPL-MCNC: 135 MG/DL (ref 70–110)
GLUCOSE SERPL-MCNC: 136 MG/DL (ref 70–110)
HCT VFR BLD AUTO: 39 % (ref 40–54)
HGB BLD-MCNC: 13.7 G/DL (ref 14–18)
IMM GRANULOCYTES # BLD AUTO: 0.01 K/UL (ref 0–0.04)
IMM GRANULOCYTES NFR BLD AUTO: 0.2 % (ref 0–0.5)
LYMPHOCYTES # BLD AUTO: 2.9 K/UL (ref 1–4.8)
LYMPHOCYTES NFR BLD: 45.4 % (ref 18–48)
MAGNESIUM SERPL-MCNC: 1.6 MG/DL (ref 1.6–2.6)
MCH RBC QN AUTO: 29.9 PG (ref 27–31)
MCHC RBC AUTO-ENTMCNC: 35.1 G/DL (ref 32–36)
MCV RBC AUTO: 85 FL (ref 82–98)
MONOCYTES # BLD AUTO: 0.4 K/UL (ref 0.3–1)
MONOCYTES NFR BLD: 5.8 % (ref 4–15)
NEUTROPHILS # BLD AUTO: 3 K/UL (ref 1.8–7.7)
NEUTROPHILS NFR BLD: 46.6 % (ref 38–73)
NRBC BLD-RTO: 0 /100 WBC
PLATELET # BLD AUTO: 201 K/UL (ref 150–450)
PMV BLD AUTO: 9.4 FL (ref 9.2–12.9)
POCT GLUCOSE: 135 MG/DL (ref 70–110)
POTASSIUM SERPL-SCNC: 3.8 MMOL/L (ref 3.5–5.1)
PROT SERPL-MCNC: 7.3 G/DL (ref 6–8.4)
RBC # BLD AUTO: 4.58 M/UL (ref 4.6–6.2)
SARS-COV-2 RDRP RESP QL NAA+PROBE: NEGATIVE
SODIUM SERPL-SCNC: 144 MMOL/L (ref 136–145)
WBC # BLD AUTO: 6.39 K/UL (ref 3.9–12.7)

## 2021-06-28 PROCEDURE — U0002 COVID-19 LAB TEST NON-CDC: HCPCS | Performed by: EMERGENCY MEDICINE

## 2021-06-28 PROCEDURE — 80053 COMPREHEN METABOLIC PANEL: CPT | Performed by: EMERGENCY MEDICINE

## 2021-06-28 PROCEDURE — 93010 EKG 12-LEAD: ICD-10-PCS | Mod: ,,, | Performed by: INTERNAL MEDICINE

## 2021-06-28 PROCEDURE — 93010 ELECTROCARDIOGRAM REPORT: CPT | Mod: ,,, | Performed by: INTERNAL MEDICINE

## 2021-06-28 PROCEDURE — 83735 ASSAY OF MAGNESIUM: CPT | Performed by: EMERGENCY MEDICINE

## 2021-06-28 PROCEDURE — 99285 EMERGENCY DEPT VISIT HI MDM: CPT | Mod: 25

## 2021-06-28 PROCEDURE — 99284 PR EMERGENCY DEPT VISIT,LEVEL IV: ICD-10-PCS | Mod: CS,,, | Performed by: EMERGENCY MEDICINE

## 2021-06-28 PROCEDURE — 96365 THER/PROPH/DIAG IV INF INIT: CPT

## 2021-06-28 PROCEDURE — 99284 EMERGENCY DEPT VISIT MOD MDM: CPT | Mod: CS,,, | Performed by: EMERGENCY MEDICINE

## 2021-06-28 PROCEDURE — 80143 DRUG ASSAY ACETAMINOPHEN: CPT | Performed by: EMERGENCY MEDICINE

## 2021-06-28 PROCEDURE — 96366 THER/PROPH/DIAG IV INF ADDON: CPT

## 2021-06-28 PROCEDURE — 85025 COMPLETE CBC W/AUTO DIFF WBC: CPT | Performed by: EMERGENCY MEDICINE

## 2021-06-28 PROCEDURE — 63600175 PHARM REV CODE 636 W HCPCS: Performed by: EMERGENCY MEDICINE

## 2021-06-28 PROCEDURE — 93005 ELECTROCARDIOGRAM TRACING: CPT

## 2021-06-28 PROCEDURE — 25000003 PHARM REV CODE 250: Performed by: EMERGENCY MEDICINE

## 2021-06-28 PROCEDURE — 82077 ASSAY SPEC XCP UR&BREATH IA: CPT | Performed by: EMERGENCY MEDICINE

## 2021-06-28 PROCEDURE — 82962 GLUCOSE BLOOD TEST: CPT

## 2021-06-28 RX ADMIN — FOLIC ACID: 5 INJECTION, SOLUTION INTRAMUSCULAR; INTRAVENOUS; SUBCUTANEOUS at 09:06

## 2021-06-29 VITALS
WEIGHT: 140 LBS | HEART RATE: 70 BPM | DIASTOLIC BLOOD PRESSURE: 63 MMHG | OXYGEN SATURATION: 100 % | RESPIRATION RATE: 16 BRPM | HEIGHT: 68 IN | SYSTOLIC BLOOD PRESSURE: 105 MMHG | BODY MASS INDEX: 21.22 KG/M2 | TEMPERATURE: 98 F

## 2021-06-29 LAB
AMPHET+METHAMPHET UR QL: NEGATIVE
BARBITURATES UR QL SCN>200 NG/ML: NEGATIVE
BENZODIAZ UR QL SCN>200 NG/ML: NEGATIVE
BILIRUB UR QL STRIP: NEGATIVE
BZE UR QL SCN: NEGATIVE
CANNABINOIDS UR QL SCN: NEGATIVE
CLARITY UR REFRACT.AUTO: ABNORMAL
COLOR UR AUTO: YELLOW
CREAT UR-MCNC: 74 MG/DL (ref 23–375)
GLUCOSE UR QL STRIP: NEGATIVE
HGB UR QL STRIP: NEGATIVE
KETONES UR QL STRIP: ABNORMAL
LEUKOCYTE ESTERASE UR QL STRIP: NEGATIVE
METHADONE UR QL SCN>300 NG/ML: NEGATIVE
NITRITE UR QL STRIP: NEGATIVE
OPIATES UR QL SCN: NEGATIVE
PCP UR QL SCN>25 NG/ML: NEGATIVE
PH UR STRIP: 5 [PH] (ref 5–8)
PROT UR QL STRIP: NEGATIVE
SP GR UR STRIP: 1.01 (ref 1–1.03)
TOXICOLOGY INFORMATION: NORMAL
URN SPEC COLLECT METH UR: ABNORMAL

## 2021-06-29 PROCEDURE — 80307 DRUG TEST PRSMV CHEM ANLYZR: CPT | Performed by: EMERGENCY MEDICINE

## 2021-06-29 PROCEDURE — 96366 THER/PROPH/DIAG IV INF ADDON: CPT

## 2021-06-29 PROCEDURE — 81003 URINALYSIS AUTO W/O SCOPE: CPT | Mod: 59 | Performed by: EMERGENCY MEDICINE

## 2021-07-01 ENCOUNTER — CLINICAL SUPPORT (OUTPATIENT)
Dept: REHABILITATION | Facility: HOSPITAL | Age: 41
End: 2021-07-01
Attending: PSYCHIATRY & NEUROLOGY
Payer: MEDICAID

## 2021-07-01 DIAGNOSIS — Z91.81 RISK FOR FALLS: ICD-10-CM

## 2021-07-01 DIAGNOSIS — Z74.09 DECREASED STRENGTH, ENDURANCE, AND MOBILITY: ICD-10-CM

## 2021-07-01 DIAGNOSIS — R27.8 DECREASED COORDINATION: ICD-10-CM

## 2021-07-01 DIAGNOSIS — R68.89 DECREASED STRENGTH, ENDURANCE, AND MOBILITY: ICD-10-CM

## 2021-07-01 DIAGNOSIS — R20.0 NUMBNESS OF UPPER EXTREMITY: ICD-10-CM

## 2021-07-01 DIAGNOSIS — Z74.09 IMPAIRED FUNCTIONAL MOBILITY, BALANCE, GAIT, AND ENDURANCE: ICD-10-CM

## 2021-07-01 DIAGNOSIS — R53.1 DECREASED STRENGTH, ENDURANCE, AND MOBILITY: ICD-10-CM

## 2021-07-01 DIAGNOSIS — R29.898 DECREASED GRIP STRENGTH: ICD-10-CM

## 2021-07-01 PROCEDURE — 97162 PT EVAL MOD COMPLEX 30 MIN: CPT | Mod: PO

## 2021-07-01 PROCEDURE — 97167 OT EVAL HIGH COMPLEX 60 MIN: CPT | Mod: PO

## 2021-07-13 ENCOUNTER — PATIENT OUTREACH (OUTPATIENT)
Dept: EMERGENCY MEDICINE | Facility: HOSPITAL | Age: 41
End: 2021-07-13
Payer: MEDICAID

## 2021-07-13 ENCOUNTER — HOSPITAL ENCOUNTER (EMERGENCY)
Facility: HOSPITAL | Age: 41
Discharge: HOME | End: 2021-07-13
Attending: EMERGENCY MEDICINE
Payer: MEDICAID

## 2021-07-13 VITALS
BODY MASS INDEX: 22.9 KG/M2 | OXYGEN SATURATION: 98 % | RESPIRATION RATE: 18 BRPM | WEIGHT: 160 LBS | SYSTOLIC BLOOD PRESSURE: 150 MMHG | TEMPERATURE: 99 F | DIASTOLIC BLOOD PRESSURE: 85 MMHG | HEIGHT: 70 IN | HEART RATE: 70 BPM

## 2021-07-13 DIAGNOSIS — R73.9 HYPERGLYCEMIA: ICD-10-CM

## 2021-07-13 LAB
ALBUMIN SERPL BCP-MCNC: 3.7 G/DL (ref 3.5–5.2)
ALLENS TEST: ABNORMAL
ALP SERPL-CCNC: 139 U/L (ref 55–135)
ALT SERPL W/O P-5'-P-CCNC: 45 U/L (ref 10–44)
ANION GAP SERPL CALC-SCNC: 9 MMOL/L (ref 8–16)
AST SERPL-CCNC: 16 U/L (ref 10–40)
B-OH-BUTYR BLD STRIP-SCNC: 0.1 MMOL/L (ref 0–0.5)
BACTERIA #/AREA URNS AUTO: NORMAL /HPF
BASOPHILS # BLD AUTO: 0.06 K/UL (ref 0–0.2)
BASOPHILS NFR BLD: 0.6 % (ref 0–1.9)
BILIRUB SERPL-MCNC: 0.5 MG/DL (ref 0.1–1)
BILIRUB UR QL STRIP: NEGATIVE
BUN SERPL-MCNC: 8 MG/DL (ref 6–20)
CALCIUM SERPL-MCNC: 9.2 MG/DL (ref 8.7–10.5)
CHLORIDE SERPL-SCNC: 99 MMOL/L (ref 95–110)
CLARITY UR REFRACT.AUTO: CLEAR
CO2 SERPL-SCNC: 26 MMOL/L (ref 23–29)
COLOR UR AUTO: ABNORMAL
CREAT SERPL-MCNC: 0.9 MG/DL (ref 0.5–1.4)
DELSYS: ABNORMAL
DIFFERENTIAL METHOD: ABNORMAL
EOSINOPHIL # BLD AUTO: 0.1 K/UL (ref 0–0.5)
EOSINOPHIL NFR BLD: 0.6 % (ref 0–8)
ERYTHROCYTE [DISTWIDTH] IN BLOOD BY AUTOMATED COUNT: 17.6 % (ref 11.5–14.5)
EST. GFR  (AFRICAN AMERICAN): >60 ML/MIN/1.73 M^2
EST. GFR  (NON AFRICAN AMERICAN): >60 ML/MIN/1.73 M^2
ESTIMATED AVG GLUCOSE: 192 MG/DL (ref 68–131)
GLUCOSE SERPL-MCNC: 449 MG/DL (ref 70–110)
GLUCOSE UR QL STRIP: ABNORMAL
HBA1C MFR BLD: 8.3 % (ref 4–5.6)
HCO3 UR-SCNC: 31.3 MMOL/L (ref 24–28)
HCT VFR BLD AUTO: 34.7 % (ref 40–54)
HGB BLD-MCNC: 12.1 G/DL (ref 14–18)
HGB UR QL STRIP: NEGATIVE
IMM GRANULOCYTES # BLD AUTO: 0.05 K/UL (ref 0–0.04)
IMM GRANULOCYTES NFR BLD AUTO: 0.5 % (ref 0–0.5)
KETONES UR QL STRIP: NEGATIVE
LEUKOCYTE ESTERASE UR QL STRIP: NEGATIVE
LYMPHOCYTES # BLD AUTO: 2.3 K/UL (ref 1–4.8)
LYMPHOCYTES NFR BLD: 21.3 % (ref 18–48)
MCH RBC QN AUTO: 30.6 PG (ref 27–31)
MCHC RBC AUTO-ENTMCNC: 34.9 G/DL (ref 32–36)
MCV RBC AUTO: 88 FL (ref 82–98)
MICROSCOPIC COMMENT: NORMAL
MODE: ABNORMAL
MONOCYTES # BLD AUTO: 1.2 K/UL (ref 0.3–1)
MONOCYTES NFR BLD: 11.3 % (ref 4–15)
NEUTROPHILS # BLD AUTO: 7.1 K/UL (ref 1.8–7.7)
NEUTROPHILS NFR BLD: 65.7 % (ref 38–73)
NITRITE UR QL STRIP: NEGATIVE
NRBC BLD-RTO: 0 /100 WBC
PCO2 BLDA: 55.3 MMHG (ref 35–45)
PH SMN: 7.36 [PH] (ref 7.35–7.45)
PH UR STRIP: 7 [PH] (ref 5–8)
PLATELET # BLD AUTO: 277 K/UL (ref 150–450)
PMV BLD AUTO: 9.4 FL (ref 9.2–12.9)
PO2 BLDA: 23 MMHG (ref 40–60)
POC BE: 6 MMOL/L
POC SATURATED O2: 37 % (ref 95–100)
POC TCO2: 33 MMOL/L (ref 24–29)
POCT GLUCOSE: 376 MG/DL (ref 70–110)
POCT GLUCOSE: 452 MG/DL (ref 70–110)
POCT GLUCOSE: >500 MG/DL (ref 70–110)
POTASSIUM SERPL-SCNC: 3.7 MMOL/L (ref 3.5–5.1)
PROT SERPL-MCNC: 7.1 G/DL (ref 6–8.4)
PROT UR QL STRIP: NEGATIVE
RBC # BLD AUTO: 3.96 M/UL (ref 4.6–6.2)
SAMPLE: ABNORMAL
SITE: ABNORMAL
SODIUM SERPL-SCNC: 134 MMOL/L (ref 136–145)
SP GR UR STRIP: 1.01 (ref 1–1.03)
URN SPEC COLLECT METH UR: ABNORMAL
WBC # BLD AUTO: 10.85 K/UL (ref 3.9–12.7)
YEAST UR QL AUTO: NORMAL

## 2021-07-13 PROCEDURE — 96372 THER/PROPH/DIAG INJ SC/IM: CPT | Mod: 59

## 2021-07-13 PROCEDURE — 99285 EMERGENCY DEPT VISIT HI MDM: CPT | Mod: ,,, | Performed by: EMERGENCY MEDICINE

## 2021-07-13 PROCEDURE — 82010 KETONE BODYS QUAN: CPT | Performed by: STUDENT IN AN ORGANIZED HEALTH CARE EDUCATION/TRAINING PROGRAM

## 2021-07-13 PROCEDURE — 93005 ELECTROCARDIOGRAM TRACING: CPT

## 2021-07-13 PROCEDURE — 93010 EKG 12-LEAD: ICD-10-PCS | Mod: ,,, | Performed by: INTERNAL MEDICINE

## 2021-07-13 PROCEDURE — 81001 URINALYSIS AUTO W/SCOPE: CPT | Performed by: STUDENT IN AN ORGANIZED HEALTH CARE EDUCATION/TRAINING PROGRAM

## 2021-07-13 PROCEDURE — 63600175 PHARM REV CODE 636 W HCPCS: Performed by: STUDENT IN AN ORGANIZED HEALTH CARE EDUCATION/TRAINING PROGRAM

## 2021-07-13 PROCEDURE — 99900035 HC TECH TIME PER 15 MIN (STAT)

## 2021-07-13 PROCEDURE — 30200315 PPD INTRADERMAL TEST REV CODE 302: Performed by: EMERGENCY MEDICINE

## 2021-07-13 PROCEDURE — 99285 PR EMERGENCY DEPT VISIT,LEVEL V: ICD-10-PCS | Mod: ,,, | Performed by: EMERGENCY MEDICINE

## 2021-07-13 PROCEDURE — 85025 COMPLETE CBC W/AUTO DIFF WBC: CPT | Performed by: STUDENT IN AN ORGANIZED HEALTH CARE EDUCATION/TRAINING PROGRAM

## 2021-07-13 PROCEDURE — 96361 HYDRATE IV INFUSION ADD-ON: CPT

## 2021-07-13 PROCEDURE — 99285 EMERGENCY DEPT VISIT HI MDM: CPT | Mod: 25

## 2021-07-13 PROCEDURE — 94761 N-INVAS EAR/PLS OXIMETRY MLT: CPT

## 2021-07-13 PROCEDURE — 96360 HYDRATION IV INFUSION INIT: CPT

## 2021-07-13 PROCEDURE — 82962 GLUCOSE BLOOD TEST: CPT

## 2021-07-13 PROCEDURE — 80053 COMPREHEN METABOLIC PANEL: CPT | Performed by: STUDENT IN AN ORGANIZED HEALTH CARE EDUCATION/TRAINING PROGRAM

## 2021-07-13 PROCEDURE — 93010 ELECTROCARDIOGRAM REPORT: CPT | Mod: ,,, | Performed by: INTERNAL MEDICINE

## 2021-07-13 PROCEDURE — 86580 TB INTRADERMAL TEST: CPT | Performed by: EMERGENCY MEDICINE

## 2021-07-13 PROCEDURE — 83036 HEMOGLOBIN GLYCOSYLATED A1C: CPT | Performed by: STUDENT IN AN ORGANIZED HEALTH CARE EDUCATION/TRAINING PROGRAM

## 2021-07-13 PROCEDURE — 82803 BLOOD GASES ANY COMBINATION: CPT

## 2021-07-13 RX ORDER — INSULIN ASPART 100 [IU]/ML
25 INJECTION, SOLUTION INTRAVENOUS; SUBCUTANEOUS EVERY MORNING
Qty: 7.5 ML | Refills: 11 | Status: SHIPPED | OUTPATIENT
Start: 2021-07-13 | End: 2021-07-13 | Stop reason: SDUPTHER

## 2021-07-13 RX ORDER — INSULIN ASPART 100 [IU]/ML
15 INJECTION, SOLUTION INTRAVENOUS; SUBCUTANEOUS NIGHTLY
Qty: 4.5 ML | Refills: 11 | Status: SHIPPED | OUTPATIENT
Start: 2021-07-13 | End: 2021-07-13 | Stop reason: SDUPTHER

## 2021-07-13 RX ORDER — INSULIN ASPART 100 [IU]/ML
25 INJECTION, SOLUTION INTRAVENOUS; SUBCUTANEOUS EVERY MORNING
Qty: 7.5 ML | Refills: 11 | Status: SHIPPED | OUTPATIENT
Start: 2021-07-13 | End: 2022-03-30

## 2021-07-13 RX ORDER — INSULIN ASPART 100 [IU]/ML
15 INJECTION, SOLUTION INTRAVENOUS; SUBCUTANEOUS
Status: COMPLETED | OUTPATIENT
Start: 2021-07-13 | End: 2021-07-13

## 2021-07-13 RX ORDER — INSULIN ASPART 100 [IU]/ML
15 INJECTION, SOLUTION INTRAVENOUS; SUBCUTANEOUS NIGHTLY
Qty: 4.5 ML | Refills: 11 | Status: SHIPPED | OUTPATIENT
Start: 2021-07-13 | End: 2022-03-30

## 2021-07-13 RX ADMIN — TUBERCULIN PURIFIED PROTEIN DERIVATIVE 5 UNITS: 5 INJECTION, SOLUTION INTRADERMAL at 09:07

## 2021-07-13 RX ADMIN — INSULIN ASPART 15 UNITS: 100 INJECTION, SOLUTION INTRAVENOUS; SUBCUTANEOUS at 05:07

## 2021-07-20 ENCOUNTER — HOSPITAL ENCOUNTER (EMERGENCY)
Facility: HOSPITAL | Age: 41
Discharge: HOME | End: 2021-07-20
Attending: EMERGENCY MEDICINE
Payer: MEDICAID

## 2021-07-20 ENCOUNTER — PATIENT OUTREACH (OUTPATIENT)
Dept: EMERGENCY MEDICINE | Facility: HOSPITAL | Age: 41
End: 2021-07-20
Payer: MEDICAID

## 2021-07-20 VITALS
DIASTOLIC BLOOD PRESSURE: 94 MMHG | TEMPERATURE: 99 F | HEART RATE: 99 BPM | OXYGEN SATURATION: 98 % | SYSTOLIC BLOOD PRESSURE: 136 MMHG | RESPIRATION RATE: 16 BRPM

## 2021-07-20 DIAGNOSIS — R73.9 HYPERGLYCEMIA: ICD-10-CM

## 2021-07-20 DIAGNOSIS — E83.42 HYPOMAGNESEMIA: ICD-10-CM

## 2021-07-20 DIAGNOSIS — R56.9 SEIZURE: Primary | ICD-10-CM

## 2021-07-20 LAB
ALBUMIN SERPL BCP-MCNC: 3.8 G/DL (ref 3.5–5.2)
ALLENS TEST: ABNORMAL
ALP SERPL-CCNC: 98 U/L (ref 55–135)
ALT SERPL W/O P-5'-P-CCNC: 48 U/L (ref 10–44)
AMPHET+METHAMPHET UR QL: NEGATIVE
ANION GAP SERPL CALC-SCNC: 13 MMOL/L (ref 8–16)
AST SERPL-CCNC: 44 U/L (ref 10–40)
B-OH-BUTYR BLD STRIP-SCNC: 1.4 MMOL/L (ref 0–0.5)
BACTERIA #/AREA URNS AUTO: ABNORMAL /HPF
BARBITURATES UR QL SCN>200 NG/ML: NEGATIVE
BASOPHILS # BLD AUTO: 0.07 K/UL (ref 0–0.2)
BASOPHILS NFR BLD: 0.6 % (ref 0–1.9)
BENZODIAZ UR QL SCN>200 NG/ML: NEGATIVE
BILIRUB SERPL-MCNC: 0.5 MG/DL (ref 0.1–1)
BILIRUB UR QL STRIP: NEGATIVE
BUN SERPL-MCNC: 8 MG/DL (ref 6–20)
BZE UR QL SCN: NEGATIVE
CALCIUM SERPL-MCNC: 10 MG/DL (ref 8.7–10.5)
CANNABINOIDS UR QL SCN: NEGATIVE
CHLORIDE SERPL-SCNC: 97 MMOL/L (ref 95–110)
CLARITY UR REFRACT.AUTO: ABNORMAL
CO2 SERPL-SCNC: 26 MMOL/L (ref 23–29)
COLOR UR AUTO: YELLOW
CREAT SERPL-MCNC: 0.8 MG/DL (ref 0.5–1.4)
CREAT UR-MCNC: 43 MG/DL (ref 23–375)
DIFFERENTIAL METHOD: ABNORMAL
EOSINOPHIL # BLD AUTO: 0 K/UL (ref 0–0.5)
EOSINOPHIL NFR BLD: 0.1 % (ref 0–8)
ERYTHROCYTE [DISTWIDTH] IN BLOOD BY AUTOMATED COUNT: 17.6 % (ref 11.5–14.5)
EST. GFR  (AFRICAN AMERICAN): >60 ML/MIN/1.73 M^2
EST. GFR  (NON AFRICAN AMERICAN): >60 ML/MIN/1.73 M^2
ETHANOL SERPL-MCNC: <10 MG/DL
GLUCOSE SERPL-MCNC: 328 MG/DL (ref 70–110)
GLUCOSE UR QL STRIP: ABNORMAL
HCO3 UR-SCNC: 30.2 MMOL/L (ref 24–28)
HCT VFR BLD AUTO: 35.4 % (ref 40–54)
HGB BLD-MCNC: 12.2 G/DL (ref 14–18)
HGB UR QL STRIP: ABNORMAL
HYALINE CASTS UR QL AUTO: 1 /LPF
IMM GRANULOCYTES # BLD AUTO: 0.16 K/UL (ref 0–0.04)
IMM GRANULOCYTES NFR BLD AUTO: 1.4 % (ref 0–0.5)
KETONES UR QL STRIP: ABNORMAL
LEUKOCYTE ESTERASE UR QL STRIP: NEGATIVE
LYMPHOCYTES # BLD AUTO: 0.8 K/UL (ref 1–4.8)
LYMPHOCYTES NFR BLD: 6.7 % (ref 18–48)
MAGNESIUM SERPL-MCNC: 1.4 MG/DL (ref 1.6–2.6)
MCH RBC QN AUTO: 29.9 PG (ref 27–31)
MCHC RBC AUTO-ENTMCNC: 34.5 G/DL (ref 32–36)
MCV RBC AUTO: 87 FL (ref 82–98)
METHADONE UR QL SCN>300 NG/ML: NEGATIVE
MICROSCOPIC COMMENT: ABNORMAL
MONOCYTES # BLD AUTO: 2 K/UL (ref 0.3–1)
MONOCYTES NFR BLD: 18 % (ref 4–15)
NEUTROPHILS # BLD AUTO: 8.2 K/UL (ref 1.8–7.7)
NEUTROPHILS NFR BLD: 73.2 % (ref 38–73)
NITRITE UR QL STRIP: NEGATIVE
NRBC BLD-RTO: 0 /100 WBC
OPIATES UR QL SCN: NEGATIVE
PCO2 BLDA: 46.8 MMHG (ref 35–45)
PCP UR QL SCN>25 NG/ML: NEGATIVE
PH SMN: 7.42 [PH] (ref 7.35–7.45)
PH UR STRIP: 5 [PH] (ref 5–8)
PLATELET # BLD AUTO: 399 K/UL (ref 150–450)
PLATELET BLD QL SMEAR: ABNORMAL
PMV BLD AUTO: 8.6 FL (ref 9.2–12.9)
PO2 BLDA: 28 MMHG (ref 40–60)
POC BE: 6 MMOL/L
POC SATURATED O2: 52 % (ref 95–100)
POC TCO2: 32 MMOL/L (ref 24–29)
POCT GLUCOSE: 348 MG/DL (ref 70–110)
POCT GLUCOSE: 349 MG/DL (ref 70–110)
POTASSIUM SERPL-SCNC: 4 MMOL/L (ref 3.5–5.1)
PROT SERPL-MCNC: 7.4 G/DL (ref 6–8.4)
PROT UR QL STRIP: ABNORMAL
RBC # BLD AUTO: 4.08 M/UL (ref 4.6–6.2)
RBC #/AREA URNS AUTO: 4 /HPF (ref 0–4)
SAMPLE: ABNORMAL
SITE: ABNORMAL
SODIUM SERPL-SCNC: 136 MMOL/L (ref 136–145)
SP GR UR STRIP: 1.01 (ref 1–1.03)
TOXICOLOGY INFORMATION: NORMAL
URN SPEC COLLECT METH UR: ABNORMAL
VALPROATE SERPL-MCNC: <12.5 UG/ML (ref 50–100)
WBC # BLD AUTO: 11.21 K/UL (ref 3.9–12.7)
WBC #/AREA URNS AUTO: 14 /HPF (ref 0–5)
YEAST UR QL AUTO: ABNORMAL

## 2021-07-20 PROCEDURE — 93010 EKG 12-LEAD: ICD-10-PCS | Mod: ,,, | Performed by: INTERNAL MEDICINE

## 2021-07-20 PROCEDURE — 83735 ASSAY OF MAGNESIUM: CPT

## 2021-07-20 PROCEDURE — 80053 COMPREHEN METABOLIC PANEL: CPT

## 2021-07-20 PROCEDURE — 25000003 PHARM REV CODE 250: Performed by: EMERGENCY MEDICINE

## 2021-07-20 PROCEDURE — 82010 KETONE BODYS QUAN: CPT

## 2021-07-20 PROCEDURE — 80307 DRUG TEST PRSMV CHEM ANLYZR: CPT

## 2021-07-20 PROCEDURE — 81001 URINALYSIS AUTO W/SCOPE: CPT

## 2021-07-20 PROCEDURE — 99900035 HC TECH TIME PER 15 MIN (STAT)

## 2021-07-20 PROCEDURE — 99291 CRITICAL CARE FIRST HOUR: CPT | Mod: ,,, | Performed by: EMERGENCY MEDICINE

## 2021-07-20 PROCEDURE — 63600175 PHARM REV CODE 636 W HCPCS

## 2021-07-20 PROCEDURE — 96365 THER/PROPH/DIAG IV INF INIT: CPT

## 2021-07-20 PROCEDURE — 80177 DRUG SCRN QUAN LEVETIRACETAM: CPT

## 2021-07-20 PROCEDURE — 80164 ASSAY DIPROPYLACETIC ACD TOT: CPT

## 2021-07-20 PROCEDURE — 93010 ELECTROCARDIOGRAM REPORT: CPT | Mod: ,,, | Performed by: INTERNAL MEDICINE

## 2021-07-20 PROCEDURE — 85025 COMPLETE CBC W/AUTO DIFF WBC: CPT

## 2021-07-20 PROCEDURE — 25000003 PHARM REV CODE 250

## 2021-07-20 PROCEDURE — 87086 URINE CULTURE/COLONY COUNT: CPT

## 2021-07-20 PROCEDURE — 82077 ASSAY SPEC XCP UR&BREATH IA: CPT

## 2021-07-20 PROCEDURE — 93005 ELECTROCARDIOGRAM TRACING: CPT

## 2021-07-20 PROCEDURE — 96367 TX/PROPH/DG ADDL SEQ IV INF: CPT | Mod: 59

## 2021-07-20 PROCEDURE — 99291 CRITICAL CARE FIRST HOUR: CPT

## 2021-07-20 PROCEDURE — 63600175 PHARM REV CODE 636 W HCPCS: Performed by: EMERGENCY MEDICINE

## 2021-07-20 PROCEDURE — 82803 BLOOD GASES ANY COMBINATION: CPT

## 2021-07-20 PROCEDURE — 99291 PR CRITICAL CARE, E/M 30-74 MINUTES: ICD-10-PCS | Mod: ,,, | Performed by: EMERGENCY MEDICINE

## 2021-07-20 PROCEDURE — 96366 THER/PROPH/DIAG IV INF ADDON: CPT

## 2021-07-20 PROCEDURE — 96375 TX/PRO/DX INJ NEW DRUG ADDON: CPT

## 2021-07-20 PROCEDURE — 82962 GLUCOSE BLOOD TEST: CPT

## 2021-07-20 RX ORDER — RISPERIDONE 1 MG/1
1 TABLET ORAL 3 TIMES DAILY
Qty: 90 TABLET | Refills: 11
Start: 2021-07-20 | End: 2022-03-30

## 2021-07-20 RX ORDER — DIVALPROEX SODIUM 500 MG/1
1000 TABLET, FILM COATED, EXTENDED RELEASE ORAL EVERY 12 HOURS
Qty: 120 TABLET | Refills: 11 | Status: ON HOLD | OUTPATIENT
Start: 2021-07-20 | End: 2022-03-31 | Stop reason: SDUPTHER

## 2021-07-20 RX ORDER — LEVETIRACETAM 750 MG/1
750 TABLET ORAL 2 TIMES DAILY
Qty: 60 TABLET | Refills: 5 | Status: SHIPPED | OUTPATIENT
Start: 2021-07-20 | End: 2021-10-06 | Stop reason: SDUPTHER

## 2021-07-20 RX ORDER — MAGNESIUM SULFATE 1 G/100ML
1 INJECTION INTRAVENOUS ONCE
Status: COMPLETED | OUTPATIENT
Start: 2021-07-20 | End: 2021-07-20

## 2021-07-20 RX ORDER — LORAZEPAM 2 MG/ML
1 INJECTION INTRAMUSCULAR
Status: COMPLETED | OUTPATIENT
Start: 2021-07-20 | End: 2021-07-20

## 2021-07-20 RX ORDER — CLOBAZAM 10 MG/1
TABLET ORAL
Qty: 90 EACH | Refills: 5 | Status: SHIPPED | OUTPATIENT
Start: 2021-07-20 | End: 2022-03-30

## 2021-07-20 RX ORDER — BLOOD-GLUCOSE CONTROL, NORMAL
1 EACH MISCELLANEOUS 2 TIMES DAILY WITH MEALS
Qty: 100 EACH | Refills: 11 | Status: ON HOLD | OUTPATIENT
Start: 2021-07-20 | End: 2022-05-25 | Stop reason: SDUPTHER

## 2021-07-20 RX ORDER — LEVETIRACETAM 10 MG/ML
1000 INJECTION INTRAVASCULAR
Status: COMPLETED | OUTPATIENT
Start: 2021-07-20 | End: 2021-07-20

## 2021-07-20 RX ORDER — INSULIN ASPART 100 [IU]/ML
25 INJECTION, SUSPENSION SUBCUTANEOUS
Qty: 22.5 ML | Refills: 3 | Status: SHIPPED | OUTPATIENT
Start: 2021-07-20 | End: 2022-03-30

## 2021-07-20 RX ADMIN — LORAZEPAM 1 MG: 2 INJECTION INTRAMUSCULAR; INTRAVENOUS at 10:07

## 2021-07-20 RX ADMIN — MAGNESIUM SULFATE HEPTAHYDRATE 1 G: 500 INJECTION, SOLUTION INTRAMUSCULAR; INTRAVENOUS at 12:07

## 2021-07-20 RX ADMIN — LEVETIRACETAM INJECTION 1000 MG: 10 INJECTION INTRAVENOUS at 02:07

## 2021-07-20 RX ADMIN — FOLIC ACID: 5 INJECTION, SOLUTION INTRAMUSCULAR; INTRAVENOUS; SUBCUTANEOUS at 10:07

## 2021-07-21 LAB
BACTERIA UR CULT: NORMAL
BACTERIA UR CULT: NORMAL

## 2021-07-23 LAB — LEVETIRACETAM SERPL-MCNC: <1 UG/ML (ref 3–60)

## 2021-07-26 ENCOUNTER — TELEPHONE (OUTPATIENT)
Dept: REHABILITATION | Facility: HOSPITAL | Age: 41
End: 2021-07-26

## 2021-08-02 ENCOUNTER — DOCUMENTATION ONLY (OUTPATIENT)
Dept: REHABILITATION | Facility: HOSPITAL | Age: 41
End: 2021-08-02

## 2021-08-10 ENCOUNTER — PATIENT OUTREACH (OUTPATIENT)
Dept: EMERGENCY MEDICINE | Facility: HOSPITAL | Age: 41
End: 2021-08-10

## 2021-08-19 ENCOUNTER — DOCUMENTATION ONLY (OUTPATIENT)
Dept: REHABILITATION | Facility: HOSPITAL | Age: 41
End: 2021-08-19

## 2021-08-23 ENCOUNTER — TELEPHONE (OUTPATIENT)
Dept: REHABILITATION | Facility: HOSPITAL | Age: 41
End: 2021-08-23

## 2021-09-04 ENCOUNTER — PATIENT MESSAGE (OUTPATIENT)
Dept: NEUROLOGY | Facility: CLINIC | Age: 41
End: 2021-09-04

## 2021-10-06 ENCOUNTER — OFFICE VISIT (OUTPATIENT)
Dept: NEUROLOGY | Facility: CLINIC | Age: 41
End: 2021-10-06
Payer: MEDICAID

## 2021-10-06 DIAGNOSIS — R93.7 ABNORMAL MRI, CERVICAL SPINE: ICD-10-CM

## 2021-10-06 DIAGNOSIS — R91.1 SOLITARY PULMONARY NODULE: ICD-10-CM

## 2021-10-06 DIAGNOSIS — R90.82 WHITE MATTER ABNORMALITY ON MRI OF BRAIN: Primary | ICD-10-CM

## 2021-10-06 DIAGNOSIS — R26.89 ABNORMALITY OF GAIT DUE TO IMPAIRMENT OF BALANCE: ICD-10-CM

## 2021-10-06 DIAGNOSIS — G40.909 SEIZURE DISORDER: ICD-10-CM

## 2021-10-06 DIAGNOSIS — Z79.4 TYPE 2 DIABETES MELLITUS WITH OTHER SPECIFIED COMPLICATION, WITH LONG-TERM CURRENT USE OF INSULIN: ICD-10-CM

## 2021-10-06 DIAGNOSIS — H53.8 BLURRED VISION, BILATERAL: ICD-10-CM

## 2021-10-06 DIAGNOSIS — R56.9 SEIZURE: ICD-10-CM

## 2021-10-06 DIAGNOSIS — E11.69 TYPE 2 DIABETES MELLITUS WITH OTHER SPECIFIED COMPLICATION, WITH LONG-TERM CURRENT USE OF INSULIN: ICD-10-CM

## 2021-10-06 DIAGNOSIS — F10.151: ICD-10-CM

## 2021-10-06 PROCEDURE — 99215 PR OFFICE/OUTPT VISIT, EST, LEVL V, 40-54 MIN: ICD-10-PCS | Mod: 95,,, | Performed by: PSYCHIATRY & NEUROLOGY

## 2021-10-06 PROCEDURE — 99215 OFFICE O/P EST HI 40 MIN: CPT | Mod: 95,,, | Performed by: PSYCHIATRY & NEUROLOGY

## 2021-10-06 RX ORDER — LEVETIRACETAM 750 MG/1
750 TABLET ORAL 2 TIMES DAILY
Qty: 60 TABLET | Refills: 5 | Status: ON HOLD | OUTPATIENT
Start: 2021-10-06 | End: 2022-03-31

## 2021-10-12 ENCOUNTER — DOCUMENTATION ONLY (OUTPATIENT)
Dept: REHABILITATION | Facility: HOSPITAL | Age: 41
End: 2021-10-12

## 2021-10-14 ENCOUNTER — TELEPHONE (OUTPATIENT)
Dept: NEUROLOGY | Facility: CLINIC | Age: 41
End: 2021-10-14

## 2021-10-27 ENCOUNTER — HOSPITAL ENCOUNTER (OUTPATIENT)
Dept: RADIOLOGY | Facility: HOSPITAL | Age: 41
Discharge: HOME | End: 2021-10-27
Attending: PSYCHIATRY & NEUROLOGY
Payer: MEDICAID

## 2021-10-27 ENCOUNTER — HOSPITAL ENCOUNTER (OUTPATIENT)
Dept: NEUROLOGY | Facility: CLINIC | Age: 41
Discharge: HOME | End: 2021-10-27
Payer: MEDICAID

## 2021-10-27 ENCOUNTER — PATIENT MESSAGE (OUTPATIENT)
Dept: NEUROLOGY | Facility: CLINIC | Age: 41
End: 2021-10-27
Payer: MEDICAID

## 2021-10-27 DIAGNOSIS — R91.1 SOLITARY PULMONARY NODULE: ICD-10-CM

## 2021-10-27 DIAGNOSIS — R90.82 WHITE MATTER ABNORMALITY ON MRI OF BRAIN: ICD-10-CM

## 2021-10-27 DIAGNOSIS — R26.89 ABNORMALITY OF GAIT DUE TO IMPAIRMENT OF BALANCE: ICD-10-CM

## 2021-10-27 DIAGNOSIS — R93.7 ABNORMAL MRI, CERVICAL SPINE: ICD-10-CM

## 2021-10-27 DIAGNOSIS — G40.909 SEIZURE DISORDER: ICD-10-CM

## 2021-10-27 DIAGNOSIS — R56.9 SEIZURE: ICD-10-CM

## 2021-10-27 PROCEDURE — 72157 MRI CHEST SPINE W/O & W/DYE: CPT | Mod: TC

## 2021-10-27 PROCEDURE — 72157 MRI CHEST SPINE W/O & W/DYE: CPT | Mod: 26,,, | Performed by: RADIOLOGY

## 2021-10-27 PROCEDURE — A9585 GADOBUTROL INJECTION: HCPCS | Performed by: PSYCHIATRY & NEUROLOGY

## 2021-10-27 PROCEDURE — 95816 PR EEG,W/AWAKE & DROWSY RECORD: ICD-10-PCS | Mod: 26,S$PBB,, | Performed by: PSYCHIATRY & NEUROLOGY

## 2021-10-27 PROCEDURE — 25500020 PHARM REV CODE 255: Performed by: PSYCHIATRY & NEUROLOGY

## 2021-10-27 PROCEDURE — 72157 MRI THORACIC SPINE DEMYELINATING W W/O CONTRAST: ICD-10-PCS | Mod: 26,,, | Performed by: RADIOLOGY

## 2021-10-27 PROCEDURE — 71250 CT CHEST WITHOUT CONTRAST: ICD-10-PCS | Mod: 26,,, | Performed by: RADIOLOGY

## 2021-10-27 PROCEDURE — 70553 MRI BRAIN STEM W/O & W/DYE: CPT | Mod: TC

## 2021-10-27 PROCEDURE — 95816 EEG AWAKE AND DROWSY: CPT | Mod: 26,S$PBB,, | Performed by: PSYCHIATRY & NEUROLOGY

## 2021-10-27 PROCEDURE — 71250 CT THORAX DX C-: CPT | Mod: TC

## 2021-10-27 PROCEDURE — 71250 CT THORAX DX C-: CPT | Mod: 26,,, | Performed by: RADIOLOGY

## 2021-10-27 PROCEDURE — 72156 MRI NECK SPINE W/O & W/DYE: CPT | Mod: TC

## 2021-10-27 PROCEDURE — 70553 MRI BRAIN STEM W/O & W/DYE: CPT | Mod: 26,,, | Performed by: RADIOLOGY

## 2021-10-27 PROCEDURE — 72156 MRI NECK SPINE W/O & W/DYE: CPT | Mod: 26,,, | Performed by: RADIOLOGY

## 2021-10-27 PROCEDURE — 95816 EEG AWAKE AND DROWSY: CPT | Mod: PBBFAC | Performed by: PSYCHIATRY & NEUROLOGY

## 2021-10-27 PROCEDURE — 72156 MRI CERVICAL SPINE DEMYELINATING W W/O CONTRAST: ICD-10-PCS | Mod: 26,,, | Performed by: RADIOLOGY

## 2021-10-27 PROCEDURE — 70553 MRI BRAIN DEMYELINATING W/ WO CONTRAST: ICD-10-PCS | Mod: 26,,, | Performed by: RADIOLOGY

## 2021-10-27 RX ORDER — GADOBUTROL 604.72 MG/ML
8 INJECTION INTRAVENOUS
Status: COMPLETED | OUTPATIENT
Start: 2021-10-27 | End: 2021-10-27

## 2021-10-27 RX ADMIN — GADOBUTROL 8 ML: 604.72 INJECTION INTRAVENOUS at 03:10

## 2021-12-22 ENCOUNTER — TELEPHONE (OUTPATIENT)
Dept: NEUROLOGY | Facility: CLINIC | Age: 41
End: 2021-12-22
Payer: MEDICAID

## 2021-12-27 ENCOUNTER — DOCUMENTATION ONLY (OUTPATIENT)
Dept: NEUROLOGY | Facility: CLINIC | Age: 41
End: 2021-12-27
Payer: MEDICAID

## 2022-02-22 ENCOUNTER — LAB VISIT (OUTPATIENT)
Dept: LAB | Facility: HOSPITAL | Age: 42
End: 2022-02-22
Attending: PSYCHIATRY & NEUROLOGY
Payer: MEDICAID

## 2022-02-22 ENCOUNTER — OFFICE VISIT (OUTPATIENT)
Dept: NEUROLOGY | Facility: CLINIC | Age: 42
End: 2022-02-22
Payer: MEDICAID

## 2022-02-22 VITALS
WEIGHT: 126.56 LBS | DIASTOLIC BLOOD PRESSURE: 80 MMHG | BODY MASS INDEX: 18.12 KG/M2 | SYSTOLIC BLOOD PRESSURE: 131 MMHG | HEART RATE: 106 BPM | HEIGHT: 70 IN

## 2022-02-22 DIAGNOSIS — Z51.81 MEDICATION MONITORING ENCOUNTER: ICD-10-CM

## 2022-02-22 DIAGNOSIS — F10.26 ALCOHOL DEPENDENCE WITH ALCOHOL-INDUCED PERSISTING AMNESTIC DISORDER: ICD-10-CM

## 2022-02-22 DIAGNOSIS — G40.909 SEIZURE DISORDER: ICD-10-CM

## 2022-02-22 DIAGNOSIS — E51.9 THIAMINE DEFICIENCY: ICD-10-CM

## 2022-02-22 DIAGNOSIS — E56.9 HYPOVITAMINOSIS: ICD-10-CM

## 2022-02-22 DIAGNOSIS — F10.151: Primary | ICD-10-CM

## 2022-02-22 DIAGNOSIS — Z00.00 ROUTINE ADULT HEALTH MAINTENANCE: ICD-10-CM

## 2022-02-22 DIAGNOSIS — R91.1 SOLITARY PULMONARY NODULE: ICD-10-CM

## 2022-02-22 DIAGNOSIS — F32.89 OTHER DEPRESSION: ICD-10-CM

## 2022-02-22 LAB
ALBUMIN SERPL BCP-MCNC: 4.3 G/DL (ref 3.5–5.2)
ALP SERPL-CCNC: 96 U/L (ref 55–135)
ALT SERPL W/O P-5'-P-CCNC: 280 U/L (ref 10–44)
ANION GAP SERPL CALC-SCNC: 18 MMOL/L (ref 8–16)
AST SERPL-CCNC: 575 U/L (ref 10–40)
BASOPHILS # BLD AUTO: 0.03 K/UL (ref 0–0.2)
BASOPHILS NFR BLD: 0.7 % (ref 0–1.9)
BILIRUB SERPL-MCNC: 0.7 MG/DL (ref 0.1–1)
BUN SERPL-MCNC: 9 MG/DL (ref 6–20)
CALCIUM SERPL-MCNC: 10.1 MG/DL (ref 8.7–10.5)
CHLORIDE SERPL-SCNC: 92 MMOL/L (ref 95–110)
CO2 SERPL-SCNC: 24 MMOL/L (ref 23–29)
CREAT SERPL-MCNC: 0.8 MG/DL (ref 0.5–1.4)
DIFFERENTIAL METHOD: ABNORMAL
EOSINOPHIL # BLD AUTO: 0.1 K/UL (ref 0–0.5)
EOSINOPHIL NFR BLD: 2.5 % (ref 0–8)
ERYTHROCYTE [DISTWIDTH] IN BLOOD BY AUTOMATED COUNT: 12.1 % (ref 11.5–14.5)
EST. GFR  (AFRICAN AMERICAN): >60 ML/MIN/1.73 M^2
EST. GFR  (NON AFRICAN AMERICAN): >60 ML/MIN/1.73 M^2
GLUCOSE SERPL-MCNC: 174 MG/DL (ref 70–110)
HCT VFR BLD AUTO: 43 % (ref 40–54)
HGB BLD-MCNC: 15.6 G/DL (ref 14–18)
IMM GRANULOCYTES # BLD AUTO: 0.01 K/UL (ref 0–0.04)
IMM GRANULOCYTES NFR BLD AUTO: 0.2 % (ref 0–0.5)
LYMPHOCYTES # BLD AUTO: 2.1 K/UL (ref 1–4.8)
LYMPHOCYTES NFR BLD: 49.5 % (ref 18–48)
MCH RBC QN AUTO: 32 PG (ref 27–31)
MCHC RBC AUTO-ENTMCNC: 36.3 G/DL (ref 32–36)
MCV RBC AUTO: 88 FL (ref 82–98)
MONOCYTES # BLD AUTO: 0.4 K/UL (ref 0.3–1)
MONOCYTES NFR BLD: 9 % (ref 4–15)
NEUTROPHILS # BLD AUTO: 1.6 K/UL (ref 1.8–7.7)
NEUTROPHILS NFR BLD: 38.1 % (ref 38–73)
NRBC BLD-RTO: 0 /100 WBC
PLATELET # BLD AUTO: 142 K/UL (ref 150–450)
PMV BLD AUTO: 9.3 FL (ref 9.2–12.9)
POTASSIUM SERPL-SCNC: 3.9 MMOL/L (ref 3.5–5.1)
PROT SERPL-MCNC: 8.4 G/DL (ref 6–8.4)
RBC # BLD AUTO: 4.87 M/UL (ref 4.6–6.2)
SODIUM SERPL-SCNC: 134 MMOL/L (ref 136–145)
VALPROATE SERPL-MCNC: <12.5 UG/ML (ref 50–100)
WBC # BLD AUTO: 4.32 K/UL (ref 3.9–12.7)

## 2022-02-22 PROCEDURE — 36415 COLL VENOUS BLD VENIPUNCTURE: CPT | Performed by: PSYCHIATRY & NEUROLOGY

## 2022-02-22 PROCEDURE — 99215 OFFICE O/P EST HI 40 MIN: CPT | Mod: PBBFAC | Performed by: PSYCHIATRY & NEUROLOGY

## 2022-02-22 PROCEDURE — 3079F PR MOST RECENT DIASTOLIC BLOOD PRESSURE 80-89 MM HG: ICD-10-PCS | Mod: CPTII,,, | Performed by: PSYCHIATRY & NEUROLOGY

## 2022-02-22 PROCEDURE — 80164 ASSAY DIPROPYLACETIC ACD TOT: CPT | Performed by: PSYCHIATRY & NEUROLOGY

## 2022-02-22 PROCEDURE — 99215 OFFICE O/P EST HI 40 MIN: CPT | Mod: S$PBB,,, | Performed by: PSYCHIATRY & NEUROLOGY

## 2022-02-22 PROCEDURE — 3079F DIAST BP 80-89 MM HG: CPT | Mod: CPTII,,, | Performed by: PSYCHIATRY & NEUROLOGY

## 2022-02-22 PROCEDURE — 99215 PR OFFICE/OUTPT VISIT, EST, LEVL V, 40-54 MIN: ICD-10-PCS | Mod: S$PBB,,, | Performed by: PSYCHIATRY & NEUROLOGY

## 2022-02-22 PROCEDURE — 99999 PR PBB SHADOW E&M-EST. PATIENT-LVL V: ICD-10-PCS | Mod: PBBFAC,,, | Performed by: PSYCHIATRY & NEUROLOGY

## 2022-02-22 PROCEDURE — 3008F PR BODY MASS INDEX (BMI) DOCUMENTED: ICD-10-PCS | Mod: CPTII,,, | Performed by: PSYCHIATRY & NEUROLOGY

## 2022-02-22 PROCEDURE — 80053 COMPREHEN METABOLIC PANEL: CPT | Performed by: PSYCHIATRY & NEUROLOGY

## 2022-02-22 PROCEDURE — 99999 PR PBB SHADOW E&M-EST. PATIENT-LVL V: CPT | Mod: PBBFAC,,, | Performed by: PSYCHIATRY & NEUROLOGY

## 2022-02-22 PROCEDURE — 80177 DRUG SCRN QUAN LEVETIRACETAM: CPT | Performed by: PSYCHIATRY & NEUROLOGY

## 2022-02-22 PROCEDURE — 1159F MED LIST DOCD IN RCRD: CPT | Mod: CPTII,,, | Performed by: PSYCHIATRY & NEUROLOGY

## 2022-02-22 PROCEDURE — 3075F SYST BP GE 130 - 139MM HG: CPT | Mod: CPTII,,, | Performed by: PSYCHIATRY & NEUROLOGY

## 2022-02-22 PROCEDURE — 1159F PR MEDICATION LIST DOCUMENTED IN MEDICAL RECORD: ICD-10-PCS | Mod: CPTII,,, | Performed by: PSYCHIATRY & NEUROLOGY

## 2022-02-22 PROCEDURE — 85025 COMPLETE CBC W/AUTO DIFF WBC: CPT | Performed by: PSYCHIATRY & NEUROLOGY

## 2022-02-22 PROCEDURE — 3008F BODY MASS INDEX DOCD: CPT | Mod: CPTII,,, | Performed by: PSYCHIATRY & NEUROLOGY

## 2022-02-22 PROCEDURE — 3075F PR MOST RECENT SYSTOLIC BLOOD PRESS GE 130-139MM HG: ICD-10-PCS | Mod: CPTII,,, | Performed by: PSYCHIATRY & NEUROLOGY

## 2022-02-22 RX ORDER — LANOLIN ALCOHOL/MO/W.PET/CERES
100 CREAM (GRAM) TOPICAL DAILY
Qty: 90 TABLET | Refills: 2 | Status: SHIPPED | OUTPATIENT
Start: 2022-02-22 | End: 2022-03-30

## 2022-02-22 NOTE — Clinical Note
Could you please call him and ask if he is taking his keppra (levetiracetam) and valproic acid (depakote)

## 2022-02-22 NOTE — PROGRESS NOTES
"  Subjective:       Patient ID: Terrance Chavez is a 41 y.o. male who presents today for a routine clinic visit for abnormal MRI and other neurologic symptoms. He presents today with his sister.    HPI:      SUBJECTIVE/TODAY:  · Feeling more tired than usual. Taking insulin and two other pills, but time between doses are longer as he has started some temp work, twice a week. 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   · No longer living with his niece, has been living with his aunt. Back to using alcohol, can't say how often he drinks but insists that it's less than 2 beers a day. However, about one month ago, he had an episode where he passed out. He calls it a seizure, but no one witnessed it. He was passed out overnight.  · Denies current use of drugs. Still smoking cigarettes.  · Reports taking his insulin daily  · Still having n/t in fingers and toes. No new symptoms.  · Feels like he's doing "okay but I could do better."        SOCIAL HISTORY  Social History     Tobacco Use    Smoking status: Current Some Day Smoker     Years: 15.00     Types: Cigarettes, Cigars    Smokeless tobacco: Never Used   Substance Use Topics    Alcohol use: Yes     Alcohol/week: 30.0 standard drinks     Types: 30 Shots of liquor per week    Drug use: Not Currently     Living arrangements - the patient lives with his aunt  Employment: yes, part time temp work, less than 20hrs/week.    ROS    Current Outpatient Medications on File Prior to Visit   Medication Sig Dispense Refill Last Dose    blood sugar diagnostic (ONETOUCH VERIO TEST STRIPS) Strp Use to test blood glucose 2 (two) times daily with meals. 100 each 11 Taking    blood sugar diagnostic Strp 1 strip by Misc.(Non-Drug; Combo Route) route 2 (two) times daily with meals. 100 strip 11 Taking    blood-glucose meter kit Use as instructed 1 each 0 Taking    chlordiazepoxide (LIBRIUM) 25 MG Cap Take 1 capsule (25 mg total) by mouth every 6 " "(six) hours as needed (alcohol withdrawals). Take 2 pills every 6 hours on day 1  Take 1 pill every 6 hours on day 2 and day 3.   Do not take clobezam when taking this medication. Start your clobezam again when you are done this medicine. 16 capsule 0 Taking    insulin aspart protamine-insulin aspart (NOVOLOG MIX 70-30FLEXPEN U-100) 100 unit/mL (70-30) InPn pen Inject 25 Units into the skin with breakfast and 15 Units with dinner or evening meal. 22.5 mL 3 Taking    insulin aspart U-100 (NOVOLOG FLEXPEN U-100 INSULIN) 100 unit/mL (3 mL) InPn pen Inject 25 Units into the skin every morning. 7.5 mL 11 Taking    insulin aspart U-100 (NOVOLOG FLEXPEN U-100 INSULIN) 100 unit/mL (3 mL) InPn pen Inject 15 Units into the skin every evening. 4.5 mL 11 Taking    lancets 30 gauge Misc 1 lancet by Misc.(Non-Drug; Combo Route) route 2 (two) times daily with meals. 100 each 11 Taking    lancets Misc 1 lancet by Misc.(Non-Drug; Combo Route) route 2 (two) times daily with meals. 100 each 11 Taking    lancing device Misc 1 Device by Misc.(Non-Drug; Combo Route) route 2 (two) times daily with meals. 1 each 0 Taking    pen needle, diabetic 31 gauge x 3/16" Ndle use to inject insulins 3 times a day 100 each 0 Taking    pen needle, diabetic 32 gauge x 5/32" Ndle Injecting twice daily 60 each 3 Taking    aspirin (ECOTRIN) 81 MG EC tablet Take 1 tablet (81 mg total) by mouth once daily. 90 tablet 3 Unknown    cloBAZam (ONFI) 10 mg Tab Take 10mg in am and 20mg qhs 90 each 5 Unknown    divalproex ER (DEPAKOTE) 500 MG Tb24 Take 2 tablets (1,000 mg total) by mouth every 12 (twelve) hours. 120 tablet 11 Unknown    folic acid (FOLVITE) 1 MG tablet Take 1 tablet (1 mg total) by mouth once daily. (Patient not taking: Reported on 2/22/2022) 30 tablet 11 Not Taking    folic acid-vit B6-vit B12 2.5-25-2 mg (FOLBIC OR EQUIV) 2.5-25-2 mg Tab Take 1 tablet by mouth once daily. (Patient not taking: Reported on 2/22/2022) 30 tablet 3 Not " Taking    levETIRAcetam (KEPPRA) 750 MG Tab Take 1 tablet (750 mg total) by mouth 2 (two) times daily. 60 tablet 5 Unknown    multivitamin Tab Take 1 tablet by mouth once daily. (Patient not taking: Reported on 2/22/2022) 90 tablet 3 Not Taking    risperiDONE (RISPERDAL) 1 MG tablet Take 1 tablet (1 mg total) by mouth 3 (three) times daily. (Patient not taking: No sig reported) 90 tablet 11 Unknown    thiamine 100 MG tablet Take 1 tablet (100 mg total) by mouth once daily. (Patient not taking: Reported on 2/22/2022) 90 tablet 2 Not Taking             Objective:        Neurologic Exam          Imaging:     Results for orders placed during the hospital encounter of 02/12/21    MRI Brain W WO Contrast    Impression  Allowing for artifact related to motion there is no acute intracranial findings specifically without evidence for acute infarction or new parenchymal signal abnormality    Previous small volume subdural hemorrhage overlying the right frontal convexity is no longer seen.    T2 FLAIR signal hyperintensities centrum semiovale extending to the basal ganglia and periventricular white matter unchanged which remain nonspecific.  Prior vascular event or prior demyelination remain in the differential.  No evidence for intracranial enhancing mass lesion.      Electronically signed by: Mike Israel DO  Date:    03/27/2021  Time:    16:00    No results found for this or any previous visit.    No results found for this or any previous visit.        Labs:       Lab Results   Component Value Date    WBC 11.21 07/20/2021    RBC 4.08 (L) 07/20/2021    HGB 12.2 (L) 07/20/2021    HCT 35.4 (L) 07/20/2021    MCV 87 07/20/2021    MCH 29.9 07/20/2021    MCHC 34.5 07/20/2021    RDW 17.6 (H) 07/20/2021     07/20/2021    MPV 8.6 (L) 07/20/2021    GRAN 8.2 (H) 07/20/2021    GRAN 73.2 (H) 07/20/2021    LYMPH 0.8 (L) 07/20/2021    LYMPH 6.7 (L) 07/20/2021    MONO 2.0 (H) 07/20/2021    MONO 18.0 (H) 07/20/2021    EOS 0.0  07/20/2021    BASO 0.07 07/20/2021    EOSINOPHIL 0.1 07/20/2021    BASOPHIL 0.6 07/20/2021       Chemistry        Component Value Date/Time     07/20/2021 0951    K 4.0 07/20/2021 0951    CL 97 07/20/2021 0951    CO2 26 07/20/2021 0951    BUN 8 07/20/2021 0951    CREATININE 0.8 07/20/2021 0951     (H) 07/20/2021 0951        Component Value Date/Time    CALCIUM 10.0 07/20/2021 0951    ALKPHOS 98 07/20/2021 0951    AST 44 (H) 07/20/2021 0951    ALT 48 (H) 07/20/2021 0951    BILITOT 0.5 07/20/2021 0951    ESTGFRAFRICA >60.0 07/20/2021 0951    EGFRNONAA >60.0 07/20/2021 0951                Diagnosis/Assessment/Plan:     1. Abnormal brain and c-spine MRI  · Assessment: Unclear etiology of cervical spine abnormality but suspect possible injury or trauma, but cannot entirely rule out demyelinating disorder. history questionable as patient is a poor historian.   · Imaging: MRI brain and c/t spine in one year    3. Hypovitaminosis, chronic alcoholism: alcohol abuse causing poor nutrition and hypovitaminosis. Advised to start thiamine, folate, Vit b12     2. Numbness and tingling in fingers/toes: related to diabetes     3. Seizures: routine EEG 10/27/21 normal, but patient was sober. Suspect that seizures are likely related to prior alcohol and substance abuse. Will get levels for keppra and depakote. Continue keppra and depakote.     4. Multiple medical issues: referral placed to [facility] primary care.     6. Alcohol abuse: new referral placed to addiction psychiatry. Extensive counseling given to patient and his family      RTC with me in 4 months as a virtual visit      Time spent on this encounter: 60 minutes. This includes face to face time and non-face to face time preparing to see the patient (eg, review of tests), obtaining and/or reviewing separately obtained history, documenting clinical information in the electronic or other health record, independently interpreting results and communicating results to the  patient/family/caregiver, or care coordinator.          There are no diagnoses linked to this encounter.

## 2022-02-24 LAB — LEVETIRACETAM SERPL-MCNC: <1 UG/ML (ref 3–60)

## 2022-03-28 ENCOUNTER — HOSPITAL ENCOUNTER (EMERGENCY)
Facility: HOSPITAL | Age: 42
Discharge: HOME | End: 2022-03-29
Attending: EMERGENCY MEDICINE
Payer: MEDICAID

## 2022-03-28 DIAGNOSIS — R74.01 TRANSAMINITIS: ICD-10-CM

## 2022-03-28 DIAGNOSIS — R05.9 COUGH: ICD-10-CM

## 2022-03-28 DIAGNOSIS — E16.2 HYPOGLYCEMIA: Primary | ICD-10-CM

## 2022-03-28 PROBLEM — J44.9 COPD (CHRONIC OBSTRUCTIVE PULMONARY DISEASE): Chronic | Status: ACTIVE | Noted: 2021-02-12

## 2022-03-28 PROBLEM — I10 HYPERTENSION: Chronic | Status: ACTIVE | Noted: 2021-02-12

## 2022-03-28 PROBLEM — F10.20 ALCOHOL USE DISORDER, SEVERE, DEPENDENCE: Chronic | Status: ACTIVE | Noted: 2021-06-09

## 2022-03-28 PROBLEM — R41.9 NEUROCOGNITIVE DISORDER: Chronic | Status: ACTIVE | Noted: 2021-03-19

## 2022-03-28 PROBLEM — G40.201 COMPLEX PARTIAL STATUS EPILEPTICUS: Chronic | Status: ACTIVE | Noted: 2021-03-21

## 2022-03-28 PROBLEM — E78.5 HYPERLIPIDEMIA: Chronic | Status: ACTIVE | Noted: 2021-02-13

## 2022-03-28 PROBLEM — I63.311 CEREBROVASCULAR ACCIDENT (CVA) DUE TO THROMBOSIS OF RIGHT MIDDLE CEREBRAL ARTERY: Chronic | Status: ACTIVE | Noted: 2021-05-26

## 2022-03-28 PROBLEM — E11.9 TYPE 2 DIABETES MELLITUS, WITH LONG-TERM CURRENT USE OF INSULIN: Chronic | Status: ACTIVE | Noted: 2021-02-12

## 2022-03-28 PROBLEM — Z79.4 TYPE 2 DIABETES MELLITUS, WITH LONG-TERM CURRENT USE OF INSULIN: Chronic | Status: ACTIVE | Noted: 2021-02-12

## 2022-03-28 LAB
ALBUMIN SERPL BCP-MCNC: 4.1 G/DL (ref 3.5–5.2)
ALP SERPL-CCNC: 86 U/L (ref 55–135)
ALT SERPL W/O P-5'-P-CCNC: 91 U/L (ref 10–44)
ANION GAP SERPL CALC-SCNC: 15 MMOL/L (ref 8–16)
AST SERPL-CCNC: 109 U/L (ref 10–40)
BASOPHILS # BLD AUTO: 0.02 K/UL (ref 0–0.2)
BASOPHILS NFR BLD: 0.3 % (ref 0–1.9)
BILIRUB SERPL-MCNC: 0.9 MG/DL (ref 0.1–1)
BUN SERPL-MCNC: 7 MG/DL (ref 6–20)
CALCIUM SERPL-MCNC: 12 MG/DL (ref 8.7–10.5)
CHLORIDE SERPL-SCNC: 94 MMOL/L (ref 95–110)
CO2 SERPL-SCNC: 34 MMOL/L (ref 23–29)
CREAT SERPL-MCNC: 0.7 MG/DL (ref 0.5–1.4)
DIFFERENTIAL METHOD: ABNORMAL
EOSINOPHIL # BLD AUTO: 0 K/UL (ref 0–0.5)
EOSINOPHIL NFR BLD: 0.3 % (ref 0–8)
ERYTHROCYTE [DISTWIDTH] IN BLOOD BY AUTOMATED COUNT: 15 % (ref 11.5–14.5)
EST. GFR  (AFRICAN AMERICAN): >60 ML/MIN/1.73 M^2
EST. GFR  (NON AFRICAN AMERICAN): >60 ML/MIN/1.73 M^2
GLUCOSE SERPL-MCNC: 96 MG/DL (ref 70–110)
HCT VFR BLD AUTO: 43.7 % (ref 40–54)
HGB BLD-MCNC: 15.4 G/DL (ref 14–18)
IMM GRANULOCYTES # BLD AUTO: 0.03 K/UL (ref 0–0.04)
IMM GRANULOCYTES NFR BLD AUTO: 0.4 % (ref 0–0.5)
LYMPHOCYTES # BLD AUTO: 1.2 K/UL (ref 1–4.8)
LYMPHOCYTES NFR BLD: 14.5 % (ref 18–48)
MCH RBC QN AUTO: 32.3 PG (ref 27–31)
MCHC RBC AUTO-ENTMCNC: 35.2 G/DL (ref 32–36)
MCV RBC AUTO: 92 FL (ref 82–98)
MONOCYTES # BLD AUTO: 0.9 K/UL (ref 0.3–1)
MONOCYTES NFR BLD: 11.3 % (ref 4–15)
NEUTROPHILS # BLD AUTO: 5.8 K/UL (ref 1.8–7.7)
NEUTROPHILS NFR BLD: 73.2 % (ref 38–73)
NRBC BLD-RTO: 0 /100 WBC
PLATELET # BLD AUTO: 390 K/UL (ref 150–450)
PMV BLD AUTO: 8.8 FL (ref 9.2–12.9)
POCT GLUCOSE: 108 MG/DL (ref 70–110)
POCT GLUCOSE: 224 MG/DL (ref 70–110)
POTASSIUM SERPL-SCNC: 3.5 MMOL/L (ref 3.5–5.1)
PROT SERPL-MCNC: 8.5 G/DL (ref 6–8.4)
RBC # BLD AUTO: 4.77 M/UL (ref 4.6–6.2)
SODIUM SERPL-SCNC: 143 MMOL/L (ref 136–145)
WBC # BLD AUTO: 7.91 K/UL (ref 3.9–12.7)

## 2022-03-28 PROCEDURE — 25000003 PHARM REV CODE 250: Performed by: EMERGENCY MEDICINE

## 2022-03-28 PROCEDURE — 82962 GLUCOSE BLOOD TEST: CPT

## 2022-03-28 PROCEDURE — 99284 EMERGENCY DEPT VISIT MOD MDM: CPT | Mod: 25

## 2022-03-28 PROCEDURE — 80053 COMPREHEN METABOLIC PANEL: CPT | Performed by: EMERGENCY MEDICINE

## 2022-03-28 PROCEDURE — 85025 COMPLETE CBC W/AUTO DIFF WBC: CPT | Performed by: EMERGENCY MEDICINE

## 2022-03-28 PROCEDURE — 80048 BASIC METABOLIC PNL TOTAL CA: CPT | Performed by: EMERGENCY MEDICINE

## 2022-03-28 PROCEDURE — 99282 PR EMERGENCY DEPT VISIT,LEVEL II: ICD-10-PCS | Mod: ,,, | Performed by: EMERGENCY MEDICINE

## 2022-03-28 PROCEDURE — 96360 HYDRATION IV INFUSION INIT: CPT

## 2022-03-28 PROCEDURE — 99282 EMERGENCY DEPT VISIT SF MDM: CPT | Mod: ,,, | Performed by: EMERGENCY MEDICINE

## 2022-03-28 RX ADMIN — SODIUM CHLORIDE 1000 ML: 0.9 INJECTION, SOLUTION INTRAVENOUS at 10:03

## 2022-03-29 VITALS
OXYGEN SATURATION: 97 % | SYSTOLIC BLOOD PRESSURE: 123 MMHG | WEIGHT: 126.56 LBS | BODY MASS INDEX: 18.16 KG/M2 | TEMPERATURE: 97 F | RESPIRATION RATE: 18 BRPM | DIASTOLIC BLOOD PRESSURE: 67 MMHG | HEART RATE: 68 BPM

## 2022-03-29 LAB
ANION GAP SERPL CALC-SCNC: 13 MMOL/L (ref 8–16)
BUN SERPL-MCNC: 7 MG/DL (ref 6–20)
CALCIUM SERPL-MCNC: 9.6 MG/DL (ref 8.7–10.5)
CHLORIDE SERPL-SCNC: 97 MMOL/L (ref 95–110)
CO2 SERPL-SCNC: 30 MMOL/L (ref 23–29)
CREAT SERPL-MCNC: 0.6 MG/DL (ref 0.5–1.4)
EST. GFR  (AFRICAN AMERICAN): >60 ML/MIN/1.73 M^2
EST. GFR  (NON AFRICAN AMERICAN): >60 ML/MIN/1.73 M^2
GLUCOSE SERPL-MCNC: 160 MG/DL (ref 70–110)
POTASSIUM SERPL-SCNC: 3.6 MMOL/L (ref 3.5–5.1)
SODIUM SERPL-SCNC: 140 MMOL/L (ref 136–145)

## 2022-03-29 NOTE — PROVIDER PROGRESS NOTES - EMERGENCY DEPT.
Signout Note    I received signout from the previous provider.     Chief complaint:  Hypoglycemia (Pt took insulin and went to bed without  eating, EMS called by wife due to patient making strange noises, wife believes pt may have had seizure. Pt blood sugar with EMS 34, given 1 amp D50, cbg now 183. Pt had recent fall with trauma noted to face, no falls today. Pt AAOx4 upon arrival. + ETOH, previous hx stroke)      Pertinent history &exam:  Terrance Chavez is a 41 y.o. male with pertinent PMH of COPD, insulin-dependent diabetes, hypertension, hyperlipidemia, neuro cognitive disorder, stroke, severe alcohol dependence who presented to emergency department with complaint of hypoglycemia.  Patient did not eat food, took insulin, and went to sleep.  Initial blood glucose in the field was 30.  He received food and dextrose here.  He also has a dry cough.  Time of sign-out he is pending labs and chest x-ray for final disposition.    Vitals:    03/28/22 2300   BP: (!) 142/76   Pulse: 71   Resp: 18   Temp:        Imaging Studies:    X-Ray Chest PA And Lateral   Final Result      No acute cardiopulmonary process.      Large bulla left upper lung field, similar to prior.  No pneumothorax identified.         Electronically signed by: Rafael Gorman MD   Date:    03/28/2022   Time:    22:06          Medications Given:  Medications   sodium chloride 0.9% bolus 1,000 mL (0 mLs Intravenous Stopped 3/28/22 2326)       Pending Items/ MDM:  41 y.o. male with hypoglycemia after taking his insulin without eating.    Chest x-ray without acute abnormality.    Labs reviewed, CMP with multiple abnormalities, will plan to repeat metabolic panel as I believe this may be a contaminated sample.    Repeat metabolic panel reviewed.  Calcium is normal, electrolytes normalizing.  Blood glucose is 160 - 4 hours into ER stay.  Patient is maintaining stable blood glucose, appropriate for discharge.    Patient discharged home in stable  condition.    Diagnostic Impression:    1. Hypoglycemia    2. Cough    3. Transaminitis         ED Disposition Condition    Discharge Stable        ED Prescriptions     None        Follow-up Information     Follow up With Specialties Details Why Contact Info Additional Information    [facility] Internal Medicine Schedule an appointment as soon as possible for a visit in 3 days  [address]  [facility] [id number] [phone] [facility] Please park in surface lot and check in at central registration desk            Tabitha Woods MD  Emergency Medicine

## 2022-03-29 NOTE — ED PROVIDER NOTES
Chief complaint:  Hypoglycemia (Pt took insulin and went to bed without  eating, EMS called by wife due to patient making strange noises, wife believes pt may have had seizure. Pt blood sugar with EMS 34, given 1 amp D50, cbg now 183. Pt had recent fall with trauma noted to face, no falls today. Pt AAOx4 upon arrival. + ETOH, previous hx stroke)      HPI:  Terrance Chavez is a 41 y.o. male presenting with acute onset of chronic having been found unresponsive by his wife of vomiting while he was in bed.  She told EMS that she was concerned that he had some sort of seizure activity.  EMS arrived and his glucose was 34 and was given an amp of D50 with resolution of his symptoms and he is awake and alert.  He does have a history of alcohol use and did fall 2 days ago.  However right now he has no complaints and has no headache.  He denies dysuria frequency.  He does state he has a smoker's cough.  No abdominal pain.  No nausea or vomiting or diarrhea.    ROS: As per HPI and below:  Constitutional:  No fevers, no chills  Eyes: no visual changes  Cardiac: no chest pain  Respiratory: no shortness of breath, cough  Abdominal: no abdominal pain, no nausea, no vomiting  Genitourinary: No dysuria, no frequency  Skin: no rash  Heme: no bleeding  Musculoskeletal: no joint pain  Neuro: no focal numbness, no focal weakness, seizure-like activity  Pyschological: no depression      Review of patient's allergies indicates:  No Known Allergies    No current facility-administered medications on file prior to encounter.     Current Outpatient Medications on File Prior to Encounter   Medication Sig Dispense Refill    aspirin (ECOTRIN) 81 MG EC tablet Take 1 tablet (81 mg total) by mouth once daily. 90 tablet 3    blood sugar diagnostic (ONETOUCH VERIO TEST STRIPS) Strp Use to test blood glucose 2 (two) times daily with meals. 100 each 11    blood sugar diagnostic Strp 1 strip by Misc.(Non-Drug; Combo Route) route 2 (two) times daily  with meals. 100 strip 11    blood-glucose meter kit Use as instructed 1 each 0    chlordiazepoxide (LIBRIUM) 25 MG Cap Take 1 capsule (25 mg total) by mouth every 6 (six) hours as needed (alcohol withdrawals). Take 2 pills every 6 hours on day 1  Take 1 pill every 6 hours on day 2 and day 3.   Do not take clobezam when taking this medication. Start your clobezam again when you are done this medicine. 16 capsule 0    cloBAZam (ONFI) 10 mg Tab Take 10mg in am and 20mg qhs 90 each 5    divalproex ER (DEPAKOTE) 500 MG Tb24 Take 2 tablets (1,000 mg total) by mouth every 12 (twelve) hours. 120 tablet 11    folic acid (FOLVITE) 1 MG tablet Take 1 tablet (1 mg total) by mouth once daily. (Patient not taking: Reported on 2/22/2022) 30 tablet 11    folic acid-vit B6-vit B12 2.5-25-2 mg (FOLBIC OR EQUIV) 2.5-25-2 mg Tab Take 1 tablet by mouth once daily. 30 tablet 3    insulin aspart protamine-insulin aspart (NOVOLOG MIX 70-30FLEXPEN U-100) 100 unit/mL (70-30) InPn pen Inject 25 Units into the skin with breakfast and 15 Units with dinner or evening meal. 22.5 mL 3    insulin aspart U-100 (NOVOLOG FLEXPEN U-100 INSULIN) 100 unit/mL (3 mL) InPn pen Inject 25 Units into the skin every morning. 7.5 mL 11    insulin aspart U-100 (NOVOLOG FLEXPEN U-100 INSULIN) 100 unit/mL (3 mL) InPn pen Inject 15 Units into the skin every evening. 4.5 mL 11    lancets 30 gauge Misc 1 lancet by Misc.(Non-Drug; Combo Route) route 2 (two) times daily with meals. 100 each 11    lancets Misc 1 lancet by Misc.(Non-Drug; Combo Route) route 2 (two) times daily with meals. 100 each 11    lancing device Misc 1 Device by Misc.(Non-Drug; Combo Route) route 2 (two) times daily with meals. 1 each 0    levETIRAcetam (KEPPRA) 750 MG Tab Take 1 tablet (750 mg total) by mouth 2 (two) times daily. 60 tablet 5    multivitamin Tab Take 1 tablet by mouth once daily. (Patient not taking: Reported on 2/22/2022) 90 tablet 3    pen needle, diabetic 31 gauge  "x 3/16" Ndle use to inject insulins 3 times a day 100 each 0    pen needle, diabetic 32 gauge x 5/32" Ndle Injecting twice daily 60 each 3    risperiDONE (RISPERDAL) 1 MG tablet Take 1 tablet (1 mg total) by mouth 3 (three) times daily. (Patient not taking: No sig reported) 90 tablet 11    thiamine 100 MG tablet Take 1 tablet (100 mg total) by mouth once daily. 90 tablet 2       PMH:  As per HPI and below:  Past Medical History:   Diagnosis Date    Diabetes mellitus     Seizures     Stroke 2013     Past Surgical History:   Procedure Laterality Date    TRANSESOPHAGEAL ECHOCARDIOGRAPHY N/A 6/1/2021    Procedure: ECHOCARDIOGRAM, TRANSESOPHAGEAL;  Surgeon: [facility] Diagnostic Provider;  Location: [facility] EP LAB;  Service: Anesthesiology;  Laterality: N/A;       Social History     Socioeconomic History    Marital status: Single   Tobacco Use    Smoking status: Current Some Day Smoker     Years: 15.00     Types: Cigarettes, Cigars    Smokeless tobacco: Never Used   Substance and Sexual Activity    Alcohol use: Yes     Alcohol/week: 30.0 standard drinks     Types: 30 Shots of liquor per week    Drug use: Not Currently    Sexual activity: Not Currently     Social Determinants of Health     Financial Resource Strain: High Risk    Difficulty of Paying Living Expenses: Very hard   Food Insecurity: Food Insecurity Present    Worried About Running Out of Food in the Last Year: Often true    Ran Out of Food in the Last Year: Often true   Transportation Needs: Unmet Transportation Needs    Lack of Transportation (Medical): Yes    Lack of Transportation (Non-Medical): Yes   Physical Activity: Inactive    Days of Exercise per Week: 0 days    Minutes of Exercise per Session: 0 min   Stress: Stress Concern Present    Feeling of Stress : Very much   Social Connections: Unknown    Frequency of Communication with Friends and Family: Never    Frequency of Social Gatherings with Friends and Family: Never    Attends " [employer] Services: Never    Active Member of Clubs or Organizations: No    Attends Club or Organization Meetings: Never   Housing Stability: High Risk    Unable to Pay for Housing in the Last Year: Yes    Unstable Housing in the Last Year: Yes       No family history on file.    Physical Exam:    Vitals:    03/29/22 0033   BP: 123/67   Pulse: 68   Resp: 18   Temp: 97.2 °F (36.2 °C)     Constitutional: Well-nourished, well-developed, in no acute distress, not cachectic  Eyes: PERRLA, EOMI, normal conjunctiva, normal sclera  ENT: Moist Mucous membranes  Respiratory: Clear to auscultation bilaterally, no wheezes, no crackles, no rhonchi  Cardiovascular: Regular rate and rhythm, no murmurs, no rubs, no gallops  Abdominal: Soft, nontender, nondistended, no guarding, no rebound  Musculoskeletal: Normal range of motion, no obvious deformity, normal capillary refill, head atraumatic, neck supple, no meningismus  Skin:  Bruising to the right side of his face   Neurologic: Cranial nerves II through XII intact, no motor deficits, no sensory deficits, no cerebellar deficits  Psychological: Alert, oriented x3, normal affect, normal mood    Orders Placed This Encounter   Procedures    X-Ray Chest PA And Lateral    CBC auto differential    Comprehensive metabolic panel    Basic metabolic panel    Insert Saline lock IV       Medications   sodium chloride 0.9% bolus 1,000 mL (0 mLs Intravenous Stopped 3/28/22 3251)         Labs Reviewed   CBC W/ AUTO DIFFERENTIAL - Abnormal; Notable for the following components:       Result Value    MCH 32.3 (*)     RDW 15.0 (*)     MPV 8.8 (*)     Gran % 73.2 (*)     Lymph % 14.5 (*)     All other components within normal limits   COMPREHENSIVE METABOLIC PANEL - Abnormal; Notable for the following components:    Chloride 94 (*)     CO2 34 (*)     Calcium 12.0 (*)     Total Protein 8.5 (*)      (*)     ALT 91 (*)     All other components within normal limits   BASIC METABOLIC PANEL  - Abnormal; Notable for the following components:    CO2 30 (*)     Glucose 160 (*)     All other components within normal limits   POCT GLUCOSE - Abnormal; Notable for the following components:    POCT Glucose 224 (*)     All other components within normal limits   POCT GLUCOSE           MDM  Number of Diagnoses or Management Options  Cough  Hypoglycemia  Transaminitis  Diagnosis management comments: Differential diagnosis includes hypoglycemia, pneumonia, electrolyte abnormality, seizure    Patient presents with having had some seizure-like activity likely secondary to hypoglycemia.  EMS found him to have a glucose less than 40 minutes.  He is back to baseline after giving glucagon.  He has no complaints except for a bit of a cough.  Will check some basic labs and a chest x-ray and likely discharge home.  He does admit to using his insulin but not eating today.    Patient's labs and chest x-ray are unremarkable.  His glucose has maintained while he is here in the emergency department.  Will discharge home.       Amount and/or Complexity of Data Reviewed  Clinical lab tests: ordered and reviewed  Tests in the radiology section of CPT®: ordered and reviewed  Decide to obtain previous medical records or to obtain history from someone other than the patient: yes  Obtain history from someone other than the patient: yes (EMS)  Independent visualization of images, tracings, or specimens: yes (Chest x-ray:  No acute process)              ASSESSMENT  1. Hypoglycemia    2. Cough    3. Transaminitis          Disposition:  Discharge    Discharge Medication List as of 3/28/2022  9:46 PM        Discharge Medication List as of 3/28/2022  9:46 PM        Discharge Medication List as of 3/28/2022  9:46 PM             Ashkan Em III, MD  03/29/22 0709

## 2022-03-30 ENCOUNTER — HOSPITAL ENCOUNTER (OUTPATIENT)
Facility: HOSPITAL | Age: 42
Discharge: HOME | End: 2022-03-31
Attending: EMERGENCY MEDICINE | Admitting: INTERNAL MEDICINE
Payer: MEDICAID

## 2022-03-30 DIAGNOSIS — F10.20 ALCOHOL USE DISORDER, SEVERE, DEPENDENCE: Chronic | ICD-10-CM

## 2022-03-30 DIAGNOSIS — R00.0 TACHYCARDIA: ICD-10-CM

## 2022-03-30 DIAGNOSIS — F10.939 ALCOHOL WITHDRAWAL SYNDROME WITH COMPLICATION: ICD-10-CM

## 2022-03-30 DIAGNOSIS — S14.152A: ICD-10-CM

## 2022-03-30 DIAGNOSIS — I63.311 CEREBROVASCULAR ACCIDENT (CVA) DUE TO THROMBOSIS OF RIGHT MIDDLE CEREBRAL ARTERY: Chronic | ICD-10-CM

## 2022-03-30 DIAGNOSIS — E11.65 TYPE 2 DIABETES MELLITUS WITH HYPERGLYCEMIA, WITH LONG-TERM CURRENT USE OF INSULIN: ICD-10-CM

## 2022-03-30 DIAGNOSIS — R20.0 NUMBNESS OF UPPER EXTREMITY: ICD-10-CM

## 2022-03-30 DIAGNOSIS — Z79.4 TYPE 2 DIABETES MELLITUS WITH HYPERGLYCEMIA, WITH LONG-TERM CURRENT USE OF INSULIN: ICD-10-CM

## 2022-03-30 DIAGNOSIS — R07.9 CHEST PAIN: ICD-10-CM

## 2022-03-30 DIAGNOSIS — E11.69 TYPE 2 DIABETES MELLITUS WITH OTHER SPECIFIED COMPLICATION, WITH LONG-TERM CURRENT USE OF INSULIN: Chronic | ICD-10-CM

## 2022-03-30 DIAGNOSIS — Z79.4 TYPE 2 DIABETES MELLITUS WITH OTHER SPECIFIED COMPLICATION, WITH LONG-TERM CURRENT USE OF INSULIN: Chronic | ICD-10-CM

## 2022-03-30 DIAGNOSIS — R56.9 SEIZURE: Primary | ICD-10-CM

## 2022-03-30 PROBLEM — G40.909 EPILEPSY: Status: ACTIVE | Noted: 2021-02-18

## 2022-03-30 LAB
ALBUMIN SERPL BCP-MCNC: 3.2 G/DL (ref 3.5–5.2)
ALP SERPL-CCNC: 65 U/L (ref 55–135)
ALT SERPL W/O P-5'-P-CCNC: 61 U/L (ref 10–44)
AMMONIA PLAS-SCNC: 61 UMOL/L (ref 10–50)
ANION GAP SERPL CALC-SCNC: 10 MMOL/L (ref 8–16)
AST SERPL-CCNC: 84 U/L (ref 10–40)
BACTERIA #/AREA URNS AUTO: ABNORMAL /HPF
BASOPHILS # BLD AUTO: 0.02 K/UL (ref 0–0.2)
BASOPHILS NFR BLD: 0.3 % (ref 0–1.9)
BILIRUB SERPL-MCNC: 0.9 MG/DL (ref 0.1–1)
BILIRUB UR QL STRIP: NEGATIVE
BUN SERPL-MCNC: 9 MG/DL (ref 6–20)
CALCIUM SERPL-MCNC: 9.4 MG/DL (ref 8.7–10.5)
CHLORIDE SERPL-SCNC: 98 MMOL/L (ref 95–110)
CK SERPL-CCNC: 648 U/L (ref 20–200)
CLARITY UR REFRACT.AUTO: CLEAR
CO2 SERPL-SCNC: 27 MMOL/L (ref 23–29)
COLOR UR AUTO: YELLOW
CREAT SERPL-MCNC: 0.8 MG/DL (ref 0.5–1.4)
CTP QC/QA: YES
DIFFERENTIAL METHOD: ABNORMAL
EOSINOPHIL # BLD AUTO: 0 K/UL (ref 0–0.5)
EOSINOPHIL NFR BLD: 0.5 % (ref 0–8)
ERYTHROCYTE [DISTWIDTH] IN BLOOD BY AUTOMATED COUNT: 14.9 % (ref 11.5–14.5)
EST. GFR  (AFRICAN AMERICAN): >60 ML/MIN/1.73 M^2
EST. GFR  (NON AFRICAN AMERICAN): >60 ML/MIN/1.73 M^2
ESTIMATED AVG GLUCOSE: 160 MG/DL (ref 68–131)
ETHANOL SERPL-MCNC: <10 MG/DL
GLUCOSE SERPL-MCNC: 308 MG/DL (ref 70–110)
GLUCOSE UR QL STRIP: ABNORMAL
HBA1C MFR BLD: 7.2 % (ref 4–5.6)
HCT VFR BLD AUTO: 35.7 % (ref 40–54)
HGB BLD-MCNC: 12.8 G/DL (ref 14–18)
HGB UR QL STRIP: NEGATIVE
HYALINE CASTS UR QL AUTO: 7 /LPF
IMM GRANULOCYTES # BLD AUTO: 0.02 K/UL (ref 0–0.04)
IMM GRANULOCYTES NFR BLD AUTO: 0.3 % (ref 0–0.5)
INR PPP: 1 (ref 0.8–1.2)
KETONES UR QL STRIP: ABNORMAL
LACTATE SERPL-SCNC: 2.6 MMOL/L (ref 0.5–2.2)
LEUKOCYTE ESTERASE UR QL STRIP: NEGATIVE
LIPASE SERPL-CCNC: 83 U/L (ref 4–60)
LYMPHOCYTES # BLD AUTO: 1.2 K/UL (ref 1–4.8)
LYMPHOCYTES NFR BLD: 18.7 % (ref 18–48)
MAGNESIUM SERPL-MCNC: 1.5 MG/DL (ref 1.6–2.6)
MCH RBC QN AUTO: 32.3 PG (ref 27–31)
MCHC RBC AUTO-ENTMCNC: 35.9 G/DL (ref 32–36)
MCV RBC AUTO: 90 FL (ref 82–98)
MICROSCOPIC COMMENT: ABNORMAL
MONOCYTES # BLD AUTO: 0.9 K/UL (ref 0.3–1)
MONOCYTES NFR BLD: 15.1 % (ref 4–15)
NEUTROPHILS # BLD AUTO: 4 K/UL (ref 1.8–7.7)
NEUTROPHILS NFR BLD: 65.1 % (ref 38–73)
NITRITE UR QL STRIP: NEGATIVE
NRBC BLD-RTO: 0 /100 WBC
PH UR STRIP: 7 [PH] (ref 5–8)
PLATELET # BLD AUTO: 284 K/UL (ref 150–450)
PMV BLD AUTO: 8.8 FL (ref 9.2–12.9)
POCT GLUCOSE: 193 MG/DL (ref 70–110)
POCT GLUCOSE: 213 MG/DL (ref 70–110)
POCT GLUCOSE: 408 MG/DL (ref 70–110)
POTASSIUM SERPL-SCNC: 3.9 MMOL/L (ref 3.5–5.1)
PROT SERPL-MCNC: 6.7 G/DL (ref 6–8.4)
PROT UR QL STRIP: NEGATIVE
PROTHROMBIN TIME: 10.2 SEC (ref 9–12.5)
RBC # BLD AUTO: 3.96 M/UL (ref 4.6–6.2)
RBC #/AREA URNS AUTO: 1 /HPF (ref 0–4)
SARS-COV-2 RDRP RESP QL NAA+PROBE: NEGATIVE
SODIUM SERPL-SCNC: 135 MMOL/L (ref 136–145)
SP GR UR STRIP: 1.01 (ref 1–1.03)
SQUAMOUS #/AREA URNS AUTO: 0 /HPF
URN SPEC COLLECT METH UR: ABNORMAL
VALPROATE SERPL-MCNC: 29.9 UG/ML (ref 50–100)
WBC # BLD AUTO: 6.21 K/UL (ref 3.9–12.7)
WBC #/AREA URNS AUTO: 0 /HPF (ref 0–5)
YEAST UR QL AUTO: ABNORMAL

## 2022-03-30 PROCEDURE — 96361 HYDRATE IV INFUSION ADD-ON: CPT

## 2022-03-30 PROCEDURE — 97161 PT EVAL LOW COMPLEX 20 MIN: CPT

## 2022-03-30 PROCEDURE — U0002 COVID-19 LAB TEST NON-CDC: HCPCS

## 2022-03-30 PROCEDURE — 96374 THER/PROPH/DIAG INJ IV PUSH: CPT

## 2022-03-30 PROCEDURE — 93010 ELECTROCARDIOGRAM REPORT: CPT | Mod: ,,, | Performed by: INTERNAL MEDICINE

## 2022-03-30 PROCEDURE — 96372 THER/PROPH/DIAG INJ SC/IM: CPT | Mod: 59

## 2022-03-30 PROCEDURE — 99223 PR INITIAL HOSPITAL CARE,LEVL III: ICD-10-PCS | Mod: ,,, | Performed by: STUDENT IN AN ORGANIZED HEALTH CARE EDUCATION/TRAINING PROGRAM

## 2022-03-30 PROCEDURE — 80164 ASSAY DIPROPYLACETIC ACD TOT: CPT | Performed by: EMERGENCY MEDICINE

## 2022-03-30 PROCEDURE — 93005 ELECTROCARDIOGRAM TRACING: CPT

## 2022-03-30 PROCEDURE — 97535 SELF CARE MNGMENT TRAINING: CPT

## 2022-03-30 PROCEDURE — 85610 PROTHROMBIN TIME: CPT | Performed by: EMERGENCY MEDICINE

## 2022-03-30 PROCEDURE — 83735 ASSAY OF MAGNESIUM: CPT | Performed by: EMERGENCY MEDICINE

## 2022-03-30 PROCEDURE — 20600001 HC STEP DOWN PRIVATE ROOM

## 2022-03-30 PROCEDURE — 99223 1ST HOSP IP/OBS HIGH 75: CPT | Mod: ,,, | Performed by: INTERNAL MEDICINE

## 2022-03-30 PROCEDURE — 99285 EMERGENCY DEPT VISIT HI MDM: CPT | Mod: 25

## 2022-03-30 PROCEDURE — 97530 THERAPEUTIC ACTIVITIES: CPT

## 2022-03-30 PROCEDURE — 82140 ASSAY OF AMMONIA: CPT | Performed by: EMERGENCY MEDICINE

## 2022-03-30 PROCEDURE — 25000003 PHARM REV CODE 250

## 2022-03-30 PROCEDURE — G0378 HOSPITAL OBSERVATION PER HR: HCPCS

## 2022-03-30 PROCEDURE — 83690 ASSAY OF LIPASE: CPT | Performed by: EMERGENCY MEDICINE

## 2022-03-30 PROCEDURE — S4991 NICOTINE PATCH NONLEGEND: HCPCS | Performed by: STUDENT IN AN ORGANIZED HEALTH CARE EDUCATION/TRAINING PROGRAM

## 2022-03-30 PROCEDURE — 83036 HEMOGLOBIN GLYCOSYLATED A1C: CPT

## 2022-03-30 PROCEDURE — 82077 ASSAY SPEC XCP UR&BREATH IA: CPT | Performed by: EMERGENCY MEDICINE

## 2022-03-30 PROCEDURE — 85025 COMPLETE CBC W/AUTO DIFF WBC: CPT | Performed by: EMERGENCY MEDICINE

## 2022-03-30 PROCEDURE — 82550 ASSAY OF CK (CPK): CPT

## 2022-03-30 PROCEDURE — 99223 1ST HOSP IP/OBS HIGH 75: CPT | Mod: ,,, | Performed by: STUDENT IN AN ORGANIZED HEALTH CARE EDUCATION/TRAINING PROGRAM

## 2022-03-30 PROCEDURE — 63600175 PHARM REV CODE 636 W HCPCS

## 2022-03-30 PROCEDURE — 25000003 PHARM REV CODE 250: Performed by: INTERNAL MEDICINE

## 2022-03-30 PROCEDURE — 25000003 PHARM REV CODE 250: Performed by: STUDENT IN AN ORGANIZED HEALTH CARE EDUCATION/TRAINING PROGRAM

## 2022-03-30 PROCEDURE — 87040 BLOOD CULTURE FOR BACTERIA: CPT | Performed by: EMERGENCY MEDICINE

## 2022-03-30 PROCEDURE — 97165 OT EVAL LOW COMPLEX 30 MIN: CPT

## 2022-03-30 PROCEDURE — 80177 DRUG SCRN QUAN LEVETIRACETAM: CPT | Performed by: EMERGENCY MEDICINE

## 2022-03-30 PROCEDURE — 81001 URINALYSIS AUTO W/SCOPE: CPT | Mod: 59 | Performed by: EMERGENCY MEDICINE

## 2022-03-30 PROCEDURE — 99285 PR EMERGENCY DEPT VISIT,LEVEL V: ICD-10-PCS | Mod: CS,,, | Performed by: EMERGENCY MEDICINE

## 2022-03-30 PROCEDURE — 99223 PR INITIAL HOSPITAL CARE,LEVL III: ICD-10-PCS | Mod: ,,, | Performed by: INTERNAL MEDICINE

## 2022-03-30 PROCEDURE — 96375 TX/PRO/DX INJ NEW DRUG ADDON: CPT

## 2022-03-30 PROCEDURE — 63600175 PHARM REV CODE 636 W HCPCS: Performed by: EMERGENCY MEDICINE

## 2022-03-30 PROCEDURE — C9399 UNCLASSIFIED DRUGS OR BIOLOG: HCPCS

## 2022-03-30 PROCEDURE — 83605 ASSAY OF LACTIC ACID: CPT | Performed by: EMERGENCY MEDICINE

## 2022-03-30 PROCEDURE — 99285 EMERGENCY DEPT VISIT HI MDM: CPT | Mod: CS,,, | Performed by: EMERGENCY MEDICINE

## 2022-03-30 PROCEDURE — 93010 EKG 12-LEAD: ICD-10-PCS | Mod: ,,, | Performed by: INTERNAL MEDICINE

## 2022-03-30 PROCEDURE — 25000003 PHARM REV CODE 250: Performed by: EMERGENCY MEDICINE

## 2022-03-30 PROCEDURE — 80053 COMPREHEN METABOLIC PANEL: CPT | Performed by: EMERGENCY MEDICINE

## 2022-03-30 RX ORDER — MAGNESIUM SULFATE HEPTAHYDRATE 40 MG/ML
2 INJECTION, SOLUTION INTRAVENOUS ONCE
Status: COMPLETED | OUTPATIENT
Start: 2022-03-30 | End: 2022-03-30

## 2022-03-30 RX ORDER — THIAMINE HCL 100 MG
100 TABLET ORAL
Status: DISCONTINUED | OUTPATIENT
Start: 2022-04-01 | End: 2022-03-30

## 2022-03-30 RX ORDER — IBUPROFEN 200 MG
24 TABLET ORAL
Status: DISCONTINUED | OUTPATIENT
Start: 2022-03-30 | End: 2022-03-31 | Stop reason: HOSPADM

## 2022-03-30 RX ORDER — INSULIN ASPART 100 [IU]/ML
0-5 INJECTION, SOLUTION INTRAVENOUS; SUBCUTANEOUS
Status: DISCONTINUED | OUTPATIENT
Start: 2022-03-30 | End: 2022-03-31 | Stop reason: HOSPADM

## 2022-03-30 RX ORDER — INSULIN ASPART 100 [IU]/ML
INJECTION, SUSPENSION SUBCUTANEOUS
Status: ON HOLD | COMMUNITY
End: 2022-03-31 | Stop reason: HOSPADM

## 2022-03-30 RX ORDER — DIVALPROEX SODIUM 500 MG/1
500 TABLET, FILM COATED, EXTENDED RELEASE ORAL EVERY 12 HOURS
Status: DISCONTINUED | OUTPATIENT
Start: 2022-03-30 | End: 2022-03-30

## 2022-03-30 RX ORDER — LORAZEPAM 1 MG/1
2 TABLET ORAL EVERY 4 HOURS PRN
Status: DISCONTINUED | OUTPATIENT
Start: 2022-03-30 | End: 2022-03-30

## 2022-03-30 RX ORDER — LORAZEPAM 2 MG/ML
2 INJECTION INTRAMUSCULAR
Status: DISCONTINUED | OUTPATIENT
Start: 2022-03-30 | End: 2022-03-31 | Stop reason: HOSPADM

## 2022-03-30 RX ORDER — LORAZEPAM 2 MG/ML
2 INJECTION INTRAMUSCULAR
Status: COMPLETED | OUTPATIENT
Start: 2022-03-30 | End: 2022-03-30

## 2022-03-30 RX ORDER — DIAZEPAM 5 MG/1
5 TABLET ORAL EVERY 8 HOURS
Status: DISCONTINUED | OUTPATIENT
Start: 2022-03-30 | End: 2022-03-30

## 2022-03-30 RX ORDER — GLUCAGON 1 MG
1 KIT INJECTION
Status: DISCONTINUED | OUTPATIENT
Start: 2022-03-30 | End: 2022-03-31 | Stop reason: HOSPADM

## 2022-03-30 RX ORDER — LORAZEPAM 2 MG/ML
INJECTION INTRAMUSCULAR
Status: COMPLETED
Start: 2022-03-30 | End: 2022-03-30

## 2022-03-30 RX ORDER — DIAZEPAM 5 MG/1
5 TABLET ORAL EVERY 8 HOURS PRN
Status: DISCONTINUED | OUTPATIENT
Start: 2022-03-30 | End: 2022-03-31 | Stop reason: HOSPADM

## 2022-03-30 RX ORDER — FOLIC ACID 1 MG/1
1 TABLET ORAL DAILY
Status: DISCONTINUED | OUTPATIENT
Start: 2022-03-30 | End: 2022-03-31 | Stop reason: HOSPADM

## 2022-03-30 RX ORDER — SUCRALFATE 1 G/10ML
1 SUSPENSION ORAL
Status: DISCONTINUED | OUTPATIENT
Start: 2022-03-30 | End: 2022-03-30

## 2022-03-30 RX ORDER — IBUPROFEN 200 MG
1 TABLET ORAL DAILY
Status: DISCONTINUED | OUTPATIENT
Start: 2022-03-30 | End: 2022-03-31 | Stop reason: HOSPADM

## 2022-03-30 RX ORDER — NALOXONE HCL 0.4 MG/ML
0.02 VIAL (ML) INJECTION
Status: DISCONTINUED | OUTPATIENT
Start: 2022-03-30 | End: 2022-03-31 | Stop reason: HOSPADM

## 2022-03-30 RX ORDER — THIAMINE HCL 100 MG
100 TABLET ORAL DAILY
Status: DISCONTINUED | OUTPATIENT
Start: 2022-04-01 | End: 2022-03-31 | Stop reason: HOSPADM

## 2022-03-30 RX ORDER — IBUPROFEN 200 MG
16 TABLET ORAL
Status: DISCONTINUED | OUTPATIENT
Start: 2022-03-30 | End: 2022-03-31 | Stop reason: HOSPADM

## 2022-03-30 RX ORDER — ENOXAPARIN SODIUM 100 MG/ML
40 INJECTION SUBCUTANEOUS EVERY 24 HOURS
Status: DISCONTINUED | OUTPATIENT
Start: 2022-03-30 | End: 2022-03-31 | Stop reason: HOSPADM

## 2022-03-30 RX ORDER — DIVALPROEX SODIUM 500 MG/1
1000 TABLET, FILM COATED, EXTENDED RELEASE ORAL EVERY 12 HOURS
Status: DISCONTINUED | OUTPATIENT
Start: 2022-03-30 | End: 2022-03-31 | Stop reason: HOSPADM

## 2022-03-30 RX ORDER — ASPIRIN 81 MG/1
81 TABLET ORAL DAILY
Status: DISCONTINUED | OUTPATIENT
Start: 2022-03-30 | End: 2022-03-31 | Stop reason: HOSPADM

## 2022-03-30 RX ORDER — INSULIN ASPART 100 [IU]/ML
INJECTION, SOLUTION INTRAVENOUS; SUBCUTANEOUS
Status: ON HOLD | COMMUNITY
End: 2022-03-31 | Stop reason: HOSPADM

## 2022-03-30 RX ORDER — LEVETIRACETAM 500 MG/5ML
2000 INJECTION, SOLUTION, CONCENTRATE INTRAVENOUS
Status: COMPLETED | OUTPATIENT
Start: 2022-03-30 | End: 2022-03-30

## 2022-03-30 RX ORDER — ONDANSETRON 8 MG/1
8 TABLET, ORALLY DISINTEGRATING ORAL EVERY 8 HOURS PRN
Status: DISCONTINUED | OUTPATIENT
Start: 2022-03-30 | End: 2022-03-31 | Stop reason: HOSPADM

## 2022-03-30 RX ORDER — TALC
6 POWDER (GRAM) TOPICAL NIGHTLY PRN
Status: DISCONTINUED | OUTPATIENT
Start: 2022-03-30 | End: 2022-03-31 | Stop reason: HOSPADM

## 2022-03-30 RX ORDER — DIAZEPAM 5 MG/1
5 TABLET ORAL EVERY 8 HOURS PRN
Status: DISCONTINUED | OUTPATIENT
Start: 2022-03-30 | End: 2022-03-30

## 2022-03-30 RX ORDER — INSULIN ASPART 100 [IU]/ML
3 INJECTION, SOLUTION INTRAVENOUS; SUBCUTANEOUS
Status: DISCONTINUED | OUTPATIENT
Start: 2022-03-30 | End: 2022-03-31

## 2022-03-30 RX ORDER — THIAMINE HCL 100 MG
100 TABLET ORAL EVERY 8 HOURS
Status: DISCONTINUED | OUTPATIENT
Start: 2022-04-01 | End: 2022-03-30

## 2022-03-30 RX ADMIN — THERA TABS 1 TABLET: TAB at 09:03

## 2022-03-30 RX ADMIN — INSULIN ASPART 5 UNITS: 100 INJECTION, SOLUTION INTRAVENOUS; SUBCUTANEOUS at 12:03

## 2022-03-30 RX ADMIN — LEVETIRACETAM 2000 MG: 500 INJECTION, SOLUTION INTRAVENOUS at 12:03

## 2022-03-30 RX ADMIN — INSULIN ASPART 5 UNITS: 100 INJECTION, SOLUTION INTRAVENOUS; SUBCUTANEOUS at 06:03

## 2022-03-30 RX ADMIN — DIVALPROEX SODIUM 1000 MG: 500 TABLET, FILM COATED, EXTENDED RELEASE ORAL at 08:03

## 2022-03-30 RX ADMIN — MAGNESIUM SULFATE 2 G: 2 INJECTION INTRAVENOUS at 06:03

## 2022-03-30 RX ADMIN — ENOXAPARIN SODIUM 40 MG: 100 INJECTION SUBCUTANEOUS at 06:03

## 2022-03-30 RX ADMIN — ASPIRIN 81 MG: 81 TABLET, COATED ORAL at 09:03

## 2022-03-30 RX ADMIN — LEVETIRACETAM 750 MG: 500 TABLET, FILM COATED ORAL at 08:03

## 2022-03-30 RX ADMIN — LORAZEPAM 2 MG: 2 INJECTION INTRAMUSCULAR at 12:03

## 2022-03-30 RX ADMIN — Medication 1 PATCH: at 02:03

## 2022-03-30 RX ADMIN — FOLIC ACID 1 MG: 1 TABLET ORAL at 09:03

## 2022-03-30 RX ADMIN — INSULIN ASPART 3 UNITS: 100 INJECTION, SOLUTION INTRAVENOUS; SUBCUTANEOUS at 12:03

## 2022-03-30 RX ADMIN — LORAZEPAM 2 MG: 2 INJECTION INTRAMUSCULAR; INTRAVENOUS at 12:03

## 2022-03-30 RX ADMIN — INSULIN ASPART 3 UNITS: 100 INJECTION, SOLUTION INTRAVENOUS; SUBCUTANEOUS at 06:03

## 2022-03-30 RX ADMIN — LEVETIRACETAM 750 MG: 500 TABLET, FILM COATED ORAL at 09:03

## 2022-03-30 RX ADMIN — INSULIN DETEMIR 10 UNITS: 100 INJECTION, SOLUTION SUBCUTANEOUS at 09:03

## 2022-03-30 RX ADMIN — Medication 6 MG: at 08:03

## 2022-03-30 RX ADMIN — SODIUM CHLORIDE 1000 ML: 0.9 INJECTION, SOLUTION INTRAVENOUS at 01:03

## 2022-03-30 RX ADMIN — DIVALPROEX SODIUM 1000 MG: 500 TABLET, FILM COATED, EXTENDED RELEASE ORAL at 01:03

## 2022-03-30 RX ADMIN — INSULIN ASPART 3 UNITS: 100 INJECTION, SOLUTION INTRAVENOUS; SUBCUTANEOUS at 09:03

## 2022-03-30 RX ADMIN — THIAMINE HYDROCHLORIDE 500 MG: 100 INJECTION, SOLUTION INTRAMUSCULAR; INTRAVENOUS at 09:03

## 2022-03-30 RX ADMIN — THIAMINE HYDROCHLORIDE 500 MG: 100 INJECTION, SOLUTION INTRAMUSCULAR; INTRAVENOUS at 04:03

## 2022-03-30 NOTE — ASSESSMENT & PLAN NOTE
Patient initially hypoglycemia upon arrival requiring D50. Patient has insulin 70-30 listed in home medications. Uncertain the patient understands his insulin regimen, however he says he gives a shot in the morning and at nighttime. Patient is type 2 diabetes with insulin dependence and blood glucose of 300's. Last A1c 8.3 at 9 months ago.    Plan:  --Detemir 10u daily  -- Aspart 3u TIDWM  --LDSSI  --POCT glucose checks with meals and at night  --adjust insulin according to sliding scale usage  -- A1c ordered

## 2022-03-30 NOTE — ED NOTES
Patient now sleeping.  Respirations ENL.  Easily awakened.  Continues to answer questions appropriately, however some of his interactions seem as though he is disoriented.  Will continue to monitor.

## 2022-03-30 NOTE — CONSULTS
[facility] Dept  Neurology  Consult Note    Patient Name: Terrance Chavez  MRN: [id number]  Admission Date: 3/30/2022  Hospital Length of Stay: 0 days  Code Status: Full Code   Attending Provider: Sepideh Claros MD   Consulting Provider: Liban Bolivar MD  Primary Care Physician: Primary Doctor No  Principal Problem:Seizure    Inpatient consult to neurology  Consult performed by: Liban Bolivar MD  Consult ordered by: Sumit Arevalo MD         Subjective:     Chief Complaint:  Seizure     HPI:   Terrance Chavez is a 41yoM with DM, ETOH abuse, COPD, CVA (2013), Epilepsy, HTN who presented to the ED following a witnessed seizure of undetermined etiology. Per report, the patient had been recently seen in a hospital for acute intoxication 2-3 days ago. The patient reports that his last drink was at that time. Per report the patient had a seizure of undetermined semiology today for which he came to the hospital. Upon arrival, the patient reported that he did not feel as though he was having withdrawal symptoms. He was, of note, found to be hypoglycemic. Valproate level was low (29.9) on arrival with remaining level pending. His sister reports that she is uncertain if he has been compliant on his medications. He is currently on Keppra 750 BID and Valproate 1g BID. The patient has been seen by Dr Oliva in the past. His most recent EEG study was completed in 10/21 which was normal. EEG in 02/21 demonstrated rare right sided seizures are seen during sleep with continuous right sided epileptiform periodic discharges seen throughout the study, suggestive of underlying epileptiform  potential and severe generalized slowing. An MRI brain in 2021 demonstrated some basal ganglia signal abnormality along with findings resembling chronic ischemic microvascular changes.        Past Medical History:   Diagnosis Date    Diabetes mellitus     Seizures     Stroke 2013       Past Surgical History:   Procedure  "Laterality Date    TRANSESOPHAGEAL ECHOCARDIOGRAPHY N/A 6/1/2021    Procedure: ECHOCARDIOGRAM, TRANSESOPHAGEAL;  Surgeon: Neo Diagnostic Provider;  Location: [facility] EP LAB;  Service: Anesthesiology;  Laterality: N/A;       Review of patient's allergies indicates:  No Known Allergies      No current facility-administered medications on file prior to encounter.     Current Outpatient Medications on File Prior to Encounter   Medication Sig    [DISCONTINUED] insulin aspart U-100 (NOVOLOG FLEXPEN U-100 INSULIN) 100 unit/mL (3 mL) InPn pen Inject 25 Units into the skin every morning.    blood sugar diagnostic (ONETOUCH VERIO TEST STRIPS) Strp Use to test blood glucose 2 (two) times daily with meals.    blood sugar diagnostic Strp 1 strip by Misc.(Non-Drug; Combo Route) route 2 (two) times daily with meals.    blood-glucose meter kit Use as instructed    divalproex ER (DEPAKOTE) 500 MG Tb24 Take 2 tablets (1,000 mg total) by mouth every 12 (twelve) hours.    insulin asp prt-insulin aspart, NovoLOG 70/30, (NOVOLOG MIX 70-30 U-100 INSULN) 100 unit/mL (70-30) Soln Injecct 25 units into the skin with breakfast and 15 units with dinner or evening meal    insulin aspart U-100 (NOVOLOG FLEXPEN U-100 INSULIN) 100 unit/mL (3 mL) InPn pen Inject 25 units into the skin every morning & 15 units every evening    lancets 30 gauge Misc 1 lancet by Misc.(Non-Drug; Combo Route) route 2 (two) times daily with meals.    lancets Misc 1 lancet by Misc.(Non-Drug; Combo Route) route 2 (two) times daily with meals.    lancing device Misc 1 Device by Misc.(Non-Drug; Combo Route) route 2 (two) times daily with meals.    levETIRAcetam (KEPPRA) 750 MG Tab Take 1 tablet (750 mg total) by mouth 2 (two) times daily.    pen needle, diabetic 31 gauge x 3/16" Ndle use to inject insulins 3 times a day    pen needle, diabetic 32 gauge x 5/32" Ndle Injecting twice daily    [DISCONTINUED] aspirin (ECOTRIN) 81 MG EC tablet Take 1 tablet (81 mg " total) by mouth once daily.    [DISCONTINUED] chlordiazepoxide (LIBRIUM) 25 MG Cap Take 1 capsule (25 mg total) by mouth every 6 (six) hours as needed (alcohol withdrawals). Take 2 pills every 6 hours on day 1  Take 1 pill every 6 hours on day 2 and day 3.   Do not take clobezam when taking this medication. Start your clobezam again when you are done this medicine.    [DISCONTINUED] cloBAZam (ONFI) 10 mg Tab Take 10mg in am and 20mg qhs    [DISCONTINUED] folic acid (FOLVITE) 1 MG tablet Take 1 tablet (1 mg total) by mouth once daily. (Patient not taking: Reported on 2/22/2022)    [DISCONTINUED] folic acid-vit B6-vit B12 2.5-25-2 mg (FOLBIC OR EQUIV) 2.5-25-2 mg Tab Take 1 tablet by mouth once daily.    [DISCONTINUED] insulin aspart protamine-insulin aspart (NOVOLOG MIX 70-30FLEXPEN U-100) 100 unit/mL (70-30) InPn pen Inject 25 Units into the skin with breakfast and 15 Units with dinner or evening meal.    [DISCONTINUED] insulin aspart U-100 (NOVOLOG FLEXPEN U-100 INSULIN) 100 unit/mL (3 mL) InPn pen Inject 15 Units into the skin every evening.    [DISCONTINUED] multivitamin Tab Take 1 tablet by mouth once daily. (Patient not taking: Reported on 2/22/2022)    [DISCONTINUED] risperiDONE (RISPERDAL) 1 MG tablet Take 1 tablet (1 mg total) by mouth 3 (three) times daily. (Patient not taking: No sig reported)    [DISCONTINUED] thiamine 100 MG tablet Take 1 tablet (100 mg total) by mouth once daily.     Family History    None       Tobacco Use    Smoking status: Current Some Day Smoker     Years: 15.00     Types: Cigarettes, Cigars    Smokeless tobacco: Never Used   Substance and Sexual Activity    Alcohol use: Yes     Alcohol/week: 30.0 standard drinks     Types: 30 Shots of liquor per week    Drug use: Not Currently    Sexual activity: Not Currently     Review of Systems   Constitutional:  Negative for chills and fever.   HENT:  Negative for rhinorrhea and sneezing.    Eyes:  Negative for redness and visual  disturbance.   Respiratory:  Negative for cough and shortness of breath.    Gastrointestinal:  Negative for nausea and vomiting.   Skin:  Negative for pallor and rash.   Neurological:  Positive for seizures. Negative for facial asymmetry.   Psychiatric/Behavioral:  Positive for decreased concentration. Negative for agitation.    Objective:     Vital Signs (Most Recent):  Temp: 98.4 °F (36.9 °C) (03/30/22 0715)  Pulse: 95 (03/30/22 1511)  Resp: 18 (03/30/22 1511)  BP: 120/82 (03/30/22 1511)  SpO2: 100 % (03/30/22 1511) Vital Signs (24h Range):  Temp:  [97.6 °F (36.4 °C)-98.4 °F (36.9 °C)] 98.4 °F (36.9 °C)  Pulse:  [] 95  Resp:  [18-26] 18  SpO2:  [96 %-100 %] 100 %  BP: (120-140)/(79-85) 120/82     Weight: 57.2 kg (126 lb)  Body mass index is 18.61 kg/m².    Physical Exam  Constitutional:       Appearance: Normal appearance.   HENT:      Head: Normocephalic and atraumatic.   Eyes:      Extraocular Movements: Extraocular movements intact.      Pupils: Pupils are equal, round, and reactive to light.   Skin:     General: Skin is warm and dry.   Neurological:      Mental Status: He is oriented to person, place, and time.      Sensory: No sensory deficit.      Deep Tendon Reflexes: Reflexes normal.      Reflex Scores:       Tricep reflexes are 2+ on the right side and 2+ on the left side.       Bicep reflexes are 2+ on the right side and 2+ on the left side.       Brachioradialis reflexes are 2+ on the right side and 2+ on the left side.       Patellar reflexes are 2+ on the right side and 2+ on the left side.       Achilles reflexes are 2+ on the right side and 2+ on the left side.      NEUROLOGICAL EXAMINATION:     MENTAL STATUS   Oriented to person, place, and time.   Attention: decreased. Concentration: decreased.   Level of consciousness: drowsy    CRANIAL NERVES     CN II   Visual fields full to confrontation.     CN III, IV, VI   Pupils are equal, round, and reactive to light.  CN III: no CN III palsy  CN  VI: no CN VI palsy    CN V   Facial sensation intact.     CN VII   Facial expression full, symmetric.     CN VIII   CN VIII normal.     CN IX, X   CN IX normal.   CN X normal.     CN XI   CN XI normal.     CN XII   CN XII normal.     MOTOR EXAM     Strength   Right deltoid: 5/5  Left deltoid: 5/5  Right biceps: 5/5  Left biceps: 5/5  Right triceps: 5/5  Left triceps: 5/5  Right quadriceps: 5/5  Left quadriceps: 5/5  Right hamstrin/5  Left hamstrin/5  Right anterior tibial: 5/5  Left anterior tibial: 5/5  Right peroneal: 5/5  Left peroneal: 5/5    REFLEXES     Reflexes   Right brachioradialis: 2+  Left brachioradialis: 2+  Right biceps: 2+  Left biceps: 2+  Right triceps: 2+  Left triceps: 2+  Right patellar: 2+  Left patellar: 2+  Right achilles: 2+  Left achilles: 2+  Right : 2+  Left : 2+  Right Wong: absent  Left Wong: absent  Right ankle clonus: absent  Left ankle clonus: absent    SENSORY EXAM   Light touch normal.     Significant Labs: All pertinent lab results from the past 24 hours have been reviewed.    Significant Imaging: I have reviewed all pertinent imaging results/findings within the past 24 hours.    Assessment and Plan:     Epilepsy  Terrance Chavez is a 41yoM with DM, ETOH abuse, COPD, CVA (), Epilepsy, HTN who presented to the ED following a witnessed seizure of undetermined etiology. Per report, the patient had been recently seen in a hospital for acute intoxication 2-3 days ago. The patient reports that his last drink was at that time. Per report the patient had a seizure of undetermined semiology today for which he came to the hospital. Upon arrival, the patient reported that he did not feel as though he was having withdrawal symptoms. He was, of note, found to be hypoglycemic. Valproate level was low (29.9) on arrival with remaining level pending. His sister reports that she is uncertain if he has been compliant on his medications. He is currently on Keppra 750 BID and  Valproate 1g BID. The patient has been seen by Dr Oliva in the past. His most recent EEG study was completed in 10/21 which was normal. EEG in 02/21 demonstrated rare right sided seizures are seen during sleep with continuous right sided epileptiform periodic discharges seen throughout the study, suggestive of underlying epileptiform  potential and severe generalized slowing. An MRI brain in 2021 demonstrated some basal ganglia signal abnormality along with findings resembling chronic ischemic microvascular changes.       Patients clinical course and exam are concerning for breakthrough seizure v alcohol withdrawal seizure. If breakthrough seizure, it may have been precipitated by medication noncompliance or metabolic findigns such as hypoglycemia on presentation.     Recommend [facility] protocol for alcohol withdrawal  Recommend continuing current AED regimen of Keppra 750 BID and Depakote 1000 BID; no changes at this time  Reinforced need for medication compliance and alcohol cessation  Would benefit from additional resources for alcohol cessation  Referred patient to epilepsy as outpatient for further management. Can follow up with Dr Oliva as well for clinically isolated syndrome  Messaged  for epilepsy follow up as outpatient    Will sign off at this time  Appreciate consult  Please contact with any questions or concerns    Alcohol use disorder, severe, dependence  See above        VTE Risk Mitigation (From admission, onward)         Ordered     enoxaparin injection 40 mg  Daily         03/30/22 0517     IP VTE LOW RISK PATIENT  Once         03/30/22 0517     Place sequential compression device  Until discontinued         03/30/22 0517                Thank you for your consult. I will sign off. Please contact us if you have any additional questions.    Liban Bolivar MD  Neurology  [facility]

## 2022-03-30 NOTE — PT/OT/SLP EVAL
"Occupational Therapy   Evaluation with PT     Name: Terrance Chavez  MRN: [id number]  Admitting Diagnosis:  Seizure  Recent Surgery: * No surgery found *      Recommendations:     Discharge Recommendations: home  Discharge Equipment Recommendations:  none  Barriers to discharge:  None    Assessment:     Terrance Chavez is a 41 y.o. male with a medical diagnosis of Seizure.  He presents with performance deficits affecting function: weakness, impaired endurance, decreased coordination, gait instability, impaired self care skills, impaired functional mobilty, decreased safety awareness, impaired balance.  Pt presents with delayed processing, decreased safety awareness, and mild instability. Pt reports he lives with his Aunt but does not have assistance from family/friends upon d/c. Pt is functioning close to is PLOF but would benefit from OT monitoring in order maintain functional independence once pt is d/c from acute care. Pt would benefit from continued skilled acute OT services in order to maximize independence and safety with ADLs and functional mobility to ensure safe return to PLOF in the least restrictive environment. OT recommending home once pt is medically appropriate for d/c.        Rehab Prognosis: Good; patient would benefit from acute skilled OT services to address these deficits and reach maximum level of function.       Plan:     Patient to be seen 2 x/week to address the above listed problems via    · Plan of Care Expires: 04/28/22  · Plan of Care Reviewed with: patient    Subjective     Chief Complaint: none stated   Patient/Family Comments/goals: to get better.   [place] stated, " [place] " I feel better than this morning."     Occupational Profile:  Living Environment: Pt reports he lives with his Aunt in a 2nd floor apartment with full flight of stairs and elevator access. Pt does not work or drive.   Previous level of function: PTA, pt was (I) with ADLs and functional mobility   Roles and " Routines: none stated   Equipment Used at Home:  none  Assistance upon Discharge: Pt does not have family/friend assistance upon d/c.     Pain/Comfort:  · Pain Rating 1: 0/10  · Pain Rating Post-Intervention 1: 0/10    Patients cultural, spiritual, Jainism conflicts given the current situation: no    Objective:     Communicated with: RN prior to session.  Patient found HOB elevated with blood pressure cuff, pulse ox (continuous), telemetry upon OT entry to room. Pt agreeable to therapy session.     General Precautions: Standard, seizure, fall   Orthopedic Precautions:N/A   Braces: N/A  Respiratory Status: Room air    Occupational Performance:    Bed Mobility:    · Patient completed Scooting/Bridging with supervision  · Patient completed Supine to Sit with supervision  · Patient completed Sit to Supine with stand by assistance    Functional Mobility/Transfers:  · Patient completed Sit <> Stand Transfer with stand by assistance  with  no assistive device   · Functional Mobility: Pt engaging in functional mobility to simulate household/community distances approx 4ft x2 forward to sink and backwards back to bed  with CGA and utilizing no AD in order to maximize functional activity tolerance and standing balance required for engagement in occupations of choice.   · Mild instability but no overt LOB   · Delayed processing- redirection required and directional cueing provided     Activities of Daily Living:  · Feeding:  independence sitting supported in bed for self-feeding   · Grooming: stand by assistance pt completed hand hygiene with min verbal cues for sequencing of steps and increased time provided due to delayed processing   · Lower Body Dressing: minimum assistance to dff B  socks sitting EOB  with increased time requried. Therapist assisted in donning  socks over toes and instructed pt to pull up over heel - increased time required due to delayed processing     Cognitive/Visual  Perceptual:  Cognitive/Psychosocial Skills:     -       Oriented to: Person, Place, Time and Situation   -       Follows Commands/attention:Easily distracted and Follows two-step commands  -       Communication: clear/fluent  -       Memory: no deficts noted but mildly delayed   -       Safety awareness/insight to disability: impaired   -       Mood/Affect/Coping skills/emotional control: Cooperative, Flat affect and Withdrawn  Visual/Perceptual:      -Intact      Physical Exam:  Balance:     Static sit: SBA    Dynamic sit: SBA   Static standing: CGA <> SBA   Dynamic Standing: CGA    Postural examination/scapula alignment:    -       No postural abnormalities identified  Skin integrity: Visible skin intact. Bruising and swelling noted to R eye   Edema:  Mild R eye   Sensation:    -       Intact for B UE  Dominant hand:  right   Upper Extremity Range of Motion:     -       Right Upper Extremity: WFL  -       Left Upper Extremity: WFL  Upper Extremity Strength:    -       Right Upper Extremity: grossly 3+/5   -       Left Upper Extremity: grossly 3+/5    Strength:    -       Right Upper Extremity: WFL  -       Left Upper Extremity: WFL  Fine Motor Coordination:    -       Impaired  Left hand, finger to nose    and Right hand, finger to nose      AMPAC 6 Click ADL:  AMPAC Total Score: 19    Treatment & Education:   Pt educated on role of OT, POC, and goals for therapy.     POC was dicussed with patient/caregiver, who was included in its development and is in agreement with the identified goals and treatment plan.    Patient and family aware of patient's deficits and therapy progression.    Time provided for therapeutic counseling and discussion of health disposition.    Educated on importance of EOB/OOB mobility, maintaining routine, sitting up in chair, and maximizing independence with ADLs during admission    Pt completed ADLs and functional mobility for treatment session as noted above    Pt/caregiver  verbalized understanding and expressed no further concerns/questions.   Updated communication board with level of assist required (CGA 1 person assistance) & educated RN/patient that pt is appropriate for transfers with RN/PCT.     Co-evaluation/treatment performed due to patient's multiple deficits requiring two skilled therapists to appropriately and safely assess patient's strength and endurance while facilitating functional tasks in addition to accommodating for patient's activity tolerance.  Education:    Patient left HOB elevated with all lines intact, call button in reach and RN  notified    GOALS:   Multidisciplinary Problems     Occupational Therapy Goals        Problem: Occupational Therapy    Goal Priority Disciplines Outcome Interventions   Occupational Therapy Goal     OT, PT/OT Ongoing, Progressing    Description: Goals to be met by: 4/14/2022    Patient will increase functional independence with ADLs by performing:    UE Dressing with [place].  LE Dressing with [place].  Grooming while standing at sink with [place].  Toileting from toilet with [place] for hygiene and clothing management.   Toilet transfer to toilet with [place].                     History:     Past Medical History:   Diagnosis Date    Diabetes mellitus     Seizures     Stroke 2013       Past Surgical History:   Procedure Laterality Date    TRANSESOPHAGEAL ECHOCARDIOGRAPHY N/A 6/1/2021    Procedure: ECHOCARDIOGRAM, TRANSESOPHAGEAL;  Surgeon: Neo Diagnostic Provider;  Location: [facility] EP LAB;  Service: Anesthesiology;  Laterality: N/A;       Time Tracking:     OT Date of Treatment: 03/30/22  OT Start Time: 0838  OT Stop Time: 0855  OT Total Time (min): 17 min    Billable Minutes:Evaluation 8  Self Care/Home Management 8    3/30/2022

## 2022-03-30 NOTE — ASSESSMENT & PLAN NOTE
Mr Terrance is a 41 y.o M w ETOH abuse, prior seizures now presenting with seizure after wife called EMS due to unresponsiveness found with vomit. Patient with complaints of weakness and stating he had a seizure 3-4 days ago and did not have one today. He denies feeling symptoms of withdrawal. On exam he is not tremulous and is comfortable. Patients generalized weakness could from rhabdomyolysis 2/2 to previous/current seizure. Patient with lactic acid level of 2.6. Patient was given versed in the field by EMS so could be masking symptoms. Patient is slow to respond but oriented to person/place/time. Patient with low valproic acid level.     Plan:  --Keppra loaded in ED  --Valproic acid 500mg BID  --Continue Keppra 750mg BID  --CIWA protocol  --PRN diazepam for CIWA greater than 8  --2mg IV PRN Ativan for Seizure  --seizure precautions  --fall precautions  --neurology consult order placed  --EEG order placed

## 2022-03-30 NOTE — ED NOTES
Report received from RITA De Leon.  Pt to ED for seizures 2/2 alcohol withdrawal.  Last drink was 4 days ago.  Pt also has facial fracture.  Large hematoma and edema to right eye.  He is calm-appearing at this time, lying on stretcher, breathing even and unlabored.  Cardiac monitor and continuous pulse ox in place.  anthony Finnegan.    LOC: Patient name and date of birth verified. The patient is awake, alert and aware of environment with an appropriate affect, the patient is oriented x 3 and speaking appropriately.   APPEARANCE: Patient resting comfortably, patient is clean and well groomed, patient's clothing is properly fastened.  SKIN: The skin is warm and dry, color consistent with ethnicity, patient has normal skin turgor and moist mucus membranes, skin intact, no breakdown or bruising noted.  MUSCULOSKELETAL: Patient moving all extremities well, no obvious swelling or deformities noted.   RESPIRATORY: Respirations are spontaneous, patient has a normal effort and rate, no accessory muscle use noted.  CARDIAC: Patient has a normal rate and rhythm, no periphreal edema noted, capillary refill < 3 seconds.  ABDOMEN: Soft and non tender to palpation, no distention noted. Bowel sounds present in all four quadrants.  NEUROLOGIC: Eyes open spontaneously, behavior appropriate to situation, follows commands, facial expression symmetrical, bilateral hand grasp equal and even, purposeful motor response noted, normal sensation in all extremities when touched with a finger.

## 2022-03-30 NOTE — ASSESSMENT & PLAN NOTE
Terrance Chavez is a 41yoM with DM, ETOH abuse, COPD, CVA (2013), Epilepsy, HTN who presented to the ED following a witnessed seizure of undetermined etiology. Per report, the patient had been recently seen in a hospital for acute intoxication 2-3 days ago. The patient reports that his last drink was at that time. Per report the patient had a seizure of undetermined semiology today for which he came to the hospital. Upon arrival, the patient reported that he did not feel as though he was having withdrawal symptoms. He was, of note, found to be hypoglycemic. Valproate level was low (29.9) on arrival with remaining level pending. His sister reports that she is uncertain if he has been compliant on his medications. He is currently on Keppra 750 BID and Valproate 1g BID. The patient has been seen by Dr Oliva in the past. His most recent EEG study was completed in 10/21 which was normal. EEG in 02/21 demonstrated rare right sided seizures are seen during sleep with continuous right sided epileptiform periodic discharges seen throughout the study, suggestive of underlying epileptiform  potential and severe generalized slowing. An MRI brain in 2021 demonstrated some basal ganglia signal abnormality along with findings resembling chronic ischemic microvascular changes.       Patients clinical course and exam are concerning for breakthrough seizure v alcohol withdrawal seizure. If breakthrough seizure, it may have been precipitated by medication noncompliance or metabolic findigns such as hypoglycemia on presentation.     Recommend [facility] protocol for alcohol withdrawal  Recommend continuing current AED regimen of Keppra 750 BID and Depakote 1000 BID; no changes at this time  Reinforced need for medication compliance and alcohol cessation  Would benefit from additional resources for alcohol cessation  Referred patient to epilepsy as outpatient for further management. Can follow up with Dr Oliva as well for clinically isolated  syndrome  Messaged  for epilepsy follow up as outpatient    Will sign off at this time  Appreciate consult  Please contact with any questions or concerns

## 2022-03-30 NOTE — PT/OT/SLP EVAL
"Physical Therapy Co-Evaluation with OT and Treatment     Patient Name:  Terrance Chavez  MRN: [id number]    Admit Date: 3/30/2022  Admitting Diagnosis:  Seizure  Length of Stay: 0 days  Recent Surgery: * No surgery found *      Recommendations:     Discharge Recommendations: Home, no PT needs anticipated  Equipment recommendations: none  Barriers to discharge: None Identified     Assessment:     Terrance Chavez is a 41 y.o. male [place] [facility] [place] with medical diagnosis of Seizure. Pt presents with impaired functional mobilty, gait instability, impaired balance. These deficits effect their roles and responsibilities in which they were able to complete prior to admit. Terrance Chavez would benefit from acute PT intervention to improve quality of life, focus on recovery of impairments, provide patient/caregiver education, reduce fall risk, and maximize (I) and safety with functional mobility. Once medically stable, recommending pt discharge to Home, no PT needs anticipated.      Rehab Prognosis: Good    Plan:     During this hospitalization, patient to be seen 2 x/week to address the identified rehab impairments via gait training, therapeutic exercises, therapeutic activities, neuromuscular re-education and progress towards stated goals.     Plan of Care Expires:  04/28/22  Plan of Care reviewed with: patient    This plan of care has been discussed with the patient/caregiver, who was included in its development and is in agreement with the identified goals and treatment plan.     Subjective     Communicated with RN prior to session.  Patient found HOB elevated upon PT entry to room, agreeable to evaluation.     Chief Complaint: Seizures (Witnessed by EMS described as tonic clonic. Last ETOH intake 3 days ago. Pt received 5mg versed by EMS)      Patient/Family Comments/goals: "I feel better than I did this morning" "Could I have something to drink"     Pain/Comfort:  · Pain Rating 1: 0/10  · Pain Rating " Post-Intervention 1: 0/10    Patients cultural, spiritual, Advent conflicts given the current situation: None identified     Patient History: information obtained from pt      Living Environment: Pt lives with aunt in 2nd floor apartment with elevator access     Prior Level of Function: independent with mobility and ADLs  DME owned: none  Support Available/Caregiver Assistance: limited assist from Aunt per pt's report    Objective:     Patient found with: blood pressure cuff, pulse ox (continuous), telemetry    Recent Surgery: * No surgery found *    General Precautions: Standard, fall, seizure   Orthopedic Precautions:N/A   Braces: N/A   Oxygen Device: room air      Exams:     Cognition:  · Oriented X person, place and time; Alert  · Command following: Follows one-step verbal commands; delayed processing of commands noted; delayed response time   · Communication: clear/fluent     Sensation:   o Light touch sensation: Intact BLEs     Gross Motor Coordination: No deficits noted during functional mobility tasks      Edema/Skin Integrity: None noted; Visible skin intact; bruising noted at R orbital region      Postural examination/scapula alignment: Rounded shoulder and Head forward     Lower Extremity Range of Motion:  o Right Lower Extremity: WFL  o Left Lower Extremity: WFL     Lower Extremity Strength:  o Right Lower Extremity: WFL  o Left Lower Extremity: WFL    Functional Mobility:    Bed Mobility:  · Supine > Sit with supervision  · Sit > Supine with supervision    Transfers:   · Sit <> Stand Transfer: Stand-by Assistance x 1 trials from EOB with no AD               Gait:  · Distance: ~5 ft. Forward and backward   · Assistance level: Contact Guard Assistance  · Assistive Device: none   · Gait Assessment: reciprocal gait pattern with mild increased multi-directional sway, decreased step length. No overt LOB.     Balance:  · Dynamic Sitting: GOOD: Maintains balance through MODERATE excursions of active  trunk movement  · Standing:  · Static: FAIR: Maintains without assist but unable to take challenges   · Dynamic: FAIR: Needs CONTACT GUARD during gait    Outcome Measure: AM-PAC 6 CLICK MOBILITY  Total Score:19     Patient/Caregiver Education:     Therapist educated pt/caregiver regarding:    PT POC and goals for therapy    Safety with mobility and fall risk    Instruction on use of call button and importance of calling nursing staff for assistance with mobility    Time provided for therapeutic counseling and discussion of current health disposition. All questions answered to satisfaction, within scope of PT practice     Patient/caregiver able to verbalize understanding and expressed no further questions this visit; will follow-up with pt/caregiver during current admit for additional questions/concerns within scope of practice.     White board updated.     Patient left HOB elevated with all lines intact, call button in reach and RN notified.    GOALS:   Multidisciplinary Problems     Physical Therapy Goals        Problem: Physical Therapy    Goal Priority Disciplines Outcome Goal Variances Interventions   Physical Therapy Goal     PT, PT/OT Ongoing, Progressing     Description: Goals to be met by: 4/10/22    Patient will increase functional independence with mobility by performin. Sit to stand transfer with [place] x 3 repetitions   2. Gait  x 250 feet with Supervision without AD   3. Stand for 3 minutes with Supervision while performing dynamic balance activities to reduce fall risk   4. Lower extremity exercise program x20 reps per handout, with independence                       History:     Past Medical History:   Diagnosis Date    Diabetes mellitus     Seizures     Stroke        Past Surgical History:   Procedure Laterality Date    TRANSESOPHAGEAL ECHOCARDIOGRAPHY N/A 2021    Procedure: ECHOCARDIOGRAM, TRANSESOPHAGEAL;  Surgeon: [facility] Diagnostic Provider;  Location: [facility] EP LAB;   Service: Anesthesiology;  Laterality: N/A;       Time Tracking:     PT Received On: 03/30/22  PT Start Time: 0838     PT Stop Time: 0855  PT Total Time (min): 17 min     Billable Minutes: Evaluation 9 min and Therapeutic Activity 8 min    03/30/2022

## 2022-03-30 NOTE — ED NOTES
Telemetry box placed on this patient at this time.   Telemetry was called to confirm that they are able to see the patient on the monitor.  HR 76 NSR.

## 2022-03-30 NOTE — PHARMACY MED REC
"  Admission Medication History     The home medication history was taken by Laura Grossman.    You may go to "Admission" then "Reconcile Home Medications" tabs to review and/or act upon these items.      The home medication list has been updated by the Pharmacy department.    Please read ALL comments highlighted in yellow.    Please address this information as you see fit.     Feel free to contact us if you have any questions or require assistance.      The medications listed below were removed from the home medication list. Please reorder if appropriate:  Patient reports no longer taking the following medication(s):   ASPIRIN 81MG   CHLORDIAZEPOXIDE 25MG   CLOBAZAM 10MG   FOLIC ACID 1MG   FOLIC ACID-VIT B6-VIT B12 2.5-25-2MG   MULTIVITAMIN   RISPERIDONE 1MG   THIAMINE 100MG    Medications listed below were obtained from: Patient/family    Medication Sig    blood sugar diagnostic (ONETOUCH VERIO TEST STRIPS) Strp Use to test blood glucose 2 (two) times daily with meals.    blood sugar diagnostic Strp 1 strip by Misc.(Non-Drug; Combo Route) route 2 (two) times daily with meals.    divalproex ER (DEPAKOTE) 500 MG Tb24 Take 2 tablets (1,000 mg total) by mouth every 12 (twelve) hours.    insulin asp prt-insulin aspart, NovoLOG 70/30, (NOVOLOG MIX 70-30 U-100 INSULN) 100 unit/mL (70-30) Soln Injecct 25 units into the skin with breakfast and 15 units with dinner or evening meal    insulin aspart U-100 (NOVOLOG FLEXPEN U-100 INSULIN) 100 unit/mL (3 mL) InPn pen Inject 25 units into the skin every morning & 15 units every evening    lancets Misc 1 lancet by Misc.(Non-Drug; Combo Route) route 2 (two) times daily with meals.    levETIRAcetam (KEPPRA) 750 MG Tab Take 1 tablet (750 mg total) by mouth 2 (two) times daily.    pen needle, diabetic 31 gauge x 3/16" Ndle use to inject insulins 3 times a day     Potential issues to be addressed PRIOR TO DISCHARGE  Please discuss with the patient barriers to " adherence with medication treatment plans  Patient requires education regarding drug therapies     Laura Grossman  EXT [phone]        .

## 2022-03-30 NOTE — SUBJECTIVE & OBJECTIVE
Past Medical History:   Diagnosis Date    Diabetes mellitus     Seizures     Stroke 2013       Past Surgical History:   Procedure Laterality Date    TRANSESOPHAGEAL ECHOCARDIOGRAPHY N/A 6/1/2021    Procedure: ECHOCARDIOGRAM, TRANSESOPHAGEAL;  Surgeon: [facility] Diagnostic Provider;  Location: [facility] LAB;  Service: Anesthesiology;  Laterality: N/A;       Review of patient's allergies indicates:  No Known Allergies    Family History    None       Tobacco Use    Smoking status: Current Some Day Smoker     Years: 15.00     Types: Cigarettes, Cigars    Smokeless tobacco: Never Used   Substance and Sexual Activity    Alcohol use: Yes     Alcohol/week: 30.0 standard drinks     Types: 30 Shots of liquor per week    Drug use: Not Currently    Sexual activity: Not Currently      Review of Systems   Constitutional:  Negative for appetite change and fever.   HENT:  Negative for congestion and sinus pain.    Eyes:  Negative for discharge and redness.   Respiratory:  Negative for cough and shortness of breath.    Cardiovascular:  Negative for chest pain and leg swelling.   Gastrointestinal:  Negative for diarrhea and nausea.   Endocrine: Negative for polydipsia and polyuria.   Genitourinary:  Negative for dysuria and hematuria.   Musculoskeletal:  Negative for arthralgias and myalgias.   Skin:  Negative for color change and rash.   Neurological:  Positive for seizures. Negative for weakness and numbness.   Psychiatric/Behavioral:  Negative for agitation and confusion.    Objective:     Vital Signs (Most Recent):  Temp: 97.6 °F (36.4 °C) (03/30/22 0040)  Pulse: 81 (03/30/22 0303)  Resp: 20 (03/30/22 0104)  BP: 124/79 (03/30/22 0303)  SpO2: 100 % (03/30/22 0303) Vital Signs (24h Range):  Temp:  [97.6 °F (36.4 °C)] 97.6 °F (36.4 °C)  Pulse:  [] 81  Resp:  [20-26] 20  SpO2:  [96 %-100 %] 100 %  BP: (124-132)/(79-85) 124/79      There is no height or weight on file to calculate BMI.    No intake or output data in the 24 hours ending  03/30/22 0342    Physical Exam  Constitutional:       General: He is not in acute distress.     Appearance: Normal appearance.   HENT:      Head: Normocephalic and atraumatic.      Mouth/Throat:      Mouth: Mucous membranes are moist.      Pharynx: Oropharynx is clear.   Eyes:      Conjunctiva/sclera: Conjunctivae normal.      Pupils: Pupils are equal, round, and reactive to light.   Cardiovascular:      Rate and Rhythm: Normal rate and regular rhythm.      Pulses: Normal pulses.      Heart sounds: Normal heart sounds.   Pulmonary:      Effort: Pulmonary effort is normal.      Breath sounds: Normal breath sounds.   Abdominal:      General: Abdomen is flat.      Palpations: Abdomen is soft.   Musculoskeletal:         General: No swelling or deformity.      Cervical back: Normal range of motion and neck supple. No tenderness.   Skin:     General: Skin is warm and dry.   Neurological:      General: No focal deficit present.      Mental Status: He is alert and oriented to person, place, and time.      Sensory: No sensory deficit.      Comments: No tremors. Sluggish with coordination testing   Psychiatric:         Mood and Affect: Mood normal.         Behavior: Behavior normal.       Vents:     Lines/Drains/Airways       Drain  Duration             Male External Urinary Catheter 03/30/22 0102 Medium <1 day              Peripheral Intravenous Line  Duration                  Peripheral IV - Single Lumen 03/30/22 0057 18 G Right Forearm <1 day                  Significant Labs:    CBC/Anemia Profile:  Recent Labs   Lab 03/28/22 2135 03/30/22  0059   WBC 7.91 6.21   HGB 15.4 12.8*   HCT 43.7 35.7*    284   MCV 92 90   RDW 15.0* 14.9*        Chemistries:  Recent Labs   Lab 03/28/22 2135 03/28/22 2329 03/30/22  0059    140 135*   K 3.5 3.6 3.9   CL 94* 97 98   CO2 34* 30* 27   BUN 7 7 9   CREATININE 0.7 0.6 0.8   CALCIUM 12.0* 9.6 9.4   ALBUMIN 4.1  --  3.2*   PROT 8.5*  --  6.7   BILITOT 0.9  --  0.9   ALKPHOS  86  --  65   ALT 91*  --  61*   *  --  84*   MG  --   --  1.5*       CMP:   Recent Labs   Lab 03/28/22 2135 03/28/22 2329 03/30/22  0059    140 135*   K 3.5 3.6 3.9   CL 94* 97 98   CO2 34* 30* 27   GLU 96 160* 308*   BUN 7 7 9   CREATININE 0.7 0.6 0.8   CALCIUM 12.0* 9.6 9.4   PROT 8.5*  --  6.7   ALBUMIN 4.1  --  3.2*   BILITOT 0.9  --  0.9   ALKPHOS 86  --  65   *  --  84*   ALT 91*  --  61*   ANIONGAP 15 13 10   EGFRNONAA >60.0 >60.0 >60.0     Lactic Acid:   Recent Labs   Lab 03/30/22  0059   LACTATE 2.6*     POCT Glucose:   Recent Labs   Lab 03/28/22 2133 03/28/22 2235   POCTGLUCOSE 108 224*     Troponin: No results for input(s): TROPONINI in the last 48 hours.    Significant Imaging: I have reviewed all pertinent imaging results/findings within the past 24 hours.

## 2022-03-30 NOTE — HPI
Terrance Chavez is a 41 y.o M with history of T2DM (insulin dependent), ETOH abuse, COPD, CVA in 2013, Seizures, HTN presenting to ED after reportedly having seizure. Wife called EMS after he vomited in bed and was found unresponsive. Patient with blood glucose of 34 upon and required D50 with appropriate response in glucose. Patient had recent hospitalization on 3/27 at [facility] ED for alcohol intoxication, says his last drink was 2-3 days ago. Patient says he has only missing taking his seizure medication one time and that it was 2 weeks ago. Patient with sore tongue and thinks he may have bit it during seizure, along with the bruising surround his right eye from falling. Patient has no complaints at time of interview. Patient has had withdrawals from alcohol in past which gives him tremors/shakes. He does not feel like he is having withdrawal symptoms this time. Patient has complaints of whole body weakness.    In the ED, HDS, afebrile, alert and oriented, on room air, and does not feel like he is withdrawing. Labs significant for AST 84, ALT 61, ammonia 61, lipase 83, lactate 2.6, valproic acid 29.9 (low). Head and neck imaging negative for acute findings. CT maxillofacial without evidence of fracture. CXR with no acute abnormalities.

## 2022-03-30 NOTE — ASSESSMENT & PLAN NOTE
Patient with T2DM, alcohol abuse, and prior seizures presents due to a reported seizure after his wife called EMS due to his unresponsiveness in bed. He vomited and was hypoglycemic at the time as well, and was given an amp of d50. On presentation, EtOH was negative, was not in obvious alcohol withdrawals and did not feel like he was either. VPA level low. Loaded with Keppra in ED. Lactate 2.6. Was reportedly hypoglycemic on EMS arrival. Could have precipitated a seizure as well. On recent ED visit to [facility] (3 days prior), he was seen for alcohol intoxication, and he reports this was the last time he drank. Seizures for the past few days, tongue biting, and positive ethanol then as well as negative ethanol here support recent alcohol withdrawal but he is not actively withdrawing in the ED. He was given Versed in the field by EMS as well, so could have treated it.     Plan:  - Hemodynamically stable without signs of continued seizure activity or alcohol withdrawal. No critical care needs at this time.  - Continue anti-epileptic medications  - Continue to trend status with CIWA scoring  - Recommend starting a scheduled long acting benzodiazepine like diazepam with prn lorazepam for CIWA >8 if he begins showing signs of withdrawal (tachycardia, tremulousness, headache, diaphoresis, agitation)  - If no signs of alcohol withdrawal and continued seizure activity or concern for breakthrough seizures, could consider neurology consultation  - Continue to monitor and correct any electrolyte abnormalities  - Maintain euvolemia and trend lactate until it normalizes  - If concern for continued seizures, hemodynamic instability, status, etc, don't hesitate to reach back out to critical care.

## 2022-03-30 NOTE — ED NOTES
Pt continuously encouraged to stay in bed, but he prefers to stand up.  Gait is steady, oriented, clear speech.

## 2022-03-30 NOTE — ED NOTES
Patient was ambulating around the department, was assisted back to RM 3 and bed linens were changed as he was incontinent of urine.  Denies that his underwear or pants are wet, states that he had just put them on.  He denies any SOB or chest pain.  States that he is not hurting any where.  Patient is AAOx4, had asked why we keep asking him these questions.  I explained to him that it is because he has a hx of seizures and sometimes this makes the patient's confused.  Patient denies any needs.

## 2022-03-30 NOTE — PLAN OF CARE
Plan of Care:  PT evaluation completed- see note for details. Goals and POC established.     Discharge Recommendation: Home, no PT needs.  Please continue Progressive Mobility Protocol as appropriate.  Pt to be seen 2x/week during acute hospitalization to address listed goals below.     3/30/2022    Physical Therapy Treatment Goals:  Multidisciplinary Problems       Physical Therapy Goals          Problem: Physical Therapy    Goal Priority Disciplines Outcome Goal Variances Interventions   Physical Therapy Goal     PT, PT/OT Ongoing, Progressing     Description: Goals to be met by: 4/10/22    Patient will increase functional independence with mobility by performin. Sit to stand transfer with [place] x 3 repetitions   2. Gait  x 250 feet with Supervision without AD   3. Stand for 3 minutes with Supervision while performing dynamic balance activities to reduce fall risk   4. Lower extremity exercise program x20 reps per handout, with independence

## 2022-03-30 NOTE — Clinical Note
Diagnosis: Seizure [[id number]]   Admitting Provider:: RUBINA IVY [[id number]]   Future Attending Provider: RUBINA IVY [[id number]]   Reason for IP Medical Treatment  (Clinical interventions that can only be accomplished in the IP setting? ) :: Alcohol withdrawal seizures   Estimated Length of Stay:: 2 midnights   I certify that Inpatient services for greater than or equal to 2 midnights are medically necessary:: Yes   Plans for Post-Acute care--if anticipated (pick the single best option):: A. No post acute care anticipated at this time   Special Needs:: No Special Needs [1]

## 2022-03-30 NOTE — SUBJECTIVE & OBJECTIVE
Past Medical History:   Diagnosis Date    Diabetes mellitus     Seizures     Stroke 2013       Past Surgical History:   Procedure Laterality Date    TRANSESOPHAGEAL ECHOCARDIOGRAPHY N/A 6/1/2021    Procedure: ECHOCARDIOGRAM, TRANSESOPHAGEAL;  Surgeon: Neo Diagnostic Provider;  Location: [facility] EP LAB;  Service: Anesthesiology;  Laterality: N/A;       Review of patient's allergies indicates:  No Known Allergies    No current facility-administered medications on file prior to encounter.     Current Outpatient Medications on File Prior to Encounter   Medication Sig    aspirin (ECOTRIN) 81 MG EC tablet Take 1 tablet (81 mg total) by mouth once daily.    blood sugar diagnostic (ONETOUCH VERIO TEST STRIPS) Strp Use to test blood glucose 2 (two) times daily with meals.    blood sugar diagnostic Strp 1 strip by Misc.(Non-Drug; Combo Route) route 2 (two) times daily with meals.    blood-glucose meter kit Use as instructed    chlordiazepoxide (LIBRIUM) 25 MG Cap Take 1 capsule (25 mg total) by mouth every 6 (six) hours as needed (alcohol withdrawals). Take 2 pills every 6 hours on day 1  Take 1 pill every 6 hours on day 2 and day 3.   Do not take clobezam when taking this medication. Start your clobezam again when you are done this medicine.    cloBAZam (ONFI) 10 mg Tab Take 10mg in am and 20mg qhs    divalproex ER (DEPAKOTE) 500 MG Tb24 Take 2 tablets (1,000 mg total) by mouth every 12 (twelve) hours.    folic acid (FOLVITE) 1 MG tablet Take 1 tablet (1 mg total) by mouth once daily. (Patient not taking: Reported on 2/22/2022)    folic acid-vit B6-vit B12 2.5-25-2 mg (FOLBIC OR EQUIV) 2.5-25-2 mg Tab Take 1 tablet by mouth once daily.    insulin aspart protamine-insulin aspart (NOVOLOG MIX 70-30FLEXPEN U-100) 100 unit/mL (70-30) InPn pen Inject 25 Units into the skin with breakfast and 15 Units with dinner or evening meal.    insulin aspart U-100 (NOVOLOG FLEXPEN U-100 INSULIN) 100 unit/mL (3 mL) InPn pen Inject 25 Units into  "the skin every morning.    insulin aspart U-100 (NOVOLOG FLEXPEN U-100 INSULIN) 100 unit/mL (3 mL) InPn pen Inject 15 Units into the skin every evening.    lancets 30 gauge Misc 1 lancet by Misc.(Non-Drug; Combo Route) route 2 (two) times daily with meals.    lancets Misc 1 lancet by Misc.(Non-Drug; Combo Route) route 2 (two) times daily with meals.    lancing device Misc 1 Device by Misc.(Non-Drug; Combo Route) route 2 (two) times daily with meals.    levETIRAcetam (KEPPRA) 750 MG Tab Take 1 tablet (750 mg total) by mouth 2 (two) times daily.    multivitamin Tab Take 1 tablet by mouth once daily. (Patient not taking: Reported on 2/22/2022)    pen needle, diabetic 31 gauge x 3/16" Ndle use to inject insulins 3 times a day    pen needle, diabetic 32 gauge x 5/32" Ndle Injecting twice daily    risperiDONE (RISPERDAL) 1 MG tablet Take 1 tablet (1 mg total) by mouth 3 (three) times daily. (Patient not taking: No sig reported)    thiamine 100 MG tablet Take 1 tablet (100 mg total) by mouth once daily.     Family History    None       Tobacco Use    Smoking status: Current Some Day Smoker     Years: 15.00     Types: Cigarettes, Cigars    Smokeless tobacco: Never Used   Substance and Sexual Activity    Alcohol use: Yes     Alcohol/week: 30.0 standard drinks     Types: 30 Shots of liquor per week    Drug use: Not Currently    Sexual activity: Not Currently     Review of Systems   Constitutional:  Positive for fatigue. Negative for chills and fever.   HENT:  Negative for congestion and sore throat.    Eyes:  Negative for photophobia, pain and visual disturbance.   Respiratory:  Negative for chest tightness and shortness of breath.    Cardiovascular:  Negative for chest pain and leg swelling.   Gastrointestinal:  Negative for abdominal distention, abdominal pain, anal bleeding, constipation, diarrhea, nausea and vomiting.   Genitourinary:  Negative for difficulty urinating.   Musculoskeletal:  Negative for arthralgias. "   Skin:  Negative for rash.   Neurological:  Positive for seizures and weakness. Negative for headaches.   Hematological:  Negative for adenopathy.   Psychiatric/Behavioral:  Negative for agitation.    Objective:     Vital Signs (Most Recent):  Temp: 97.6 °F (36.4 °C) (03/30/22 0040)  Pulse: 84 (03/30/22 0422)  Resp: 20 (03/30/22 0104)  BP: (!) 140/84 (03/30/22 0422)  SpO2: 99 % (03/30/22 0422)   Vital Signs (24h Range):  Temp:  [97.6 °F (36.4 °C)] 97.6 °F (36.4 °C)  Pulse:  [] 84  Resp:  [20-26] 20  SpO2:  [96 %-100 %] 99 %  BP: (124-140)/(79-85) 140/84        There is no height or weight on file to calculate BMI.    Physical Exam  Constitutional:       General: He is not in acute distress.     Appearance: He is ill-appearing. He is not toxic-appearing.   HENT:      Head: Normocephalic.      Comments: Brusing around right eye     Right Ear: External ear normal.      Left Ear: External ear normal.      Nose: No congestion.   Eyes:      General:         Right eye: No discharge.         Left eye: No discharge.      Conjunctiva/sclera: Conjunctivae normal.   Cardiovascular:      Rate and Rhythm: Normal rate and regular rhythm.      Heart sounds: No murmur heard.  Pulmonary:      Effort: Pulmonary effort is normal. No respiratory distress.      Breath sounds: Normal breath sounds. No wheezing.   Abdominal:      General: Abdomen is flat.      Palpations: Abdomen is soft.      Tenderness: There is no abdominal tenderness. There is no guarding.   Musculoskeletal:         General: No swelling.      Cervical back: Normal range of motion.      Right lower leg: No edema.      Left lower leg: No edema.   Skin:     General: Skin is warm and dry.      Coloration: Skin is not jaundiced.      Findings: Bruising present. No rash.   Neurological:      Mental Status: He is alert and oriented to person, place, and time.   Psychiatric:         Mood and Affect: Mood normal.         Behavior: Behavior normal.           Significant  Labs: All pertinent labs within the past 24 hours have been reviewed.    Bilirubin:   Recent Labs   Lab 03/28/22 2135 03/30/22  0059   BILITOT 0.9 0.9     BMP:   Recent Labs   Lab 03/30/22  0059   *   *   K 3.9   CL 98   CO2 27   BUN 9   CREATININE 0.8   CALCIUM 9.4   MG 1.5*     CBC:   Recent Labs   Lab 03/28/22 2135 03/30/22  0059   WBC 7.91 6.21   HGB 15.4 12.8*   HCT 43.7 35.7*    284     CMP:   Recent Labs   Lab 03/28/22 2135 03/28/22 2329 03/30/22  0059    140 135*   K 3.5 3.6 3.9   CL 94* 97 98   CO2 34* 30* 27   GLU 96 160* 308*   BUN 7 7 9   CREATININE 0.7 0.6 0.8   CALCIUM 12.0* 9.6 9.4   PROT 8.5*  --  6.7   ALBUMIN 4.1  --  3.2*   BILITOT 0.9  --  0.9   ALKPHOS 86  --  65   *  --  84*   ALT 91*  --  61*   ANIONGAP 15 13 10   EGFRNONAA >60.0 >60.0 >60.0       Recent Labs   Lab 03/30/22  0101   INR 1.0       Recent Labs   Lab 03/30/22  0059   LACTATE 2.6*       Recent Labs   Lab 03/30/22  0059   LIPASE 83*       Magnesium:   Recent Labs   Lab 03/30/22  0059   MG 1.5*       Recent Labs   Lab 03/28/22 2133 03/28/22  2235   POCTGLUCOSE 108 224*       Recent Labs   Lab 03/30/22  0400   COLORU Yellow   APPEARANCEUA Clear   PHUR 7.0   SPECGRAV 1.010   PROTEINUA Negative   GLUCUA 3+*   KETONESU Trace*   BILIRUBINUA Negative   OCCULTUA Negative   NITRITE Negative   LEUKOCYTESUR Negative   RBCUA 1   WBCUA 0   BACTERIA Rare   SQUAMEPITHEL 0   HYALINECASTS 7*       Significant Imaging: I have reviewed all pertinent imaging results/findings within the past 24 hours.

## 2022-03-30 NOTE — SUBJECTIVE & OBJECTIVE
"Past Medical History:   Diagnosis Date    Diabetes mellitus     Seizures     Stroke 2013       Past Surgical History:   Procedure Laterality Date    TRANSESOPHAGEAL ECHOCARDIOGRAPHY N/A 6/1/2021    Procedure: ECHOCARDIOGRAM, TRANSESOPHAGEAL;  Surgeon: Noe Diagnostic Provider;  Location: [facility] EP LAB;  Service: Anesthesiology;  Laterality: N/A;       Review of patient's allergies indicates:  No Known Allergies      No current facility-administered medications on file prior to encounter.     Current Outpatient Medications on File Prior to Encounter   Medication Sig    [DISCONTINUED] insulin aspart U-100 (NOVOLOG FLEXPEN U-100 INSULIN) 100 unit/mL (3 mL) InPn pen Inject 25 Units into the skin every morning.    blood sugar diagnostic (ONETOUCH VERIO TEST STRIPS) Strp Use to test blood glucose 2 (two) times daily with meals.    blood sugar diagnostic Strp 1 strip by Misc.(Non-Drug; Combo Route) route 2 (two) times daily with meals.    blood-glucose meter kit Use as instructed    divalproex ER (DEPAKOTE) 500 MG Tb24 Take 2 tablets (1,000 mg total) by mouth every 12 (twelve) hours.    insulin asp prt-insulin aspart, NovoLOG 70/30, (NOVOLOG MIX 70-30 U-100 INSULN) 100 unit/mL (70-30) Soln Injecct 25 units into the skin with breakfast and 15 units with dinner or evening meal    insulin aspart U-100 (NOVOLOG FLEXPEN U-100 INSULIN) 100 unit/mL (3 mL) InPn pen Inject 25 units into the skin every morning & 15 units every evening    lancets 30 gauge Misc 1 lancet by Misc.(Non-Drug; Combo Route) route 2 (two) times daily with meals.    lancets Misc 1 lancet by Misc.(Non-Drug; Combo Route) route 2 (two) times daily with meals.    lancing device Misc 1 Device by Misc.(Non-Drug; Combo Route) route 2 (two) times daily with meals.    levETIRAcetam (KEPPRA) 750 MG Tab Take 1 tablet (750 mg total) by mouth 2 (two) times daily.    pen needle, diabetic 31 gauge x 3/16" Ndle use to inject insulins 3 times a day    pen needle, diabetic 32 " "gauge x 5/32" Ndle Injecting twice daily    [DISCONTINUED] aspirin (ECOTRIN) 81 MG EC tablet Take 1 tablet (81 mg total) by mouth once daily.    [DISCONTINUED] chlordiazepoxide (LIBRIUM) 25 MG Cap Take 1 capsule (25 mg total) by mouth every 6 (six) hours as needed (alcohol withdrawals). Take 2 pills every 6 hours on day 1  Take 1 pill every 6 hours on day 2 and day 3.   Do not take clobezam when taking this medication. Start your clobezam again when you are done this medicine.    [DISCONTINUED] cloBAZam (ONFI) 10 mg Tab Take 10mg in am and 20mg qhs    [DISCONTINUED] folic acid (FOLVITE) 1 MG tablet Take 1 tablet (1 mg total) by mouth once daily. (Patient not taking: Reported on 2/22/2022)    [DISCONTINUED] folic acid-vit B6-vit B12 2.5-25-2 mg (FOLBIC OR EQUIV) 2.5-25-2 mg Tab Take 1 tablet by mouth once daily.    [DISCONTINUED] insulin aspart protamine-insulin aspart (NOVOLOG MIX 70-30FLEXPEN U-100) 100 unit/mL (70-30) InPn pen Inject 25 Units into the skin with breakfast and 15 Units with dinner or evening meal.    [DISCONTINUED] insulin aspart U-100 (NOVOLOG FLEXPEN U-100 INSULIN) 100 unit/mL (3 mL) InPn pen Inject 15 Units into the skin every evening.    [DISCONTINUED] multivitamin Tab Take 1 tablet by mouth once daily. (Patient not taking: Reported on 2/22/2022)    [DISCONTINUED] risperiDONE (RISPERDAL) 1 MG tablet Take 1 tablet (1 mg total) by mouth 3 (three) times daily. (Patient not taking: No sig reported)    [DISCONTINUED] thiamine 100 MG tablet Take 1 tablet (100 mg total) by mouth once daily.     Family History    None       Tobacco Use    Smoking status: Current Some Day Smoker     Years: 15.00     Types: Cigarettes, Cigars    Smokeless tobacco: Never Used   Substance and Sexual Activity    Alcohol use: Yes     Alcohol/week: 30.0 standard drinks     Types: 30 Shots of liquor per week    Drug use: Not Currently    Sexual activity: Not Currently     Review of Systems   Constitutional:  Negative for chills " and fever.   HENT:  Negative for rhinorrhea and sneezing.    Eyes:  Negative for redness and visual disturbance.   Respiratory:  Negative for cough and shortness of breath.    Gastrointestinal:  Negative for nausea and vomiting.   Skin:  Negative for pallor and rash.   Neurological:  Positive for seizures. Negative for facial asymmetry.   Psychiatric/Behavioral:  Positive for decreased concentration. Negative for agitation.    Objective:     Vital Signs (Most Recent):  Temp: 98.4 °F (36.9 °C) (03/30/22 0715)  Pulse: 95 (03/30/22 1511)  Resp: 18 (03/30/22 1511)  BP: 120/82 (03/30/22 1511)  SpO2: 100 % (03/30/22 1511) Vital Signs (24h Range):  Temp:  [97.6 °F (36.4 °C)-98.4 °F (36.9 °C)] 98.4 °F (36.9 °C)  Pulse:  [] 95  Resp:  [18-26] 18  SpO2:  [96 %-100 %] 100 %  BP: (120-140)/(79-85) 120/82     Weight: 57.2 kg (126 lb)  Body mass index is 18.61 kg/m².    Physical Exam  Constitutional:       Appearance: Normal appearance.   HENT:      Head: Normocephalic and atraumatic.   Eyes:      Extraocular Movements: Extraocular movements intact.      Pupils: Pupils are equal, round, and reactive to light.   Skin:     General: Skin is warm and dry.   Neurological:      Mental Status: He is oriented to person, place, and time.      Sensory: No sensory deficit.      Deep Tendon Reflexes: Reflexes normal.      Reflex Scores:       Tricep reflexes are 2+ on the right side and 2+ on the left side.       Bicep reflexes are 2+ on the right side and 2+ on the left side.       Brachioradialis reflexes are 2+ on the right side and 2+ on the left side.       Patellar reflexes are 2+ on the right side and 2+ on the left side.       Achilles reflexes are 2+ on the right side and 2+ on the left side.      NEUROLOGICAL EXAMINATION:     MENTAL STATUS   Oriented to person, place, and time.   Attention: decreased. Concentration: decreased.   Level of consciousness: drowsy    CRANIAL NERVES     CN II   Visual fields full to confrontation.      CN III, IV, VI   Pupils are equal, round, and reactive to light.  CN III: no CN III palsy  CN VI: no CN VI palsy    CN V   Facial sensation intact.     CN VII   Facial expression full, symmetric.     CN VIII   CN VIII normal.     CN IX, X   CN IX normal.   CN X normal.     CN XI   CN XI normal.     CN XII   CN XII normal.     MOTOR EXAM     Strength   Right deltoid: 5/5  Left deltoid: 5/5  Right biceps: 5/5  Left biceps: 5/5  Right triceps: 5/5  Left triceps: 5/5  Right quadriceps: 5/5  Left quadriceps: 5/5  Right hamstrin/5  Left hamstrin/5  Right anterior tibial: 5/5  Left anterior tibial: 5/5  Right peroneal: 5/5  Left peroneal: 5/5    REFLEXES     Reflexes   Right brachioradialis: 2+  Left brachioradialis: 2+  Right biceps: 2+  Left biceps: 2+  Right triceps: 2+  Left triceps: 2+  Right patellar: 2+  Left patellar: 2+  Right achilles: 2+  Left achilles: 2+  Right : 2+  Left : 2+  Right Wong: absent  Left Wong: absent  Right ankle clonus: absent  Left ankle clonus: absent    SENSORY EXAM   Light touch normal.     Significant Labs: All pertinent lab results from the past 24 hours have been reviewed.    Significant Imaging: I have reviewed all pertinent imaging results/findings within the past 24 hours.

## 2022-03-30 NOTE — PROGRESS NOTES
03/30/22 1751   Vital Signs   Temp 98.2 °F (36.8 °C)   Temp src Oral   Pulse 74   Resp 18   SpO2 98 %   /79   MAP (mmHg) 98   BP Location Right arm   BP Method Manual   Patient Position Lying

## 2022-03-30 NOTE — ASSESSMENT & PLAN NOTE
Patient notes that his last drink was 3-4 days ago. He states he drinks about half a pint per day of hard liquor. Patient has had withdrawal in the past with tremors as a symptoms. Does not currently feel he is withdrawing form ETOH.    Plan:  --Consider addiction psych consult if patient interested  --education on helping quit ETOH once patient feeling better

## 2022-03-30 NOTE — HPI
Terrance Chavez is a 41yoM with DM, ETOH abuse, COPD, CVA (2013), Epilepsy, HTN who presented to the ED following a witnessed seizure of undetermined etiology. Per report, the patient had been recently seen in a hospital for acute intoxication 2-3 days ago. The patient reports that his last drink was at that time. Per report the patient had a seizure of undetermined semiology today for which he came to the hospital. Upon arrival, the patient reported that he did not feel as though he was having withdrawal symptoms. He was, of note, found to be hypoglycemic. Valproate level was low (29.9) on arrival with remaining level pending. His sister reports that she is uncertain if he has been compliant on his medications. He is currently on Keppra 750 BID and Valproate 1g BID. The patient has been seen by Dr Oliva in the past. His most recent EEG study was completed in 10/21 which was normal. EEG in 02/21 demonstrated rare right sided seizures are seen during sleep with continuous right sided epileptiform periodic discharges seen throughout the study, suggestive of underlying epileptiform  potential and severe generalized slowing. An MRI brain in 2021 demonstrated some basal ganglia signal abnormality along with findings resembling chronic ischemic microvascular changes.

## 2022-03-30 NOTE — CONSULTS
[facility] - Emergency Dept  Critical Care Medicine  Consult Note    Patient Name: Terrance Chavez  MRN: [id number]  Admission Date: 3/30/2022  Hospital Length of Stay: 0 days  Code Status: Prior  Attending Physician: Tabitha Woods MD   Primary Care Provider: Primary Doctor No   Principal Problem: Seizure    Inpatient consult to Critical Care Medicine  Consult performed by: Connor M Gillies, MD  Consult ordered by: Lionel rBuner MD        Subjective:     HPI:  Terrance Chavez is a 41yoM with a hx of T2DM (insulin dependent), alcohol abuse, COPD, CVA 2013, HTN, seizures, and historical reports of homelessness who presents to the ED after reportedly having a seizure. His wife called EMS after he vomited in bed and was unresponsive. On EMS arrival his glucose was 34 so he was given D50 and glucose came up appropriately. He was seen at [facility] ED on 3/27 for alcohol intoxication, which lines up with his report that his last drink was about 2-3 days ago. He is mildly sluggish in his responses, but is alert and oriented. He tells us at bedside that he has had a few seizures over the past couple of days, all of which since his last drink (around the time of his visit to [facility] ED). He has no complaints at this time. He does have a tongue sore that he states he might have bitten with a seizure a about 2 days ago.     In the ED, he is hemodynamically stable, alert and oriented, on room air, and does not feel like he is withdrawing. Labs significant for AST 84, ALT 61, ammonia 61, lipase 83, lactate 2.6, valproic acid 29.9 (low). Head and neck imaging negative for acute findings.       Hospital/ICU Course:  No notes on file    Past Medical History:   Diagnosis Date    Diabetes mellitus     Seizures     Stroke 2013       Past Surgical History:   Procedure Laterality Date    TRANSESOPHAGEAL ECHOCARDIOGRAPHY N/A 6/1/2021    Procedure: ECHOCARDIOGRAM, TRANSESOPHAGEAL;  Surgeon: [facility] Diagnostic Provider;  Location: [facility] LAB;   Service: Anesthesiology;  Laterality: N/A;       Review of patient's allergies indicates:  No Known Allergies    Family History    None       Tobacco Use    Smoking status: Current Some Day Smoker     Years: 15.00     Types: Cigarettes, Cigars    Smokeless tobacco: Never Used   Substance and Sexual Activity    Alcohol use: Yes     Alcohol/week: 30.0 standard drinks     Types: 30 Shots of liquor per week    Drug use: Not Currently    Sexual activity: Not Currently      Review of Systems   Constitutional:  Negative for appetite change and fever.   HENT:  Negative for congestion and sinus pain.    Eyes:  Negative for discharge and redness.   Respiratory:  Negative for cough and shortness of breath.    Cardiovascular:  Negative for chest pain and leg swelling.   Gastrointestinal:  Negative for diarrhea and nausea.   Endocrine: Negative for polydipsia and polyuria.   Genitourinary:  Negative for dysuria and hematuria.   Musculoskeletal:  Negative for arthralgias and myalgias.   Skin:  Negative for color change and rash.   Neurological:  Positive for seizures. Negative for weakness and numbness.   Psychiatric/Behavioral:  Negative for agitation and confusion.    Objective:     Vital Signs (Most Recent):  Temp: 97.6 °F (36.4 °C) (03/30/22 0040)  Pulse: 81 (03/30/22 0303)  Resp: 20 (03/30/22 0104)  BP: 124/79 (03/30/22 0303)  SpO2: 100 % (03/30/22 0303) Vital Signs (24h Range):  Temp:  [97.6 °F (36.4 °C)] 97.6 °F (36.4 °C)  Pulse:  [] 81  Resp:  [20-26] 20  SpO2:  [96 %-100 %] 100 %  BP: (124-132)/(79-85) 124/79      There is no height or weight on file to calculate BMI.    No intake or output data in the 24 hours ending 03/30/22 0342    Physical Exam  Constitutional:       General: He is not in acute distress.     Appearance: Normal appearance.   HENT:      Head: Normocephalic and atraumatic.      Mouth/Throat:      Mouth: Mucous membranes are moist.      Pharynx: Oropharynx is clear.   Eyes:       Conjunctiva/sclera: Conjunctivae normal.      Pupils: Pupils are equal, round, and reactive to light.   Cardiovascular:      Rate and Rhythm: Normal rate and regular rhythm.      Pulses: Normal pulses.      Heart sounds: Normal heart sounds.   Pulmonary:      Effort: Pulmonary effort is normal.      Breath sounds: Normal breath sounds.   Abdominal:      General: Abdomen is flat.      Palpations: Abdomen is soft.   Musculoskeletal:         General: No swelling or deformity.      Cervical back: Normal range of motion and neck supple. No tenderness.   Skin:     General: Skin is warm and dry.   Neurological:      General: No focal deficit present.      Mental Status: He is alert and oriented to person, place, and time.      Sensory: No sensory deficit.      Comments: No tremors. Sluggish with coordination testing   Psychiatric:         Mood and Affect: Mood normal.         Behavior: Behavior normal.       Vents:     Lines/Drains/Airways       Drain  Duration             Male External Urinary Catheter 03/30/22 0102 Medium <1 day              Peripheral Intravenous Line  Duration                  Peripheral IV - Single Lumen 03/30/22 0057 18 G Right Forearm <1 day                  Significant Labs:    CBC/Anemia Profile:  Recent Labs   Lab 03/28/22 2135 03/30/22 0059   WBC 7.91 6.21   HGB 15.4 12.8*   HCT 43.7 35.7*    284   MCV 92 90   RDW 15.0* 14.9*        Chemistries:  Recent Labs   Lab 03/28/22 2135 03/28/22 2329 03/30/22  0059    140 135*   K 3.5 3.6 3.9   CL 94* 97 98   CO2 34* 30* 27   BUN 7 7 9   CREATININE 0.7 0.6 0.8   CALCIUM 12.0* 9.6 9.4   ALBUMIN 4.1  --  3.2*   PROT 8.5*  --  6.7   BILITOT 0.9  --  0.9   ALKPHOS 86  --  65   ALT 91*  --  61*   *  --  84*   MG  --   --  1.5*       CMP:   Recent Labs   Lab 03/28/22 2135 03/28/22 2329 03/30/22  0059    140 135*   K 3.5 3.6 3.9   CL 94* 97 98   CO2 34* 30* 27   GLU 96 160* 308*   BUN 7 7 9   CREATININE 0.7 0.6 0.8   CALCIUM  12.0* 9.6 9.4   PROT 8.5*  --  6.7   ALBUMIN 4.1  --  3.2*   BILITOT 0.9  --  0.9   ALKPHOS 86  --  65   *  --  84*   ALT 91*  --  61*   ANIONGAP 15 13 10   EGFRNONAA >60.0 >60.0 >60.0     Lactic Acid:   Recent Labs   Lab 03/30/22  0059   LACTATE 2.6*     POCT Glucose:   Recent Labs   Lab 03/28/22  2133 03/28/22  2235   POCTGLUCOSE 108 224*     Troponin: No results for input(s): TROPONINI in the last 48 hours.    Significant Imaging: I have reviewed all pertinent imaging results/findings within the past 24 hours.      ABG  No results for input(s): PH, PO2, PCO2, HCO3, BE in the last 168 hours.  Assessment/Plan:     Neuro  * Seizure  Patient with T2DM, alcohol abuse, and prior seizures presents due to a reported seizure after his wife called EMS due to his unresponsiveness in bed. He vomited and was hypoglycemic at the time as well, and was given an amp of d50. On presentation, EtOH was negative, was not in obvious alcohol withdrawals and did not feel like he was either. VPA level low. Loaded with Keppra in ED. Lactate 2.6. Was reportedly hypoglycemic on EMS arrival. Could have precipitated a seizure as well. On recent ED visit to [facility] (3 days prior), he was seen for alcohol intoxication, and he reports this was the last time he drank. Seizures for the past few days, tongue biting, and positive ethanol then as well as negative ethanol here support recent alcohol withdrawal but he is not actively withdrawing in the ED. He was given Versed in the field by EMS as well, so could have treated it.     Plan:  - Hemodynamically stable without signs of continued seizure activity or alcohol withdrawal. No critical care needs at this time.  - Continue anti-epileptic medications  - Continue to trend status with CIWA scoring  - Recommend starting a scheduled long acting benzodiazepine like diazepam with prn lorazepam for CIWA >8 if he begins showing signs of withdrawal (tachycardia, tremulousness, headache, diaphoresis,  agitation)  - If no signs of alcohol withdrawal and continued seizure activity or concern for breakthrough seizures, could consider neurology consultation  - Continue to monitor and correct any electrolyte abnormalities  - Maintain euvolemia and trend lactate until it normalizes  - If concern for continued seizures, hemodynamic instability, status, etc, don't hesitate to reach back out to critical care.     Psychiatric  Alcohol use disorder, severe, dependence  See seizure for plan    Endocrine  Type 2 diabetes mellitus, with long-term current use of insulin  Defer to primary team          Critical care was time spent personally by me on the following activities: development of treatment plan with patient or surrogate and bedside caregivers, discussions with consultants, evaluation of patient's response to treatment, examination of patient, ordering and performing treatments and interventions, ordering and review of laboratory studies, ordering and review of radiographic studies, pulse oximetry, re-evaluation of patient's condition. This critical care time did not overlap with that of any other provider or involve time for any procedures.    Thank you for your consult. I will sign off. Please contact us if you have any additional questions.     Connor M Gillies, MD  Critical Care Medicine  [facility] - Emergency Dept

## 2022-03-30 NOTE — PLAN OF CARE
Problem: Occupational Therapy  Goal: Occupational Therapy Goal  Description: Goals to be met by: 4/14/2022    Patient will increase functional independence with ADLs by performing:    UE Dressing with [place].  LE Dressing with [place].  Grooming while standing at sink with [place].  Toileting from toilet with [place] for hygiene and clothing management.   Toilet transfer to toilet with [place].    Outcome: Ongoing, Progressing

## 2022-03-30 NOTE — HPI
Terrance Chavez is a 41yoM with a hx of T2DM (insulin dependent), alcohol abuse, COPD, CVA 2013, HTN, seizures, and historical reports of homelessness who presents to the ED after reportedly having a seizure. His wife called EMS after he vomited in bed and was unresponsive. On EMS arrival his glucose was 34 so he was given D50 and glucose came up appropriately. He was seen at [facility] ED on 3/27 for alcohol intoxication, which lines up with his report that his last drink was about 2-3 days ago. He is mildly sluggish in his responses, but is alert and oriented. He tells us at bedside that he has had a few seizures over the past couple of days, all of which since his last drink (around the time of his visit to [facility] ED). He has no complaints at this time. He does have a tongue sore that he states he might have bitten with a seizure a about 2 days ago.     In the ED, he is hemodynamically stable, alert and oriented, on room air, and does not feel like he is withdrawing. Labs significant for AST 84, ALT 61, ammonia 61, lipase 83, lactate 2.6, valproic acid 29.9 (low). Head and neck imaging negative for acute findings.

## 2022-03-30 NOTE — H&P
[facility] Emergency Dept  [facility] Medicine  History & Physical    Patient Name: Terrance Chavez  MRN: [id number]  Patient Class: IP- Inpatient  Admission Date: 3/30/2022  Attending Physician: Sepideh Claros MD   Primary Care Provider: Primary Doctor No         Patient information was obtained from patient, past medical records and ER records.     Subjective:     Principal Problem:Seizure    Chief Complaint:   Chief Complaint   Patient presents with    Seizures     Witnessed by EMS described as tonic clonic. Last ETOH intake 3 days ago. Pt received 5mg versed by EMS        HPI: Terrance Chavez is a 41 y.o M with history of T2DM (insulin dependent), ETOH abuse, COPD, CVA in 2013, Seizures, HTN presenting to ED after reportedly having seizure. Wife called EMS after he vomited in bed and was found unresponsive. Patient with blood glucose of 34 upon and required D50 with appropriate response in glucose. Patient had recent hospitalization on 3/27 at [facility] ED for alcohol intoxication, says his last drink was 2-3 days ago. Patient says he has only missing taking his seizure medication one time and that it was 2 weeks ago. Patient with sore tongue and thinks he may have bit it during seizure, along with the bruising surround his right eye from falling. Patient has no complaints at time of interview. Patient has had withdrawals from alcohol in past which gives him tremors/shakes. He does not feel like he is having withdrawal symptoms this time. Patient has complaints of whole body weakness.    In the ED, HDS, afebrile, alert and oriented, on room air, and does not feel like he is withdrawing. Labs significant for AST 84, ALT 61, ammonia 61, lipase 83, lactate 2.6, valproic acid 29.9 (low). Head and neck imaging negative for acute findings. CT maxillofacial without evidence of fracture. CXR with no acute abnormalities.      Past Medical History:   Diagnosis Date    Diabetes mellitus     Seizures     Stroke 2013       Past  Surgical History:   Procedure Laterality Date    TRANSESOPHAGEAL ECHOCARDIOGRAPHY N/A 6/1/2021    Procedure: ECHOCARDIOGRAM, TRANSESOPHAGEAL;  Surgeon: [facility] Provider;  Location: [facility] EP LAB;  Service: Anesthesiology;  Laterality: N/A;       Review of patient's allergies indicates:  No Known Allergies    No current facility-administered medications on file prior to encounter.     Current Outpatient Medications on File Prior to Encounter   Medication Sig    aspirin (ECOTRIN) 81 MG EC tablet Take 1 tablet (81 mg total) by mouth once daily.    blood sugar diagnostic (ONETOUCH VERIO TEST STRIPS) Strp Use to test blood glucose 2 (two) times daily with meals.    blood sugar diagnostic Strp 1 strip by Misc.(Non-Drug; Combo Route) route 2 (two) times daily with meals.    blood-glucose meter kit Use as instructed    chlordiazepoxide (LIBRIUM) 25 MG Cap Take 1 capsule (25 mg total) by mouth every 6 (six) hours as needed (alcohol withdrawals). Take 2 pills every 6 hours on day 1  Take 1 pill every 6 hours on day 2 and day 3.   Do not take clobezam when taking this medication. Start your clobezam again when you are done this medicine.    cloBAZam (ONFI) 10 mg Tab Take 10mg in am and 20mg qhs    divalproex ER (DEPAKOTE) 500 MG Tb24 Take 2 tablets (1,000 mg total) by mouth every 12 (twelve) hours.    folic acid (FOLVITE) 1 MG tablet Take 1 tablet (1 mg total) by mouth once daily. (Patient not taking: Reported on 2/22/2022)    folic acid-vit B6-vit B12 2.5-25-2 mg (FOLBIC OR EQUIV) 2.5-25-2 mg Tab Take 1 tablet by mouth once daily.    insulin aspart protamine-insulin aspart (NOVOLOG MIX 70-30FLEXPEN U-100) 100 unit/mL (70-30) InPn pen Inject 25 Units into the skin with breakfast and 15 Units with dinner or evening meal.    insulin aspart U-100 (NOVOLOG FLEXPEN U-100 INSULIN) 100 unit/mL (3 mL) InPn pen Inject 25 Units into the skin every morning.    insulin aspart U-100 (NOVOLOG FLEXPEN U-100 INSULIN) 100  "unit/mL (3 mL) InPn pen Inject 15 Units into the skin every evening.    lancets 30 gauge Misc 1 lancet by Misc.(Non-Drug; Combo Route) route 2 (two) times daily with meals.    lancets Misc 1 lancet by Misc.(Non-Drug; Combo Route) route 2 (two) times daily with meals.    lancing device Misc 1 Device by Misc.(Non-Drug; Combo Route) route 2 (two) times daily with meals.    levETIRAcetam (KEPPRA) 750 MG Tab Take 1 tablet (750 mg total) by mouth 2 (two) times daily.    multivitamin Tab Take 1 tablet by mouth once daily. (Patient not taking: Reported on 2/22/2022)    pen needle, diabetic 31 gauge x 3/16" Ndle use to inject insulins 3 times a day    pen needle, diabetic 32 gauge x 5/32" Ndle Injecting twice daily    risperiDONE (RISPERDAL) 1 MG tablet Take 1 tablet (1 mg total) by mouth 3 (three) times daily. (Patient not taking: No sig reported)    thiamine 100 MG tablet Take 1 tablet (100 mg total) by mouth once daily.     Family History    None       Tobacco Use    Smoking status: Current Some Day Smoker     Years: 15.00     Types: Cigarettes, Cigars    Smokeless tobacco: Never Used   Substance and Sexual Activity    Alcohol use: Yes     Alcohol/week: 30.0 standard drinks     Types: 30 Shots of liquor per week    Drug use: Not Currently    Sexual activity: Not Currently     Review of Systems   Constitutional:  Positive for fatigue. Negative for chills and fever.   HENT:  Negative for congestion and sore throat.    Eyes:  Negative for photophobia, pain and visual disturbance.   Respiratory:  Negative for chest tightness and shortness of breath.    Cardiovascular:  Negative for chest pain and leg swelling.   Gastrointestinal:  Negative for abdominal distention, abdominal pain, anal bleeding, constipation, diarrhea, nausea and vomiting.   Genitourinary:  Negative for difficulty urinating.   Musculoskeletal:  Negative for arthralgias.   Skin:  Negative for rash.   Neurological:  Positive for seizures and " weakness. Negative for headaches.   Hematological:  Negative for adenopathy.   Psychiatric/Behavioral:  Negative for agitation.    Objective:     Vital Signs (Most Recent):  Temp: 97.6 °F (36.4 °C) (03/30/22 0040)  Pulse: 84 (03/30/22 0422)  Resp: 20 (03/30/22 0104)  BP: (!) 140/84 (03/30/22 0422)  SpO2: 99 % (03/30/22 0422)   Vital Signs (24h Range):  Temp:  [97.6 °F (36.4 °C)] 97.6 °F (36.4 °C)  Pulse:  [] 84  Resp:  [20-26] 20  SpO2:  [96 %-100 %] 99 %  BP: (124-140)/(79-85) 140/84        There is no height or weight on file to calculate BMI.    Physical Exam  Constitutional:       General: He is not in acute distress.     Appearance: He is ill-appearing. He is not toxic-appearing.   HENT:      Head: Normocephalic.      Comments: Brusing around right eye     Right Ear: External ear normal.      Left Ear: External ear normal.      Nose: No congestion.   Eyes:      General:         Right eye: No discharge.         Left eye: No discharge.      Conjunctiva/sclera: Conjunctivae normal.   Cardiovascular:      Rate and Rhythm: Normal rate and regular rhythm.      Heart sounds: No murmur heard.  Pulmonary:      Effort: Pulmonary effort is normal. No respiratory distress.      Breath sounds: Normal breath sounds. No wheezing.   Abdominal:      General: Abdomen is flat.      Palpations: Abdomen is soft.      Tenderness: There is no abdominal tenderness. There is no guarding.   Musculoskeletal:         General: No swelling.      Cervical back: Normal range of motion.      Right lower leg: No edema.      Left lower leg: No edema.   Skin:     General: Skin is warm and dry.      Coloration: Skin is not jaundiced.      Findings: Bruising present. No rash.   Neurological:      Mental Status: He is alert and oriented to person, place, and time.   Psychiatric:         Mood and Affect: Mood normal.         Behavior: Behavior normal.           Significant Labs: All pertinent labs within the past 24 hours have been  reviewed.    Bilirubin:   Recent Labs   Lab 03/28/22 2135 03/30/22  0059   BILITOT 0.9 0.9     BMP:   Recent Labs   Lab 03/30/22  0059   *   *   K 3.9   CL 98   CO2 27   BUN 9   CREATININE 0.8   CALCIUM 9.4   MG 1.5*     CBC:   Recent Labs   Lab 03/28/22 2135 03/30/22  0059   WBC 7.91 6.21   HGB 15.4 12.8*   HCT 43.7 35.7*    284     CMP:   Recent Labs   Lab 03/28/22 2135 03/28/22 2329 03/30/22  0059    140 135*   K 3.5 3.6 3.9   CL 94* 97 98   CO2 34* 30* 27   GLU 96 160* 308*   BUN 7 7 9   CREATININE 0.7 0.6 0.8   CALCIUM 12.0* 9.6 9.4   PROT 8.5*  --  6.7   ALBUMIN 4.1  --  3.2*   BILITOT 0.9  --  0.9   ALKPHOS 86  --  65   *  --  84*   ALT 91*  --  61*   ANIONGAP 15 13 10   EGFRNONAA >60.0 >60.0 >60.0       Recent Labs   Lab 03/30/22  0101   INR 1.0       Recent Labs   Lab 03/30/22  0059   LACTATE 2.6*       Recent Labs   Lab 03/30/22  0059   LIPASE 83*       Magnesium:   Recent Labs   Lab 03/30/22  0059   MG 1.5*       Recent Labs   Lab 03/28/22 2133 03/28/22  2235   POCTGLUCOSE 108 224*       Recent Labs   Lab 03/30/22  0400   COLORU Yellow   APPEARANCEUA Clear   PHUR 7.0   SPECGRAV 1.010   PROTEINUA Negative   GLUCUA 3+*   KETONESU Trace*   BILIRUBINUA Negative   OCCULTUA Negative   NITRITE Negative   LEUKOCYTESUR Negative   RBCUA 1   WBCUA 0   BACTERIA Rare   SQUAMEPITHEL 0   HYALINECASTS 7*       Significant Imaging: I have reviewed all pertinent imaging results/findings within the past 24 hours.    Assessment/Plan:     * Seizure  Mr Carlos is a 41 y.o M w ETOH abuse, prior seizures now presenting with seizure after wife called EMS due to unresponsiveness found with vomit. Patient with complaints of weakness and stating he had a seizure 3-4 days ago and did not have one today. He denies feeling symptoms of withdrawal. On exam he is not tremulous and is comfortable. Patients generalized weakness could from rhabdomyolysis 2/2 to previous/current seizure. Patient with  lactic acid level of 2.6. Patient was given versed in the field by EMS so could be masking symptoms. Patient is slow to respond but oriented to person/place/time. Patient with low valproic acid level.     Plan:  --Keppra loaded in ED  --Valproic acid 500mg BID  --Continue Keppra 750mg BID  --CIWA protocol  --PRN diazepam for CIWA greater than 8  --2mg IV PRN Ativan for Seizure  --seizure precautions  --fall precautions  --neurology consult order placed  --EEG order placed        Alcohol use disorder, severe, dependence  Patient notes that his last drink was 3-4 days ago. He states he drinks about half a pint per day of hard liquor. Patient has had withdrawal in the past with tremors as a symptoms. Does not currently feel he is withdrawing form ETOH.    Plan:  --Consider addiction psych consult if patient interested  --education on helping quit ETOH once patient feeling better  --Folate  --thiamine for wernicke's treatment      Weakness  PT/OT ordered  CPK ordered      Hypomagnesemia   Plan:  -replenish with 2g IV      History of stroke  Patient with history of stroke in 2013    Plan:  --continue ASA      Type 2 diabetes mellitus, with long-term current use of insulin  Patient initially hypoglycemia upon arrival requiring D50. Patient has insulin 70-30 listed in home medications. Uncertain the patient understands his insulin regimen, however he says he gives a shot in the morning and at nighttime. Patient is type 2 diabetes with insulin dependence and blood glucose of 300's. Last A1c 8.3 at 9 months ago.    Plan:  --Detemir 10u daily  -- Aspart 3u TIDWM  --LDSSI  --POCT glucose checks with meals and at night  --adjust insulin according to sliding scale usage  -- A1c ordered        VTE Risk Mitigation (From admission, onward)         Ordered     enoxaparin injection 40 mg  Daily         03/30/22 0517     IP VTE LOW RISK PATIENT  Once         03/30/22 0517     Place sequential compression device  Until discontinued          03/30/22 0517                   Sumit Arevalo MD  Department of Hospital Medicine   [facility] - Emergency Dept

## 2022-03-30 NOTE — ED NOTES
Patient continues to ask for his belongings, initially he was reporting that he was missing his black coat.  Later he reported that he was missing his black coat and his black shoes.  Now he is saying that he is missing his book bag and his coat and shoes.  I had informed the patient that all he had come with was all that he has with him at this time.  I attempted to get him to be in a gown and he states that his clothes are not wet and that he does not like the gown that he had on.  Charge nurse is aware that the patient continues to walk around the unit looking for his belongings.

## 2022-03-30 NOTE — ED NOTES
Transport is here for this patient at this time.  They are aware that the patient had been incontinent of urine and was refusing to change his clothes.  Disposable chucks were placed on the wheelchair.      Patient did ambulated to the restroom prior to leaving the EDOU and voided.

## 2022-03-30 NOTE — ED PROVIDER NOTES
Encounter Date: 3/30/2022       History     Chief Complaint   Patient presents with    Seizures     Witnessed by EMS described as tonic clonic. Last ETOH intake 3 days ago. Pt received 5mg versed by EMS     41-year-old male with past medical history of diabetes, seizures secondary to alcohol/drug intoxication who presents to the emergency department via EMS for concerns of seizures.  EMS reports that patient had multiple episodes of upper and lower extremity clonic movements.  Therefore the patient was able to respond shortly after however was not at his mental baseline.  They stated they gave him 5 mg of Versed prior to arrival.  Patient was able to report that he had not had alcohol in 4 days.  Glucose was in the 200s per EMS.        Review of patient's allergies indicates:  No Known Allergies  Past Medical History:   Diagnosis Date    Diabetes mellitus     Seizures     Stroke 2013     Past Surgical History:   Procedure Laterality Date    TRANSESOPHAGEAL ECHOCARDIOGRAPHY N/A 6/1/2021    Procedure: ECHOCARDIOGRAM, TRANSESOPHAGEAL;  Surgeon: Neo Diagnostic Provider;  [facility]: [facility] EP LAB;  Service: Anesthesiology;  Laterality: N/A;     No family history on file.  Social History     Tobacco Use    Smoking status: Current Some Day Smoker     Years: 15.00     Types: Cigarettes, Cigars    Smokeless tobacco: Never Used   Substance Use Topics    Alcohol use: Yes     Alcohol/week: 30.0 standard drinks     Types: 30 Shots of liquor per week    Drug use: Not Currently     Review of Systems   Unable to perform ROS: Mental status change       Physical Exam     Initial Vitals [03/30/22 0040]   BP Pulse Resp Temp SpO2   132/85 (!) 126 (!) 26 97.6 °F (36.4 °C) 96 %      MAP       --         Physical Exam    Constitutional: He appears well-developed and well-nourished.   HENT:   Head: Normocephalic and atraumatic.   Contusion and hematoma over the right eye.   Eyes: EOM are normal. Pupils are equal, round, and reactive  to light.   Neck: Neck supple. No JVD present.   Normal range of motion.  Cardiovascular: Normal rate, regular rhythm, normal heart sounds and intact distal pulses.   Pulmonary/Chest: Breath sounds normal.   Abdominal: Abdomen is soft. Bowel sounds are normal.   Musculoskeletal:         General: Normal range of motion.      Cervical back: Normal range of motion and neck supple.     Lymphadenopathy:     He has no cervical adenopathy.   Neurological: He is alert. He is disoriented. No cranial nerve deficit or sensory deficit.   Skin: Skin is warm and dry. Capillary refill takes less than 2 seconds.         ED Course   Procedures  Labs Reviewed   CBC W/ AUTO DIFFERENTIAL - Abnormal; Notable for the following components:       Result Value    RBC 3.96 (*)     Hemoglobin 12.8 (*)     Hematocrit 35.7 (*)     MCH 32.3 (*)     RDW 14.9 (*)     MPV 8.8 (*)     Mono % 15.1 (*)     All other components within normal limits   COMPREHENSIVE METABOLIC PANEL - Abnormal; Notable for the following components:    Sodium 135 (*)     Glucose 308 (*)     Albumin 3.2 (*)     AST 84 (*)     ALT 61 (*)     All other components within normal limits   LACTIC ACID, PLASMA - Abnormal; Notable for the following components:    Lactate (Lactic Acid) 2.6 (*)     All other components within normal limits   AMMONIA - Abnormal; Notable for the following components:    Ammonia 61 (*)     All other components within normal limits   MAGNESIUM - Abnormal; Notable for the following components:    Magnesium 1.5 (*)     All other components within normal limits   URINALYSIS, REFLEX TO URINE CULTURE - Abnormal; Notable for the following components:    Glucose, UA 3+ (*)     Ketones, UA Trace (*)     All other components within normal limits    Narrative:     Specimen Source->Urine   VALPROIC ACID - Abnormal; Notable for the following components:    Valproic Acid Level 29.9 (*)     All other components within normal limits   LIPASE - Abnormal; Notable for the  following components:    Lipase 83 (*)     All other components within normal limits    Narrative:     add on lipase per dr jeremiah peralta/order#[id number] @03:14 03/30/2022   URINALYSIS MICROSCOPIC - Abnormal; Notable for the following components:    Hyaline Casts, UA 7 (*)     All other components within normal limits    Narrative:     Specimen Source->Urine   CULTURE, BLOOD   CULTURE, BLOOD   ALCOHOL,MEDICAL (ETHANOL)   PROTIME-INR   LEVETIRACETAM  (KEPPRA) LEVEL   CK   HEMOGLOBIN A1C   SARS-COV-2 RDRP GENE          Imaging Results          CT Maxillofacial Without Contrast (Final result)  Result time 03/30/22 04:52:24    Final result by Miguel Lacey MD (03/30/22 04:52:24)                 Impression:      Right facial and periorbital soft tissue edema.  No evidence of an acute displaced fracture.    Electronically signed by resident: Landon Finley  Date:    03/30/2022  Time:    04:16    Electronically signed by: Miguel Lacey MD  Date:    03/30/2022  Time:    04:52             Narrative:    EXAMINATION:  CT MAXILLOFACIAL WITHOUT CONTRAST    CLINICAL HISTORY:  Facial trauma, blunt;    TECHNIQUE:  Low dose CT images throughout the region of the facial bones.  Axial, sagittal and coronal reformations were obtained.  Contrast was not administered.    COMPARISON:  CT head 03/30/2022, 07/20/2021.    FINDINGS:  Mild right facial and periorbital soft tissue edema.  The globes and intraorbital contents are within normal limits.  No orbital fracture.    The remainder of the facial bones appear intact without evidence of an acute displaced fracture.  No osseous destructive lesions.  Scattered dental caries with few areas of periodontal disease in the right maxillary molar/premolar teeth.    Temporomandibular joints appropriately position without evidence of dislocation.    Paranasal sinuses essentially clear.  Mastoids are clear.    Limited intracranial evaluation demonstrates small remote lacunar-type infarcts  involving the bilateral basal ganglia.                               CT Cervical Spine Without Contrast (Final result)  Result time 03/30/22 01:41:04    Final result by Lenny Pereira MD (03/30/22 01:41:04)                 Impression:      1.  Motion limited examination.  No definite CT evidence of acute intracranial abnormality.  Further evaluation and follow-up as warranted.    2.  Stable nonspecific patchy periventricular white matter hypoattenuation and bilateral basal ganglia encephalomalacia.    3.  Motion limited examination of the cervical spine.  No definite acute displaced fracture or traumatic subluxation.      Electronically signed by: Lenny Pereira MD  Date:    03/30/2022  Time:    01:41             Narrative:    EXAMINATION:  CT HEAD WITHOUT CONTRAST; CT CERVICAL SPINE WITHOUT CONTRAST    CLINICAL HISTORY:  Seizure, new-onset, no history of trauma;; Neck trauma, uncomplicated (NEXUS/CCR neg) (Age 16-64y);    TECHNIQUE:  Low dose axial images were obtained through the head and cervical spine.  Coronal and sagittal reformations were also performed. Contrast was not administered.    COMPARISON:  CT HEAD 07/20/2021, CT CERVICAL SPINE 06/20/2021    FINDINGS:  CT HEAD:    Please note image quality is mildly degraded by patient motion artifact.  There is generalized cerebral volume loss.  There is patchy nonspecific periventricular white matter hypoattenuation, similar to prior examination.  There are stable small focal hypodensities within the bilateral basal ganglia, similar to prior examination.  No acute parenchymal hemorrhage or midline shift identified.  No extra-axial fluid collections appreciated.  The basal cisterns are patent. The mastoid air cells and paranasal sinuses are clear of acute process. The visualized bones of the calvarium demonstrate no acute osseous abnormality.    CT CERVICAL SPINE:    Please note image quality is degraded by patient motion artifact.    Cervical spine alignment  appears within normal limits.  Cervical vertebral body heights appear adequately maintained.  No definite acute displaced fracture identified.  There are stable mild multilevel degenerative changes of the cervical spine, similar to prior examination.    The prevertebral soft tissues appear within normal limits.  There is mild atherosclerotic calcification of the bilateral carotid bifurcations.  There is a remote deformity involving the right clavicular head.  There is a known partially visualized left upper lobe cystic lesion.                               CT Head Without Contrast (Final result)  Result time 03/30/22 01:41:04    Final result by Lenny Pereira MD (03/30/22 01:41:04)                 Impression:      1.  Motion limited examination.  No definite CT evidence of acute intracranial abnormality.  Further evaluation and follow-up as warranted.    2.  Stable nonspecific patchy periventricular white matter hypoattenuation and bilateral basal ganglia encephalomalacia.    3.  Motion limited examination of the cervical spine.  No definite acute displaced fracture or traumatic subluxation.      Electronically signed by: Lenny Pereira MD  Date:    03/30/2022  Time:    01:41             Narrative:    EXAMINATION:  CT HEAD WITHOUT CONTRAST; CT CERVICAL SPINE WITHOUT CONTRAST    CLINICAL HISTORY:  Seizure, new-onset, no history of trauma;; Neck trauma, uncomplicated (NEXUS/CCR neg) (Age 16-64y);    TECHNIQUE:  Low dose axial images were obtained through the head and cervical spine.  Coronal and sagittal reformations were also performed. Contrast was not administered.    COMPARISON:  CT HEAD 07/20/2021, CT CERVICAL SPINE 06/20/2021    FINDINGS:  CT HEAD:    Please note image quality is mildly degraded by patient motion artifact.  There is generalized cerebral volume loss.  There is patchy nonspecific periventricular white matter hypoattenuation, similar to prior examination.  There are stable small focal  hypodensities within the bilateral basal ganglia, similar to prior examination.  No acute parenchymal hemorrhage or midline shift identified.  No extra-axial fluid collections appreciated.  The basal cisterns are patent. The mastoid air cells and paranasal sinuses are clear of acute process. The visualized bones of the calvarium demonstrate no acute osseous abnormality.    CT CERVICAL SPINE:    Please note image quality is degraded by patient motion artifact.    Cervical spine alignment appears within normal limits.  Cervical vertebral body heights appear adequately maintained.  No definite acute displaced fracture identified.  There are stable mild multilevel degenerative changes of the cervical spine, similar to prior examination.    The prevertebral soft tissues appear within normal limits.  There is mild atherosclerotic calcification of the bilateral carotid bifurcations.  There is a remote deformity involving the right clavicular head.  There is a known partially visualized left upper lobe cystic lesion.                                 Medications   aspirin EC tablet 81 mg (has no administration in time range)   levETIRAcetam tablet 750 mg (has no administration in time range)   divalproex ER 24 hr tablet 500 mg (has no administration in time range)   multivitamin tablet (has no administration in time range)   folic acid tablet 1 mg (has no administration in time range)   lorazepam injection 2 mg (has no administration in time range)   thiamine (B-1) 500 mg in dextrose 5 % 50 mL IVPB (has no administration in time range)     Followed by   thiamine tablet 100 mg (has no administration in time range)   naloxone 0.4 mg/mL injection 0.02 mg (has no administration in time range)   glucose chewable tablet 16 g (has no administration in time range)   glucose chewable tablet 24 g (has no administration in time range)   glucagon (human recombinant) injection 1 mg (has no administration in time range)   enoxaparin  injection 40 mg (has no administration in time range)   ondansetron disintegrating tablet 8 mg (has no administration in time range)   insulin detemir U-100 pen 10 Units (has no administration in time range)   insulin aspart U-100 pen 3 Units (has no administration in time range)   insulin aspart U-100 pen 0-5 Units (has no administration in time range)   melatonin tablet 6 mg (has no administration in time range)   dextrose 10% bolus 125 mL (has no administration in time range)   dextrose 10% bolus 250 mL (has no administration in time range)   magnesium sulfate 2g in water 50mL IVPB (premix) (has no administration in time range)   diazePAM tablet 5 mg (has no administration in time range)   lorazepam injection 2 mg (2 mg Intravenous Given 3/30/22 0056)   levETIRAcetam injection 2,000 mg (2,000 mg Intravenous Given 3/30/22 0056)   sodium chloride 0.9% bolus 1,000 mL (0 mLs Intravenous Stopped 3/30/22 0448)     Medical Decision Making:   Initial Assessment:   41-year-old male who presents emergency department over concerns of seizures.  Patient received 5 mg of Ativan via EMS with resolution of seizure-like activity.  Patient is alert however not oriented or completely back to baseline.  Patient's vitals are within normal limits.  Patient is afebrile.  Differential Diagnosis:   Alcohol withdrawal seizures  Alcohol intoxication  Intracranial hemorrhage  Facial fracture    Clinical Tests:   Lab Tests: Ordered and Reviewed  Radiological Study: Ordered and Reviewed  Medical Tests: Ordered and Reviewed  ED Management:  Patient given 2 mg of Versed as he was actively seizing.  Head CT obtained to evaluate for anti intracranial processes.  Head CT within normal limit.  Labs obtained to evaluate for infectious source of fever.  Lactate was elevated all other labs within normal limits.  Patient loaded with 2000 mg of Keppra.  Patient vitals remained stable with no further series.  ICU consulted and evaluated patient.  They  recommended that patient be admitted to floor as he is stable does not require critical care treatment.  Patient was admitted to Hospital Medicine due to concerns of alcohol withdrawal seizures.            Attending Attestation:   Physician Attestation Statement for Resident:  As the supervising MD   Physician Attestation Statement: I have personally seen and examined this patient.   I agree with the above history. -:   As the supervising MD I agree with the above PE.    As the supervising MD I agree with the above treatment, course, plan, and disposition.  I have reviewed and agree with the residents interpretation of the following: lab data, CT scans and EKG.  I have reviewed the following: old records at this facility.                         Clinical Impression:   Final diagnoses:  [R00.0] Tachycardia  [R56.9] Seizure (Primary)  [F10.239] Alcohol withdrawal syndrome with complication          ED Disposition Condition    Admit               Lionel Bruner MD  Resident  03/30/22 0606       Tabitha Woods MD  04/05/22 [id number]

## 2022-03-31 VITALS
TEMPERATURE: 98 F | RESPIRATION RATE: 18 BRPM | HEART RATE: 105 BPM | BODY MASS INDEX: 18.66 KG/M2 | SYSTOLIC BLOOD PRESSURE: 141 MMHG | WEIGHT: 126 LBS | HEIGHT: 69 IN | DIASTOLIC BLOOD PRESSURE: 94 MMHG | OXYGEN SATURATION: 100 %

## 2022-03-31 LAB
ALBUMIN SERPL BCP-MCNC: 3.4 G/DL (ref 3.5–5.2)
ALP SERPL-CCNC: 73 U/L (ref 55–135)
ALT SERPL W/O P-5'-P-CCNC: 56 U/L (ref 10–44)
ANION GAP SERPL CALC-SCNC: 13 MMOL/L (ref 8–16)
AST SERPL-CCNC: 52 U/L (ref 10–40)
BASOPHILS # BLD AUTO: 0.04 K/UL (ref 0–0.2)
BASOPHILS NFR BLD: 0.5 % (ref 0–1.9)
BILIRUB SERPL-MCNC: 0.6 MG/DL (ref 0.1–1)
BUN SERPL-MCNC: 11 MG/DL (ref 6–20)
CALCIUM SERPL-MCNC: 10.5 MG/DL (ref 8.7–10.5)
CHLORIDE SERPL-SCNC: 98 MMOL/L (ref 95–110)
CK SERPL-CCNC: 690 U/L (ref 20–200)
CO2 SERPL-SCNC: 25 MMOL/L (ref 23–29)
CREAT SERPL-MCNC: 0.7 MG/DL (ref 0.5–1.4)
DIFFERENTIAL METHOD: ABNORMAL
EOSINOPHIL # BLD AUTO: 0.1 K/UL (ref 0–0.5)
EOSINOPHIL NFR BLD: 1.6 % (ref 0–8)
ERYTHROCYTE [DISTWIDTH] IN BLOOD BY AUTOMATED COUNT: 15 % (ref 11.5–14.5)
EST. GFR  (AFRICAN AMERICAN): >60 ML/MIN/1.73 M^2
EST. GFR  (NON AFRICAN AMERICAN): >60 ML/MIN/1.73 M^2
GLUCOSE SERPL-MCNC: 238 MG/DL (ref 70–110)
HCT VFR BLD AUTO: 38.5 % (ref 40–54)
HGB BLD-MCNC: 13 G/DL (ref 14–18)
IMM GRANULOCYTES # BLD AUTO: 0.02 K/UL (ref 0–0.04)
IMM GRANULOCYTES NFR BLD AUTO: 0.2 % (ref 0–0.5)
LACTATE SERPL-SCNC: 1.2 MMOL/L (ref 0.5–2.2)
LYMPHOCYTES # BLD AUTO: 2.6 K/UL (ref 1–4.8)
LYMPHOCYTES NFR BLD: 31.9 % (ref 18–48)
MAGNESIUM SERPL-MCNC: 1.7 MG/DL (ref 1.6–2.6)
MCH RBC QN AUTO: 32.4 PG (ref 27–31)
MCHC RBC AUTO-ENTMCNC: 33.8 G/DL (ref 32–36)
MCV RBC AUTO: 96 FL (ref 82–98)
MONOCYTES # BLD AUTO: 1.2 K/UL (ref 0.3–1)
MONOCYTES NFR BLD: 15 % (ref 4–15)
NEUTROPHILS # BLD AUTO: 4.2 K/UL (ref 1.8–7.7)
NEUTROPHILS NFR BLD: 50.8 % (ref 38–73)
NRBC BLD-RTO: 0 /100 WBC
PHOSPHATE SERPL-MCNC: 2.8 MG/DL (ref 2.7–4.5)
PLATELET # BLD AUTO: 340 K/UL (ref 150–450)
PMV BLD AUTO: 9.5 FL (ref 9.2–12.9)
POCT GLUCOSE: 269 MG/DL (ref 70–110)
POCT GLUCOSE: 334 MG/DL (ref 70–110)
POTASSIUM SERPL-SCNC: 4.1 MMOL/L (ref 3.5–5.1)
PROT SERPL-MCNC: 7 G/DL (ref 6–8.4)
RBC # BLD AUTO: 4.01 M/UL (ref 4.6–6.2)
SODIUM SERPL-SCNC: 136 MMOL/L (ref 136–145)
WBC # BLD AUTO: 8.24 K/UL (ref 3.9–12.7)

## 2022-03-31 PROCEDURE — 25000003 PHARM REV CODE 250

## 2022-03-31 PROCEDURE — S4991 NICOTINE PATCH NONLEGEND: HCPCS | Performed by: STUDENT IN AN ORGANIZED HEALTH CARE EDUCATION/TRAINING PROGRAM

## 2022-03-31 PROCEDURE — 36415 COLL VENOUS BLD VENIPUNCTURE: CPT

## 2022-03-31 PROCEDURE — 82550 ASSAY OF CK (CPK): CPT

## 2022-03-31 PROCEDURE — 85025 COMPLETE CBC W/AUTO DIFF WBC: CPT

## 2022-03-31 PROCEDURE — 80053 COMPREHEN METABOLIC PANEL: CPT

## 2022-03-31 PROCEDURE — 25000003 PHARM REV CODE 250: Performed by: STUDENT IN AN ORGANIZED HEALTH CARE EDUCATION/TRAINING PROGRAM

## 2022-03-31 PROCEDURE — 83735 ASSAY OF MAGNESIUM: CPT

## 2022-03-31 PROCEDURE — 63600175 PHARM REV CODE 636 W HCPCS

## 2022-03-31 PROCEDURE — 84100 ASSAY OF PHOSPHORUS: CPT

## 2022-03-31 PROCEDURE — 83605 ASSAY OF LACTIC ACID: CPT

## 2022-03-31 PROCEDURE — 99224 PR SUBSEQUENT OBSERVATION CARE,LEVEL I: ICD-10-PCS | Mod: ,,, | Performed by: INTERNAL MEDICINE

## 2022-03-31 PROCEDURE — 96372 THER/PROPH/DIAG INJ SC/IM: CPT

## 2022-03-31 PROCEDURE — 99224 PR SUBSEQUENT OBSERVATION CARE,LEVEL I: CPT | Mod: ,,, | Performed by: INTERNAL MEDICINE

## 2022-03-31 PROCEDURE — G0378 HOSPITAL OBSERVATION PER HR: HCPCS

## 2022-03-31 PROCEDURE — C9399 UNCLASSIFIED DRUGS OR BIOLOG: HCPCS

## 2022-03-31 PROCEDURE — 25000003 PHARM REV CODE 250: Performed by: INTERNAL MEDICINE

## 2022-03-31 RX ORDER — LEVETIRACETAM 750 MG/1
750 TABLET ORAL 2 TIMES DAILY
Qty: 60 TABLET | Refills: 1 | Status: SHIPPED | OUTPATIENT
Start: 2022-03-31 | End: 2022-05-07 | Stop reason: SDUPTHER

## 2022-03-31 RX ORDER — LEVETIRACETAM 750 MG/1
750 TABLET ORAL 2 TIMES DAILY
Qty: 60 TABLET | Refills: 1 | Status: SHIPPED | OUTPATIENT
Start: 2022-03-31 | End: 2022-04-30

## 2022-03-31 RX ORDER — INSULIN ASPART 100 [IU]/ML
6 INJECTION, SOLUTION INTRAVENOUS; SUBCUTANEOUS
Status: DISCONTINUED | OUTPATIENT
Start: 2022-03-31 | End: 2022-03-31

## 2022-03-31 RX ORDER — INSULIN ASPART 100 [IU]/ML
5 INJECTION, SOLUTION INTRAVENOUS; SUBCUTANEOUS
Status: DISCONTINUED | OUTPATIENT
Start: 2022-03-31 | End: 2022-03-31 | Stop reason: HOSPADM

## 2022-03-31 RX ORDER — DIVALPROEX SODIUM 500 MG/1
1000 TABLET, FILM COATED, EXTENDED RELEASE ORAL EVERY 12 HOURS
Qty: 120 TABLET | Refills: 1 | Status: ON HOLD | OUTPATIENT
Start: 2022-03-31 | End: 2022-05-25 | Stop reason: HOSPADM

## 2022-03-31 RX ORDER — DIVALPROEX SODIUM 500 MG/1
1000 TABLET, FILM COATED, EXTENDED RELEASE ORAL EVERY 12 HOURS
Qty: 120 TABLET | Refills: 1 | Status: SHIPPED | OUTPATIENT
Start: 2022-03-31 | End: 2022-05-07 | Stop reason: SDUPTHER

## 2022-03-31 RX ADMIN — LEVETIRACETAM 750 MG: 500 TABLET, FILM COATED ORAL at 08:03

## 2022-03-31 RX ADMIN — INSULIN ASPART 3 UNITS: 100 INJECTION, SOLUTION INTRAVENOUS; SUBCUTANEOUS at 12:03

## 2022-03-31 RX ADMIN — INSULIN ASPART 5 UNITS: 100 INJECTION, SOLUTION INTRAVENOUS; SUBCUTANEOUS at 12:03

## 2022-03-31 RX ADMIN — Medication 1 PATCH: at 08:03

## 2022-03-31 RX ADMIN — THIAMINE HYDROCHLORIDE 500 MG: 100 INJECTION, SOLUTION INTRAMUSCULAR; INTRAVENOUS at 03:03

## 2022-03-31 RX ADMIN — FOLIC ACID 1 MG: 1 TABLET ORAL at 08:03

## 2022-03-31 RX ADMIN — DIAZEPAM 5 MG: 5 TABLET ORAL at 11:03

## 2022-03-31 RX ADMIN — THERA TABS 1 TABLET: TAB at 08:03

## 2022-03-31 RX ADMIN — INSULIN DETEMIR 14 UNITS: 100 INJECTION, SOLUTION SUBCUTANEOUS at 08:03

## 2022-03-31 RX ADMIN — ASPIRIN 81 MG: 81 TABLET, COATED ORAL at 08:03

## 2022-03-31 RX ADMIN — INSULIN ASPART 3 UNITS: 100 INJECTION, SOLUTION INTRAVENOUS; SUBCUTANEOUS at 07:03

## 2022-03-31 RX ADMIN — DIVALPROEX SODIUM 1000 MG: 500 TABLET, FILM COATED, EXTENDED RELEASE ORAL at 08:03

## 2022-03-31 NOTE — ASSESSMENT & PLAN NOTE
Patient notes that his last drink was 3-4 days ago. He states he drinks about half a pint per day of hard liquor. Patient has had withdrawal in the past with tremors as a symptoms. Does not currently feel he is withdrawing form ETOH.    Plan:  --addiction psych referral placed  --education on helping quit ETOH given

## 2022-03-31 NOTE — PLAN OF CARE
ABDIRAHMAN arranged LYFT transport for patient for 4:15pm. Patient will dc home today.          Alisa Tavarez LMSW  [facility]   [id number]

## 2022-03-31 NOTE — NURSING
Patient received discharge education and provided AVS. He verbalized understanding regarding his medications and f/u appointment. Hygiene needs were addressed and patient provided with paper scrubs to wear home. PIV's were removed by patient early this morning. Telemetry monitor removed and personal belongings sent with patient.      16:18 Patient left via wheelchair ride at this time. Personal belongings (pants, shirt, 60 cents, cell phone, 2 lighters and a belt) sent with patient. A lyft was arranged for patient and the  called the patient prior to leaving unit.

## 2022-03-31 NOTE — PLAN OF CARE
ABDIRAHMAN charged patient's phone, obtained his phone number ([phone]), and put the patient's number in the chart.    Met with patient at bedside and provided him with AA information and other substance abuse literature/resources.     Jina Rinaldi ABDIRAHMAN  [phone]

## 2022-03-31 NOTE — ASSESSMENT & PLAN NOTE
Mr Terrance is a 41 y.o M w ETOH abuse, prior seizures now presenting with seizure after wife called EMS due to unresponsiveness found with vomit. Patient with complaints of weakness and stating he had a seizure 3-4 days ago and did not have one today. He denies feeling symptoms of withdrawal. On exam he is not tremulous and is comfortable. Patients generalized weakness could from rhabdomyolysis 2/2 to previous/current seizure. Patient with lactic acid level of 2.6. Patient was given versed in the field by EMS so could be masking symptoms. Patient is slow to respond but oriented to person/place/time. Patient with low valproic acid level.     Plan:  --Keppra loaded in ED  --Valproic acid 1000mg BID  --Continue Keppra 750mg BID  --CIWA protocol  --PRN diazepam for CIWA greater than 8  --2mg IV PRN Ativan for Seizure  --seizure precautions  --fall precautions  --neurology consult order placed  --EEG order placed

## 2022-03-31 NOTE — PLAN OF CARE
[facility] ([facility]  Discharge Assessment    Primary Care Provider: Primary Doctor No     Discharge Assessment (most recent)     BRIEF DISCHARGE ASSESSMENT - 03/31/22 1045        Discharge Planning    Assessment Type Discharge Planning Brief Assessment     Resource/Environmental Concerns none     Support Systems Family members     Assistance Needed Lyft ride home     Equipment Currently Used at Home none     Current Living Arrangements home/apartment/condo     Patient/Family Anticipates Transition to home with family     Patient/Family Anticipated Services at Transition none     DME Needed Upon Discharge  none     Discharge Plan A Home with family     Discharge Plan B Home with family                   SW met with patient in room for Discharge Planning Assessment.  Patient was able to answer questions.  Per patient, he lives with his aunt, Gisele Fairbanks (does not know number) in an apartment with elevator to enter.   Per patient, he was independent with ADLS and used no DME for ambulation.  Patient will have assistance from aunt/Lyft upon discharge.  All questions addressed.  Assigned SW/CM will follow for needs.    When meeting with the patient, he reported that he left his white [employer] tennis shoes in the ED. SW traveled to the ED to locate them. [place] showed SW note reporting that the patient asked about black shoes but that he did not arrive with any. Advised patient that his shoes were not in the ED.    [facility] - [facility] ([place] [id number])  Discharge Final Note    Primary Care Provider: Primary Doctor No  Expected Discharge Date: 3/31/2022    Patient medically ready for discharge to home with family.  Nurse can call report to n/a.  Transportation yes per Lyft.   Is family/patient aware of discharge yes - patient.  Hospital follow up scheduled, no.  Final Discharge Note (most recent)     Final Note - 03/31/22 1045        Final Note    Assessment Type Discharge Planning Brief  Assessment                 Important Message from Medicare         Referral Info (most recent)     Referral Info    No documentation.                 Future Appointments   Date Time Provider Department Center   4/28/2022  1:00 PM Gilda Lynne PA-C [facility] [address]   5/4/2022  9:20 [facility]   6/22/2022  8:20 AM Merle Oliva MD [facility] ROB Villavicencio  Case Management  Ext: [phone]  03/31/2022      Jina Rinaldi [facility] [place]  [phone]

## 2022-03-31 NOTE — DISCHARGE SUMMARY
[facility] - Intensive Care ([place] [address])  [facility] Medicine  Discharge Summary      Patient Name: Terrance Chavez  MRN: [id number]  Patient Class: OP- Observation  Admission Date: 3/30/2022  Hospital Length of Stay: 1 days  Discharge Date and Time: 3/31/2022  4:18 PM  Attending Physician: Mariama att. providers found   Discharging Provider: Conrado Perez MD  Primary Care Provider: Primary Doctor No  [facility] Medicine Team: [facility] 4 Conrado Perez MD    HPI:   Terrance Chavez is a 41 y.o M with history of T2DM (insulin dependent), ETOH abuse, COPD, CVA in 2013, Seizures, HTN presenting to ED after reportedly having seizure. Wife called EMS after he vomited in bed and was found unresponsive. Patient with blood glucose of 34 upon and required D50 with appropriate response in glucose. Patient had recent hospitalization on 3/27 at [facility] ED for alcohol intoxication, says his last drink was 2-3 days ago. Patient says he has only missing taking his seizure medication one time and that it was 2 weeks ago. Patient with sore tongue and thinks he may have bit it during seizure, along with the bruising surround his right eye from falling. Patient has no complaints at time of interview. Patient has had withdrawals from alcohol in past which gives him tremors/shakes. He does not feel like he is having withdrawal symptoms this time. Patient has complaints of whole body weakness.    In the ED, HDS, afebrile, alert and oriented, on room air, and does not feel like he is withdrawing. Labs significant for AST 84, ALT 61, ammonia 61, lipase 83, lactate 2.6, valproic acid 29.9 (low). Head and neck imaging negative for acute findings. CT maxillofacial without evidence of fracture. CXR with no acute abnormalities.      * No surgery found *      Hospital Course:   Patient stable without further seizure events and discharged per neurology recommendations with home AEDs and with follow-ups planned. Patient is receptive to alcohol rehab /  cessation and would like resources and referrals in that regard, but not ready for smoking cessation. Given resources and outpatient followups scheduled.       Goals of Care Treatment Preferences:  Code Status: Full Code      Consults:   Consults (From admission, onward)        Status Ordering Provider     Inpatient consult to neurology  Once        Provider:  (Not yet assigned)    Completed GUSTAVO SCHERER     Inpatient consult to Critical Care Medicine  Once        Provider:  (Not yet assigned)    Completed GARO VEGA        Physical Exam  Constitutional:       General: He is not in acute distress.     Appearance: He is ill-appearing. He is not toxic-appearing.   HENT:      Head: Normocephalic.      Comments: Brusing around right eye     Right Ear: External ear normal.      Left Ear: External ear normal.      Nose: No congestion.   Eyes:      General:         Right eye: No discharge.         Left eye: No discharge.      Conjunctiva/sclera: Conjunctivae normal.   Cardiovascular:      Rate and Rhythm: Normal rate and regular rhythm.      Heart sounds: No murmur heard.  Pulmonary:      Effort: Pulmonary effort is normal. No respiratory distress.      Breath sounds: Normal breath sounds. No wheezing.   Abdominal:      General: Abdomen is flat.      Palpations: Abdomen is soft.      Tenderness: There is no abdominal tenderness. There is no guarding.   Musculoskeletal:         General: No swelling.      Cervical back: Normal range of motion.      Right lower leg: No edema.      Left lower leg: No edema.   Skin:     General: Skin is warm and dry.      Coloration: Skin is not jaundiced.      Findings: Bruising present. No rash.   Neurological:      Mental Status: He is alert and oriented to person, place, and time.   Psychiatric:         Mood and Affect: Mood normal.         Behavior: Behavior normal.     * Seizure  Mr Carlos is a 41 y.o M w ETOH abuse, prior seizures now presenting with seizure after wife called EMS  due to unresponsiveness found with vomit. Patient with complaints of weakness and stating he had a seizure 3-4 days ago and did not have one today. He denies feeling symptoms of withdrawal. On exam he is not tremulous and is comfortable. Patients generalized weakness could from rhabdomyolysis 2/2 to previous/current seizure. Patient with lactic acid level of 2.6. Patient was given versed in the field by EMS so could be masking symptoms. Patient is slow to respond but oriented to person/place/time. Patient with low valproic acid level.     Plan:  --Keppra loaded in ED  --Valproic acid 1000mg BID  --Continue Keppra 750mg BID  --CIWA protocol  --PRN diazepam for CIWA greater than 8  --2mg IV PRN Ativan for Seizure  --seizure precautions  --fall precautions  --neurology consult order placed  --EEG order placed        Alcohol use disorder, severe, dependence  Patient notes that his last drink was 3-4 days ago. He states he drinks about half a pint per day of hard liquor. Patient has had withdrawal in the past with tremors as a symptoms. Does not currently feel he is withdrawing form ETOH.    Plan:  --addiction psych referral placed  --education on helping quit ETOH given      Weakness  PT/OT ordered  CPK ordered      Hypomagnesemia   Plan:  -replenish with 2g IV      History of stroke  Patient with history of stroke in 2013    Plan:  --continue ASA      Type 2 diabetes mellitus, with long-term current use of insulin  Patient initially hypoglycemia upon arrival requiring D50. Patient has insulin 70-30 listed in home medications. Uncertain the patient understands his insulin regimen, however he says he gives a shot in the morning and at nighttime. Patient is type 2 diabetes with insulin dependence and blood glucose of 300's. Last A1c 8.3 at 9 months ago.    Plan:  --Detemir 10u daily inpatient  -- Aspart 3u TIDWM inpatient  -- insulin NPH 25 U with breakfast and 15 U with evening meal on discharge  --LDSSI  --POCT glucose  checks with meals and at night  --adjust insulin according to sliding scale usage  -- A1c ordered      Final Active Diagnoses:    Diagnosis Date Noted POA    PRINCIPAL PROBLEM:  Seizure [R56.9] 06/08/2021 Yes    Alcohol use disorder, severe, dependence [F10.20] 06/09/2021 Yes     Chronic    Weakness [R53.1] 06/09/2021 Yes    Hypomagnesemia  [E83.42] 05/23/2021 Yes     Chronic    Epilepsy [G40.909] 02/18/2021 Unknown    Type 2 diabetes mellitus, with long-term current use of insulin [E11.9, Z79.4] 02/12/2021 Not Applicable     Chronic    History of stroke [Z86.73] 02/12/2021 Not Applicable      Problems Resolved During this Admission:       Discharged Condition: fair    Disposition: Home or Self Care    Follow Up:    Patient Instructions:      Ambulatory referral/consult to Neurology   Standing Status: Future   Referral Priority: Routine Referral Type: Consultation   Referral Reason: Specialty Services Required   Referred to Provider: MATT AMATO Requested Specialty: Neurology   Number of Visits Requested: 1     Ambulatory referral/consult to Internal Medicine   Standing Status: Future   Referral Priority: Routine Referral Type: Consultation   Referral Reason: Specialty Services Required   Requested Specialty: Internal Medicine   Number of Visits Requested: 1     Ambulatory referral/consult to Addiction Psychiatry   Standing Status: Future   Referral Priority: Routine Referral Type: Psychiatric   Referral Reason: Specialty Services Required   Requested Specialty: Addiction Medicine   Number of Visits Requested: 1       Significant Diagnostic Studies: Labs:   CMP   Recent Labs   Lab 03/30/22  0059 03/31/22  0312   * 136   K 3.9 4.1   CL 98 98   CO2 27 25   * 238*   BUN 9 11   CREATININE 0.8 0.7   CALCIUM 9.4 10.5   PROT 6.7 7.0   ALBUMIN 3.2* 3.4*   BILITOT 0.9 0.6   ALKPHOS 65 73   AST 84* 52*   ALT 61* 56*   ANIONGAP 10 13   ESTGFRAFRICA >60.0 >60.0   EGFRNONAA >60.0 >60.0   , CBC   Recent  "Labs   Lab 03/30/22 0059 03/31/22  0313   WBC 6.21 8.24   HGB 12.8* 13.0*   HCT 35.7* 38.5*    340    and All labs within the past 24 hours have been reviewed    Pending Diagnostic Studies:     Procedure Component Value Units Date/Time    Levetiracetam level [[id number]] Collected: 03/30/22 0059    Order Status: Sent Lab Status: In process Updated: 03/30/22 0104    Specimen: Blood          Medications:  Reconciled Home Medications:      Medication List      START taking these medications    * divalproex  MG Tb24  Commonly known as: DEPAKOTE  Take 2 tablets (1,000 mg total) by mouth every 12 (twelve) hours.     * divalproex  MG Tb24  Commonly known as: DEPAKOTE  Take 2 tablets (1,000 mg total) by mouth every 12 (twelve) hours.     HumuLIN 70/30 U-100 KwikPen 100 unit/mL (70-30) Inpn pen  Generic drug: insulin NPH/Reg human  Inject 25 units into the skin with breakfast and 15 units with dinner or evening meal. Please see your primary care physician for further adjustments as needed.         * This list has 2 medication(s) that are the same as other medications prescribed for you. Read the directions carefully, and ask your doctor or other care provider to review them with you.            CHANGE how you take these medications    BD ULTRA-FINE RAMSEY PEN NEEDLE 32 gauge x 5/32" Ndle  Generic drug: pen needle, diabetic  Injecting twice daily  What changed: Another medication with the same name was removed. Continue taking this medication, and follow the directions you see here.     * levETIRAcetam 750 MG Tab  Commonly known as: KEPPRA  Take 1 tablet (750 mg total) by mouth 2 (two) times daily.  What changed: Another medication with the same name was added. Make sure you understand how and when to take each.     * levETIRAcetam 750 MG Tab  Commonly known as: KEPPRA  Take 1 tablet (750 mg total) by mouth 2 (two) times daily.  What changed: You were already taking a medication with the same name, and this " prescription was added. Make sure you understand how and when to take each.     ONETOUCH DELICA PLUS LANCET 30 gauge Misc  Generic drug: lancets  1 lancet by Misc.(Non-Drug; Combo Route) route 2 (two) times daily with meals.  What changed: Another medication with the same name was removed. Continue taking this medication, and follow the directions you see here.         * This list has 2 medication(s) that are the same as other medications prescribed for you. Read the directions carefully, and ask your doctor or other care provider to review them with you.            CONTINUE taking these medications    * blood sugar diagnostic Strp  1 strip by Misc.(Non-Drug; Combo Route) route 2 (two) times daily with meals.     * ONETOUCH VERIO TEST STRIPS Strp  Generic drug: blood sugar diagnostic  Use to test blood glucose 2 (two) times daily with meals.     blood-glucose meter kit  Use as instructed     lancing device Misc  1 Device by Misc.(Non-Drug; Combo Route) route 2 (two) times daily with meals.         * This list has 2 medication(s) that are the same as other medications prescribed for you. Read the directions carefully, and ask your doctor or other care provider to review them with you.            STOP taking these medications    NovoLOG Flexpen U-100 Insulin 100 unit/mL (3 mL) Inpn pen  Generic drug: insulin aspart U-100     NovoLOG Mix 70-30 U-100 Insuln 100 unit/mL (70-30) Soln  Generic drug: insulin asp prt-insulin aspart (NovoLOG 70/30)            Indwelling Lines/Drains at time of discharge:   Lines/Drains/Airways     Drain  Duration           Male External Urinary Catheter 03/30/22 0102 Medium 1 day                Time spent on the discharge of patient: 30 minutes         Conrado Perez MD  Department of Hospital Medicine  [facility] - Intensive Care ([place]-14)

## 2022-03-31 NOTE — ASSESSMENT & PLAN NOTE
Patient initially hypoglycemia upon arrival requiring D50. Patient has insulin 70-30 listed in home medications. Uncertain the patient understands his insulin regimen, however he says he gives a shot in the morning and at nighttime. Patient is type 2 diabetes with insulin dependence and blood glucose of 300's. Last A1c 8.3 at 9 months ago.    Plan:  --Detemir 10u daily inpatient  -- Aspart 3u TIDWM inpatient  -- insulin NPH 25 U with breakfast and 15 U with evening meal on discharge  --LDSSI  --POCT glucose checks with meals and at night  --adjust insulin according to sliding scale usage  -- A1c ordered

## 2022-04-01 LAB — LEVETIRACETAM SERPL-MCNC: 12.3 UG/ML (ref 3–60)

## 2022-04-04 LAB
BACTERIA BLD CULT: NORMAL
BACTERIA BLD CULT: NORMAL

## 2022-04-11 ENCOUNTER — TELEPHONE (OUTPATIENT)
Dept: NEUROLOGY | Facility: CLINIC | Age: 42
End: 2022-04-11
Payer: MEDICAID

## 2022-05-07 ENCOUNTER — HOSPITAL ENCOUNTER (EMERGENCY)
Facility: HOSPITAL | Age: 42
Discharge: HOME | End: 2022-05-07
Attending: EMERGENCY MEDICINE
Payer: MEDICAID

## 2022-05-07 VITALS
HEART RATE: 81 BPM | RESPIRATION RATE: 18 BRPM | SYSTOLIC BLOOD PRESSURE: 122 MMHG | TEMPERATURE: 98 F | OXYGEN SATURATION: 99 % | BODY MASS INDEX: 18.66 KG/M2 | DIASTOLIC BLOOD PRESSURE: 79 MMHG | HEIGHT: 69 IN | WEIGHT: 126 LBS

## 2022-05-07 DIAGNOSIS — R56.9 SEIZURE: ICD-10-CM

## 2022-05-07 DIAGNOSIS — R73.9 HYPERGLYCEMIA: Primary | ICD-10-CM

## 2022-05-07 DIAGNOSIS — Z76.0 MEDICATION REFILL: ICD-10-CM

## 2022-05-07 LAB
ALBUMIN SERPL BCP-MCNC: 3.8 G/DL (ref 3.5–5.2)
ALP SERPL-CCNC: 67 U/L (ref 55–135)
ALT SERPL W/O P-5'-P-CCNC: 21 U/L (ref 10–44)
ANION GAP SERPL CALC-SCNC: 12 MMOL/L (ref 8–16)
AST SERPL-CCNC: 22 U/L (ref 10–40)
B-OH-BUTYR BLD STRIP-SCNC: 0.4 MMOL/L (ref 0–0.5)
BACTERIA #/AREA URNS HPF: NORMAL /HPF
BASOPHILS # BLD AUTO: 0.05 K/UL (ref 0–0.2)
BASOPHILS NFR BLD: 0.6 % (ref 0–1.9)
BILIRUB SERPL-MCNC: 0.4 MG/DL (ref 0.1–1)
BILIRUB UR QL STRIP: NEGATIVE
BUN SERPL-MCNC: 8 MG/DL (ref 6–20)
CALCIUM SERPL-MCNC: 9.8 MG/DL (ref 8.7–10.5)
CHLORIDE SERPL-SCNC: 98 MMOL/L (ref 95–110)
CLARITY UR: CLEAR
CO2 SERPL-SCNC: 24 MMOL/L (ref 23–29)
COLOR UR: COLORLESS
CREAT SERPL-MCNC: 0.8 MG/DL (ref 0.5–1.4)
DIFFERENTIAL METHOD: ABNORMAL
EOSINOPHIL # BLD AUTO: 0.1 K/UL (ref 0–0.5)
EOSINOPHIL NFR BLD: 0.9 % (ref 0–8)
ERYTHROCYTE [DISTWIDTH] IN BLOOD BY AUTOMATED COUNT: 13 % (ref 11.5–14.5)
EST. GFR  (AFRICAN AMERICAN): >60 ML/MIN/1.73 M^2
EST. GFR  (NON AFRICAN AMERICAN): >60 ML/MIN/1.73 M^2
GLUCOSE SERPL-MCNC: 334 MG/DL (ref 70–110)
GLUCOSE UR QL STRIP: ABNORMAL
HCT VFR BLD AUTO: 35.2 % (ref 40–54)
HGB BLD-MCNC: 12 G/DL (ref 14–18)
HGB UR QL STRIP: NEGATIVE
IMM GRANULOCYTES # BLD AUTO: 0.04 K/UL (ref 0–0.04)
IMM GRANULOCYTES NFR BLD AUTO: 0.5 % (ref 0–0.5)
KETONES UR QL STRIP: NEGATIVE
LEUKOCYTE ESTERASE UR QL STRIP: NEGATIVE
LYMPHOCYTES # BLD AUTO: 2.7 K/UL (ref 1–4.8)
LYMPHOCYTES NFR BLD: 31.4 % (ref 18–48)
MCH RBC QN AUTO: 32.3 PG (ref 27–31)
MCHC RBC AUTO-ENTMCNC: 34.1 G/DL (ref 32–36)
MCV RBC AUTO: 95 FL (ref 82–98)
MICROSCOPIC COMMENT: NORMAL
MONOCYTES # BLD AUTO: 1.2 K/UL (ref 0.3–1)
MONOCYTES NFR BLD: 14.3 % (ref 4–15)
NEUTROPHILS # BLD AUTO: 4.4 K/UL (ref 1.8–7.7)
NEUTROPHILS NFR BLD: 52.3 % (ref 38–73)
NITRITE UR QL STRIP: NEGATIVE
NRBC BLD-RTO: 0 /100 WBC
PH UR STRIP: 7 [PH] (ref 5–8)
PLATELET # BLD AUTO: 332 K/UL (ref 150–450)
PMV BLD AUTO: 9.9 FL (ref 9.2–12.9)
POCT GLUCOSE: 247 MG/DL (ref 70–110)
POCT GLUCOSE: 311 MG/DL (ref 70–110)
POTASSIUM SERPL-SCNC: 4.2 MMOL/L (ref 3.5–5.1)
PROT SERPL-MCNC: 7.8 G/DL (ref 6–8.4)
PROT UR QL STRIP: NEGATIVE
RBC # BLD AUTO: 3.71 M/UL (ref 4.6–6.2)
RBC #/AREA URNS HPF: 0 /HPF (ref 0–4)
SODIUM SERPL-SCNC: 134 MMOL/L (ref 136–145)
SP GR UR STRIP: 1 (ref 1–1.03)
URN SPEC COLLECT METH UR: ABNORMAL
UROBILINOGEN UR STRIP-ACNC: NEGATIVE EU/DL
WBC # BLD AUTO: 8.48 K/UL (ref 3.9–12.7)
WBC #/AREA URNS HPF: 0 /HPF (ref 0–5)
YEAST URNS QL MICRO: NORMAL

## 2022-05-07 PROCEDURE — 81000 URINALYSIS NONAUTO W/SCOPE: CPT | Performed by: NURSE PRACTITIONER

## 2022-05-07 PROCEDURE — 63600175 PHARM REV CODE 636 W HCPCS: Performed by: NURSE PRACTITIONER

## 2022-05-07 PROCEDURE — 25000003 PHARM REV CODE 250: Performed by: NURSE PRACTITIONER

## 2022-05-07 PROCEDURE — 82962 GLUCOSE BLOOD TEST: CPT

## 2022-05-07 PROCEDURE — 99284 EMERGENCY DEPT VISIT MOD MDM: CPT | Mod: 25

## 2022-05-07 PROCEDURE — 96361 HYDRATE IV INFUSION ADD-ON: CPT

## 2022-05-07 PROCEDURE — 85025 COMPLETE CBC W/AUTO DIFF WBC: CPT | Performed by: NURSE PRACTITIONER

## 2022-05-07 PROCEDURE — 80053 COMPREHEN METABOLIC PANEL: CPT | Performed by: NURSE PRACTITIONER

## 2022-05-07 PROCEDURE — 96374 THER/PROPH/DIAG INJ IV PUSH: CPT

## 2022-05-07 PROCEDURE — 82010 KETONE BODYS QUAN: CPT | Performed by: NURSE PRACTITIONER

## 2022-05-07 RX ORDER — DIVALPROEX SODIUM 500 MG/1
1000 TABLET, FILM COATED, EXTENDED RELEASE ORAL EVERY 12 HOURS
Qty: 120 TABLET | Refills: 0 | Status: ON HOLD | OUTPATIENT
Start: 2022-05-07 | End: 2022-05-17 | Stop reason: SDUPTHER

## 2022-05-07 RX ORDER — LEVETIRACETAM 750 MG/1
750 TABLET ORAL 2 TIMES DAILY
Qty: 60 TABLET | Refills: 1 | Status: ON HOLD | OUTPATIENT
Start: 2022-05-07 | End: 2022-05-25 | Stop reason: SDUPTHER

## 2022-05-07 RX ADMIN — SODIUM CHLORIDE 1000 ML: 0.9 INJECTION, SOLUTION INTRAVENOUS at 04:05

## 2022-05-07 RX ADMIN — INSULIN HUMAN 6 UNITS: 100 INJECTION, SOLUTION PARENTERAL at 05:05

## 2022-05-07 NOTE — DISCHARGE INSTRUCTIONS
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
## 2022-05-07 NOTE — PLAN OF CARE
SW met with patient at bedside to provide shelter resources and complete assessment. Pt reported he has been homeless for the past two months, sleeping in various places. SW attempted to assess pt's family support. Pt advised Sister Dot Shelton lives too far, so he is unable to live with her (pt's sister has a [place] address). SW briefly discussed homeless shelters with patient and a day center for showers and meals. Pt expressed interest. SW provided patient with a list of shelters that included intake times, contact information and addresses to:  [facility], [facility] , [facility] and [facility]. Pt reported no other needs at this time. SW left discharge brochure and contact information. Pt was encouraged to call with any questions or concerns. Pt verbalized understanding.        05/07/22 1273   Discharge Assessment   Assessment Type Discharge Planning Assessment   Confirmed/corrected address, phone number and insurance   (Pt homeless)   Source of Information patient   Communicated SHABANA with patient/caregiver Date not available/Unable to determine   Reason For Admission Generalized fatigue and hyperglycemia   Prior to hospitilization cognitive status: Alert/Oriented   Current cognitive status: Alert/Oriented   Walking or Climbing Stairs Difficulty none   Dressing/Bathing Difficulty none   Equipment Currently Used at Home none   Do you take prescription medications? Yes   Do you have prescription coverage? Yes   Coverage Medicaid  [employer]   Do you have any problems affording any of your prescribed medications? No   Is the patient taking medications as prescribed? yes   Discharge Plan A Shelter

## 2022-05-08 NOTE — ED NOTES
Patient is discharge, Patient is alert oriented x4, no acute distress noted, no other complaints at this time, ambulatory with steady gait. Discharge planning huddled with Charge RN, discharge papers given to patient. Food and juice given to patient. No other complaints at this time.

## 2022-05-13 ENCOUNTER — HOSPITAL ENCOUNTER (INPATIENT)
Facility: HOSPITAL | Age: 42
LOS: 13 days | Discharge: HOME | DRG: 177 | End: 2022-05-26
Attending: EMERGENCY MEDICINE | Admitting: STUDENT IN AN ORGANIZED HEALTH CARE EDUCATION/TRAINING PROGRAM
Payer: MEDICAID

## 2022-05-13 DIAGNOSIS — E11.69 TYPE 2 DIABETES MELLITUS WITH OTHER SPECIFIED COMPLICATION, WITH LONG-TERM CURRENT USE OF INSULIN: Chronic | ICD-10-CM

## 2022-05-13 DIAGNOSIS — R56.9 SEIZURE: ICD-10-CM

## 2022-05-13 DIAGNOSIS — R41.82 ALTERED MENTAL STATUS, UNSPECIFIED ALTERED MENTAL STATUS TYPE: Primary | ICD-10-CM

## 2022-05-13 DIAGNOSIS — Z79.4 TYPE 2 DIABETES MELLITUS WITH OTHER SPECIFIED COMPLICATION, WITH LONG-TERM CURRENT USE OF INSULIN: Chronic | ICD-10-CM

## 2022-05-13 DIAGNOSIS — J18.9 CAVITARY PNEUMONIA: ICD-10-CM

## 2022-05-13 DIAGNOSIS — G40.909 NONINTRACTABLE EPILEPSY WITHOUT STATUS EPILEPTICUS, UNSPECIFIED EPILEPSY TYPE: ICD-10-CM

## 2022-05-13 DIAGNOSIS — A41.9 SEPSIS, DUE TO UNSPECIFIED ORGANISM, UNSPECIFIED WHETHER ACUTE ORGAN DYSFUNCTION PRESENT: ICD-10-CM

## 2022-05-13 DIAGNOSIS — R73.9 HYPERGLYCEMIA: ICD-10-CM

## 2022-05-13 DIAGNOSIS — R41.82 AMS (ALTERED MENTAL STATUS): ICD-10-CM

## 2022-05-13 DIAGNOSIS — J15.212 PNEUMONIA OF RIGHT UPPER LOBE DUE TO METHICILLIN RESISTANT STAPHYLOCOCCUS AUREUS (MRSA): ICD-10-CM

## 2022-05-13 DIAGNOSIS — Z59.00 HOMELESSNESS: ICD-10-CM

## 2022-05-13 DIAGNOSIS — J98.4 CAVITARY PNEUMONIA: ICD-10-CM

## 2022-05-13 DIAGNOSIS — F10.10 ETOH ABUSE: ICD-10-CM

## 2022-05-13 DIAGNOSIS — A41.9 SEPSIS: ICD-10-CM

## 2022-05-13 LAB
ALBUMIN SERPL BCP-MCNC: 3.7 G/DL (ref 3.5–5.2)
ALBUMIN SERPL BCP-MCNC: 4.4 G/DL (ref 3.5–5.2)
ALP SERPL-CCNC: 72 U/L (ref 55–135)
ALP SERPL-CCNC: 83 U/L (ref 55–135)
ALT SERPL W/O P-5'-P-CCNC: 6 U/L (ref 10–44)
ALT SERPL W/O P-5'-P-CCNC: 8 U/L (ref 10–44)
AMMONIA PLAS-SCNC: 50 UMOL/L (ref 10–50)
AMPHET+METHAMPHET UR QL: NEGATIVE
ANION GAP SERPL CALC-SCNC: 13 MMOL/L (ref 8–16)
ANION GAP SERPL CALC-SCNC: 16 MMOL/L (ref 8–16)
ANION GAP SERPL CALC-SCNC: 16 MMOL/L (ref 8–16)
ANION GAP SERPL CALC-SCNC: 18 MMOL/L (ref 8–16)
APTT BLDCRRT: 31.3 SEC (ref 21–32)
AST SERPL-CCNC: 13 U/L (ref 10–40)
AST SERPL-CCNC: 14 U/L (ref 10–40)
B-OH-BUTYR BLD STRIP-SCNC: 1.4 MMOL/L (ref 0–0.5)
BACTERIA #/AREA URNS HPF: NORMAL /HPF
BARBITURATES UR QL SCN>200 NG/ML: NEGATIVE
BASOPHILS # BLD AUTO: 0.13 K/UL (ref 0–0.2)
BASOPHILS # BLD AUTO: 0.13 K/UL (ref 0–0.2)
BASOPHILS NFR BLD: 0.4 % (ref 0–1.9)
BASOPHILS NFR BLD: 0.4 % (ref 0–1.9)
BENZODIAZ UR QL SCN>200 NG/ML: NEGATIVE
BILIRUB SERPL-MCNC: 0.6 MG/DL (ref 0.1–1)
BILIRUB SERPL-MCNC: 0.7 MG/DL (ref 0.1–1)
BILIRUB UR QL STRIP: NEGATIVE
BNP SERPL-MCNC: <10 PG/ML (ref 0–99)
BUN SERPL-MCNC: 13 MG/DL (ref 6–20)
BUN SERPL-MCNC: 13 MG/DL (ref 6–20)
BUN SERPL-MCNC: 18 MG/DL (ref 6–20)
BUN SERPL-MCNC: 8 MG/DL (ref 6–20)
BUN SERPL-MCNC: 8 MG/DL (ref 6–20)
BUN SERPL-MCNC: 9 MG/DL (ref 6–20)
BZE UR QL SCN: NEGATIVE
CALCIUM SERPL-MCNC: 10.2 MG/DL (ref 8.7–10.5)
CALCIUM SERPL-MCNC: 10.8 MG/DL (ref 8.7–10.5)
CALCIUM SERPL-MCNC: 9.3 MG/DL (ref 8.7–10.5)
CALCIUM SERPL-MCNC: 9.4 MG/DL (ref 8.7–10.5)
CALCIUM SERPL-MCNC: 9.4 MG/DL (ref 8.7–10.5)
CALCIUM SERPL-MCNC: 9.6 MG/DL (ref 8.7–10.5)
CANNABINOIDS UR QL SCN: NEGATIVE
CHLORIDE SERPL-SCNC: 101 MMOL/L (ref 95–110)
CHLORIDE SERPL-SCNC: 104 MMOL/L (ref 95–110)
CHLORIDE SERPL-SCNC: 105 MMOL/L (ref 95–110)
CHLORIDE SERPL-SCNC: 105 MMOL/L (ref 95–110)
CHLORIDE SERPL-SCNC: 107 MMOL/L (ref 95–110)
CHLORIDE SERPL-SCNC: 99 MMOL/L (ref 95–110)
CLARITY UR: CLEAR
CO2 SERPL-SCNC: 22 MMOL/L (ref 23–29)
CO2 SERPL-SCNC: 23 MMOL/L (ref 23–29)
CO2 SERPL-SCNC: 23 MMOL/L (ref 23–29)
CO2 SERPL-SCNC: 24 MMOL/L (ref 23–29)
CO2 SERPL-SCNC: 25 MMOL/L (ref 23–29)
CO2 SERPL-SCNC: 26 MMOL/L (ref 23–29)
COLOR UR: YELLOW
CREAT SERPL-MCNC: 0.7 MG/DL (ref 0.5–1.4)
CREAT SERPL-MCNC: 0.7 MG/DL (ref 0.5–1.4)
CREAT SERPL-MCNC: 0.8 MG/DL (ref 0.5–1.4)
CREAT SERPL-MCNC: 1 MG/DL (ref 0.5–1.4)
CREAT UR-MCNC: 110.5 MG/DL (ref 23–375)
DELSYS: ABNORMAL
DIFFERENTIAL METHOD: ABNORMAL
DIFFERENTIAL METHOD: ABNORMAL
EOSINOPHIL # BLD AUTO: 0 K/UL (ref 0–0.5)
EOSINOPHIL # BLD AUTO: 0 K/UL (ref 0–0.5)
EOSINOPHIL NFR BLD: 0 % (ref 0–8)
EOSINOPHIL NFR BLD: 0 % (ref 0–8)
ERYTHROCYTE [DISTWIDTH] IN BLOOD BY AUTOMATED COUNT: 13.2 % (ref 11.5–14.5)
ERYTHROCYTE [DISTWIDTH] IN BLOOD BY AUTOMATED COUNT: 13.3 % (ref 11.5–14.5)
EST. GFR  (AFRICAN AMERICAN): >60 ML/MIN/1.73 M^2
EST. GFR  (NON AFRICAN AMERICAN): >60 ML/MIN/1.73 M^2
ETHANOL SERPL-MCNC: <10 MG/DL
GLUCOSE SERPL-MCNC: 135 MG/DL (ref 70–110)
GLUCOSE SERPL-MCNC: 163 MG/DL (ref 70–110)
GLUCOSE SERPL-MCNC: 225 MG/DL (ref 70–110)
GLUCOSE SERPL-MCNC: 252 MG/DL (ref 70–110)
GLUCOSE SERPL-MCNC: 297 MG/DL (ref 70–110)
GLUCOSE SERPL-MCNC: 304 MG/DL (ref 70–110)
GLUCOSE UR QL STRIP: ABNORMAL
HCO3 UR-SCNC: 28.9 MMOL/L (ref 24–28)
HCT VFR BLD AUTO: 34.1 % (ref 40–54)
HCT VFR BLD AUTO: 36.8 % (ref 40–54)
HGB BLD-MCNC: 11.6 G/DL (ref 14–18)
HGB BLD-MCNC: 12.7 G/DL (ref 14–18)
HGB UR QL STRIP: NEGATIVE
HYALINE CASTS #/AREA URNS LPF: 0 /LPF
IMM GRANULOCYTES # BLD AUTO: 0.21 K/UL (ref 0–0.04)
IMM GRANULOCYTES # BLD AUTO: 0.38 K/UL (ref 0–0.04)
IMM GRANULOCYTES NFR BLD AUTO: 0.7 % (ref 0–0.5)
IMM GRANULOCYTES NFR BLD AUTO: 1 % (ref 0–0.5)
INFLUENZA A, MOLECULAR: NEGATIVE
INFLUENZA B, MOLECULAR: NEGATIVE
INR PPP: 1.1 (ref 0.8–1.2)
KETONES UR QL STRIP: ABNORMAL
LACTATE SERPL-SCNC: 1.9 MMOL/L (ref 0.5–2.2)
LEUKOCYTE ESTERASE UR QL STRIP: NEGATIVE
LIPASE SERPL-CCNC: 14 U/L (ref 4–60)
LYMPHOCYTES # BLD AUTO: 0.8 K/UL (ref 1–4.8)
LYMPHOCYTES # BLD AUTO: 1.7 K/UL (ref 1–4.8)
LYMPHOCYTES NFR BLD: 2.8 % (ref 18–48)
LYMPHOCYTES NFR BLD: 4.6 % (ref 18–48)
MAGNESIUM SERPL-MCNC: 1.6 MG/DL (ref 1.6–2.6)
MAGNESIUM SERPL-MCNC: 1.8 MG/DL (ref 1.6–2.6)
MCH RBC QN AUTO: 32.5 PG (ref 27–31)
MCH RBC QN AUTO: 32.6 PG (ref 27–31)
MCHC RBC AUTO-ENTMCNC: 34 G/DL (ref 32–36)
MCHC RBC AUTO-ENTMCNC: 34.5 G/DL (ref 32–36)
MCV RBC AUTO: 95 FL (ref 82–98)
MCV RBC AUTO: 96 FL (ref 82–98)
METHADONE UR QL SCN>300 NG/ML: NEGATIVE
MICROSCOPIC COMMENT: NORMAL
MONOCYTES # BLD AUTO: 3.4 K/UL (ref 0.3–1)
MONOCYTES # BLD AUTO: 3.5 K/UL (ref 0.3–1)
MONOCYTES NFR BLD: 11.3 % (ref 4–15)
MONOCYTES NFR BLD: 9.5 % (ref 4–15)
NEUTROPHILS # BLD AUTO: 25.4 K/UL (ref 1.8–7.7)
NEUTROPHILS # BLD AUTO: 31.3 K/UL (ref 1.8–7.7)
NEUTROPHILS NFR BLD: 84.5 % (ref 38–73)
NEUTROPHILS NFR BLD: 84.8 % (ref 38–73)
NITRITE UR QL STRIP: NEGATIVE
NRBC BLD-RTO: 0 /100 WBC
NRBC BLD-RTO: 0 /100 WBC
OPIATES UR QL SCN: NEGATIVE
PCO2 BLDA: 53.8 MMHG (ref 35–45)
PCP UR QL SCN>25 NG/ML: NEGATIVE
PH SMN: 7.34 [PH] (ref 7.35–7.45)
PH UR STRIP: 5 [PH] (ref 5–8)
PHOSPHATE SERPL-MCNC: 3.4 MG/DL (ref 2.7–4.5)
PHOSPHATE SERPL-MCNC: 3.7 MG/DL (ref 2.7–4.5)
PLATELET # BLD AUTO: 566 K/UL (ref 150–450)
PLATELET # BLD AUTO: 631 K/UL (ref 150–450)
PLATELET BLD QL SMEAR: ABNORMAL
PMV BLD AUTO: 9 FL (ref 9.2–12.9)
PMV BLD AUTO: 9.3 FL (ref 9.2–12.9)
PO2 BLDA: 24 MMHG (ref 40–60)
POC BE: 3 MMOL/L
POC SATURATED O2: 37 % (ref 95–100)
POC TCO2: 31 MMOL/L (ref 24–29)
POCT GLUCOSE: 115 MG/DL (ref 70–110)
POCT GLUCOSE: 122 MG/DL (ref 70–110)
POCT GLUCOSE: 135 MG/DL (ref 70–110)
POCT GLUCOSE: 160 MG/DL (ref 70–110)
POCT GLUCOSE: 168 MG/DL (ref 70–110)
POCT GLUCOSE: 197 MG/DL (ref 70–110)
POCT GLUCOSE: 255 MG/DL (ref 70–110)
POCT GLUCOSE: 257 MG/DL (ref 70–110)
POCT GLUCOSE: 335 MG/DL (ref 70–110)
POTASSIUM SERPL-SCNC: 3.7 MMOL/L (ref 3.5–5.1)
POTASSIUM SERPL-SCNC: 3.9 MMOL/L (ref 3.5–5.1)
POTASSIUM SERPL-SCNC: 4 MMOL/L (ref 3.5–5.1)
POTASSIUM SERPL-SCNC: 4 MMOL/L (ref 3.5–5.1)
POTASSIUM SERPL-SCNC: 4.1 MMOL/L (ref 3.5–5.1)
POTASSIUM SERPL-SCNC: 4.2 MMOL/L (ref 3.5–5.1)
PROCALCITONIN SERPL IA-MCNC: 0.11 NG/ML
PROT SERPL-MCNC: 6.9 G/DL (ref 6–8.4)
PROT SERPL-MCNC: 8.8 G/DL (ref 6–8.4)
PROT UR QL STRIP: ABNORMAL
PROTHROMBIN TIME: 11 SEC (ref 9–12.5)
RBC # BLD AUTO: 3.57 M/UL (ref 4.6–6.2)
RBC # BLD AUTO: 3.89 M/UL (ref 4.6–6.2)
RBC #/AREA URNS HPF: 1 /HPF (ref 0–4)
SAMPLE: ABNORMAL
SARS-COV-2 RDRP RESP QL NAA+PROBE: NEGATIVE
SODIUM SERPL-SCNC: 140 MMOL/L (ref 136–145)
SODIUM SERPL-SCNC: 141 MMOL/L (ref 136–145)
SODIUM SERPL-SCNC: 142 MMOL/L (ref 136–145)
SODIUM SERPL-SCNC: 142 MMOL/L (ref 136–145)
SODIUM SERPL-SCNC: 144 MMOL/L (ref 136–145)
SODIUM SERPL-SCNC: 144 MMOL/L (ref 136–145)
SP GR UR STRIP: 1.01 (ref 1–1.03)
SPECIMEN SOURCE: NORMAL
SQUAMOUS #/AREA URNS HPF: 0 /HPF
TOXICOLOGY INFORMATION: NORMAL
TROPONIN I SERPL DL<=0.01 NG/ML-MCNC: <0.006 NG/ML (ref 0–0.03)
URN SPEC COLLECT METH UR: ABNORMAL
UROBILINOGEN UR STRIP-ACNC: NEGATIVE EU/DL
WBC # BLD AUTO: 29.98 K/UL (ref 3.9–12.7)
WBC # BLD AUTO: 37.05 K/UL (ref 3.9–12.7)
WBC #/AREA URNS HPF: 1 /HPF (ref 0–5)
YEAST URNS QL MICRO: NORMAL

## 2022-05-13 PROCEDURE — 80048 BASIC METABOLIC PNL TOTAL CA: CPT | Mod: XB | Performed by: STUDENT IN AN ORGANIZED HEALTH CARE EDUCATION/TRAINING PROGRAM

## 2022-05-13 PROCEDURE — 25000003 PHARM REV CODE 250: Performed by: STUDENT IN AN ORGANIZED HEALTH CARE EDUCATION/TRAINING PROGRAM

## 2022-05-13 PROCEDURE — 99291 PR CRITICAL CARE, E/M 30-74 MINUTES: ICD-10-PCS | Mod: ,,, | Performed by: INTERNAL MEDICINE

## 2022-05-13 PROCEDURE — 25000003 PHARM REV CODE 250: Performed by: EMERGENCY MEDICINE

## 2022-05-13 PROCEDURE — 80048 BASIC METABOLIC PNL TOTAL CA: CPT | Mod: 91,XB | Performed by: STUDENT IN AN ORGANIZED HEALTH CARE EDUCATION/TRAINING PROGRAM

## 2022-05-13 PROCEDURE — 63600175 PHARM REV CODE 636 W HCPCS: Performed by: STUDENT IN AN ORGANIZED HEALTH CARE EDUCATION/TRAINING PROGRAM

## 2022-05-13 PROCEDURE — C9399 UNCLASSIFIED DRUGS OR BIOLOG: HCPCS | Performed by: STUDENT IN AN ORGANIZED HEALTH CARE EDUCATION/TRAINING PROGRAM

## 2022-05-13 PROCEDURE — 84145 PROCALCITONIN (PCT): CPT | Performed by: EMERGENCY MEDICINE

## 2022-05-13 PROCEDURE — U0002 COVID-19 LAB TEST NON-CDC: HCPCS | Performed by: EMERGENCY MEDICINE

## 2022-05-13 PROCEDURE — 93010 EKG 12-LEAD: ICD-10-PCS | Mod: ,,, | Performed by: INTERNAL MEDICINE

## 2022-05-13 PROCEDURE — 63600175 PHARM REV CODE 636 W HCPCS: Performed by: EMERGENCY MEDICINE

## 2022-05-13 PROCEDURE — 93005 ELECTROCARDIOGRAM TRACING: CPT

## 2022-05-13 PROCEDURE — 83690 ASSAY OF LIPASE: CPT | Performed by: EMERGENCY MEDICINE

## 2022-05-13 PROCEDURE — 85610 PROTHROMBIN TIME: CPT | Performed by: EMERGENCY MEDICINE

## 2022-05-13 PROCEDURE — 80307 DRUG TEST PRSMV CHEM ANLYZR: CPT | Performed by: EMERGENCY MEDICINE

## 2022-05-13 PROCEDURE — 82077 ASSAY SPEC XCP UR&BREATH IA: CPT | Performed by: EMERGENCY MEDICINE

## 2022-05-13 PROCEDURE — G0378 HOSPITAL OBSERVATION PER HR: HCPCS

## 2022-05-13 PROCEDURE — 96372 THER/PROPH/DIAG INJ SC/IM: CPT | Performed by: STUDENT IN AN ORGANIZED HEALTH CARE EDUCATION/TRAINING PROGRAM

## 2022-05-13 PROCEDURE — 51702 INSERT TEMP BLADDER CATH: CPT

## 2022-05-13 PROCEDURE — 85025 COMPLETE CBC W/AUTO DIFF WBC: CPT | Performed by: EMERGENCY MEDICINE

## 2022-05-13 PROCEDURE — 85025 COMPLETE CBC W/AUTO DIFF WBC: CPT | Mod: 91 | Performed by: STUDENT IN AN ORGANIZED HEALTH CARE EDUCATION/TRAINING PROGRAM

## 2022-05-13 PROCEDURE — 94761 N-INVAS EAR/PLS OXIMETRY MLT: CPT

## 2022-05-13 PROCEDURE — 96361 HYDRATE IV INFUSION ADD-ON: CPT

## 2022-05-13 PROCEDURE — 99900035 HC TECH TIME PER 15 MIN (STAT)

## 2022-05-13 PROCEDURE — 80053 COMPREHEN METABOLIC PANEL: CPT | Mod: 91 | Performed by: STUDENT IN AN ORGANIZED HEALTH CARE EDUCATION/TRAINING PROGRAM

## 2022-05-13 PROCEDURE — 36415 COLL VENOUS BLD VENIPUNCTURE: CPT | Performed by: STUDENT IN AN ORGANIZED HEALTH CARE EDUCATION/TRAINING PROGRAM

## 2022-05-13 PROCEDURE — 99285 EMERGENCY DEPT VISIT HI MDM: CPT | Mod: 25

## 2022-05-13 PROCEDURE — 84484 ASSAY OF TROPONIN QUANT: CPT | Performed by: EMERGENCY MEDICINE

## 2022-05-13 PROCEDURE — 82803 BLOOD GASES ANY COMBINATION: CPT

## 2022-05-13 PROCEDURE — 87502 INFLUENZA DNA AMP PROBE: CPT | Performed by: EMERGENCY MEDICINE

## 2022-05-13 PROCEDURE — 85730 THROMBOPLASTIN TIME PARTIAL: CPT | Performed by: EMERGENCY MEDICINE

## 2022-05-13 PROCEDURE — 83605 ASSAY OF LACTIC ACID: CPT | Performed by: EMERGENCY MEDICINE

## 2022-05-13 PROCEDURE — 96367 TX/PROPH/DG ADDL SEQ IV INF: CPT

## 2022-05-13 PROCEDURE — 82140 ASSAY OF AMMONIA: CPT | Performed by: EMERGENCY MEDICINE

## 2022-05-13 PROCEDURE — 87040 BLOOD CULTURE FOR BACTERIA: CPT | Performed by: EMERGENCY MEDICINE

## 2022-05-13 PROCEDURE — 81000 URINALYSIS NONAUTO W/SCOPE: CPT | Mod: 59 | Performed by: EMERGENCY MEDICINE

## 2022-05-13 PROCEDURE — 83735 ASSAY OF MAGNESIUM: CPT | Mod: 91 | Performed by: STUDENT IN AN ORGANIZED HEALTH CARE EDUCATION/TRAINING PROGRAM

## 2022-05-13 PROCEDURE — 80053 COMPREHEN METABOLIC PANEL: CPT | Performed by: EMERGENCY MEDICINE

## 2022-05-13 PROCEDURE — 99291 CRITICAL CARE FIRST HOUR: CPT | Mod: ,,, | Performed by: INTERNAL MEDICINE

## 2022-05-13 PROCEDURE — 84100 ASSAY OF PHOSPHORUS: CPT | Performed by: EMERGENCY MEDICINE

## 2022-05-13 PROCEDURE — 84100 ASSAY OF PHOSPHORUS: CPT | Mod: 91 | Performed by: STUDENT IN AN ORGANIZED HEALTH CARE EDUCATION/TRAINING PROGRAM

## 2022-05-13 PROCEDURE — 83735 ASSAY OF MAGNESIUM: CPT | Performed by: EMERGENCY MEDICINE

## 2022-05-13 PROCEDURE — 11000001 HC ACUTE MED/SURG PRIVATE ROOM

## 2022-05-13 PROCEDURE — 93010 ELECTROCARDIOGRAM REPORT: CPT | Mod: ,,, | Performed by: INTERNAL MEDICINE

## 2022-05-13 PROCEDURE — 82010 KETONE BODYS QUAN: CPT | Performed by: EMERGENCY MEDICINE

## 2022-05-13 PROCEDURE — 83880 ASSAY OF NATRIURETIC PEPTIDE: CPT | Performed by: EMERGENCY MEDICINE

## 2022-05-13 PROCEDURE — 96365 THER/PROPH/DIAG IV INF INIT: CPT

## 2022-05-13 PROCEDURE — 87502 INFLUENZA DNA AMP PROBE: CPT

## 2022-05-13 PROCEDURE — 96366 THER/PROPH/DIAG IV INF ADDON: CPT

## 2022-05-13 PROCEDURE — 82962 GLUCOSE BLOOD TEST: CPT

## 2022-05-13 PROCEDURE — S0030 INJECTION, METRONIDAZOLE: HCPCS | Performed by: STUDENT IN AN ORGANIZED HEALTH CARE EDUCATION/TRAINING PROGRAM

## 2022-05-13 RX ORDER — INSULIN ASPART 100 [IU]/ML
15 INJECTION, SOLUTION INTRAVENOUS; SUBCUTANEOUS ONCE
Status: DISCONTINUED | OUTPATIENT
Start: 2022-05-13 | End: 2022-05-13

## 2022-05-13 RX ORDER — SODIUM CHLORIDE 0.9 % (FLUSH) 0.9 %
5 SYRINGE (ML) INJECTION
Status: DISCONTINUED | OUTPATIENT
Start: 2022-05-13 | End: 2022-05-26 | Stop reason: HOSPADM

## 2022-05-13 RX ORDER — ACETAMINOPHEN 500 MG
1000 TABLET ORAL
Status: DISCONTINUED | OUTPATIENT
Start: 2022-05-13 | End: 2022-05-13

## 2022-05-13 RX ORDER — ACETAMINOPHEN 325 MG/1
650 TABLET ORAL EVERY 8 HOURS PRN
Status: DISCONTINUED | OUTPATIENT
Start: 2022-05-13 | End: 2022-05-26 | Stop reason: HOSPADM

## 2022-05-13 RX ORDER — IBUPROFEN 200 MG
16 TABLET ORAL
Status: DISCONTINUED | OUTPATIENT
Start: 2022-05-13 | End: 2022-05-26 | Stop reason: HOSPADM

## 2022-05-13 RX ORDER — GLUCAGON 1 MG
1 KIT INJECTION
Status: DISCONTINUED | OUTPATIENT
Start: 2022-05-13 | End: 2022-05-26 | Stop reason: HOSPADM

## 2022-05-13 RX ORDER — DIVALPROEX SODIUM 250 MG/1
1000 TABLET, FILM COATED, EXTENDED RELEASE ORAL EVERY 12 HOURS
Status: DISCONTINUED | OUTPATIENT
Start: 2022-05-13 | End: 2022-05-26 | Stop reason: HOSPADM

## 2022-05-13 RX ORDER — LIDOCAINE 50 MG/G
1 PATCH TOPICAL DAILY PRN
Status: DISCONTINUED | OUTPATIENT
Start: 2022-05-13 | End: 2022-05-26 | Stop reason: HOSPADM

## 2022-05-13 RX ORDER — SODIUM CHLORIDE, SODIUM LACTATE, POTASSIUM CHLORIDE, CALCIUM CHLORIDE 600; 310; 30; 20 MG/100ML; MG/100ML; MG/100ML; MG/100ML
INJECTION, SOLUTION INTRAVENOUS CONTINUOUS
Status: DISCONTINUED | OUTPATIENT
Start: 2022-05-13 | End: 2022-05-13

## 2022-05-13 RX ORDER — TALC
9 POWDER (GRAM) TOPICAL NIGHTLY PRN
Status: DISCONTINUED | OUTPATIENT
Start: 2022-05-13 | End: 2022-05-26 | Stop reason: HOSPADM

## 2022-05-13 RX ORDER — INSULIN ASPART 100 [IU]/ML
1-10 INJECTION, SOLUTION INTRAVENOUS; SUBCUTANEOUS
Status: DISCONTINUED | OUTPATIENT
Start: 2022-05-13 | End: 2022-05-26 | Stop reason: HOSPADM

## 2022-05-13 RX ORDER — NALOXONE HCL 0.4 MG/ML
0.02 VIAL (ML) INJECTION
Status: DISCONTINUED | OUTPATIENT
Start: 2022-05-13 | End: 2022-05-26 | Stop reason: HOSPADM

## 2022-05-13 RX ORDER — KETOROLAC TROMETHAMINE 30 MG/ML
10 INJECTION, SOLUTION INTRAMUSCULAR; INTRAVENOUS
Status: DISCONTINUED | OUTPATIENT
Start: 2022-05-13 | End: 2022-05-13

## 2022-05-13 RX ORDER — CEFEPIME HYDROCHLORIDE 1 G/50ML
2 INJECTION, SOLUTION INTRAVENOUS
Status: DISCONTINUED | OUTPATIENT
Start: 2022-05-13 | End: 2022-05-13

## 2022-05-13 RX ORDER — METRONIDAZOLE 500 MG/100ML
500 INJECTION, SOLUTION INTRAVENOUS
Status: DISCONTINUED | OUTPATIENT
Start: 2022-05-13 | End: 2022-05-14

## 2022-05-13 RX ORDER — ACETAMINOPHEN 325 MG/1
650 TABLET ORAL EVERY 4 HOURS PRN
Status: DISCONTINUED | OUTPATIENT
Start: 2022-05-13 | End: 2022-05-26 | Stop reason: HOSPADM

## 2022-05-13 RX ORDER — DEXTROSE, SODIUM CHLORIDE, SODIUM LACTATE, POTASSIUM CHLORIDE, AND CALCIUM CHLORIDE 5; .6; .31; .03; .02 G/100ML; G/100ML; G/100ML; G/100ML; G/100ML
INJECTION, SOLUTION INTRAVENOUS CONTINUOUS
Status: DISCONTINUED | OUTPATIENT
Start: 2022-05-13 | End: 2022-05-13

## 2022-05-13 RX ORDER — POTASSIUM CHLORIDE 20 MEQ/1
20 TABLET, EXTENDED RELEASE ORAL ONCE
Status: COMPLETED | OUTPATIENT
Start: 2022-05-13 | End: 2022-05-13

## 2022-05-13 RX ORDER — IBUPROFEN 200 MG
24 TABLET ORAL
Status: DISCONTINUED | OUTPATIENT
Start: 2022-05-13 | End: 2022-05-26 | Stop reason: HOSPADM

## 2022-05-13 RX ORDER — LOSARTAN POTASSIUM 25 MG/1
25 TABLET ORAL DAILY
Status: DISCONTINUED | OUTPATIENT
Start: 2022-05-13 | End: 2022-05-26 | Stop reason: HOSPADM

## 2022-05-13 RX ADMIN — LOSARTAN POTASSIUM 25 MG: 25 TABLET, FILM COATED ORAL at 08:05

## 2022-05-13 RX ADMIN — LEVETIRACETAM 750 MG: 250 TABLET, FILM COATED ORAL at 08:05

## 2022-05-13 RX ADMIN — CEFEPIME HYDROCHLORIDE 2 G: 2 INJECTION, SOLUTION INTRAVENOUS at 08:05

## 2022-05-13 RX ADMIN — CEFEPIME HYDROCHLORIDE 2 G: 2 INJECTION, SOLUTION INTRAVENOUS at 04:05

## 2022-05-13 RX ADMIN — SODIUM CHLORIDE 2178 ML: 0.9 INJECTION, SOLUTION INTRAVENOUS at 03:05

## 2022-05-13 RX ADMIN — DIVALPROEX SODIUM 1000 MG: 250 TABLET, EXTENDED RELEASE ORAL at 08:05

## 2022-05-13 RX ADMIN — INSULIN DETEMIR 20 UNITS: 100 INJECTION, SOLUTION SUBCUTANEOUS at 07:05

## 2022-05-13 RX ADMIN — INSULIN DETEMIR 20 UNITS: 100 INJECTION, SOLUTION SUBCUTANEOUS at 08:05

## 2022-05-13 RX ADMIN — ACETAMINOPHEN 650 MG: 325 TABLET ORAL at 05:05

## 2022-05-13 RX ADMIN — INSULIN ASPART 8 UNITS: 100 INJECTION, SOLUTION INTRAVENOUS; SUBCUTANEOUS at 04:05

## 2022-05-13 RX ADMIN — METRONIDAZOLE 500 MG: 500 INJECTION, SOLUTION INTRAVENOUS at 09:05

## 2022-05-13 RX ADMIN — POTASSIUM CHLORIDE 20 MEQ: 1500 TABLET, EXTENDED RELEASE ORAL at 08:05

## 2022-05-13 RX ADMIN — VANCOMYCIN HYDROCHLORIDE 1750 MG: 10 INJECTION, POWDER, LYOPHILIZED, FOR SOLUTION INTRAVENOUS at 10:05

## 2022-05-13 RX ADMIN — METRONIDAZOLE 500 MG: 500 INJECTION, SOLUTION INTRAVENOUS at 04:05

## 2022-05-13 RX ADMIN — VANCOMYCIN HYDROCHLORIDE 1250 MG: 1.25 INJECTION, POWDER, LYOPHILIZED, FOR SOLUTION INTRAVENOUS at 10:05

## 2022-05-13 RX ADMIN — CEFTRIAXONE 2 G: 2 INJECTION, SOLUTION INTRAVENOUS at 03:05

## 2022-05-13 RX ADMIN — SODIUM CHLORIDE 7 UNITS/HR: 9 INJECTION, SOLUTION INTRAVENOUS at 07:05

## 2022-05-13 RX ADMIN — DEXTROSE, SODIUM CHLORIDE, SODIUM LACTATE, POTASSIUM CHLORIDE, AND CALCIUM CHLORIDE: 5; .6; .31; .03; .02 INJECTION, SOLUTION INTRAVENOUS at 09:05

## 2022-05-13 SDOH — SOCIAL DETERMINANTS OF HEALTH (SDOH): HOMELESSNESS UNSPECIFIED: Z59.00

## 2022-05-13 NOTE — PLAN OF CARE
VN cued into pt's room for introduction with pt's permission. Pt is confused to time. Pt stated he does not know his home medications. Unable to complete at this time on admission.  VN role explained and informed pt that VN would be working with bedside nurse and the rest of the care team.  Fall risk and bed alarm protocol education provided.  Instructed pt to call for assistance and agreeable.  Allowed time for questions. NAD noted.  Will cont to be available as needed.      05/13/22 1501   Admission   Initial VN Admission Questions Complete   Shift   Virtual Nurse - Patient Verbalized Approval Of Camera Use   Safety/Activity   Patient Rounds call light in patient/parent reach;visualized patient   Safety Promotion/Fall Prevention Fall Risk reviewed with patient/family;instructed to call staff for mobility

## 2022-05-13 NOTE — HPI
Patient is a 42 yr old male with pmhx of T2DM, HTN, CVA, neurocognitive impairment and substance abuse disorder presenting to the ED for Altered mental status. [place], brought in by EMS. On arrival patient was only oriented to person but not place,time or situation. Patient blood glucose was elevated on arrival. Initial labs showed small acidosis. Patient was provided 2L LR in ED and his mentation improved. Patient had a fever in the ED , and elevated WBC  of 29.9. Can not rule out infection exacerbating or causing change in mental status. Patient received 2g ceftriaxone in ED. Patient denies chest pain, shortness of breath, headache, nausea and vomiting. Admitted to [facility]  for observation and correction of glucose.

## 2022-05-13 NOTE — Clinical Note
Diagnosis: Altered mental status, unspecified altered mental status type [[id number]]   Future Attending Provider: MINOR CLINTON [[id number]]   Admitting Provider:: MINOR CLINTON [[id number]]

## 2022-05-13 NOTE — PROGRESS NOTES
Pharmacokinetic Initial Assessment: IV Vancomycin    Assessment/Plan:    Initiate intravenous vancomycin with loading dose of 1750 mg once followed by a maintenance dose of vancomycin 1250mg IV every 12 hours  Desired empiric serum trough concentration is 15 to 20 mcg/mL  Draw vancomycin trough level 60 min prior to fourth dose on 5/14 at approximately 2030  Pharmacy will continue to follow and monitor vancomycin.      Please contact pharmacy at extension [phone] with any questions regarding this assessment.     Thank you for the consult,   Ifrah Morfin       Patient brief summary:  Terrance Chavez is a 42 y.o. male initiated on antimicrobial therapy with IV Vancomycin for treatment of suspected lower respiratory infection    Drug Allergies:   Review of patient's allergies indicates:  No Known Allergies    Actual Body Weight:   72.6 kg    Renal Function:   Estimated Creatinine Clearance: 111.3 mL/min (based on SCr of 0.8 mg/dL).,     Dialysis Method (if applicable):  N/A    CBC (last 72 hours):  Recent Labs   Lab Result Units 05/13/22  0310 05/13/22  0640   WBC K/uL 29.98* 37.05*   Hemoglobin g/dL 12.7* 11.6*   Hematocrit % 36.8* 34.1*   Platelets K/uL 631* 566*   Gran % % 84.8* 84.5*   Lymph % % 2.8* 4.6*   Mono % % 11.3 9.5   Eosinophil % % 0.0 0.0   Basophil % % 0.4 0.4   Differential Method  Automated Automated       Metabolic Panel (last 72 hours):  Recent Labs   Lab Result Units 05/13/22  0310 05/13/22  0340 05/13/22  0640   Sodium mmol/L 141  --  144   Potassium mmol/L 4.0  --  4.2   Chloride mmol/L 99  --  105   CO2 mmol/L 24  --  23   Glucose mg/dL 304*  --  252*   Glucose, UA   --  3+*  --    BUN mg/dL 18  --  13   Creatinine mg/dL 1.0  --  0.8   Creatinine, Urine mg/dL  --  110.5  --    Albumin g/dL 4.4  --  3.7   Total Bilirubin mg/dL 0.7  --  0.6   Alkaline Phosphatase U/L 83  --  72   AST U/L 14  --  13   ALT U/L 8*  --  6*   Magnesium mg/dL 1.8  --  1.6   Phosphorus mg/dL 3.4  --  3.7       Drug  levels (last 3 results):  No results for input(s): VANCOMYCINRA, VANCOMYCINPE, VANCOMYCINTR in the last 72 hours.    Microbiologic Results:  Microbiology Results (last 7 days)       Procedure Component Value Units Date/Time    Influenza A & B by Molecular [[id number]] Collected: 05/13/22 0313    Order Status: Completed Specimen: Nasopharyngeal Swab Updated: 05/13/22 0357     Influenza A, Molecular Negative     Influenza B, Molecular Negative     Flu A & B Source Nasal swab    Blood culture x two cultures. Draw prior to antibiotics. [[id number]] Collected: 05/13/22 0311    Order Status: Sent Specimen: Blood from Peripheral, Forearm, Right Updated: 05/13/22 0311    Blood culture x two cultures. Draw prior to antibiotics. [[id number]] Collected: 05/13/22 0255    Order Status: Sent Specimen: Blood from Peripheral, Antecubital, Right Updated: 05/13/22 0256

## 2022-05-13 NOTE — H&P
[facility]  History & Physical    Patient Name: Terrance Chavez  MRN: [id number]  Patient Class: OP- Observation  Admission Date: 5/13/2022  Attending Physician: Cony Kim MD   Primary Care Provider: Primary Doctor No         Patient information was obtained from patient, past medical records and ER records.     Subjective:     Principal Problem:Hyperglycemia    Chief Complaint:   Chief Complaint   Patient presents with    Altered Mental Status     EMS called by [employer] to  pt.  [place].  Pt oriented to person.        HPI: Patient is a 42 yr old male with pmhx of T2DM, HTN, CVA, neurocognitive impairment and substance abuse disorder presenting to the ED for Altered mental status. Patient was founding wandering near the airport talking incoherently, brought in by EMS. On arrival patient was only oriented to person but not place,time or situation. Patient blood glucose was elevated on arrival. Initial labs showed small acidosis. Patient was provided 2L LR in ED and his mentation improved. Patient had a fever in the ED , and elevated WBC  of 29.9. Can not rule out infection exacerbating or causing change in mental status. Patient received 2g ceftriaxone in ED. Patient denies chest pain, shortness of breath, headache, nausea and vomiting. Admitted to [facility]  for observation and correction of glucose.      Past Medical History:   Diagnosis Date    Diabetes mellitus     Seizures     Stroke 2013       Past Surgical History:   Procedure Laterality Date    TRANSESOPHAGEAL ECHOCARDIOGRAPHY N/A 6/1/2021    Procedure: ECHOCARDIOGRAM, TRANSESOPHAGEAL;  Surgeon: [facility] Diagnostic Provider;  Location: [facility];  Service: Anesthesiology;  Laterality: N/A;       Review of patient's allergies indicates:  No Known Allergies    No current facility-administered medications on file prior to encounter.     Current Outpatient Medications on File Prior to Encounter  "  Medication Sig    blood sugar diagnostic (ONETOUCH VERIO TEST STRIPS) Strp Use to test blood glucose 2 (two) times daily with meals.    blood sugar diagnostic Strp 1 strip by Misc.(Non-Drug; Combo Route) route 2 (two) times daily with meals.    blood-glucose meter kit Use as instructed    divalproex ER (DEPAKOTE) 500 MG Tb24 Take 2 tablets (1,000 mg total) by mouth every 12 (twelve) hours.    divalproex ER (DEPAKOTE) 500 MG Tb24 Take 2 tablets (1,000 mg total) by mouth every 12 (twelve) hours.    insulin NPH/Reg human (HUMULIN, 70/30,) 100 unit/mL (70-30) InPn pen Inject 25 units into the skin with breakfast and 15 units with dinner or evening meal. Please see your primary care physician for further adjustments as needed.    lancets 30 gauge Misc 1 lancet by Misc.(Non-Drug; Combo Route) route 2 (two) times daily with meals.    lancing device Misc 1 Device by Misc.(Non-Drug; Combo Route) route 2 (two) times daily with meals.    levETIRAcetam (KEPPRA) 750 MG Tab Take 1 tablet (750 mg total) by mouth 2 (two) times daily.    pen needle, diabetic 32 gauge x 5/32" Ndle Injecting twice daily     Family History    None       Tobacco Use    Smoking status: Current Some Day Smoker     Years: 15.00     Types: Cigarettes, Cigars    Smokeless tobacco: Never Used   Substance and Sexual Activity    Alcohol use: Yes     Alcohol/week: 30.0 standard drinks     Types: 30 Shots of liquor per week    Drug use: Not Currently    Sexual activity: Not Currently     Review of Systems   Unable to perform ROS: Mental status change   Objective:     Vital Signs (Most Recent):  Temp: 98.8 °F (37.1 °C) (05/13/22 0549)  Pulse: 87 (05/13/22 0549)  Resp: (!) 22 (05/13/22 0549)  BP: (!) 157/85 (05/13/22 0502)  SpO2: 99 % (05/13/22 0549)   Vital Signs (24h Range):  Temp:  [98 °F (36.7 °C)-101.5 °F (38.6 °C)] 98.8 °F (37.1 °C)  Pulse:  [] 87  Resp:  [20-22] 22  SpO2:  [98 %-100 %] 99 %  BP: (118-157)/(72-85) 157/85     Weight: " 72.6 kg (160 lb)  Body mass index is 25.06 kg/m².    Physical Exam  HENT:      Head: Normocephalic.      Right Ear: External ear normal.      Left Ear: External ear normal.      Nose: Nose normal.      Mouth/Throat:      Mouth: Mucous membranes are moist.   Eyes:      Extraocular Movements: Extraocular movements intact.      Pupils: Pupils are equal, round, and reactive to light.   Cardiovascular:      Rate and Rhythm: Normal rate.      Pulses: Normal pulses.   Pulmonary:      Effort: Pulmonary effort is normal.   Abdominal:      Palpations: Abdomen is soft.   Musculoskeletal:         General: Normal range of motion.      Cervical back: Normal range of motion.   Skin:     General: Skin is warm.      Capillary Refill: Capillary refill takes less than 2 seconds.   Neurological:      Mental Status: He is alert.      Comments: Patient oriented to place, self, and situation but does not know the year.   Patient was making incoherent statements and acting erratically   Patient would make bizarre assertions about the objects in the room         CRANIAL NERVES     CN III, IV, VI   Pupils are equal, round, and reactive to light.     Significant Labs: All pertinent labs within the past 24 hours have been reviewed.  BMP:   Recent Labs   Lab 05/13/22  0310   *      K 4.0   CL 99   CO2 24   BUN 18   CREATININE 1.0   CALCIUM 10.8*   MG 1.8     CBC:   Recent Labs   Lab 05/13/22 0310   WBC 29.98*   HGB 12.7*   HCT 36.8*   *     CMP:   Recent Labs   Lab 05/13/22 0310      K 4.0   CL 99   CO2 24   *   BUN 18   CREATININE 1.0   CALCIUM 10.8*   PROT 8.8*   ALBUMIN 4.4   BILITOT 0.7   ALKPHOS 83   AST 14   ALT 8*   ANIONGAP 18*   EGFRNONAA >60     Urine Studies:   Recent Labs   Lab 05/13/22  0340   COLORU Yellow   APPEARANCEUA Clear   PHUR 5.0   SPECGRAV 1.015   PROTEINUA 1+*   GLUCUA 3+*   KETONESU 2+*   BILIRUBINUA Negative   OCCULTUA Negative   NITRITE Negative   UROBILINOGEN Negative   LEUKOCYTESUR  Negative   RBCUA 1   WBCUA 1   BACTERIA None   SQUAMEPITHEL 0   HYALINECASTS 0       Significant Imaging: I have reviewed all pertinent imaging results/findings within the past 24 hours.    Assessment/Plan:     * Hyperglycemia  2L LR infusion started in ED  Plan  20U long acting insulin given  Reassess for closing of any anion gap and correction of BG  POCT BG q4h      Epilepsy  Restart home keppra and depakote      Hypertension  Patient not on any home anti-HTN medication  Plan  Start losartan 25mg   Monitor Vitals      Type 2 diabetes mellitus, with long-term current use of insulin  Patient uses Humalin 70/30 to control BG  Plan  MDSSI  POCT BG Q4h  Diabetic Diet        VTE Risk Mitigation (From admission, onward)         Ordered     IP VTE LOW RISK PATIENT  Once         05/13/22 0530     Place sequential compression device  Until discontinued         05/13/22 0530                   Benja Liao MD  [facility] PGY-1  05/13/2022

## 2022-05-13 NOTE — ED PROVIDER NOTES
Encounter Date: 5/13/2022    SCRIBE #1 NOTE: I, Gutierrez Gottileb, am scribing for, and in the presence of, Dr. Kim.       History     Chief Complaint   Patient presents with    Altered Mental Status     EMS called by [facility] to  pt.  Pt found in airport parking lot.  Pt oriented to person.     Terrance Chavez is a 42 y.o. male with a past medical history of diabetes mellitus, Seizures, and stroke (2013) who presents to the emergency department due to altered mental status. EMS reports that they were called by [facility] after the patient was found confused and wandering in the airport parking lot. They report that his glucose was above 400 and that he was febrile upon their arrival so they brought him to the ED for further evaluation. The patient reports that he feels weak. He denies chest pain, dysuria, or other associated symptoms.  He reports that he is compliant with his diabetes medications.    The history is provided by the patient and the EMS personnel. No  was used.     Review of patient's allergies indicates:  No Known Allergies  Past Medical History:   Diagnosis Date    Diabetes mellitus     Seizures     Stroke 2013     Past Surgical History:   Procedure Laterality Date    TRANSESOPHAGEAL ECHOCARDIOGRAPHY N/A 6/1/2021    Procedure: ECHOCARDIOGRAM, TRANSESOPHAGEAL;  Surgeon: [facility] Diagnostic Provider;  Location: [facility] LAB;  Service: Anesthesiology;  Laterality: N/A;     No family history on file.  Social History     Tobacco Use    Smoking status: Current Some Day Smoker     Years: 15.00     Types: Cigarettes, Cigars    Smokeless tobacco: Never Used   Substance Use Topics    Alcohol use: Yes     Alcohol/week: 30.0 standard drinks     Types: 30 Shots of liquor per week    Drug use: Not Currently     Review of Systems   Constitutional: Positive for fever.   HENT: Negative for sore throat.    Eyes: Negative for pain.   Respiratory: Negative for shortness of breath.     Cardiovascular: Negative for chest pain.   Gastrointestinal: Negative for abdominal pain.   Endocrine:        Hyperglycemia.   Genitourinary: Negative for dysuria.   Musculoskeletal: Negative for back pain.   Neurological: Positive for weakness.   All other systems reviewed and are negative.      Physical Exam     Initial Vitals [05/13/22 0220]   BP Pulse Resp Temp SpO2   118/76 (!) 116 20 (!) 101.5 °F (38.6 °C) 98 %      MAP       --         Physical Exam    Nursing note and vitals reviewed.  Constitutional:   Chronically ill-appearing. Appears older than said age.  Malodorous urine smelling, disheveled   HENT:   Head: Normocephalic and atraumatic.   Mouth/Throat: Oropharynx is clear and moist.   Eyes: Conjunctivae are normal.   Neck: Neck supple.   Normal range of motion.  Pulmonary/Chest: No respiratory distress.   Abdominal: Abdomen is soft. He exhibits no distension. There is no abdominal tenderness.   Musculoskeletal:      Cervical back: Normal range of motion and neck supple.      Comments: Chronic wasting of extremities     Neurological: He is alert.   Confused but directable cancer most questions appropriately    Moves all extremities spontaneously   Skin: Skin is warm and dry. Capillary refill takes less than 2 seconds. No rash noted. No pallor.   Psychiatric:   Confused but directable         ED Course   Procedures  Labs Reviewed   CBC W/ AUTO DIFFERENTIAL - Abnormal; Notable for the following components:       Result Value    WBC 29.98 (*)     RBC 3.89 (*)     Hemoglobin 12.7 (*)     Hematocrit 36.8 (*)     MCH 32.6 (*)     Platelets 631 (*)     Immature Granulocytes 0.7 (*)     Gran # (ANC) 25.4 (*)     Immature Grans (Abs) 0.21 (*)     Lymph # 0.8 (*)     Mono # 3.4 (*)     Gran % 84.8 (*)     Lymph % 2.8 (*)     Platelet Estimate Increased (*)     All other components within normal limits   COMPREHENSIVE METABOLIC PANEL - Abnormal; Notable for the following components:    Glucose 304 (*)     Calcium  10.8 (*)     Total Protein 8.8 (*)     ALT 8 (*)     Anion Gap 18 (*)     All other components within normal limits   URINALYSIS, REFLEX TO URINE CULTURE - Abnormal; Notable for the following components:    Protein, UA 1+ (*)     Glucose, UA 3+ (*)     Ketones, UA 2+ (*)     All other components within normal limits    Narrative:     Specimen Source->Urine   BETA - HYDROXYBUTYRATE, SERUM - Abnormal; Notable for the following components:    Beta-Hydroxybutyrate 1.4 (*)     All other components within normal limits   ISTAT PROCEDURE - Abnormal; Notable for the following components:    POC PH 7.338 (*)     POC PCO2 53.8 (*)     POC PO2 24 (*)     POC HCO3 28.9 (*)     POC SATURATED O2 37 (*)     POC TCO2 31 (*)     All other components within normal limits   INFLUENZA A & B BY MOLECULAR   CULTURE, BLOOD   CULTURE, BLOOD   LACTIC ACID, PLASMA   MAGNESIUM   PHOSPHORUS   TROPONIN I   LIPASE   B-TYPE NATRIURETIC PEPTIDE   PROCALCITONIN   ALCOHOL,MEDICAL (ETHANOL)   DRUG SCREEN PANEL, URINE EMERGENCY    Narrative:     Specimen Source->Urine   SARS-COV-2 RNA AMPLIFICATION, QUAL   URINALYSIS MICROSCOPIC    Narrative:     Specimen Source->Urine   AMMONIA   PROTIME-INR   APTT   COMPREHENSIVE METABOLIC PANEL   MAGNESIUM   PHOSPHORUS   CBC W/ AUTO DIFFERENTIAL   POCT GLUCOSE, HAND-HELD DEVICE          Imaging Results          CT Head Without Contrast (Final result)  Result time 05/13/22 04:10:43    Final result by Abimael Eid MD (05/13/22 04:10:43)                 Impression:      Chronic changes are noted, there is no evidence for superimposed acute intracranial process.      Electronically signed by: Abimael Eid  Date:    05/13/2022  Time:    04:10             Narrative:    EXAMINATION:  CT HEAD WITHOUT CONTRAST    CLINICAL HISTORY:  Mental status change, unknown cause;    TECHNIQUE:  Low dose axial images were obtained through the head.  Coronal and sagittal reformations were also performed. Contrast was not  administered.    COMPARISON:  CT examination of the brain without contrast March 30, 2022    FINDINGS:  The ventricular system, sulcal pattern and parenchymal attenuation characteristics are consistent with chronic change appearing similar to the prior examination without evidence for superimposed acute process.  There are areas consistent with remote lacunar-type ischemic change.  Chronic appearing white matter change noted.    There is no evidence for intracranial mass, mass effect or midline shift and there is no evidence for acute intracranial hemorrhage.  Appropriate CSF spaces are seen at the skull base.    The visualized orbits appear intact.  The mastoid air cells and middle ear cavity bilaterally appear appropriate.  Mild opacity along the external auditory canal likely represents cerumen.  Minimal paranasal sinus mucosal thickening noted.  The osseous structures appear intact.                               X-Ray Chest AP Portable (Final result)  Result time 05/13/22 03:45:11    Final result by Lenny Pereira MD (05/13/22 03:45:11)                 Impression:      1.  Increased fullness of the right hilar region, not well appreciated on prior radiograph examination as well as possible new 1.0 cm nodular opacity projecting over the right upper lung zone.  Given short-term interval development, this could relate to underlying infectious process, however neoplastic process not excluded.  Consider further assessment with dedicated chest CT.    2.  Stable large thin walled left upper lung zone cyst.      Electronically signed by: Lenny Pereira MD  Date:    05/13/2022  Time:    03:45             Narrative:    EXAMINATION:  XR CHEST AP PORTABLE    CLINICAL HISTORY:  Sepsis;    TECHNIQUE:  Single frontal view of the chest was performed.    COMPARISON:  Chest radiograph 03/28/2022, CT chest 10/27/2021    FINDINGS:  Cardiac monitoring leads overlie the chest.  Cardiac silhouette is not significantly enlarged.   There is a stable large thin walled cyst projecting over the left upper lung zone.  There is increased fullness of the right hilar region, not well appreciated on prior radiograph examination.  Additionally, there is a possible new 1.0 cm nodular opacity projecting over the right upper lung zone.  The remaining lungs demonstrate no new confluent airspace consolidation.  No significant volume of pleural fluid or pneumothorax appreciated.  Osseous structures appear grossly intact.                                 Medications   divalproex ER 24 hr tablet 1,000 mg (has no administration in time range)   levETIRAcetam tablet 750 mg (has no administration in time range)   sodium chloride 0.9% flush 5 mL (has no administration in time range)   melatonin tablet 9 mg (has no administration in time range)   acetaminophen tablet 650 mg (has no administration in time range)   acetaminophen tablet 650 mg (has no administration in time range)   naloxone 0.4 mg/mL injection 0.02 mg (has no administration in time range)   LIDOcaine 5 % patch 1 patch (has no administration in time range)   insulin aspart U-100 pen 1-10 Units (has no administration in time range)   glucose chewable tablet 16 g (has no administration in time range)   glucose chewable tablet 24 g (has no administration in time range)   glucagon (human recombinant) injection 1 mg (has no administration in time range)   dextrose 10% bolus 125 mL (has no administration in time range)   dextrose 10% bolus 250 mL (has no administration in time range)   sodium chloride 0.9% bolus 2,178 mL (0 mL/kg × 72.6 kg Intravenous Stopped 5/13/22 0430)   cefTRIAXone (ROCEPHIN) 2 g/50 mL D5W IVPB (0 g Intravenous Stopped 5/13/22 0340)     Medical Decision Making:   Initial Assessment:   42-year-old male multiple medical problems including COPD, poorly controlled diabetes, polysubstance abuse, alcohol abuse, presents the ER for evaluation of altered mentation.  Patient [place] acting inappropriate, oriented only to person.  EMS was activated by police noted patient was febrile hyperglycemic and tachycardic.  Patient came to the ER, confused moves all extremities but directable.  No deficits noted.  Differential includes DKA UTI sepsis meningitis, intracranial mass electrolyte abnormality other cause.  Will plan blood work code sepsis initiated reassess low threshold for admission.    ED Management:  No obvious source of fever at this time.  Antibiotics given fluids given.  Mentation appears to be improving.  Will plan admission to [facility] for further evaluation.          Scribe Attestation:   Scribe #1: I performed the above scribed service and the documentation accurately describes the services I performed. I attest to the accuracy of the note.                   Clinical Impression:   Final diagnoses:  [A41.9] Sepsis  [R41.82] Altered mental status, unspecified altered mental status type (Primary)  [R73.9] Hyperglycemia          ED Disposition Condition    Observation                 My Scribe Attestation: I acknowledge that the documentation on this chart was provided by described on the date of service noted above and that the documentation in the chart accurately reflects work and decisions made by me alone.       Cony Kim MD  05/13/22 0532

## 2022-05-13 NOTE — RESPIRATORY THERAPY
VBG Results from 3:08am that didn't cross over.    PH 7.33  PCO2 53.8  PO2 24  BE 3  HCO3 28.9   TCO2 31  sO2 37

## 2022-05-13 NOTE — ASSESSMENT & PLAN NOTE
2L LR infusion started in ED  Plan  20U long acting insulin given  Reassess for closing of any anion gap and correction of BG  POCT BG q4h

## 2022-05-13 NOTE — CONSULTS
[facility] Pulmonary/Critical Care Consult Note      Patient:Terrance Chavez  Age:42 y.o.  MRN:[id number]  Admit date:5/13/2022  LOS:0 day(s)     Primary Team: [facility]  Primary Attending: Mitzi    Reason for Consult: DKA     HPI:       Terrance Chavez is a 42 year old man with PMH T2DM, HTN, CVA, cognitive impairment, substance abuse who presented to [facility] on 5/13/22 with acute encephalopathy. Patient reportedly found wandering the [place] talking incoherently. EMS called who brought patient to [facility]. Per patient he has been out of all of his medications for several days. He denies headache, neck pain or stiffness, fevers, chills, abdominal pain, N/V/D, CP. Oriented x1 initially, has improved mentation since. ER workup notable for WBC 29, CMP suggestive of DKA. He received 2g rocephin in ER and was admitted to [facility].      MEDICAL HISTORY:      Past Medical History:  Past Medical History:   Diagnosis Date    Diabetes mellitus     Seizures     Stroke 2013        Past Surgical History:  Past Surgical History:   Procedure Laterality Date    TRANSESOPHAGEAL ECHOCARDIOGRAPHY N/A 6/1/2021    Procedure: ECHOCARDIOGRAM, TRANSESOPHAGEAL;  Surgeon: [facility] Diagnostic Provider;  Location: [facility] EP LAB;  Service: Anesthesiology;  Laterality: N/A;      Family History:   No family history on file.     Allergies:  Review of patient's allergies indicates:  No Known Allergies    Home Medications:  Current Outpatient Medications   Medication Instructions    blood sugar diagnostic (ONETOUCH VERIO TEST STRIPS) Strp Use to test blood glucose 2 (two) times daily with meals.    blood sugar diagnostic Strp 1 strip, Misc.(Non-Drug; Combo Route), 2 times daily with meals    blood-glucose meter kit Use as instructed    divalproex ER (DEPAKOTE) 1,000 mg, Oral, Every 12 hours    divalproex ER (DEPAKOTE) 1,000 mg, Oral, Every 12 hours    insulin NPH/Reg human (HUMULIN, 70/30,) 100 unit/mL (70-30) InPn pen Inject  "25 units into the skin with breakfast and 15 units with dinner or evening meal. Please see your primary care physician for further adjustments as needed.    lancets 30 gauge Misc 1 lancet, Misc.(Non-Drug; Combo Route), 2 times daily with meals    lancing device Misc 1 Device, Misc.(Non-Drug; Combo Route), 2 times daily with meals    levETIRAcetam (KEPPRA) 750 mg, Oral, 2 times daily    pen needle, diabetic 32 gauge x 5/32" Ndle Injecting twice daily        OBJECTIVE DATA:      Vital Signs:  Vitals:    05/13/22 1202   BP: 132/79   Pulse: 77   Resp: (!) 21   Temp:      Intake/Output:    Intake/Output Summary (Last 24 hours) at 5/13/2022 1214  Last data filed at 5/13/2022 0340  Gross per 24 hour   Intake --   Output 250 ml   Net -250 ml        Physical Exam:  Constitutional: cooperative, calm, appears stated age.  HEENT: NCAT, PERRL, MMM  Neck: Supple, normal ROM, no stiffness or pain with movement  Resp: clear to auscultation bilaterally, No wheezes, rhonchi, or crackles.   Cardio: RRR, S1 and S2 normal, no murmurs or added sounds.  GI: Soft, non-tender, bowel sounds active.  Extremities: No edema, peripheral pulses 2+ and symmetric  Skin: No rashes, bruises, or lesions.    Neurologic: Alert and oriented x2, follows commands, moves extremities spontaneously.     Laboratory/Imaging:  Recent Labs   Lab 05/07/22  1447 05/13/22  0310 05/13/22  0640 05/13/22  1023   WBC 8.48 29.98* 37.05*  --    HGB 12.0* 12.7* 11.6*  --    HCT 35.2* 36.8* 34.1*  --     631* 566*  --    * 141 144 142   K 4.2 4.0 4.2 4.0   CL 98 99 105 107   CREATININE 0.8 1.0 0.8 0.7   BUN 8 18 13 13   CO2 24 24 23 22*   ALT 21 8* 6*  --    AST 22 14 13  --      Microbiology:  5/13/22 Blood Cx: sent  5/133/22 Flu PCR: negative     Imaging:  CXR 5/13/22:   1. Increased fullness of the right hilar region, not well appreciated on prior radiograph examination as well as possible new 1.0 cm nodular opacity projecting over the right upper lung " zone.  Given short-term interval development, this could relate to underlying infectious process, however neoplastic process not excluded.  Consider further assessment with dedicated chest CT.  2. Stable large thin walled left upper lung zone cyst.    CT Head 5/13/22: Chronic changes are noted, there is no evidence for superimposed acute intracranial process.    Medications:   ceFEPime (MAXIPIME) IVPB  2 g Intravenous Q8H    divalproex ER  1,000 mg Oral Q12H    insulin detemir U-100  10 Units Subcutaneous Once    insulin detemir U-100  20 Units Subcutaneous QHS    levETIRAcetam  750 mg Oral BID    losartan  25 mg Oral Daily    metronidazole  500 mg Intravenous Q8H    vancomycin (VANCOCIN) IVPB  1,750 mg Intravenous Once    vancomycin (VANCOCIN) IVPB  1,250 mg Intravenous Q12H         dextrose 5% lactated ringers 150 mL/hr at 05/13/22 0922        acetaminophen, acetaminophen, dextrose 10%, dextrose 10%, glucagon (human recombinant), glucose, glucose, insulin aspart U-100, LIDOcaine, melatonin, naloxone, sodium chloride 0.9%, Pharmacy to dose Vancomycin consult **AND** vancomycin - pharmacy to dose     Assessment/Plan:        Neuro  Acute Encephalopathy  - seems to be improving since presentation but unclear etiology.  - patient reportedly has cognitive impairment at baseline.   - CTH negative for acute intracranial processes.  - UTOX negative  - meningitis a consideration, but low suspicion given lack of headaches, neck pain/stiffness, improving mentation. Low threshold however to obtain LP and add meningitis coverage if he spikes fever, WBC doesn't improve, or mentation worsens.     Epilepsy  - continue home depakote and keppra.     Cardiovascular  - no active issues     Respiratory  Pulmonary Nodule  - 1.0 cm nodular opacity noted in RUL zone  - rapid development, less concern for malignancy  - can consider CT chest to further evaluate      GI/FEN  - no active issues     F: D5 LR  E: replete prn  N:  diabetic diet     Renal   - no active issues     Heme  Leukocytosis  - WBC increased from 29 to 37 (was 8 on 5/7)  - less concern for malignancy given rapid development  - continue broad spectrum abx pending cultures  - can consider abdominal imaging if unable to find septic source  - as above, low threshold for LP and meningitis coverage       Endocrine  DKA  - initially on insulin drip, DKA has since resolved.  - uses humalin 70/30 at home but has been out for several days.   - continue long acting insulin and SSI.      Infectious Disease  - on broad spectrum abx pending cultures, discussed above    Code: FULL     Zeina Nunes MD  [employer] Pulmonary/Critical Care Fellow  05/13/2022 12:14 PM      Pulmonary/Critical Care Attending with Team    Patient seen and examined on rounds in the ER with team after review of PMH, reason for admission, hospital course, labs and xrays.  I have reviewed, we discussed, and I have verified Dr. Nunes's findings, assessment and recommendations.  The patient was BIB EMS [place]  Here, he answers some simple questions but he still appears altered, which may be from substance abuse.  He was also febrile and is now on ceftriaxone.  There were no signs of meningitis when tested.  He was admitted to Family Medicine for further assessment and management.     Critical care time 32 min for review of chart, lab data and images, examination of the patient, monitoring, and adjusting as necessary the management plan for support of vital organ system (mental status), and also for discussion with other caregivers.    Nayeli Gardner MD  P/CCM  5/13/2022

## 2022-05-13 NOTE — SUBJECTIVE & OBJECTIVE
"Past Medical History:   Diagnosis Date    Diabetes mellitus     Seizures     Stroke 2013       Past Surgical History:   Procedure Laterality Date    TRANSESOPHAGEAL ECHOCARDIOGRAPHY N/A 6/1/2021    Procedure: ECHOCARDIOGRAM, TRANSESOPHAGEAL;  Surgeon: [facility] Provider;  Location: [facility] EP LAB;  Service: Anesthesiology;  Laterality: N/A;       Review of patient's allergies indicates:  No Known Allergies    No current facility-administered medications on file prior to encounter.     Current Outpatient Medications on File Prior to Encounter   Medication Sig    blood sugar diagnostic (ONETOUCH VERIO TEST STRIPS) Strp Use to test blood glucose 2 (two) times daily with meals.    blood sugar diagnostic Strp 1 strip by Misc.(Non-Drug; Combo Route) route 2 (two) times daily with meals.    blood-glucose meter kit Use as instructed    divalproex ER (DEPAKOTE) 500 MG Tb24 Take 2 tablets (1,000 mg total) by mouth every 12 (twelve) hours.    divalproex ER (DEPAKOTE) 500 MG Tb24 Take 2 tablets (1,000 mg total) by mouth every 12 (twelve) hours.    insulin NPH/Reg human (HUMULIN, 70/30,) 100 unit/mL (70-30) InPn pen Inject 25 units into the skin with breakfast and 15 units with dinner or evening meal. Please see your primary care physician for further adjustments as needed.    lancets 30 gauge Misc 1 lancet by Misc.(Non-Drug; Combo Route) route 2 (two) times daily with meals.    lancing device Misc 1 Device by Misc.(Non-Drug; Combo Route) route 2 (two) times daily with meals.    levETIRAcetam (KEPPRA) 750 MG Tab Take 1 tablet (750 mg total) by mouth 2 (two) times daily.    pen needle, diabetic 32 gauge x 5/32" Ndle Injecting twice daily     Family History    None       Tobacco Use    Smoking status: Current Some Day Smoker     Years: 15.00     Types: Cigarettes, Cigars    Smokeless tobacco: Never Used   Substance and Sexual Activity    Alcohol use: Yes     Alcohol/week: 30.0 standard drinks     Types: 30 Shots of liquor per " week    Drug use: Not Currently    Sexual activity: Not Currently     Review of Systems   Unable to perform ROS: Mental status change   Objective:     Vital Signs (Most Recent):  Temp: 98.8 °F (37.1 °C) (05/13/22 0549)  Pulse: 87 (05/13/22 0549)  Resp: (!) 22 (05/13/22 0549)  BP: (!) 157/85 (05/13/22 0502)  SpO2: 99 % (05/13/22 0549)   Vital Signs (24h Range):  Temp:  [98 °F (36.7 °C)-101.5 °F (38.6 °C)] 98.8 °F (37.1 °C)  Pulse:  [] 87  Resp:  [20-22] 22  SpO2:  [98 %-100 %] 99 %  BP: (118-157)/(72-85) 157/85     Weight: 72.6 kg (160 lb)  Body mass index is 25.06 kg/m².    Physical Exam  HENT:      Head: Normocephalic.      Right Ear: External ear normal.      Left Ear: External ear normal.      Nose: Nose normal.      Mouth/Throat:      Mouth: Mucous membranes are moist.   Eyes:      Extraocular Movements: Extraocular movements intact.      Pupils: Pupils are equal, round, and reactive to light.   Cardiovascular:      Rate and Rhythm: Normal rate.      Pulses: Normal pulses.   Pulmonary:      Effort: Pulmonary effort is normal.   Abdominal:      Palpations: Abdomen is soft.   Musculoskeletal:         General: Normal range of motion.      Cervical back: Normal range of motion.   Skin:     General: Skin is warm.      Capillary Refill: Capillary refill takes less than 2 seconds.   Neurological:      Mental Status: He is alert.      Comments: Patient oriented to place, self, and situation but does not know the year.   Patient was making incoherent statements and acting erratically   Patient would make bizarre assertions about the objects in the room         CRANIAL NERVES     CN III, IV, VI   Pupils are equal, round, and reactive to light.     Significant Labs: All pertinent labs within the past 24 hours have been reviewed.  BMP:   Recent Labs   Lab 05/13/22 0310   *      K 4.0   CL 99   CO2 24   BUN 18   CREATININE 1.0   CALCIUM 10.8*   MG 1.8     CBC:   Recent Labs   Lab 05/13/22 0310   WBC  29.98*   HGB 12.7*   HCT 36.8*   *     CMP:   Recent Labs   Lab 05/13/22  0310      K 4.0   CL 99   CO2 24   *   BUN 18   CREATININE 1.0   CALCIUM 10.8*   PROT 8.8*   ALBUMIN 4.4   BILITOT 0.7   ALKPHOS 83   AST 14   ALT 8*   ANIONGAP 18*   EGFRNONAA >60     Urine Studies:   Recent Labs   Lab 05/13/22  0340   COLORU Yellow   APPEARANCEUA Clear   PHUR 5.0   SPECGRAV 1.015   PROTEINUA 1+*   GLUCUA 3+*   KETONESU 2+*   BILIRUBINUA Negative   OCCULTUA Negative   NITRITE Negative   UROBILINOGEN Negative   LEUKOCYTESUR Negative   RBCUA 1   WBCUA 1   BACTERIA None   SQUAMEPITHEL 0   HYALINECASTS 0       Significant Imaging: I have reviewed all pertinent imaging results/findings within the past 24 hours.

## 2022-05-14 PROBLEM — J18.9 PNEUMONIA: Status: ACTIVE | Noted: 2022-05-14

## 2022-05-14 PROBLEM — R73.9 HYPERGLYCEMIA: Status: RESOLVED | Noted: 2022-05-13 | Resolved: 2022-05-14

## 2022-05-14 LAB
ALBUMIN SERPL BCP-MCNC: 3.2 G/DL (ref 3.5–5.2)
ALP SERPL-CCNC: 66 U/L (ref 55–135)
ALT SERPL W/O P-5'-P-CCNC: 9 U/L (ref 10–44)
ANION GAP SERPL CALC-SCNC: 13 MMOL/L (ref 8–16)
AST SERPL-CCNC: 15 U/L (ref 10–40)
BASOPHILS # BLD AUTO: 0.12 K/UL (ref 0–0.2)
BASOPHILS NFR BLD: 0.4 % (ref 0–1.9)
BILIRUB SERPL-MCNC: 0.6 MG/DL (ref 0.1–1)
BUN SERPL-MCNC: 7 MG/DL (ref 6–20)
CALCIUM SERPL-MCNC: 9.8 MG/DL (ref 8.7–10.5)
CHLORIDE SERPL-SCNC: 108 MMOL/L (ref 95–110)
CO2 SERPL-SCNC: 25 MMOL/L (ref 23–29)
CREAT SERPL-MCNC: 0.7 MG/DL (ref 0.5–1.4)
DIFFERENTIAL METHOD: ABNORMAL
EOSINOPHIL # BLD AUTO: 0.1 K/UL (ref 0–0.5)
EOSINOPHIL NFR BLD: 0.5 % (ref 0–8)
ERYTHROCYTE [DISTWIDTH] IN BLOOD BY AUTOMATED COUNT: 13.2 % (ref 11.5–14.5)
EST. GFR  (AFRICAN AMERICAN): >60 ML/MIN/1.73 M^2
EST. GFR  (NON AFRICAN AMERICAN): >60 ML/MIN/1.73 M^2
FOLATE SERPL-MCNC: 11.2 NG/ML (ref 4–24)
GLUCOSE SERPL-MCNC: 38 MG/DL (ref 70–110)
HCT VFR BLD AUTO: 33.4 % (ref 40–54)
HGB BLD-MCNC: 11.4 G/DL (ref 14–18)
IMM GRANULOCYTES # BLD AUTO: 0.17 K/UL (ref 0–0.04)
IMM GRANULOCYTES NFR BLD AUTO: 0.6 % (ref 0–0.5)
LYMPHOCYTES # BLD AUTO: 2.8 K/UL (ref 1–4.8)
LYMPHOCYTES NFR BLD: 9.8 % (ref 18–48)
MAGNESIUM SERPL-MCNC: 1.8 MG/DL (ref 1.6–2.6)
MCH RBC QN AUTO: 32.4 PG (ref 27–31)
MCHC RBC AUTO-ENTMCNC: 34.1 G/DL (ref 32–36)
MCV RBC AUTO: 95 FL (ref 82–98)
MONOCYTES # BLD AUTO: 2.2 K/UL (ref 0.3–1)
MONOCYTES NFR BLD: 7.7 % (ref 4–15)
NEUTROPHILS # BLD AUTO: 23.2 K/UL (ref 1.8–7.7)
NEUTROPHILS NFR BLD: 81 % (ref 38–73)
NRBC BLD-RTO: 0 /100 WBC
PHOSPHATE SERPL-MCNC: 3.5 MG/DL (ref 2.7–4.5)
PLATELET # BLD AUTO: 577 K/UL (ref 150–450)
PLATELET BLD QL SMEAR: ABNORMAL
PMV BLD AUTO: 8.9 FL (ref 9.2–12.9)
POCT GLUCOSE: 123 MG/DL (ref 70–110)
POCT GLUCOSE: 185 MG/DL (ref 70–110)
POCT GLUCOSE: 204 MG/DL (ref 70–110)
POCT GLUCOSE: 238 MG/DL (ref 70–110)
POCT GLUCOSE: 38 MG/DL (ref 70–110)
POCT GLUCOSE: 381 MG/DL (ref 70–110)
POCT GLUCOSE: 56 MG/DL (ref 70–110)
POTASSIUM SERPL-SCNC: 3.9 MMOL/L (ref 3.5–5.1)
PROT SERPL-MCNC: 6.5 G/DL (ref 6–8.4)
RBC # BLD AUTO: 3.52 M/UL (ref 4.6–6.2)
SODIUM SERPL-SCNC: 146 MMOL/L (ref 136–145)
TSH SERPL DL<=0.005 MIU/L-ACNC: 0.41 UIU/ML (ref 0.4–4)
VIT B12 SERPL-MCNC: 415 PG/ML (ref 210–950)
WBC # BLD AUTO: 28.67 K/UL (ref 3.9–12.7)

## 2022-05-14 PROCEDURE — 82746 ASSAY OF FOLIC ACID SERUM: CPT | Performed by: STUDENT IN AN ORGANIZED HEALTH CARE EDUCATION/TRAINING PROGRAM

## 2022-05-14 PROCEDURE — 63700000 PHARM REV CODE 250 ALT 637 W/O HCPCS: Performed by: STUDENT IN AN ORGANIZED HEALTH CARE EDUCATION/TRAINING PROGRAM

## 2022-05-14 PROCEDURE — 25000003 PHARM REV CODE 250: Performed by: STUDENT IN AN ORGANIZED HEALTH CARE EDUCATION/TRAINING PROGRAM

## 2022-05-14 PROCEDURE — 87389 HIV-1 AG W/HIV-1&-2 AB AG IA: CPT | Performed by: STUDENT IN AN ORGANIZED HEALTH CARE EDUCATION/TRAINING PROGRAM

## 2022-05-14 PROCEDURE — 84425 ASSAY OF VITAMIN B-1: CPT | Performed by: STUDENT IN AN ORGANIZED HEALTH CARE EDUCATION/TRAINING PROGRAM

## 2022-05-14 PROCEDURE — 86592 SYPHILIS TEST NON-TREP QUAL: CPT | Performed by: STUDENT IN AN ORGANIZED HEALTH CARE EDUCATION/TRAINING PROGRAM

## 2022-05-14 PROCEDURE — 84100 ASSAY OF PHOSPHORUS: CPT | Performed by: STUDENT IN AN ORGANIZED HEALTH CARE EDUCATION/TRAINING PROGRAM

## 2022-05-14 PROCEDURE — 83735 ASSAY OF MAGNESIUM: CPT | Performed by: STUDENT IN AN ORGANIZED HEALTH CARE EDUCATION/TRAINING PROGRAM

## 2022-05-14 PROCEDURE — 85025 COMPLETE CBC W/AUTO DIFF WBC: CPT | Performed by: STUDENT IN AN ORGANIZED HEALTH CARE EDUCATION/TRAINING PROGRAM

## 2022-05-14 PROCEDURE — 80053 COMPREHEN METABOLIC PANEL: CPT | Performed by: STUDENT IN AN ORGANIZED HEALTH CARE EDUCATION/TRAINING PROGRAM

## 2022-05-14 PROCEDURE — 11000001 HC ACUTE MED/SURG PRIVATE ROOM

## 2022-05-14 PROCEDURE — 84207 ASSAY OF VITAMIN B-6: CPT | Performed by: STUDENT IN AN ORGANIZED HEALTH CARE EDUCATION/TRAINING PROGRAM

## 2022-05-14 PROCEDURE — 84443 ASSAY THYROID STIM HORMONE: CPT | Performed by: STUDENT IN AN ORGANIZED HEALTH CARE EDUCATION/TRAINING PROGRAM

## 2022-05-14 PROCEDURE — 63600175 PHARM REV CODE 636 W HCPCS: Performed by: STUDENT IN AN ORGANIZED HEALTH CARE EDUCATION/TRAINING PROGRAM

## 2022-05-14 PROCEDURE — 82607 VITAMIN B-12: CPT | Performed by: STUDENT IN AN ORGANIZED HEALTH CARE EDUCATION/TRAINING PROGRAM

## 2022-05-14 PROCEDURE — 94761 N-INVAS EAR/PLS OXIMETRY MLT: CPT

## 2022-05-14 PROCEDURE — S0030 INJECTION, METRONIDAZOLE: HCPCS | Performed by: STUDENT IN AN ORGANIZED HEALTH CARE EDUCATION/TRAINING PROGRAM

## 2022-05-14 PROCEDURE — G0378 HOSPITAL OBSERVATION PER HR: HCPCS

## 2022-05-14 PROCEDURE — 36415 COLL VENOUS BLD VENIPUNCTURE: CPT | Performed by: STUDENT IN AN ORGANIZED HEALTH CARE EDUCATION/TRAINING PROGRAM

## 2022-05-14 RX ORDER — AMOXICILLIN AND CLAVULANATE POTASSIUM 875; 125 MG/1; MG/1
1 TABLET, FILM COATED ORAL EVERY 12 HOURS
Status: DISCONTINUED | OUTPATIENT
Start: 2022-05-14 | End: 2022-05-17

## 2022-05-14 RX ORDER — AZITHROMYCIN 250 MG/1
500 TABLET, FILM COATED ORAL DAILY
Status: COMPLETED | OUTPATIENT
Start: 2022-05-14 | End: 2022-05-16

## 2022-05-14 RX ADMIN — Medication 9 MG: at 08:05

## 2022-05-14 RX ADMIN — LEVETIRACETAM 750 MG: 250 TABLET, FILM COATED ORAL at 08:05

## 2022-05-14 RX ADMIN — DEXTROSE 125 ML: 10 SOLUTION INTRAVENOUS at 11:05

## 2022-05-14 RX ADMIN — DIVALPROEX SODIUM 1000 MG: 250 TABLET, EXTENDED RELEASE ORAL at 08:05

## 2022-05-14 RX ADMIN — VANCOMYCIN HYDROCHLORIDE 1250 MG: 1.25 INJECTION, POWDER, LYOPHILIZED, FOR SOLUTION INTRAVENOUS at 09:05

## 2022-05-14 RX ADMIN — AMOXICILLIN AND CLAVULANATE POTASSIUM 1 TABLET: 875; 125 TABLET, FILM COATED ORAL at 08:05

## 2022-05-14 RX ADMIN — CEFEPIME 2 G: 2 INJECTION, POWDER, FOR SOLUTION INTRAVENOUS at 09:05

## 2022-05-14 RX ADMIN — AZITHROMYCIN MONOHYDRATE 500 MG: 250 TABLET ORAL at 11:05

## 2022-05-14 RX ADMIN — METRONIDAZOLE 500 MG: 500 INJECTION, SOLUTION INTRAVENOUS at 02:05

## 2022-05-14 RX ADMIN — AMOXICILLIN AND CLAVULANATE POTASSIUM 1 TABLET: 875; 125 TABLET, FILM COATED ORAL at 11:05

## 2022-05-14 RX ADMIN — DEXTROSE 250 ML: 10 SOLUTION INTRAVENOUS at 05:05

## 2022-05-14 RX ADMIN — INSULIN ASPART 4 UNITS: 100 INJECTION, SOLUTION INTRAVENOUS; SUBCUTANEOUS at 08:05

## 2022-05-14 RX ADMIN — CEFEPIME 2 G: 2 INJECTION, POWDER, FOR SOLUTION INTRAVENOUS at 01:05

## 2022-05-14 RX ADMIN — INSULIN DETEMIR 10 UNITS: 100 INJECTION, SOLUTION SUBCUTANEOUS at 08:05

## 2022-05-14 RX ADMIN — METRONIDAZOLE 500 MG: 500 INJECTION, SOLUTION INTRAVENOUS at 08:05

## 2022-05-14 NOTE — ASSESSMENT & PLAN NOTE
Pt afebrile but w/ leokocytosis and new findings on CXR   Initiated Broad spec abx in ED w/ Vanc/cef/flagyl  Bld cultures drawn in ED w/ NGTD    Plan:  Deescalate abx to Augmentin and azithromycin for 5 days ending 5/18  Cnt to monitor for fevers and repeat cultures/imaging

## 2022-05-14 NOTE — NURSING
Pt blood sugar is 56. Pt is asymptomatic. A bolus of D10 is given. Pt is eating lunch. Will recheck blood sugar and monitor.

## 2022-05-14 NOTE — PLAN OF CARE
VN note: Patient chart, labs, and vitals reviewed. VN to continue to be available as needed.     Problem: Adult Inpatient Plan of Care  Goal: Plan of Care Review  Outcome: Ongoing, Progressing  Goal: Patient-Specific Goal (Individualized)  Outcome: Ongoing, Progressing  Goal: Absence of Hospital-Acquired Illness or Injury  Outcome: Ongoing, Progressing  Goal: Optimal Comfort and Wellbeing  Outcome: Ongoing, Progressing  Goal: Readiness for Transition of Care  Outcome: Ongoing, Progressing

## 2022-05-14 NOTE — CARE UPDATE
Tried to call pt's sister Dot but voicemail box not set up. I was able to speak with pt's Brother Pete at [phone] who reports none of his family has spoken to his brother since 3/22 when he was dropped off at the [facility]. Prior to being placed he reports pt was homeless for several months before moving in with his aunt and niece. While he was living with his family he was known to drink daily and was non-compliant with medications. He does report that he could perform his ADLs and baseline mentation was AOx3 though as stated no one has had contact with him in 2mo.     Corby Grande MD  [facility], PGY-1  2:54 PM, 5/14/22

## 2022-05-14 NOTE — PROGRESS NOTES
[facility] Medicine  Progress Note    Patient Name: Terrance Chavez  MRN: [id number]  Patient Class: OP- Observation   Admission Date: 5/13/2022  Length of Stay: 0 days  Attending Physician: Kalina Cartagena MD  Primary Care Provider: Primary Doctor No        Subjective:     Principal Problem:Hyperglycemia        HPI:  Patient is a 42 yr old male with pmhx of T2DM, HTN, CVA, neurocognitive impairment and substance abuse disorder presenting to the ED for Altered mental status. [place], brought in by EMS. On arrival patient was only oriented to person but not place,time or situation. Patient blood glucose was elevated on arrival. Initial labs showed small acidosis. Patient was provided 2L LR in ED and his mentation improved. Patient had a fever in the ED , and elevated WBC  of 29.9. Can not rule out infection exacerbating or causing change in mental status. Patient received 2g ceftriaxone in ED. Patient denies chest pain, shortness of breath, headache, nausea and vomiting. Admitted to [facility] for observation and correction of glucose.      Overview/Hospital Course:  No notes on file    Interval History: NAEON. Pt remained afebrile. He reports he is feeling better this AM and still denying nay acute complaints. His mentation is improving and is now Aox2.    Review of Systems   Constitutional:  Positive for fatigue. Negative for activity change, chills and fever.   HENT:  Negative for congestion, sinus pressure and trouble swallowing.    Eyes:  Negative for photophobia and visual disturbance.   Respiratory:  Negative for cough, choking, shortness of breath and wheezing.    Cardiovascular:  Negative for chest pain, palpitations and leg swelling.   Gastrointestinal:  Negative for abdominal distention, constipation, diarrhea, nausea and vomiting.   Endocrine: Positive for polyuria. Negative for polydipsia and polyphagia.   Genitourinary:  Negative  for dysuria and hematuria.   Musculoskeletal:  Negative for arthralgias and joint swelling.   Skin:  Negative for pallor, rash and wound.   Neurological:  Negative for tremors, seizures, speech difficulty, weakness, light-headedness, numbness and headaches.   Objective:     Vital Signs (Most Recent):  Temp: 98.2 °F (36.8 °C) (05/14/22 1131)  Pulse: 90 (05/14/22 1142)  Resp: 18 (05/14/22 1131)  BP: 102/75 (05/14/22 1131)  SpO2: 98 % (05/14/22 1131) Vital Signs (24h Range):  Temp:  [97.3 °F (36.3 °C)-99.5 °F (37.5 °C)] 98.2 °F (36.8 °C)  Pulse:  [] 90  Resp:  [17-18] 18  SpO2:  [97 %-100 %] 98 %  BP: (100-140)/(56-80) 102/75     Weight: 72.6 kg (160 lb 0.9 oz)  Body mass index is 25.07 kg/m².  No intake or output data in the 24 hours ending 05/14/22 1419   Physical Exam  Constitutional:       General: He is not in acute distress.  HENT:      Head: Normocephalic and atraumatic.      Right Ear: External ear normal.      Left Ear: External ear normal.      Nose: Nose normal.   Eyes:      Extraocular Movements: Extraocular movements intact.      Conjunctiva/sclera: Conjunctivae normal.      Pupils: Pupils are equal, round, and reactive to light.   Cardiovascular:      Rate and Rhythm: Normal rate and regular rhythm.      Pulses: Normal pulses.      Heart sounds: Normal heart sounds. No murmur heard.    No friction rub. No gallop.   Pulmonary:      Effort: Pulmonary effort is normal. No respiratory distress.      Breath sounds: Normal breath sounds. No wheezing or rhonchi.   Abdominal:      General: Bowel sounds are normal. There is no distension.      Palpations: There is no mass.      Tenderness: There is no abdominal tenderness.   Musculoskeletal:         General: No swelling or tenderness. Normal range of motion.      Cervical back: Normal range of motion. No tenderness.   Lymphadenopathy:      Cervical: No cervical adenopathy.   Skin:     General: Skin is warm and dry.      Findings: No rash.   Neurological:       Mental Status: He is alert.      Comments: AO x2      Recent Labs   Lab 05/13/22 0310 05/13/22  0640 05/14/22  0435   WBC 29.98* 37.05* 28.67*   HGB 12.7* 11.6* 11.4*   HCT 36.8* 34.1* 33.4*   MCV 95 96 95   RBC 3.89* 3.57* 3.52*   MCH 32.6* 32.5* 32.4*   MCHC 34.5 34.0 34.1   RDW 13.3 13.2 13.2   * 566* 577*   MPV 9.3 9.0* 8.9*   GRAN 84.8*  25.4* 84.5*  31.3* 81.0*  23.2*   LYMPH 2.8*  0.8* 4.6*  1.7 9.8*  2.8   MONO 11.3  3.4* 9.5  3.5* 7.7  2.2*   EOSINOPHIL 0.0 0.0 0.5   BASOPHIL 0.4 0.4 0.4     Recent Labs   Lab 05/13/22 0310 05/13/22  0640 05/13/22  1023 05/13/22  1920 05/13/22  2333 05/14/22  0435    144   < > 142 144 146*   K 4.0 4.2   < > 3.7 3.9 3.9   CL 99 105   < > 104 105 108   CO2 24 23   < > 25 26 25   ANIONGAP 18* 16   < > 13 13 13   BUN 18 13   < > 8 8 7   CREATININE 1.0 0.8   < > 0.8 0.7 0.7   * 252*   < > 225* 135* 38*   CALCIUM 10.8* 9.3   < > 9.6 9.4 9.8   PROT 8.8* 6.9  --   --   --  6.5   ALBUMIN 4.4 3.7  --   --   --  3.2*   ALKPHOS 83 72  --   --   --  66   BILITOT 0.7 0.6  --   --   --  0.6   ALT 8* 6*  --   --   --  9*   AST 14 13  --   --   --  15   ESTGFRAFRICA >60 >60   < > >60 >60 >60   EGFRNONAA >60 >60   < > >60 >60 >60   MG 1.8 1.6  --   --   --  1.8   PHOS 3.4 3.7  --   --   --  3.5    < > = values in this interval not displayed.     Recent Labs   Lab 05/13/22  0340   COLORU Yellow   APPEARANCEUA Clear   PHUR 5.0   SPECGRAV 1.015   PROTEINUA 1+*   GLUCUA 3+*   KETONESU 2+*   BILIRUBINUA Negative   OCCULTUA Negative   UROBILINOGEN Negative   NITRITE Negative   LEUKOCYTESUR Negative   RBCUA 1   WBCUA 1   BACTERIA None   SQUAMEPITHEL 0   HYALINECASTS 0   MICROCMT SEE COMMENT     Recent Labs   Lab 05/13/22  0310   TROPONINI <0.006     Recent Labs   Lab 05/13/22  0310   INR 1.1   APTT 31.3     No results for input(s): TSH, X8CZHTN, K9VSUXZ, THYROIDAB, FREET4 in the last 168 hours.  CT Head Without Contrast    Result Date: 5/13/2022  EXAMINATION: CT HEAD  WITHOUT CONTRAST CLINICAL HISTORY: Mental status change, unknown cause; TECHNIQUE: Low dose axial images were obtained through the head.  Coronal and sagittal reformations were also performed. Contrast was not administered. COMPARISON: CT examination of the brain without contrast March 30, 2022 FINDINGS: The ventricular system, sulcal pattern and parenchymal attenuation characteristics are consistent with chronic change appearing similar to the prior examination without evidence for superimposed acute process.  There are areas consistent with remote lacunar-type ischemic change.  Chronic appearing white matter change noted. There is no evidence for intracranial mass, mass effect or midline shift and there is no evidence for acute intracranial hemorrhage.  Appropriate CSF spaces are seen at the skull base. The visualized orbits appear intact.  The mastoid air cells and middle ear cavity bilaterally appear appropriate.  Mild opacity along the external auditory canal likely represents cerumen.  Minimal paranasal sinus mucosal thickening noted.  The osseous structures appear intact.     Chronic changes are noted, there is no evidence for superimposed acute intracranial process. Electronically signed by: Abimael Eid Date:    05/13/2022 Time:    04:10    X-Ray Chest AP Portable    Result Date: 5/13/2022  EXAMINATION: XR CHEST AP PORTABLE CLINICAL HISTORY: Sepsis; TECHNIQUE: Single frontal view of the chest was performed. COMPARISON: Chest radiograph 03/28/2022, CT chest 10/27/2021 FINDINGS: Cardiac monitoring leads overlie the chest.  Cardiac silhouette is not significantly enlarged.  There is a stable large thin walled cyst projecting over the left upper lung zone.  There is increased fullness of the right hilar region, not well appreciated on prior radiograph examination.  Additionally, there is a possible new 1.0 cm nodular opacity projecting over the right upper lung zone.  The remaining lungs demonstrate no new  confluent airspace consolidation.  No significant volume of pleural fluid or pneumothorax appreciated.  Osseous structures appear grossly intact.     1.  Increased fullness of the right hilar region, not well appreciated on prior radiograph examination as well as possible new 1.0 cm nodular opacity projecting over the right upper lung zone.  Given short-term interval development, this could relate to underlying infectious process, however neoplastic process not excluded.  Consider further assessment with dedicated chest CT. 2.  Stable large thin walled left upper lung zone cyst. Electronically signed by: Lenny Pereira MD Date:    05/13/2022 Time:    03:45    Microbiology Results (last 7 days)       Procedure Component Value Units Date/Time    Blood culture x two cultures. Draw prior to antibiotics. [[id number]] Collected: 05/13/22 0255    Order Status: Completed Specimen: Blood from Peripheral, Antecubital, Right Updated: 05/14/22 1012     Blood Culture, Routine No Growth to date      No Growth to date    Narrative:      Aerobic and anaerobic    Blood culture x two cultures. Draw prior to antibiotics. [[id number]] Collected: 05/13/22 0311    Order Status: Completed Specimen: Blood from Peripheral, Forearm, Right Updated: 05/14/22 1012     Blood Culture, Routine No Growth to date      No Growth to date    Narrative:      Aerobic and anaerobic    Influenza A & B by Molecular [[id number]] Collected: 05/13/22 0313    Order Status: Completed Specimen: Nasopharyngeal Swab Updated: 05/13/22 0357     Influenza A, Molecular Negative     Influenza B, Molecular Negative     Flu A & B Source Nasal swab                Assessment/Plan:      Pneumonia  Pt afebrile but w/ leokocytosis and new findings on CXR   Initiated Broad spec abx in ED w/ Vanc/cef/flagyl  Bld cultures drawn in ED w/ NGTD    Plan:  Deescalate abx to Augmentin and azithromycin for 5 days ending 5/18  Cnt to monitor for fevers and repeat cultures/imaging      AMS  (altered mental status)  Pt w/ AMS on admit thought to be related to DKA  Uncertain baseline mentation though previous CVA in 2013  After DKA resolution pt still altered  UDS and EtOH neg on admit   Ammonia slightly elevated to 61  CT head w/o any new intracranial abnormalities    Plan:  AMS lab work including B1/6/9/12, RPR, HIV, and TSH pending       Epilepsy  Restart home keppra and depakote      Hypertension  Patient not on any home anti-HTN medication  Plan  Start losartan 25mg   Monitor Vitals      Type 2 diabetes mellitus, with long-term current use of insulin  Patient uses Humalin 70/30 to control BG  Plan  MDSSI  POCT BG Q4h  Diabetic Diet      ETOH abuse  Pt w/ known EtOH abuse hx  Ethanol neg on admit  Hx of EtOH seizures    Plan:  CIWA q4   Ativan PRN for CIWA >8        VTE Risk Mitigation (From admission, onward)         Ordered     IP VTE LOW RISK PATIENT  Once         05/13/22 0530     Place sequential compression device  Until discontinued         05/13/22 0530                Discharge Planning   SHABANA: 5/14/2022     Code Status: Full Code   Is the patient medically ready for discharge?:     Reason for patient still in hospital (select all that apply): Patient trending condition             Corby Grande MD  Department of Hospital Medicine   [facility]

## 2022-05-14 NOTE — SUBJECTIVE & OBJECTIVE
Interval History: NAEON. Pt remained afebrile. He reports he is feeling better this AM and still denying nay acute complaints. His mentation is improving and is now Aox2.    Review of Systems   Constitutional:  Positive for fatigue. Negative for activity change, chills and fever.   HENT:  Negative for congestion, sinus pressure and trouble swallowing.    Eyes:  Negative for photophobia and visual disturbance.   Respiratory:  Negative for cough, choking, shortness of breath and wheezing.    Cardiovascular:  Negative for chest pain, palpitations and leg swelling.   Gastrointestinal:  Negative for abdominal distention, constipation, diarrhea, nausea and vomiting.   Endocrine: Positive for polyuria. Negative for polydipsia and polyphagia.   Genitourinary:  Negative for dysuria and hematuria.   Musculoskeletal:  Negative for arthralgias and joint swelling.   Skin:  Negative for pallor, rash and wound.   Neurological:  Negative for tremors, seizures, speech difficulty, weakness, light-headedness, numbness and headaches.   Objective:     Vital Signs (Most Recent):  Temp: 98.2 °F (36.8 °C) (05/14/22 1131)  Pulse: 90 (05/14/22 1142)  Resp: 18 (05/14/22 1131)  BP: 102/75 (05/14/22 1131)  SpO2: 98 % (05/14/22 1131) Vital Signs (24h Range):  Temp:  [97.3 °F (36.3 °C)-99.5 °F (37.5 °C)] 98.2 °F (36.8 °C)  Pulse:  [] 90  Resp:  [17-18] 18  SpO2:  [97 %-100 %] 98 %  BP: (100-140)/(56-80) 102/75     Weight: 72.6 kg (160 lb 0.9 oz)  Body mass index is 25.07 kg/m².  No intake or output data in the 24 hours ending 05/14/22 1419   Physical Exam  Constitutional:       General: He is not in acute distress.  HENT:      Head: Normocephalic and atraumatic.      Right Ear: External ear normal.      Left Ear: External ear normal.      Nose: Nose normal.   Eyes:      Extraocular Movements: Extraocular movements intact.      Conjunctiva/sclera: Conjunctivae normal.      Pupils: Pupils are equal, round, and reactive to light.    Cardiovascular:      Rate and Rhythm: Normal rate and regular rhythm.      Pulses: Normal pulses.      Heart sounds: Normal heart sounds. No murmur heard.    No friction rub. No gallop.   Pulmonary:      Effort: Pulmonary effort is normal. No respiratory distress.      Breath sounds: Normal breath sounds. No wheezing or rhonchi.   Abdominal:      General: Bowel sounds are normal. There is no distension.      Palpations: There is no mass.      Tenderness: There is no abdominal tenderness.   Musculoskeletal:         General: No swelling or tenderness. Normal range of motion.      Cervical back: Normal range of motion. No tenderness.   Lymphadenopathy:      Cervical: No cervical adenopathy.   Skin:     General: Skin is warm and dry.      Findings: No rash.   Neurological:      Mental Status: He is alert.      Comments: AO x2      Recent Labs   Lab 05/13/22  0310 05/13/22  0640 05/14/22  0435   WBC 29.98* 37.05* 28.67*   HGB 12.7* 11.6* 11.4*   HCT 36.8* 34.1* 33.4*   MCV 95 96 95   RBC 3.89* 3.57* 3.52*   MCH 32.6* 32.5* 32.4*   MCHC 34.5 34.0 34.1   RDW 13.3 13.2 13.2   * 566* 577*   MPV 9.3 9.0* 8.9*   GRAN 84.8*  25.4* 84.5*  31.3* 81.0*  23.2*   LYMPH 2.8*  0.8* 4.6*  1.7 9.8*  2.8   MONO 11.3  3.4* 9.5  3.5* 7.7  2.2*   EOSINOPHIL 0.0 0.0 0.5   BASOPHIL 0.4 0.4 0.4     Recent Labs   Lab 05/13/22  0310 05/13/22  0640 05/13/22  1023 05/13/22  1920 05/13/22  2333 05/14/22  0435    144   < > 142 144 146*   K 4.0 4.2   < > 3.7 3.9 3.9   CL 99 105   < > 104 105 108   CO2 24 23   < > 25 26 25   ANIONGAP 18* 16   < > 13 13 13   BUN 18 13   < > 8 8 7   CREATININE 1.0 0.8   < > 0.8 0.7 0.7   * 252*   < > 225* 135* 38*   CALCIUM 10.8* 9.3   < > 9.6 9.4 9.8   PROT 8.8* 6.9  --   --   --  6.5   ALBUMIN 4.4 3.7  --   --   --  3.2*   ALKPHOS 83 72  --   --   --  66   BILITOT 0.7 0.6  --   --   --  0.6   ALT 8* 6*  --   --   --  9*   AST 14 13  --   --   --  15   ESTGFRAFRICA >60 >60   < > >60 >60  >60   EGFRNONAA >60 >60   < > >60 >60 >60   MG 1.8 1.6  --   --   --  1.8   PHOS 3.4 3.7  --   --   --  3.5    < > = values in this interval not displayed.     Recent Labs   Lab 05/13/22  0340   COLORU Yellow   APPEARANCEUA Clear   PHUR 5.0   SPECGRAV 1.015   PROTEINUA 1+*   GLUCUA 3+*   KETONESU 2+*   BILIRUBINUA Negative   OCCULTUA Negative   UROBILINOGEN Negative   NITRITE Negative   LEUKOCYTESUR Negative   RBCUA 1   WBCUA 1   BACTERIA None   SQUAMEPITHEL 0   HYALINECASTS 0   MICROCMT SEE COMMENT     Recent Labs   Lab 05/13/22  0310   TROPONINI <0.006     Recent Labs   Lab 05/13/22  0310   INR 1.1   APTT 31.3     No results for input(s): TSH, H8ZZPWI, O7ECQQA, THYROIDAB, FREET4 in the last 168 hours.  CT Head Without Contrast    Result Date: 5/13/2022  EXAMINATION: CT HEAD WITHOUT CONTRAST CLINICAL HISTORY: Mental status change, unknown cause; TECHNIQUE: Low dose axial images were obtained through the head.  Coronal and sagittal reformations were also performed. Contrast was not administered. COMPARISON: CT examination of the brain without contrast March 30, 2022 FINDINGS: The ventricular system, sulcal pattern and parenchymal attenuation characteristics are consistent with chronic change appearing similar to the prior examination without evidence for superimposed acute process.  There are areas consistent with remote lacunar-type ischemic change.  Chronic appearing white matter change noted. There is no evidence for intracranial mass, mass effect or midline shift and there is no evidence for acute intracranial hemorrhage.  Appropriate CSF spaces are seen at the skull base. The visualized orbits appear intact.  The mastoid air cells and middle ear cavity bilaterally appear appropriate.  Mild opacity along the external auditory canal likely represents cerumen.  Minimal paranasal sinus mucosal thickening noted.  The osseous structures appear intact.     Chronic changes are noted, there is no evidence for  superimposed acute intracranial process. Electronically signed by: Abimael Eid Date:    05/13/2022 Time:    04:10    X-Ray Chest AP Portable    Result Date: 5/13/2022  EXAMINATION: XR CHEST AP PORTABLE CLINICAL HISTORY: Sepsis; TECHNIQUE: Single frontal view of the chest was performed. COMPARISON: Chest radiograph 03/28/2022, CT chest 10/27/2021 FINDINGS: Cardiac monitoring leads overlie the chest.  Cardiac silhouette is not significantly enlarged.  There is a stable large thin walled cyst projecting over the left upper lung zone.  There is increased fullness of the right hilar region, not well appreciated on prior radiograph examination.  Additionally, there is a possible new 1.0 cm nodular opacity projecting over the right upper lung zone.  The remaining lungs demonstrate no new confluent airspace consolidation.  No significant volume of pleural fluid or pneumothorax appreciated.  Osseous structures appear grossly intact.     1.  Increased fullness of the right hilar region, not well appreciated on prior radiograph examination as well as possible new 1.0 cm nodular opacity projecting over the right upper lung zone.  Given short-term interval development, this could relate to underlying infectious process, however neoplastic process not excluded.  Consider further assessment with dedicated chest CT. 2.  Stable large thin walled left upper lung zone cyst. Electronically signed by: Lenny Pereira MD Date:    05/13/2022 Time:    03:45    Microbiology Results (last 7 days)       Procedure Component Value Units Date/Time    Blood culture x two cultures. Draw prior to antibiotics. [[id number]] Collected: 05/13/22 0255    Order Status: Completed Specimen: Blood from Peripheral, Antecubital, Right Updated: 05/14/22 1012     Blood Culture, Routine No Growth to date      No Growth to date    Narrative:      Aerobic and anaerobic    Blood culture x two cultures. Draw prior to antibiotics. [[id number]] Collected: 05/13/22  0311    Order Status: Completed Specimen: Blood from Peripheral, Forearm, Right Updated: 05/14/22 1012     Blood Culture, Routine No Growth to date      No Growth to date    Narrative:      Aerobic and anaerobic    Influenza A & B by Molecular [[id number]] Collected: 05/13/22 0313    Order Status: Completed Specimen: Nasopharyngeal Swab Updated: 05/13/22 0357     Influenza A, Molecular Negative     Influenza B, Molecular Negative     Flu A & B Source Nasal swab

## 2022-05-14 NOTE — ASSESSMENT & PLAN NOTE
Pt w/ known EtOH abuse hx  Ethanol neg on admit  Hx of EtOH seizures    Plan:  CIWA q4   Ativan PRN for CIWA >8

## 2022-05-14 NOTE — ASSESSMENT & PLAN NOTE
Pt w/ AMS on admit thought to be related to DKA  Uncertain baseline mentation though previous CVA in 2013  After DKA resolution pt still altered  UDS and EtOH neg on admit   Ammonia slightly elevated to 61  CT head w/o any new intracranial abnormalities    Plan:  AMS lab work including B1/6/9/12, RPR, HIV, and TSH pending

## 2022-05-15 LAB
ALBUMIN SERPL BCP-MCNC: 2.8 G/DL (ref 3.5–5.2)
ALP SERPL-CCNC: 62 U/L (ref 55–135)
ALT SERPL W/O P-5'-P-CCNC: 6 U/L (ref 10–44)
ANION GAP SERPL CALC-SCNC: 14 MMOL/L (ref 8–16)
AST SERPL-CCNC: 11 U/L (ref 10–40)
BASOPHILS # BLD AUTO: 0.08 K/UL (ref 0–0.2)
BASOPHILS NFR BLD: 0.5 % (ref 0–1.9)
BILIRUB SERPL-MCNC: 0.4 MG/DL (ref 0.1–1)
BUN SERPL-MCNC: 9 MG/DL (ref 6–20)
CALCIUM SERPL-MCNC: 9.4 MG/DL (ref 8.7–10.5)
CHLORIDE SERPL-SCNC: 102 MMOL/L (ref 95–110)
CO2 SERPL-SCNC: 25 MMOL/L (ref 23–29)
CREAT SERPL-MCNC: 0.7 MG/DL (ref 0.5–1.4)
DIFFERENTIAL METHOD: ABNORMAL
EOSINOPHIL # BLD AUTO: 0.2 K/UL (ref 0–0.5)
EOSINOPHIL NFR BLD: 1.1 % (ref 0–8)
ERYTHROCYTE [DISTWIDTH] IN BLOOD BY AUTOMATED COUNT: 13 % (ref 11.5–14.5)
EST. GFR  (AFRICAN AMERICAN): >60 ML/MIN/1.73 M^2
EST. GFR  (NON AFRICAN AMERICAN): >60 ML/MIN/1.73 M^2
GLUCOSE SERPL-MCNC: 249 MG/DL (ref 70–110)
HCT VFR BLD AUTO: 31.9 % (ref 40–54)
HGB BLD-MCNC: 10.9 G/DL (ref 14–18)
IMM GRANULOCYTES # BLD AUTO: 0.06 K/UL (ref 0–0.04)
IMM GRANULOCYTES NFR BLD AUTO: 0.4 % (ref 0–0.5)
LYMPHOCYTES # BLD AUTO: 3 K/UL (ref 1–4.8)
LYMPHOCYTES NFR BLD: 18.5 % (ref 18–48)
MAGNESIUM SERPL-MCNC: 1.8 MG/DL (ref 1.6–2.6)
MCH RBC QN AUTO: 32 PG (ref 27–31)
MCHC RBC AUTO-ENTMCNC: 34.2 G/DL (ref 32–36)
MCV RBC AUTO: 94 FL (ref 82–98)
MONOCYTES # BLD AUTO: 1.2 K/UL (ref 0.3–1)
MONOCYTES NFR BLD: 7.2 % (ref 4–15)
NEUTROPHILS # BLD AUTO: 11.7 K/UL (ref 1.8–7.7)
NEUTROPHILS NFR BLD: 72.3 % (ref 38–73)
NRBC BLD-RTO: 0 /100 WBC
PHOSPHATE SERPL-MCNC: 4.2 MG/DL (ref 2.7–4.5)
PLATELET # BLD AUTO: 531 K/UL (ref 150–450)
PMV BLD AUTO: 9 FL (ref 9.2–12.9)
POCT GLUCOSE: 200 MG/DL (ref 70–110)
POCT GLUCOSE: 246 MG/DL (ref 70–110)
POCT GLUCOSE: 276 MG/DL (ref 70–110)
POCT GLUCOSE: 322 MG/DL (ref 70–110)
POTASSIUM SERPL-SCNC: 4.1 MMOL/L (ref 3.5–5.1)
PROT SERPL-MCNC: 5.9 G/DL (ref 6–8.4)
RBC # BLD AUTO: 3.41 M/UL (ref 4.6–6.2)
RPR SER QL: NORMAL
SODIUM SERPL-SCNC: 141 MMOL/L (ref 136–145)
WBC # BLD AUTO: 16.19 K/UL (ref 3.9–12.7)

## 2022-05-15 PROCEDURE — 25000003 PHARM REV CODE 250: Performed by: STUDENT IN AN ORGANIZED HEALTH CARE EDUCATION/TRAINING PROGRAM

## 2022-05-15 PROCEDURE — 84100 ASSAY OF PHOSPHORUS: CPT | Performed by: STUDENT IN AN ORGANIZED HEALTH CARE EDUCATION/TRAINING PROGRAM

## 2022-05-15 PROCEDURE — 87205 SMEAR GRAM STAIN: CPT | Performed by: STUDENT IN AN ORGANIZED HEALTH CARE EDUCATION/TRAINING PROGRAM

## 2022-05-15 PROCEDURE — 85025 COMPLETE CBC W/AUTO DIFF WBC: CPT | Performed by: STUDENT IN AN ORGANIZED HEALTH CARE EDUCATION/TRAINING PROGRAM

## 2022-05-15 PROCEDURE — 11000001 HC ACUTE MED/SURG PRIVATE ROOM

## 2022-05-15 PROCEDURE — 87070 CULTURE OTHR SPECIMN AEROBIC: CPT | Performed by: STUDENT IN AN ORGANIZED HEALTH CARE EDUCATION/TRAINING PROGRAM

## 2022-05-15 PROCEDURE — 83735 ASSAY OF MAGNESIUM: CPT | Performed by: STUDENT IN AN ORGANIZED HEALTH CARE EDUCATION/TRAINING PROGRAM

## 2022-05-15 PROCEDURE — 99223 PR INITIAL HOSPITAL CARE,LEVL III: ICD-10-PCS | Mod: ,,, | Performed by: INTERNAL MEDICINE

## 2022-05-15 PROCEDURE — 99900035 HC TECH TIME PER 15 MIN (STAT)

## 2022-05-15 PROCEDURE — 94761 N-INVAS EAR/PLS OXIMETRY MLT: CPT

## 2022-05-15 PROCEDURE — 99223 1ST HOSP IP/OBS HIGH 75: CPT | Mod: ,,, | Performed by: INTERNAL MEDICINE

## 2022-05-15 PROCEDURE — 94760 N-INVAS EAR/PLS OXIMETRY 1: CPT

## 2022-05-15 PROCEDURE — 87077 CULTURE AEROBIC IDENTIFY: CPT | Performed by: STUDENT IN AN ORGANIZED HEALTH CARE EDUCATION/TRAINING PROGRAM

## 2022-05-15 PROCEDURE — 87186 SC STD MICRODIL/AGAR DIL: CPT | Performed by: STUDENT IN AN ORGANIZED HEALTH CARE EDUCATION/TRAINING PROGRAM

## 2022-05-15 PROCEDURE — 27000207 HC ISOLATION

## 2022-05-15 PROCEDURE — 63700000 PHARM REV CODE 250 ALT 637 W/O HCPCS: Performed by: STUDENT IN AN ORGANIZED HEALTH CARE EDUCATION/TRAINING PROGRAM

## 2022-05-15 PROCEDURE — 36415 COLL VENOUS BLD VENIPUNCTURE: CPT | Performed by: STUDENT IN AN ORGANIZED HEALTH CARE EDUCATION/TRAINING PROGRAM

## 2022-05-15 PROCEDURE — 80053 COMPREHEN METABOLIC PANEL: CPT | Performed by: STUDENT IN AN ORGANIZED HEALTH CARE EDUCATION/TRAINING PROGRAM

## 2022-05-15 RX ORDER — THIAMINE HCL 100 MG
100 TABLET ORAL DAILY
Status: DISCONTINUED | OUTPATIENT
Start: 2022-05-15 | End: 2022-05-26 | Stop reason: HOSPADM

## 2022-05-15 RX ORDER — SODIUM CHLORIDE FOR INHALATION 3 %
4 VIAL, NEBULIZER (ML) INHALATION
Status: DISCONTINUED | OUTPATIENT
Start: 2022-05-15 | End: 2022-05-17

## 2022-05-15 RX ORDER — ALBUTEROL SULFATE 2.5 MG/.5ML
2.5 SOLUTION RESPIRATORY (INHALATION)
Status: DISCONTINUED | OUTPATIENT
Start: 2022-05-15 | End: 2022-05-17

## 2022-05-15 RX ADMIN — DIVALPROEX SODIUM 1000 MG: 250 TABLET, EXTENDED RELEASE ORAL at 08:05

## 2022-05-15 RX ADMIN — INSULIN ASPART 3 UNITS: 100 INJECTION, SOLUTION INTRAVENOUS; SUBCUTANEOUS at 08:05

## 2022-05-15 RX ADMIN — AMOXICILLIN AND CLAVULANATE POTASSIUM 1 TABLET: 875; 125 TABLET, FILM COATED ORAL at 08:05

## 2022-05-15 RX ADMIN — INSULIN ASPART 8 UNITS: 100 INJECTION, SOLUTION INTRAVENOUS; SUBCUTANEOUS at 01:05

## 2022-05-15 RX ADMIN — INSULIN DETEMIR 10 UNITS: 100 INJECTION, SOLUTION SUBCUTANEOUS at 08:05

## 2022-05-15 RX ADMIN — AZITHROMYCIN MONOHYDRATE 500 MG: 250 TABLET ORAL at 08:05

## 2022-05-15 RX ADMIN — THERA TABS 1 TABLET: TAB at 03:05

## 2022-05-15 RX ADMIN — THIAMINE HCL TAB 100 MG 100 MG: 100 TAB at 03:05

## 2022-05-15 RX ADMIN — LEVETIRACETAM 750 MG: 250 TABLET, FILM COATED ORAL at 08:05

## 2022-05-15 RX ADMIN — INSULIN ASPART 4 UNITS: 100 INJECTION, SOLUTION INTRAVENOUS; SUBCUTANEOUS at 05:05

## 2022-05-15 NOTE — SUBJECTIVE & OBJECTIVE
Interval History: NAEON. Pt seen this morning at bedside. Alert and oriented to self and place, not to time or situation. Endorses some dizziness, but is hydrating PO and requesting food. No other complaints.    Review of Systems   Constitutional:  Negative for activity change, chills, fatigue and fever.   HENT:  Negative for congestion, sinus pressure and trouble swallowing.    Eyes:  Negative for photophobia and visual disturbance.   Respiratory:  Negative for cough, choking and shortness of breath.    Cardiovascular:  Negative for chest pain and leg swelling.   Gastrointestinal:  Negative for abdominal distention, constipation, diarrhea, nausea and vomiting.   Endocrine: Negative for polyuria.   Genitourinary:  Negative for dysuria and hematuria.   Musculoskeletal:  Negative for arthralgias.   Skin:  Negative for wound.   Neurological:  Positive for dizziness. Negative for tremors, seizures, speech difficulty, weakness, light-headedness, numbness and headaches.   Objective:     Vital Signs (Most Recent):  Temp: 98.5 °F (36.9 °C) (05/15/22 0721)  Pulse: 85 (05/15/22 0721)  Resp: 18 (05/15/22 0721)  BP: 94/60 (05/15/22 0721)  SpO2: 96 % (05/15/22 0721) Vital Signs (24h Range):  Temp:  [97.5 °F (36.4 °C)-99.7 °F (37.6 °C)] 98.5 °F (36.9 °C)  Pulse:  [77-98] 85  Resp:  [16-18] 18  SpO2:  [95 %-99 %] 96 %  BP: ()/(60-76) 94/60     Weight: 72.6 kg (160 lb 0.9 oz)  Body mass index is 25.07 kg/m².    Intake/Output Summary (Last 24 hours) at 5/15/2022 0830  Last data filed at 5/14/2022 1850  Gross per 24 hour   Intake --   Output 1000 ml   Net -1000 ml      Physical Exam  Constitutional:       General: He is not in acute distress.  HENT:      Head: Normocephalic and atraumatic.      Right Ear: External ear normal.      Left Ear: External ear normal.      Nose: Nose normal.   Eyes:      Extraocular Movements: Extraocular movements intact.      Conjunctiva/sclera: Conjunctivae normal.      Pupils: Pupils are equal,  round, and reactive to light.   Cardiovascular:      Rate and Rhythm: Normal rate and regular rhythm.      Pulses: Normal pulses.      Heart sounds: Normal heart sounds. No murmur heard.    No friction rub. No gallop.   Pulmonary:      Effort: Pulmonary effort is normal. No respiratory distress.      Breath sounds: Normal breath sounds. No wheezing or rhonchi.   Abdominal:      General: Bowel sounds are normal. There is no distension.      Palpations: There is no mass.      Tenderness: There is no abdominal tenderness.   Musculoskeletal:         General: No swelling or tenderness. Normal range of motion.      Cervical back: Normal range of motion. No tenderness.   Lymphadenopathy:      Cervical: No cervical adenopathy.   Skin:     General: Skin is warm and dry.      Findings: No rash.   Neurological:      Mental Status: He is alert.      Comments: AO x2        Significant Labs:   Recent Results (from the past 24 hour(s))   POCT glucose    Collection Time: 05/14/22 11:25 AM   Result Value Ref Range    POCT Glucose 56 (L) 70 - 110 mg/dL   POCT glucose    Collection Time: 05/14/22 12:24 PM   Result Value Ref Range    POCT Glucose 238 (H) 70 - 110 mg/dL   TSH    Collection Time: 05/14/22  3:24 PM   Result Value Ref Range    TSH 0.409 0.400 - 4.000 uIU/mL   Folate    Collection Time: 05/14/22  3:24 PM   Result Value Ref Range    Folate 11.2 4.0 - 24.0 ng/mL   Vitamin B12    Collection Time: 05/14/22  3:24 PM   Result Value Ref Range    Vitamin B-12 415 210 - 950 pg/mL   POCT glucose    Collection Time: 05/14/22  4:15 PM   Result Value Ref Range    POCT Glucose 185 (H) 70 - 110 mg/dL   POCT glucose    Collection Time: 05/14/22  7:34 PM   Result Value Ref Range    POCT Glucose 381 (H) 70 - 110 mg/dL   Comprehensive Metabolic Panel (CMP)    Collection Time: 05/15/22  2:48 AM   Result Value Ref Range    Sodium 141 136 - 145 mmol/L    Potassium 4.1 3.5 - 5.1 mmol/L    Chloride 102 95 - 110 mmol/L    CO2 25 23 - 29 mmol/L     Glucose 249 (H) 70 - 110 mg/dL    BUN 9 6 - 20 mg/dL    Creatinine 0.7 0.5 - 1.4 mg/dL    Calcium 9.4 8.7 - 10.5 mg/dL    Total Protein 5.9 (L) 6.0 - 8.4 g/dL    Albumin 2.8 (L) 3.5 - 5.2 g/dL    Total Bilirubin 0.4 0.1 - 1.0 mg/dL    Alkaline Phosphatase 62 55 - 135 U/L    AST 11 10 - 40 U/L    ALT 6 (L) 10 - 44 U/L    Anion Gap 14 8 - 16 mmol/L    eGFR if African American >60 >60 mL/min/1.73 m^2    eGFR if non African American >60 >60 mL/min/1.73 m^2   Magnesium    Collection Time: 05/15/22  2:48 AM   Result Value Ref Range    Magnesium 1.8 1.6 - 2.6 mg/dL   Phosphorus    Collection Time: 05/15/22  2:48 AM   Result Value Ref Range    Phosphorus 4.2 2.7 - 4.5 mg/dL   CBC with Automated Differential    Collection Time: 05/15/22  2:48 AM   Result Value Ref Range    WBC 16.19 (H) 3.90 - 12.70 K/uL    RBC 3.41 (L) 4.60 - 6.20 M/uL    Hemoglobin 10.9 (L) 14.0 - 18.0 g/dL    Hematocrit 31.9 (L) 40.0 - 54.0 %    MCV 94 82 - 98 fL    MCH 32.0 (H) 27.0 - 31.0 pg    MCHC 34.2 32.0 - 36.0 g/dL    RDW 13.0 11.5 - 14.5 %    Platelets 531 (H) 150 - 450 K/uL    MPV 9.0 (L) 9.2 - 12.9 fL    Immature Granulocytes 0.4 0.0 - 0.5 %    Gran # (ANC) 11.7 (H) 1.8 - 7.7 K/uL    Immature Grans (Abs) 0.06 (H) 0.00 - 0.04 K/uL    Lymph # 3.0 1.0 - 4.8 K/uL    Mono # 1.2 (H) 0.3 - 1.0 K/uL    Eos # 0.2 0.0 - 0.5 K/uL    Baso # 0.08 0.00 - 0.20 K/uL    nRBC 0 0 /100 WBC    Gran % 72.3 38.0 - 73.0 %    Lymph % 18.5 18.0 - 48.0 %    Mono % 7.2 4.0 - 15.0 %    Eosinophil % 1.1 0.0 - 8.0 %    Basophil % 0.5 0.0 - 1.9 %    Differential Method Automated    POCT glucose    Collection Time: 05/15/22  4:52 AM   Result Value Ref Range    POCT Glucose 200 (H) 70 - 110 mg/dL       Significant Imaging:   Imaging Results              CT Head Without Contrast (Final result)  Result time 05/13/22 04:10:43      Final result by Abimael Eid MD (05/13/22 04:10:43)                   Impression:      Chronic changes are noted, there is no evidence for  superimposed acute intracranial process.      Electronically signed by: Abimael Mixzier  Date:    05/13/2022  Time:    04:10               Narrative:    EXAMINATION:  CT HEAD WITHOUT CONTRAST    CLINICAL HISTORY:  Mental status change, unknown cause;    TECHNIQUE:  Low dose axial images were obtained through the head.  Coronal and sagittal reformations were also performed. Contrast was not administered.    COMPARISON:  CT examination of the brain without contrast March 30, 2022    FINDINGS:  The ventricular system, sulcal pattern and parenchymal attenuation characteristics are consistent with chronic change appearing similar to the prior examination without evidence for superimposed acute process.  There are areas consistent with remote lacunar-type ischemic change.  Chronic appearing white matter change noted.    There is no evidence for intracranial mass, mass effect or midline shift and there is no evidence for acute intracranial hemorrhage.  Appropriate CSF spaces are seen at the skull base.    The visualized orbits appear intact.  The mastoid air cells and middle ear cavity bilaterally appear appropriate.  Mild opacity along the external auditory canal likely represents cerumen.  Minimal paranasal sinus mucosal thickening noted.  The osseous structures appear intact.                                       X-Ray Chest AP Portable (Final result)  Result time 05/13/22 03:45:11      Final result by Lenny Pereira MD (05/13/22 03:45:11)                   Impression:      1.  Increased fullness of the right hilar region, not well appreciated on prior radiograph examination as well as possible new 1.0 cm nodular opacity projecting over the right upper lung zone.  Given short-term interval development, this could relate to underlying infectious process, however neoplastic process not excluded.  Consider further assessment with dedicated chest CT.    2.  Stable large thin walled left upper lung zone  cyst.      Electronically signed by: Lenny Pereira MD  Date:    05/13/2022  Time:    03:45               Narrative:    EXAMINATION:  XR CHEST AP PORTABLE    CLINICAL HISTORY:  Sepsis;    TECHNIQUE:  Single frontal view of the chest was performed.    COMPARISON:  Chest radiograph 03/28/2022, CT chest 10/27/2021    FINDINGS:  Cardiac monitoring leads overlie the chest.  Cardiac silhouette is not significantly enlarged.  There is a stable large thin walled cyst projecting over the left upper lung zone.  There is increased fullness of the right hilar region, not well appreciated on prior radiograph examination.  Additionally, there is a possible new 1.0 cm nodular opacity projecting over the right upper lung zone.  The remaining lungs demonstrate no new confluent airspace consolidation.  No significant volume of pleural fluid or pneumothorax appreciated.  Osseous structures appear grossly intact.

## 2022-05-15 NOTE — CONSULTS
Consult Note  [facility]    Attending: Kalina Cartagena MD  Fellow: Manju  Admit Date: 5/13/2022  Today's Date: 05/15/2022  Reason for Consult:  Lung cavitation    SUBJECTIVE:     HPI: patient is a 42 year old male PMHx of T2DM, HTN, seizures, COPD?, alcohol use disorder, substance abuse presenting to [facility] for hyperglycemia and AMS. Patients hyperglycemia resolved with insulin subcutaneously. Patient monitored for encephalopathy. UDS and EtOH negative on admit. Ammonia slighly elevated to 61. CT head with no new abnormalities. CXR on admit documented new cavitary lung mass with confirmation on CT chest.   Patient reports close to 30 pack year smoking history. Patient reports unsure of night sweats but denies any fevers.  Patient denies any recent travels or sick contacts. Patient is homeless. Patient denies any weight loss. Per chart review, patient noncompliant with medications including AEDs. Family collateral report patient at baseline could perform his ADLs and baseline mentation was AOx3 with no recent contact over the last 2 months.     Pulmonology consulted for cavitary mass.     Review of patient's allergies indicates:  No Known Allergies    Past Medical History:   Diagnosis Date    Diabetes mellitus     Seizures     Stroke 2013     Past Surgical History:   Procedure Laterality Date    TRANSESOPHAGEAL ECHOCARDIOGRAPHY N/A 6/1/2021    Procedure: ECHOCARDIOGRAM, TRANSESOPHAGEAL;  Surgeon: [facility] Provider;  [facility];  Service: Anesthesiology;  Laterality: N/A;     No family history on file.  Social History     Tobacco Use    Smoking status: Current Some Day Smoker     Years: 15.00     Types: Cigarettes, Cigars    Smokeless tobacco: Never Used   Substance Use Topics    Alcohol use: Yes     Alcohol/week: 30.0 standard drinks     Types: 30 Shots of liquor per week    Drug use: Not Currently       All medications reviewed.    Review of Systems    Constitutional: Negative for fever and weight loss.   HENT: Negative for congestion.    Eyes: Negative for blurred vision and pain.   Respiratory: Negative for shortness of breath and wheezing.    Cardiovascular: Negative for chest pain.   Gastrointestinal: Negative for abdominal pain, nausea and vomiting.   Neurological: Positive for dizziness. Negative for seizures.   Psychiatric/Behavioral: The patient is not nervous/anxious.        OBJECTIVE:     Vital Signs Trends/Hx Reviewed  Vitals:    05/15/22 0500 05/15/22 0721 05/15/22 1143 05/15/22 1146   BP:  94/60  102/62   BP Location:  Left arm  Left arm   Patient Position:  Lying  Lying   Pulse: 90 85 77 77   Resp:  18  18   Temp:  98.5 °F (36.9 °C)  97.7 °F (36.5 °C)   TempSrc:  Oral  Oral   SpO2:  96% 100% 100%   Weight:       Height:             Physical Exam:  General: NAD, cooperative & interactive.  HEENT: AT/NC, PERRL, EOMI, oral and nasal mucosa moist.   Cardiac: normal rate, regular rhythm, with no MRG with brisk cap refill and symmetric pulses in distal extremities.  Respiratory: Normal inspection. Symmetric chest rise. Normal palpation and percussion. Auscultation clear bilaterally. No increased work of breathing noted. On RA  Abdomen: Soft, NT/ND.  Extremities: No edema.   Neuro: Grossly intact to brief exam. Oriented x2 to self and place.       Laboratory:  No results for input(s): PH, PCO2, PO2, HCO3, POCSATURATED, BE in the last 24 hours.  Recent Labs   Lab 05/15/22  0248   WBC 16.19*   RBC 3.41*   HGB 10.9*   HCT 31.9*   *   MCV 94   MCH 32.0*   MCHC 34.2     Recent Labs   Lab 05/15/22  0248      K 4.1      CO2 25   BUN 9   CREATININE 0.7   MG 1.8       Microbiology Data:   Microbiology Results (last 7 days)     Procedure Component Value Units Date/Time    Culture, Respiratory with Gram Stain [[id number]]     Order Status: No result Specimen: Respiratory     AFB Culture & Smear [[id number]]     Order Status: No result Specimen:  Respiratory     Blood culture x two cultures. Draw prior to antibiotics. [[id number]] Collected: 05/13/22 0255    Order Status: Completed Specimen: Blood from Peripheral, Antecubital, Right Updated: 05/15/22 1012     Blood Culture, Routine No Growth to date      No Growth to date      No Growth to date    Narrative:      Aerobic and anaerobic    Blood culture x two cultures. Draw prior to antibiotics. [[id number]] Collected: 05/13/22 0311    Order Status: Completed Specimen: Blood from Peripheral, Forearm, Right Updated: 05/15/22 1012     Blood Culture, Routine No Growth to date      No Growth to date      No Growth to date    Narrative:      Aerobic and anaerobic    Influenza A & B by Molecular [[id number]] Collected: 05/13/22 0313    Order Status: Completed Specimen: Nasopharyngeal Swab Updated: 05/13/22 0357     Influenza A, Molecular Negative     Influenza B, Molecular Negative     Flu A & B Source Nasal swab           Chest Imaging:   No new imaging.     Infusions:        Scheduled Medications:    amoxicillin-clavulanate 875-125mg  1 tablet Oral Q12H    azithromycin  500 mg Oral Daily    divalproex ER  1,000 mg Oral Q12H    insulin detemir U-100  10 Units Subcutaneous QHS    levETIRAcetam  750 mg Oral BID    losartan  25 mg Oral Daily    multivitamin  1 tablet Oral Daily    thiamine  100 mg Oral Daily       PRN Medications:   acetaminophen, acetaminophen, albuterol sulfate, dextrose 10%, dextrose 10%, glucagon (human recombinant), glucose, glucose, insulin aspart U-100, LIDOcaine, lorazepam, melatonin, naloxone, sodium chloride 0.9%, sodium chloride 3%    Assessment & Plan:   Patient Active Problem List   Diagnosis    COPD (chronic obstructive pulmonary disease)    ETOH abuse    Type 2 diabetes mellitus, with long-term current use of insulin    Hypertension    History of stroke    Cardiomyopathy    Hyperlipidemia    Risk for falls    Bizarre behavior    Epilepsy    Cachectic    Bleb, lung     Severe malnutrition    Neurocognitive disorder    Complex partial status epilepticus    Substance or medication-induced psychotic disorder, with delusions    Type 2 diabetes mellitus with hyperglycemia    Hypomagnesemia     Alcohol withdrawal syndrome, with delirium    Cerebrovascular accident (CVA) due to thrombosis of right middle cerebral artery    Tachycardia    Mitral valve vegetation    Multiple complaints    Cervical Spine Lesion    Seizure    Weakness    Other incomplete lesion at C2 level of cervical spinal cord, initial encounter    Alcohol use disorder, severe, dependence    Back pain    Urinary retention    Impaired functional mobility, balance, gait, and endurance    Decreased strength, endurance, and mobility    Decreased  strength    Decreased coordination    AMS (altered mental status)    Pneumonia       ASSESSMENT & RECOMMENDATIONS     Patient is a  42 yr old male with pmhx of T2DM, HTN, CVA, neurocognitive impairment and substance abuse disorder admitted for DKA. Patient was monitored for altered mental status. CT chest showing RUL cavitary mass with satellite lesions and a large SAVANAH bulla. Concern for infectious etiology vs atypical pneumonia.    Cavitary lesion  - CXR showing 1cm nodular opacity projecting over RUL zone  - CT chest showing RUL 4.5 cm cavitary mass with adjacent 9 mm satellite lesion. Large bulla in SAVANAH  - not found on previous CT chest  - currently asymptomatic  - initially on vanc/cefepime/flagyl currently on abx: augmentin and azithro for respiratory infection  - recommend 3 AFB culture and smear over 3 days  - respiratory culture pending  - quantiferon gold  - HIV in process  - airborne precautions  - Continue to monitor for fevers and repeat cultures/imaging    All other medical conditions per primary    DVT PPx: SCDs  Diet: carb consistent  GI PPX: n/a  Deconditioning: n/a  Code Status: Full      Thank you for this consult. We will continue to  follow.    5/15/2022 Kody Mcnair M.D., \[facility]\""II  [facility] PULMONOLOGY   [facility]      Pulmonary/Critical Care Attending with Team    Patient seen and examined on rounds with team after review of PMH, reason for admission, hospital course, labs and xrays.  I have reviewed, we discussed, and I have verified Dr. Mcnair's findings, assessment and recommendations. There appear to be two cavitary lesions in his lungs with halos.  This could be suggestive of infection, especially mycobacterial disease such as TB.  Discussed this with the primary team, and they will order three sputum samples on three consecutive days for smears and cultures.  A Tspot-TB is also to be ordered.  An N-95 mask should be worn at all times when seeing the patient.  Other possible infections could be fungal or NTM mycobacteria.       Nayeli Gardner MD  P/CCM  5/15/2022    Critical care time 30 min for review of chart, lab data and images, examination of the patient, and creation of, monitoring, and adjusting as necessary the management plan for support of vital organ system (respiratory), and also for discussion with other caregivers and the family members.      Nayeli Gardner MD

## 2022-05-15 NOTE — PROGRESS NOTES
[facility] Medicine  Progress Note    Patient Name: Terrance Chavez  MRN: [id number]  Patient Class: OP- Observation   Admission Date: 5/13/2022  Length of Stay: 0 days  Attending Physician: Kalina Cartagena MD  Primary Care Provider: Primary Doctor No        Subjective:     Principal Problem:Hyperglycemia        HPI:  Patient is a 42 yr old male with pmhx of T2DM, HTN, CVA, neurocognitive impairment and substance abuse disorder presenting to the ED for Altered mental status. [place], brought in by EMS. On arrival patient was only oriented to person but not place,time or situation. Patient blood glucose was elevated on arrival. Initial labs showed small acidosis. Patient was provided 2L LR in ED and his mentation improved. Patient had a fever in the ED , and elevated WBC  of 29.9. Can not rule out infection exacerbating or causing change in mental status. Patient received 2g ceftriaxone in ED. Patient denies chest pain, shortness of breath, headache, nausea and vomiting. Admitted to [facility] for observation and correction of glucose.      Overview/Hospital Course:  No notes on file    Interval History: NAEON. Pt seen this morning at bedside. Alert and oriented to self and place, not to time or situation. Endorses some dizziness, but is hydrating PO and requesting food. No other complaints.    Review of Systems   Constitutional:  Negative for activity change, chills, fatigue and fever.   HENT:  Negative for congestion, sinus pressure and trouble swallowing.    Eyes:  Negative for photophobia and visual disturbance.   Respiratory:  Negative for cough, choking and shortness of breath.    Cardiovascular:  Negative for chest pain and leg swelling.   Gastrointestinal:  Negative for abdominal distention, constipation, diarrhea, nausea and vomiting.   Endocrine: Negative for polyuria.   Genitourinary:  Negative for dysuria and hematuria.    Musculoskeletal:  Negative for arthralgias.   Skin:  Negative for wound.   Neurological:  Positive for dizziness. Negative for tremors, seizures, speech difficulty, weakness, light-headedness, numbness and headaches.   Objective:     Vital Signs (Most Recent):  Temp: 98.5 °F (36.9 °C) (05/15/22 0721)  Pulse: 85 (05/15/22 0721)  Resp: 18 (05/15/22 0721)  BP: 94/60 (05/15/22 0721)  SpO2: 96 % (05/15/22 0721) Vital Signs (24h Range):  Temp:  [97.5 °F (36.4 °C)-99.7 °F (37.6 °C)] 98.5 °F (36.9 °C)  Pulse:  [77-98] 85  Resp:  [16-18] 18  SpO2:  [95 %-99 %] 96 %  BP: ()/(60-76) 94/60     Weight: 72.6 kg (160 lb 0.9 oz)  Body mass index is 25.07 kg/m².    Intake/Output Summary (Last 24 hours) at 5/15/2022 0830  Last data filed at 5/14/2022 1850  Gross per 24 hour   Intake --   Output 1000 ml   Net -1000 ml      Physical Exam  Constitutional:       General: He is not in acute distress.  HENT:      Head: Normocephalic and atraumatic.      Right Ear: External ear normal.      Left Ear: External ear normal.      Nose: Nose normal.   Eyes:      Extraocular Movements: Extraocular movements intact.      Conjunctiva/sclera: Conjunctivae normal.      Pupils: Pupils are equal, round, and reactive to light.   Cardiovascular:      Rate and Rhythm: Normal rate and regular rhythm.      Pulses: Normal pulses.      Heart sounds: Normal heart sounds. No murmur heard.    No friction rub. No gallop.   Pulmonary:      Effort: Pulmonary effort is normal. No respiratory distress.      Breath sounds: Normal breath sounds. No wheezing or rhonchi.   Abdominal:      General: Bowel sounds are normal. There is no distension.      Palpations: There is no mass.      Tenderness: There is no abdominal tenderness.   Musculoskeletal:         General: No swelling or tenderness. Normal range of motion.      Cervical back: Normal range of motion. No tenderness.   Lymphadenopathy:      Cervical: No cervical adenopathy.   Skin:     General: Skin is warm  and dry.      Findings: No rash.   Neurological:      Mental Status: He is alert.      Comments: AO x2        Significant Labs:   Recent Results (from the past 24 hour(s))   POCT glucose    Collection Time: 05/14/22 11:25 AM   Result Value Ref Range    POCT Glucose 56 (L) 70 - 110 mg/dL   POCT glucose    Collection Time: 05/14/22 12:24 PM   Result Value Ref Range    POCT Glucose 238 (H) 70 - 110 mg/dL   TSH    Collection Time: 05/14/22  3:24 PM   Result Value Ref Range    TSH 0.409 0.400 - 4.000 uIU/mL   Folate    Collection Time: 05/14/22  3:24 PM   Result Value Ref Range    Folate 11.2 4.0 - 24.0 ng/mL   Vitamin B12    Collection Time: 05/14/22  3:24 PM   Result Value Ref Range    Vitamin B-12 415 210 - 950 pg/mL   POCT glucose    Collection Time: 05/14/22  4:15 PM   Result Value Ref Range    POCT Glucose 185 (H) 70 - 110 mg/dL   POCT glucose    Collection Time: 05/14/22  7:34 PM   Result Value Ref Range    POCT Glucose 381 (H) 70 - 110 mg/dL   Comprehensive Metabolic Panel (CMP)    Collection Time: 05/15/22  2:48 AM   Result Value Ref Range    Sodium 141 136 - 145 mmol/L    Potassium 4.1 3.5 - 5.1 mmol/L    Chloride 102 95 - 110 mmol/L    CO2 25 23 - 29 mmol/L    Glucose 249 (H) 70 - 110 mg/dL    BUN 9 6 - 20 mg/dL    Creatinine 0.7 0.5 - 1.4 mg/dL    Calcium 9.4 8.7 - 10.5 mg/dL    Total Protein 5.9 (L) 6.0 - 8.4 g/dL    Albumin 2.8 (L) 3.5 - 5.2 g/dL    Total Bilirubin 0.4 0.1 - 1.0 mg/dL    Alkaline Phosphatase 62 55 - 135 U/L    AST 11 10 - 40 U/L    ALT 6 (L) 10 - 44 U/L    Anion Gap 14 8 - 16 mmol/L    eGFR if African American >60 >60 mL/min/1.73 m^2    eGFR if non African American >60 >60 mL/min/1.73 m^2   Magnesium    Collection Time: 05/15/22  2:48 AM   Result Value Ref Range    Magnesium 1.8 1.6 - 2.6 mg/dL   Phosphorus    Collection Time: 05/15/22  2:48 AM   Result Value Ref Range    Phosphorus 4.2 2.7 - 4.5 mg/dL   CBC with Automated Differential    Collection Time: 05/15/22  2:48 AM   Result Value  Ref Range    WBC 16.19 (H) 3.90 - 12.70 K/uL    RBC 3.41 (L) 4.60 - 6.20 M/uL    Hemoglobin 10.9 (L) 14.0 - 18.0 g/dL    Hematocrit 31.9 (L) 40.0 - 54.0 %    MCV 94 82 - 98 fL    MCH 32.0 (H) 27.0 - 31.0 pg    MCHC 34.2 32.0 - 36.0 g/dL    RDW 13.0 11.5 - 14.5 %    Platelets 531 (H) 150 - 450 K/uL    MPV 9.0 (L) 9.2 - 12.9 fL    Immature Granulocytes 0.4 0.0 - 0.5 %    Gran # (ANC) 11.7 (H) 1.8 - 7.7 K/uL    Immature Grans (Abs) 0.06 (H) 0.00 - 0.04 K/uL    Lymph # 3.0 1.0 - 4.8 K/uL    Mono # 1.2 (H) 0.3 - 1.0 K/uL    Eos # 0.2 0.0 - 0.5 K/uL    Baso # 0.08 0.00 - 0.20 K/uL    nRBC 0 0 /100 WBC    Gran % 72.3 38.0 - 73.0 %    Lymph % 18.5 18.0 - 48.0 %    Mono % 7.2 4.0 - 15.0 %    Eosinophil % 1.1 0.0 - 8.0 %    Basophil % 0.5 0.0 - 1.9 %    Differential Method Automated    POCT glucose    Collection Time: 05/15/22  4:52 AM   Result Value Ref Range    POCT Glucose 200 (H) 70 - 110 mg/dL       Significant Imaging:   Imaging Results              CT Head Without Contrast (Final result)  Result time 05/13/22 04:10:43      Final result by Abimael Eid MD (05/13/22 04:10:43)                   Impression:      Chronic changes are noted, there is no evidence for superimposed acute intracranial process.      Electronically signed by: Abimael Eid  Date:    05/13/2022  Time:    04:10               Narrative:    EXAMINATION:  CT HEAD WITHOUT CONTRAST    CLINICAL HISTORY:  Mental status change, unknown cause;    TECHNIQUE:  Low dose axial images were obtained through the head.  Coronal and sagittal reformations were also performed. Contrast was not administered.    COMPARISON:  CT examination of the brain without contrast March 30, 2022    FINDINGS:  The ventricular system, sulcal pattern and parenchymal attenuation characteristics are consistent with chronic change appearing similar to the prior examination without evidence for superimposed acute process.  There are areas consistent with remote lacunar-type ischemic  change.  Chronic appearing white matter change noted.    There is no evidence for intracranial mass, mass effect or midline shift and there is no evidence for acute intracranial hemorrhage.  Appropriate CSF spaces are seen at the skull base.    The visualized orbits appear intact.  The mastoid air cells and middle ear cavity bilaterally appear appropriate.  Mild opacity along the external auditory canal likely represents cerumen.  Minimal paranasal sinus mucosal thickening noted.  The osseous structures appear intact.                                       X-Ray Chest AP Portable (Final result)  Result time 05/13/22 03:45:11      Final result by Lenny Pereira MD (05/13/22 03:45:11)                   Impression:      1.  Increased fullness of the right hilar region, not well appreciated on prior radiograph examination as well as possible new 1.0 cm nodular opacity projecting over the right upper lung zone.  Given short-term interval development, this could relate to underlying infectious process, however neoplastic process not excluded.  Consider further assessment with dedicated chest CT.    2.  Stable large thin walled left upper lung zone cyst.      Electronically signed by: Lenny Pereira MD  Date:    05/13/2022  Time:    03:45               Narrative:    EXAMINATION:  XR CHEST AP PORTABLE    CLINICAL HISTORY:  Sepsis;    TECHNIQUE:  Single frontal view of the chest was performed.    COMPARISON:  Chest radiograph 03/28/2022, CT chest 10/27/2021    FINDINGS:  Cardiac monitoring leads overlie the chest.  Cardiac silhouette is not significantly enlarged.  There is a stable large thin walled cyst projecting over the left upper lung zone.  There is increased fullness of the right hilar region, not well appreciated on prior radiograph examination.  Additionally, there is a possible new 1.0 cm nodular opacity projecting over the right upper lung zone.  The remaining lungs demonstrate no new confluent airspace  consolidation.  No significant volume of pleural fluid or pneumothorax appreciated.  Osseous structures appear grossly intact.                                          Assessment/Plan:      Pneumonia  Pt afebrile but w/ leokocytosis and new findings on CXR   Initiated Broad spec abx in ED w/ Vanc/cef/flagyl  Bld cultures drawn in ED w/ NGTD    Plan:  Continue Augmentin and azithromycin for 5 days ending 5/18  Cnt to monitor for fevers and repeat cultures/imaging      AMS (altered mental status)  Pt w/ AMS on admit thought to be related to DKA  Uncertain baseline mentation though previous CVA in 2013  After DKA resolution pt still altered  UDS and EtOH neg on admit   Ammonia slightly elevated to 61  CT head w/o any new intracranial abnormalities  Folate 11.2  VitB12 415  TSH 0.409    Plan:  AMS lab work including B1/6, RPR, HIV      Epilepsy  Restart home keppra and depakote      Hypertension  Patient not on any home anti-HTN medication  Plan  Losartan 25mg daily  Monitor Vitals      Type 2 diabetes mellitus, with long-term current use of insulin  Patient uses Humalin 70/30 to control BG  Plan  MDSSI  Detemir 10U Subq nightly  POCT BG Q4h  Diabetic Diet      ETOH abuse  Pt w/ known EtOH abuse hx  Ethanol neg on admit  Hx of EtOH seizures    Plan:  CIWA q4   Ativan PRN for CIWA >8        VTE Risk Mitigation (From admission, onward)         Ordered     IP VTE LOW RISK PATIENT  Once         05/13/22 0530     Place sequential compression device  Until discontinued         05/13/22 0530                Discharge Planning   SHABANA: 5/14/2022     Code Status: Full Code   Is the patient medically ready for discharge?:     Reason for patient still in hospital (select all that apply): Patient trending condition, Treatment and Pending disposition           Ted Dyer MD, PGY-2  Department of [facility] Medicine   [facility]

## 2022-05-15 NOTE — ASSESSMENT & PLAN NOTE
Pt afebrile but w/ leokocytosis and new findings on CXR   Initiated Broad spec abx in ED w/ Vanc/cef/flagyl  Bld cultures drawn in ED w/ NGTD    Plan:  Continue Augmentin and azithromycin for 5 days ending 5/18  Cnt to monitor for fevers and repeat cultures/imaging

## 2022-05-15 NOTE — ASSESSMENT & PLAN NOTE
Patient uses Humalin 70/30 to control BG  Plan  MDSSI  Detemir 10U Subq nightly  POCT BG Q4h  Diabetic Diet

## 2022-05-15 NOTE — CONSULTS
[facility] Consult Note    Consultant Attending: Dr. Kaba  Consultant Resident: Krishan Velazco MD     Reason for consult: Seizure d/o and EtOH Abuse with persistent AMS  Informant: None       Other sources of information: patient and past medical records    Chief Complaint and Duration     AMS since admission 5/13    Subjective:      History of Present Illness:  Terrance Chavez is a 41 y/o M with DM2, HTN, prior CVAs, substance use d/o (EtOH) and seizures who presented on 5/13 to [facility] ED after being picked up by [facility] for altered mentation. Upon arrival patient with evidence of DKA, with leukocytosis c/f infectious process and was febrile to 101.5 with tachycardia to 110s. Patient was described as being confused and on admission oriented to person only. Neurology has been asked to evaluate this patient on 5/15 given degree of persistent confusion since arrival.     Patient has a history of EtOH use with prior provoked seizures, currently prescribed Keppra and Depakote, following with Dr. Oliva with last visit 2/22/22. He is unable to describe prior seizure semiology outside of acknowledging loss of consciousness and jerking movements of the limbs, he is uncertain of duration, or any preceding aura. He is uncertain when the last time he took his AEDs was and cannot recall when his last seizure was. Per chart review, he was most recently hospitalized at [facility] with breakthrough seizure in setting of EtOH abuse and hypoglycemia on 3/30/22 and at that time was non-adherent to medications.     Additional complicating concerns during this hospitalization include pneumonia, currently on Bactrim and Azithromycin. A period of hypoglycemia also occurred on 5/14 around 0500 with POCT and CMP with glucose read of 38.     Today he remarks he is feeling better than upon arrival. He has some amnestic component to presentation and does not recall why he was initially hospitalized. He is conversant, denies any acute  "neurologic issues including weakness, paresthesias, dizziness, visual changes, or speech/language impairment.     ROS: Pertinent items are noted in HPI.    Allergies:  Patient has no known allergies.    Home Medications:    Prior to Admission medications    Medication Sig Start Date End Date Taking? Authorizing Provider   blood sugar diagnostic (ONETOUCH VERIO TEST STRIPS) Strp Use to test blood glucose 2 (two) times daily with meals. 7/20/21  Yes Ty Gonzales MD   blood sugar diagnostic Strp 1 strip by Misc.(Non-Drug; Combo Route) route 2 (two) times daily with meals. 6/10/21  Yes Clementine Lopez NP   blood-glucose meter kit Use as instructed 6/10/21 6/10/22 Yes Clementine Lopez NP   divalproex ER (DEPAKOTE) 500 MG Tb24 Take 2 tablets (1,000 mg total) by mouth every 12 (twelve) hours. 3/31/22  Yes Maite Nolan MD   divalproex ER (DEPAKOTE) 500 MG Tb24 Take 2 tablets (1,000 mg total) by mouth every 12 (twelve) hours. 5/7/22 6/6/22 Yes Slime Franco NP   insulin NPH/Reg human (HUMULIN, 70/30,) 100 unit/mL (70-30) InPn pen Inject 25 units into the skin with breakfast and 15 units with dinner or evening meal. Please see your primary care physician for further adjustments as needed. 5/7/22  Yes Slime Franco NP   lancets 30 gauge Misc 1 lancet by Misc.(Non-Drug; Combo Route) route 2 (two) times daily with meals. 7/20/21  Yes Ty Gonzales MD   lancing device Misc 1 Device by Misc.(Non-Drug; Combo Route) route 2 (two) times daily with meals. 6/10/21 6/10/22 Yes Clementine Lopez NP   levETIRAcetam (KEPPRA) 750 MG Tab Take 1 tablet (750 mg total) by mouth 2 (two) times daily. 5/7/22  Yes Slime Franco NP   pen needle, diabetic 32 gauge x 5/32" Ndle Injecting twice daily 3/28/21  Yes Zuri Lopez NP     Past Medical/Surgical/Family History:  PMHx:   Past Medical History:   Diagnosis Date    Diabetes mellitus     Seizures     Stroke 2013      Surgeries:   Past Surgical History: " "  Procedure Laterality Date    TRANSESOPHAGEAL ECHOCARDIOGRAPHY N/A 2021    Procedure: ECHOCARDIOGRAM, TRANSESOPHAGEAL;  Surgeon: [facility] Provider;  Location: [facility];  Service: Anesthesiology;  Laterality: N/A;      Family  Hx: No family history on file.    Social History:  Substance Abuse/Dependence History  Tobacco: current occasional tobacco use  EtOH: yes, reports last drink 5-6 days prior to arrival  Illicit drugs: denies    Objective:     Temp BP HR Resp Rate O2 Sat   97.7 °F (36.5 °C) 102/62  77 18  100 %    Height: 5' 7" (170.2 cm)  Weight: 72.6 kg (160 lb 0.9 oz)  BMI (Calculated): 25.1    Last 24 Hour Vital Signs:  BP  Min: 94/60  Max: 115/68  Temp  Av.2 °F (36.8 °C)  Min: 97.5 °F (36.4 °C)  Max: 99.7 °F (37.6 °C)  Pulse  Av.6  Min: 77  Max: 96  Resp  Av.7  Min: 16  Max: 18  SpO2  Av.1 %  Min: 95 %  Max: 100 %  Body mass index is 25.07 kg/m².  I/O last 3 completed shifts:  In: -   Out: 1000 [Urine:1000]    NEUROLOGIC EXAMINATION:  Orientation: person, place, year, not month or date or situation  Memory: recent memory impaired  Language: intact verbal fluency, comprehension and repetition  Cranial Nerves: PERRL, EOMI w/o nystagmus, visual fields full to confrontation, Face symmetric, V1-V3 light touch intact bilaterally, tongue midline, symmetric palatal elevation, 5/5 TPZ/SCM  Motor:  Pronator drift: absent  5/5 strength throughout including shoulder abduction, elbow flexion/extension, wrist flexion/extension, hand , hip flexion/extension, knee flexion/extension, and plantar/dorsiflexion  Tone: normal  DTR'S:  2+ bilateral biceps, brachioradialis, and patellar  Negative clonus or asterixis   Negative Wong  Sensory:  Intact to light touch and vibration in distal extremities  Cerebellar/Gait:  Finger to nose: normal  Heel to shin: normal  Gait: deferred currently    LABORATORY STUDIES:  Trended Lab Data:  Recent Labs   Lab 22  0640 22  1023 " 05/13/22  2333 05/14/22  0435 05/15/22  0248   WBC 37.05*  --   --  28.67* 16.19*   HGB 11.6*  --   --  11.4* 10.9*   HCT 34.1*  --   --  33.4* 31.9*   *  --   --  577* 531*   MCV 96  --   --  95 94   RDW 13.2  --   --  13.2 13.0      < > 144 146* 141   K 4.2   < > 3.9 3.9 4.1      < > 105 108 102   CO2 23   < > 26 25 25   BUN 13   < > 8 7 9   *   < > 135* 38* 249*   CALCIUM 9.3   < > 9.4 9.8 9.4   PROT 6.9  --   --  6.5 5.9*   ALBUMIN 3.7  --   --  3.2* 2.8*   AST 13  --   --  15 11   ALKPHOS 72  --   --  66 62   ALT 6*  --   --  9* 6*    < > = values in this interval not displayed.       RADIOLOGY STUDIES:  I have personally reviewed the images performed.     CT Head Without Contrast  Result Date: 5/13/2022  Chronic changes are noted, there is no evidence for superimposed acute intracranial process.     CT Chest Without Contrast  Result Date: 5/15/2022  4.5 cm right upper lobe cavitary mass with adjacent 9 mm satellite lesion, as above, concerning for infectious etiology/atypical pneumonia.  Malignancy could have a similar appearance. Large thin walled left upper lobe bulla, stable. Mild emphysematous lung changes.     Assessment:     Terrance Chavez is a 42 y.o. male with DM2, HTN, prior CVAs, substance use d/o (EtOH) and seizures acutely hospitalized since 5/13 with AMS in setting of DKA and cavitary lung lesion c/f infectious process. Mentation improving, however, limited knowledge of baseline, in a patient with prior seizures (med non-compliant) and EtOH abuse, further evaluation of AMS with EEG and MRI are reasonable. Additional considerations for nutritional deficiency warranted with empiric treatment with thiamine at this time. Suspect multifactorial process given underlying infectious and metabolic concerns.      Recommendations:     -Seizure precautions  Delirium precautions  -Continue Keppra 750 mg BID and Depakote 1000 mg BID  Check VPA lvl 5/16  -Recommend MRI Brain w/o  contrast  -EEG routine given delayed recovery in cognition and prior seizures  -Labs: Ammonia, B12, folate, TSH and UDS unremarkable  B1 pending  -Empiric treatment with thiamine supplementation given chronic EtOH use  -Avoid hypoglycemia  Mag >2 and Normonatremia  -Infectious concern per Primary and Pulmonology    Differential diagnosis was explained to the patient. All questions were answered. Patient understood and agreed to adhere to plan.   Recommendations discussed with staff, Dr. Kaba as well as communicated to primary team.    Thank you for allowing us to participate in the care of this patient. Please call us if you have any questions regarding this consult.    Krishan Velazco MD   [facility], PGY-IV

## 2022-05-15 NOTE — ASSESSMENT & PLAN NOTE
Pt w/ AMS on admit thought to be related to DKA  Uncertain baseline mentation though previous CVA in 2013  After DKA resolution pt still altered  UDS and EtOH neg on admit   Ammonia slightly elevated to 61  CT head w/o any new intracranial abnormalities  Folate 11.2  VitB12 415  TSH 0.409    Plan:  AMS lab work including B1/6, RPR, HIV

## 2022-05-16 ENCOUNTER — CLINICAL SUPPORT (OUTPATIENT)
Dept: SMOKING CESSATION | Facility: CLINIC | Age: 42
End: 2022-05-16

## 2022-05-16 DIAGNOSIS — F17.210 CIGARETTE SMOKER: Primary | ICD-10-CM

## 2022-05-16 LAB
ALBUMIN SERPL BCP-MCNC: 2.9 G/DL (ref 3.5–5.2)
ALP SERPL-CCNC: 66 U/L (ref 55–135)
ALT SERPL W/O P-5'-P-CCNC: 6 U/L (ref 10–44)
ANION GAP SERPL CALC-SCNC: 8 MMOL/L (ref 8–16)
AST SERPL-CCNC: 11 U/L (ref 10–40)
BASOPHILS # BLD AUTO: 0.04 K/UL (ref 0–0.2)
BASOPHILS NFR BLD: 0.3 % (ref 0–1.9)
BILIRUB SERPL-MCNC: 0.4 MG/DL (ref 0.1–1)
BUN SERPL-MCNC: 7 MG/DL (ref 6–20)
CALCIUM SERPL-MCNC: 9.6 MG/DL (ref 8.7–10.5)
CHLORIDE SERPL-SCNC: 102 MMOL/L (ref 95–110)
CO2 SERPL-SCNC: 31 MMOL/L (ref 23–29)
CREAT SERPL-MCNC: 0.7 MG/DL (ref 0.5–1.4)
DIFFERENTIAL METHOD: ABNORMAL
EOSINOPHIL # BLD AUTO: 0.1 K/UL (ref 0–0.5)
EOSINOPHIL NFR BLD: 0.9 % (ref 0–8)
ERYTHROCYTE [DISTWIDTH] IN BLOOD BY AUTOMATED COUNT: 12.9 % (ref 11.5–14.5)
EST. GFR  (AFRICAN AMERICAN): >60 ML/MIN/1.73 M^2
EST. GFR  (NON AFRICAN AMERICAN): >60 ML/MIN/1.73 M^2
GLUCOSE SERPL-MCNC: 180 MG/DL (ref 70–110)
HCT VFR BLD AUTO: 34.7 % (ref 40–54)
HGB BLD-MCNC: 11.4 G/DL (ref 14–18)
HIV 1+2 AB+HIV1 P24 AG SERPL QL IA: NEGATIVE
IMM GRANULOCYTES # BLD AUTO: 0.04 K/UL (ref 0–0.04)
IMM GRANULOCYTES NFR BLD AUTO: 0.3 % (ref 0–0.5)
LYMPHOCYTES # BLD AUTO: 2.8 K/UL (ref 1–4.8)
LYMPHOCYTES NFR BLD: 22.3 % (ref 18–48)
MCH RBC QN AUTO: 31.8 PG (ref 27–31)
MCHC RBC AUTO-ENTMCNC: 32.9 G/DL (ref 32–36)
MCV RBC AUTO: 97 FL (ref 82–98)
MONOCYTES # BLD AUTO: 0.8 K/UL (ref 0.3–1)
MONOCYTES NFR BLD: 6.8 % (ref 4–15)
NEUTROPHILS # BLD AUTO: 8.6 K/UL (ref 1.8–7.7)
NEUTROPHILS NFR BLD: 69.4 % (ref 38–73)
NRBC BLD-RTO: 0 /100 WBC
PLATELET # BLD AUTO: 482 K/UL (ref 150–450)
PMV BLD AUTO: 9.6 FL (ref 9.2–12.9)
POCT GLUCOSE: 227 MG/DL (ref 70–110)
POCT GLUCOSE: 237 MG/DL (ref 70–110)
POCT GLUCOSE: 263 MG/DL (ref 70–110)
POCT GLUCOSE: 404 MG/DL (ref 70–110)
POTASSIUM SERPL-SCNC: 4.4 MMOL/L (ref 3.5–5.1)
PROT SERPL-MCNC: 6.2 G/DL (ref 6–8.4)
RBC # BLD AUTO: 3.58 M/UL (ref 4.6–6.2)
SODIUM SERPL-SCNC: 141 MMOL/L (ref 136–145)
VALPROATE SERPL-MCNC: 98.7 UG/ML (ref 50–100)
WBC # BLD AUTO: 12.35 K/UL (ref 3.9–12.7)

## 2022-05-16 PROCEDURE — 25000003 PHARM REV CODE 250: Performed by: STUDENT IN AN ORGANIZED HEALTH CARE EDUCATION/TRAINING PROGRAM

## 2022-05-16 PROCEDURE — 99406 PT REFUSED TOBACCO CESSATION: ICD-10-PCS | Mod: S$GLB,,,

## 2022-05-16 PROCEDURE — 95816 EEG AWAKE AND DROWSY: CPT | Mod: 26,,, | Performed by: PSYCHIATRY & NEUROLOGY

## 2022-05-16 PROCEDURE — 63600175 PHARM REV CODE 636 W HCPCS: Performed by: STUDENT IN AN ORGANIZED HEALTH CARE EDUCATION/TRAINING PROGRAM

## 2022-05-16 PROCEDURE — S4991 NICOTINE PATCH NONLEGEND: HCPCS | Performed by: STUDENT IN AN ORGANIZED HEALTH CARE EDUCATION/TRAINING PROGRAM

## 2022-05-16 PROCEDURE — 27000207 HC ISOLATION

## 2022-05-16 PROCEDURE — C9399 UNCLASSIFIED DRUGS OR BIOLOG: HCPCS | Performed by: STUDENT IN AN ORGANIZED HEALTH CARE EDUCATION/TRAINING PROGRAM

## 2022-05-16 PROCEDURE — 99232 SBSQ HOSP IP/OBS MODERATE 35: CPT | Mod: ,,, | Performed by: PSYCHIATRY & NEUROLOGY

## 2022-05-16 PROCEDURE — 11000001 HC ACUTE MED/SURG PRIVATE ROOM

## 2022-05-16 PROCEDURE — 95816 PR EEG,W/AWAKE & DROWSY RECORD: ICD-10-PCS | Mod: 26,,, | Performed by: PSYCHIATRY & NEUROLOGY

## 2022-05-16 PROCEDURE — 94760 N-INVAS EAR/PLS OXIMETRY 1: CPT

## 2022-05-16 PROCEDURE — 63700000 PHARM REV CODE 250 ALT 637 W/O HCPCS: Performed by: STUDENT IN AN ORGANIZED HEALTH CARE EDUCATION/TRAINING PROGRAM

## 2022-05-16 PROCEDURE — 80164 ASSAY DIPROPYLACETIC ACD TOT: CPT | Performed by: STUDENT IN AN ORGANIZED HEALTH CARE EDUCATION/TRAINING PROGRAM

## 2022-05-16 PROCEDURE — 95819 EEG AWAKE AND ASLEEP: CPT

## 2022-05-16 PROCEDURE — 85025 COMPLETE CBC W/AUTO DIFF WBC: CPT | Performed by: STUDENT IN AN ORGANIZED HEALTH CARE EDUCATION/TRAINING PROGRAM

## 2022-05-16 PROCEDURE — 99232 PR SUBSEQUENT HOSPITAL CARE,LEVL II: ICD-10-PCS | Mod: ,,, | Performed by: PSYCHIATRY & NEUROLOGY

## 2022-05-16 PROCEDURE — 99406 BEHAV CHNG SMOKING 3-10 MIN: CPT | Mod: S$GLB,,,

## 2022-05-16 PROCEDURE — 80053 COMPREHEN METABOLIC PANEL: CPT | Performed by: STUDENT IN AN ORGANIZED HEALTH CARE EDUCATION/TRAINING PROGRAM

## 2022-05-16 PROCEDURE — 86480 TB TEST CELL IMMUN MEASURE: CPT | Performed by: STUDENT IN AN ORGANIZED HEALTH CARE EDUCATION/TRAINING PROGRAM

## 2022-05-16 PROCEDURE — 36415 COLL VENOUS BLD VENIPUNCTURE: CPT | Performed by: STUDENT IN AN ORGANIZED HEALTH CARE EDUCATION/TRAINING PROGRAM

## 2022-05-16 RX ORDER — IBUPROFEN 200 MG
1 TABLET ORAL DAILY
Status: DISCONTINUED | OUTPATIENT
Start: 2022-05-16 | End: 2022-05-26 | Stop reason: HOSPADM

## 2022-05-16 RX ADMIN — DIVALPROEX SODIUM 1000 MG: 250 TABLET, EXTENDED RELEASE ORAL at 09:05

## 2022-05-16 RX ADMIN — THIAMINE HCL TAB 100 MG 100 MG: 100 TAB at 09:05

## 2022-05-16 RX ADMIN — LEVETIRACETAM 750 MG: 250 TABLET, FILM COATED ORAL at 09:05

## 2022-05-16 RX ADMIN — LOSARTAN POTASSIUM 25 MG: 25 TABLET, FILM COATED ORAL at 09:05

## 2022-05-16 RX ADMIN — AZITHROMYCIN MONOHYDRATE 500 MG: 250 TABLET ORAL at 09:05

## 2022-05-16 RX ADMIN — INSULIN DETEMIR 10 UNITS: 100 INJECTION, SOLUTION SUBCUTANEOUS at 09:05

## 2022-05-16 RX ADMIN — AMOXICILLIN AND CLAVULANATE POTASSIUM 1 TABLET: 875; 125 TABLET, FILM COATED ORAL at 09:05

## 2022-05-16 RX ADMIN — NICOTINE 1 PATCH: 14 PATCH, EXTENDED RELEASE TRANSDERMAL at 09:05

## 2022-05-16 RX ADMIN — INSULIN ASPART 10 UNITS: 100 INJECTION, SOLUTION INTRAVENOUS; SUBCUTANEOUS at 01:05

## 2022-05-16 RX ADMIN — THERA TABS 1 TABLET: TAB at 09:05

## 2022-05-16 RX ADMIN — INSULIN ASPART 6 UNITS: 100 INJECTION, SOLUTION INTRAVENOUS; SUBCUTANEOUS at 05:05

## 2022-05-16 NOTE — PROGRESS NOTES
"[facility] Progress Note      Subjective:    Upon entering the room patient alert, calm and cooperative. His only complaint is fatigue which has been going on for months. Denies any difficulty with vision, speech, swallowing or focal motor or sensory deficits.       Objective:     Temp BP HR Resp Rate O2 Sat   98.3 °F (36.8 °C) 113/77  79 18  98 %    Height: 5' 7" (170.2 cm)  Weight: 72.6 kg (160 lb 0.9 oz)  BMI (Calculated): 25.1    Last 24 Hour Vital Signs:  BP  Min: 99/60  Max: 129/86  Temp  Av.1 °F (36.7 °C)  Min: 97.5 °F (36.4 °C)  Max: 98.7 °F (37.1 °C)  Pulse  Av.1  Min: 77  Max: 93  Resp  Av.2  Min: 16  Max: 18  SpO2  Av.6 %  Min: 94 %  Max: 100 %  Body mass index is 25.07 kg/m².  I/O last 3 completed shifts:  In: -   Out: 700 [Urine:700]    NEUROLOGIC EXAMINATION:  Orientation: person, place and situation. States is 2019.   Memory: recent memory impaired  Language: intact verbal fluency, comprehension and repetition  Cranial Nerves: PERRL, EOMI w/o nystagmus, visual fields full to confrontation, Face symmetric, V1-V3 light touch intact bilaterally, tongue midline, symmetric palatal elevation, 5/5 TPZ/SCM  Motor:  Pronator drift: absent  5/5 strength throughout including shoulder abduction, elbow flexion/extension, wrist flexion/extension, hand , hip flexion/extension, knee flexion/extension, and plantar/dorsiflexion  Tone: normal  DTR'S:  2+ bilateral biceps, brachioradialis, and patellar  Negative clonus or asterixis   Negative Wong  Sensory:  Intact to light touch and vibration in distal extremities  Cerebellar/Gait:  Finger to nose: normal  Heel to shin: normal  Gait: deferred currently    LABORATORY STUDIES:  Trended Lab Data:  Recent Labs   Lab 22  0435 05/15/22  0248 22  0850   WBC 28.67* 16.19* 12.35   HGB 11.4* 10.9* 11.4*   HCT 33.4* 31.9* 34.7*   * 531* 482*   MCV 95 94 97   RDW 13.2 13.0 12.9   * 141 141   K 3.9 4.1 4.4    102 102 "   CO2 25 25 31*   BUN 7 9 7   GLU 38* 249* 180*   CALCIUM 9.8 9.4 9.6   PROT 6.5 5.9* 6.2   ALBUMIN 3.2* 2.8* 2.9*   AST 15 11 11   ALKPHOS 66 62 66   ALT 9* 6* 6*       RADIOLOGY STUDIES:  I have personally reviewed the images performed.     CT Head Without Contrast  Result Date: 5/13/2022  Chronic changes are noted, there is no evidence for superimposed acute intracranial process.     CT Chest Without Contrast  Result Date: 5/15/2022  4.5 cm right upper lobe cavitary mass with adjacent 9 mm satellite lesion, as above, concerning for infectious etiology/atypical pneumonia.  Malignancy could have a similar appearance. Large thin walled left upper lobe bulla, stable. Mild emphysematous lung changes.     Brain MRI:  There is central volume loss, similar to prior studies.  There is no evidence of hydrocephalus, mass effect, intracranial hemorrhage or acute infarct.   Focal lesions within the periventricular white matter bilaterally are stable from the prior study consistent with prior nonspecific insult.  No new white matter lesions are noted.   Normal arterial flow voids are preserved at the skull base.   The visualized sinuses and mastoid air cells are clear.    EEG:  Sleep:   Normal sleep transients including sleep spindles, K complexes, vertex waves were seen.     Activation procedures:   Answers questions correctly     Abnormal activity:   No epileptiform discharges, periodic discharges, lateralized rhythmic delta activity or electrographic seizures were seen.     IMPRESSION:   Abnormal EEG due to mild generalized non-specific cerebral dysfunction.  No electrographic seizures or indications of seizure tendency.    Assessment:     Terrance Chavez is a 42 y.o. male with DM2, HTN, prior CVAs, substance use d/o (EtOH) and seizures acutely hospitalized since 5/13 with AMS in setting of DKA and cavitary lung lesion c/f infectious process. Mentation improving, however, still disoriented to time and unclear cognition  baseline. Brain MRI without contrast unchanged from prior as above. EEG negative for ongoing seizures or epileptogenic discharges. Autoimmune panel negative 3/2021.  Additional considerations for nutritional deficiency warranted with empiric treatment with thiamine at this time. Suspect multifactorial process given underlying infectious and metabolic concerns on top of possible cognitive impairment due to chronic EtOH abuse.     Recommendations:     -Seizure precautions  Delirium precautions  -Continue Keppra 750 mg BID and Depakote 1000 mg BID  VPA lvl 5/16 98.7  -Labs: Ammonia, B12, folate, TSH and UDS unremarkable  B1 pending  -Continue empiric treatment with thiamine supplementation given chronic EtOH use  -Avoid hypoglycemia  Mag >2 and Normonatremia  -Infectious concern per Primary and Pulmonology  -Consider ID consultation.  - If by tomorrow patient is still disoriented and baseline is not clarified consider to repeat Brain MRI with contrast to evaluate for enhancement indicating inflammatory process.   -Will continue to f/up    Seen and discussed with Dr Ellis.    Amy Floyd MD   [facility], PGY-IV

## 2022-05-16 NOTE — PROGRESS NOTES
"[facility] - Telemetry  Adult Nutrition  Progress Note    SUMMARY       Recommendations    Recommendation:   1. Encourage intake at meals as tolerated.   2. Monitor weight/labs.   3. Monitor need for supplements.   4. RD to follow to monitor po intake    Goals:   Pt will tolerate diet with at least 50-75% intake at meals by RD follow up  Nutrition Goal Status: new  Communication of RD Recs: reviewed with RN    Assessment and Plan  No nutrition dx at this time     Malnutrition Assessment  Unable to assess NFPE at this time 2/2 pt on isolation     Reason for Assessment  Reason For Assessment: identified at risk by screening criteria ([facility])  Diagnosis:  (hyperglycemia)  Relevant Medical History: DM, stroke, seizures  General Information Comments: Pt on 2000 ADA diet with % intake at recent meals. Pt on isolation for possible TB. [place] confused wandering around airport [place]. [place] 18-skin intact. Unable to assess NFPE 2/2 isolation. No recent weight loss noted.  Nutrition Discharge Planning: pt to d/c on ADA diet    Nutrition Risk Screen  Nutrition Risk Screen: no indicators present    Nutrition/Diet History  Food Preferences: no Muslim or cultural food prefs identified  Factors Affecting Nutritional Intake: None identified at this time    Anthropometrics  Temp: 98.3 °F (36.8 °C)  Height Method: Stated  Height: 5' 7" (170.2 cm)  Height (inches): 67 in  Weight Method: Bed Scale  Weight: 72.6 kg (160 lb 0.9 oz)  Weight (lb): 160.06 lb  Ideal Body Weight (IBW), Male: 148 lb  % Ideal Body Weight, Male (lb): 108.15 %  BMI (Calculated): 25.1  BMI Grade: 25 - 29.9 - overweight     Lab/Procedures/Meds  Pertinent Labs Reviewed: reviewed  Pertinent Labs Comments: Glu 249H, Alb 2.8L  Pertinent Medications Reviewed: reviewed  Pertinent Medications Comments: azithromycin, insulin, MVI, thiamine    Estimated/Assessed Needs  Weight Used For Calorie Calculations: 72.6 kg (160 lb 0.9 oz)  Energy Calorie Requirements (kcal): " 2178 (30 kcal/kg)  Energy Need Method: Kcal/kg  Protein Requirements: 72g (1.0g/kg)  Weight Used For Protein Calculations: 72.6 kg (160 lb 0.9 oz)  Estimated Fluid Requirement Method: RDA Method  RDA Method (mL): 2178     Nutrition Prescription Ordered  Current Diet Order: 2000 ADA    Evaluation of Received Nutrient/Fluid Intake  I/O: 0/700  Energy Calories Required: meeting needs  Protein Required: meeting needs  Fluid Required: meeting needs  Comments: LBM 5/12  % Intake of Estimated Energy Needs: 50 - 75 %  % Meal Intake: 50 - 75 %    Nutrition Risk  Level of Risk/Frequency of Follow-up:  (2xweekly)     Monitor and Evaluation  Food and Nutrient Intake: food and beverage intake  Food and Nutrient Adminstration: diet order  Physical Activity and Function: nutrition-related ADLs and IADLs  Anthropometric Measurements: weight  Biochemical Data, Medical Tests and Procedures: electrolyte and renal panel  Nutrition-Focused Physical Findings: overall appearance     Nutrition Follow-Up  RD Follow-up?: Yes

## 2022-05-16 NOTE — PLAN OF CARE
Recommendation:   1. Encourage intake at meals as tolerated.   2. Monitor weight/labs.   3. Monitor need for supplements.   4. RD to follow to monitor po intake    Goals:   Pt will tolerate diet with at least 50-75% intake at meals by RD follow up  Nutrition Goal Status: new

## 2022-05-16 NOTE — PLAN OF CARE
Patient has produced one AFB sample which is in process, two remaining. Quant gold in process. Have also added MTB PCR sputum sample as well. Discussed with RT. Will continue to follow peripherally while awaiting results of TB workup.     Zeina Hunt MD  [facility] Pulmonary/Critical Care Fellow  05/16/2022 1:21 PM

## 2022-05-16 NOTE — NURSING
No significant events during this shift. Pt VSS and afebrile throughout shift. Pt free of skin breakdown.  Pt has been eating and voiding adequately throughout shift. Pt has call light in reach, bed alarm on, bed brakes on, side rails up x2, bed in low position, nonskid socks on. Pt lying in bed in no distress. Purposeful rounding was preformed during this shift.

## 2022-05-16 NOTE — SUBJECTIVE & OBJECTIVE
Interval History: NAEON. Pt is doing well this AM with no complaints though he is still only Aox 2. It is still unlcear what his BL is but Neuro was consulted yesterday to further w/u AMS. He will have an MRI and EEG prior to d/c. Pulm was consulted as well for SAVANAH cavitary lesion which is being worked up for TB.     Review of Systems   Constitutional:  Negative for activity change, chills, fatigue and fever.   HENT:  Negative for congestion, sinus pressure and trouble swallowing.    Eyes:  Negative for photophobia and visual disturbance.   Respiratory:  Negative for cough, choking and shortness of breath.    Cardiovascular:  Negative for chest pain and leg swelling.   Gastrointestinal:  Negative for abdominal distention, constipation, diarrhea, nausea and vomiting.   Endocrine: Negative for polyuria.   Genitourinary:  Negative for dysuria and hematuria.   Musculoskeletal:  Negative for arthralgias.   Skin:  Negative for wound.   Neurological:  Negative for dizziness, tremors, seizures, speech difficulty, weakness, light-headedness, numbness and headaches.   Objective:     Vital Signs (Most Recent):  Temp: 98.3 °F (36.8 °C) (05/16/22 0807)  Pulse: 79 (05/16/22 1000)  Resp: 18 (05/16/22 0807)  BP: 113/77 (05/16/22 0938)  SpO2: 98 % (05/16/22 0938) Vital Signs (24h Range):  Temp:  [97.5 °F (36.4 °C)-98.7 °F (37.1 °C)] 98.3 °F (36.8 °C)  Pulse:  [77-93] 79  Resp:  [16-18] 18  SpO2:  [94 %-100 %] 98 %  BP: ()/(60-86) 113/77     Weight: 72.6 kg (160 lb 0.9 oz)  Body mass index is 25.07 kg/m².    Intake/Output Summary (Last 24 hours) at 5/16/2022 1030  Last data filed at 5/15/2022 1400  Gross per 24 hour   Intake --   Output 200 ml   Net -200 ml      Physical Exam  Constitutional:       General: He is not in acute distress.  HENT:      Head: Normocephalic and atraumatic.      Right Ear: External ear normal.      Left Ear: External ear normal.      Nose: Nose normal.   Eyes:      Extraocular Movements: Extraocular  movements intact.      Conjunctiva/sclera: Conjunctivae normal.      Pupils: Pupils are equal, round, and reactive to light.   Cardiovascular:      Rate and Rhythm: Normal rate and regular rhythm.      Pulses: Normal pulses.      Heart sounds: Normal heart sounds. No murmur heard.    No friction rub. No gallop.   Pulmonary:      Effort: Pulmonary effort is normal. No respiratory distress.      Breath sounds: Normal breath sounds. No wheezing or rhonchi.   Abdominal:      General: Bowel sounds are normal. There is no distension.      Palpations: There is no mass.      Tenderness: There is no abdominal tenderness.   Musculoskeletal:         General: No swelling or tenderness. Normal range of motion.      Cervical back: Normal range of motion. No tenderness.   Lymphadenopathy:      Cervical: No cervical adenopathy.   Skin:     General: Skin is warm and dry.      Findings: No rash.   Neurological:      Mental Status: He is alert.      Comments: AO x2        Recent Labs   Lab 05/14/22  0435 05/15/22  0248 05/16/22  0850   WBC 28.67* 16.19* 12.35   HGB 11.4* 10.9* 11.4*   HCT 33.4* 31.9* 34.7*   MCV 95 94 97   RBC 3.52* 3.41* 3.58*   MCH 32.4* 32.0* 31.8*   MCHC 34.1 34.2 32.9   RDW 13.2 13.0 12.9   * 531* 482*   MPV 8.9* 9.0* 9.6   GRAN 81.0*  23.2* 72.3  11.7* 69.4  8.6*   LYMPH 9.8*  2.8 18.5  3.0 22.3  2.8   MONO 7.7  2.2* 7.2  1.2* 6.8  0.8   EOSINOPHIL 0.5 1.1 0.9   BASOPHIL 0.4 0.5 0.3     Recent Labs   Lab 05/13/22  0640 05/13/22  1023 05/14/22  0435 05/15/22  0248 05/16/22  0850      < > 146* 141 141   K 4.2   < > 3.9 4.1 4.4      < > 108 102 102   CO2 23   < > 25 25 31*   ANIONGAP 16   < > 13 14 8   BUN 13   < > 7 9 7   CREATININE 0.8   < > 0.7 0.7 0.7   *   < > 38* 249* 180*   CALCIUM 9.3   < > 9.8 9.4 9.6   PROT 6.9  --  6.5 5.9* 6.2   ALBUMIN 3.7  --  3.2* 2.8* 2.9*   ALKPHOS 72  --  66 62 66   BILITOT 0.6  --  0.6 0.4 0.4   ALT 6*  --  9* 6* 6*   AST 13  --  15 11 11    ESTGFRAFRICA >60   < > >60 >60 >60   EGFRNONAA >60   < > >60 >60 >60   MG 1.6  --  1.8 1.8  --    PHOS 3.7  --  3.5 4.2  --     < > = values in this interval not displayed.     Recent Labs   Lab 05/13/22  0340   COLORU Yellow   APPEARANCEUA Clear   PHUR 5.0   SPECGRAV 1.015   PROTEINUA 1+*   GLUCUA 3+*   KETONESU 2+*   BILIRUBINUA Negative   OCCULTUA Negative   UROBILINOGEN Negative   NITRITE Negative   LEUKOCYTESUR Negative   RBCUA 1   WBCUA 1   BACTERIA None   SQUAMEPITHEL 0   HYALINECASTS 0   MICROCMT SEE COMMENT     Recent Labs   Lab 05/13/22  0310   TROPONINI <0.006     Recent Labs   Lab 05/13/22  0310   INR 1.1   APTT 31.3     Recent Labs   Lab 05/14/22  1524   TSH 0.409     CT Head Without Contrast    Result Date: 5/13/2022  EXAMINATION: CT HEAD WITHOUT CONTRAST CLINICAL HISTORY: Mental status change, unknown cause; TECHNIQUE: Low dose axial images were obtained through the head.  Coronal and sagittal reformations were also performed. Contrast was not administered. COMPARISON: CT examination of the brain without contrast March 30, 2022 FINDINGS: The ventricular system, sulcal pattern and parenchymal attenuation characteristics are consistent with chronic change appearing similar to the prior examination without evidence for superimposed acute process.  There are areas consistent with remote lacunar-type ischemic change.  Chronic appearing white matter change noted. There is no evidence for intracranial mass, mass effect or midline shift and there is no evidence for acute intracranial hemorrhage.  Appropriate CSF spaces are seen at the skull base. The visualized orbits appear intact.  The mastoid air cells and middle ear cavity bilaterally appear appropriate.  Mild opacity along the external auditory canal likely represents cerumen.  Minimal paranasal sinus mucosal thickening noted.  The osseous structures appear intact.     Chronic changes are noted, there is no evidence for superimposed acute intracranial  process. Electronically signed by: Abimael Eid Date:    05/13/2022 Time:    04:10    CT Chest Without Contrast    Result Date: 5/15/2022  EXAMINATION: CT CHEST WITHOUT CONTRAST CLINICAL HISTORY: Abnormal xray - lung nodule, >= 1 cm; TECHNIQUE: Low dose axial images, sagittal and coronal reformations were obtained from the thoracic inlet to the lung bases. Contrast was not administered. COMPARISON: Radiograph 05/13/2022 FINDINGS: Lungs: There is a right upper lobe partially cavitary mass in the posterior segment eroding through the major fissure into the superior segment of the right lower lobe (series 601, image 105).  The lesion measures 4.5 cm in transverse plane (series 2, image 54). There is an adjacent right upper lobe nodule measuring 0.9 cm (series 2, image 39), with the same imaging characteristics. Mild emphysematous lung changes. There is a large left upper lobe thin walled bulla measuring 11.4 cm (series 2, image 136), stable. Mediastinum: Thyroid gland is grossly unremarkable.  No mediastinal lymphadenopathy.  Limited assessment of the hilum without IV contrast.  Thoracic aorta is normal caliber.  Heart size within normal limits.  No pericardial or pleural effusion.  Mild scattered calcific atherosclerosis. Upper abdomen: Extensive pancreatic calcifications, probable sequela of chronic pancreatitis. Chest wall: Mild gynecomastia. Bones: No marrow replacement process.     4.5 cm right upper lobe cavitary mass with adjacent 9 mm satellite lesion, as above, concerning for infectious etiology/atypical pneumonia.  Malignancy could have a similar appearance. Large thin walled left upper lobe bulla, stable. Mild emphysematous lung changes. This report was flagged in Epic as abnormal. Electronically signed by: Juvencio Cardona MD Date:    05/15/2022 Time:    12:10    X-Ray Chest AP Portable    Result Date: 5/13/2022  EXAMINATION: XR CHEST AP PORTABLE CLINICAL HISTORY: Sepsis; TECHNIQUE: Single frontal view of the  chest was performed. COMPARISON: Chest radiograph 03/28/2022, CT chest 10/27/2021 FINDINGS: Cardiac monitoring leads overlie the chest.  Cardiac silhouette is not significantly enlarged.  There is a stable large thin walled cyst projecting over the left upper lung zone.  There is increased fullness of the right hilar region, not well appreciated on prior radiograph examination.  Additionally, there is a possible new 1.0 cm nodular opacity projecting over the right upper lung zone.  The remaining lungs demonstrate no new confluent airspace consolidation.  No significant volume of pleural fluid or pneumothorax appreciated.  Osseous structures appear grossly intact.     1.  Increased fullness of the right hilar region, not well appreciated on prior radiograph examination as well as possible new 1.0 cm nodular opacity projecting over the right upper lung zone.  Given short-term interval development, this could relate to underlying infectious process, however neoplastic process not excluded.  Consider further assessment with dedicated chest CT. 2.  Stable large thin walled left upper lung zone cyst. Electronically signed by: Lenny Pereira MD Date:    05/13/2022 Time:    03:45    Microbiology Results (last 7 days)       Procedure Component Value Units Date/Time    Blood culture x two cultures. Draw prior to antibiotics. [[id number]] Collected: 05/13/22 0311    Order Status: Completed Specimen: Blood from Peripheral, Forearm, Right Updated: 05/16/22 1012     Blood Culture, Routine No Growth to date      No Growth to date      No Growth to date      No Growth to date    Narrative:      Aerobic and anaerobic    Blood culture x two cultures. Draw prior to antibiotics. [[id number]] Collected: 05/13/22 0255    Order Status: Completed Specimen: Blood from Peripheral, Antecubital, Right Updated: 05/16/22 1012     Blood Culture, Routine No Growth to date      No Growth to date      No Growth to date      No Growth to date     Narrative:      Aerobic and anaerobic    AFB Culture & Smear [[id number]]     Order Status: No result Specimen: Respiratory     Culture, Respiratory with Gram Stain [[id number]] Collected: 05/15/22 1710    Order Status: Completed Specimen: Respiratory from Sputum Updated: 05/15/22 2337     Gram Stain (Respiratory) <10 epithelial cells per low power field.     Gram Stain (Respiratory) Rare WBC's     Gram Stain (Respiratory) Rare Gram positive cocci     Gram Stain (Respiratory) Rare Gram negative rods    AFB Culture & Smear [[id number]] Collected: 05/15/22 1710    Order Status: Sent Specimen: Respiratory from Sputum Updated: 05/15/22 1711    Influenza A & B by Molecular [[id number]] Collected: 05/13/22 0313    Order Status: Completed Specimen: Nasopharyngeal Swab Updated: 05/13/22 0357     Influenza A, Molecular Negative     Influenza B, Molecular Negative     Flu A & B Source Nasal swab

## 2022-05-16 NOTE — PROGRESS NOTES
[facility] Medicine  Progress Note    Patient Name: Terrance Chavez  MRN: [id number]  Patient Class: IP- Inpatient   Admission Date: 5/13/2022  Length of Stay: 1 days  Attending Physician: Kalina Cartagena MD  Primary Care Provider: Primary Doctor No        Subjective:     Principal Problem:Hyperglycemia        HPI:  Patient is a 42 yr old male with pmhx of T2DM, HTN, CVA, neurocognitive impairment and substance abuse disorder presenting to the ED for Altered mental status. [place], brought in by EMS. On arrival patient was only oriented to person but not place,time or situation. Patient blood glucose was elevated on arrival. Initial labs showed small acidosis. Patient was provided 2L LR in ED and his mentation improved. Patient had a fever in the ED , and elevated WBC  of 29.9. Can not rule out infection exacerbating or causing change in mental status. Patient received 2g ceftriaxone in ED. Patient denies chest pain, shortness of breath, headache, nausea and vomiting. Admitted to [facility] for observation and correction of glucose.      Overview/Hospital Course:  No notes on file    Interval History: NAEON. Pt is doing well this AM with no complaints though he is still only Aox 2. It is still unlcear what his BL is but Neuro was consulted yesterday to further w/u AMS. He will have an MRI and EEG prior to d/c. Pulm was consulted as well for SAVANAH cavitary lesion which is being worked up for TB.     Review of Systems   Constitutional:  Negative for activity change, chills, fatigue and fever.   HENT:  Negative for congestion, sinus pressure and trouble swallowing.    Eyes:  Negative for photophobia and visual disturbance.   Respiratory:  Negative for cough, choking and shortness of breath.    Cardiovascular:  Negative for chest pain and leg swelling.   Gastrointestinal:  Negative for abdominal distention, constipation, diarrhea, nausea and  vomiting.   Endocrine: Negative for polyuria.   Genitourinary:  Negative for dysuria and hematuria.   Musculoskeletal:  Negative for arthralgias.   Skin:  Negative for wound.   Neurological:  Negative for dizziness, tremors, seizures, speech difficulty, weakness, light-headedness, numbness and headaches.   Objective:     Vital Signs (Most Recent):  Temp: 98.3 °F (36.8 °C) (05/16/22 0807)  Pulse: 79 (05/16/22 1000)  Resp: 18 (05/16/22 0807)  BP: 113/77 (05/16/22 0938)  SpO2: 98 % (05/16/22 0938) Vital Signs (24h Range):  Temp:  [97.5 °F (36.4 °C)-98.7 °F (37.1 °C)] 98.3 °F (36.8 °C)  Pulse:  [77-93] 79  Resp:  [16-18] 18  SpO2:  [94 %-100 %] 98 %  BP: ()/(60-86) 113/77     Weight: 72.6 kg (160 lb 0.9 oz)  Body mass index is 25.07 kg/m².    Intake/Output Summary (Last 24 hours) at 5/16/2022 1030  Last data filed at 5/15/2022 1400  Gross per 24 hour   Intake --   Output 200 ml   Net -200 ml      Physical Exam  Constitutional:       General: He is not in acute distress.  HENT:      Head: Normocephalic and atraumatic.      Right Ear: External ear normal.      Left Ear: External ear normal.      Nose: Nose normal.   Eyes:      Extraocular Movements: Extraocular movements intact.      Conjunctiva/sclera: Conjunctivae normal.      Pupils: Pupils are equal, round, and reactive to light.   Cardiovascular:      Rate and Rhythm: Normal rate and regular rhythm.      Pulses: Normal pulses.      Heart sounds: Normal heart sounds. No murmur heard.    No friction rub. No gallop.   Pulmonary:      Effort: Pulmonary effort is normal. No respiratory distress.      Breath sounds: Normal breath sounds. No wheezing or rhonchi.   Abdominal:      General: Bowel sounds are normal. There is no distension.      Palpations: There is no mass.      Tenderness: There is no abdominal tenderness.   Musculoskeletal:         General: No swelling or tenderness. Normal range of motion.      Cervical back: Normal range of motion. No tenderness.    Lymphadenopathy:      Cervical: No cervical adenopathy.   Skin:     General: Skin is warm and dry.      Findings: No rash.   Neurological:      Mental Status: He is alert.      Comments: AO x2        Recent Labs   Lab 05/14/22  0435 05/15/22  0248 05/16/22  0850   WBC 28.67* 16.19* 12.35   HGB 11.4* 10.9* 11.4*   HCT 33.4* 31.9* 34.7*   MCV 95 94 97   RBC 3.52* 3.41* 3.58*   MCH 32.4* 32.0* 31.8*   MCHC 34.1 34.2 32.9   RDW 13.2 13.0 12.9   * 531* 482*   MPV 8.9* 9.0* 9.6   GRAN 81.0*  23.2* 72.3  11.7* 69.4  8.6*   LYMPH 9.8*  2.8 18.5  3.0 22.3  2.8   MONO 7.7  2.2* 7.2  1.2* 6.8  0.8   EOSINOPHIL 0.5 1.1 0.9   BASOPHIL 0.4 0.5 0.3     Recent Labs   Lab 05/13/22  0640 05/13/22  1023 05/14/22  0435 05/15/22  0248 05/16/22  0850      < > 146* 141 141   K 4.2   < > 3.9 4.1 4.4      < > 108 102 102   CO2 23   < > 25 25 31*   ANIONGAP 16   < > 13 14 8   BUN 13   < > 7 9 7   CREATININE 0.8   < > 0.7 0.7 0.7   *   < > 38* 249* 180*   CALCIUM 9.3   < > 9.8 9.4 9.6   PROT 6.9  --  6.5 5.9* 6.2   ALBUMIN 3.7  --  3.2* 2.8* 2.9*   ALKPHOS 72  --  66 62 66   BILITOT 0.6  --  0.6 0.4 0.4   ALT 6*  --  9* 6* 6*   AST 13  --  15 11 11   ESTGFRAFRICA >60   < > >60 >60 >60   EGFRNONAA >60   < > >60 >60 >60   MG 1.6  --  1.8 1.8  --    PHOS 3.7  --  3.5 4.2  --     < > = values in this interval not displayed.     Recent Labs   Lab 05/13/22  0340   COLORU Yellow   APPEARANCEUA Clear   PHUR 5.0   SPECGRAV 1.015   PROTEINUA 1+*   GLUCUA 3+*   KETONESU 2+*   BILIRUBINUA Negative   OCCULTUA Negative   UROBILINOGEN Negative   NITRITE Negative   LEUKOCYTESUR Negative   RBCUA 1   WBCUA 1   BACTERIA None   SQUAMEPITHEL 0   HYALINECASTS 0   MICROCMT SEE COMMENT     Recent Labs   Lab 05/13/22  0310   TROPONINI <0.006     Recent Labs   Lab 05/13/22  0310   INR 1.1   APTT 31.3     Recent Labs   Lab 05/14/22  1524   TSH 0.409     CT Head Without Contrast    Result Date: 5/13/2022  EXAMINATION: CT HEAD WITHOUT  CONTRAST CLINICAL HISTORY: Mental status change, unknown cause; TECHNIQUE: Low dose axial images were obtained through the head.  Coronal and sagittal reformations were also performed. Contrast was not administered. COMPARISON: CT examination of the brain without contrast March 30, 2022 FINDINGS: The ventricular system, sulcal pattern and parenchymal attenuation characteristics are consistent with chronic change appearing similar to the prior examination without evidence for superimposed acute process.  There are areas consistent with remote lacunar-type ischemic change.  Chronic appearing white matter change noted. There is no evidence for intracranial mass, mass effect or midline shift and there is no evidence for acute intracranial hemorrhage.  Appropriate CSF spaces are seen at the skull base. The visualized orbits appear intact.  The mastoid air cells and middle ear cavity bilaterally appear appropriate.  Mild opacity along the external auditory canal likely represents cerumen.  Minimal paranasal sinus mucosal thickening noted.  The osseous structures appear intact.     Chronic changes are noted, there is no evidence for superimposed acute intracranial process. Electronically signed by: Abimael Eid Date:    05/13/2022 Time:    04:10    CT Chest Without Contrast    Result Date: 5/15/2022  EXAMINATION: CT CHEST WITHOUT CONTRAST CLINICAL HISTORY: Abnormal xray - lung nodule, >= 1 cm; TECHNIQUE: Low dose axial images, sagittal and coronal reformations were obtained from the thoracic inlet to the lung bases. Contrast was not administered. COMPARISON: Radiograph 05/13/2022 FINDINGS: Lungs: There is a right upper lobe partially cavitary mass in the posterior segment eroding through the major fissure into the superior segment of the right lower lobe (series 601, image 105).  The lesion measures 4.5 cm in transverse plane (series 2, image 54). There is an adjacent right upper lobe nodule measuring 0.9 cm (series 2,  image 39), with the same imaging characteristics. Mild emphysematous lung changes. There is a large left upper lobe thin walled bulla measuring 11.4 cm (series 2, image 136), stable. Mediastinum: Thyroid gland is grossly unremarkable.  No mediastinal lymphadenopathy.  Limited assessment of the hilum without IV contrast.  Thoracic aorta is normal caliber.  Heart size within normal limits.  No pericardial or pleural effusion.  Mild scattered calcific atherosclerosis. Upper abdomen: Extensive pancreatic calcifications, probable sequela of chronic pancreatitis. Chest wall: Mild gynecomastia. Bones: No marrow replacement process.     4.5 cm right upper lobe cavitary mass with adjacent 9 mm satellite lesion, as above, concerning for infectious etiology/atypical pneumonia.  Malignancy could have a similar appearance. Large thin walled left upper lobe bulla, stable. Mild emphysematous lung changes. This report was flagged in Epic as abnormal. Electronically signed by: Juvencio Cardona MD Date:    05/15/2022 Time:    12:10    X-Ray Chest AP Portable    Result Date: 5/13/2022  EXAMINATION: XR CHEST AP PORTABLE CLINICAL HISTORY: Sepsis; TECHNIQUE: Single frontal view of the chest was performed. COMPARISON: Chest radiograph 03/28/2022, CT chest 10/27/2021 FINDINGS: Cardiac monitoring leads overlie the chest.  Cardiac silhouette is not significantly enlarged.  There is a stable large thin walled cyst projecting over the left upper lung zone.  There is increased fullness of the right hilar region, not well appreciated on prior radiograph examination.  Additionally, there is a possible new 1.0 cm nodular opacity projecting over the right upper lung zone.  The remaining lungs demonstrate no new confluent airspace consolidation.  No significant volume of pleural fluid or pneumothorax appreciated.  Osseous structures appear grossly intact.     1.  Increased fullness of the right hilar region, not well appreciated on prior radiograph  examination as well as possible new 1.0 cm nodular opacity projecting over the right upper lung zone.  Given short-term interval development, this could relate to underlying infectious process, however neoplastic process not excluded.  Consider further assessment with dedicated chest CT. 2.  Stable large thin walled left upper lung zone cyst. Electronically signed by: Lenny Pereira MD Date:    05/13/2022 Time:    03:45    Microbiology Results (last 7 days)       Procedure Component Value Units Date/Time    Blood culture x two cultures. Draw prior to antibiotics. [[id number]] Collected: 05/13/22 0311    Order Status: Completed Specimen: Blood from Peripheral, Forearm, Right Updated: 05/16/22 1012     Blood Culture, Routine No Growth to date      No Growth to date      No Growth to date      No Growth to date    Narrative:      Aerobic and anaerobic    Blood culture x two cultures. Draw prior to antibiotics. [[id number]] Collected: 05/13/22 0255    Order Status: Completed Specimen: Blood from Peripheral, Antecubital, Right Updated: 05/16/22 1012     Blood Culture, Routine No Growth to date      No Growth to date      No Growth to date      No Growth to date    Narrative:      Aerobic and anaerobic    AFB Culture & Smear [[id number]]     Order Status: No result Specimen: Respiratory     Culture, Respiratory with Gram Stain [[id number]] Collected: 05/15/22 1710    Order Status: Completed Specimen: Respiratory from Sputum Updated: 05/15/22 2337     Gram Stain (Respiratory) <10 epithelial cells per low power field.     Gram Stain (Respiratory) Rare WBC's     Gram Stain (Respiratory) Rare Gram positive cocci     Gram Stain (Respiratory) Rare Gram negative rods    AFB Culture & Smear [[id number]] Collected: 05/15/22 1710    Order Status: Sent Specimen: Respiratory from Sputum Updated: 05/15/22 1711    Influenza A & B by Molecular [[id number]] Collected: 05/13/22 0313    Order Status: Completed Specimen: Nasopharyngeal Swab  Updated: 05/13/22 0357     Influenza A, Molecular Negative     Influenza B, Molecular Negative     Flu A & B Source Nasal swab                Assessment/Plan:      Pneumonia  Pt afebrile but w/ leokocytosis and new findings on CXR   Initiated Broad spec abx in ED w/ Vanc/cef/flagyl  Bld cultures drawn in ED w/ NGTD  CT chest w/ 4.5 cm right upper lobe cavitary mass with adjacent 9 mm satellite lesion which is concerning for infectious etiology/atypical pneumonia as well as a large thin walled left upper lobe bulla      Plan:  Pulm consulted  Rec cnt pneumonia abx but acquire Quant gold and 3x AFB smears   Continue Augmentin and azithromycin for 5 days ending 5/18  Cnt to monitor for fevers and repeat cultures/imaging      AMS (altered mental status)  Pt w/ AMS on admit thought to be related to DKA  Uncertain baseline mentation though previous CVA in 2013  After DKA resolution pt still altered  UDS and EtOH neg on admit   Ammonia slightly elevated to 61  CT head w/o any new intracranial abnormalities  Folate 11.2  VitB12 415  TSH 0.409  RPR nonreactive    Plan:  MRI and EEG pending  AMS lab work including B1/6,  HIV      Epilepsy  Restart home keppra and depakote      Hypertension  Patient not on any home anti-HTN medication  Plan  Losartan 25mg daily  Monitor Vitals      Type 2 diabetes mellitus, with long-term current use of insulin  Patient uses Humalin 70/30 to control BG  Plan  MDSSI  Detemir 10U Subq nightly  POCT BG Q4h  Diabetic Diet      ETOH abuse  Pt w/ known EtOH abuse hx  Ethanol neg on admit  Hx of EtOH seizures    Plan:  CIWA q4   Ativan PRN for CIWA >8        VTE Risk Mitigation (From admission, onward)         Ordered     IP VTE LOW RISK PATIENT  Once         05/13/22 0530     Place sequential compression device  Until discontinued         05/13/22 0530                Discharge Planning   SHABANA: 5/14/2022     Code Status: Full Code   Is the patient medically ready for discharge?:     Reason for patient  still in hospital (select all that apply): Patient trending condition           Corby Grande MD  Department of [facility] Medicine   [facility]

## 2022-05-16 NOTE — PLAN OF CARE
Sw called into pt's room due to TB rule out and pt not hearing sw on [place]. Pt stated that his Aunt Chucho lives with him at his home. Pt stated that his medicaid transport or Sister Dot [phone] would help transport pt home at time of d/c. Pt reported no having any HME at home and that he is independent in his ADL's. Pt got annoyed with SW questions and stated he does not need or want any help with any services. SW will request f/u appts. White board updated with CM name and contact information.  Discharge brochure provided.  Pt encouraged to call with any questions or concerns.  Cm will continue to follow pt through transitions of care and assist with any discharge needs.    Otilio Loza, MSW  [phone]    Future Appointments   Date Time Provider Department Center   5/16/2022  4:00 PM EEG, [facility]   6/22/2022  8:20 AM Merle Oliva MD [facility]        05/16/22 1513   Discharge Assessment   Assessment Type Discharge Planning Assessment   Confirmed/corrected address, phone number and insurance Yes   Confirmed Demographics Correct on Facesheet   Source of Information patient   When was your last doctors appointment?   (pt does not remember)   Does patient/caregiver understand observation status No   Communicated SHABANA with patient/caregiver Yes   Reason For Admission sepsis   Lives With other relative(s)   Facility Arrived From: HOME   Do you expect to return to your current living situation? Yes   Do you have help at home or someone to help you manage your care at home? Yes   Who are your caregiver(s) and their phone number(s)? Aunt Chucho or Sister Dot [phone]   Prior to hospitilization cognitive status: Alert/Oriented   Current cognitive status: Alert/Oriented   Walking or Climbing Stairs Difficulty none   Dressing/Bathing Difficulty none   Home Accessibility wheelchair accessible;stairs to enter home   Number of Stairs, Main Entrance eight   Home Layout Able to  live on 1st floor   Equipment Currently Used at Home none   Readmission within 30 days? No   Patient currently being followed by outpatient case management? No   Do you currently have service(s) that help you manage your care at home? No   Do you take prescription medications? No   Do you have prescription coverage? Yes   Coverage [employer]   Do you have any problems affording any of your prescribed medications? No   Is the patient taking medications as prescribed? no   Who is going to help you get home at discharge? Aunt Chucho or Sister Dot [phone]   How do you get to doctors appointments? health plan transportation   Are you on dialysis? No   Do you take coumadin? No   Discharge Plan A Home   DME Needed Upon Discharge  none   Discharge Plan discussed with: Patient   Discharge Barriers Identified None   Relationship/Environment   Name(s) of Who Lives With Patient Aunt Chucho or Sister Dot [phone]

## 2022-05-16 NOTE — PROCEDURES
EEG REPORT      Terrance Chavez  [id number]  1980    DATE OF SERVICE: 5/16/2022    OK     METHODOLOGY      Extended electroencephalographic recording is made while the patient is ambulatory and continuing normal daily activities.  Electrodes are placed according to the International 10-20 placement system and included T1 and T2 electrode placement.  Twenty four (24) channels of digital signal (sampling rate of 512/sec) was simultaneously recorded from the scalp including EKG and eye monitors.  Recording band pass was 0.1 to 100 hz and all data was stored digitally on the recorder.  The patient is instructed to press an event button when clinical symptoms occur and write the symptoms into a diary. Activation procedures which include photic stimulation, hyperventilation and instructing patients to perform simple task are done in selected patients.        The EEG is displayed on a monitor screen and can be reformatted into different montages for evaluation.  The entire recoding is submitted for computer assisted analysis to detect spike and electrographic seizure activity.  The entire recording is visually reviewed and the times identified by computer analysis as being spikes or seizures are reviewed again.  Compresses spectral analysis (CSA) is also performed on the activity recorded from each individual channel.  This is displayed as a power display of frequencies from 0 to 30 Hz over time.   The CSA analysis is done and displayed continuously.  This is reviewed for asymmetries in power between homologous areas of the scalp and for presence of changes in power which canbe seen when seizures occur.  Sections of suspected abnormalities on the CSA is then compared with the original EEG recording.  .     [employer] software was also utilized in the review of this study.  This software suite analyzes the EEG recording in multiple domains.  Coherence and rhythmicity is computed to identify EEG sections which may  contain organized seizures.  Each channel undergoes analysis to detect presence of spike and sharp waves which have special and morphological characteristic of epileptic activity.  The routine EEG recording is converted from spacial into frequency domain.  This is then displayed comparing homologous areas to identify areas of significant asymmetry.  Algorithm to identify non-cortically generated artifact is used to separate eye movement, EMG and other artifact from the EEG     Recording Times    A total of 00:38:21 hours of EEG was recorded.      EEG FINDINGS:  Background activity:   The background rhythm was characterized by generalized theta activity with a no clear posterior dominant alpha rhythm.   Symmetry and continuity: the background was continuous and symmetric     Sleep:   Normal sleep transients including sleep spindles, K complexes, vertex waves were seen.    Activation procedures:   Answers questions correctly    Abnormal activity:   No epileptiform discharges, periodic discharges, lateralized rhythmic delta activity or electrographic seizures were seen.    IMPRESSION:   Abnormal EEG due to mild generalized non-specific cerebral dysfunction.  No electrographic seizures or indications of seizure tendency.      Richi Lubin MD  Neurology-Epilepsy.  [facility].

## 2022-05-16 NOTE — ASSESSMENT & PLAN NOTE
Pt w/ AMS on admit thought to be related to DKA  Uncertain baseline mentation though previous CVA in 2013  After DKA resolution pt still altered  UDS and EtOH neg on admit   Ammonia slightly elevated to 61  CT head w/o any new intracranial abnormalities  Folate 11.2  VitB12 415  TSH 0.409  RPR nonreactive    Plan:  MRI and EEG pending  AMS lab work including B1/6,  HIV

## 2022-05-16 NOTE — PROGRESS NOTES
"  Virtual smoking cessation education note: Pt states that he is a "light" smoker x 24 yrs- currently smokes 3 cigarettes per day on average and occasional cigar. He denies nicotine withdrawal symptoms at this time. Pt declines referral to Ambulatory Smoking Cessation Program. Handout provided.   "

## 2022-05-16 NOTE — ASSESSMENT & PLAN NOTE
Pt afebrile but w/ leokocytosis and new findings on CXR   Initiated Broad spec abx in ED w/ Vanc/cef/flagyl  Bld cultures drawn in ED w/ NGTD  CT chest w/ 4.5 cm right upper lobe cavitary mass with adjacent 9 mm satellite lesion which is concerning for infectious etiology/atypical pneumonia as well as a large thin walled left upper lobe bulla      Plan:  Pulm consulted  Rec cnt pneumonia abx but acquire Quant gold and 3x AFB smears   Continue Augmentin and azithromycin for 5 days ending 5/18  Cnt to monitor for fevers and repeat cultures/imaging

## 2022-05-17 LAB
ALBUMIN SERPL BCP-MCNC: 2.9 G/DL (ref 3.5–5.2)
ALP SERPL-CCNC: 68 U/L (ref 55–135)
ALT SERPL W/O P-5'-P-CCNC: 9 U/L (ref 10–44)
ANION GAP SERPL CALC-SCNC: 13 MMOL/L (ref 8–16)
AST SERPL-CCNC: 12 U/L (ref 10–40)
BASOPHILS # BLD AUTO: 0.06 K/UL (ref 0–0.2)
BASOPHILS NFR BLD: 0.6 % (ref 0–1.9)
BILIRUB SERPL-MCNC: 0.2 MG/DL (ref 0.1–1)
BUN SERPL-MCNC: 12 MG/DL (ref 6–20)
CALCIUM SERPL-MCNC: 9.3 MG/DL (ref 8.7–10.5)
CHLORIDE SERPL-SCNC: 99 MMOL/L (ref 95–110)
CO2 SERPL-SCNC: 25 MMOL/L (ref 23–29)
CREAT SERPL-MCNC: 0.6 MG/DL (ref 0.5–1.4)
DIFFERENTIAL METHOD: ABNORMAL
EOSINOPHIL # BLD AUTO: 0.2 K/UL (ref 0–0.5)
EOSINOPHIL NFR BLD: 1.6 % (ref 0–8)
ERYTHROCYTE [DISTWIDTH] IN BLOOD BY AUTOMATED COUNT: 12.8 % (ref 11.5–14.5)
EST. GFR  (AFRICAN AMERICAN): >60 ML/MIN/1.73 M^2
EST. GFR  (NON AFRICAN AMERICAN): >60 ML/MIN/1.73 M^2
GLUCOSE SERPL-MCNC: 280 MG/DL (ref 70–110)
HCT VFR BLD AUTO: 31.3 % (ref 40–54)
HGB BLD-MCNC: 11 G/DL (ref 14–18)
IMM GRANULOCYTES # BLD AUTO: 0.08 K/UL (ref 0–0.04)
IMM GRANULOCYTES NFR BLD AUTO: 0.8 % (ref 0–0.5)
LYMPHOCYTES # BLD AUTO: 2.9 K/UL (ref 1–4.8)
LYMPHOCYTES NFR BLD: 29.3 % (ref 18–48)
MAGNESIUM SERPL-MCNC: 1.6 MG/DL (ref 1.6–2.6)
MCH RBC QN AUTO: 32.8 PG (ref 27–31)
MCHC RBC AUTO-ENTMCNC: 35.1 G/DL (ref 32–36)
MCV RBC AUTO: 93 FL (ref 82–98)
MONOCYTES # BLD AUTO: 0.8 K/UL (ref 0.3–1)
MONOCYTES NFR BLD: 8 % (ref 4–15)
NEUTROPHILS # BLD AUTO: 6 K/UL (ref 1.8–7.7)
NEUTROPHILS NFR BLD: 59.7 % (ref 38–73)
NRBC BLD-RTO: 0 /100 WBC
PHOSPHATE SERPL-MCNC: 3.5 MG/DL (ref 2.7–4.5)
PLATELET # BLD AUTO: 503 K/UL (ref 150–450)
PMV BLD AUTO: 9.1 FL (ref 9.2–12.9)
POCT GLUCOSE: 257 MG/DL (ref 70–110)
POCT GLUCOSE: 308 MG/DL (ref 70–110)
POCT GLUCOSE: 337 MG/DL (ref 70–110)
POCT GLUCOSE: 359 MG/DL (ref 70–110)
POTASSIUM SERPL-SCNC: 4.5 MMOL/L (ref 3.5–5.1)
PROT SERPL-MCNC: 6 G/DL (ref 6–8.4)
RBC # BLD AUTO: 3.35 M/UL (ref 4.6–6.2)
SODIUM SERPL-SCNC: 137 MMOL/L (ref 136–145)
WBC # BLD AUTO: 9.98 K/UL (ref 3.9–12.7)

## 2022-05-17 PROCEDURE — 27000207 HC ISOLATION

## 2022-05-17 PROCEDURE — 25000003 PHARM REV CODE 250: Performed by: STUDENT IN AN ORGANIZED HEALTH CARE EDUCATION/TRAINING PROGRAM

## 2022-05-17 PROCEDURE — 36415 COLL VENOUS BLD VENIPUNCTURE: CPT | Performed by: STUDENT IN AN ORGANIZED HEALTH CARE EDUCATION/TRAINING PROGRAM

## 2022-05-17 PROCEDURE — 80053 COMPREHEN METABOLIC PANEL: CPT | Performed by: STUDENT IN AN ORGANIZED HEALTH CARE EDUCATION/TRAINING PROGRAM

## 2022-05-17 PROCEDURE — S4991 NICOTINE PATCH NONLEGEND: HCPCS | Performed by: STUDENT IN AN ORGANIZED HEALTH CARE EDUCATION/TRAINING PROGRAM

## 2022-05-17 PROCEDURE — 87149 DNA/RNA DIRECT PROBE: CPT | Performed by: STUDENT IN AN ORGANIZED HEALTH CARE EDUCATION/TRAINING PROGRAM

## 2022-05-17 PROCEDURE — 63700000 PHARM REV CODE 250 ALT 637 W/O HCPCS: Performed by: STUDENT IN AN ORGANIZED HEALTH CARE EDUCATION/TRAINING PROGRAM

## 2022-05-17 PROCEDURE — 87015 SPECIMEN INFECT AGNT CONCNTJ: CPT | Performed by: STUDENT IN AN ORGANIZED HEALTH CARE EDUCATION/TRAINING PROGRAM

## 2022-05-17 PROCEDURE — 87116 MYCOBACTERIA CULTURE: CPT | Performed by: STUDENT IN AN ORGANIZED HEALTH CARE EDUCATION/TRAINING PROGRAM

## 2022-05-17 PROCEDURE — 87118 MYCOBACTERIC IDENTIFICATION: CPT | Performed by: STUDENT IN AN ORGANIZED HEALTH CARE EDUCATION/TRAINING PROGRAM

## 2022-05-17 PROCEDURE — 87186 SC STD MICRODIL/AGAR DIL: CPT | Performed by: STUDENT IN AN ORGANIZED HEALTH CARE EDUCATION/TRAINING PROGRAM

## 2022-05-17 PROCEDURE — 85025 COMPLETE CBC W/AUTO DIFF WBC: CPT | Performed by: STUDENT IN AN ORGANIZED HEALTH CARE EDUCATION/TRAINING PROGRAM

## 2022-05-17 PROCEDURE — 63600175 PHARM REV CODE 636 W HCPCS: Performed by: FAMILY MEDICINE

## 2022-05-17 PROCEDURE — 84100 ASSAY OF PHOSPHORUS: CPT | Performed by: STUDENT IN AN ORGANIZED HEALTH CARE EDUCATION/TRAINING PROGRAM

## 2022-05-17 PROCEDURE — 99232 SBSQ HOSP IP/OBS MODERATE 35: CPT | Mod: ,,, | Performed by: PSYCHIATRY & NEUROLOGY

## 2022-05-17 PROCEDURE — 94640 AIRWAY INHALATION TREATMENT: CPT

## 2022-05-17 PROCEDURE — 87206 SMEAR FLUORESCENT/ACID STAI: CPT | Performed by: STUDENT IN AN ORGANIZED HEALTH CARE EDUCATION/TRAINING PROGRAM

## 2022-05-17 PROCEDURE — 25000242 PHARM REV CODE 250 ALT 637 W/ HCPCS: Performed by: STUDENT IN AN ORGANIZED HEALTH CARE EDUCATION/TRAINING PROGRAM

## 2022-05-17 PROCEDURE — 99232 PR SUBSEQUENT HOSPITAL CARE,LEVL II: ICD-10-PCS | Mod: ,,, | Performed by: PSYCHIATRY & NEUROLOGY

## 2022-05-17 PROCEDURE — 25000003 PHARM REV CODE 250: Performed by: FAMILY MEDICINE

## 2022-05-17 PROCEDURE — 11000001 HC ACUTE MED/SURG PRIVATE ROOM

## 2022-05-17 PROCEDURE — 83735 ASSAY OF MAGNESIUM: CPT | Performed by: STUDENT IN AN ORGANIZED HEALTH CARE EDUCATION/TRAINING PROGRAM

## 2022-05-17 PROCEDURE — 94761 N-INVAS EAR/PLS OXIMETRY MLT: CPT

## 2022-05-17 RX ORDER — AMOXICILLIN AND CLAVULANATE POTASSIUM 875; 125 MG/1; MG/1
1 TABLET, FILM COATED ORAL EVERY 12 HOURS
Status: COMPLETED | OUTPATIENT
Start: 2022-05-17 | End: 2022-05-18

## 2022-05-17 RX ORDER — AZITHROMYCIN 250 MG/1
500 TABLET, FILM COATED ORAL DAILY
Status: COMPLETED | OUTPATIENT
Start: 2022-05-17 | End: 2022-05-18

## 2022-05-17 RX ORDER — SODIUM CHLORIDE FOR INHALATION 3 %
4 VIAL, NEBULIZER (ML) INHALATION ONCE
Status: COMPLETED | OUTPATIENT
Start: 2022-05-17 | End: 2022-05-17

## 2022-05-17 RX ADMIN — INSULIN ASPART 4 UNITS: 100 INJECTION, SOLUTION INTRAVENOUS; SUBCUTANEOUS at 08:05

## 2022-05-17 RX ADMIN — AMOXICILLIN AND CLAVULANATE POTASSIUM 1 TABLET: 875; 125 TABLET, FILM COATED ORAL at 08:05

## 2022-05-17 RX ADMIN — INSULIN ASPART 6 UNITS: 100 INJECTION, SOLUTION INTRAVENOUS; SUBCUTANEOUS at 01:05

## 2022-05-17 RX ADMIN — VANCOMYCIN HYDROCHLORIDE 1750 MG: 10 INJECTION, POWDER, LYOPHILIZED, FOR SOLUTION INTRAVENOUS at 01:05

## 2022-05-17 RX ADMIN — LEVETIRACETAM 750 MG: 250 TABLET, FILM COATED ORAL at 08:05

## 2022-05-17 RX ADMIN — AZITHROMYCIN MONOHYDRATE 500 MG: 250 TABLET ORAL at 08:05

## 2022-05-17 RX ADMIN — INSULIN ASPART 8 UNITS: 100 INJECTION, SOLUTION INTRAVENOUS; SUBCUTANEOUS at 05:05

## 2022-05-17 RX ADMIN — INSULIN DETEMIR 10 UNITS: 100 INJECTION, SOLUTION SUBCUTANEOUS at 08:05

## 2022-05-17 RX ADMIN — THIAMINE HCL TAB 100 MG 100 MG: 100 TAB at 08:05

## 2022-05-17 RX ADMIN — DIVALPROEX SODIUM 1000 MG: 250 TABLET, EXTENDED RELEASE ORAL at 08:05

## 2022-05-17 RX ADMIN — NICOTINE 1 PATCH: 14 PATCH, EXTENDED RELEASE TRANSDERMAL at 08:05

## 2022-05-17 RX ADMIN — INSULIN ASPART 10 UNITS: 100 INJECTION, SOLUTION INTRAVENOUS; SUBCUTANEOUS at 04:05

## 2022-05-17 RX ADMIN — SODIUM CHLORIDE 30 MG/ML INHALATION SOLUTION 4 ML: 30 SOLUTION INHALANT at 03:05

## 2022-05-17 RX ADMIN — THERA TABS 1 TABLET: TAB at 08:05

## 2022-05-17 NOTE — PROGRESS NOTES
[facility] Medicine  Progress Note    Patient Name: Terracne Chavez  MRN: [id number]  Patient Class: IP- Inpatient   Admission Date: 5/13/2022  Length of Stay: 2 days  Attending Physician: Kalina Cartagena MD  Primary Care Provider: Primary Doctor No        Subjective:     Principal Problem:Hyperglycemia        HPI:  Patient is a 42 yr old male with pmhx of T2DM, HTN, CVA, neurocognitive impairment and substance abuse disorder presenting to the ED for Altered mental status. [place], brought in by EMS. On arrival patient was only oriented to person but not place,time or situation. Patient blood glucose was elevated on arrival. Initial labs showed small acidosis. Patient was provided 2L LR in ED and his mentation improved. Patient had a fever in the ED , and elevated WBC  of 29.9. Can not rule out infection exacerbating or causing change in mental status. Patient received 2g ceftriaxone in ED. Patient denies chest pain, shortness of breath, headache, nausea and vomiting. Admitted to [facility] for observation and correction of glucose.      Overview/Hospital Course:  No notes on file    Interval History: NAEON. Pt doing well overall with no acute complaints this AM. He is now AO x3 and appears to be returning to his baseline. He was informed of the results of his MRI and EEG and is aware we are waiting for the TB w/u to result before he can leave.     Review of Systems   Constitutional:  Negative for activity change, chills, fatigue and fever.   HENT:  Negative for congestion, sinus pressure and trouble swallowing.    Eyes:  Negative for photophobia and visual disturbance.   Respiratory:  Negative for cough, choking and shortness of breath.    Cardiovascular:  Negative for chest pain and leg swelling.   Gastrointestinal:  Negative for abdominal distention, constipation, diarrhea, nausea and vomiting.   Endocrine: Negative for polyuria.    Genitourinary:  Negative for dysuria and hematuria.   Musculoskeletal:  Negative for arthralgias.   Skin:  Negative for wound.   Neurological:  Negative for dizziness, tremors, seizures, speech difficulty, weakness, light-headedness, numbness and headaches.   Objective:     Vital Signs (Most Recent):  Temp: 96.9 °F (36.1 °C) (05/17/22 0805)  Pulse: 72 (05/17/22 0805)  Resp: 18 (05/17/22 0805)  BP: (!) 99/59 (05/17/22 0805)  SpO2: 99 % (05/17/22 0805)   Vital Signs (24h Range):  Temp:  [96.9 °F (36.1 °C)-98.8 °F (37.1 °C)] 96.9 °F (36.1 °C)  Pulse:  [72-93] 72  Resp:  [16-20] 18  SpO2:  [93 %-99 %] 99 %  BP: ()/(59-79) 99/59     Weight: 72.6 kg (160 lb 0.9 oz)  Body mass index is 25.07 kg/m².    Intake/Output Summary (Last 24 hours) at 5/17/2022 0842  Last data filed at 5/17/2022 0042  Gross per 24 hour   Intake 355 ml   Output 300 ml   Net 55 ml      Physical Exam  Constitutional:       General: He is not in acute distress.  HENT:      Head: Normocephalic and atraumatic.      Right Ear: External ear normal.      Left Ear: External ear normal.      Nose: Nose normal.   Eyes:      Extraocular Movements: Extraocular movements intact.      Conjunctiva/sclera: Conjunctivae normal.      Pupils: Pupils are equal, round, and reactive to light.   Cardiovascular:      Rate and Rhythm: Normal rate and regular rhythm.      Pulses: Normal pulses.      Heart sounds: Normal heart sounds. No murmur heard.    No friction rub. No gallop.   Pulmonary:      Effort: Pulmonary effort is normal. No respiratory distress.      Breath sounds: Normal breath sounds. No wheezing or rhonchi.   Abdominal:      General: Bowel sounds are normal. There is no distension.      Palpations: There is no mass.      Tenderness: There is no abdominal tenderness.   Musculoskeletal:         General: No swelling or tenderness. Normal range of motion.      Cervical back: Normal range of motion. No tenderness.   Lymphadenopathy:      Cervical: No  cervical adenopathy.   Skin:     General: Skin is warm and dry.      Findings: No rash.   Neurological:      Mental Status: He is alert.      Comments: AO x3     Recent Labs   Lab 05/15/22  0248 05/16/22  0850 05/17/22  0412   WBC 16.19* 12.35 9.98   HGB 10.9* 11.4* 11.0*   HCT 31.9* 34.7* 31.3*   MCV 94 97 93   RBC 3.41* 3.58* 3.35*   MCH 32.0* 31.8* 32.8*   MCHC 34.2 32.9 35.1   RDW 13.0 12.9 12.8   * 482* 503*   MPV 9.0* 9.6 9.1*   GRAN 72.3  11.7* 69.4  8.6* 59.7  6.0   LYMPH 18.5  3.0 22.3  2.8 29.3  2.9   MONO 7.2  1.2* 6.8  0.8 8.0  0.8   EOSINOPHIL 1.1 0.9 1.6   BASOPHIL 0.5 0.3 0.6     Recent Labs   Lab 05/14/22  0435 05/15/22  0248 05/16/22  0850 05/17/22  0412   * 141 141 137   K 3.9 4.1 4.4 4.5    102 102 99   CO2 25 25 31* 25   ANIONGAP 13 14 8 13   BUN 7 9 7 12   CREATININE 0.7 0.7 0.7 0.6   GLU 38* 249* 180* 280*   CALCIUM 9.8 9.4 9.6 9.3   PROT 6.5 5.9* 6.2 6.0   ALBUMIN 3.2* 2.8* 2.9* 2.9*   ALKPHOS 66 62 66 68   BILITOT 0.6 0.4 0.4 0.2   ALT 9* 6* 6* 9*   AST 15 11 11 12   ESTGFRAFRICA >60 >60 >60 >60   EGFRNONAA >60 >60 >60 >60   MG 1.8 1.8  --  1.6   PHOS 3.5 4.2  --  3.5     Recent Labs   Lab 05/13/22  0340   COLORU Yellow   APPEARANCEUA Clear   PHUR 5.0   SPECGRAV 1.015   PROTEINUA 1+*   GLUCUA 3+*   KETONESU 2+*   BILIRUBINUA Negative   OCCULTUA Negative   UROBILINOGEN Negative   NITRITE Negative   LEUKOCYTESUR Negative   RBCUA 1   WBCUA 1   BACTERIA None   SQUAMEPITHEL 0   HYALINECASTS 0   MICROCMT SEE COMMENT     Recent Labs   Lab 05/13/22  0310   TROPONINI <0.006     Recent Labs   Lab 05/13/22  0310   INR 1.1   APTT 31.3     Recent Labs   Lab 05/14/22  1524   TSH 0.409     CT Head Without Contrast    Result Date: 5/13/2022  EXAMINATION: CT HEAD WITHOUT CONTRAST CLINICAL HISTORY: Mental status change, unknown cause; TECHNIQUE: Low dose axial images were obtained through the head.  Coronal and sagittal reformations were also performed. Contrast was not  administered. COMPARISON: CT examination of the brain without contrast March 30, 2022 FINDINGS: The ventricular system, sulcal pattern and parenchymal attenuation characteristics are consistent with chronic change appearing similar to the prior examination without evidence for superimposed acute process.  There are areas consistent with remote lacunar-type ischemic change.  Chronic appearing white matter change noted. There is no evidence for intracranial mass, mass effect or midline shift and there is no evidence for acute intracranial hemorrhage.  Appropriate CSF spaces are seen at the skull base. The visualized orbits appear intact.  The mastoid air cells and middle ear cavity bilaterally appear appropriate.  Mild opacity along the external auditory canal likely represents cerumen.  Minimal paranasal sinus mucosal thickening noted.  The osseous structures appear intact.     Chronic changes are noted, there is no evidence for superimposed acute intracranial process. Electronically signed by: Abimael Eid Date:    05/13/2022 Time:    04:10    CT Chest Without Contrast    Result Date: 5/15/2022  EXAMINATION: CT CHEST WITHOUT CONTRAST CLINICAL HISTORY: Abnormal xray - lung nodule, >= 1 cm; TECHNIQUE: Low dose axial images, sagittal and coronal reformations were obtained from the thoracic inlet to the lung bases. Contrast was not administered. COMPARISON: Radiograph 05/13/2022 FINDINGS: Lungs: There is a right upper lobe partially cavitary mass in the posterior segment eroding through the major fissure into the superior segment of the right lower lobe (series 601, image 105).  The lesion measures 4.5 cm in transverse plane (series 2, image 54). There is an adjacent right upper lobe nodule measuring 0.9 cm (series 2, image 39), with the same imaging characteristics. Mild emphysematous lung changes. There is a large left upper lobe thin walled bulla measuring 11.4 cm (series 2, image 136), stable. Mediastinum: Thyroid  gland is grossly unremarkable.  No mediastinal lymphadenopathy.  Limited assessment of the hilum without IV contrast.  Thoracic aorta is normal caliber.  Heart size within normal limits.  No pericardial or pleural effusion.  Mild scattered calcific atherosclerosis. Upper abdomen: Extensive pancreatic calcifications, probable sequela of chronic pancreatitis. Chest wall: Mild gynecomastia. Bones: No marrow replacement process.     4.5 cm right upper lobe cavitary mass with adjacent 9 mm satellite lesion, as above, concerning for infectious etiology/atypical pneumonia.  Malignancy could have a similar appearance. Large thin walled left upper lobe bulla, stable. Mild emphysematous lung changes. This report was flagged in Epic as abnormal. Electronically signed by: Juvencio Cardona MD Date:    05/15/2022 Time:    12:10    MRI Brain Without Contrast    Result Date: 5/16/2022  EXAMINATION: MRI BRAIN WITHOUT CONTRAST CLINICAL HISTORY: Mental status change, unknown cause; TECHNIQUE: Multiplanar multisequence MR imaging of the brain was performed without contrast. COMPARISON: CT head 05/13/2022 MRI 10/27/2021 FINDINGS: there is central volume loss, similar to prior studies.  There is no evidence of hydrocephalus, mass effect, intracranial hemorrhage or acute infarct. Focal lesions within the periventricular white matter bilaterally are stable from the prior study consistent with prior nonspecific insult.  No new white matter lesions are noted. Normal arterial flow voids are preserved at the skull base. The visualized sinuses and mastoid air cells are clear.     Stable appearance of the brain as detailed above...  No acute intracranial process. Electronically signed by: Ibrahima Soria Date:    05/16/2022 Time:    11:55    X-Ray Chest AP Portable    Result Date: 5/13/2022  EXAMINATION: XR CHEST AP PORTABLE CLINICAL HISTORY: Sepsis; TECHNIQUE: Single frontal view of the chest was performed. COMPARISON: Chest radiograph 03/28/2022,  CT chest 10/27/2021 FINDINGS: Cardiac monitoring leads overlie the chest.  Cardiac silhouette is not significantly enlarged.  There is a stable large thin walled cyst projecting over the left upper lung zone.  There is increased fullness of the right hilar region, not well appreciated on prior radiograph examination.  Additionally, there is a possible new 1.0 cm nodular opacity projecting over the right upper lung zone.  The remaining lungs demonstrate no new confluent airspace consolidation.  No significant volume of pleural fluid or pneumothorax appreciated.  Osseous structures appear grossly intact.     1.  Increased fullness of the right hilar region, not well appreciated on prior radiograph examination as well as possible new 1.0 cm nodular opacity projecting over the right upper lung zone.  Given short-term interval development, this could relate to underlying infectious process, however neoplastic process not excluded.  Consider further assessment with dedicated chest CT. 2.  Stable large thin walled left upper lung zone cyst. Electronically signed by: Lenny Pereira MD Date:    05/13/2022 Time:    03:45    Microbiology Results (last 7 days)       Procedure Component Value Units Date/Time    Culture, Respiratory with Gram Stain [[id number]]  (Abnormal) Collected: 05/15/22 1710    Order Status: Completed Specimen: Respiratory from Sputum Updated: 05/17/22 0829     Respiratory Culture STAPHYLOCOCCUS AUREUS  Few  Susceptibility pending       Gram Stain (Respiratory) <10 epithelial cells per low power field.     Gram Stain (Respiratory) Rare WBC's     Gram Stain (Respiratory) Rare Gram positive cocci     Gram Stain (Respiratory) Rare Gram negative rods    AFB Culture & Smear [[id number]]     Order Status: No result Specimen: Respiratory     Blood culture x two cultures. Draw prior to antibiotics. [[id number]] Collected: 05/13/22 0311    Order Status: Completed Specimen: Blood from Peripheral, Forearm, Right  Updated: 05/16/22 1012     Blood Culture, Routine No Growth to date      No Growth to date      No Growth to date      No Growth to date    Narrative:      Aerobic and anaerobic    Blood culture x two cultures. Draw prior to antibiotics. [[id number]] Collected: 05/13/22 0255    Order Status: Completed Specimen: Blood from Peripheral, Antecubital, Right Updated: 05/16/22 1012     Blood Culture, Routine No Growth to date      No Growth to date      No Growth to date      No Growth to date    Narrative:      Aerobic and anaerobic    AFB Culture & Smear [[id number]]     Order Status: No result Specimen: Respiratory     AFB Culture & Smear [[id number]] Collected: 05/15/22 1710    Order Status: Sent Specimen: Respiratory from Sputum Updated: 05/15/22 1711    Influenza A & B by Molecular [[id number]] Collected: 05/13/22 0313    Order Status: Completed Specimen: Nasopharyngeal Swab Updated: 05/13/22 0357     Influenza A, Molecular Negative     Influenza B, Molecular Negative     Flu A & B Source Nasal swab                Assessment/Plan:      Pneumonia  Pt afebrile but w/ leokocytosis and new findings on CXR   Initiated Broad spec abx in ED w/ Vanc/cef/flagyl  Bld cultures drawn in ED w/ NGTD  CT chest w/ 4.5 cm right upper lobe cavitary mass with adjacent 9 mm satellite lesion which is concerning for infectious etiology/atypical pneumonia as well as a large thin walled left upper lobe bulla  Sputum cx w/ staph aureus w/ susceptibilities pending      Plan:  Pulm consulted  Rec cnt pneumonia abx but acquire Quant gold and 3x AFB smears   Continue Augmentin and azithromycin for 5 days ending 5/18  Cnt to monitor for fevers and repeat cultures/imaging      AMS (altered mental status)  Pt w/ AMS on admit thought to be related to DKA  Uncertain baseline mentation though previous CVA in 2013  After DKA resolution pt still altered  UDS and EtOH neg on admit   Ammonia slightly elevated to 61  CT head w/o any new intracranial  abnormalities  Folate 11.2  VitB12 415  TSH 0.409  RPR nonreactive  HIV neg   MRI brain w/o w/ no acute intracranial abnormalities   EEG showed generalized cerebral dysfunction but no signs of seizure activity      Plan:  AMS lab work including B1/6      Epilepsy  Restart home keppra and depakote      Hypertension  Patient not on any home anti-HTN medication  Plan  Losartan 25mg daily  Monitor Vitals      Type 2 diabetes mellitus, with long-term current use of insulin  Patient uses Humalin 70/30 to control BG  Plan  MDSSI  Detemir 10U Subq nightly  POCT BG Q4h  Diabetic Diet      ETOH abuse  Pt w/ known EtOH abuse hx  Ethanol neg on admit  Hx of EtOH seizures    Plan:  CIWA q4   Ativan PRN for CIWA >8  Thiamine and multivitamin daily       VTE Risk Mitigation (From admission, onward)         Ordered     IP VTE LOW RISK PATIENT  Once         05/13/22 0530     Place sequential compression device  Until discontinued         05/13/22 0530                Discharge Planning   SHABANA: 5/14/2022     Code Status: Full Code   Is the patient medically ready for discharge?:     Reason for patient still in hospital (select all that apply): Patient trending condition  Discharge Plan A: Home            Corby Grande MD  Department of Hospital Medicine   [place] [facility]

## 2022-05-17 NOTE — PLAN OF CARE
I spoke with pt via [employer].  He says he was homeless prior to admission.  Discharging with family is not an option.  He prefers to discharge to a shelter if able at time of discharge.       05/17/22 1146   Post-Acute Status   Post-Acute Authorization Placement     Emerson Aparicio, RN   Supervisor Case Management[facility]  [phone]

## 2022-05-17 NOTE — ASSESSMENT & PLAN NOTE
Pt w/ known EtOH abuse hx  Ethanol neg on admit  Hx of EtOH seizures    Plan:  CIWA q4   Ativan PRN for CIWA >8  Thiamine and multivitamin daily

## 2022-05-17 NOTE — HOSPITAL COURSE
Pt was admitted to the  service for DKA and AMS. Pt was initially found to have elevated BG as well as Beta hydroxybutyrate and AG. He was started on an insulin drip as well as giving his long acting insulin. His DKA quickly resolved and then was continued to work up for his AMS and new lung findings. On admission he had a CXR which showed a new R Upper Lung nodule as well as hilar haziness and a known L upper lung cyst. A CT was recommned for f/u which showed a 4.5 cm R Upper Lobe and satellite 9mm lesion. Pulm was consulted and pt was worked up for potential pneumonia vs TB he was started on a 5 day course of augmentin and azithromycin. Quant gold, TB PCR, as well as AFBs were collected which were neg. His resp cultures grew MRSA and he was transitioned to a course of Clindamycin for 7 days ending 5/25. It was determined he was unreliable to completethis course at d/c so for his safety he would complete this course IV while inpt and have his final dose PO while at the shelter found for him by SW. On admission he had a CT head which did not show any acute intracranial abnormalities as well as EtOH and UDS which were neg. It was unclear as to what his BL mentation is as previous notes had documented normal mentation and AO x4. His AMS was worked up with HIV/RPR/B12/Folate which were wnl and B1/6 pending. Neurology was consulted to determine if there was any other potential causes of AMS and rec an MRI and EEG. His MRI again did not show any acute abnormalities and EEG which showed generalized cerebral dysfxn but no epileptiform activity. The pt's mentation slowly improved throughout his admission and was AO x3 at time of d/c. Pt was educated on his chronic conditions and ABDIRAHMAN gave him multiple resources for his outpt care. He was given strict return precautions and all questions were answered.

## 2022-05-17 NOTE — PHARMACY MED REC
"Admission Medication History     The home medication history was taken by Mckayla Mendoza PharmD.    Medication history obtained from patient    You may go to "Admission" then "Reconcile Home Medications" tabs to review and/or act upon these items.      The home medication list has been updated by the Pharmacy department.    Please read ALL comments highlighted in yellow.    Please address this information as you see fit.     Feel free to contact us if you have any questions or require assistance.          Mckayla Mendoza PharmD.                  .          "

## 2022-05-17 NOTE — PLAN OF CARE
ABDIRAHMAN called pt's sister Dot [phone] to discuss discharge planning. Dot stated that she has not spoken to her brother in over a month. Dot said one of her last interactions with her brother she had him enrolled in the [facility] to detox. Dot stated that she could not accept pt and she knows none of their family would be willing to accept pt. ABDIRAHMAN will speak with pt and help find a shelter for pt. ABDIRAHMAN will follow.     Otilio Loza MSANTIONETTE  [phone]    Future Appointments   Date Time Provider Department Center   5/27/2022 10:00 AM [facility]   6/22/2022  8:20 AM Merle Oliva MD [facility]        05/17/22 1136   Post-Acute Status   Coverage Medicaid   Discharge Plan   Discharge Plan A Shelter

## 2022-05-17 NOTE — PROGRESS NOTES
"[facility] Progress Note      Subjective:       Upon entering the room patient eating lunch, comfortable. States he is feeling well and denies any complaint at this time.      Objective:     Temp BP HR Resp Rate O2 Sat   97.8 °F (36.6 °C) 120/72  75 20  98 %    Height: 5' 7" (170.2 cm)  Weight: 72.6 kg (160 lb 0.9 oz)  BMI (Calculated): 25.1    Last 24 Hour Vital Signs:  BP  Min: 107/61  Max: 127/71  Temp  Av.9 °F (36.6 °C)  Min: 97 °F (36.1 °C)  Max: 98.8 °F (37.1 °C)  Pulse  Av.1  Min: 72  Max: 93  Resp  Av.3  Min: 16  Max: 20  SpO2  Av.8 %  Min: 93 %  Max: 99 %  Height  Av' 7" (170.2 cm)  Min: 5' 7" (170.2 cm)  Max: 5' 7" (170.2 cm)  Weight  Av.6 kg (160 lb 0.9 oz)  Min: 72.6 kg (160 lb 0.9 oz)  Max: 72.6 kg (160 lb 0.9 oz)  Body mass index is 25.07 kg/m².  I/O last 3 completed shifts:  In: 592 [P.O.:592]  Out: 300 [Urine:300]    NEUROLOGIC EXAMINATION:  Orientation: person, place and situation. States is .   Memory: recent memory impaired  Language: intact verbal fluency, comprehension and repetition  Cranial Nerves: PERRL, EOMI w/o nystagmus, visual fields full to confrontation, Face symmetric, V1-V3 light touch intact bilaterally, tongue midline, symmetric palatal elevation, 5/5 TPZ/SCM  Motor:  Pronator drift: absent  5/5 strength throughout including shoulder abduction, elbow flexion/extension, wrist flexion/extension, hand , hip flexion/extension, knee flexion/extension, and plantar/dorsiflexion  Tone: normal  DTR'S:  2+ bilateral biceps, brachioradialis, and patellar  Negative clonus or asterixis   Negative Wong  Sensory:  Intact to light touch and vibration in distal extremities  Cerebellar/Gait:  Finger to nose: normal  Heel to shin: normal  Gait: deferred currently    LABORATORY STUDIES:  Trended Lab Data:  Recent Labs   Lab 05/15/22  0248 22  0850 22  0412   WBC 16.19* 12.35 9.98   HGB 10.9* 11.4* 11.0*   HCT 31.9* 34.7* 31.3*   * 482* " 503*   MCV 94 97 93   RDW 13.0 12.9 12.8    141 137   K 4.1 4.4 4.5    102 99   CO2 25 31* 25   BUN 9 7 12   * 180* 280*   CALCIUM 9.4 9.6 9.3   PROT 5.9* 6.2 6.0   ALBUMIN 2.8* 2.9* 2.9*   AST 11 11 12   ALKPHOS 62 66 68   ALT 6* 6* 9*       RADIOLOGY STUDIES:  I have personally reviewed the images performed.     CT Head Without Contrast  Result Date: 5/13/2022  Chronic changes are noted, there is no evidence for superimposed acute intracranial process.     CT Chest Without Contrast  Result Date: 5/15/2022  4.5 cm right upper lobe cavitary mass with adjacent 9 mm satellite lesion, as above, concerning for infectious etiology/atypical pneumonia.  Malignancy could have a similar appearance. Large thin walled left upper lobe bulla, stable. Mild emphysematous lung changes.     Brain MRI:  There is central volume loss, similar to prior studies.  There is no evidence of hydrocephalus, mass effect, intracranial hemorrhage or acute infarct.   Focal lesions within the periventricular white matter bilaterally are stable from the prior study consistent with prior nonspecific insult.  No new white matter lesions are noted.   Normal arterial flow voids are preserved at the skull base.   The visualized sinuses and mastoid air cells are clear.    EEG:  Sleep:   Normal sleep transients including sleep spindles, K complexes, vertex waves were seen.     Activation procedures:   Answers questions correctly     Abnormal activity:   No epileptiform discharges, periodic discharges, lateralized rhythmic delta activity or electrographic seizures were seen.     IMPRESSION:   Abnormal EEG due to mild generalized non-specific cerebral dysfunction.  No electrographic seizures or indications of seizure tendency.    Assessment:     Terrance Chavez is a 42 y.o. male with DM2, HTN, prior CVAs, substance use d/o (EtOH) and seizures acutely hospitalized since 5/13 with AMS in setting of DKA and cavitary lung lesion c/f infectious  process. Mentation improving, currently alert and oriented x 4. Per chart review patient has some mild cognitive impairment . See outpatient neurologist note (Dr Oliva 6/24/2021). Brain MRI without contrast unchanged from prior as above. EEG negative for ongoing seizures or epileptogenic discharges. Autoimmune panel negative 3/2021. Negative NMO and MOG Ab 6/2021.  Additional considerations for nutritional deficiency warranted with empiric treatment with thiamine at this time. Suspect multifactorial process given underlying infectious and metabolic concerns on top of possible cognitive impairment due to chronic EtOH abuse.     Recommendations:     -Seizure precautions  Delirium precautions  -Continue Keppra 750 mg BID and Depakote 1000 mg BID  VPA lvl 5/16 98.7  -Labs: Ammonia, B12, folate, TSH and UDS unremarkable  B1 pending  -Continue empiric treatment with thiamine supplementation given chronic EtOH use  -Avoid hypoglycemia  Mag >2 and Normonatremia  -Infectious concern per Primary and Pulmonology  -Continue Neurology f/up as outpatient with Dr Oliva. Next appt scheduled for 6/22/22.  -No further recommendation from neurology standpoint. Call us if any question arises.    Seen and discussed with Dr Ellis.    Amy Floyd MD   [employer] [facility], PGY-IV

## 2022-05-17 NOTE — ASSESSMENT & PLAN NOTE
Pt afebrile but w/ leokocytosis and new findings on CXR   Initiated Broad spec abx in ED w/ Vanc/cef/flagyl  Bld cultures drawn in ED w/ NGTD  CT chest w/ 4.5 cm right upper lobe cavitary mass with adjacent 9 mm satellite lesion which is concerning for infectious etiology/atypical pneumonia as well as a large thin walled left upper lobe bulla  Sputum cx w/ staph aureus w/ susceptibilities pending      Plan:  Pulm consulted  Rec cnt pneumonia abx but acquire Quant gold and 3x AFB smears   Continue Augmentin and azithromycin for 5 days ending 5/18  Cnt to monitor for fevers and repeat cultures/imaging

## 2022-05-17 NOTE — PROGRESS NOTES
Pharmacokinetic Initial Assessment: IV Vancomycin    Assessment/Plan:    Initiate intravenous vancomycin with loading dose of 1750 mg once followed by a maintenance dose of vancomycin 1250 mg IV every 12 hours  Desired empiric serum trough concentration is 15 to 20 mcg/mL  Draw vancomycin trough level 60 min prior to fourth dose on 05/19/22 at approximately 0000  Pharmacy will continue to follow and monitor vancomycin.      Please contact pharmacy at extension [phone] with any questions regarding this assessment.     Thank you for the consult,   Delisa Gupta       Patient brief summary:  Terrance Chavez is a 42 y.o. male initiated on antimicrobial therapy with IV Vancomycin for treatment of suspected lower respiratory infection    Drug Allergies:   Review of patient's allergies indicates:  No Known Allergies    Actual Body Weight:   72.6 kg    Renal Function:   Estimated Creatinine Clearance: 148.4 mL/min (based on SCr of 0.6 mg/dL).,     Dialysis Method (if applicable):  N/A    CBC (last 72 hours):  Recent Labs   Lab Result Units 05/15/22  0248 05/16/22  0850 05/17/22  0412   WBC K/uL 16.19* 12.35 9.98   Hemoglobin g/dL 10.9* 11.4* 11.0*   Hematocrit % 31.9* 34.7* 31.3*   Platelets K/uL 531* 482* 503*   Gran % % 72.3 69.4 59.7   Lymph % % 18.5 22.3 29.3   Mono % % 7.2 6.8 8.0   Eosinophil % % 1.1 0.9 1.6   Basophil % % 0.5 0.3 0.6   Differential Method  Automated Automated Automated       Metabolic Panel (last 72 hours):  Recent Labs   Lab Result Units 05/15/22  0248 05/16/22  0850 05/17/22  0412   Sodium mmol/L 141 141 137   Potassium mmol/L 4.1 4.4 4.5   Chloride mmol/L 102 102 99   CO2 mmol/L 25 31* 25   Glucose mg/dL 249* 180* 280*   BUN mg/dL 9 7 12   Creatinine mg/dL 0.7 0.7 0.6   Albumin g/dL 2.8* 2.9* 2.9*   Total Bilirubin mg/dL 0.4 0.4 0.2   Alkaline Phosphatase U/L 62 66 68   AST U/L 11 11 12   ALT U/L 6* 6* 9*   Magnesium mg/dL 1.8  --  1.6   Phosphorus mg/dL 4.2  --  3.5       Drug levels (last 3  results):  No results for input(s): VANCOMYCINRA, VANCOMYCINPE, VANCOMYCINTR in the last 72 hours.    Microbiologic Results:  Microbiology Results (last 7 days)       Procedure Component Value Units Date/Time    Blood culture x two cultures. Draw prior to antibiotics. [[id number]] Collected: 05/13/22 0255    Order Status: Completed Specimen: Blood from Peripheral, Antecubital, Right Updated: 05/17/22 1012     Blood Culture, Routine No Growth to date      No Growth to date      No Growth to date      No Growth to date      No Growth to date    Narrative:      Aerobic and anaerobic    Blood culture x two cultures. Draw prior to antibiotics. [[id number]] Collected: 05/13/22 0311    Order Status: Completed Specimen: Blood from Peripheral, Forearm, Right Updated: 05/17/22 1012     Blood Culture, Routine No Growth to date      No Growth to date      No Growth to date      No Growth to date      No Growth to date    Narrative:      Aerobic and anaerobic    Culture, Respiratory with Gram Stain [[id number]]  (Abnormal) Collected: 05/15/22 1710    Order Status: Completed Specimen: Respiratory from Sputum Updated: 05/17/22 0829     Respiratory Culture STAPHYLOCOCCUS AUREUS  Few  Susceptibility pending       Gram Stain (Respiratory) <10 epithelial cells per low power field.     Gram Stain (Respiratory) Rare WBC's     Gram Stain (Respiratory) Rare Gram positive cocci     Gram Stain (Respiratory) Rare Gram negative rods    AFB Culture & Smear [[id number]]     Order Status: No result Specimen: Respiratory     AFB Culture & Smear [[id number]]     Order Status: No result Specimen: Respiratory     AFB Culture & Smear [[id number]] Collected: 05/15/22 1710    Order Status: Sent Specimen: Respiratory from Sputum Updated: 05/15/22 1711    Influenza A & B by Molecular [[id number]] Collected: 05/13/22 0313    Order Status: Completed Specimen: Nasopharyngeal Swab Updated: 05/13/22 0357     Influenza A, Molecular Negative     Influenza B, Molecular  Negative     Flu A & B Source Nasal swab

## 2022-05-17 NOTE — PLAN OF CARE
EKTA spoke with Germania at [facility] [phone], if pt comes back with positive for TB sw will contact [facility] [phone] to start the admission process.      If Pt is negative for TB ekta will contact [facility] [address] [facility] [place], [place] - [address] [phone]. EKTA will print out of his negative TB test with d/c papers. Pt must leave hospital by 2:30 to make the line for intake beginning at 4 first come first serve. Residents leave each morning by 6:30am. [facility] allows people to stay five nights for free after which they have to pay $10 a night. The shelter also has beds set aside for people who are working but don't make enough to afford their own home. EKTA will follow.     Otilio Loza, MSANTIONETTE  [phone]    Future Appointments   Date Time Provider Department Center   5/27/2022 10:00 AM Keanu Pinon PA-C [facility]   6/22/2022  8:20 AM Merle Oliva [facility]        05/17/22 2575   Post-Acute Status   Post-Acute Authorization Placement   Coverage medicaid   Discharge Plan   Discharge Plan A Shelter

## 2022-05-17 NOTE — SUBJECTIVE & OBJECTIVE
Interval History: NAEON. Pt doing well overall with no acute complaints this AM. He is now AO x3 and appears to be returning to his baseline. He was informed of the results of his MRI and EEG and is aware we are waiting for the TB w/u to result before he can leave.     Review of Systems   Constitutional:  Negative for activity change, chills, fatigue and fever.   HENT:  Negative for congestion, sinus pressure and trouble swallowing.    Eyes:  Negative for photophobia and visual disturbance.   Respiratory:  Negative for cough, choking and shortness of breath.    Cardiovascular:  Negative for chest pain and leg swelling.   Gastrointestinal:  Negative for abdominal distention, constipation, diarrhea, nausea and vomiting.   Endocrine: Negative for polyuria.   Genitourinary:  Negative for dysuria and hematuria.   Musculoskeletal:  Negative for arthralgias.   Skin:  Negative for wound.   Neurological:  Negative for dizziness, tremors, seizures, speech difficulty, weakness, light-headedness, numbness and headaches.   Objective:     Vital Signs (Most Recent):  Temp: 96.9 °F (36.1 °C) (05/17/22 0805)  Pulse: 72 (05/17/22 0805)  Resp: 18 (05/17/22 0805)  BP: (!) 99/59 (05/17/22 0805)  SpO2: 99 % (05/17/22 0805)   Vital Signs (24h Range):  Temp:  [96.9 °F (36.1 °C)-98.8 °F (37.1 °C)] 96.9 °F (36.1 °C)  Pulse:  [72-93] 72  Resp:  [16-20] 18  SpO2:  [93 %-99 %] 99 %  BP: ()/(59-79) 99/59     Weight: 72.6 kg (160 lb 0.9 oz)  Body mass index is 25.07 kg/m².    Intake/Output Summary (Last 24 hours) at 5/17/2022 0842  Last data filed at 5/17/2022 0042  Gross per 24 hour   Intake 355 ml   Output 300 ml   Net 55 ml      Physical Exam  Constitutional:       General: He is not in acute distress.  HENT:      Head: Normocephalic and atraumatic.      Right Ear: External ear normal.      Left Ear: External ear normal.      Nose: Nose normal.   Eyes:      Extraocular Movements: Extraocular movements intact.      Conjunctiva/sclera:  Conjunctivae normal.      Pupils: Pupils are equal, round, and reactive to light.   Cardiovascular:      Rate and Rhythm: Normal rate and regular rhythm.      Pulses: Normal pulses.      Heart sounds: Normal heart sounds. No murmur heard.    No friction rub. No gallop.   Pulmonary:      Effort: Pulmonary effort is normal. No respiratory distress.      Breath sounds: Normal breath sounds. No wheezing or rhonchi.   Abdominal:      General: Bowel sounds are normal. There is no distension.      Palpations: There is no mass.      Tenderness: There is no abdominal tenderness.   Musculoskeletal:         General: No swelling or tenderness. Normal range of motion.      Cervical back: Normal range of motion. No tenderness.   Lymphadenopathy:      Cervical: No cervical adenopathy.   Skin:     General: Skin is warm and dry.      Findings: No rash.   Neurological:      Mental Status: He is alert.      Comments: AO x3     Recent Labs   Lab 05/15/22  0248 05/16/22  0850 05/17/22  0412   WBC 16.19* 12.35 9.98   HGB 10.9* 11.4* 11.0*   HCT 31.9* 34.7* 31.3*   MCV 94 97 93   RBC 3.41* 3.58* 3.35*   MCH 32.0* 31.8* 32.8*   MCHC 34.2 32.9 35.1   RDW 13.0 12.9 12.8   * 482* 503*   MPV 9.0* 9.6 9.1*   GRAN 72.3  11.7* 69.4  8.6* 59.7  6.0   LYMPH 18.5  3.0 22.3  2.8 29.3  2.9   MONO 7.2  1.2* 6.8  0.8 8.0  0.8   EOSINOPHIL 1.1 0.9 1.6   BASOPHIL 0.5 0.3 0.6     Recent Labs   Lab 05/14/22  0435 05/15/22  0248 05/16/22  0850 05/17/22  0412   * 141 141 137   K 3.9 4.1 4.4 4.5    102 102 99   CO2 25 25 31* 25   ANIONGAP 13 14 8 13   BUN 7 9 7 12   CREATININE 0.7 0.7 0.7 0.6   GLU 38* 249* 180* 280*   CALCIUM 9.8 9.4 9.6 9.3   PROT 6.5 5.9* 6.2 6.0   ALBUMIN 3.2* 2.8* 2.9* 2.9*   ALKPHOS 66 62 66 68   BILITOT 0.6 0.4 0.4 0.2   ALT 9* 6* 6* 9*   AST 15 11 11 12   ESTGFRAFRICA >60 >60 >60 >60   EGFRNONAA >60 >60 >60 >60   MG 1.8 1.8  --  1.6   PHOS 3.5 4.2  --  3.5     Recent Labs   Lab 05/13/22  0340   COLORU Yellow    APPEARANCEUA Clear   PHUR 5.0   SPECGRAV 1.015   PROTEINUA 1+*   GLUCUA 3+*   KETONESU 2+*   BILIRUBINUA Negative   OCCULTUA Negative   UROBILINOGEN Negative   NITRITE Negative   LEUKOCYTESUR Negative   RBCUA 1   WBCUA 1   BACTERIA None   SQUAMEPITHEL 0   HYALINECASTS 0   MICROCMT SEE COMMENT     Recent Labs   Lab 05/13/22  0310   TROPONINI <0.006     Recent Labs   Lab 05/13/22  0310   INR 1.1   APTT 31.3     Recent Labs   Lab 05/14/22  1524   TSH 0.409     CT Head Without Contrast    Result Date: 5/13/2022  EXAMINATION: CT HEAD WITHOUT CONTRAST CLINICAL HISTORY: Mental status change, unknown cause; TECHNIQUE: Low dose axial images were obtained through the head.  Coronal and sagittal reformations were also performed. Contrast was not administered. COMPARISON: CT examination of the brain without contrast March 30, 2022 FINDINGS: The ventricular system, sulcal pattern and parenchymal attenuation characteristics are consistent with chronic change appearing similar to the prior examination without evidence for superimposed acute process.  There are areas consistent with remote lacunar-type ischemic change.  Chronic appearing white matter change noted. There is no evidence for intracranial mass, mass effect or midline shift and there is no evidence for acute intracranial hemorrhage.  Appropriate CSF spaces are seen at the skull base. The visualized orbits appear intact.  The mastoid air cells and middle ear cavity bilaterally appear appropriate.  Mild opacity along the external auditory canal likely represents cerumen.  Minimal paranasal sinus mucosal thickening noted.  The osseous structures appear intact.     Chronic changes are noted, there is no evidence for superimposed acute intracranial process. Electronically signed by: Abimael Eid Date:    05/13/2022 Time:    04:10    CT Chest Without Contrast    Result Date: 5/15/2022  EXAMINATION: CT CHEST WITHOUT CONTRAST CLINICAL HISTORY: Abnormal xray - lung nodule,  >= 1 cm; TECHNIQUE: Low dose axial images, sagittal and coronal reformations were obtained from the thoracic inlet to the lung bases. Contrast was not administered. COMPARISON: Radiograph 05/13/2022 FINDINGS: Lungs: There is a right upper lobe partially cavitary mass in the posterior segment eroding through the major fissure into the superior segment of the right lower lobe (series 601, image 105).  The lesion measures 4.5 cm in transverse plane (series 2, image 54). There is an adjacent right upper lobe nodule measuring 0.9 cm (series 2, image 39), with the same imaging characteristics. Mild emphysematous lung changes. There is a large left upper lobe thin walled bulla measuring 11.4 cm (series 2, image 136), stable. Mediastinum: Thyroid gland is grossly unremarkable.  No mediastinal lymphadenopathy.  Limited assessment of the hilum without IV contrast.  Thoracic aorta is normal caliber.  Heart size within normal limits.  No pericardial or pleural effusion.  Mild scattered calcific atherosclerosis. Upper abdomen: Extensive pancreatic calcifications, probable sequela of chronic pancreatitis. Chest wall: Mild gynecomastia. Bones: No marrow replacement process.     4.5 cm right upper lobe cavitary mass with adjacent 9 mm satellite lesion, as above, concerning for infectious etiology/atypical pneumonia.  Malignancy could have a similar appearance. Large thin walled left upper lobe bulla, stable. Mild emphysematous lung changes. This report was flagged in Epic as abnormal. Electronically signed by: Juvencio Cardona MD Date:    05/15/2022 Time:    12:10    MRI Brain Without Contrast    Result Date: 5/16/2022  EXAMINATION: MRI BRAIN WITHOUT CONTRAST CLINICAL HISTORY: Mental status change, unknown cause; TECHNIQUE: Multiplanar multisequence MR imaging of the brain was performed without contrast. COMPARISON: CT head 05/13/2022 MRI 10/27/2021 FINDINGS: there is central volume loss, similar to prior studies.  There is no evidence  of hydrocephalus, mass effect, intracranial hemorrhage or acute infarct. Focal lesions within the periventricular white matter bilaterally are stable from the prior study consistent with prior nonspecific insult.  No new white matter lesions are noted. Normal arterial flow voids are preserved at the skull base. The visualized sinuses and mastoid air cells are clear.     Stable appearance of the brain as detailed above...  No acute intracranial process. Electronically signed by: Ibrahima Soria Date:    05/16/2022 Time:    11:55    X-Ray Chest AP Portable    Result Date: 5/13/2022  EXAMINATION: XR CHEST AP PORTABLE CLINICAL HISTORY: Sepsis; TECHNIQUE: Single frontal view of the chest was performed. COMPARISON: Chest radiograph 03/28/2022, CT chest 10/27/2021 FINDINGS: Cardiac monitoring leads overlie the chest.  Cardiac silhouette is not significantly enlarged.  There is a stable large thin walled cyst projecting over the left upper lung zone.  There is increased fullness of the right hilar region, not well appreciated on prior radiograph examination.  Additionally, there is a possible new 1.0 cm nodular opacity projecting over the right upper lung zone.  The remaining lungs demonstrate no new confluent airspace consolidation.  No significant volume of pleural fluid or pneumothorax appreciated.  Osseous structures appear grossly intact.     1.  Increased fullness of the right hilar region, not well appreciated on prior radiograph examination as well as possible new 1.0 cm nodular opacity projecting over the right upper lung zone.  Given short-term interval development, this could relate to underlying infectious process, however neoplastic process not excluded.  Consider further assessment with dedicated chest CT. 2.  Stable large thin walled left upper lung zone cyst. Electronically signed by: Lenny Pereira MD Date:    05/13/2022 Time:    03:45    Microbiology Results (last 7 days)       Procedure Component Value  Units Date/Time    Culture, Respiratory with Gram Stain [[id number]]  (Abnormal) Collected: 05/15/22 1710    Order Status: Completed Specimen: Respiratory from Sputum Updated: 05/17/22 0829     Respiratory Culture STAPHYLOCOCCUS AUREUS  Few  Susceptibility pending       Gram Stain (Respiratory) <10 epithelial cells per low power field.     Gram Stain (Respiratory) Rare WBC's     Gram Stain (Respiratory) Rare Gram positive cocci     Gram Stain (Respiratory) Rare Gram negative rods    AFB Culture & Smear [[id number]]     Order Status: No result Specimen: Respiratory     Blood culture x two cultures. Draw prior to antibiotics. [[id number]] Collected: 05/13/22 0311    Order Status: Completed Specimen: Blood from Peripheral, Forearm, Right Updated: 05/16/22 1012     Blood Culture, Routine No Growth to date      No Growth to date      No Growth to date      No Growth to date    Narrative:      Aerobic and anaerobic    Blood culture x two cultures. Draw prior to antibiotics. [[id number]] Collected: 05/13/22 0255    Order Status: Completed Specimen: Blood from Peripheral, Antecubital, Right Updated: 05/16/22 1012     Blood Culture, Routine No Growth to date      No Growth to date      No Growth to date      No Growth to date    Narrative:      Aerobic and anaerobic    AFB Culture & Smear [[id number]]     Order Status: No result Specimen: Respiratory     AFB Culture & Smear [[id number]] Collected: 05/15/22 1710    Order Status: Sent Specimen: Respiratory from Sputum Updated: 05/15/22 1711    Influenza A & B by Molecular [[id number]] Collected: 05/13/22 0313    Order Status: Completed Specimen: Nasopharyngeal Swab Updated: 05/13/22 0357     Influenza A, Molecular Negative     Influenza B, Molecular Negative     Flu A & B Source Nasal swab

## 2022-05-17 NOTE — ASSESSMENT & PLAN NOTE
Pt w/ AMS on admit thought to be related to DKA  Uncertain baseline mentation though previous CVA in 2013  After DKA resolution pt still altered  UDS and EtOH neg on admit   Ammonia slightly elevated to 61  CT head w/o any new intracranial abnormalities  Folate 11.2  VitB12 415  TSH 0.409  RPR nonreactive  HIV neg   MRI brain w/o w/ no acute intracranial abnormalities   EEG showed generalized cerebral dysfunction but no signs of seizure activity      Plan:  AMS lab work including B1/6

## 2022-05-18 LAB
ALBUMIN SERPL BCP-MCNC: 2.8 G/DL (ref 3.5–5.2)
ALP SERPL-CCNC: 62 U/L (ref 55–135)
ALT SERPL W/O P-5'-P-CCNC: 9 U/L (ref 10–44)
ANION GAP SERPL CALC-SCNC: 14 MMOL/L (ref 8–16)
AST SERPL-CCNC: 12 U/L (ref 10–40)
BACTERIA BLD CULT: NORMAL
BACTERIA BLD CULT: NORMAL
BACTERIA SPEC AEROBE CULT: ABNORMAL
BACTERIA SPEC AEROBE CULT: ABNORMAL
BASOPHILS # BLD AUTO: 0.05 K/UL (ref 0–0.2)
BASOPHILS NFR BLD: 0.5 % (ref 0–1.9)
BILIRUB SERPL-MCNC: 0.3 MG/DL (ref 0.1–1)
BUN SERPL-MCNC: 7 MG/DL (ref 6–20)
CALCIUM SERPL-MCNC: 9.2 MG/DL (ref 8.7–10.5)
CHLORIDE SERPL-SCNC: 97 MMOL/L (ref 95–110)
CO2 SERPL-SCNC: 23 MMOL/L (ref 23–29)
CREAT SERPL-MCNC: 0.7 MG/DL (ref 0.5–1.4)
DIFFERENTIAL METHOD: ABNORMAL
EOSINOPHIL # BLD AUTO: 0.1 K/UL (ref 0–0.5)
EOSINOPHIL NFR BLD: 1.3 % (ref 0–8)
ERYTHROCYTE [DISTWIDTH] IN BLOOD BY AUTOMATED COUNT: 12.6 % (ref 11.5–14.5)
EST. GFR  (AFRICAN AMERICAN): >60 ML/MIN/1.73 M^2
EST. GFR  (NON AFRICAN AMERICAN): >60 ML/MIN/1.73 M^2
GLUCOSE SERPL-MCNC: 363 MG/DL (ref 70–110)
GLUCOSE SERPL-MCNC: 477 MG/DL (ref 70–110)
GRAM STN SPEC: ABNORMAL
HCT VFR BLD AUTO: 32.4 % (ref 40–54)
HGB BLD-MCNC: 11.4 G/DL (ref 14–18)
IMM GRANULOCYTES # BLD AUTO: 0.06 K/UL (ref 0–0.04)
IMM GRANULOCYTES NFR BLD AUTO: 0.7 % (ref 0–0.5)
LYMPHOCYTES # BLD AUTO: 3 K/UL (ref 1–4.8)
LYMPHOCYTES NFR BLD: 32.5 % (ref 18–48)
MAGNESIUM SERPL-MCNC: 1.7 MG/DL (ref 1.6–2.6)
MCH RBC QN AUTO: 32.2 PG (ref 27–31)
MCHC RBC AUTO-ENTMCNC: 35.2 G/DL (ref 32–36)
MCV RBC AUTO: 92 FL (ref 82–98)
MONOCYTES # BLD AUTO: 0.7 K/UL (ref 0.3–1)
MONOCYTES NFR BLD: 7.3 % (ref 4–15)
NEUTROPHILS # BLD AUTO: 5.3 K/UL (ref 1.8–7.7)
NEUTROPHILS NFR BLD: 57.7 % (ref 38–73)
NRBC BLD-RTO: 0 /100 WBC
PHOSPHATE SERPL-MCNC: 3 MG/DL (ref 2.7–4.5)
PLATELET # BLD AUTO: 478 K/UL (ref 150–450)
PMV BLD AUTO: 9 FL (ref 9.2–12.9)
POCT GLUCOSE: 179 MG/DL (ref 70–110)
POCT GLUCOSE: 218 MG/DL (ref 70–110)
POCT GLUCOSE: 301 MG/DL (ref 70–110)
POCT GLUCOSE: 385 MG/DL (ref 70–110)
POCT GLUCOSE: >500 MG/DL (ref 70–110)
POTASSIUM SERPL-SCNC: 4.4 MMOL/L (ref 3.5–5.1)
PROT SERPL-MCNC: 6.1 G/DL (ref 6–8.4)
RBC # BLD AUTO: 3.54 M/UL (ref 4.6–6.2)
SODIUM SERPL-SCNC: 134 MMOL/L (ref 136–145)
WBC # BLD AUTO: 9.12 K/UL (ref 3.9–12.7)

## 2022-05-18 PROCEDURE — 63700000 PHARM REV CODE 250 ALT 637 W/O HCPCS: Performed by: STUDENT IN AN ORGANIZED HEALTH CARE EDUCATION/TRAINING PROGRAM

## 2022-05-18 PROCEDURE — 11000001 HC ACUTE MED/SURG PRIVATE ROOM

## 2022-05-18 PROCEDURE — C9399 UNCLASSIFIED DRUGS OR BIOLOG: HCPCS | Performed by: STUDENT IN AN ORGANIZED HEALTH CARE EDUCATION/TRAINING PROGRAM

## 2022-05-18 PROCEDURE — 36415 COLL VENOUS BLD VENIPUNCTURE: CPT | Performed by: STUDENT IN AN ORGANIZED HEALTH CARE EDUCATION/TRAINING PROGRAM

## 2022-05-18 PROCEDURE — 25000003 PHARM REV CODE 250: Performed by: STUDENT IN AN ORGANIZED HEALTH CARE EDUCATION/TRAINING PROGRAM

## 2022-05-18 PROCEDURE — 83735 ASSAY OF MAGNESIUM: CPT | Performed by: STUDENT IN AN ORGANIZED HEALTH CARE EDUCATION/TRAINING PROGRAM

## 2022-05-18 PROCEDURE — S4991 NICOTINE PATCH NONLEGEND: HCPCS | Performed by: STUDENT IN AN ORGANIZED HEALTH CARE EDUCATION/TRAINING PROGRAM

## 2022-05-18 PROCEDURE — 85025 COMPLETE CBC W/AUTO DIFF WBC: CPT | Performed by: STUDENT IN AN ORGANIZED HEALTH CARE EDUCATION/TRAINING PROGRAM

## 2022-05-18 PROCEDURE — 25000003 PHARM REV CODE 250: Performed by: FAMILY MEDICINE

## 2022-05-18 PROCEDURE — 82947 ASSAY GLUCOSE BLOOD QUANT: CPT | Performed by: FAMILY MEDICINE

## 2022-05-18 PROCEDURE — 80053 COMPREHEN METABOLIC PANEL: CPT | Performed by: STUDENT IN AN ORGANIZED HEALTH CARE EDUCATION/TRAINING PROGRAM

## 2022-05-18 PROCEDURE — 86480 TB TEST CELL IMMUN MEASURE: CPT | Performed by: STUDENT IN AN ORGANIZED HEALTH CARE EDUCATION/TRAINING PROGRAM

## 2022-05-18 PROCEDURE — 94761 N-INVAS EAR/PLS OXIMETRY MLT: CPT

## 2022-05-18 PROCEDURE — 84100 ASSAY OF PHOSPHORUS: CPT | Performed by: STUDENT IN AN ORGANIZED HEALTH CARE EDUCATION/TRAINING PROGRAM

## 2022-05-18 PROCEDURE — 27000207 HC ISOLATION

## 2022-05-18 PROCEDURE — 63600175 PHARM REV CODE 636 W HCPCS: Performed by: FAMILY MEDICINE

## 2022-05-18 RX ORDER — MUPIROCIN 20 MG/G
OINTMENT TOPICAL 2 TIMES DAILY
Status: COMPLETED | OUTPATIENT
Start: 2022-05-18 | End: 2022-05-23

## 2022-05-18 RX ADMIN — INSULIN DETEMIR 15 UNITS: 100 INJECTION, SOLUTION SUBCUTANEOUS at 10:05

## 2022-05-18 RX ADMIN — INSULIN ASPART 8 UNITS: 100 INJECTION, SOLUTION INTRAVENOUS; SUBCUTANEOUS at 06:05

## 2022-05-18 RX ADMIN — THIAMINE HCL TAB 100 MG 100 MG: 100 TAB at 10:05

## 2022-05-18 RX ADMIN — MUPIROCIN: 20 OINTMENT TOPICAL at 09:05

## 2022-05-18 RX ADMIN — THERA TABS 1 TABLET: TAB at 10:05

## 2022-05-18 RX ADMIN — VANCOMYCIN HYDROCHLORIDE 1250 MG: 1.25 INJECTION, POWDER, LYOPHILIZED, FOR SOLUTION INTRAVENOUS at 01:05

## 2022-05-18 RX ADMIN — AZITHROMYCIN MONOHYDRATE 500 MG: 250 TABLET ORAL at 10:05

## 2022-05-18 RX ADMIN — AMOXICILLIN AND CLAVULANATE POTASSIUM 1 TABLET: 875; 125 TABLET, FILM COATED ORAL at 10:05

## 2022-05-18 RX ADMIN — INSULIN ASPART 5 UNITS: 100 INJECTION, SOLUTION INTRAVENOUS; SUBCUTANEOUS at 09:05

## 2022-05-18 RX ADMIN — NICOTINE 1 PATCH: 14 PATCH, EXTENDED RELEASE TRANSDERMAL at 10:05

## 2022-05-18 RX ADMIN — DIVALPROEX SODIUM 1000 MG: 250 TABLET, EXTENDED RELEASE ORAL at 10:05

## 2022-05-18 RX ADMIN — LEVETIRACETAM 750 MG: 250 TABLET, FILM COATED ORAL at 10:05

## 2022-05-18 RX ADMIN — AMOXICILLIN AND CLAVULANATE POTASSIUM 1 TABLET: 875; 125 TABLET, FILM COATED ORAL at 09:05

## 2022-05-18 RX ADMIN — INSULIN ASPART 10 UNITS: 100 INJECTION, SOLUTION INTRAVENOUS; SUBCUTANEOUS at 06:05

## 2022-05-18 RX ADMIN — VANCOMYCIN HYDROCHLORIDE 1250 MG: 1.25 INJECTION, POWDER, LYOPHILIZED, FOR SOLUTION INTRAVENOUS at 02:05

## 2022-05-18 RX ADMIN — LOSARTAN POTASSIUM 25 MG: 25 TABLET, FILM COATED ORAL at 10:05

## 2022-05-18 RX ADMIN — LEVETIRACETAM 750 MG: 250 TABLET, FILM COATED ORAL at 09:05

## 2022-05-18 RX ADMIN — DIVALPROEX SODIUM 1000 MG: 250 TABLET, EXTENDED RELEASE ORAL at 09:05

## 2022-05-18 NOTE — SUBJECTIVE & OBJECTIVE
Interval History: NAEON. Pt remains Aox 3 this AM. We are pending placement to a shelter for him but awaiting results of his quant gold and AFB's. He has no acute complaints today.    Review of Systems   Constitutional:  Negative for activity change, chills, fatigue and fever.   HENT:  Negative for congestion, sinus pressure and trouble swallowing.    Eyes:  Negative for photophobia and visual disturbance.   Respiratory:  Negative for cough, choking and shortness of breath.    Cardiovascular:  Negative for chest pain and leg swelling.   Gastrointestinal:  Negative for abdominal distention, constipation, diarrhea, nausea and vomiting.   Endocrine: Negative for polyuria.   Genitourinary:  Negative for dysuria and hematuria.   Musculoskeletal:  Negative for arthralgias.   Skin:  Negative for wound.   Neurological:  Negative for dizziness, tremors, seizures, speech difficulty, weakness, light-headedness, numbness and headaches.   Objective:     Vital Signs (Most Recent):  Temp: 98.6 °F (37 °C) (05/18/22 0848)  Pulse: 83 (05/18/22 0848)  Resp: 20 (05/18/22 0848)  BP: 103/71 (05/18/22 0848)  SpO2: 98 % (05/18/22 0848) Vital Signs (24h Range):  Temp:  [97.6 °F (36.4 °C)-99.5 °F (37.5 °C)] 98.6 °F (37 °C)  Pulse:  [68-83] 83  Resp:  [16-20] 20  SpO2:  [98 %-100 %] 98 %  BP: (103-135)/(71-80) 103/71     Weight: 72.6 kg (160 lb 0.9 oz)  Body mass index is 25.07 kg/m².    Intake/Output Summary (Last 24 hours) at 5/18/2022 0908  Last data filed at 5/18/2022 0300  Gross per 24 hour   Intake 485 ml   Output 900 ml   Net -415 ml      Physical Exam  Constitutional:       General: He is not in acute distress.  HENT:      Head: Normocephalic and atraumatic.      Right Ear: External ear normal.      Left Ear: External ear normal.      Nose: Nose normal.   Eyes:      Extraocular Movements: Extraocular movements intact.      Conjunctiva/sclera: Conjunctivae normal.      Pupils: Pupils are equal, round, and reactive to light.    Cardiovascular:      Rate and Rhythm: Normal rate and regular rhythm.      Pulses: Normal pulses.      Heart sounds: Normal heart sounds. No murmur heard.    No friction rub. No gallop.   Pulmonary:      Effort: Pulmonary effort is normal. No respiratory distress.      Breath sounds: Normal breath sounds. No wheezing or rhonchi.   Abdominal:      General: Bowel sounds are normal. There is no distension.      Palpations: There is no mass.      Tenderness: There is no abdominal tenderness.   Musculoskeletal:         General: No swelling or tenderness. Normal range of motion.      Cervical back: Normal range of motion. No tenderness.   Lymphadenopathy:      Cervical: No cervical adenopathy.   Skin:     General: Skin is warm and dry.      Findings: No rash.   Neurological:      Mental Status: He is alert.      Comments: AO x3   Recent Labs   Lab 05/16/22  0850 05/17/22  0412 05/18/22  0321   WBC 12.35 9.98 9.12   HGB 11.4* 11.0* 11.4*   HCT 34.7* 31.3* 32.4*   MCV 97 93 92   RBC 3.58* 3.35* 3.54*   MCH 31.8* 32.8* 32.2*   MCHC 32.9 35.1 35.2   RDW 12.9 12.8 12.6   * 503* 478*   MPV 9.6 9.1* 9.0*   GRAN 69.4  8.6* 59.7  6.0 57.7  5.3   LYMPH 22.3  2.8 29.3  2.9 32.5  3.0   MONO 6.8  0.8 8.0  0.8 7.3  0.7   EOSINOPHIL 0.9 1.6 1.3   BASOPHIL 0.3 0.6 0.5     Recent Labs   Lab 05/15/22  0248 05/16/22  0850 05/17/22  0412 05/18/22  0321    141 137 134*   K 4.1 4.4 4.5 4.4    102 99 97   CO2 25 31* 25 23   ANIONGAP 14 8 13 14   BUN 9 7 12 7   CREATININE 0.7 0.7 0.6 0.7   * 180* 280* 363*   CALCIUM 9.4 9.6 9.3 9.2   PROT 5.9* 6.2 6.0 6.1   ALBUMIN 2.8* 2.9* 2.9* 2.8*   ALKPHOS 62 66 68 62   BILITOT 0.4 0.4 0.2 0.3   ALT 6* 6* 9* 9*   AST 11 11 12 12   ESTGFRAFRICA >60 >60 >60 >60   EGFRNONAA >60 >60 >60 >60   MG 1.8  --  1.6 1.7   PHOS 4.2  --  3.5 3.0     Recent Labs   Lab 05/13/22  0340   COLORU Yellow   APPEARANCEUA Clear   PHUR 5.0   SPECGRAV 1.015   PROTEINUA 1+*   GLUCUA 3+*   KETONESU  2+*   BILIRUBINUA Negative   OCCULTUA Negative   UROBILINOGEN Negative   NITRITE Negative   LEUKOCYTESUR Negative   RBCUA 1   WBCUA 1   BACTERIA None   SQUAMEPITHEL 0   HYALINECASTS 0   MICROCMT SEE COMMENT     Recent Labs   Lab 05/13/22  0310   TROPONINI <0.006     Recent Labs   Lab 05/13/22  0310   INR 1.1   APTT 31.3     Recent Labs   Lab 05/14/22  1524   TSH 0.409     CT Head Without Contrast    Result Date: 5/13/2022  EXAMINATION: CT HEAD WITHOUT CONTRAST CLINICAL HISTORY: Mental status change, unknown cause; TECHNIQUE: Low dose axial images were obtained through the head.  Coronal and sagittal reformations were also performed. Contrast was not administered. COMPARISON: CT examination of the brain without contrast March 30, 2022 FINDINGS: The ventricular system, sulcal pattern and parenchymal attenuation characteristics are consistent with chronic change appearing similar to the prior examination without evidence for superimposed acute process.  There are areas consistent with remote lacunar-type ischemic change.  Chronic appearing white matter change noted. There is no evidence for intracranial mass, mass effect or midline shift and there is no evidence for acute intracranial hemorrhage.  Appropriate CSF spaces are seen at the skull base. The visualized orbits appear intact.  The mastoid air cells and middle ear cavity bilaterally appear appropriate.  Mild opacity along the external auditory canal likely represents cerumen.  Minimal paranasal sinus mucosal thickening noted.  The osseous structures appear intact.     Chronic changes are noted, there is no evidence for superimposed acute intracranial process. Electronically signed by: Abimael Eid Date:    05/13/2022 Time:    04:10    CT Chest Without Contrast    Result Date: 5/15/2022  EXAMINATION: CT CHEST WITHOUT CONTRAST CLINICAL HISTORY: Abnormal xray - lung nodule, >= 1 cm; TECHNIQUE: Low dose axial images, sagittal and coronal reformations were obtained  from the thoracic inlet to the lung bases. Contrast was not administered. COMPARISON: Radiograph 05/13/2022 FINDINGS: Lungs: There is a right upper lobe partially cavitary mass in the posterior segment eroding through the major fissure into the superior segment of the right lower lobe (series 601, image 105).  The lesion measures 4.5 cm in transverse plane (series 2, image 54). There is an adjacent right upper lobe nodule measuring 0.9 cm (series 2, image 39), with the same imaging characteristics. Mild emphysematous lung changes. There is a large left upper lobe thin walled bulla measuring 11.4 cm (series 2, image 136), stable. Mediastinum: Thyroid gland is grossly unremarkable.  No mediastinal lymphadenopathy.  Limited assessment of the hilum without IV contrast.  Thoracic aorta is normal caliber.  Heart size within normal limits.  No pericardial or pleural effusion.  Mild scattered calcific atherosclerosis. Upper abdomen: Extensive pancreatic calcifications, probable sequela of chronic pancreatitis. Chest wall: Mild gynecomastia. Bones: No marrow replacement process.     4.5 cm right upper lobe cavitary mass with adjacent 9 mm satellite lesion, as above, concerning for infectious etiology/atypical pneumonia.  Malignancy could have a similar appearance. Large thin walled left upper lobe bulla, stable. Mild emphysematous lung changes. This report was flagged in Epic as abnormal. Electronically signed by: Juvencio Cardona MD Date:    05/15/2022 Time:    12:10    MRI Brain Without Contrast    Result Date: 5/16/2022  EXAMINATION: MRI BRAIN WITHOUT CONTRAST CLINICAL HISTORY: Mental status change, unknown cause; TECHNIQUE: Multiplanar multisequence MR imaging of the brain was performed without contrast. COMPARISON: CT head 05/13/2022 MRI 10/27/2021 FINDINGS: there is central volume loss, similar to prior studies.  There is no evidence of hydrocephalus, mass effect, intracranial hemorrhage or acute infarct. Focal lesions  within the periventricular white matter bilaterally are stable from the prior study consistent with prior nonspecific insult.  No new white matter lesions are noted. Normal arterial flow voids are preserved at the skull base. The visualized sinuses and mastoid air cells are clear.     Stable appearance of the brain as detailed above...  No acute intracranial process. Electronically signed by: Ibrahima Soria Date:    05/16/2022 Time:    11:55    X-Ray Chest AP Portable    Result Date: 5/13/2022  EXAMINATION: XR CHEST AP PORTABLE CLINICAL HISTORY: Sepsis; TECHNIQUE: Single frontal view of the chest was performed. COMPARISON: Chest radiograph 03/28/2022, CT chest 10/27/2021 FINDINGS: Cardiac monitoring leads overlie the chest.  Cardiac silhouette is not significantly enlarged.  There is a stable large thin walled cyst projecting over the left upper lung zone.  There is increased fullness of the right hilar region, not well appreciated on prior radiograph examination.  Additionally, there is a possible new 1.0 cm nodular opacity projecting over the right upper lung zone.  The remaining lungs demonstrate no new confluent airspace consolidation.  No significant volume of pleural fluid or pneumothorax appreciated.  Osseous structures appear grossly intact.     1.  Increased fullness of the right hilar region, not well appreciated on prior radiograph examination as well as possible new 1.0 cm nodular opacity projecting over the right upper lung zone.  Given short-term interval development, this could relate to underlying infectious process, however neoplastic process not excluded.  Consider further assessment with dedicated chest CT. 2.  Stable large thin walled left upper lung zone cyst. Electronically signed by: Lenny Pereira MD Date:    05/13/2022 Time:    03:45    Microbiology Results (last 7 days)       Procedure Component Value Units Date/Time    AFB Culture & Smear [[id number]] Collected: 05/17/22 1616    Order  Status: Sent Specimen: Respiratory from Sputum Updated: 05/17/22 2046    AFB Culture & Smear [[id number]] Collected: 05/17/22 1616    Order Status: Sent Specimen: Respiratory from Sputum Updated: 05/17/22 1616    Blood culture x two cultures. Draw prior to antibiotics. [[id number]] Collected: 05/13/22 0255    Order Status: Completed Specimen: Blood from Peripheral, Antecubital, Right Updated: 05/17/22 1012     Blood Culture, Routine No Growth to date      No Growth to date      No Growth to date      No Growth to date      No Growth to date    Narrative:      Aerobic and anaerobic    Blood culture x two cultures. Draw prior to antibiotics. [[id number]] Collected: 05/13/22 0311    Order Status: Completed Specimen: Blood from Peripheral, Forearm, Right Updated: 05/17/22 1012     Blood Culture, Routine No Growth to date      No Growth to date      No Growth to date      No Growth to date      No Growth to date    Narrative:      Aerobic and anaerobic    Culture, Respiratory with Gram Stain [[id number]]  (Abnormal) Collected: 05/15/22 1710    Order Status: Completed Specimen: Respiratory from Sputum Updated: 05/17/22 0829     Respiratory Culture STAPHYLOCOCCUS AUREUS  Few  Susceptibility pending       Gram Stain (Respiratory) <10 epithelial cells per low power field.     Gram Stain (Respiratory) Rare WBC's     Gram Stain (Respiratory) Rare Gram positive cocci     Gram Stain (Respiratory) Rare Gram negative rods    AFB Culture & Smear [[id number]] Collected: 05/15/22 1710    Order Status: Sent Specimen: Respiratory from Sputum Updated: 05/15/22 1711    Influenza A & B by Molecular [[id number]] Collected: 05/13/22 0313    Order Status: Completed Specimen: Nasopharyngeal Swab Updated: 05/13/22 0357     Influenza A, Molecular Negative     Influenza B, Molecular Negative     Flu A & B Source Nasal swab

## 2022-05-18 NOTE — PROGRESS NOTES
[facility] Medicine  Progress Note    Patient Name: Terrance Chavez  MRN: [id number]  Patient Class: IP- Inpatient   Admission Date: 5/13/2022  Length of Stay: 3 days  Attending Physician: Alonso Carlos III, MD  Primary Care Provider: Primary Doctor No        Subjective:     Principal Problem:Hyperglycemia        HPI:  Patient is a 42 yr old male with pmhx of T2DM, HTN, CVA, neurocognitive impairment and substance abuse disorder presenting to the ED for Altered mental status. [place], brought in by EMS. On arrival patient was only oriented to person but not place,time or situation. Patient blood glucose was elevated on arrival. Initial labs showed small acidosis. Patient was provided 2L LR in ED and his mentation improved. Patient had a fever in the ED , and elevated WBC  of 29.9. Can not rule out infection exacerbating or causing change in mental status. Patient received 2g ceftriaxone in ED. Patient denies chest pain, shortness of breath, headache, nausea and vomiting. Admitted to [facility] for observation and correction of glucose.      Overview/Hospital Course:  Pt was admitted to the FM service for DKA and AMS. Pt was initially found to have elevated BG as well as Beta hydroxybutyrate and AG. He was started on an insulin drip as well as giving his long acting insulin. His DKA quickly resolved and then was continued to work up for his AMS and new lung findings. On admission he had a CXR which showed a new R Upper Lung nodule as well as hilar haziness and a known L upper lung cyst. A CT was recommned for f/u which showed a 4.5 cm R Upper Lobe and satellite 9mm lesion. Pulm was consulted and pt was worked up for potential pneumonia vs TB he was started on a 5 day course of augmentin and azithromycin which he will complete on 5/18. Quant gold as well as AFBs were collected which showed............On admission he had a CT head which did not  show any acute intracranial abnormalities as well as EtOH and UDS which were neg. It was unclear as to what his BL mentation is as previous notes had documented normal mentation and AO x4. His AMS was worked up with HIV/RPR/B12/Folate which were wnl and B1/6 pending. Neurology was consulted to determine if there was any other potential causes of AMS and rec an MRI and EEG. His MRI again did not show any acute abnormalities and EEG which showed generalized cerebral dysfxn but no epileptiform activity. The pt's mentation slowly improved throughout his admission and was AO x3 at time of d/c. Pt was educated on his chronic conditions and SW gave him multiple resources for his outpt care. He was given strict return precautions and all questions were answered.      Interval History: NAEON. Pt remains Aox 3 this AM. We are pending placement to a shelter for him but awaiting results of his quant gold and AFB's. He has no acute complaints today.    Review of Systems   Constitutional:  Negative for activity change, chills, fatigue and fever.   HENT:  Negative for congestion, sinus pressure and trouble swallowing.    Eyes:  Negative for photophobia and visual disturbance.   Respiratory:  Negative for cough, choking and shortness of breath.    Cardiovascular:  Negative for chest pain and leg swelling.   Gastrointestinal:  Negative for abdominal distention, constipation, diarrhea, nausea and vomiting.   Endocrine: Negative for polyuria.   Genitourinary:  Negative for dysuria and hematuria.   Musculoskeletal:  Negative for arthralgias.   Skin:  Negative for wound.   Neurological:  Negative for dizziness, tremors, seizures, speech difficulty, weakness, light-headedness, numbness and headaches.   Objective:     Vital Signs (Most Recent):  Temp: 98.6 °F (37 °C) (05/18/22 0848)  Pulse: 83 (05/18/22 0848)  Resp: 20 (05/18/22 0848)  BP: 103/71 (05/18/22 0848)  SpO2: 98 % (05/18/22 0848) Vital Signs (24h Range):  Temp:  [97.6 °F (36.4  °C)-99.5 °F (37.5 °C)] 98.6 °F (37 °C)  Pulse:  [68-83] 83  Resp:  [16-20] 20  SpO2:  [98 %-100 %] 98 %  BP: (103-135)/(71-80) 103/71     Weight: 72.6 kg (160 lb 0.9 oz)  Body mass index is 25.07 kg/m².    Intake/Output Summary (Last 24 hours) at 5/18/2022 0908  Last data filed at 5/18/2022 0300  Gross per 24 hour   Intake 485 ml   Output 900 ml   Net -415 ml      Physical Exam  Constitutional:       General: He is not in acute distress.  HENT:      Head: Normocephalic and atraumatic.      Right Ear: External ear normal.      Left Ear: External ear normal.      Nose: Nose normal.   Eyes:      Extraocular Movements: Extraocular movements intact.      Conjunctiva/sclera: Conjunctivae normal.      Pupils: Pupils are equal, round, and reactive to light.   Cardiovascular:      Rate and Rhythm: Normal rate and regular rhythm.      Pulses: Normal pulses.      Heart sounds: Normal heart sounds. No murmur heard.    No friction rub. No gallop.   Pulmonary:      Effort: Pulmonary effort is normal. No respiratory distress.      Breath sounds: Normal breath sounds. No wheezing or rhonchi.   Abdominal:      General: Bowel sounds are normal. There is no distension.      Palpations: There is no mass.      Tenderness: There is no abdominal tenderness.   Musculoskeletal:         General: No swelling or tenderness. Normal range of motion.      Cervical back: Normal range of motion. No tenderness.   Lymphadenopathy:      Cervical: No cervical adenopathy.   Skin:     General: Skin is warm and dry.      Findings: No rash.   Neurological:      Mental Status: He is alert.      Comments: AO x3   Recent Labs   Lab 05/16/22  0850 05/17/22  0412 05/18/22  0321   WBC 12.35 9.98 9.12   HGB 11.4* 11.0* 11.4*   HCT 34.7* 31.3* 32.4*   MCV 97 93 92   RBC 3.58* 3.35* 3.54*   MCH 31.8* 32.8* 32.2*   MCHC 32.9 35.1 35.2   RDW 12.9 12.8 12.6   * 503* 478*   MPV 9.6 9.1* 9.0*   GRAN 69.4  8.6* 59.7  6.0 57.7  5.3   LYMPH 22.3  2.8 29.3  2.9  32.5  3.0   MONO 6.8  0.8 8.0  0.8 7.3  0.7   EOSINOPHIL 0.9 1.6 1.3   BASOPHIL 0.3 0.6 0.5     Recent Labs   Lab 05/15/22  0248 05/16/22  0850 05/17/22  0412 05/18/22  0321    141 137 134*   K 4.1 4.4 4.5 4.4    102 99 97   CO2 25 31* 25 23   ANIONGAP 14 8 13 14   BUN 9 7 12 7   CREATININE 0.7 0.7 0.6 0.7   * 180* 280* 363*   CALCIUM 9.4 9.6 9.3 9.2   PROT 5.9* 6.2 6.0 6.1   ALBUMIN 2.8* 2.9* 2.9* 2.8*   ALKPHOS 62 66 68 62   BILITOT 0.4 0.4 0.2 0.3   ALT 6* 6* 9* 9*   AST 11 11 12 12   ESTGFRAFRICA >60 >60 >60 >60   EGFRNONAA >60 >60 >60 >60   MG 1.8  --  1.6 1.7   PHOS 4.2  --  3.5 3.0     Recent Labs   Lab 05/13/22  0340   COLORU Yellow   APPEARANCEUA Clear   PHUR 5.0   SPECGRAV 1.015   PROTEINUA 1+*   GLUCUA 3+*   KETONESU 2+*   BILIRUBINUA Negative   OCCULTUA Negative   UROBILINOGEN Negative   NITRITE Negative   LEUKOCYTESUR Negative   RBCUA 1   WBCUA 1   BACTERIA None   SQUAMEPITHEL 0   HYALINECASTS 0   MICROCMT SEE COMMENT     Recent Labs   Lab 05/13/22  0310   TROPONINI <0.006     Recent Labs   Lab 05/13/22  0310   INR 1.1   APTT 31.3     Recent Labs   Lab 05/14/22  1524   TSH 0.409     CT Head Without Contrast    Result Date: 5/13/2022  EXAMINATION: CT HEAD WITHOUT CONTRAST CLINICAL HISTORY: Mental status change, unknown cause; TECHNIQUE: Low dose axial images were obtained through the head.  Coronal and sagittal reformations were also performed. Contrast was not administered. COMPARISON: CT examination of the brain without contrast March 30, 2022 FINDINGS: The ventricular system, sulcal pattern and parenchymal attenuation characteristics are consistent with chronic change appearing similar to the prior examination without evidence for superimposed acute process.  There are areas consistent with remote lacunar-type ischemic change.  Chronic appearing white matter change noted. There is no evidence for intracranial mass, mass effect or midline shift and there is no evidence for acute  intracranial hemorrhage.  Appropriate CSF spaces are seen at the skull base. The visualized orbits appear intact.  The mastoid air cells and middle ear cavity bilaterally appear appropriate.  Mild opacity along the external auditory canal likely represents cerumen.  Minimal paranasal sinus mucosal thickening noted.  The osseous structures appear intact.     Chronic changes are noted, there is no evidence for superimposed acute intracranial process. Electronically signed by: Abimael Eid Date:    05/13/2022 Time:    04:10    CT Chest Without Contrast    Result Date: 5/15/2022  EXAMINATION: CT CHEST WITHOUT CONTRAST CLINICAL HISTORY: Abnormal xray - lung nodule, >= 1 cm; TECHNIQUE: Low dose axial images, sagittal and coronal reformations were obtained from the thoracic inlet to the lung bases. Contrast was not administered. COMPARISON: Radiograph 05/13/2022 FINDINGS: Lungs: There is a right upper lobe partially cavitary mass in the posterior segment eroding through the major fissure into the superior segment of the right lower lobe (series 601, image 105).  The lesion measures 4.5 cm in transverse plane (series 2, image 54). There is an adjacent right upper lobe nodule measuring 0.9 cm (series 2, image 39), with the same imaging characteristics. Mild emphysematous lung changes. There is a large left upper lobe thin walled bulla measuring 11.4 cm (series 2, image 136), stable. Mediastinum: Thyroid gland is grossly unremarkable.  No mediastinal lymphadenopathy.  Limited assessment of the hilum without IV contrast.  Thoracic aorta is normal caliber.  Heart size within normal limits.  No pericardial or pleural effusion.  Mild scattered calcific atherosclerosis. Upper abdomen: Extensive pancreatic calcifications, probable sequela of chronic pancreatitis. Chest wall: Mild gynecomastia. Bones: No marrow replacement process.     4.5 cm right upper lobe cavitary mass with adjacent 9 mm satellite lesion, as above, concerning  for infectious etiology/atypical pneumonia.  Malignancy could have a similar appearance. Large thin walled left upper lobe bulla, stable. Mild emphysematous lung changes. This report was flagged in Epic as abnormal. Electronically signed by: Juvencio Cardona MD Date:    05/15/2022 Time:    12:10    MRI Brain Without Contrast    Result Date: 5/16/2022  EXAMINATION: MRI BRAIN WITHOUT CONTRAST CLINICAL HISTORY: Mental status change, unknown cause; TECHNIQUE: Multiplanar multisequence MR imaging of the brain was performed without contrast. COMPARISON: CT head 05/13/2022 MRI 10/27/2021 FINDINGS: there is central volume loss, similar to prior studies.  There is no evidence of hydrocephalus, mass effect, intracranial hemorrhage or acute infarct. Focal lesions within the periventricular white matter bilaterally are stable from the prior study consistent with prior nonspecific insult.  No new white matter lesions are noted. Normal arterial flow voids are preserved at the skull base. The visualized sinuses and mastoid air cells are clear.     Stable appearance of the brain as detailed above...  No acute intracranial process. Electronically signed by: Ibrahima Soria Date:    05/16/2022 Time:    11:55    X-Ray Chest AP Portable    Result Date: 5/13/2022  EXAMINATION: XR CHEST AP PORTABLE CLINICAL HISTORY: Sepsis; TECHNIQUE: Single frontal view of the chest was performed. COMPARISON: Chest radiograph 03/28/2022, CT chest 10/27/2021 FINDINGS: Cardiac monitoring leads overlie the chest.  Cardiac silhouette is not significantly enlarged.  There is a stable large thin walled cyst projecting over the left upper lung zone.  There is increased fullness of the right hilar region, not well appreciated on prior radiograph examination.  Additionally, there is a possible new 1.0 cm nodular opacity projecting over the right upper lung zone.  The remaining lungs demonstrate no new confluent airspace consolidation.  No significant volume of  pleural fluid or pneumothorax appreciated.  Osseous structures appear grossly intact.     1.  Increased fullness of the right hilar region, not well appreciated on prior radiograph examination as well as possible new 1.0 cm nodular opacity projecting over the right upper lung zone.  Given short-term interval development, this could relate to underlying infectious process, however neoplastic process not excluded.  Consider further assessment with dedicated chest CT. 2.  Stable large thin walled left upper lung zone cyst. Electronically signed by: Lenny Pereira MD Date:    05/13/2022 Time:    03:45    Microbiology Results (last 7 days)       Procedure Component Value Units Date/Time    AFB Culture & Smear [[id number]] Collected: 05/17/22 1616    Order Status: Sent Specimen: Respiratory from Sputum Updated: 05/17/22 2046    AFB Culture & Smear [[id number]] Collected: 05/17/22 1616    Order Status: Sent Specimen: Respiratory from Sputum Updated: 05/17/22 1616    Blood culture x two cultures. Draw prior to antibiotics. [[id number]] Collected: 05/13/22 0255    Order Status: Completed Specimen: Blood from Peripheral, Antecubital, Right Updated: 05/17/22 1012     Blood Culture, Routine No Growth to date      No Growth to date      No Growth to date      No Growth to date      No Growth to date    Narrative:      Aerobic and anaerobic    Blood culture x two cultures. Draw prior to antibiotics. [[id number]] Collected: 05/13/22 0311    Order Status: Completed Specimen: Blood from Peripheral, Forearm, Right Updated: 05/17/22 1012     Blood Culture, Routine No Growth to date      No Growth to date      No Growth to date      No Growth to date      No Growth to date    Narrative:      Aerobic and anaerobic    Culture, Respiratory with Gram Stain [[id number]]  (Abnormal) Collected: 05/15/22 1710    Order Status: Completed Specimen: Respiratory from Sputum Updated: 05/17/22 0829     Respiratory Culture STAPHYLOCOCCUS  AUREUS  Few  Susceptibility pending       Gram Stain (Respiratory) <10 epithelial cells per low power field.     Gram Stain (Respiratory) Rare WBC's     Gram Stain (Respiratory) Rare Gram positive cocci     Gram Stain (Respiratory) Rare Gram negative rods    AFB Culture & Smear [[id number]] Collected: 05/15/22 1710    Order Status: Sent Specimen: Respiratory from Sputum Updated: 05/15/22 1711    Influenza A & B by Molecular [[id number]] Collected: 05/13/22 0313    Order Status: Completed Specimen: Nasopharyngeal Swab Updated: 05/13/22 0357     Influenza A, Molecular Negative     Influenza B, Molecular Negative     Flu A & B Source Nasal swab                Assessment/Plan:      Pneumonia  Pt afebrile but w/ leokocytosis and new findings on CXR   Initiated Broad spec abx in ED w/ Vanc/cef/flagyl  Bld cultures drawn in ED w/ NGTD  CT chest w/ 4.5 cm right upper lobe cavitary mass with adjacent 9 mm satellite lesion which is concerning for infectious etiology/atypical pneumonia as well as a large thin walled left upper lobe bulla  Sputum cx w/ staph aureus w/ susceptibilities pending  Add vanc 5/17 for potential MRSA      Plan:  Pulm consulted  Rec cnt pneumonia abx but acquire Quant gold and 3x AFB smears   Continue Augmentin/azithromycin/vanc for potential MRSA coverage    Cnt to monitor for fevers and repeat cultures/imaging      AMS (altered mental status)  Pt w/ AMS on admit thought to be related to DKA  Uncertain baseline mentation though previous CVA in 2013  After DKA resolution pt still altered  UDS and EtOH neg on admit   Ammonia slightly elevated to 61  CT head w/o any new intracranial abnormalities  Folate 11.2  VitB12 415  TSH 0.409  RPR nonreactive  HIV neg   MRI brain w/o w/ no acute intracranial abnormalities   EEG showed generalized cerebral dysfunction but no signs of seizure activity      Plan:  AMS lab work including B1/6      Epilepsy  Restart home keppra and depakote  EEG done 5/16 which showed no  seizure or epileptiform activity     Hypertension  Patient not on any home anti-HTN medication  Plan  Losartan 25mg daily  Monitor Vitals      Type 2 diabetes mellitus, with long-term current use of insulin  Patient uses Humalin 70/30 to control BG  Plan  MDSSI  Detemir 15U Subq nightly  POCT BG Q4h  Diabetic Diet      ETOH abuse  Pt w/ known EtOH abuse hx  Ethanol neg on admit  Hx of EtOH seizures    Plan:  CIWA q4   Ativan PRN for CIWA >8  Thiamine and multivitamin daily         VTE Risk Mitigation (From admission, onward)         Ordered     IP VTE LOW RISK PATIENT  Once         05/13/22 0530     Place sequential compression device  Until discontinued         05/13/22 0530                Discharge Planning   SHABANA: 5/14/2022     Code Status: Full Code   Is the patient medically ready for discharge?:     Reason for patient still in hospital (select all that apply): Patient trending condition  Discharge Plan A: Lynnette Grande MD  Department of Hospital Medicine   [place] [facility]

## 2022-05-18 NOTE — PLAN OF CARE
Sputum cx noted to be growing MRSA, patient is on vanc. AFB sputum samples 1 and 2 are in process, quant gold is in process. Awaiting collection of AFB sputum sample 3 as well as MTB PCR sputum sample. Will communicate with .     Zeina Hunt MD  [facility] Pulmonary/Critical Care Fellow  05/18/2022 11:32 AM

## 2022-05-18 NOTE — ASSESSMENT & PLAN NOTE
Patient uses Humalin 70/30 to control BG  Plan  MDSSI  Detemir 15U Subq nightly  POCT BG Q4h  Diabetic Diet

## 2022-05-18 NOTE — ASSESSMENT & PLAN NOTE
Pt afebrile but w/ leokocytosis and new findings on CXR   Initiated Broad spec abx in ED w/ Vanc/cef/flagyl  Bld cultures drawn in ED w/ NGTD  CT chest w/ 4.5 cm right upper lobe cavitary mass with adjacent 9 mm satellite lesion which is concerning for infectious etiology/atypical pneumonia as well as a large thin walled left upper lobe bulla  Sputum cx w/ staph aureus w/ susceptibilities pending  Add vanc 5/17 for potential MRSA      Plan:  Pulm consulted  Rec cnt pneumonia abx but acquire Quant gold and 3x AFB smears   Continue Augmentin/azithromycin/vanc for potential MRSA coverage    Cnt to monitor for fevers and repeat cultures/imaging     negative...

## 2022-05-19 LAB
ALBUMIN SERPL BCP-MCNC: 3 G/DL (ref 3.5–5.2)
ALP SERPL-CCNC: 60 U/L (ref 55–135)
ALT SERPL W/O P-5'-P-CCNC: 10 U/L (ref 10–44)
ANION GAP SERPL CALC-SCNC: 11 MMOL/L (ref 8–16)
AST SERPL-CCNC: 10 U/L (ref 10–40)
BASOPHILS # BLD AUTO: 0.07 K/UL (ref 0–0.2)
BASOPHILS NFR BLD: 0.7 % (ref 0–1.9)
BILIRUB SERPL-MCNC: 0.2 MG/DL (ref 0.1–1)
BUN SERPL-MCNC: 11 MG/DL (ref 6–20)
CALCIUM SERPL-MCNC: 9.6 MG/DL (ref 8.7–10.5)
CHLORIDE SERPL-SCNC: 98 MMOL/L (ref 95–110)
CO2 SERPL-SCNC: 27 MMOL/L (ref 23–29)
CREAT SERPL-MCNC: 0.7 MG/DL (ref 0.5–1.4)
DIFFERENTIAL METHOD: ABNORMAL
EOSINOPHIL # BLD AUTO: 0.1 K/UL (ref 0–0.5)
EOSINOPHIL NFR BLD: 1.2 % (ref 0–8)
ERYTHROCYTE [DISTWIDTH] IN BLOOD BY AUTOMATED COUNT: 12.7 % (ref 11.5–14.5)
EST. GFR  (AFRICAN AMERICAN): >60 ML/MIN/1.73 M^2
EST. GFR  (NON AFRICAN AMERICAN): >60 ML/MIN/1.73 M^2
GAMMA INTERFERON BACKGROUND BLD IA-ACNC: 0 IU/ML
GLUCOSE SERPL-MCNC: 250 MG/DL (ref 70–110)
HCT VFR BLD AUTO: 32.4 % (ref 40–54)
HGB BLD-MCNC: 11 G/DL (ref 14–18)
IMM GRANULOCYTES # BLD AUTO: 0.11 K/UL (ref 0–0.04)
IMM GRANULOCYTES NFR BLD AUTO: 1.1 % (ref 0–0.5)
LYMPHOCYTES # BLD AUTO: 3 K/UL (ref 1–4.8)
LYMPHOCYTES NFR BLD: 30.8 % (ref 18–48)
M TB IFN-G CD4+ BCKGRND COR BLD-ACNC: 0.05 IU/ML
MAGNESIUM SERPL-MCNC: 1.8 MG/DL (ref 1.6–2.6)
MCH RBC QN AUTO: 31.5 PG (ref 27–31)
MCHC RBC AUTO-ENTMCNC: 34 G/DL (ref 32–36)
MCV RBC AUTO: 93 FL (ref 82–98)
MITOGEN IGNF BCKGRD COR BLD-ACNC: 4.71 IU/ML
MONOCYTES # BLD AUTO: 0.7 K/UL (ref 0.3–1)
MONOCYTES NFR BLD: 6.7 % (ref 4–15)
NEUTROPHILS # BLD AUTO: 5.8 K/UL (ref 1.8–7.7)
NEUTROPHILS NFR BLD: 59.5 % (ref 38–73)
NRBC BLD-RTO: 0 /100 WBC
PHOSPHATE SERPL-MCNC: 4.1 MG/DL (ref 2.7–4.5)
PLATELET # BLD AUTO: 457 K/UL (ref 150–450)
PMV BLD AUTO: 9.1 FL (ref 9.2–12.9)
POCT GLUCOSE: 157 MG/DL (ref 70–110)
POCT GLUCOSE: 268 MG/DL (ref 70–110)
POCT GLUCOSE: 272 MG/DL (ref 70–110)
POCT GLUCOSE: 296 MG/DL (ref 70–110)
POCT GLUCOSE: 348 MG/DL (ref 70–110)
POTASSIUM SERPL-SCNC: 4.3 MMOL/L (ref 3.5–5.1)
PROT SERPL-MCNC: 6.2 G/DL (ref 6–8.4)
PYRIDOXAL SERPL-MCNC: 6 UG/L (ref 5–50)
RBC # BLD AUTO: 3.49 M/UL (ref 4.6–6.2)
SODIUM SERPL-SCNC: 136 MMOL/L (ref 136–145)
TB GOLD PLUS: NEGATIVE
TB2 - NIL: 0.01 IU/ML
VANCOMYCIN TROUGH SERPL-MCNC: 8.6 UG/ML (ref 10–22)
VIT B1 BLD-MCNC: 88 UG/L (ref 38–122)
WBC # BLD AUTO: 9.77 K/UL (ref 3.9–12.7)

## 2022-05-19 PROCEDURE — 11000001 HC ACUTE MED/SURG PRIVATE ROOM

## 2022-05-19 PROCEDURE — 63600175 PHARM REV CODE 636 W HCPCS: Performed by: FAMILY MEDICINE

## 2022-05-19 PROCEDURE — 85025 COMPLETE CBC W/AUTO DIFF WBC: CPT | Performed by: STUDENT IN AN ORGANIZED HEALTH CARE EDUCATION/TRAINING PROGRAM

## 2022-05-19 PROCEDURE — 63600175 PHARM REV CODE 636 W HCPCS: Performed by: STUDENT IN AN ORGANIZED HEALTH CARE EDUCATION/TRAINING PROGRAM

## 2022-05-19 PROCEDURE — 25000003 PHARM REV CODE 250: Performed by: STUDENT IN AN ORGANIZED HEALTH CARE EDUCATION/TRAINING PROGRAM

## 2022-05-19 PROCEDURE — 87556 M.TUBERCULO DNA AMP PROBE: CPT | Performed by: FAMILY MEDICINE

## 2022-05-19 PROCEDURE — 94761 N-INVAS EAR/PLS OXIMETRY MLT: CPT

## 2022-05-19 PROCEDURE — 99900035 HC TECH TIME PER 15 MIN (STAT)

## 2022-05-19 PROCEDURE — 25000003 PHARM REV CODE 250: Performed by: FAMILY MEDICINE

## 2022-05-19 PROCEDURE — 80053 COMPREHEN METABOLIC PANEL: CPT | Performed by: STUDENT IN AN ORGANIZED HEALTH CARE EDUCATION/TRAINING PROGRAM

## 2022-05-19 PROCEDURE — 80202 ASSAY OF VANCOMYCIN: CPT | Performed by: FAMILY MEDICINE

## 2022-05-19 PROCEDURE — 36415 COLL VENOUS BLD VENIPUNCTURE: CPT | Performed by: FAMILY MEDICINE

## 2022-05-19 PROCEDURE — S4991 NICOTINE PATCH NONLEGEND: HCPCS | Performed by: STUDENT IN AN ORGANIZED HEALTH CARE EDUCATION/TRAINING PROGRAM

## 2022-05-19 PROCEDURE — 83735 ASSAY OF MAGNESIUM: CPT | Performed by: STUDENT IN AN ORGANIZED HEALTH CARE EDUCATION/TRAINING PROGRAM

## 2022-05-19 PROCEDURE — 27000207 HC ISOLATION

## 2022-05-19 PROCEDURE — C9399 UNCLASSIFIED DRUGS OR BIOLOG: HCPCS | Performed by: STUDENT IN AN ORGANIZED HEALTH CARE EDUCATION/TRAINING PROGRAM

## 2022-05-19 PROCEDURE — 84100 ASSAY OF PHOSPHORUS: CPT | Performed by: STUDENT IN AN ORGANIZED HEALTH CARE EDUCATION/TRAINING PROGRAM

## 2022-05-19 RX ORDER — CLINDAMYCIN HYDROCHLORIDE 150 MG/1
600 CAPSULE ORAL EVERY 8 HOURS
Status: DISCONTINUED | OUTPATIENT
Start: 2022-05-19 | End: 2022-05-19

## 2022-05-19 RX ORDER — CLINDAMYCIN PHOSPHATE 600 MG/50ML
600 INJECTION, SOLUTION INTRAVENOUS
Status: DISCONTINUED | OUTPATIENT
Start: 2022-05-19 | End: 2022-05-25

## 2022-05-19 RX ORDER — SODIUM CHLORIDE FOR INHALATION 3 %
4 VIAL, NEBULIZER (ML) INHALATION ONCE
Status: DISCONTINUED | OUTPATIENT
Start: 2022-05-19 | End: 2022-05-19

## 2022-05-19 RX ADMIN — INSULIN DETEMIR 25 UNITS: 100 INJECTION, SOLUTION SUBCUTANEOUS at 10:05

## 2022-05-19 RX ADMIN — DIVALPROEX SODIUM 1000 MG: 250 TABLET, EXTENDED RELEASE ORAL at 10:05

## 2022-05-19 RX ADMIN — LEVETIRACETAM 750 MG: 250 TABLET, FILM COATED ORAL at 10:05

## 2022-05-19 RX ADMIN — THIAMINE HCL TAB 100 MG 100 MG: 100 TAB at 10:05

## 2022-05-19 RX ADMIN — THERA TABS 1 TABLET: TAB at 10:05

## 2022-05-19 RX ADMIN — CLINDAMYCIN HYDROCHLORIDE 600 MG: 150 CAPSULE ORAL at 01:05

## 2022-05-19 RX ADMIN — NICOTINE 1 PATCH: 14 PATCH, EXTENDED RELEASE TRANSDERMAL at 10:05

## 2022-05-19 RX ADMIN — INSULIN ASPART 4 UNITS: 100 INJECTION, SOLUTION INTRAVENOUS; SUBCUTANEOUS at 10:05

## 2022-05-19 RX ADMIN — CLINDAMYCIN IN 5 PERCENT DEXTROSE 600 MG: 12 INJECTION, SOLUTION INTRAVENOUS at 10:05

## 2022-05-19 RX ADMIN — LOSARTAN POTASSIUM 25 MG: 25 TABLET, FILM COATED ORAL at 10:05

## 2022-05-19 RX ADMIN — MUPIROCIN: 20 OINTMENT TOPICAL at 10:05

## 2022-05-19 RX ADMIN — INSULIN ASPART 6 UNITS: 100 INJECTION, SOLUTION INTRAVENOUS; SUBCUTANEOUS at 06:05

## 2022-05-19 RX ADMIN — VANCOMYCIN HYDROCHLORIDE 500 MG: 500 INJECTION, POWDER, LYOPHILIZED, FOR SOLUTION INTRAVENOUS at 04:05

## 2022-05-19 RX ADMIN — VANCOMYCIN HYDROCHLORIDE 1250 MG: 1.25 INJECTION, POWDER, LYOPHILIZED, FOR SOLUTION INTRAVENOUS at 01:05

## 2022-05-19 NOTE — ASSESSMENT & PLAN NOTE
Patient uses Humalin 70/30 to control BG  Plan  MDSSI  Detemir 25U Subq nightly  POCT BG Q4h  Diabetic Diet

## 2022-05-19 NOTE — CARE UPDATE
Spoke with Pulm team about pt's tx plan moving forward. They went to speak with the pt about his tx and possible d/c; however, when I went to speak with the pt he was aware they had come by but could not give definitive information regarding the plan. I again reminded him he would require PO abx TID for 7 days. The pt was in agreement with this plan but upon further questioning if his medications were to be stolen or lost as he has had happen previously he was unable to form a plan in which he could follow up and make sure his infxn is treated.  It was then reiterated the serious nature of his infxn and need for full course of treatment.     Corby Grande MD  [facility], PGY-1  2:11 PM, 5/19/22

## 2022-05-19 NOTE — PLAN OF CARE
SW met with pt at bedside through [employer] due to TB rule out. SW discussed d/c planning with Pt. The Pt is till agreeable to shelter placement once lab results are in. SW will follow.     ROB Harper  [phone]    Future Appointments   Date Time Provider Department Center   5/27/2022 10:00 AM Keanu Pinon PA-C [facility]   6/22/2022  8:20 AM Merle Oliva MD [facility]        05/19/22 1158   Post-Acute Status   Post-Acute Authorization Placement   Discharge Plan   Discharge Plan A Shelter

## 2022-05-19 NOTE — PROGRESS NOTES
Progress Note  [facility]    Attending: Rodolfo  Fellow: Manju  Admit Date: 5/13/2022  Today's Date: 05/19/2022      SUBJECTIVE:     Reports feeling well and is ready to eat lunch. Denies any fevers or coughing since admission.     OBJECTIVE:     Vital Signs Trends/Hx Reviewed  Vitals:    05/19/22 0559 05/19/22 0819 05/19/22 0908 05/19/22 1138   BP: 100/64 102/68  102/66   BP Location:  Right arm  Right arm   Patient Position: Lying Lying  Lying   Pulse: 80 71  75   Resp: 18 18 18   Temp: 97.7 °F (36.5 °C) 98.5 °F (36.9 °C)  98.7 °F (37.1 °C)   TempSrc: Oral Oral  Oral   SpO2: 98% 99% 98% 98%   Weight:       Height:           Ventilator settings:   No data recorded       Physical Exam:  Physical Exam  Constitutional:       Appearance: Normal appearance.   HENT:      Head: Normocephalic and atraumatic.   Cardiovascular:      Rate and Rhythm: Normal rate and regular rhythm.      Pulses: Normal pulses.      Heart sounds: No murmur heard.  Pulmonary:      Effort: Pulmonary effort is normal. No respiratory distress.      Breath sounds: Normal breath sounds. No rales.   Abdominal:      General: Abdomen is flat.      Tenderness: There is no abdominal tenderness.   Musculoskeletal:         General: No swelling.   Neurological:      Mental Status: He is alert. Mental status is at baseline.      Motor: No weakness.         Laboratory:  No results for input(s): PH, PCO2, PO2, HCO3, POCSATURATED, BE in the last 24 hours.  Recent Labs   Lab 05/19/22  0442   WBC 9.77   RBC 3.49*   HGB 11.0*   HCT 32.4*   *   MCV 93   MCH 31.5*   MCHC 34.0     Recent Labs   Lab 05/19/22  0442      K 4.3   CL 98   CO2 27   BUN 11   CREATININE 0.7   MG 1.8       Microbiology Data:   Microbiology Results (last 7 days)     Procedure Component Value Units Date/Time    AFB Culture & Smear [[id number]] Collected: 05/17/22 1616    Order Status: Completed Specimen: Respiratory from Sputum Updated: 05/18/22 2122      AFB Culture & Smear Culture in progress     AFB CULTURE STAIN No acid fast bacilli seen.    AFB Culture & Smear [[id number]]     Order Status: No result Specimen: Body Fluid from Sputum, Expectorated     Culture, Respiratory with Gram Stain [[id number]]  (Abnormal)  (Susceptibility) Collected: 05/15/22 1710    Order Status: Completed Specimen: Respiratory from Sputum Updated: 05/18/22 1014     Respiratory Culture No Pseudomonas isolated.      METHICILLIN RESISTANT STAPHYLOCOCCUS AUREUS  Few  Normal respiratory armida also present       Gram Stain (Respiratory) <10 epithelial cells per low power field.     Gram Stain (Respiratory) Rare WBC's     Gram Stain (Respiratory) Rare Gram positive cocci     Gram Stain (Respiratory) Rare Gram negative rods    Blood culture x two cultures. Draw prior to antibiotics. [[id number]] Collected: 05/13/22 0255    Order Status: Completed Specimen: Blood from Peripheral, Antecubital, Right Updated: 05/18/22 1012     Blood Culture, Routine No growth after 5 days.    Narrative:      Aerobic and anaerobic    Blood culture x two cultures. Draw prior to antibiotics. [[id number]] Collected: 05/13/22 0311    Order Status: Completed Specimen: Blood from Peripheral, Forearm, Right Updated: 05/18/22 1012     Blood Culture, Routine No growth after 5 days.    Narrative:      Aerobic and anaerobic    AFB Culture & Smear [[id number]] Collected: 05/17/22 1616    Order Status: Sent Specimen: Respiratory from Sputum Updated: 05/17/22 1616    AFB Culture & Smear [[id number]] Collected: 05/15/22 1710    Order Status: Sent Specimen: Respiratory from Sputum Updated: 05/15/22 1711    Influenza A & B by Molecular [[id number]] Collected: 05/13/22 0313    Order Status: Completed Specimen: Nasopharyngeal Swab Updated: 05/13/22 0357     Influenza A, Molecular Negative     Influenza B, Molecular Negative     Flu A & B Source Nasal swab           Chest Imaging:   No results found in the last 24 hours.      Infusions:         Scheduled Medications:    clindamycin  600 mg Oral Q8H    divalproex ER  1,000 mg Oral Q12H    insulin detemir U-100  25 Units Subcutaneous QHS    levETIRAcetam  750 mg Oral BID    losartan  25 mg Oral Daily    multivitamin  1 tablet Oral Daily    mupirocin   Nasal BID    nicotine  1 patch Transdermal Daily    sodium chloride 3%  4 mL Nebulization Once    thiamine  100 mg Oral Daily    vancomycin (VANCOCIN) IVPB  1,750 mg Intravenous Q12H       PRN Medications:   acetaminophen, acetaminophen, dextrose 10%, dextrose 10%, glucagon (human recombinant), glucose, glucose, insulin aspart U-100, LIDOcaine, lorazepam, melatonin, naloxone, sodium chloride 0.9%    Problem List:   Patient Active Problem List   Diagnosis    COPD (chronic obstructive pulmonary disease)    ETOH abuse    Type 2 diabetes mellitus, with long-term current use of insulin    Hypertension    History of stroke    Cardiomyopathy    Hyperlipidemia    Risk for falls    Bizarre behavior    Epilepsy    Cachectic    Bleb, lung    Severe malnutrition    Neurocognitive disorder    Complex partial status epilepticus    Substance or medication-induced psychotic disorder, with delusions    Type 2 diabetes mellitus with hyperglycemia    Hypomagnesemia     Alcohol withdrawal syndrome, with delirium    Cerebrovascular accident (CVA) due to thrombosis of right middle cerebral artery    Tachycardia    Mitral valve vegetation    Multiple complaints    Cervical Spine Lesion    Seizure    Weakness    Other incomplete lesion at C2 level of cervical spinal cord, initial encounter    Alcohol use disorder, severe, dependence    Back pain    Urinary retention    Impaired functional mobility, balance, gait, and endurance    Decreased strength, endurance, and mobility    Decreased  strength    Decreased coordination    AMS (altered mental status)    Pneumonia       ASSESSMENT & RECOMMENDATIONS        Patient is a  42 yr old  male with pmhx of T2DM, HTN, CVA, neurocognitive impairment and substance abuse disorder admitted for DKA. Patient was monitored for altered mental status. CT chest showing RUL cavitary mass with satellite lesions and a large SAVANAH bulla. Found to have MRSA CAP with possible differential diagnosis of TB being worked up.    Cavitary lesion  - CXR showing 1cm nodular opacity projecting over RUL zone  - CT chest showing RUL 4.5 cm cavitary mass with adjacent 9 mm satellite lesion. Large bulla in SAVANAH  - not found on previous CT chest  - currently asymptomatic  - initially on vanc/cefepime/flagyl switched to vanc and unasyn and  - respiratory culture: MRSA sensitive to vanc and clindamycin. Patient has been subtherapeutic on vanc (day 2) so recommend starting from 5/19 with clinda TID for a full 7 day course  - 2 out of 3 AFB smears in process  - MTB PCR sputum pending collection  - quantiferon gold pending  - HIV negative  - airborne precautions  - Continue to monitor for fevers and repeat cultures/imaging  - recommend CXR on discharge after completing Abx course       Will continue to follow this patient.  Case discussed with staff and primary team.    5/19/2022 Kody Mcnair M.D., -[facility]  [facility]  [facility]

## 2022-05-19 NOTE — PROGRESS NOTES
Pharmacokinetic Assessment Follow Up: IV Vancomycin    Vancomycin serum concentration assessment(s):    The trough level was drawn correctly and can be used to guide therapy at this time. The measurement is below the desired definitive target range of 15 to 20 mcg/mL.    Vancomycin Regimen Plan:    Change regimen to Vancomycin 1750 mg IV every 12 hours with next serum trough concentration measured at 1300 prior to 4th dose on 5/20    Drug levels (last 3 results):  Recent Labs   Lab Result Units 05/19/22  0010   Vancomycin-Trough ug/mL 8.6*       Pharmacy will continue to follow and monitor vancomycin.    Please contact pharmacy at extension [phone] for questions regarding this assessment.    Thank you for the consult,   Damaris Flores       Patient brief summary:  Terrance Chavez is a 42 y.o. male initiated on antimicrobial therapy with IV Vancomycin for treatment of lower respiratory infection    The patient's current regimen is 1250 mg q12h    Drug Allergies:   Review of patient's allergies indicates:  No Known Allergies    Actual Body Weight:   72.6 kg    Renal Function:   Estimated Creatinine Clearance: 127.2 mL/min (based on SCr of 0.7 mg/dL).,     Dialysis Method (if applicable):  N/A    CBC (last 72 hours):  Recent Labs   Lab Result Units 05/16/22  0850 05/17/22  0412 05/18/22  0321   WBC K/uL 12.35 9.98 9.12   Hemoglobin g/dL 11.4* 11.0* 11.4*   Hematocrit % 34.7* 31.3* 32.4*   Platelets K/uL 482* 503* 478*   Gran % % 69.4 59.7 57.7   Lymph % % 22.3 29.3 32.5   Mono % % 6.8 8.0 7.3   Eosinophil % % 0.9 1.6 1.3   Basophil % % 0.3 0.6 0.5   Differential Method  Automated Automated Automated       Metabolic Panel (last 72 hours):  Recent Labs   Lab Result Units 05/16/22  0850 05/17/22  0412 05/18/22  0321 05/18/22  2041   Sodium mmol/L 141 137 134*  --    Potassium mmol/L 4.4 4.5 4.4  --    Chloride mmol/L 102 99 97  --    CO2 mmol/L 31* 25 23  --    Glucose mg/dL 180* 280* 363* 477*   BUN mg/dL 7 12 7  --     Creatinine mg/dL 0.7 0.6 0.7  --    Albumin g/dL 2.9* 2.9* 2.8*  --    Total Bilirubin mg/dL 0.4 0.2 0.3  --    Alkaline Phosphatase U/L 66 68 62  --    AST U/L 11 12 12  --    ALT U/L 6* 9* 9*  --    Magnesium mg/dL  --  1.6 1.7  --    Phosphorus mg/dL  --  3.5 3.0  --        Vancomycin Administrations:  vancomycin given in the last 96 hours                     vancomycin 1.25 g in dextrose 5% 250 mL IVPB (ready to mix) (mg) 1,250 mg New Bag 05/19/22 0134     1,250 mg New Bag 05/18/22 1400     1,250 mg New Bag  0118    vancomycin (VANCOCIN) 1,750 mg in dextrose 5 % 500 mL IVPB (mg) 1,750 mg New Bag 05/17/22 1311                    Microbiologic Results:  Microbiology Results (last 7 days)       Procedure Component Value Units Date/Time    AFB Culture & Smear [[id number]] Collected: 05/17/22 1616    Order Status: Completed Specimen: Respiratory from Sputum Updated: 05/18/22 2127     AFB Culture & Smear Culture in progress     AFB CULTURE STAIN No acid fast bacilli seen.    AFB Culture & Smear [[id number]]     Order Status: No result Specimen: Body Fluid from Sputum, Expectorated     Culture, Respiratory with Gram Stain [[id number]]  (Abnormal)  (Susceptibility) Collected: 05/15/22 1710    Order Status: Completed Specimen: Respiratory from Sputum Updated: 05/18/22 1014     Respiratory Culture No Pseudomonas isolated.      METHICILLIN RESISTANT STAPHYLOCOCCUS AUREUS  Few  Normal respiratory armida also present       Gram Stain (Respiratory) <10 epithelial cells per low power field.     Gram Stain (Respiratory) Rare WBC's     Gram Stain (Respiratory) Rare Gram positive cocci     Gram Stain (Respiratory) Rare Gram negative rods    Blood culture x two cultures. Draw prior to antibiotics. [[id number]] Collected: 05/13/22 0255    Order Status: Completed Specimen: Blood from Peripheral, Antecubital, Right Updated: 05/18/22 1012     Blood Culture, Routine No growth after 5 days.    Narrative:      Aerobic and anaerobic    Blood  culture x two cultures. Draw prior to antibiotics. [[id number]] Collected: 05/13/22 0311    Order Status: Completed Specimen: Blood from Peripheral, Forearm, Right Updated: 05/18/22 1012     Blood Culture, Routine No growth after 5 days.    Narrative:      Aerobic and anaerobic    AFB Culture & Smear [[id number]] Collected: 05/17/22 1616    Order Status: Sent Specimen: Respiratory from Sputum Updated: 05/17/22 1616    AFB Culture & Smear [[id number]] Collected: 05/15/22 1710    Order Status: Sent Specimen: Respiratory from Sputum Updated: 05/15/22 1711    Influenza A & B by Molecular [[id number]] Collected: 05/13/22 0313    Order Status: Completed Specimen: Nasopharyngeal Swab Updated: 05/13/22 0357     Influenza A, Molecular Negative     Influenza B, Molecular Negative     Flu A & B Source Nasal swab

## 2022-05-19 NOTE — PROGRESS NOTES
[facility] Medicine  Progress Note    Patient Name: Terrance Chavez  MRN: [id number]  Patient Class: IP- Inpatient   Admission Date: 5/13/2022  Length of Stay: 4 days  Attending Physician: Alonso Carlos III, MD  Primary Care Provider: Primary Doctor No        Subjective:     Principal Problem:Hyperglycemia        HPI:  Patient is a 42 yr old male with pmhx of T2DM, HTN, CVA, neurocognitive impairment and substance abuse disorder presenting to the ED for Altered mental status. [place]ly, brought in by EMS. On arrival patient was only oriented to person but not place,time or situation. Patient blood glucose was elevated on arrival. Initial labs showed small acidosis. Patient was provided 2L LR in ED and his mentation improved. Patient had a fever in the ED , and elevated WBC  of 29.9. Can not rule out infection exacerbating or causing change in mental status. Patient received 2g ceftriaxone in ED. Patient denies chest pain, shortness of breath, headache, nausea and vomiting. Admitted to [facility] for observation and correction of glucose.      Overview/Hospital Course:  Pt was admitted to the FM service for DKA and AMS. Pt was initially found to have elevated BG as well as Beta hydroxybutyrate and AG. He was started on an insulin drip as well as giving his long acting insulin. His DKA quickly resolved and then was continued to work up for his AMS and new lung findings. On admission he had a CXR which showed a new R Upper Lung nodule as well as hilar haziness and a known L upper lung cyst. A CT was recommned for f/u which showed a 4.5 cm R Upper Lobe and satellite 9mm lesion. Pulm was consulted and pt was worked up for potential pneumonia vs TB he was started on a 5 day course of augmentin and azithromycin which he will complete on 5/18. Quant gold as well as AFBs were collected which showed............On admission he had a CT head which did not  show any acute intracranial abnormalities as well as EtOH and UDS which were neg. It was unclear as to what his BL mentation is as previous notes had documented normal mentation and AO x4. His AMS was worked up with HIV/RPR/B12/Folate which were wnl and B1/6 pending. Neurology was consulted to determine if there was any other potential causes of AMS and rec an MRI and EEG. His MRI again did not show any acute abnormalities and EEG which showed generalized cerebral dysfxn but no epileptiform activity. The pt's mentation slowly improved throughout his admission and was AO x3 at time of d/c. Pt was educated on his chronic conditions and SW gave him multiple resources for his outpt care. He was given strict return precautions and all questions were answered.      Interval History: NAEON. Pt remains Aox 3 this AM. We are pending placement to a shelter for him but awaiting results of his quant gold and AFB's as well as further TB studies. He has no acute complaints today.    Review of Systems   Constitutional:  Negative for activity change, chills, fatigue and fever.   HENT:  Negative for congestion, sinus pressure and trouble swallowing.    Eyes:  Negative for photophobia and visual disturbance.   Respiratory:  Negative for cough, choking and shortness of breath.    Cardiovascular:  Negative for chest pain and leg swelling.   Gastrointestinal:  Negative for abdominal distention, constipation, diarrhea, nausea and vomiting.   Endocrine: Negative for polyuria.   Genitourinary:  Negative for dysuria and hematuria.   Musculoskeletal:  Negative for arthralgias.   Skin:  Negative for wound.   Neurological:  Negative for dizziness, tremors, seizures, speech difficulty, weakness, light-headedness, numbness and headaches.   Objective:     Vital Signs (Most Recent):  Temp: 98.5 °F (36.9 °C) (05/19/22 0819)  Pulse: 71 (05/19/22 0819)  Resp: 18 (05/19/22 0819)  BP: 102/68 (05/19/22 0819)  SpO2: 98 % (05/19/22 0908) Vital Signs (24h  Range):  Temp:  [96.1 °F (35.6 °C)-98.5 °F (36.9 °C)] 98.5 °F (36.9 °C)  Pulse:  [71-91] 71  Resp:  [14-19] 18  SpO2:  [97 %-100 %] 98 %  BP: (100-117)/(64-70) 102/68     Weight: 72.6 kg (160 lb 0.9 oz)  Body mass index is 25.07 kg/m².  No intake or output data in the 24 hours ending 05/19/22 1132   Physical Exam  Constitutional:       General: He is not in acute distress.  HENT:      Head: Normocephalic and atraumatic.      Right Ear: External ear normal.      Left Ear: External ear normal.      Nose: Nose normal.   Eyes:      Extraocular Movements: Extraocular movements intact.      Conjunctiva/sclera: Conjunctivae normal.      Pupils: Pupils are equal, round, and reactive to light.   Cardiovascular:      Rate and Rhythm: Normal rate and regular rhythm.      Pulses: Normal pulses.      Heart sounds: Normal heart sounds. No murmur heard.    No friction rub. No gallop.   Pulmonary:      Effort: Pulmonary effort is normal. No respiratory distress.      Breath sounds: Normal breath sounds. No wheezing or rhonchi.   Abdominal:      General: Bowel sounds are normal. There is no distension.      Palpations: There is no mass.      Tenderness: There is no abdominal tenderness.   Musculoskeletal:         General: No swelling or tenderness. Normal range of motion.      Cervical back: Normal range of motion. No tenderness.   Lymphadenopathy:      Cervical: No cervical adenopathy.   Skin:     General: Skin is warm and dry.      Findings: No rash.   Neurological:      Mental Status: He is alert.      Comments: AO x3     Recent Labs   Lab 05/17/22  0412 05/18/22  0321 05/19/22  0442   WBC 9.98 9.12 9.77   HGB 11.0* 11.4* 11.0*   HCT 31.3* 32.4* 32.4*   MCV 93 92 93   RBC 3.35* 3.54* 3.49*   MCH 32.8* 32.2* 31.5*   MCHC 35.1 35.2 34.0   RDW 12.8 12.6 12.7   * 478* 457*   MPV 9.1* 9.0* 9.1*   GRAN 59.7  6.0 57.7  5.3 59.5  5.8   LYMPH 29.3  2.9 32.5  3.0 30.8  3.0   MONO 8.0  0.8 7.3  0.7 6.7  0.7   EOSINOPHIL 1.6  1.3 1.2   BASOPHIL 0.6 0.5 0.7     Recent Labs   Lab 05/17/22  0412 05/18/22  0321 05/18/22 2041 05/19/22  0442    134*  --  136   K 4.5 4.4  --  4.3   CL 99 97  --  98   CO2 25 23  --  27   ANIONGAP 13 14  --  11   BUN 12 7  --  11   CREATININE 0.6 0.7  --  0.7   * 363* 477* 250*   CALCIUM 9.3 9.2  --  9.6   PROT 6.0 6.1  --  6.2   ALBUMIN 2.9* 2.8*  --  3.0*   ALKPHOS 68 62  --  60   BILITOT 0.2 0.3  --  0.2   ALT 9* 9*  --  10   AST 12 12  --  10   ESTGFRAFRICA >60 >60  --  >60   EGFRNONAA >60 >60  --  >60   MG 1.6 1.7  --  1.8   PHOS 3.5 3.0  --  4.1     Recent Labs   Lab 05/13/22  0340   COLORU Yellow   APPEARANCEUA Clear   PHUR 5.0   SPECGRAV 1.015   PROTEINUA 1+*   GLUCUA 3+*   KETONESU 2+*   BILIRUBINUA Negative   OCCULTUA Negative   UROBILINOGEN Negative   NITRITE Negative   LEUKOCYTESUR Negative   RBCUA 1   WBCUA 1   BACTERIA None   SQUAMEPITHEL 0   HYALINECASTS 0   MICROCMT SEE COMMENT     Recent Labs   Lab 05/13/22  0310   TROPONINI <0.006     Recent Labs   Lab 05/13/22  0310   INR 1.1   APTT 31.3     Recent Labs   Lab 05/14/22  1524   TSH 0.409     CT Head Without Contrast    Result Date: 5/13/2022  EXAMINATION: CT HEAD WITHOUT CONTRAST CLINICAL HISTORY: Mental status change, unknown cause; TECHNIQUE: Low dose axial images were obtained through the head.  Coronal and sagittal reformations were also performed. Contrast was not administered. COMPARISON: CT examination of the brain without contrast March 30, 2022 FINDINGS: The ventricular system, sulcal pattern and parenchymal attenuation characteristics are consistent with chronic change appearing similar to the prior examination without evidence for superimposed acute process.  There are areas consistent with remote lacunar-type ischemic change.  Chronic appearing white matter change noted. There is no evidence for intracranial mass, mass effect or midline shift and there is no evidence for acute intracranial hemorrhage.  Appropriate CSF  spaces are seen at the skull base. The visualized orbits appear intact.  The mastoid air cells and middle ear cavity bilaterally appear appropriate.  Mild opacity along the external auditory canal likely represents cerumen.  Minimal paranasal sinus mucosal thickening noted.  The osseous structures appear intact.     Chronic changes are noted, there is no evidence for superimposed acute intracranial process. Electronically signed by: Abimael Eid Date:    05/13/2022 Time:    04:10    CT Chest Without Contrast    Result Date: 5/15/2022  EXAMINATION: CT CHEST WITHOUT CONTRAST CLINICAL HISTORY: Abnormal xray - lung nodule, >= 1 cm; TECHNIQUE: Low dose axial images, sagittal and coronal reformations were obtained from the thoracic inlet to the lung bases. Contrast was not administered. COMPARISON: Radiograph 05/13/2022 FINDINGS: Lungs: There is a right upper lobe partially cavitary mass in the posterior segment eroding through the major fissure into the superior segment of the right lower lobe (series 601, image 105).  The lesion measures 4.5 cm in transverse plane (series 2, image 54). There is an adjacent right upper lobe nodule measuring 0.9 cm (series 2, image 39), with the same imaging characteristics. Mild emphysematous lung changes. There is a large left upper lobe thin walled bulla measuring 11.4 cm (series 2, image 136), stable. Mediastinum: Thyroid gland is grossly unremarkable.  No mediastinal lymphadenopathy.  Limited assessment of the hilum without IV contrast.  Thoracic aorta is normal caliber.  Heart size within normal limits.  No pericardial or pleural effusion.  Mild scattered calcific atherosclerosis. Upper abdomen: Extensive pancreatic calcifications, probable sequela of chronic pancreatitis. Chest wall: Mild gynecomastia. Bones: No marrow replacement process.     4.5 cm right upper lobe cavitary mass with adjacent 9 mm satellite lesion, as above, concerning for infectious etiology/atypical  pneumonia.  Malignancy could have a similar appearance. Large thin walled left upper lobe bulla, stable. Mild emphysematous lung changes. This report was flagged in Epic as abnormal. Electronically signed by: Juvencio Cardona MD Date:    05/15/2022 Time:    12:10    MRI Brain Without Contrast    Result Date: 5/16/2022  EXAMINATION: MRI BRAIN WITHOUT CONTRAST CLINICAL HISTORY: Mental status change, unknown cause; TECHNIQUE: Multiplanar multisequence MR imaging of the brain was performed without contrast. COMPARISON: CT head 05/13/2022 MRI 10/27/2021 FINDINGS: there is central volume loss, similar to prior studies.  There is no evidence of hydrocephalus, mass effect, intracranial hemorrhage or acute infarct. Focal lesions within the periventricular white matter bilaterally are stable from the prior study consistent with prior nonspecific insult.  No new white matter lesions are noted. Normal arterial flow voids are preserved at the skull base. The visualized sinuses and mastoid air cells are clear.     Stable appearance of the brain as detailed above...  No acute intracranial process. Electronically signed by: Ibrahima Soria Date:    05/16/2022 Time:    11:55    X-Ray Chest AP Portable    Result Date: 5/13/2022  EXAMINATION: XR CHEST AP PORTABLE CLINICAL HISTORY: Sepsis; TECHNIQUE: Single frontal view of the chest was performed. COMPARISON: Chest radiograph 03/28/2022, CT chest 10/27/2021 FINDINGS: Cardiac monitoring leads overlie the chest.  Cardiac silhouette is not significantly enlarged.  There is a stable large thin walled cyst projecting over the left upper lung zone.  There is increased fullness of the right hilar region, not well appreciated on prior radiograph examination.  Additionally, there is a possible new 1.0 cm nodular opacity projecting over the right upper lung zone.  The remaining lungs demonstrate no new confluent airspace consolidation.  No significant volume of pleural fluid or pneumothorax  appreciated.  Osseous structures appear grossly intact.     1.  Increased fullness of the right hilar region, not well appreciated on prior radiograph examination as well as possible new 1.0 cm nodular opacity projecting over the right upper lung zone.  Given short-term interval development, this could relate to underlying infectious process, however neoplastic process not excluded.  Consider further assessment with dedicated chest CT. 2.  Stable large thin walled left upper lung zone cyst. Electronically signed by: Lenny Pereira MD Date:    05/13/2022 Time:    03:45    Microbiology Results (last 7 days)       Procedure Component Value Units Date/Time    AFB Culture & Smear [[id number]] Collected: 05/17/22 1616    Order Status: Completed Specimen: Respiratory from Sputum Updated: 05/18/22 2127     AFB Culture & Smear Culture in progress     AFB CULTURE STAIN No acid fast bacilli seen.    AFB Culture & Smear [[id number]]     Order Status: No result Specimen: Body Fluid from Sputum, Expectorated     Culture, Respiratory with Gram Stain [[id number]]  (Abnormal)  (Susceptibility) Collected: 05/15/22 1710    Order Status: Completed Specimen: Respiratory from Sputum Updated: 05/18/22 1014     Respiratory Culture No Pseudomonas isolated.      METHICILLIN RESISTANT STAPHYLOCOCCUS AUREUS  Few  Normal respiratory armida also present       Gram Stain (Respiratory) <10 epithelial cells per low power field.     Gram Stain (Respiratory) Rare WBC's     Gram Stain (Respiratory) Rare Gram positive cocci     Gram Stain (Respiratory) Rare Gram negative rods    Blood culture x two cultures. Draw prior to antibiotics. [[id number]] Collected: 05/13/22 0255    Order Status: Completed Specimen: Blood from Peripheral, Antecubital, Right Updated: 05/18/22 1012     Blood Culture, Routine No growth after 5 days.    Narrative:      Aerobic and anaerobic    Blood culture x two cultures. Draw prior to antibiotics. [[id number]] Collected: 05/13/22  0311    Order Status: Completed Specimen: Blood from Peripheral, Forearm, Right Updated: 05/18/22 1012     Blood Culture, Routine No growth after 5 days.    Narrative:      Aerobic and anaerobic    AFB Culture & Smear [[id number]] Collected: 05/17/22 1616    Order Status: Sent Specimen: Respiratory from Sputum Updated: 05/17/22 1616    AFB Culture & Smear [[id number]] Collected: 05/15/22 1710    Order Status: Sent Specimen: Respiratory from Sputum Updated: 05/15/22 1711    Influenza A & B by Molecular [[id number]] Collected: 05/13/22 0313    Order Status: Completed Specimen: Nasopharyngeal Swab Updated: 05/13/22 0357     Influenza A, Molecular Negative     Influenza B, Molecular Negative     Flu A & B Source Nasal swab                Assessment/Plan:      Pneumonia  Pt afebrile but w/ leokocytosis and new findings on CXR   Initiated Broad spec abx in ED w/ Vanc/cef/flagyl after 1 day switched to augmentin/azithromycin for 2 days then cultures showing MRSA so put back on Vanc for 1 day 5/17 and transitioned to PO cipro for 7 days on 5/19  Bld cultures drawn in ED w/ NGTD  CT chest w/ 4.5 cm right upper lobe cavitary mass with adjacent 9 mm satellite lesion which is concerning for infectious etiology/atypical pneumonia as well as a large thin walled left upper lobe bulla       Plan:  Pulm consulted  Rec cnt pneumonia abx but acquire Quant gold and 3x AFB smears    Cnt to monitor for fevers and repeat cultures/imaging      AMS (altered mental status)  Pt w/ AMS on admit thought to be related to DKA  Uncertain baseline mentation though previous CVA in 2013  After DKA resolution pt still altered  UDS and EtOH neg on admit   Ammonia slightly elevated to 61  CT head w/o any new intracranial abnormalities  Folate 11.2  VitB12 415  Thiamine 88  B6 6  TSH 0.409  RPR nonreactive  HIV neg   MRI brain w/o w/ no acute intracranial abnormalities   EEG showed generalized cerebral dysfunction but no signs of seizure activity    Pt  returned to baseline and has been AOx 3 since 5/17      Epilepsy  Restart home keppra and depakote  EEG done 5/16 which showed no seizure or epileptiform activity     Hypertension  Patient not on any home anti-HTN medication  Plan  Losartan 25mg daily  Monitor Vitals      Type 2 diabetes mellitus, with long-term current use of insulin  Patient uses Humalin 70/30 to control BG  Plan  MDSSI  Detemir 25U Subq nightly  POCT BG Q4h  Diabetic Diet      ETOH abuse  Pt w/ known EtOH abuse hx  Ethanol neg on admit  Hx of EtOH seizures    Plan:  CIWA q4   Ativan PRN for CIWA >8  Thiamine and multivitamin daily         VTE Risk Mitigation (From admission, onward)         Ordered     IP VTE LOW RISK PATIENT  Once         05/13/22 0530     Place sequential compression device  Until discontinued         05/13/22 0530                Discharge Planning   SHABANA: 5/14/2022     Code Status: Full Code   Is the patient medically ready for discharge?:     Reason for patient still in hospital (select all that apply): Patient trending condition  Discharge Plan A: Lynnette Grande MD  Department of Hospital Medicine   [place] [facility]

## 2022-05-19 NOTE — SUBJECTIVE & OBJECTIVE
Interval History: NAEON. Pt remains Aox 3 this AM. We are pending placement to a shelter for him but awaiting results of his quant gold and AFB's as well as further TB studies. He has no acute complaints today.    Review of Systems   Constitutional:  Negative for activity change, chills, fatigue and fever.   HENT:  Negative for congestion, sinus pressure and trouble swallowing.    Eyes:  Negative for photophobia and visual disturbance.   Respiratory:  Negative for cough, choking and shortness of breath.    Cardiovascular:  Negative for chest pain and leg swelling.   Gastrointestinal:  Negative for abdominal distention, constipation, diarrhea, nausea and vomiting.   Endocrine: Negative for polyuria.   Genitourinary:  Negative for dysuria and hematuria.   Musculoskeletal:  Negative for arthralgias.   Skin:  Negative for wound.   Neurological:  Negative for dizziness, tremors, seizures, speech difficulty, weakness, light-headedness, numbness and headaches.   Objective:     Vital Signs (Most Recent):  Temp: 98.5 °F (36.9 °C) (05/19/22 0819)  Pulse: 71 (05/19/22 0819)  Resp: 18 (05/19/22 0819)  BP: 102/68 (05/19/22 0819)  SpO2: 98 % (05/19/22 0908) Vital Signs (24h Range):  Temp:  [96.1 °F (35.6 °C)-98.5 °F (36.9 °C)] 98.5 °F (36.9 °C)  Pulse:  [71-91] 71  Resp:  [14-19] 18  SpO2:  [97 %-100 %] 98 %  BP: (100-117)/(64-70) 102/68     Weight: 72.6 kg (160 lb 0.9 oz)  Body mass index is 25.07 kg/m².  No intake or output data in the 24 hours ending 05/19/22 1132   Physical Exam  Constitutional:       General: He is not in acute distress.  HENT:      Head: Normocephalic and atraumatic.      Right Ear: External ear normal.      Left Ear: External ear normal.      Nose: Nose normal.   Eyes:      Extraocular Movements: Extraocular movements intact.      Conjunctiva/sclera: Conjunctivae normal.      Pupils: Pupils are equal, round, and reactive to light.   Cardiovascular:      Rate and Rhythm: Normal rate and regular rhythm.       Pulses: Normal pulses.      Heart sounds: Normal heart sounds. No murmur heard.    No friction rub. No gallop.   Pulmonary:      Effort: Pulmonary effort is normal. No respiratory distress.      Breath sounds: Normal breath sounds. No wheezing or rhonchi.   Abdominal:      General: Bowel sounds are normal. There is no distension.      Palpations: There is no mass.      Tenderness: There is no abdominal tenderness.   Musculoskeletal:         General: No swelling or tenderness. Normal range of motion.      Cervical back: Normal range of motion. No tenderness.   Lymphadenopathy:      Cervical: No cervical adenopathy.   Skin:     General: Skin is warm and dry.      Findings: No rash.   Neurological:      Mental Status: He is alert.      Comments: AO x3     Recent Labs   Lab 05/17/22 0412 05/18/22 0321 05/19/22 0442   WBC 9.98 9.12 9.77   HGB 11.0* 11.4* 11.0*   HCT 31.3* 32.4* 32.4*   MCV 93 92 93   RBC 3.35* 3.54* 3.49*   MCH 32.8* 32.2* 31.5*   MCHC 35.1 35.2 34.0   RDW 12.8 12.6 12.7   * 478* 457*   MPV 9.1* 9.0* 9.1*   GRAN 59.7  6.0 57.7  5.3 59.5  5.8   LYMPH 29.3  2.9 32.5  3.0 30.8  3.0   MONO 8.0  0.8 7.3  0.7 6.7  0.7   EOSINOPHIL 1.6 1.3 1.2   BASOPHIL 0.6 0.5 0.7     Recent Labs   Lab 05/17/22 0412 05/18/22 0321 05/18/22 2041 05/19/22 0442    134*  --  136   K 4.5 4.4  --  4.3   CL 99 97  --  98   CO2 25 23  --  27   ANIONGAP 13 14  --  11   BUN 12 7  --  11   CREATININE 0.6 0.7  --  0.7   * 363* 477* 250*   CALCIUM 9.3 9.2  --  9.6   PROT 6.0 6.1  --  6.2   ALBUMIN 2.9* 2.8*  --  3.0*   ALKPHOS 68 62  --  60   BILITOT 0.2 0.3  --  0.2   ALT 9* 9*  --  10   AST 12 12  --  10   ESTGFRAFRICA >60 >60  --  >60   EGFRNONAA >60 >60  --  >60   MG 1.6 1.7  --  1.8   PHOS 3.5 3.0  --  4.1     Recent Labs   Lab 05/13/22  0340   COLORU Yellow   APPEARANCEUA Clear   PHUR 5.0   SPECGRAV 1.015   PROTEINUA 1+*   GLUCUA 3+*   KETONESU 2+*   BILIRUBINUA Negative   OCCULTUA Negative    UROBILINOGEN Negative   NITRITE Negative   LEUKOCYTESUR Negative   RBCUA 1   WBCUA 1   BACTERIA None   SQUAMEPITHEL 0   HYALINECASTS 0   MICROCMT SEE COMMENT     Recent Labs   Lab 05/13/22  0310   TROPONINI <0.006     Recent Labs   Lab 05/13/22  0310   INR 1.1   APTT 31.3     Recent Labs   Lab 05/14/22  1524   TSH 0.409     CT Head Without Contrast    Result Date: 5/13/2022  EXAMINATION: CT HEAD WITHOUT CONTRAST CLINICAL HISTORY: Mental status change, unknown cause; TECHNIQUE: Low dose axial images were obtained through the head.  Coronal and sagittal reformations were also performed. Contrast was not administered. COMPARISON: CT examination of the brain without contrast March 30, 2022 FINDINGS: The ventricular system, sulcal pattern and parenchymal attenuation characteristics are consistent with chronic change appearing similar to the prior examination without evidence for superimposed acute process.  There are areas consistent with remote lacunar-type ischemic change.  Chronic appearing white matter change noted. There is no evidence for intracranial mass, mass effect or midline shift and there is no evidence for acute intracranial hemorrhage.  Appropriate CSF spaces are seen at the skull base. The visualized orbits appear intact.  The mastoid air cells and middle ear cavity bilaterally appear appropriate.  Mild opacity along the external auditory canal likely represents cerumen.  Minimal paranasal sinus mucosal thickening noted.  The osseous structures appear intact.     Chronic changes are noted, there is no evidence for superimposed acute intracranial process. Electronically signed by: Abimael Eid Date:    05/13/2022 Time:    04:10    CT Chest Without Contrast    Result Date: 5/15/2022  EXAMINATION: CT CHEST WITHOUT CONTRAST CLINICAL HISTORY: Abnormal xray - lung nodule, >= 1 cm; TECHNIQUE: Low dose axial images, sagittal and coronal reformations were obtained from the thoracic inlet to the lung bases.  Contrast was not administered. COMPARISON: Radiograph 05/13/2022 FINDINGS: Lungs: There is a right upper lobe partially cavitary mass in the posterior segment eroding through the major fissure into the superior segment of the right lower lobe (series 601, image 105).  The lesion measures 4.5 cm in transverse plane (series 2, image 54). There is an adjacent right upper lobe nodule measuring 0.9 cm (series 2, image 39), with the same imaging characteristics. Mild emphysematous lung changes. There is a large left upper lobe thin walled bulla measuring 11.4 cm (series 2, image 136), stable. Mediastinum: Thyroid gland is grossly unremarkable.  No mediastinal lymphadenopathy.  Limited assessment of the hilum without IV contrast.  Thoracic aorta is normal caliber.  Heart size within normal limits.  No pericardial or pleural effusion.  Mild scattered calcific atherosclerosis. Upper abdomen: Extensive pancreatic calcifications, probable sequela of chronic pancreatitis. Chest wall: Mild gynecomastia. Bones: No marrow replacement process.     4.5 cm right upper lobe cavitary mass with adjacent 9 mm satellite lesion, as above, concerning for infectious etiology/atypical pneumonia.  Malignancy could have a similar appearance. Large thin walled left upper lobe bulla, stable. Mild emphysematous lung changes. This report was flagged in Epic as abnormal. Electronically signed by: Juvencio Cardona MD Date:    05/15/2022 Time:    12:10    MRI Brain Without Contrast    Result Date: 5/16/2022  EXAMINATION: MRI BRAIN WITHOUT CONTRAST CLINICAL HISTORY: Mental status change, unknown cause; TECHNIQUE: Multiplanar multisequence MR imaging of the brain was performed without contrast. COMPARISON: CT head 05/13/2022 MRI 10/27/2021 FINDINGS: there is central volume loss, similar to prior studies.  There is no evidence of hydrocephalus, mass effect, intracranial hemorrhage or acute infarct. Focal lesions within the periventricular white matter  bilaterally are stable from the prior study consistent with prior nonspecific insult.  No new white matter lesions are noted. Normal arterial flow voids are preserved at the skull base. The visualized sinuses and mastoid air cells are clear.     Stable appearance of the brain as detailed above...  No acute intracranial process. Electronically signed by: Ibrahima Soria Date:    05/16/2022 Time:    11:55    X-Ray Chest AP Portable    Result Date: 5/13/2022  EXAMINATION: XR CHEST AP PORTABLE CLINICAL HISTORY: Sepsis; TECHNIQUE: Single frontal view of the chest was performed. COMPARISON: Chest radiograph 03/28/2022, CT chest 10/27/2021 FINDINGS: Cardiac monitoring leads overlie the chest.  Cardiac silhouette is not significantly enlarged.  There is a stable large thin walled cyst projecting over the left upper lung zone.  There is increased fullness of the right hilar region, not well appreciated on prior radiograph examination.  Additionally, there is a possible new 1.0 cm nodular opacity projecting over the right upper lung zone.  The remaining lungs demonstrate no new confluent airspace consolidation.  No significant volume of pleural fluid or pneumothorax appreciated.  Osseous structures appear grossly intact.     1.  Increased fullness of the right hilar region, not well appreciated on prior radiograph examination as well as possible new 1.0 cm nodular opacity projecting over the right upper lung zone.  Given short-term interval development, this could relate to underlying infectious process, however neoplastic process not excluded.  Consider further assessment with dedicated chest CT. 2.  Stable large thin walled left upper lung zone cyst. Electronically signed by: Lenny Pereira MD Date:    05/13/2022 Time:    03:45    Microbiology Results (last 7 days)       Procedure Component Value Units Date/Time    AFB Culture & Smear [[id number]] Collected: 05/17/22 1616    Order Status: Completed Specimen: Respiratory from  Sputum Updated: 05/18/22 2127     AFB Culture & Smear Culture in progress     AFB CULTURE STAIN No acid fast bacilli seen.    AFB Culture & Smear [[id number]]     Order Status: No result Specimen: Body Fluid from Sputum, Expectorated     Culture, Respiratory with Gram Stain [[id number]]  (Abnormal)  (Susceptibility) Collected: 05/15/22 1710    Order Status: Completed Specimen: Respiratory from Sputum Updated: 05/18/22 1014     Respiratory Culture No Pseudomonas isolated.      METHICILLIN RESISTANT STAPHYLOCOCCUS AUREUS  Few  Normal respiratory armida also present       Gram Stain (Respiratory) <10 epithelial cells per low power field.     Gram Stain (Respiratory) Rare WBC's     Gram Stain (Respiratory) Rare Gram positive cocci     Gram Stain (Respiratory) Rare Gram negative rods    Blood culture x two cultures. Draw prior to antibiotics. [[id number]] Collected: 05/13/22 0255    Order Status: Completed Specimen: Blood from Peripheral, Antecubital, Right Updated: 05/18/22 1012     Blood Culture, Routine No growth after 5 days.    Narrative:      Aerobic and anaerobic    Blood culture x two cultures. Draw prior to antibiotics. [[id number]] Collected: 05/13/22 0311    Order Status: Completed Specimen: Blood from Peripheral, Forearm, Right Updated: 05/18/22 1012     Blood Culture, Routine No growth after 5 days.    Narrative:      Aerobic and anaerobic    AFB Culture & Smear [[id number]] Collected: 05/17/22 1616    Order Status: Sent Specimen: Respiratory from Sputum Updated: 05/17/22 1616    AFB Culture & Smear [[id number]] Collected: 05/15/22 1710    Order Status: Sent Specimen: Respiratory from Sputum Updated: 05/15/22 1711    Influenza A & B by Molecular [[id number]] Collected: 05/13/22 0313    Order Status: Completed Specimen: Nasopharyngeal Swab Updated: 05/13/22 0357     Influenza A, Molecular Negative     Influenza B, Molecular Negative     Flu A & B Source Nasal swab

## 2022-05-19 NOTE — ASSESSMENT & PLAN NOTE
Pt w/ AMS on admit thought to be related to DKA  Uncertain baseline mentation though previous CVA in 2013  After DKA resolution pt still altered  UDS and EtOH neg on admit   Ammonia slightly elevated to 61  CT head w/o any new intracranial abnormalities  Folate 11.2  VitB12 415  Thiamine 88  B6 6  TSH 0.409  RPR nonreactive  HIV neg   MRI brain w/o w/ no acute intracranial abnormalities   EEG showed generalized cerebral dysfunction but no signs of seizure activity    Pt returned to baseline and has been AOx 3 since 5/17

## 2022-05-19 NOTE — ASSESSMENT & PLAN NOTE
Pt afebrile but w/ leokocytosis and new findings on CXR   Initiated Broad spec abx in ED w/ Vanc/cef/flagyl after 1 day switched to augmentin/azithromycin for 2 days then cultures showing MRSA so put back on Vanc for 1 day 5/17 and transitioned to PO cipro for 7 days on 5/19  Bld cultures drawn in ED w/ NGTD  CT chest w/ 4.5 cm right upper lobe cavitary mass with adjacent 9 mm satellite lesion which is concerning for infectious etiology/atypical pneumonia as well as a large thin walled left upper lobe bulla       Plan:  Pulm consulted  Rec cnt pneumonia abx but acquire Quant gold and 3x AFB smears    Cnt to monitor for fevers and repeat cultures/imaging

## 2022-05-20 LAB
ALBUMIN SERPL BCP-MCNC: 2.8 G/DL (ref 3.5–5.2)
ALP SERPL-CCNC: 61 U/L (ref 55–135)
ALT SERPL W/O P-5'-P-CCNC: 9 U/L (ref 10–44)
ANION GAP SERPL CALC-SCNC: 12 MMOL/L (ref 8–16)
AST SERPL-CCNC: 16 U/L (ref 10–40)
BASOPHILS # BLD AUTO: 0.07 K/UL (ref 0–0.2)
BASOPHILS NFR BLD: 0.8 % (ref 0–1.9)
BILIRUB SERPL-MCNC: 0.2 MG/DL (ref 0.1–1)
BUN SERPL-MCNC: 13 MG/DL (ref 6–20)
CALCIUM SERPL-MCNC: 9.9 MG/DL (ref 8.7–10.5)
CHLORIDE SERPL-SCNC: 100 MMOL/L (ref 95–110)
CO2 SERPL-SCNC: 24 MMOL/L (ref 23–29)
CREAT SERPL-MCNC: 0.8 MG/DL (ref 0.5–1.4)
DIFFERENTIAL METHOD: ABNORMAL
EOSINOPHIL # BLD AUTO: 0.1 K/UL (ref 0–0.5)
EOSINOPHIL NFR BLD: 1.6 % (ref 0–8)
ERYTHROCYTE [DISTWIDTH] IN BLOOD BY AUTOMATED COUNT: 12.6 % (ref 11.5–14.5)
EST. GFR  (AFRICAN AMERICAN): >60 ML/MIN/1.73 M^2
EST. GFR  (NON AFRICAN AMERICAN): >60 ML/MIN/1.73 M^2
GLUCOSE SERPL-MCNC: 230 MG/DL (ref 70–110)
HCT VFR BLD AUTO: 31.9 % (ref 40–54)
HGB BLD-MCNC: 10.8 G/DL (ref 14–18)
IMM GRANULOCYTES # BLD AUTO: 0.09 K/UL (ref 0–0.04)
IMM GRANULOCYTES NFR BLD AUTO: 1 % (ref 0–0.5)
LYMPHOCYTES # BLD AUTO: 3.3 K/UL (ref 1–4.8)
LYMPHOCYTES NFR BLD: 37.8 % (ref 18–48)
MAGNESIUM SERPL-MCNC: 1.6 MG/DL (ref 1.6–2.6)
MCH RBC QN AUTO: 31.6 PG (ref 27–31)
MCHC RBC AUTO-ENTMCNC: 33.9 G/DL (ref 32–36)
MCV RBC AUTO: 93 FL (ref 82–98)
MONOCYTES # BLD AUTO: 0.8 K/UL (ref 0.3–1)
MONOCYTES NFR BLD: 8.8 % (ref 4–15)
NEUTROPHILS # BLD AUTO: 4.4 K/UL (ref 1.8–7.7)
NEUTROPHILS NFR BLD: 50 % (ref 38–73)
NRBC BLD-RTO: 0 /100 WBC
PHOSPHATE SERPL-MCNC: 3.9 MG/DL (ref 2.7–4.5)
PLATELET # BLD AUTO: 418 K/UL (ref 150–450)
PMV BLD AUTO: 9.2 FL (ref 9.2–12.9)
POCT GLUCOSE: 151 MG/DL (ref 70–110)
POCT GLUCOSE: 252 MG/DL (ref 70–110)
POCT GLUCOSE: 257 MG/DL (ref 70–110)
POCT GLUCOSE: 382 MG/DL (ref 70–110)
POTASSIUM SERPL-SCNC: 4.6 MMOL/L (ref 3.5–5.1)
PROT SERPL-MCNC: 6.9 G/DL (ref 6–8.4)
RBC # BLD AUTO: 3.42 M/UL (ref 4.6–6.2)
SODIUM SERPL-SCNC: 136 MMOL/L (ref 136–145)
VANCOMYCIN TROUGH SERPL-MCNC: 1.8 UG/ML (ref 10–22)
WBC # BLD AUTO: 8.75 K/UL (ref 3.9–12.7)

## 2022-05-20 PROCEDURE — 25000003 PHARM REV CODE 250: Performed by: STUDENT IN AN ORGANIZED HEALTH CARE EDUCATION/TRAINING PROGRAM

## 2022-05-20 PROCEDURE — 80202 ASSAY OF VANCOMYCIN: CPT | Performed by: FAMILY MEDICINE

## 2022-05-20 PROCEDURE — 11000001 HC ACUTE MED/SURG PRIVATE ROOM

## 2022-05-20 PROCEDURE — 25000003 PHARM REV CODE 250: Performed by: FAMILY MEDICINE

## 2022-05-20 PROCEDURE — S4991 NICOTINE PATCH NONLEGEND: HCPCS | Performed by: STUDENT IN AN ORGANIZED HEALTH CARE EDUCATION/TRAINING PROGRAM

## 2022-05-20 PROCEDURE — 94761 N-INVAS EAR/PLS OXIMETRY MLT: CPT

## 2022-05-20 PROCEDURE — 83735 ASSAY OF MAGNESIUM: CPT | Performed by: STUDENT IN AN ORGANIZED HEALTH CARE EDUCATION/TRAINING PROGRAM

## 2022-05-20 PROCEDURE — 27000207 HC ISOLATION

## 2022-05-20 PROCEDURE — 84100 ASSAY OF PHOSPHORUS: CPT | Performed by: STUDENT IN AN ORGANIZED HEALTH CARE EDUCATION/TRAINING PROGRAM

## 2022-05-20 PROCEDURE — 99900035 HC TECH TIME PER 15 MIN (STAT)

## 2022-05-20 PROCEDURE — 80053 COMPREHEN METABOLIC PANEL: CPT | Performed by: STUDENT IN AN ORGANIZED HEALTH CARE EDUCATION/TRAINING PROGRAM

## 2022-05-20 PROCEDURE — 85025 COMPLETE CBC W/AUTO DIFF WBC: CPT | Performed by: STUDENT IN AN ORGANIZED HEALTH CARE EDUCATION/TRAINING PROGRAM

## 2022-05-20 PROCEDURE — 36415 COLL VENOUS BLD VENIPUNCTURE: CPT | Performed by: STUDENT IN AN ORGANIZED HEALTH CARE EDUCATION/TRAINING PROGRAM

## 2022-05-20 RX ADMIN — CLINDAMYCIN IN 5 PERCENT DEXTROSE 600 MG: 12 INJECTION, SOLUTION INTRAVENOUS at 03:05

## 2022-05-20 RX ADMIN — THIAMINE HCL TAB 100 MG 100 MG: 100 TAB at 09:05

## 2022-05-20 RX ADMIN — INSULIN ASPART 8 UNITS: 100 INJECTION, SOLUTION INTRAVENOUS; SUBCUTANEOUS at 06:05

## 2022-05-20 RX ADMIN — INSULIN DETEMIR 25 UNITS: 100 INJECTION, SOLUTION SUBCUTANEOUS at 10:05

## 2022-05-20 RX ADMIN — CLINDAMYCIN IN 5 PERCENT DEXTROSE 600 MG: 12 INJECTION, SOLUTION INTRAVENOUS at 11:05

## 2022-05-20 RX ADMIN — CLINDAMYCIN IN 5 PERCENT DEXTROSE 600 MG: 12 INJECTION, SOLUTION INTRAVENOUS at 08:05

## 2022-05-20 RX ADMIN — DIVALPROEX SODIUM 1000 MG: 250 TABLET, EXTENDED RELEASE ORAL at 08:05

## 2022-05-20 RX ADMIN — LEVETIRACETAM 750 MG: 250 TABLET, FILM COATED ORAL at 09:05

## 2022-05-20 RX ADMIN — LOSARTAN POTASSIUM 25 MG: 25 TABLET, FILM COATED ORAL at 09:05

## 2022-05-20 RX ADMIN — LEVETIRACETAM 750 MG: 250 TABLET, FILM COATED ORAL at 08:05

## 2022-05-20 RX ADMIN — DIVALPROEX SODIUM 1000 MG: 250 TABLET, EXTENDED RELEASE ORAL at 09:05

## 2022-05-20 RX ADMIN — NICOTINE 1 PATCH: 14 PATCH, EXTENDED RELEASE TRANSDERMAL at 09:05

## 2022-05-20 RX ADMIN — MUPIROCIN: 20 OINTMENT TOPICAL at 09:05

## 2022-05-20 RX ADMIN — MUPIROCIN: 20 OINTMENT TOPICAL at 08:05

## 2022-05-20 RX ADMIN — THERA TABS 1 TABLET: TAB at 09:05

## 2022-05-20 NOTE — PLAN OF CARE
Recommendation:  1. Encourage intake at meals as tolerated.   2. Monitor weight/labs.   3. Monitor need for supplements.   4. RD to follow to monitor po intake    Goals:   Pt will tolerate diet with at least 50-75% intake at meals by RD follow up  Nutrition Goal Status: progressing towards goal

## 2022-05-20 NOTE — SUBJECTIVE & OBJECTIVE
Interval History: NAEON. Pt Quant gold neg but still awaiting AFBx2 and TB PCR. Pulm discussed with pt his need for abx tx and are rec he stay int for completion of IV course as he would not be capable of compliance 2/2 his mentation and complicated living situation.    Review of Systems   Constitutional:  Negative for activity change, chills, fatigue and fever.   HENT:  Negative for congestion, sinus pressure and trouble swallowing.    Eyes:  Negative for photophobia and visual disturbance.   Respiratory:  Negative for cough, choking and shortness of breath.    Cardiovascular:  Negative for chest pain and leg swelling.   Gastrointestinal:  Negative for abdominal distention, constipation, diarrhea, nausea and vomiting.   Endocrine: Negative for polyuria.   Genitourinary:  Negative for dysuria and hematuria.   Musculoskeletal:  Negative for arthralgias.   Skin:  Negative for wound.   Neurological:  Negative for dizziness, tremors, seizures, speech difficulty, weakness, light-headedness, numbness and headaches.   Objective:     Vital Signs (Most Recent):  Temp: 97.1 °F (36.2 °C) (05/20/22 0824)  Pulse: 73 (05/20/22 0824)  Resp: 18 (05/20/22 0824)  BP: 115/69 (05/20/22 0824)  SpO2: 99 % (05/20/22 0824) Vital Signs (24h Range):  Temp:  [96.3 °F (35.7 °C)-98.7 °F (37.1 °C)] 97.1 °F (36.2 °C)  Pulse:  [68-78] 73  Resp:  [18] 18  SpO2:  [97 %-99 %] 99 %  BP: (102-116)/(63-77) 115/69     Weight: 72.6 kg (160 lb 0.9 oz)  Body mass index is 25.07 kg/m².  No intake or output data in the 24 hours ending 05/20/22 0926   Physical Exam  Constitutional:       General: He is not in acute distress.  HENT:      Head: Normocephalic and atraumatic.      Right Ear: External ear normal.      Left Ear: External ear normal.      Nose: Nose normal.   Eyes:      Extraocular Movements: Extraocular movements intact.      Conjunctiva/sclera: Conjunctivae normal.      Pupils: Pupils are equal, round, and reactive to light.   Cardiovascular:       Rate and Rhythm: Normal rate and regular rhythm.      Pulses: Normal pulses.      Heart sounds: Normal heart sounds. No murmur heard.    No friction rub. No gallop.   Pulmonary:      Effort: Pulmonary effort is normal. No respiratory distress.      Breath sounds: Normal breath sounds. No wheezing or rhonchi.   Abdominal:      General: Bowel sounds are normal. There is no distension.      Palpations: There is no mass.      Tenderness: There is no abdominal tenderness.   Musculoskeletal:         General: No swelling or tenderness. Normal range of motion.      Cervical back: Normal range of motion. No tenderness.   Lymphadenopathy:      Cervical: No cervical adenopathy.   Skin:     General: Skin is warm and dry.      Findings: No rash.   Neurological:      Mental Status: He is alert.      Comments: AO x3     Recent Labs   Lab 05/18/22 0321 05/19/22 0442 05/20/22  0453   WBC 9.12 9.77 8.75   HGB 11.4* 11.0* 10.8*   HCT 32.4* 32.4* 31.9*   MCV 92 93 93   RBC 3.54* 3.49* 3.42*   MCH 32.2* 31.5* 31.6*   MCHC 35.2 34.0 33.9   RDW 12.6 12.7 12.6   * 457* 418   MPV 9.0* 9.1* 9.2   GRAN 57.7  5.3 59.5  5.8 50.0  4.4   LYMPH 32.5  3.0 30.8  3.0 37.8  3.3   MONO 7.3  0.7 6.7  0.7 8.8  0.8   EOSINOPHIL 1.3 1.2 1.6   BASOPHIL 0.5 0.7 0.8     Recent Labs   Lab 05/18/22 0321 05/18/22 2041 05/19/22 0442 05/20/22  0453   *  --  136 136   K 4.4  --  4.3 4.6   CL 97  --  98 100   CO2 23  --  27 24   ANIONGAP 14  --  11 12   BUN 7  --  11 13   CREATININE 0.7  --  0.7 0.8   * 477* 250* 230*   CALCIUM 9.2  --  9.6 9.9   PROT 6.1  --  6.2 6.9   ALBUMIN 2.8*  --  3.0* 2.8*   ALKPHOS 62  --  60 61   BILITOT 0.3  --  0.2 0.2   ALT 9*  --  10 9*   AST 12  --  10 16   ESTGFRAFRICA >60  --  >60 >60   EGFRNONAA >60  --  >60 >60   MG 1.7  --  1.8 1.6   PHOS 3.0  --  4.1 3.9     No results for input(s): COLORU, APPEARANCEUA, PHUR, SPECGRAV, PROTEINUA, GLUCUA, KETONESU, BILIRUBINUA, OCCULTUA, UROBILINOGEN, NITRITE,  LEUKOCYTESUR, RBCUA, WBCUA, BACTERIA, SQUAMEPITHEL, HYALINECASTS, GRANULARCAST, MICROCMT in the last 168 hours.    Invalid input(s): SPECIMENU, AMORPHOUSU  No results for input(s): TROPONINI, CPK, CPKMB in the last 168 hours.  No results for input(s): PT, INR, APTT in the last 168 hours.  Recent Labs   Lab 05/14/22  1524   TSH 0.409     CT Head Without Contrast    Result Date: 5/13/2022  EXAMINATION: CT HEAD WITHOUT CONTRAST CLINICAL HISTORY: Mental status change, unknown cause; TECHNIQUE: Low dose axial images were obtained through the head.  Coronal and sagittal reformations were also performed. Contrast was not administered. COMPARISON: CT examination of the brain without contrast March 30, 2022 FINDINGS: The ventricular system, sulcal pattern and parenchymal attenuation characteristics are consistent with chronic change appearing similar to the prior examination without evidence for superimposed acute process.  There are areas consistent with remote lacunar-type ischemic change.  Chronic appearing white matter change noted. There is no evidence for intracranial mass, mass effect or midline shift and there is no evidence for acute intracranial hemorrhage.  Appropriate CSF spaces are seen at the skull base. The visualized orbits appear intact.  The mastoid air cells and middle ear cavity bilaterally appear appropriate.  Mild opacity along the external auditory canal likely represents cerumen.  Minimal paranasal sinus mucosal thickening noted.  The osseous structures appear intact.     Chronic changes are noted, there is no evidence for superimposed acute intracranial process. Electronically signed by: Abimael Eid Date:    05/13/2022 Time:    04:10    CT Chest Without Contrast    Result Date: 5/15/2022  EXAMINATION: CT CHEST WITHOUT CONTRAST CLINICAL HISTORY: Abnormal xray - lung nodule, >= 1 cm; TECHNIQUE: Low dose axial images, sagittal and coronal reformations were obtained from the thoracic inlet to the lung  bases. Contrast was not administered. COMPARISON: Radiograph 05/13/2022 FINDINGS: Lungs: There is a right upper lobe partially cavitary mass in the posterior segment eroding through the major fissure into the superior segment of the right lower lobe (series 601, image 105).  The lesion measures 4.5 cm in transverse plane (series 2, image 54). There is an adjacent right upper lobe nodule measuring 0.9 cm (series 2, image 39), with the same imaging characteristics. Mild emphysematous lung changes. There is a large left upper lobe thin walled bulla measuring 11.4 cm (series 2, image 136), stable. Mediastinum: Thyroid gland is grossly unremarkable.  No mediastinal lymphadenopathy.  Limited assessment of the hilum without IV contrast.  Thoracic aorta is normal caliber.  Heart size within normal limits.  No pericardial or pleural effusion.  Mild scattered calcific atherosclerosis. Upper abdomen: Extensive pancreatic calcifications, probable sequela of chronic pancreatitis. Chest wall: Mild gynecomastia. Bones: No marrow replacement process.     4.5 cm right upper lobe cavitary mass with adjacent 9 mm satellite lesion, as above, concerning for infectious etiology/atypical pneumonia.  Malignancy could have a similar appearance. Large thin walled left upper lobe bulla, stable. Mild emphysematous lung changes. This report was flagged in Epic as abnormal. Electronically signed by: Juvencio Cardona MD Date:    05/15/2022 Time:    12:10    MRI Brain Without Contrast    Result Date: 5/16/2022  EXAMINATION: MRI BRAIN WITHOUT CONTRAST CLINICAL HISTORY: Mental status change, unknown cause; TECHNIQUE: Multiplanar multisequence MR imaging of the brain was performed without contrast. COMPARISON: CT head 05/13/2022 MRI 10/27/2021 FINDINGS: there is central volume loss, similar to prior studies.  There is no evidence of hydrocephalus, mass effect, intracranial hemorrhage or acute infarct. Focal lesions within the periventricular white matter  bilaterally are stable from the prior study consistent with prior nonspecific insult.  No new white matter lesions are noted. Normal arterial flow voids are preserved at the skull base. The visualized sinuses and mastoid air cells are clear.     Stable appearance of the brain as detailed above...  No acute intracranial process. Electronically signed by: Ibrahima Soria Date:    05/16/2022 Time:    11:55    X-Ray Chest AP Portable    Result Date: 5/13/2022  EXAMINATION: XR CHEST AP PORTABLE CLINICAL HISTORY: Sepsis; TECHNIQUE: Single frontal view of the chest was performed. COMPARISON: Chest radiograph 03/28/2022, CT chest 10/27/2021 FINDINGS: Cardiac monitoring leads overlie the chest.  Cardiac silhouette is not significantly enlarged.  There is a stable large thin walled cyst projecting over the left upper lung zone.  There is increased fullness of the right hilar region, not well appreciated on prior radiograph examination.  Additionally, there is a possible new 1.0 cm nodular opacity projecting over the right upper lung zone.  The remaining lungs demonstrate no new confluent airspace consolidation.  No significant volume of pleural fluid or pneumothorax appreciated.  Osseous structures appear grossly intact.     1.  Increased fullness of the right hilar region, not well appreciated on prior radiograph examination as well as possible new 1.0 cm nodular opacity projecting over the right upper lung zone.  Given short-term interval development, this could relate to underlying infectious process, however neoplastic process not excluded.  Consider further assessment with dedicated chest CT. 2.  Stable large thin walled left upper lung zone cyst. Electronically signed by: Lenny Pereira MD Date:    05/13/2022 Time:    03:45    Microbiology Results (last 7 days)       Procedure Component Value Units Date/Time    AFB Culture & Smear [[id number]] Collected: 05/17/22 1616    Order Status: Completed Specimen: Respiratory from  Sputum Updated: 05/18/22 2127     AFB Culture & Smear Culture in progress     AFB CULTURE STAIN No acid fast bacilli seen.    AFB Culture & Smear [[id number]]     Order Status: No result Specimen: Body Fluid from Sputum, Expectorated     Culture, Respiratory with Gram Stain [[id number]]  (Abnormal)  (Susceptibility) Collected: 05/15/22 1710    Order Status: Completed Specimen: Respiratory from Sputum Updated: 05/18/22 1014     Respiratory Culture No Pseudomonas isolated.      METHICILLIN RESISTANT STAPHYLOCOCCUS AUREUS  Few  Normal respiratory armida also present       Gram Stain (Respiratory) <10 epithelial cells per low power field.     Gram Stain (Respiratory) Rare WBC's     Gram Stain (Respiratory) Rare Gram positive cocci     Gram Stain (Respiratory) Rare Gram negative rods    Blood culture x two cultures. Draw prior to antibiotics. [[id number]] Collected: 05/13/22 0255    Order Status: Completed Specimen: Blood from Peripheral, Antecubital, Right Updated: 05/18/22 1012     Blood Culture, Routine No growth after 5 days.    Narrative:      Aerobic and anaerobic    Blood culture x two cultures. Draw prior to antibiotics. [[id number]] Collected: 05/13/22 0311    Order Status: Completed Specimen: Blood from Peripheral, Forearm, Right Updated: 05/18/22 1012     Blood Culture, Routine No growth after 5 days.    Narrative:      Aerobic and anaerobic    AFB Culture & Smear [[id number]] Collected: 05/17/22 1616    Order Status: Sent Specimen: Respiratory from Sputum Updated: 05/17/22 1616    AFB Culture & Smear [[id number]] Collected: 05/15/22 1710    Order Status: Sent Specimen: Respiratory from Sputum Updated: 05/15/22 1711

## 2022-05-20 NOTE — PLAN OF CARE
Plan of care reviewed with the patient. Scheduled medicines given and the patient tolerated well. Fall and safety precautions taken. No acute distress reported in the shift. Patient's Accu check was 348 mg/dl, covered with 4 Units Insulin Aspart. Advised the patient to call for the assistance. Continued monitoring the patient.

## 2022-05-20 NOTE — PROGRESS NOTES
"[facility] - Telemetry  Adult Nutrition  Progress Note    SUMMARY       Recommendations    Recommendation:  1. Encourage intake at meals as tolerated.   2. Monitor weight/labs.   3. Monitor need for supplements.   4. RD to follow to monitor po intake    Goals:   Pt will tolerate diet with at least 50-75% intake at meals by RD follow up  Nutrition Goal Status: progressing towards goal  Communication of RD Recs: reviewed with RN    Assessment and Plan  No nutrition dx at this time     Malnutrition Assessment  Unable to assess NFPE 2/2 pt on isolation      Reason for Assessment  Reason For Assessment: RD follow-up  Diagnosis:  (hyperglycemia)  Relevant Medical History: DM, stroke, seizures  General Information Comments: Pt on 2000 ADA diet with 100% intake at recent meals. Pt on isolation for possible TB. Unable to assess NFPE 2/2 isolation. [place] 22-skin intact. No recent weight loss noted.  Nutrition Discharge Planning: pt to d/c on ADA diet    Nutrition Risk Screen  Nutrition Risk Screen: no indicators present    Nutrition/Diet History  Food Preferences: no Taoist or cultural food prefs identified  Factors Affecting Nutritional Intake: None identified at this time    Anthropometrics  Temp: 97.1 °F (36.2 °C)  Height Method: Stated  Height: 5' 7" (170.2 cm)  Height (inches): 67 in  Weight Method: Bed Scale  Weight: 72.6 kg (160 lb 0.9 oz)  Weight (lb): 160.06 lb  Ideal Body Weight (IBW), Male: 148 lb  % Ideal Body Weight, Male (lb): 108.15 %  BMI (Calculated): 25.1  BMI Grade: 25 - 29.9 - overweight     Lab/Procedures/Meds  Pertinent Labs Reviewed: reviewed  Pertinent Labs Comments: Glu 230H, Alb 2.8L  Pertinent Medications Reviewed: reviewed  Pertinent Medications Comments: clindamycin, insulin, MVI, thiamine    Estimated/Assessed Needs  Weight Used For Calorie Calculations: 72.6 kg (160 lb 0.9 oz)  Energy Calorie Requirements (kcal): 2178 (30 kcal/kg)  Energy Need Method: Kcal/kg  Protein Requirements: 72g " (1.0g/kg)  Weight Used For Protein Calculations: 72.6 kg (160 lb 0.9 oz)  Estimated Fluid Requirement Method: RDA Method  RDA Method (mL): 2178     Nutrition Prescription Ordered  Current Diet Order: 2000 ADA    Evaluation of Received Nutrient/Fluid Intake  I/O: none recorded  Energy Calories Required: meeting needs  Protein Required: meeting needs  Fluid Required: meeting needs  Comments: LBM 5/18  % Intake of Estimated Energy Needs: 75 - 100 %  % Meal Intake: 75 - 100 %    Nutrition Risk  Level of Risk/Frequency of Follow-up:  (2xweekly)     Monitor and Evaluation  Food and Nutrient Intake: food and beverage intake  Food and Nutrient Adminstration: diet order  Physical Activity and Function: nutrition-related ADLs and IADLs  Anthropometric Measurements: weight  Biochemical Data, Medical Tests and Procedures: electrolyte and renal panel  Nutrition-Focused Physical Findings: overall appearance     Nutrition Follow-Up  RD Follow-up?: Yes

## 2022-05-20 NOTE — ASSESSMENT & PLAN NOTE
Pt afebrile but w/ leokocytosis and new findings on CXR   Initiated Broad spec abx in ED w/ Vanc/cef/flagyl after 1 day switched to augmentin/azithromycin for 2 days then cultures showing MRSA so put back on Vanc for 1 day 5/17 and transitioned to IV Clinda for 7 days on 5/19  Bld cultures drawn in ED w/ NGTD  CT chest w/ 4.5 cm right upper lobe cavitary mass with adjacent 9 mm satellite lesion which is concerning for infectious etiology/atypical pneumonia as well as a large thin walled left upper lobe bulla  Quant Gold neg AFB x1 w/o AFB 2x pending as well as TB PCR    Plan:  Pulm consulted  Rec cnt pneumonia abx but await 3x AFB smears and TB PCR  Cnt CLinda for 7 days end 5/25   Cnt to monitor for fevers and repeat cultures/imaging

## 2022-05-20 NOTE — PROGRESS NOTES
[facility] Medicine  Progress Note    Patient Name: Terrance Chavez  MRN: [id number]  Patient Class: IP- Inpatient   Admission Date: 5/13/2022  Length of Stay: 5 days  Attending Physician: Alonso Carlos III, MD  Primary Care Provider: Primary Doctor No        Subjective:     Principal Problem:Hyperglycemia        HPI:  Patient is a 42 yr old male with pmhx of T2DM, HTN, CVA, neurocognitive impairment and substance abuse disorder presenting to the ED for Altered mental status. [place]ly, brought in by EMS. On arrival patient was only oriented to person but not place,time or situation. Patient blood glucose was elevated on arrival. Initial labs showed small acidosis. Patient was provided 2L LR in ED and his mentation improved. Patient had a fever in the ED , and elevated WBC  of 29.9. Can not rule out infection exacerbating or causing change in mental status. Patient received 2g ceftriaxone in ED. Patient denies chest pain, shortness of breath, headache, nausea and vomiting. Admitted to [facility] for observation and correction of glucose.      Overview/Hospital Course:  Pt was admitted to the FM service for DKA and AMS. Pt was initially found to have elevated BG as well as Beta hydroxybutyrate and AG. He was started on an insulin drip as well as giving his long acting insulin. His DKA quickly resolved and then was continued to work up for his AMS and new lung findings. On admission he had a CXR which showed a new R Upper Lung nodule as well as hilar haziness and a known L upper lung cyst. A CT was recommned for f/u which showed a 4.5 cm R Upper Lobe and satellite 9mm lesion. Pulm was consulted and pt was worked up for potential pneumonia vs TB he was started on a 5 day course of augmentin and azithromycin which he will complete on 5/18. Quant gold as well as AFBs were collected which showed............On admission he had a CT head which did not  show any acute intracranial abnormalities as well as EtOH and UDS which were neg. It was unclear as to what his BL mentation is as previous notes had documented normal mentation and AO x4. His AMS was worked up with HIV/RPR/B12/Folate which were wnl and B1/6 pending. Neurology was consulted to determine if there was any other potential causes of AMS and rec an MRI and EEG. His MRI again did not show any acute abnormalities and EEG which showed generalized cerebral dysfxn but no epileptiform activity. The pt's mentation slowly improved throughout his admission and was AO x3 at time of d/c. Pt was educated on his chronic conditions and SW gave him multiple resources for his outpt care. He was given strict return precautions and all questions were answered.      Interval History: NAEON. Pt Quant gold neg but still awaiting AFBx2 and TB PCR. Pulm discussed with pt his need for abx tx and are rec he stay int for completion of IV course as he would not be capable of compliance 2/2 his mentation and complicated living situation.    Review of Systems   Constitutional:  Negative for activity change, chills, fatigue and fever.   HENT:  Negative for congestion, sinus pressure and trouble swallowing.    Eyes:  Negative for photophobia and visual disturbance.   Respiratory:  Negative for cough, choking and shortness of breath.    Cardiovascular:  Negative for chest pain and leg swelling.   Gastrointestinal:  Negative for abdominal distention, constipation, diarrhea, nausea and vomiting.   Endocrine: Negative for polyuria.   Genitourinary:  Negative for dysuria and hematuria.   Musculoskeletal:  Negative for arthralgias.   Skin:  Negative for wound.   Neurological:  Negative for dizziness, tremors, seizures, speech difficulty, weakness, light-headedness, numbness and headaches.   Objective:     Vital Signs (Most Recent):  Temp: 97.1 °F (36.2 °C) (05/20/22 0824)  Pulse: 73 (05/20/22 0824)  Resp: 18 (05/20/22 0824)  BP: 115/69  (05/20/22 0824)  SpO2: 99 % (05/20/22 0824) Vital Signs (24h Range):  Temp:  [96.3 °F (35.7 °C)-98.7 °F (37.1 °C)] 97.1 °F (36.2 °C)  Pulse:  [68-78] 73  Resp:  [18] 18  SpO2:  [97 %-99 %] 99 %  BP: (102-116)/(63-77) 115/69     Weight: 72.6 kg (160 lb 0.9 oz)  Body mass index is 25.07 kg/m².  No intake or output data in the 24 hours ending 05/20/22 0926   Physical Exam  Constitutional:       General: He is not in acute distress.  HENT:      Head: Normocephalic and atraumatic.      Right Ear: External ear normal.      Left Ear: External ear normal.      Nose: Nose normal.   Eyes:      Extraocular Movements: Extraocular movements intact.      Conjunctiva/sclera: Conjunctivae normal.      Pupils: Pupils are equal, round, and reactive to light.   Cardiovascular:      Rate and Rhythm: Normal rate and regular rhythm.      Pulses: Normal pulses.      Heart sounds: Normal heart sounds. No murmur heard.    No friction rub. No gallop.   Pulmonary:      Effort: Pulmonary effort is normal. No respiratory distress.      Breath sounds: Normal breath sounds. No wheezing or rhonchi.   Abdominal:      General: Bowel sounds are normal. There is no distension.      Palpations: There is no mass.      Tenderness: There is no abdominal tenderness.   Musculoskeletal:         General: No swelling or tenderness. Normal range of motion.      Cervical back: Normal range of motion. No tenderness.   Lymphadenopathy:      Cervical: No cervical adenopathy.   Skin:     General: Skin is warm and dry.      Findings: No rash.   Neurological:      Mental Status: He is alert.      Comments: AO x3     Recent Labs   Lab 05/18/22  0321 05/19/22  0442 05/20/22  0453   WBC 9.12 9.77 8.75   HGB 11.4* 11.0* 10.8*   HCT 32.4* 32.4* 31.9*   MCV 92 93 93   RBC 3.54* 3.49* 3.42*   MCH 32.2* 31.5* 31.6*   MCHC 35.2 34.0 33.9   RDW 12.6 12.7 12.6   * 457* 418   MPV 9.0* 9.1* 9.2   GRAN 57.7  5.3 59.5  5.8 50.0  4.4   LYMPH 32.5  3.0 30.8  3.0 37.8  3.3    MONO 7.3  0.7 6.7  0.7 8.8  0.8   EOSINOPHIL 1.3 1.2 1.6   BASOPHIL 0.5 0.7 0.8     Recent Labs   Lab 05/18/22  0321 05/18/22  2041 05/19/22  0442 05/20/22  0453   *  --  136 136   K 4.4  --  4.3 4.6   CL 97  --  98 100   CO2 23  --  27 24   ANIONGAP 14  --  11 12   BUN 7  --  11 13   CREATININE 0.7  --  0.7 0.8   * 477* 250* 230*   CALCIUM 9.2  --  9.6 9.9   PROT 6.1  --  6.2 6.9   ALBUMIN 2.8*  --  3.0* 2.8*   ALKPHOS 62  --  60 61   BILITOT 0.3  --  0.2 0.2   ALT 9*  --  10 9*   AST 12  --  10 16   ESTGFRAFRICA >60  --  >60 >60   EGFRNONAA >60  --  >60 >60   MG 1.7  --  1.8 1.6   PHOS 3.0  --  4.1 3.9     No results for input(s): COLORU, APPEARANCEUA, PHUR, SPECGRAV, PROTEINUA, GLUCUA, KETONESU, BILIRUBINUA, OCCULTUA, UROBILINOGEN, NITRITE, LEUKOCYTESUR, RBCUA, WBCUA, BACTERIA, SQUAMEPITHEL, HYALINECASTS, GRANULARCAST, MICROCMT in the last 168 hours.    Invalid input(s): SPECIMENU, AMORPHOUSU  No results for input(s): TROPONINI, CPK, CPKMB in the last 168 hours.  No results for input(s): PT, INR, APTT in the last 168 hours.  Recent Labs   Lab 05/14/22  1524   TSH 0.409     CT Head Without Contrast    Result Date: 5/13/2022  EXAMINATION: CT HEAD WITHOUT CONTRAST CLINICAL HISTORY: Mental status change, unknown cause; TECHNIQUE: Low dose axial images were obtained through the head.  Coronal and sagittal reformations were also performed. Contrast was not administered. COMPARISON: CT examination of the brain without contrast March 30, 2022 FINDINGS: The ventricular system, sulcal pattern and parenchymal attenuation characteristics are consistent with chronic change appearing similar to the prior examination without evidence for superimposed acute process.  There are areas consistent with remote lacunar-type ischemic change.  Chronic appearing white matter change noted. There is no evidence for intracranial mass, mass effect or midline shift and there is no evidence for acute intracranial hemorrhage.   Appropriate CSF spaces are seen at the skull base. The visualized orbits appear intact.  The mastoid air cells and middle ear cavity bilaterally appear appropriate.  Mild opacity along the external auditory canal likely represents cerumen.  Minimal paranasal sinus mucosal thickening noted.  The osseous structures appear intact.     Chronic changes are noted, there is no evidence for superimposed acute intracranial process. Electronically signed by: Abimael Eid Date:    05/13/2022 Time:    04:10    CT Chest Without Contrast    Result Date: 5/15/2022  EXAMINATION: CT CHEST WITHOUT CONTRAST CLINICAL HISTORY: Abnormal xray - lung nodule, >= 1 cm; TECHNIQUE: Low dose axial images, sagittal and coronal reformations were obtained from the thoracic inlet to the lung bases. Contrast was not administered. COMPARISON: Radiograph 05/13/2022 FINDINGS: Lungs: There is a right upper lobe partially cavitary mass in the posterior segment eroding through the major fissure into the superior segment of the right lower lobe (series 601, image 105).  The lesion measures 4.5 cm in transverse plane (series 2, image 54). There is an adjacent right upper lobe nodule measuring 0.9 cm (series 2, image 39), with the same imaging characteristics. Mild emphysematous lung changes. There is a large left upper lobe thin walled bulla measuring 11.4 cm (series 2, image 136), stable. Mediastinum: Thyroid gland is grossly unremarkable.  No mediastinal lymphadenopathy.  Limited assessment of the hilum without IV contrast.  Thoracic aorta is normal caliber.  Heart size within normal limits.  No pericardial or pleural effusion.  Mild scattered calcific atherosclerosis. Upper abdomen: Extensive pancreatic calcifications, probable sequela of chronic pancreatitis. Chest wall: Mild gynecomastia. Bones: No marrow replacement process.     4.5 cm right upper lobe cavitary mass with adjacent 9 mm satellite lesion, as above, concerning for infectious  etiology/atypical pneumonia.  Malignancy could have a similar appearance. Large thin walled left upper lobe bulla, stable. Mild emphysematous lung changes. This report was flagged in Epic as abnormal. Electronically signed by: Juvencio Cardona MD Date:    05/15/2022 Time:    12:10    MRI Brain Without Contrast    Result Date: 5/16/2022  EXAMINATION: MRI BRAIN WITHOUT CONTRAST CLINICAL HISTORY: Mental status change, unknown cause; TECHNIQUE: Multiplanar multisequence MR imaging of the brain was performed without contrast. COMPARISON: CT head 05/13/2022 MRI 10/27/2021 FINDINGS: there is central volume loss, similar to prior studies.  There is no evidence of hydrocephalus, mass effect, intracranial hemorrhage or acute infarct. Focal lesions within the periventricular white matter bilaterally are stable from the prior study consistent with prior nonspecific insult.  No new white matter lesions are noted. Normal arterial flow voids are preserved at the skull base. The visualized sinuses and mastoid air cells are clear.     Stable appearance of the brain as detailed above...  No acute intracranial process. Electronically signed by: Ibrahima Soria Date:    05/16/2022 Time:    11:55    X-Ray Chest AP Portable    Result Date: 5/13/2022  EXAMINATION: XR CHEST AP PORTABLE CLINICAL HISTORY: Sepsis; TECHNIQUE: Single frontal view of the chest was performed. COMPARISON: Chest radiograph 03/28/2022, CT chest 10/27/2021 FINDINGS: Cardiac monitoring leads overlie the chest.  Cardiac silhouette is not significantly enlarged.  There is a stable large thin walled cyst projecting over the left upper lung zone.  There is increased fullness of the right hilar region, not well appreciated on prior radiograph examination.  Additionally, there is a possible new 1.0 cm nodular opacity projecting over the right upper lung zone.  The remaining lungs demonstrate no new confluent airspace consolidation.  No significant volume of pleural fluid or  pneumothorax appreciated.  Osseous structures appear grossly intact.     1.  Increased fullness of the right hilar region, not well appreciated on prior radiograph examination as well as possible new 1.0 cm nodular opacity projecting over the right upper lung zone.  Given short-term interval development, this could relate to underlying infectious process, however neoplastic process not excluded.  Consider further assessment with dedicated chest CT. 2.  Stable large thin walled left upper lung zone cyst. Electronically signed by: Lenny Pereira MD Date:    05/13/2022 Time:    03:45    Microbiology Results (last 7 days)       Procedure Component Value Units Date/Time    AFB Culture & Smear [[id number]] Collected: 05/17/22 1616    Order Status: Completed Specimen: Respiratory from Sputum Updated: 05/18/22 2127     AFB Culture & Smear Culture in progress     AFB CULTURE STAIN No acid fast bacilli seen.    AFB Culture & Smear [[id number]]     Order Status: No result Specimen: Body Fluid from Sputum, Expectorated     Culture, Respiratory with Gram Stain [[id number]]  (Abnormal)  (Susceptibility) Collected: 05/15/22 1710    Order Status: Completed Specimen: Respiratory from Sputum Updated: 05/18/22 1014     Respiratory Culture No Pseudomonas isolated.      METHICILLIN RESISTANT STAPHYLOCOCCUS AUREUS  Few  Normal respiratory armida also present       Gram Stain (Respiratory) <10 epithelial cells per low power field.     Gram Stain (Respiratory) Rare WBC's     Gram Stain (Respiratory) Rare Gram positive cocci     Gram Stain (Respiratory) Rare Gram negative rods    Blood culture x two cultures. Draw prior to antibiotics. [[id number]] Collected: 05/13/22 0255    Order Status: Completed Specimen: Blood from Peripheral, Antecubital, Right Updated: 05/18/22 1012     Blood Culture, Routine No growth after 5 days.    Narrative:      Aerobic and anaerobic    Blood culture x two cultures. Draw prior to antibiotics. [[id number]] Collected:  05/13/22 0311    Order Status: Completed Specimen: Blood from Peripheral, Forearm, Right Updated: 05/18/22 1012     Blood Culture, Routine No growth after 5 days.    Narrative:      Aerobic and anaerobic    AFB Culture & Smear [[id number]] Collected: 05/17/22 1616    Order Status: Sent Specimen: Respiratory from Sputum Updated: 05/17/22 1616    AFB Culture & Smear [[id number]] Collected: 05/15/22 1710    Order Status: Sent Specimen: Respiratory from Sputum Updated: 05/15/22 1711                Assessment/Plan:      Pneumonia  Pt afebrile but w/ leokocytosis and new findings on CXR   Initiated Broad spec abx in ED w/ Vanc/cef/flagyl after 1 day switched to augmentin/azithromycin for 2 days then cultures showing MRSA so put back on Vanc for 1 day 5/17 and transitioned to IV Clinda for 7 days on 5/19  Bld cultures drawn in ED w/ NGTD  CT chest w/ 4.5 cm right upper lobe cavitary mass with adjacent 9 mm satellite lesion which is concerning for infectious etiology/atypical pneumonia as well as a large thin walled left upper lobe bulla  Quant Gold neg AFB x1 w/o AFB 2x pending as well as TB PCR    Plan:  Pulm consulted  Rec cnt pneumonia abx but await 3x AFB smears and TB PCR  Cnt CLinda for 7 days end 5/25   Cnt to monitor for fevers and repeat cultures/imaging      AMS (altered mental status)  Pt w/ AMS on admit thought to be related to DKA  Uncertain baseline mentation though previous CVA in 2013  After DKA resolution pt still altered  UDS and EtOH neg on admit   Ammonia slightly elevated to 61  CT head w/o any new intracranial abnormalities  Folate 11.2  VitB12 415  Thiamine 88  B6 6  TSH 0.409  RPR nonreactive  HIV neg   MRI brain w/o w/ no acute intracranial abnormalities   EEG showed generalized cerebral dysfunction but no signs of seizure activity    Pt returned to baseline and has been AOx 3 since 5/17      Epilepsy  Restart home keppra and depakote  EEG done 5/16 which showed no seizure or epileptiform activity      Hypertension  Patient not on any home anti-HTN medication  Plan  Losartan 25mg daily  Monitor Vitals      Type 2 diabetes mellitus, with long-term current use of insulin  Patient uses Humalin 70/30 to control BG  Plan  MDSSI  Detemir 25U Subq nightly  POCT BG Q4h  Diabetic Diet      ETOH abuse  Pt w/ known EtOH abuse hx  Ethanol neg on admit  Hx of EtOH seizures    Plan:  CIWA q4   Ativan PRN for CIWA >8  Thiamine and multivitamin daily         VTE Risk Mitigation (From admission, onward)         Ordered     IP VTE LOW RISK PATIENT  Once         05/13/22 0530     Place sequential compression device  Until discontinued         05/13/22 0530                Discharge Planning   SHABANA: 5/14/2022     Code Status: Full Code   Is the patient medically ready for discharge?:     Reason for patient still in hospital (select all that apply): Patient trending condition  Discharge Plan A: Lynnette Grande MD  Department of Hospital Medicine   [facility]

## 2022-05-21 PROBLEM — Z59.00 HOMELESSNESS: Status: ACTIVE | Noted: 2022-05-21

## 2022-05-21 LAB
ALBUMIN SERPL BCP-MCNC: 3.3 G/DL (ref 3.5–5.2)
ALP SERPL-CCNC: 62 U/L (ref 55–135)
ALT SERPL W/O P-5'-P-CCNC: 9 U/L (ref 10–44)
ANION GAP SERPL CALC-SCNC: 11 MMOL/L (ref 8–16)
AST SERPL-CCNC: 14 U/L (ref 10–40)
BASOPHILS # BLD AUTO: 0.07 K/UL (ref 0–0.2)
BASOPHILS NFR BLD: 0.9 % (ref 0–1.9)
BILIRUB SERPL-MCNC: 0.2 MG/DL (ref 0.1–1)
BUN SERPL-MCNC: 12 MG/DL (ref 6–20)
CALCIUM SERPL-MCNC: 9.9 MG/DL (ref 8.7–10.5)
CHLORIDE SERPL-SCNC: 97 MMOL/L (ref 95–110)
CO2 SERPL-SCNC: 29 MMOL/L (ref 23–29)
CREAT SERPL-MCNC: 0.7 MG/DL (ref 0.5–1.4)
DIFFERENTIAL METHOD: ABNORMAL
EOSINOPHIL # BLD AUTO: 0.2 K/UL (ref 0–0.5)
EOSINOPHIL NFR BLD: 1.9 % (ref 0–8)
ERYTHROCYTE [DISTWIDTH] IN BLOOD BY AUTOMATED COUNT: 12.6 % (ref 11.5–14.5)
EST. GFR  (AFRICAN AMERICAN): >60 ML/MIN/1.73 M^2
EST. GFR  (NON AFRICAN AMERICAN): >60 ML/MIN/1.73 M^2
GLUCOSE SERPL-MCNC: 120 MG/DL (ref 70–110)
HCT VFR BLD AUTO: 34.4 % (ref 40–54)
HGB BLD-MCNC: 12 G/DL (ref 14–18)
IMM GRANULOCYTES # BLD AUTO: 0.09 K/UL (ref 0–0.04)
IMM GRANULOCYTES NFR BLD AUTO: 1.2 % (ref 0–0.5)
LYMPHOCYTES # BLD AUTO: 3.5 K/UL (ref 1–4.8)
LYMPHOCYTES NFR BLD: 45.1 % (ref 18–48)
MAGNESIUM SERPL-MCNC: 1.8 MG/DL (ref 1.6–2.6)
MCH RBC QN AUTO: 31.9 PG (ref 27–31)
MCHC RBC AUTO-ENTMCNC: 34.9 G/DL (ref 32–36)
MCV RBC AUTO: 92 FL (ref 82–98)
MONOCYTES # BLD AUTO: 0.5 K/UL (ref 0.3–1)
MONOCYTES NFR BLD: 6.9 % (ref 4–15)
NEUTROPHILS # BLD AUTO: 3.4 K/UL (ref 1.8–7.7)
NEUTROPHILS NFR BLD: 44 % (ref 38–73)
NRBC BLD-RTO: 0 /100 WBC
PHOSPHATE SERPL-MCNC: 4.3 MG/DL (ref 2.7–4.5)
PLATELET # BLD AUTO: 386 K/UL (ref 150–450)
PMV BLD AUTO: 9 FL (ref 9.2–12.9)
POCT GLUCOSE: 127 MG/DL (ref 70–110)
POCT GLUCOSE: 208 MG/DL (ref 70–110)
POCT GLUCOSE: 495 MG/DL (ref 70–110)
POCT GLUCOSE: >500 MG/DL (ref 70–110)
POTASSIUM SERPL-SCNC: 4.7 MMOL/L (ref 3.5–5.1)
PROT SERPL-MCNC: 6.8 G/DL (ref 6–8.4)
RBC # BLD AUTO: 3.76 M/UL (ref 4.6–6.2)
SODIUM SERPL-SCNC: 137 MMOL/L (ref 136–145)
WBC # BLD AUTO: 7.78 K/UL (ref 3.9–12.7)

## 2022-05-21 PROCEDURE — 25000003 PHARM REV CODE 250: Performed by: STUDENT IN AN ORGANIZED HEALTH CARE EDUCATION/TRAINING PROGRAM

## 2022-05-21 PROCEDURE — 85025 COMPLETE CBC W/AUTO DIFF WBC: CPT | Performed by: STUDENT IN AN ORGANIZED HEALTH CARE EDUCATION/TRAINING PROGRAM

## 2022-05-21 PROCEDURE — 25000003 PHARM REV CODE 250: Performed by: FAMILY MEDICINE

## 2022-05-21 PROCEDURE — S4991 NICOTINE PATCH NONLEGEND: HCPCS | Performed by: STUDENT IN AN ORGANIZED HEALTH CARE EDUCATION/TRAINING PROGRAM

## 2022-05-21 PROCEDURE — 36415 COLL VENOUS BLD VENIPUNCTURE: CPT | Performed by: STUDENT IN AN ORGANIZED HEALTH CARE EDUCATION/TRAINING PROGRAM

## 2022-05-21 PROCEDURE — 83735 ASSAY OF MAGNESIUM: CPT | Performed by: STUDENT IN AN ORGANIZED HEALTH CARE EDUCATION/TRAINING PROGRAM

## 2022-05-21 PROCEDURE — 27000207 HC ISOLATION

## 2022-05-21 PROCEDURE — 84100 ASSAY OF PHOSPHORUS: CPT | Performed by: STUDENT IN AN ORGANIZED HEALTH CARE EDUCATION/TRAINING PROGRAM

## 2022-05-21 PROCEDURE — 11000001 HC ACUTE MED/SURG PRIVATE ROOM

## 2022-05-21 PROCEDURE — 80053 COMPREHEN METABOLIC PANEL: CPT | Performed by: STUDENT IN AN ORGANIZED HEALTH CARE EDUCATION/TRAINING PROGRAM

## 2022-05-21 PROCEDURE — 94761 N-INVAS EAR/PLS OXIMETRY MLT: CPT

## 2022-05-21 RX ADMIN — INSULIN DETEMIR 25 UNITS: 100 INJECTION, SOLUTION SUBCUTANEOUS at 09:05

## 2022-05-21 RX ADMIN — MUPIROCIN: 20 OINTMENT TOPICAL at 09:05

## 2022-05-21 RX ADMIN — DIVALPROEX SODIUM 1000 MG: 250 TABLET, EXTENDED RELEASE ORAL at 09:05

## 2022-05-21 RX ADMIN — NICOTINE 1 PATCH: 14 PATCH, EXTENDED RELEASE TRANSDERMAL at 08:05

## 2022-05-21 RX ADMIN — LEVETIRACETAM 750 MG: 250 TABLET, FILM COATED ORAL at 08:05

## 2022-05-21 RX ADMIN — MUPIROCIN: 20 OINTMENT TOPICAL at 08:05

## 2022-05-21 RX ADMIN — CLINDAMYCIN IN 5 PERCENT DEXTROSE 600 MG: 12 INJECTION, SOLUTION INTRAVENOUS at 02:05

## 2022-05-21 RX ADMIN — THIAMINE HCL TAB 100 MG 100 MG: 100 TAB at 08:05

## 2022-05-21 RX ADMIN — INSULIN ASPART 4 UNITS: 100 INJECTION, SOLUTION INTRAVENOUS; SUBCUTANEOUS at 12:05

## 2022-05-21 RX ADMIN — CLINDAMYCIN IN 5 PERCENT DEXTROSE 600 MG: 12 INJECTION, SOLUTION INTRAVENOUS at 12:05

## 2022-05-21 RX ADMIN — CLINDAMYCIN IN 5 PERCENT DEXTROSE 600 MG: 12 INJECTION, SOLUTION INTRAVENOUS at 06:05

## 2022-05-21 RX ADMIN — INSULIN ASPART 5 UNITS: 100 INJECTION, SOLUTION INTRAVENOUS; SUBCUTANEOUS at 09:05

## 2022-05-21 RX ADMIN — LOSARTAN POTASSIUM 25 MG: 25 TABLET, FILM COATED ORAL at 08:05

## 2022-05-21 RX ADMIN — THERA TABS 1 TABLET: TAB at 08:05

## 2022-05-21 RX ADMIN — DIVALPROEX SODIUM 1000 MG: 250 TABLET, EXTENDED RELEASE ORAL at 08:05

## 2022-05-21 RX ADMIN — LEVETIRACETAM 750 MG: 250 TABLET, FILM COATED ORAL at 09:05

## 2022-05-21 RX ADMIN — INSULIN ASPART 5 UNITS: 100 INJECTION, SOLUTION INTRAVENOUS; SUBCUTANEOUS at 10:05

## 2022-05-21 NOTE — PROGRESS NOTES
[facility] Medicine  Progress Note    Patient Name: Terrance Chavez  MRN: [id number]  Patient Class: IP- Inpatient   Admission Date: 5/13/2022  Length of Stay: 6 days  Attending Physician: Alonso Carlos III, MD  Primary Care Provider: Primary Doctor No        Subjective:     Principal Problem:Hyperglycemia        HPI:  Patient is a 42 yr old male with pmhx of T2DM, HTN, CVA, neurocognitive impairment and substance abuse disorder presenting to the ED for Altered mental status. [place]herently, brought in by EMS. On arrival patient was only oriented to person but not place,time or situation. Patient blood glucose was elevated on arrival. Initial labs showed small acidosis. Patient was provided 2L LR in ED and his mentation improved. Patient had a fever in the ED , and elevated WBC  of 29.9. Can not rule out infection exacerbating or causing change in mental status. Patient received 2g ceftriaxone in ED. Patient denies chest pain, shortness of breath, headache, nausea and vomiting. Admitted to [facility] for observation and correction of glucose.      Overview/Hospital Course:  Pt was admitted to the FM service for DKA and AMS. Pt was initially found to have elevated BG as well as Beta hydroxybutyrate and AG. He was started on an insulin drip as well as giving his long acting insulin. His DKA quickly resolved and then was continued to work up for his AMS and new lung findings. On admission he had a CXR which showed a new R Upper Lung nodule as well as hilar haziness and a known L upper lung cyst. A CT was recommned for f/u which showed a 4.5 cm R Upper Lobe and satellite 9mm lesion. Pulm was consulted and pt was worked up for potential pneumonia vs TB he was started on a 5 day course of augmentin and azithromycin which he will complete on 5/18. Quant gold as well as AFBs were collected which showed............On admission he had a CT head which did not  show any acute intracranial abnormalities as well as EtOH and UDS which were neg. It was unclear as to what his BL mentation is as previous notes had documented normal mentation and AO x4. His AMS was worked up with HIV/RPR/B12/Folate which were wnl and B1/6 pending. Neurology was consulted to determine if there was any other potential causes of AMS and rec an MRI and EEG. His MRI again did not show any acute abnormalities and EEG which showed generalized cerebral dysfxn but no epileptiform activity. The pt's mentation slowly improved throughout his admission and was AO x3 at time of d/c. Pt was educated on his chronic conditions and SW gave him multiple resources for his outpt care. He was given strict return precautions and all questions were answered.      Interval History: NAEON. Pt doing well this AM w/o any complaints. He is still AO x 3 and aware of plan to await for final TB r/o as well as completion of abx course while inpt.     Review of Systems   Constitutional:  Negative for activity change, chills, fatigue and fever.   HENT:  Negative for congestion, sinus pressure and trouble swallowing.    Eyes:  Negative for photophobia and visual disturbance.   Respiratory:  Negative for cough, choking and shortness of breath.    Cardiovascular:  Negative for chest pain and leg swelling.   Gastrointestinal:  Negative for abdominal distention, constipation, diarrhea, nausea and vomiting.   Endocrine: Negative for polyuria.   Genitourinary:  Negative for dysuria and hematuria.   Musculoskeletal:  Negative for arthralgias.   Skin:  Negative for wound.   Neurological:  Negative for dizziness, tremors, seizures, speech difficulty, weakness, light-headedness, numbness and headaches.   Objective:     Vital Signs (Most Recent):  Temp: 97.3 °F (36.3 °C) (05/21/22 0814)  Pulse: 69 (05/21/22 0814)  Resp: 18 (05/21/22 0814)  BP: 107/76 (05/21/22 0814)  SpO2: 100 % (05/21/22 0814) Vital Signs (24h Range):  Temp:  [96.5 °F (35.8  °C)-97.8 °F (36.6 °C)] 97.3 °F (36.3 °C)  Pulse:  [66-77] 69  Resp:  [18] 18  SpO2:  [99 %-100 %] 100 %  BP: (101-127)/(56-82) 107/76     Weight: 72.6 kg (160 lb 0.9 oz)  Body mass index is 25.07 kg/m².    Intake/Output Summary (Last 24 hours) at 5/21/2022 0941  Last data filed at 5/21/2022 0249  Gross per 24 hour   Intake 750 ml   Output --   Net 750 ml      Physical Exam  Constitutional:       General: He is not in acute distress.  HENT:      Head: Normocephalic and atraumatic.      Right Ear: External ear normal.      Left Ear: External ear normal.      Nose: Nose normal.   Eyes:      Extraocular Movements: Extraocular movements intact.      Conjunctiva/sclera: Conjunctivae normal.      Pupils: Pupils are equal, round, and reactive to light.   Cardiovascular:      Rate and Rhythm: Normal rate and regular rhythm.      Pulses: Normal pulses.      Heart sounds: Normal heart sounds. No murmur heard.    No friction rub. No gallop.   Pulmonary:      Effort: Pulmonary effort is normal. No respiratory distress.      Breath sounds: Normal breath sounds. No wheezing or rhonchi.   Abdominal:      General: Bowel sounds are normal. There is no distension.      Palpations: There is no mass.      Tenderness: There is no abdominal tenderness.   Musculoskeletal:         General: No swelling or tenderness. Normal range of motion.      Cervical back: Normal range of motion. No tenderness.   Lymphadenopathy:      Cervical: No cervical adenopathy.   Skin:     General: Skin is warm and dry.      Findings: No rash.   Neurological:      Mental Status: He is alert.      Comments: AO x3     Recent Labs   Lab 05/19/22  0442 05/20/22  0453 05/21/22  0254   WBC 9.77 8.75 7.78   HGB 11.0* 10.8* 12.0*   HCT 32.4* 31.9* 34.4*   MCV 93 93 92   RBC 3.49* 3.42* 3.76*   MCH 31.5* 31.6* 31.9*   MCHC 34.0 33.9 34.9   RDW 12.7 12.6 12.6   * 418 386   MPV 9.1* 9.2 9.0*   GRAN 59.5  5.8 50.0  4.4 44.0  3.4   LYMPH 30.8  3.0 37.8  3.3 45.1   3.5   MONO 6.7  0.7 8.8  0.8 6.9  0.5   EOSINOPHIL 1.2 1.6 1.9   BASOPHIL 0.7 0.8 0.9     Recent Labs   Lab 05/19/22  0442 05/20/22  0453 05/21/22  0254    136 137   K 4.3 4.6 4.7   CL 98 100 97   CO2 27 24 29   ANIONGAP 11 12 11   BUN 11 13 12   CREATININE 0.7 0.8 0.7   * 230* 120*   CALCIUM 9.6 9.9 9.9   PROT 6.2 6.9 6.8   ALBUMIN 3.0* 2.8* 3.3*   ALKPHOS 60 61 62   BILITOT 0.2 0.2 0.2   ALT 10 9* 9*   AST 10 16 14   ESTGFRAFRICA >60 >60 >60   EGFRNONAA >60 >60 >60   MG 1.8 1.6 1.8   PHOS 4.1 3.9 4.3     No results for input(s): COLORU, APPEARANCEUA, PHUR, SPECGRAV, PROTEINUA, GLUCUA, KETONESU, BILIRUBINUA, OCCULTUA, UROBILINOGEN, NITRITE, LEUKOCYTESUR, RBCUA, WBCUA, BACTERIA, SQUAMEPITHEL, HYALINECASTS, GRANULARCAST, MICROCMT in the last 168 hours.    Invalid input(s): SPECIMENU, AMORPHOUSU  No results for input(s): TROPONINI, CPK, CPKMB in the last 168 hours.  No results for input(s): PT, INR, APTT in the last 168 hours.  Recent Labs   Lab 05/14/22  1524   TSH 0.409     CT Head Without Contrast    Result Date: 5/13/2022  EXAMINATION: CT HEAD WITHOUT CONTRAST CLINICAL HISTORY: Mental status change, unknown cause; TECHNIQUE: Low dose axial images were obtained through the head.  Coronal and sagittal reformations were also performed. Contrast was not administered. COMPARISON: CT examination of the brain without contrast March 30, 2022 FINDINGS: The ventricular system, sulcal pattern and parenchymal attenuation characteristics are consistent with chronic change appearing similar to the prior examination without evidence for superimposed acute process.  There are areas consistent with remote lacunar-type ischemic change.  Chronic appearing white matter change noted. There is no evidence for intracranial mass, mass effect or midline shift and there is no evidence for acute intracranial hemorrhage.  Appropriate CSF spaces are seen at the skull base. The visualized orbits appear intact.  The mastoid air  cells and middle ear cavity bilaterally appear appropriate.  Mild opacity along the external auditory canal likely represents cerumen.  Minimal paranasal sinus mucosal thickening noted.  The osseous structures appear intact.     Chronic changes are noted, there is no evidence for superimposed acute intracranial process. Electronically signed by: Abimael Eid Date:    05/13/2022 Time:    04:10    CT Chest Without Contrast    Result Date: 5/15/2022  EXAMINATION: CT CHEST WITHOUT CONTRAST CLINICAL HISTORY: Abnormal xray - lung nodule, >= 1 cm; TECHNIQUE: Low dose axial images, sagittal and coronal reformations were obtained from the thoracic inlet to the lung bases. Contrast was not administered. COMPARISON: Radiograph 05/13/2022 FINDINGS: Lungs: There is a right upper lobe partially cavitary mass in the posterior segment eroding through the major fissure into the superior segment of the right lower lobe (series 601, image 105).  The lesion measures 4.5 cm in transverse plane (series 2, image 54). There is an adjacent right upper lobe nodule measuring 0.9 cm (series 2, image 39), with the same imaging characteristics. Mild emphysematous lung changes. There is a large left upper lobe thin walled bulla measuring 11.4 cm (series 2, image 136), stable. Mediastinum: Thyroid gland is grossly unremarkable.  No mediastinal lymphadenopathy.  Limited assessment of the hilum without IV contrast.  Thoracic aorta is normal caliber.  Heart size within normal limits.  No pericardial or pleural effusion.  Mild scattered calcific atherosclerosis. Upper abdomen: Extensive pancreatic calcifications, probable sequela of chronic pancreatitis. Chest wall: Mild gynecomastia. Bones: No marrow replacement process.     4.5 cm right upper lobe cavitary mass with adjacent 9 mm satellite lesion, as above, concerning for infectious etiology/atypical pneumonia.  Malignancy could have a similar appearance. Large thin walled left upper lobe bulla,  stable. Mild emphysematous lung changes. This report was flagged in Epic as abnormal. Electronically signed by: Juvencio Cardona MD Date:    05/15/2022 Time:    12:10    MRI Brain Without Contrast    Result Date: 5/16/2022  EXAMINATION: MRI BRAIN WITHOUT CONTRAST CLINICAL HISTORY: Mental status change, unknown cause; TECHNIQUE: Multiplanar multisequence MR imaging of the brain was performed without contrast. COMPARISON: CT head 05/13/2022 MRI 10/27/2021 FINDINGS: there is central volume loss, similar to prior studies.  There is no evidence of hydrocephalus, mass effect, intracranial hemorrhage or acute infarct. Focal lesions within the periventricular white matter bilaterally are stable from the prior study consistent with prior nonspecific insult.  No new white matter lesions are noted. Normal arterial flow voids are preserved at the skull base. The visualized sinuses and mastoid air cells are clear.     Stable appearance of the brain as detailed above...  No acute intracranial process. Electronically signed by: Ibrahima Soria Date:    05/16/2022 Time:    11:55    X-Ray Chest AP Portable    Result Date: 5/13/2022  EXAMINATION: XR CHEST AP PORTABLE CLINICAL HISTORY: Sepsis; TECHNIQUE: Single frontal view of the chest was performed. COMPARISON: Chest radiograph 03/28/2022, CT chest 10/27/2021 FINDINGS: Cardiac monitoring leads overlie the chest.  Cardiac silhouette is not significantly enlarged.  There is a stable large thin walled cyst projecting over the left upper lung zone.  There is increased fullness of the right hilar region, not well appreciated on prior radiograph examination.  Additionally, there is a possible new 1.0 cm nodular opacity projecting over the right upper lung zone.  The remaining lungs demonstrate no new confluent airspace consolidation.  No significant volume of pleural fluid or pneumothorax appreciated.  Osseous structures appear grossly intact.     1.  Increased fullness of the right hilar  region, not well appreciated on prior radiograph examination as well as possible new 1.0 cm nodular opacity projecting over the right upper lung zone.  Given short-term interval development, this could relate to underlying infectious process, however neoplastic process not excluded.  Consider further assessment with dedicated chest CT. 2.  Stable large thin walled left upper lung zone cyst. Electronically signed by: Lenny Pereira MD Date:    05/13/2022 Time:    03:45    Microbiology Results (last 7 days)       Procedure Component Value Units Date/Time    AFB Culture & Smear [[id number]] Collected: 05/17/22 1616    Order Status: Completed Specimen: Respiratory from Sputum Updated: 05/18/22 2127     AFB Culture & Smear Culture in progress     AFB CULTURE STAIN No acid fast bacilli seen.    AFB Culture & Smear [[id number]]     Order Status: No result Specimen: Body Fluid from Sputum, Expectorated     Culture, Respiratory with Gram Stain [[id number]]  (Abnormal)  (Susceptibility) Collected: 05/15/22 1710    Order Status: Completed Specimen: Respiratory from Sputum Updated: 05/18/22 1014     Respiratory Culture No Pseudomonas isolated.      METHICILLIN RESISTANT STAPHYLOCOCCUS AUREUS  Few  Normal respiratory armida also present       Gram Stain (Respiratory) <10 epithelial cells per low power field.     Gram Stain (Respiratory) Rare WBC's     Gram Stain (Respiratory) Rare Gram positive cocci     Gram Stain (Respiratory) Rare Gram negative rods    Blood culture x two cultures. Draw prior to antibiotics. [[id number]] Collected: 05/13/22 0255    Order Status: Completed Specimen: Blood from Peripheral, Antecubital, Right Updated: 05/18/22 1012     Blood Culture, Routine No growth after 5 days.    Narrative:      Aerobic and anaerobic    Blood culture x two cultures. Draw prior to antibiotics. [[id number]] Collected: 05/13/22 0311    Order Status: Completed Specimen: Blood from Peripheral, Forearm, Right Updated: 05/18/22 1012      Blood Culture, Routine No growth after 5 days.    Narrative:      Aerobic and anaerobic    AFB Culture & Smear [[id number]] Collected: 05/17/22 1616    Order Status: Sent Specimen: Respiratory from Sputum Updated: 05/17/22 1616    AFB Culture & Smear [[id number]] Collected: 05/15/22 1710    Order Status: Sent Specimen: Respiratory from Sputum Updated: 05/15/22 1711                Assessment/Plan:      Pneumonia  Pt afebrile but w/ leokocytosis and new findings on CXR   Initiated Broad spec abx in ED w/ Vanc/cef/flagyl after 1 day switched to augmentin/azithromycin for 2 days then cultures showing MRSA so put back on Vanc for 1 day 5/17 and transitioned to IV Clinda for 7 days on 5/19  Bld cultures drawn in ED w/ NGTD  CT chest w/ 4.5 cm right upper lobe cavitary mass with adjacent 9 mm satellite lesion which is concerning for infectious etiology/atypical pneumonia as well as a large thin walled left upper lobe bulla  Quant Gold neg AFB x1 w/o AFB 2x pending as well as TB PCR    Plan:  Pulm consulted  Rec cnt pneumonia abx but await 3x AFB smears and TB PCR  Cnt Clinda for 7 days end 5/25   Cnt to monitor for fevers and repeat cultures/imaging      AMS (altered mental status)  Pt w/ AMS on admit thought to be related to DKA  Uncertain baseline mentation though previous CVA in 2013  After DKA resolution pt still altered  UDS and EtOH neg on admit   Ammonia slightly elevated to 61  CT head w/o any new intracranial abnormalities  Folate 11.2  VitB12 415  Thiamine 88  B6 6  TSH 0.409  RPR nonreactive  HIV neg   MRI brain w/o w/ no acute intracranial abnormalities   EEG showed generalized cerebral dysfunction but no signs of seizure activity    Pt returned to baseline and has been AOx 3 since 5/17      Epilepsy  Restart home keppra and depakote  EEG done 5/16 which showed no seizure or epileptiform activity     Hypertension  Patient not on any home anti-HTN medication  Plan  Losartan 25mg daily  Monitor Vitals      Type 2  diabetes mellitus, with long-term current use of insulin  Patient uses Humalin 70/30 to control BG  Plan  MDSSI  Detemir 25U Subq nightly  POCT BG Q4h  Diabetic Diet      ETOH abuse  Pt w/ known EtOH abuse hx  Ethanol neg on admit  Hx of EtOH seizures    Plan:  CIWA q4   Ativan PRN for CIWA >8  Thiamine and multivitamin daily         VTE Risk Mitigation (From admission, onward)         Ordered     IP VTE LOW RISK PATIENT  Once         05/13/22 0530     Place sequential compression device  Until discontinued         05/13/22 0530                Discharge Planning   SHABANA: 5/14/2022     Code Status: Full Code   Is the patient medically ready for discharge?:     Reason for patient still in hospital (select all that apply): Patient trending condition  Discharge Plan A: Lynnette Grande MD  Department of Hospital Medicine   [facility]

## 2022-05-21 NOTE — PLAN OF CARE
Problem: Adult Inpatient Plan of Care  Goal: Plan of Care Review  Outcome: Ongoing, Progressing   Patient is alert, oriented X4. Care plan explained to patient, he verbalized understanding.     On room air.O2 sat maintain 100%, no respiratory distress noted. On cardiac monitor, running normal sinus rhythm.     Deny nausea/vomiting/diarrhea. No pain complaint. Due medications given.     Maintain fall risk precaution, bed in lowest position, bed alarm on. Call light/personal items/urinal in reach. Instructed patient call for help as needed. Will continue to monitor.

## 2022-05-21 NOTE — SUBJECTIVE & OBJECTIVE
Interval History: NAEON. Pt doing well this AM w/o any complaints. He is still AO x 3 and aware of plan to await for final TB r/o as well as completion of abx course while inpt.     Review of Systems   Constitutional:  Negative for activity change, chills, fatigue and fever.   HENT:  Negative for congestion, sinus pressure and trouble swallowing.    Eyes:  Negative for photophobia and visual disturbance.   Respiratory:  Negative for cough, choking and shortness of breath.    Cardiovascular:  Negative for chest pain and leg swelling.   Gastrointestinal:  Negative for abdominal distention, constipation, diarrhea, nausea and vomiting.   Endocrine: Negative for polyuria.   Genitourinary:  Negative for dysuria and hematuria.   Musculoskeletal:  Negative for arthralgias.   Skin:  Negative for wound.   Neurological:  Negative for dizziness, tremors, seizures, speech difficulty, weakness, light-headedness, numbness and headaches.   Objective:     Vital Signs (Most Recent):  Temp: 97.3 °F (36.3 °C) (05/21/22 0814)  Pulse: 69 (05/21/22 0814)  Resp: 18 (05/21/22 0814)  BP: 107/76 (05/21/22 0814)  SpO2: 100 % (05/21/22 0814) Vital Signs (24h Range):  Temp:  [96.5 °F (35.8 °C)-97.8 °F (36.6 °C)] 97.3 °F (36.3 °C)  Pulse:  [66-77] 69  Resp:  [18] 18  SpO2:  [99 %-100 %] 100 %  BP: (101-127)/(56-82) 107/76     Weight: 72.6 kg (160 lb 0.9 oz)  Body mass index is 25.07 kg/m².    Intake/Output Summary (Last 24 hours) at 5/21/2022 0945  Last data filed at 5/21/2022 0249  Gross per 24 hour   Intake 750 ml   Output --   Net 750 ml      Physical Exam  Constitutional:       General: He is not in acute distress.  HENT:      Head: Normocephalic and atraumatic.      Right Ear: External ear normal.      Left Ear: External ear normal.      Nose: Nose normal.   Eyes:      Extraocular Movements: Extraocular movements intact.      Conjunctiva/sclera: Conjunctivae normal.      Pupils: Pupils are equal, round, and reactive to light.    Cardiovascular:      Rate and Rhythm: Normal rate and regular rhythm.      Pulses: Normal pulses.      Heart sounds: Normal heart sounds. No murmur heard.    No friction rub. No gallop.   Pulmonary:      Effort: Pulmonary effort is normal. No respiratory distress.      Breath sounds: Normal breath sounds. No wheezing or rhonchi.   Abdominal:      General: Bowel sounds are normal. There is no distension.      Palpations: There is no mass.      Tenderness: There is no abdominal tenderness.   Musculoskeletal:         General: No swelling or tenderness. Normal range of motion.      Cervical back: Normal range of motion. No tenderness.   Lymphadenopathy:      Cervical: No cervical adenopathy.   Skin:     General: Skin is warm and dry.      Findings: No rash.   Neurological:      Mental Status: He is alert.      Comments: AO x3     Recent Labs   Lab 05/19/22 0442 05/20/22 0453 05/21/22  0254   WBC 9.77 8.75 7.78   HGB 11.0* 10.8* 12.0*   HCT 32.4* 31.9* 34.4*   MCV 93 93 92   RBC 3.49* 3.42* 3.76*   MCH 31.5* 31.6* 31.9*   MCHC 34.0 33.9 34.9   RDW 12.7 12.6 12.6   * 418 386   MPV 9.1* 9.2 9.0*   GRAN 59.5  5.8 50.0  4.4 44.0  3.4   LYMPH 30.8  3.0 37.8  3.3 45.1  3.5   MONO 6.7  0.7 8.8  0.8 6.9  0.5   EOSINOPHIL 1.2 1.6 1.9   BASOPHIL 0.7 0.8 0.9     Recent Labs   Lab 05/19/22 0442 05/20/22 0453 05/21/22  0254    136 137   K 4.3 4.6 4.7   CL 98 100 97   CO2 27 24 29   ANIONGAP 11 12 11   BUN 11 13 12   CREATININE 0.7 0.8 0.7   * 230* 120*   CALCIUM 9.6 9.9 9.9   PROT 6.2 6.9 6.8   ALBUMIN 3.0* 2.8* 3.3*   ALKPHOS 60 61 62   BILITOT 0.2 0.2 0.2   ALT 10 9* 9*   AST 10 16 14   ESTGFRAFRICA >60 >60 >60   EGFRNONAA >60 >60 >60   MG 1.8 1.6 1.8   PHOS 4.1 3.9 4.3     No results for input(s): COLORU, APPEARANCEUA, PHUR, SPECGRAV, PROTEINUA, GLUCUA, KETONESU, BILIRUBINUA, OCCULTUA, UROBILINOGEN, NITRITE, LEUKOCYTESUR, RBCUA, WBCUA, BACTERIA, SQUAMEPITHEL, HYALINECASTS, GRANULARCAST, MICROCMT  in the last 168 hours.    Invalid input(s): SPECIMENU, AMORPHOUSU  No results for input(s): TROPONINI, CPK, CPKMB in the last 168 hours.  No results for input(s): PT, INR, APTT in the last 168 hours.  Recent Labs   Lab 05/14/22  1524   TSH 0.409     CT Head Without Contrast    Result Date: 5/13/2022  EXAMINATION: CT HEAD WITHOUT CONTRAST CLINICAL HISTORY: Mental status change, unknown cause; TECHNIQUE: Low dose axial images were obtained through the head.  Coronal and sagittal reformations were also performed. Contrast was not administered. COMPARISON: CT examination of the brain without contrast March 30, 2022 FINDINGS: The ventricular system, sulcal pattern and parenchymal attenuation characteristics are consistent with chronic change appearing similar to the prior examination without evidence for superimposed acute process.  There are areas consistent with remote lacunar-type ischemic change.  Chronic appearing white matter change noted. There is no evidence for intracranial mass, mass effect or midline shift and there is no evidence for acute intracranial hemorrhage.  Appropriate CSF spaces are seen at the skull base. The visualized orbits appear intact.  The mastoid air cells and middle ear cavity bilaterally appear appropriate.  Mild opacity along the external auditory canal likely represents cerumen.  Minimal paranasal sinus mucosal thickening noted.  The osseous structures appear intact.     Chronic changes are noted, there is no evidence for superimposed acute intracranial process. Electronically signed by: Abimael Eid Date:    05/13/2022 Time:    04:10    CT Chest Without Contrast    Result Date: 5/15/2022  EXAMINATION: CT CHEST WITHOUT CONTRAST CLINICAL HISTORY: Abnormal xray - lung nodule, >= 1 cm; TECHNIQUE: Low dose axial images, sagittal and coronal reformations were obtained from the thoracic inlet to the lung bases. Contrast was not administered. COMPARISON: Radiograph 05/13/2022 FINDINGS: Lungs:  There is a right upper lobe partially cavitary mass in the posterior segment eroding through the major fissure into the superior segment of the right lower lobe (series 601, image 105).  The lesion measures 4.5 cm in transverse plane (series 2, image 54). There is an adjacent right upper lobe nodule measuring 0.9 cm (series 2, image 39), with the same imaging characteristics. Mild emphysematous lung changes. There is a large left upper lobe thin walled bulla measuring 11.4 cm (series 2, image 136), stable. Mediastinum: Thyroid gland is grossly unremarkable.  No mediastinal lymphadenopathy.  Limited assessment of the hilum without IV contrast.  Thoracic aorta is normal caliber.  Heart size within normal limits.  No pericardial or pleural effusion.  Mild scattered calcific atherosclerosis. Upper abdomen: Extensive pancreatic calcifications, probable sequela of chronic pancreatitis. Chest wall: Mild gynecomastia. Bones: No marrow replacement process.     4.5 cm right upper lobe cavitary mass with adjacent 9 mm satellite lesion, as above, concerning for infectious etiology/atypical pneumonia.  Malignancy could have a similar appearance. Large thin walled left upper lobe bulla, stable. Mild emphysematous lung changes. This report was flagged in Epic as abnormal. Electronically signed by: Juvencio Cardona MD Date:    05/15/2022 Time:    12:10    MRI Brain Without Contrast    Result Date: 5/16/2022  EXAMINATION: MRI BRAIN WITHOUT CONTRAST CLINICAL HISTORY: Mental status change, unknown cause; TECHNIQUE: Multiplanar multisequence MR imaging of the brain was performed without contrast. COMPARISON: CT head 05/13/2022 MRI 10/27/2021 FINDINGS: there is central volume loss, similar to prior studies.  There is no evidence of hydrocephalus, mass effect, intracranial hemorrhage or acute infarct. Focal lesions within the periventricular white matter bilaterally are stable from the prior study consistent with prior nonspecific insult.   No new white matter lesions are noted. Normal arterial flow voids are preserved at the skull base. The visualized sinuses and mastoid air cells are clear.     Stable appearance of the brain as detailed above...  No acute intracranial process. Electronically signed by: Ibrahima Soria Date:    05/16/2022 Time:    11:55    X-Ray Chest AP Portable    Result Date: 5/13/2022  EXAMINATION: XR CHEST AP PORTABLE CLINICAL HISTORY: Sepsis; TECHNIQUE: Single frontal view of the chest was performed. COMPARISON: Chest radiograph 03/28/2022, CT chest 10/27/2021 FINDINGS: Cardiac monitoring leads overlie the chest.  Cardiac silhouette is not significantly enlarged.  There is a stable large thin walled cyst projecting over the left upper lung zone.  There is increased fullness of the right hilar region, not well appreciated on prior radiograph examination.  Additionally, there is a possible new 1.0 cm nodular opacity projecting over the right upper lung zone.  The remaining lungs demonstrate no new confluent airspace consolidation.  No significant volume of pleural fluid or pneumothorax appreciated.  Osseous structures appear grossly intact.     1.  Increased fullness of the right hilar region, not well appreciated on prior radiograph examination as well as possible new 1.0 cm nodular opacity projecting over the right upper lung zone.  Given short-term interval development, this could relate to underlying infectious process, however neoplastic process not excluded.  Consider further assessment with dedicated chest CT. 2.  Stable large thin walled left upper lung zone cyst. Electronically signed by: Lenny Pereira MD Date:    05/13/2022 Time:    03:45    Microbiology Results (last 7 days)       Procedure Component Value Units Date/Time    AFB Culture & Smear [[id number]] Collected: 05/17/22 1616    Order Status: Completed Specimen: Respiratory from Sputum Updated: 05/18/22 2127     AFB Culture & Smear Culture in progress     AFB  CULTURE STAIN No acid fast bacilli seen.    AFB Culture & Smear [[id number]]     Order Status: No result Specimen: Body Fluid from Sputum, Expectorated     Culture, Respiratory with Gram Stain [[id number]]  (Abnormal)  (Susceptibility) Collected: 05/15/22 1710    Order Status: Completed Specimen: Respiratory from Sputum Updated: 05/18/22 1014     Respiratory Culture No Pseudomonas isolated.      METHICILLIN RESISTANT STAPHYLOCOCCUS AUREUS  Few  Normal respiratory armida also present       Gram Stain (Respiratory) <10 epithelial cells per low power field.     Gram Stain (Respiratory) Rare WBC's     Gram Stain (Respiratory) Rare Gram positive cocci     Gram Stain (Respiratory) Rare Gram negative rods    Blood culture x two cultures. Draw prior to antibiotics. [[id number]] Collected: 05/13/22 0255    Order Status: Completed Specimen: Blood from Peripheral, Antecubital, Right Updated: 05/18/22 1012     Blood Culture, Routine No growth after 5 days.    Narrative:      Aerobic and anaerobic    Blood culture x two cultures. Draw prior to antibiotics. [[id number]] Collected: 05/13/22 0311    Order Status: Completed Specimen: Blood from Peripheral, Forearm, Right Updated: 05/18/22 1012     Blood Culture, Routine No growth after 5 days.    Narrative:      Aerobic and anaerobic    AFB Culture & Smear [[id number]] Collected: 05/17/22 1616    Order Status: Sent Specimen: Respiratory from Sputum Updated: 05/17/22 1616    AFB Culture & Smear [[id number]] Collected: 05/15/22 1710    Order Status: Sent Specimen: Respiratory from Sputum Updated: 05/15/22 1711

## 2022-05-22 LAB
ALBUMIN SERPL BCP-MCNC: 3.2 G/DL (ref 3.5–5.2)
ALP SERPL-CCNC: 68 U/L (ref 55–135)
ALT SERPL W/O P-5'-P-CCNC: 11 U/L (ref 10–44)
ANION GAP SERPL CALC-SCNC: 12 MMOL/L (ref 8–16)
AST SERPL-CCNC: 11 U/L (ref 10–40)
BASOPHILS # BLD AUTO: 0.07 K/UL (ref 0–0.2)
BASOPHILS NFR BLD: 0.9 % (ref 0–1.9)
BILIRUB SERPL-MCNC: 0.1 MG/DL (ref 0.1–1)
BUN SERPL-MCNC: 13 MG/DL (ref 6–20)
CALCIUM SERPL-MCNC: 10 MG/DL (ref 8.7–10.5)
CHLORIDE SERPL-SCNC: 95 MMOL/L (ref 95–110)
CO2 SERPL-SCNC: 30 MMOL/L (ref 23–29)
CREAT SERPL-MCNC: 0.8 MG/DL (ref 0.5–1.4)
DIFFERENTIAL METHOD: ABNORMAL
EOSINOPHIL # BLD AUTO: 0.2 K/UL (ref 0–0.5)
EOSINOPHIL NFR BLD: 2 % (ref 0–8)
ERYTHROCYTE [DISTWIDTH] IN BLOOD BY AUTOMATED COUNT: 12.3 % (ref 11.5–14.5)
EST. GFR  (AFRICAN AMERICAN): >60 ML/MIN/1.73 M^2
EST. GFR  (NON AFRICAN AMERICAN): >60 ML/MIN/1.73 M^2
GLUCOSE SERPL-MCNC: 247 MG/DL (ref 70–110)
HCT VFR BLD AUTO: 32.8 % (ref 40–54)
HGB BLD-MCNC: 11.2 G/DL (ref 14–18)
IMM GRANULOCYTES # BLD AUTO: 0.07 K/UL (ref 0–0.04)
IMM GRANULOCYTES NFR BLD AUTO: 0.9 % (ref 0–0.5)
LYMPHOCYTES # BLD AUTO: 3.5 K/UL (ref 1–4.8)
LYMPHOCYTES NFR BLD: 46.1 % (ref 18–48)
MAGNESIUM SERPL-MCNC: 1.9 MG/DL (ref 1.6–2.6)
MCH RBC QN AUTO: 31.5 PG (ref 27–31)
MCHC RBC AUTO-ENTMCNC: 34.1 G/DL (ref 32–36)
MCV RBC AUTO: 92 FL (ref 82–98)
MONOCYTES # BLD AUTO: 0.6 K/UL (ref 0.3–1)
MONOCYTES NFR BLD: 8.2 % (ref 4–15)
NEUTROPHILS # BLD AUTO: 3.2 K/UL (ref 1.8–7.7)
NEUTROPHILS NFR BLD: 41.9 % (ref 38–73)
NRBC BLD-RTO: 0 /100 WBC
PHOSPHATE SERPL-MCNC: 4 MG/DL (ref 2.7–4.5)
PLATELET # BLD AUTO: 395 K/UL (ref 150–450)
PMV BLD AUTO: 9.1 FL (ref 9.2–12.9)
POCT GLUCOSE: 158 MG/DL (ref 70–110)
POCT GLUCOSE: 188 MG/DL (ref 70–110)
POCT GLUCOSE: 258 MG/DL (ref 70–110)
POCT GLUCOSE: 315 MG/DL (ref 70–110)
POCT GLUCOSE: 348 MG/DL (ref 70–110)
POTASSIUM SERPL-SCNC: 4.7 MMOL/L (ref 3.5–5.1)
PROT SERPL-MCNC: 6.6 G/DL (ref 6–8.4)
RBC # BLD AUTO: 3.55 M/UL (ref 4.6–6.2)
SODIUM SERPL-SCNC: 137 MMOL/L (ref 136–145)
WBC # BLD AUTO: 7.65 K/UL (ref 3.9–12.7)

## 2022-05-22 PROCEDURE — S4991 NICOTINE PATCH NONLEGEND: HCPCS | Performed by: STUDENT IN AN ORGANIZED HEALTH CARE EDUCATION/TRAINING PROGRAM

## 2022-05-22 PROCEDURE — 25000003 PHARM REV CODE 250: Performed by: FAMILY MEDICINE

## 2022-05-22 PROCEDURE — 84100 ASSAY OF PHOSPHORUS: CPT | Performed by: STUDENT IN AN ORGANIZED HEALTH CARE EDUCATION/TRAINING PROGRAM

## 2022-05-22 PROCEDURE — 36415 COLL VENOUS BLD VENIPUNCTURE: CPT | Performed by: STUDENT IN AN ORGANIZED HEALTH CARE EDUCATION/TRAINING PROGRAM

## 2022-05-22 PROCEDURE — 11000001 HC ACUTE MED/SURG PRIVATE ROOM

## 2022-05-22 PROCEDURE — C9399 UNCLASSIFIED DRUGS OR BIOLOG: HCPCS | Performed by: STUDENT IN AN ORGANIZED HEALTH CARE EDUCATION/TRAINING PROGRAM

## 2022-05-22 PROCEDURE — 25000003 PHARM REV CODE 250: Performed by: STUDENT IN AN ORGANIZED HEALTH CARE EDUCATION/TRAINING PROGRAM

## 2022-05-22 PROCEDURE — 83735 ASSAY OF MAGNESIUM: CPT | Performed by: STUDENT IN AN ORGANIZED HEALTH CARE EDUCATION/TRAINING PROGRAM

## 2022-05-22 PROCEDURE — 27000207 HC ISOLATION

## 2022-05-22 PROCEDURE — 85025 COMPLETE CBC W/AUTO DIFF WBC: CPT | Performed by: STUDENT IN AN ORGANIZED HEALTH CARE EDUCATION/TRAINING PROGRAM

## 2022-05-22 PROCEDURE — 80053 COMPREHEN METABOLIC PANEL: CPT | Performed by: STUDENT IN AN ORGANIZED HEALTH CARE EDUCATION/TRAINING PROGRAM

## 2022-05-22 RX ADMIN — DIVALPROEX SODIUM 1000 MG: 250 TABLET, EXTENDED RELEASE ORAL at 08:05

## 2022-05-22 RX ADMIN — DIVALPROEX SODIUM 1000 MG: 250 TABLET, EXTENDED RELEASE ORAL at 09:05

## 2022-05-22 RX ADMIN — THIAMINE HCL TAB 100 MG 100 MG: 100 TAB at 09:05

## 2022-05-22 RX ADMIN — INSULIN ASPART 4 UNITS: 100 INJECTION, SOLUTION INTRAVENOUS; SUBCUTANEOUS at 08:05

## 2022-05-22 RX ADMIN — CLINDAMYCIN IN 5 PERCENT DEXTROSE 600 MG: 12 INJECTION, SOLUTION INTRAVENOUS at 10:05

## 2022-05-22 RX ADMIN — INSULIN DETEMIR 25 UNITS: 100 INJECTION, SOLUTION SUBCUTANEOUS at 08:05

## 2022-05-22 RX ADMIN — MUPIROCIN: 20 OINTMENT TOPICAL at 09:05

## 2022-05-22 RX ADMIN — INSULIN ASPART 8 UNITS: 100 INJECTION, SOLUTION INTRAVENOUS; SUBCUTANEOUS at 03:05

## 2022-05-22 RX ADMIN — NICOTINE 1 PATCH: 14 PATCH, EXTENDED RELEASE TRANSDERMAL at 09:05

## 2022-05-22 RX ADMIN — LEVETIRACETAM 750 MG: 250 TABLET, FILM COATED ORAL at 09:05

## 2022-05-22 RX ADMIN — LOSARTAN POTASSIUM 25 MG: 25 TABLET, FILM COATED ORAL at 09:05

## 2022-05-22 RX ADMIN — THERA TABS 1 TABLET: TAB at 09:05

## 2022-05-22 RX ADMIN — CLINDAMYCIN IN 5 PERCENT DEXTROSE 600 MG: 12 INJECTION, SOLUTION INTRAVENOUS at 06:05

## 2022-05-22 RX ADMIN — MUPIROCIN: 20 OINTMENT TOPICAL at 08:05

## 2022-05-22 RX ADMIN — LEVETIRACETAM 750 MG: 250 TABLET, FILM COATED ORAL at 08:05

## 2022-05-22 RX ADMIN — CLINDAMYCIN IN 5 PERCENT DEXTROSE 600 MG: 12 INJECTION, SOLUTION INTRAVENOUS at 03:05

## 2022-05-22 NOTE — PROGRESS NOTES
[facility] Medicine  Progress Note    Patient Name: Terrance Chavez  MRN: [id number]  Patient Class: IP- Inpatient   Admission Date: 5/13/2022  Length of Stay: 7 days  Attending Physician: Alonso Carlos III, MD  Primary Care Provider: Primary Doctor No        Subjective:     Principal Problem:Hyperglycemia        HPI:  Patient is a 42 yr old male with pmhx of T2DM, HTN, CVA, neurocognitive impairment and substance abuse disorder presenting to the ED for Altered mental status. [place], brought in by EMS. On arrival patient was only oriented to person but not place,time or situation. Patient blood glucose was elevated on arrival. Initial labs showed small acidosis. Patient was provided 2L LR in ED and his mentation improved. Patient had a fever in the ED , and elevated WBC  of 29.9. Can not rule out infection exacerbating or causing change in mental status. Patient received 2g ceftriaxone in ED. Patient denies chest pain, shortness of breath, headache, nausea and vomiting. Admitted to [facility] for observation and correction of glucose.      Interval History: NAEON. Reportedly snacking frequently overnight with BG elevation. No complaints this morning. When asked about understanding plan or reason for current admission, pt reports not knowing why he is here, and acted surprised to still needing abx. He is alert and oriented to self and somewhat to day of week and year, unable to report situation. Pending MTB TB PCR, and completing abx.    Review of Systems   Constitutional:  Negative for chills, fatigue and fever.   HENT:  Negative for congestion, rhinorrhea and sore throat.    Eyes:  Negative for visual disturbance.   Respiratory:  Negative for cough and shortness of breath.    Cardiovascular:  Negative for chest pain.   Gastrointestinal:  Negative for abdominal pain, nausea and vomiting.   Genitourinary:  Negative for difficulty urinating.    Skin:  Negative for rash and wound.   Neurological:  Negative for weakness and headaches.   Objective:     Vital Signs (Most Recent):  Temp: 98.1 °F (36.7 °C) (05/22/22 0741)  Pulse: 65 (05/22/22 0741)  Resp: 18 (05/22/22 0741)  BP: (!) 110/51 (05/22/22 0741)  SpO2: 100 % (05/22/22 0741)   Vital Signs (24h Range):  Temp:  [96.5 °F (35.8 °C)-99.9 °F (37.7 °C)] 98.1 °F (36.7 °C)  Pulse:  [65-86] 65  Resp:  [18] 18  SpO2:  [99 %-100 %] 100 %  BP: ()/(51-83) 110/51     Weight: 72.6 kg (160 lb 0.9 oz)  Body mass index is 25.07 kg/m².    Intake/Output Summary (Last 24 hours) at 5/22/2022 0819  Last data filed at 5/22/2022 0404  Gross per 24 hour   Intake 150 ml   Output --   Net 150 ml      Physical Exam  Vitals and nursing note reviewed.   Constitutional:       General: He is not in acute distress.     Appearance: Normal appearance. He is not ill-appearing.   HENT:      Mouth/Throat:      Pharynx: Oropharynx is clear.   Eyes:      General:         Right eye: No discharge.         Left eye: No discharge.      Conjunctiva/sclera: Conjunctivae normal.   Cardiovascular:      Rate and Rhythm: Normal rate and regular rhythm.      Heart sounds: Normal heart sounds.   Pulmonary:      Effort: Pulmonary effort is normal. No respiratory distress.      Breath sounds: Normal breath sounds. No wheezing.   Abdominal:      Palpations: Abdomen is soft.      Tenderness: There is no abdominal tenderness.   Skin:     General: Skin is warm.   Neurological:      Mental Status: He is alert and oriented to person, place, and time.   Psychiatric:         Behavior: Behavior normal.       Significant Labs:   Recent Results (from the past 24 hour(s))   POCT glucose    Collection Time: 05/21/22 11:05 AM   Result Value Ref Range    POCT Glucose 208 (H) 70 - 110 mg/dL   POCT glucose    Collection Time: 05/21/22  7:51 PM   Result Value Ref Range    POCT Glucose 495 (HH) 70 - 110 mg/dL   POCT glucose    Collection Time: 05/21/22 10:21 PM    Result Value Ref Range    POCT Glucose >500 (H) 70 - 110 mg/dL   POCT glucose    Collection Time: 05/22/22 12:09 AM   Result Value Ref Range    POCT Glucose 188 (H) 70 - 110 mg/dL   Magnesium    Collection Time: 05/22/22  4:29 AM   Result Value Ref Range    Magnesium 1.9 1.6 - 2.6 mg/dL   Phosphorus    Collection Time: 05/22/22  4:29 AM   Result Value Ref Range    Phosphorus 4.0 2.7 - 4.5 mg/dL   Comprehensive metabolic panel    Collection Time: 05/22/22  4:29 AM   Result Value Ref Range    Sodium 137 136 - 145 mmol/L    Potassium 4.7 3.5 - 5.1 mmol/L    Chloride 95 95 - 110 mmol/L    CO2 30 (H) 23 - 29 mmol/L    Glucose 247 (H) 70 - 110 mg/dL    BUN 13 6 - 20 mg/dL    Creatinine 0.8 0.5 - 1.4 mg/dL    Calcium 10.0 8.7 - 10.5 mg/dL    Total Protein 6.6 6.0 - 8.4 g/dL    Albumin 3.2 (L) 3.5 - 5.2 g/dL    Total Bilirubin 0.1 0.1 - 1.0 mg/dL    Alkaline Phosphatase 68 55 - 135 U/L    AST 11 10 - 40 U/L    ALT 11 10 - 44 U/L    Anion Gap 12 8 - 16 mmol/L    eGFR if African American >60 >60 mL/min/1.73 m^2    eGFR if non African American >60 >60 mL/min/1.73 m^2   CBC auto differential    Collection Time: 05/22/22  4:29 AM   Result Value Ref Range    WBC 7.65 3.90 - 12.70 K/uL    RBC 3.55 (L) 4.60 - 6.20 M/uL    Hemoglobin 11.2 (L) 14.0 - 18.0 g/dL    Hematocrit 32.8 (L) 40.0 - 54.0 %    MCV 92 82 - 98 fL    MCH 31.5 (H) 27.0 - 31.0 pg    MCHC 34.1 32.0 - 36.0 g/dL    RDW 12.3 11.5 - 14.5 %    Platelets 395 150 - 450 K/uL    MPV 9.1 (L) 9.2 - 12.9 fL    Immature Granulocytes 0.9 (H) 0.0 - 0.5 %    Gran # (ANC) 3.2 1.8 - 7.7 K/uL    Immature Grans (Abs) 0.07 (H) 0.00 - 0.04 K/uL    Lymph # 3.5 1.0 - 4.8 K/uL    Mono # 0.6 0.3 - 1.0 K/uL    Eos # 0.2 0.0 - 0.5 K/uL    Baso # 0.07 0.00 - 0.20 K/uL    nRBC 0 0 /100 WBC    Gran % 41.9 38.0 - 73.0 %    Lymph % 46.1 18.0 - 48.0 %    Mono % 8.2 4.0 - 15.0 %    Eosinophil % 2.0 0.0 - 8.0 %    Basophil % 0.9 0.0 - 1.9 %    Differential Method Automated    POCT glucose     Collection Time: 05/22/22  4:50 AM   Result Value Ref Range    POCT Glucose 258 (H) 70 - 110 mg/dL       Significant Imaging:   Imaging Results              CT Head Without Contrast (Final result)  Result time 05/13/22 04:10:43      Final result by Abimael Eid MD (05/13/22 04:10:43)                   Impression:      Chronic changes are noted, there is no evidence for superimposed acute intracranial process.      Electronically signed by: Abimael Eid  Date:    05/13/2022  Time:    04:10               Narrative:    EXAMINATION:  CT HEAD WITHOUT CONTRAST    CLINICAL HISTORY:  Mental status change, unknown cause;    TECHNIQUE:  Low dose axial images were obtained through the head.  Coronal and sagittal reformations were also performed. Contrast was not administered.    COMPARISON:  CT examination of the brain without contrast March 30, 2022    FINDINGS:  The ventricular system, sulcal pattern and parenchymal attenuation characteristics are consistent with chronic change appearing similar to the prior examination without evidence for superimposed acute process.  There are areas consistent with remote lacunar-type ischemic change.  Chronic appearing white matter change noted.    There is no evidence for intracranial mass, mass effect or midline shift and there is no evidence for acute intracranial hemorrhage.  Appropriate CSF spaces are seen at the skull base.    The visualized orbits appear intact.  The mastoid air cells and middle ear cavity bilaterally appear appropriate.  Mild opacity along the external auditory canal likely represents cerumen.  Minimal paranasal sinus mucosal thickening noted.  The osseous structures appear intact.                                       X-Ray Chest AP Portable (Final result)  Result time 05/13/22 03:45:11      Final result by Lenny Pereira MD (05/13/22 03:45:11)                   Impression:      1.  Increased fullness of the right hilar region, not well appreciated on  prior radiograph examination as well as possible new 1.0 cm nodular opacity projecting over the right upper lung zone.  Given short-term interval development, this could relate to underlying infectious process, however neoplastic process not excluded.  Consider further assessment with dedicated chest CT.    2.  Stable large thin walled left upper lung zone cyst.      Electronically signed by: Lenny Pereira MD  Date:    05/13/2022  Time:    03:45               Narrative:    EXAMINATION:  XR CHEST AP PORTABLE    CLINICAL HISTORY:  Sepsis;    TECHNIQUE:  Single frontal view of the chest was performed.    COMPARISON:  Chest radiograph 03/28/2022, CT chest 10/27/2021    FINDINGS:  Cardiac monitoring leads overlie the chest.  Cardiac silhouette is not significantly enlarged.  There is a stable large thin walled cyst projecting over the left upper lung zone.  There is increased fullness of the right hilar region, not well appreciated on prior radiograph examination.  Additionally, there is a possible new 1.0 cm nodular opacity projecting over the right upper lung zone.  The remaining lungs demonstrate no new confluent airspace consolidation.  No significant volume of pleural fluid or pneumothorax appreciated.  Osseous structures appear grossly intact.                                          Assessment/Plan:      Pneumonia  Pt afebrile but w/ leokocytosis and new findings on CXR   Initiated Broad spec abx in ED w/ Vanc/cef/flagyl after 1 day switched to augmentin/azithromycin for 2 days then cultures showing MRSA so put back on Vanc for 1 day 5/17 and transitioned to IV Clinda for 7 days on 5/19  Bld cultures drawn in ED w/ NGTD  CT chest w/ 4.5 cm right upper lobe cavitary mass with adjacent 9 mm satellite lesion which is concerning for infectious etiology/atypical pneumonia as well as a large thin walled left upper lobe bulla  Quant Gold neg AFB x1 w/o AFB 2x pending as well as TB PCR    Plan:  Pulm consulted  Rec cnt  pneumonia abx but await 3x AFB smears and TB PCR  Cnt Clinda for 7 days end 5/25   Cnt to monitor for fevers and repeat cultures/imaging      AMS (altered mental status)  Pt w/ AMS on admit thought to be related to DKA  Uncertain baseline mentation though previous CVA in 2013  After DKA resolution pt still altered  UDS and EtOH neg on admit   Ammonia slightly elevated to 61  CT head w/o any new intracranial abnormalities  Folate 11.2  VitB12 415  Thiamine 88  B6 6  TSH 0.409  RPR nonreactive  HIV neg   MRI brain w/o w/ no acute intracranial abnormalities   EEG showed generalized cerebral dysfunction but no signs of seizure activity    Pt returned to baseline and has been AOx 3 since 5/17      Epilepsy  Restart home keppra and depakote  EEG done 5/16 which showed no seizure or epileptiform activity     Hypertension  Patient not on any home anti-HTN medication  Plan  Losartan 25mg daily  Monitor Vitals      Type 2 diabetes mellitus, with long-term current use of insulin  Patient uses Humalin 70/30 to control BG  Plan  MDSSI  Detemir 25U Subq nightly  POCT BG Q4h  Diabetic Diet      ETOH abuse  Pt w/ known EtOH abuse hx  Ethanol neg on admit  Hx of EtOH seizures    Plan:  CIWA q4   Ativan PRN for CIWA >8  Thiamine and multivitamin daily         VTE Risk Mitigation (From admission, onward)         Ordered     IP VTE LOW RISK PATIENT  Once         05/13/22 0530     Place sequential compression device  Until discontinued         05/13/22 0530                Discharge Planning   SHABANA: 5/14/2022     Code Status: Full Code   Is the patient medically ready for discharge?:     Reason for patient still in hospital (select all that apply): Laboratory test and Treatment  Discharge Plan A: Lynnette Dyer MD, PGY-2  Department of [facility] Medicine   [facility]

## 2022-05-22 NOTE — SUBJECTIVE & OBJECTIVE
Interval History: NAEON. Reportedly snacking frequently overnight with BG elevation. No complaints this morning. When asked about understanding plan or reason for current admission, pt reports not knowing why he is here, and acted surprised to still needing abx. He is alert and oriented to self and somewhat to day of week and year, unable to report situation. Pending [facility] TB PCR, and completing abx.    Review of Systems   Constitutional:  Negative for chills, fatigue and fever.   HENT:  Negative for congestion, rhinorrhea and sore throat.    Eyes:  Negative for visual disturbance.   Respiratory:  Negative for cough and shortness of breath.    Cardiovascular:  Negative for chest pain.   Gastrointestinal:  Negative for abdominal pain, nausea and vomiting.   Genitourinary:  Negative for difficulty urinating.   Skin:  Negative for rash and wound.   Neurological:  Negative for weakness and headaches.   Objective:     Vital Signs (Most Recent):  Temp: 98.1 °F (36.7 °C) (05/22/22 0741)  Pulse: 65 (05/22/22 0741)  Resp: 18 (05/22/22 0741)  BP: (!) 110/51 (05/22/22 0741)  SpO2: 100 % (05/22/22 0741)   Vital Signs (24h Range):  Temp:  [96.5 °F (35.8 °C)-99.9 °F (37.7 °C)] 98.1 °F (36.7 °C)  Pulse:  [65-86] 65  Resp:  [18] 18  SpO2:  [99 %-100 %] 100 %  BP: ()/(51-83) 110/51     Weight: 72.6 kg (160 lb 0.9 oz)  Body mass index is 25.07 kg/m².    Intake/Output Summary (Last 24 hours) at 5/22/2022 0819  Last data filed at 5/22/2022 0404  Gross per 24 hour   Intake 150 ml   Output --   Net 150 ml      Physical Exam  Vitals and nursing note reviewed.   Constitutional:       General: He is not in acute distress.     Appearance: Normal appearance. He is not ill-appearing.   HENT:      Mouth/Throat:      Pharynx: Oropharynx is clear.   Eyes:      General:         Right eye: No discharge.         Left eye: No discharge.      Conjunctiva/sclera: Conjunctivae normal.   Cardiovascular:      Rate and Rhythm: Normal rate and regular  rhythm.      Heart sounds: Normal heart sounds.   Pulmonary:      Effort: Pulmonary effort is normal. No respiratory distress.      Breath sounds: Normal breath sounds. No wheezing.   Abdominal:      Palpations: Abdomen is soft.      Tenderness: There is no abdominal tenderness.   Skin:     General: Skin is warm.   Neurological:      Mental Status: He is alert and oriented to person, place, and time.   Psychiatric:         Behavior: Behavior normal.       Significant Labs:   Recent Results (from the past 24 hour(s))   POCT glucose    Collection Time: 05/21/22 11:05 AM   Result Value Ref Range    POCT Glucose 208 (H) 70 - 110 mg/dL   POCT glucose    Collection Time: 05/21/22  7:51 PM   Result Value Ref Range    POCT Glucose 495 (HH) 70 - 110 mg/dL   POCT glucose    Collection Time: 05/21/22 10:21 PM   Result Value Ref Range    POCT Glucose >500 (H) 70 - 110 mg/dL   POCT glucose    Collection Time: 05/22/22 12:09 AM   Result Value Ref Range    POCT Glucose 188 (H) 70 - 110 mg/dL   Magnesium    Collection Time: 05/22/22  4:29 AM   Result Value Ref Range    Magnesium 1.9 1.6 - 2.6 mg/dL   Phosphorus    Collection Time: 05/22/22  4:29 AM   Result Value Ref Range    Phosphorus 4.0 2.7 - 4.5 mg/dL   Comprehensive metabolic panel    Collection Time: 05/22/22  4:29 AM   Result Value Ref Range    Sodium 137 136 - 145 mmol/L    Potassium 4.7 3.5 - 5.1 mmol/L    Chloride 95 95 - 110 mmol/L    CO2 30 (H) 23 - 29 mmol/L    Glucose 247 (H) 70 - 110 mg/dL    BUN 13 6 - 20 mg/dL    Creatinine 0.8 0.5 - 1.4 mg/dL    Calcium 10.0 8.7 - 10.5 mg/dL    Total Protein 6.6 6.0 - 8.4 g/dL    Albumin 3.2 (L) 3.5 - 5.2 g/dL    Total Bilirubin 0.1 0.1 - 1.0 mg/dL    Alkaline Phosphatase 68 55 - 135 U/L    AST 11 10 - 40 U/L    ALT 11 10 - 44 U/L    Anion Gap 12 8 - 16 mmol/L    eGFR if African American >60 >60 mL/min/1.73 m^2    eGFR if non African American >60 >60 mL/min/1.73 m^2   CBC auto differential    Collection Time: 05/22/22  4:29 AM    Result Value Ref Range    WBC 7.65 3.90 - 12.70 K/uL    RBC 3.55 (L) 4.60 - 6.20 M/uL    Hemoglobin 11.2 (L) 14.0 - 18.0 g/dL    Hematocrit 32.8 (L) 40.0 - 54.0 %    MCV 92 82 - 98 fL    MCH 31.5 (H) 27.0 - 31.0 pg    MCHC 34.1 32.0 - 36.0 g/dL    RDW 12.3 11.5 - 14.5 %    Platelets 395 150 - 450 K/uL    MPV 9.1 (L) 9.2 - 12.9 fL    Immature Granulocytes 0.9 (H) 0.0 - 0.5 %    Gran # (ANC) 3.2 1.8 - 7.7 K/uL    Immature Grans (Abs) 0.07 (H) 0.00 - 0.04 K/uL    Lymph # 3.5 1.0 - 4.8 K/uL    Mono # 0.6 0.3 - 1.0 K/uL    Eos # 0.2 0.0 - 0.5 K/uL    Baso # 0.07 0.00 - 0.20 K/uL    nRBC 0 0 /100 WBC    Gran % 41.9 38.0 - 73.0 %    Lymph % 46.1 18.0 - 48.0 %    Mono % 8.2 4.0 - 15.0 %    Eosinophil % 2.0 0.0 - 8.0 %    Basophil % 0.9 0.0 - 1.9 %    Differential Method Automated    POCT glucose    Collection Time: 05/22/22  4:50 AM   Result Value Ref Range    POCT Glucose 258 (H) 70 - 110 mg/dL       Significant Imaging:   Imaging Results              CT Head Without Contrast (Final result)  Result time 05/13/22 04:10:43      Final result by Abimael Eid MD (05/13/22 04:10:43)                   Impression:      Chronic changes are noted, there is no evidence for superimposed acute intracranial process.      Electronically signed by: Abimael Eid  Date:    05/13/2022  Time:    04:10               Narrative:    EXAMINATION:  CT HEAD WITHOUT CONTRAST    CLINICAL HISTORY:  Mental status change, unknown cause;    TECHNIQUE:  Low dose axial images were obtained through the head.  Coronal and sagittal reformations were also performed. Contrast was not administered.    COMPARISON:  CT examination of the brain without contrast March 30, 2022    FINDINGS:  The ventricular system, sulcal pattern and parenchymal attenuation characteristics are consistent with chronic change appearing similar to the prior examination without evidence for superimposed acute process.  There are areas consistent with remote lacunar-type ischemic  change.  Chronic appearing white matter change noted.    There is no evidence for intracranial mass, mass effect or midline shift and there is no evidence for acute intracranial hemorrhage.  Appropriate CSF spaces are seen at the skull base.    The visualized orbits appear intact.  The mastoid air cells and middle ear cavity bilaterally appear appropriate.  Mild opacity along the external auditory canal likely represents cerumen.  Minimal paranasal sinus mucosal thickening noted.  The osseous structures appear intact.                                       X-Ray Chest AP Portable (Final result)  Result time 05/13/22 03:45:11      Final result by Lenny Pereira MD (05/13/22 03:45:11)                   Impression:      1.  Increased fullness of the right hilar region, not well appreciated on prior radiograph examination as well as possible new 1.0 cm nodular opacity projecting over the right upper lung zone.  Given short-term interval development, this could relate to underlying infectious process, however neoplastic process not excluded.  Consider further assessment with dedicated chest CT.    2.  Stable large thin walled left upper lung zone cyst.      Electronically signed by: Lenny Pereira MD  Date:    05/13/2022  Time:    03:45               Narrative:    EXAMINATION:  XR CHEST AP PORTABLE    CLINICAL HISTORY:  Sepsis;    TECHNIQUE:  Single frontal view of the chest was performed.    COMPARISON:  Chest radiograph 03/28/2022, CT chest 10/27/2021    FINDINGS:  Cardiac monitoring leads overlie the chest.  Cardiac silhouette is not significantly enlarged.  There is a stable large thin walled cyst projecting over the left upper lung zone.  There is increased fullness of the right hilar region, not well appreciated on prior radiograph examination.  Additionally, there is a possible new 1.0 cm nodular opacity projecting over the right upper lung zone.  The remaining lungs demonstrate no new confluent airspace  consolidation.  No significant volume of pleural fluid or pneumothorax appreciated.  Osseous structures appear grossly intact.

## 2022-05-22 NOTE — PLAN OF CARE
Problem: Adult Inpatient Plan of Care  Goal: Plan of Care Review  Outcome: Ongoing, Progressing   Patient is alert. Oriented X3. Pt's blood sugar keeps going high, the last one >500. PRN 5units of aspart given. Educated patient he needs to control the blood sugar, no evidence of learning. He keeps asking snack to eat. Checked blood sugar at midnight, glucose back to 188.     Deny nausea/vomiting/diarrhea. No pain complaint. Due medications given.     Maintain fall risk precaution, bed in lowest position, bed alarm on. Call light/personal items in reach. Instructed patient call for help as needed. Will continue to monitor.

## 2022-05-23 LAB
ALBUMIN SERPL BCP-MCNC: 3.4 G/DL (ref 3.5–5.2)
ALP SERPL-CCNC: 67 U/L (ref 55–135)
ALT SERPL W/O P-5'-P-CCNC: 11 U/L (ref 10–44)
ANION GAP SERPL CALC-SCNC: 10 MMOL/L (ref 8–16)
AST SERPL-CCNC: 14 U/L (ref 10–40)
BASOPHILS # BLD AUTO: 0.05 K/UL (ref 0–0.2)
BASOPHILS NFR BLD: 0.7 % (ref 0–1.9)
BILIRUB SERPL-MCNC: 0.2 MG/DL (ref 0.1–1)
BUN SERPL-MCNC: 13 MG/DL (ref 6–20)
CALCIUM SERPL-MCNC: 9.7 MG/DL (ref 8.7–10.5)
CHLORIDE SERPL-SCNC: 96 MMOL/L (ref 95–110)
CO2 SERPL-SCNC: 30 MMOL/L (ref 23–29)
CREAT SERPL-MCNC: 0.8 MG/DL (ref 0.5–1.4)
DIFFERENTIAL METHOD: ABNORMAL
EOSINOPHIL # BLD AUTO: 0.2 K/UL (ref 0–0.5)
EOSINOPHIL NFR BLD: 3.4 % (ref 0–8)
ERYTHROCYTE [DISTWIDTH] IN BLOOD BY AUTOMATED COUNT: 12.6 % (ref 11.5–14.5)
EST. GFR  (AFRICAN AMERICAN): >60 ML/MIN/1.73 M^2
EST. GFR  (NON AFRICAN AMERICAN): >60 ML/MIN/1.73 M^2
GLUCOSE SERPL-MCNC: 176 MG/DL (ref 70–110)
HCT VFR BLD AUTO: 36.6 % (ref 40–54)
HGB BLD-MCNC: 12.4 G/DL (ref 14–18)
IMM GRANULOCYTES # BLD AUTO: 0.04 K/UL (ref 0–0.04)
IMM GRANULOCYTES NFR BLD AUTO: 0.6 % (ref 0–0.5)
LYMPHOCYTES # BLD AUTO: 3 K/UL (ref 1–4.8)
LYMPHOCYTES NFR BLD: 42.5 % (ref 18–48)
M TB CMPLX DNA SPEC QL NAA+PROBE: NEGATIVE
MAGNESIUM SERPL-MCNC: 1.7 MG/DL (ref 1.6–2.6)
MCH RBC QN AUTO: 31.7 PG (ref 27–31)
MCHC RBC AUTO-ENTMCNC: 33.9 G/DL (ref 32–36)
MCV RBC AUTO: 94 FL (ref 82–98)
MONOCYTES # BLD AUTO: 0.6 K/UL (ref 0.3–1)
MONOCYTES NFR BLD: 8.4 % (ref 4–15)
NEUTROPHILS # BLD AUTO: 3.1 K/UL (ref 1.8–7.7)
NEUTROPHILS NFR BLD: 44.4 % (ref 38–73)
NRBC BLD-RTO: 0 /100 WBC
PHOSPHATE SERPL-MCNC: 4.6 MG/DL (ref 2.7–4.5)
PLATELET # BLD AUTO: 388 K/UL (ref 150–450)
PMV BLD AUTO: 9.3 FL (ref 9.2–12.9)
POCT GLUCOSE: 125 MG/DL (ref 70–110)
POCT GLUCOSE: 175 MG/DL (ref 70–110)
POCT GLUCOSE: 305 MG/DL (ref 70–110)
POCT GLUCOSE: 354 MG/DL (ref 70–110)
POTASSIUM SERPL-SCNC: 5 MMOL/L (ref 3.5–5.1)
PROT SERPL-MCNC: 7 G/DL (ref 6–8.4)
RBC # BLD AUTO: 3.91 M/UL (ref 4.6–6.2)
SODIUM SERPL-SCNC: 136 MMOL/L (ref 136–145)
SPECIMEN SOURCE: NORMAL
WBC # BLD AUTO: 7.04 K/UL (ref 3.9–12.7)

## 2022-05-23 PROCEDURE — 85025 COMPLETE CBC W/AUTO DIFF WBC: CPT | Performed by: STUDENT IN AN ORGANIZED HEALTH CARE EDUCATION/TRAINING PROGRAM

## 2022-05-23 PROCEDURE — 25000003 PHARM REV CODE 250: Performed by: STUDENT IN AN ORGANIZED HEALTH CARE EDUCATION/TRAINING PROGRAM

## 2022-05-23 PROCEDURE — 11000001 HC ACUTE MED/SURG PRIVATE ROOM

## 2022-05-23 PROCEDURE — 83735 ASSAY OF MAGNESIUM: CPT | Performed by: STUDENT IN AN ORGANIZED HEALTH CARE EDUCATION/TRAINING PROGRAM

## 2022-05-23 PROCEDURE — 84100 ASSAY OF PHOSPHORUS: CPT | Performed by: STUDENT IN AN ORGANIZED HEALTH CARE EDUCATION/TRAINING PROGRAM

## 2022-05-23 PROCEDURE — 27000207 HC ISOLATION

## 2022-05-23 PROCEDURE — S4991 NICOTINE PATCH NONLEGEND: HCPCS | Performed by: STUDENT IN AN ORGANIZED HEALTH CARE EDUCATION/TRAINING PROGRAM

## 2022-05-23 PROCEDURE — 36415 COLL VENOUS BLD VENIPUNCTURE: CPT | Performed by: STUDENT IN AN ORGANIZED HEALTH CARE EDUCATION/TRAINING PROGRAM

## 2022-05-23 PROCEDURE — 80053 COMPREHEN METABOLIC PANEL: CPT | Performed by: STUDENT IN AN ORGANIZED HEALTH CARE EDUCATION/TRAINING PROGRAM

## 2022-05-23 PROCEDURE — 94761 N-INVAS EAR/PLS OXIMETRY MLT: CPT

## 2022-05-23 RX ADMIN — LEVETIRACETAM 750 MG: 250 TABLET, FILM COATED ORAL at 08:05

## 2022-05-23 RX ADMIN — CLINDAMYCIN IN 5 PERCENT DEXTROSE 600 MG: 12 INJECTION, SOLUTION INTRAVENOUS at 03:05

## 2022-05-23 RX ADMIN — INSULIN ASPART 4 UNITS: 100 INJECTION, SOLUTION INTRAVENOUS; SUBCUTANEOUS at 09:05

## 2022-05-23 RX ADMIN — THERA TABS 1 TABLET: TAB at 08:05

## 2022-05-23 RX ADMIN — DIVALPROEX SODIUM 1000 MG: 250 TABLET, EXTENDED RELEASE ORAL at 09:05

## 2022-05-23 RX ADMIN — INSULIN ASPART 10 UNITS: 100 INJECTION, SOLUTION INTRAVENOUS; SUBCUTANEOUS at 04:05

## 2022-05-23 RX ADMIN — LOSARTAN POTASSIUM 25 MG: 25 TABLET, FILM COATED ORAL at 08:05

## 2022-05-23 RX ADMIN — MUPIROCIN: 20 OINTMENT TOPICAL at 08:05

## 2022-05-23 RX ADMIN — THIAMINE HCL TAB 100 MG 100 MG: 100 TAB at 08:05

## 2022-05-23 RX ADMIN — DIVALPROEX SODIUM 1000 MG: 250 TABLET, EXTENDED RELEASE ORAL at 08:05

## 2022-05-23 RX ADMIN — INSULIN DETEMIR 25 UNITS: 100 INJECTION, SOLUTION SUBCUTANEOUS at 09:05

## 2022-05-23 RX ADMIN — LEVETIRACETAM 750 MG: 250 TABLET, FILM COATED ORAL at 09:05

## 2022-05-23 RX ADMIN — CLINDAMYCIN IN 5 PERCENT DEXTROSE 600 MG: 12 INJECTION, SOLUTION INTRAVENOUS at 06:05

## 2022-05-23 RX ADMIN — NICOTINE 1 PATCH: 14 PATCH, EXTENDED RELEASE TRANSDERMAL at 08:05

## 2022-05-23 RX ADMIN — CLINDAMYCIN IN 5 PERCENT DEXTROSE 600 MG: 12 INJECTION, SOLUTION INTRAVENOUS at 11:05

## 2022-05-23 NOTE — SUBJECTIVE & OBJECTIVE
Interval History: NAEON. Pt w/ no complaints today. He is still pending full TB work up but also completing a course of abx for MRSA pneumonia.    Review of Systems   Constitutional:  Negative for chills, fatigue and fever.   HENT:  Negative for congestion, rhinorrhea and sore throat.    Eyes:  Negative for visual disturbance.   Respiratory:  Negative for cough and shortness of breath.    Cardiovascular:  Negative for chest pain.   Gastrointestinal:  Negative for abdominal pain, nausea and vomiting.   Genitourinary:  Negative for difficulty urinating.   Skin:  Negative for rash and wound.   Neurological:  Negative for weakness and headaches.   Objective:     Vital Signs (Most Recent):  Temp: 97.6 °F (36.4 °C) (05/23/22 1148)  Pulse: 72 (05/23/22 1148)  Resp: 20 (05/23/22 1148)  BP: 105/62 (05/23/22 1151)  SpO2: 100 % (05/23/22 1148) Vital Signs (24h Range):  Temp:  [96.6 °F (35.9 °C)-98.6 °F (37 °C)] 97.6 °F (36.4 °C)  Pulse:  [69-89] 72  Resp:  [16-20] 20  SpO2:  [99 %-100 %] 100 %  BP: (105-115)/(53-75) 105/62     Weight: 72.6 kg (160 lb 0.9 oz)  Body mass index is 25.07 kg/m².    Intake/Output Summary (Last 24 hours) at 5/23/2022 1342  Last data filed at 5/23/2022 0700  Gross per 24 hour   Intake 500 ml   Output 4 ml   Net 496 ml      Physical Exam  Vitals and nursing note reviewed.   Constitutional:       General: He is not in acute distress.     Appearance: Normal appearance. He is not ill-appearing.   HENT:      Mouth/Throat:      Pharynx: Oropharynx is clear.   Eyes:      General:         Right eye: No discharge.         Left eye: No discharge.      Conjunctiva/sclera: Conjunctivae normal.   Cardiovascular:      Rate and Rhythm: Normal rate and regular rhythm.      Heart sounds: Normal heart sounds.   Pulmonary:      Effort: Pulmonary effort is normal. No respiratory distress.      Breath sounds: Normal breath sounds. No wheezing.   Abdominal:      Palpations: Abdomen is soft.      Tenderness: There is no  abdominal tenderness.   Skin:     General: Skin is warm.   Neurological:      Mental Status: He is alert and oriented to person, place, and time.   Psychiatric:         Behavior: Behavior normal.     Recent Labs   Lab 05/21/22 0254 05/22/22 0429 05/23/22  1305   WBC 7.78 7.65 7.04   HGB 12.0* 11.2* 12.4*   HCT 34.4* 32.8* 36.6*   MCV 92 92 94   RBC 3.76* 3.55* 3.91*   MCH 31.9* 31.5* 31.7*   MCHC 34.9 34.1 33.9   RDW 12.6 12.3 12.6    395 388   MPV 9.0* 9.1* 9.3   GRAN 44.0  3.4 41.9  3.2 44.4  3.1   LYMPH 45.1  3.5 46.1  3.5 42.5  3.0   MONO 6.9  0.5 8.2  0.6 8.4  0.6   EOSINOPHIL 1.9 2.0 3.4   BASOPHIL 0.9 0.9 0.7     Recent Labs   Lab 05/21/22 0254 05/22/22 0429 05/23/22  0510 05/23/22  1305    137  --  136   K 4.7 4.7  --  5.0   CL 97 95  --  96   CO2 29 30*  --  30*   ANIONGAP 11 12  --  10   BUN 12 13  --  13   CREATININE 0.7 0.8  --  0.8   * 247*  --  176*   CALCIUM 9.9 10.0  --  9.7   PROT 6.8 6.6  --  7.0   ALBUMIN 3.3* 3.2*  --  3.4*   ALKPHOS 62 68  --  67   BILITOT 0.2 0.1  --  0.2   ALT 9* 11  --  11   AST 14 11  --  14   ESTGFRAFRICA >60 >60  --  >60   EGFRNONAA >60 >60  --  >60   MG 1.8 1.9 1.7  --    PHOS 4.3 4.0 4.6*  --      No results for input(s): COLORU, APPEARANCEUA, PHUR, SPECGRAV, PROTEINUA, GLUCUA, KETONESU, BILIRUBINUA, OCCULTUA, UROBILINOGEN, NITRITE, LEUKOCYTESUR, RBCUA, WBCUA, BACTERIA, SQUAMEPITHEL, HYALINECASTS, GRANULARCAST, MICROCMT in the last 168 hours.    Invalid input(s): SPECIMENU, AMORPHOUSU  No results for input(s): TROPONINI, CPK, CPKMB in the last 168 hours.  No results for input(s): PT, INR, APTT in the last 168 hours.  No results for input(s): TSH, R0XUPUB, D0MNLTK, THYROIDAB, FREET4 in the last 168 hours.  CT Head Without Contrast    Result Date: 5/13/2022  EXAMINATION: CT HEAD WITHOUT CONTRAST CLINICAL HISTORY: Mental status change, unknown cause; TECHNIQUE: Low dose axial images were obtained through the head.  Coronal and sagittal  reformations were also performed. Contrast was not administered. COMPARISON: CT examination of the brain without contrast March 30, 2022 FINDINGS: The ventricular system, sulcal pattern and parenchymal attenuation characteristics are consistent with chronic change appearing similar to the prior examination without evidence for superimposed acute process.  There are areas consistent with remote lacunar-type ischemic change.  Chronic appearing white matter change noted. There is no evidence for intracranial mass, mass effect or midline shift and there is no evidence for acute intracranial hemorrhage.  Appropriate CSF spaces are seen at the skull base. The visualized orbits appear intact.  The mastoid air cells and middle ear cavity bilaterally appear appropriate.  Mild opacity along the external auditory canal likely represents cerumen.  Minimal paranasal sinus mucosal thickening noted.  The osseous structures appear intact.     Chronic changes are noted, there is no evidence for superimposed acute intracranial process. Electronically signed by: Abimael Eid Date:    05/13/2022 Time:    04:10    CT Chest Without Contrast    Result Date: 5/15/2022  EXAMINATION: CT CHEST WITHOUT CONTRAST CLINICAL HISTORY: Abnormal xray - lung nodule, >= 1 cm; TECHNIQUE: Low dose axial images, sagittal and coronal reformations were obtained from the thoracic inlet to the lung bases. Contrast was not administered. COMPARISON: Radiograph 05/13/2022 FINDINGS: Lungs: There is a right upper lobe partially cavitary mass in the posterior segment eroding through the major fissure into the superior segment of the right lower lobe (series 601, image 105).  The lesion measures 4.5 cm in transverse plane (series 2, image 54). There is an adjacent right upper lobe nodule measuring 0.9 cm (series 2, image 39), with the same imaging characteristics. Mild emphysematous lung changes. There is a large left upper lobe thin walled bulla measuring 11.4 cm  (series 2, image 136), stable. Mediastinum: Thyroid gland is grossly unremarkable.  No mediastinal lymphadenopathy.  Limited assessment of the hilum without IV contrast.  Thoracic aorta is normal caliber.  Heart size within normal limits.  No pericardial or pleural effusion.  Mild scattered calcific atherosclerosis. Upper abdomen: Extensive pancreatic calcifications, probable sequela of chronic pancreatitis. Chest wall: Mild gynecomastia. Bones: No marrow replacement process.     4.5 cm right upper lobe cavitary mass with adjacent 9 mm satellite lesion, as above, concerning for infectious etiology/atypical pneumonia.  Malignancy could have a similar appearance. Large thin walled left upper lobe bulla, stable. Mild emphysematous lung changes. This report was flagged in Epic as abnormal. Electronically signed by: Juvencio Cardona MD Date:    05/15/2022 Time:    12:10    MRI Brain Without Contrast    Result Date: 5/16/2022  EXAMINATION: MRI BRAIN WITHOUT CONTRAST CLINICAL HISTORY: Mental status change, unknown cause; TECHNIQUE: Multiplanar multisequence MR imaging of the brain was performed without contrast. COMPARISON: CT head 05/13/2022 MRI 10/27/2021 FINDINGS: there is central volume loss, similar to prior studies.  There is no evidence of hydrocephalus, mass effect, intracranial hemorrhage or acute infarct. Focal lesions within the periventricular white matter bilaterally are stable from the prior study consistent with prior nonspecific insult.  No new white matter lesions are noted. Normal arterial flow voids are preserved at the skull base. The visualized sinuses and mastoid air cells are clear.     Stable appearance of the brain as detailed above...  No acute intracranial process. Electronically signed by: Ibrahima Soria Date:    05/16/2022 Time:    11:55    X-Ray Chest AP Portable    Result Date: 5/13/2022  EXAMINATION: XR CHEST AP PORTABLE CLINICAL HISTORY: Sepsis; TECHNIQUE: Single frontal view of the chest was  performed. COMPARISON: Chest radiograph 03/28/2022, CT chest 10/27/2021 FINDINGS: Cardiac monitoring leads overlie the chest.  Cardiac silhouette is not significantly enlarged.  There is a stable large thin walled cyst projecting over the left upper lung zone.  There is increased fullness of the right hilar region, not well appreciated on prior radiograph examination.  Additionally, there is a possible new 1.0 cm nodular opacity projecting over the right upper lung zone.  The remaining lungs demonstrate no new confluent airspace consolidation.  No significant volume of pleural fluid or pneumothorax appreciated.  Osseous structures appear grossly intact.     1.  Increased fullness of the right hilar region, not well appreciated on prior radiograph examination as well as possible new 1.0 cm nodular opacity projecting over the right upper lung zone.  Given short-term interval development, this could relate to underlying infectious process, however neoplastic process not excluded.  Consider further assessment with dedicated chest CT. 2.  Stable large thin walled left upper lung zone cyst. Electronically signed by: Lenny Pereira MD Date:    05/13/2022 Time:    03:45    Microbiology Results (last 7 days)       Procedure Component Value Units Date/Time    AFB Culture & Smear [[id number]] Collected: 05/17/22 1616    Order Status: Completed Specimen: Respiratory from Sputum Updated: 05/18/22 2127     AFB Culture & Smear Culture in progress     AFB CULTURE STAIN No acid fast bacilli seen.    AFB Culture & Smear [[id number]]     Order Status: No result Specimen: Body Fluid from Sputum, Expectorated     Culture, Respiratory with Gram Stain [[id number]]  (Abnormal)  (Susceptibility) Collected: 05/15/22 1710    Order Status: Completed Specimen: Respiratory from Sputum Updated: 05/18/22 1014     Respiratory Culture No Pseudomonas isolated.      METHICILLIN RESISTANT STAPHYLOCOCCUS AUREUS  Few  Normal respiratory armida also  present       Gram Stain (Respiratory) <10 epithelial cells per low power field.     Gram Stain (Respiratory) Rare WBC's     Gram Stain (Respiratory) Rare Gram positive cocci     Gram Stain (Respiratory) Rare Gram negative rods    Blood culture x two cultures. Draw prior to antibiotics. [[id number]] Collected: 05/13/22 0255    Order Status: Completed Specimen: Blood from Peripheral, Antecubital, Right Updated: 05/18/22 1012     Blood Culture, Routine No growth after 5 days.    Narrative:      Aerobic and anaerobic    Blood culture x two cultures. Draw prior to antibiotics. [[id number]] Collected: 05/13/22 0311    Order Status: Completed Specimen: Blood from Peripheral, Forearm, Right Updated: 05/18/22 1012     Blood Culture, Routine No growth after 5 days.    Narrative:      Aerobic and anaerobic    AFB Culture & Smear [[id number]] Collected: 05/17/22 1616    Order Status: Sent Specimen: Respiratory from Sputum Updated: 05/17/22 1616

## 2022-05-23 NOTE — PLAN OF CARE
1455 - ABDIRAHMAN spoke with Pam at [facility] she stated that pt will need a negative covid, negative TB and a form of ID. Pam reported that they do have open beds as long as pt does not d/c from hospital too late. ABDIRAHMAN will follow.     Otiliodelmis Loza, MSW  [phone]    Future Appointments   Date Time Provider Department Center   5/27/2022 10:00 AM [facility]   6/22/2022  8:20 AM Merle Oliva MD [facility]        05/23/22 5740   Post-Acute Status   Post-Acute Authorization Placement   Discharge Plan   Discharge Plan A Shelter

## 2022-05-23 NOTE — PLAN OF CARE
SW met with pt at bedside to discuss d/c planning. Pt is still agreeable to going to [facility] in [place] [phone] at d/c. Pt will finish his oral abx on Wednesday and will d/c to shelter. SW attempted to call the shelter to make sure they still have male bed availability. However, there was no answer, sw left a  requesting a return call. ABDIRAHMAN will follow.     ROB Harper  [phone]    Future Appointments   Date Time Provider Department Center   5[facility]27[facility]2022 10:00 [facility]   6/22/2022  8:20 AM Merle Oliva MD [facility]        05/23/22 1323   Post-Acute Status   Coverage medicaid   Hospital Resources/Appts/Education Provided Appointments scheduled and added to AVS   Discharge Delays None known at this time   Discharge Plan   Discharge Plan A Shelter

## 2022-05-23 NOTE — PLAN OF CARE
Problem: Adult Inpatient Plan of Care  Goal: Plan of Care Review  Outcome: Ongoing, Progressing   Patient is alert. Oriented X4. Care plan explained to patient, educated pt to control his glucose level, still keep asking snack to eat. No evidence of learning.     On room air, O2 sat maintain 98%, no respiratory distress noted. On cardiac monitor, running normal sinus rhythm.     Deny nausea/vomiting/diarrhea. Due medications given.     Maintain airborne/droplet/contact precaution, maintain fall risk precaution, bed in lowest position, bed alarm on. Call light/personal items/urinal in reach. Instructed patient call for help as needed. Will continue to monitor.

## 2022-05-23 NOTE — PROGRESS NOTES
[facility] Medicine  Progress Note    Patient Name: Terrance Chavez  MRN: [id number]  Patient Class: IP- Inpatient   Admission Date: 5/13/2022  Length of Stay: 8 days  Attending Physician: Terrance Avelar MD  Primary Care Provider: Primary Doctor No        Subjective:     Principal Problem:Hyperglycemia        HPI:  Patient is a 42 yr old male with pmhx of T2DM, HTN, CVA, neurocognitive impairment and substance abuse disorder presenting to the ED for Altered mental status. [place] incoherently, brought in by EMS. On arrival patient was only oriented to person but not place,time or situation. Patient blood glucose was elevated on arrival. Initial labs showed small acidosis. Patient was provided 2L LR in ED and his mentation improved. Patient had a fever in the ED , and elevated WBC  of 29.9. Can not rule out infection exacerbating or causing change in mental status. Patient received 2g ceftriaxone in ED. Patient denies chest pain, shortness of breath, headache, nausea and vomiting. Admitted to [facility] for observation and correction of glucose.      Overview/Hospital Course:  Pt was admitted to the FM service for DKA and AMS. Pt was initially found to have elevated BG as well as Beta hydroxybutyrate and AG. He was started on an insulin drip as well as giving his long acting insulin. His DKA quickly resolved and then was continued to work up for his AMS and new lung findings. On admission he had a CXR which showed a new R Upper Lung nodule as well as hilar haziness and a known L upper lung cyst. A CT was recommned for f/u which showed a 4.5 cm R Upper Lobe and satellite 9mm lesion. Pulm was consulted and pt was worked up for potential pneumonia vs TB he was started on a 5 day course of augmentin and azithromycin which he will complete on 5/18. Quant gold as well as AFBs were collected which showed............On admission he had a CT head which did not  show any acute intracranial abnormalities as well as EtOH and UDS which were neg. It was unclear as to what his BL mentation is as previous notes had documented normal mentation and AO x4. His AMS was worked up with HIV/RPR/B12/Folate which were wnl and B1/6 pending. Neurology was consulted to determine if there was any other potential causes of AMS and rec an MRI and EEG. His MRI again did not show any acute abnormalities and EEG which showed generalized cerebral dysfxn but no epileptiform activity. The pt's mentation slowly improved throughout his admission and was AO x3 at time of d/c. Pt was educated on his chronic conditions and SW gave him multiple resources for his outpt care. He was given strict return precautions and all questions were answered.      Interval History: NAEON. Pt w/ no complaints today. He is still pending full TB work up but also completing a course of abx for MRSA pneumonia.    Review of Systems   Constitutional:  Negative for chills, fatigue and fever.   HENT:  Negative for congestion, rhinorrhea and sore throat.    Eyes:  Negative for visual disturbance.   Respiratory:  Negative for cough and shortness of breath.    Cardiovascular:  Negative for chest pain.   Gastrointestinal:  Negative for abdominal pain, nausea and vomiting.   Genitourinary:  Negative for difficulty urinating.   Skin:  Negative for rash and wound.   Neurological:  Negative for weakness and headaches.   Objective:     Vital Signs (Most Recent):  Temp: 97.6 °F (36.4 °C) (05/23/22 1148)  Pulse: 72 (05/23/22 1148)  Resp: 20 (05/23/22 1148)  BP: 105/62 (05/23/22 1151)  SpO2: 100 % (05/23/22 1148) Vital Signs (24h Range):  Temp:  [96.6 °F (35.9 °C)-98.6 °F (37 °C)] 97.6 °F (36.4 °C)  Pulse:  [69-89] 72  Resp:  [16-20] 20  SpO2:  [99 %-100 %] 100 %  BP: (105-115)/(53-75) 105/62     Weight: 72.6 kg (160 lb 0.9 oz)  Body mass index is 25.07 kg/m².    Intake/Output Summary (Last 24 hours) at 5/23/2022 1342  Last data filed at  5/23/2022 0700  Gross per 24 hour   Intake 500 ml   Output 4 ml   Net 496 ml      Physical Exam  Vitals and nursing note reviewed.   Constitutional:       General: He is not in acute distress.     Appearance: Normal appearance. He is not ill-appearing.   HENT:      Mouth/Throat:      Pharynx: Oropharynx is clear.   Eyes:      General:         Right eye: No discharge.         Left eye: No discharge.      Conjunctiva/sclera: Conjunctivae normal.   Cardiovascular:      Rate and Rhythm: Normal rate and regular rhythm.      Heart sounds: Normal heart sounds.   Pulmonary:      Effort: Pulmonary effort is normal. No respiratory distress.      Breath sounds: Normal breath sounds. No wheezing.   Abdominal:      Palpations: Abdomen is soft.      Tenderness: There is no abdominal tenderness.   Skin:     General: Skin is warm.   Neurological:      Mental Status: He is alert and oriented to person, place, and time.   Psychiatric:         Behavior: Behavior normal.     Recent Labs   Lab 05/21/22  0254 05/22/22  0429 05/23/22  1305   WBC 7.78 7.65 7.04   HGB 12.0* 11.2* 12.4*   HCT 34.4* 32.8* 36.6*   MCV 92 92 94   RBC 3.76* 3.55* 3.91*   MCH 31.9* 31.5* 31.7*   MCHC 34.9 34.1 33.9   RDW 12.6 12.3 12.6    395 388   MPV 9.0* 9.1* 9.3   GRAN 44.0  3.4 41.9  3.2 44.4  3.1   LYMPH 45.1  3.5 46.1  3.5 42.5  3.0   MONO 6.9  0.5 8.2  0.6 8.4  0.6   EOSINOPHIL 1.9 2.0 3.4   BASOPHIL 0.9 0.9 0.7     Recent Labs   Lab 05/21/22  0254 05/22/22  0429 05/23/22  0510 05/23/22  1305    137  --  136   K 4.7 4.7  --  5.0   CL 97 95  --  96   CO2 29 30*  --  30*   ANIONGAP 11 12  --  10   BUN 12 13  --  13   CREATININE 0.7 0.8  --  0.8   * 247*  --  176*   CALCIUM 9.9 10.0  --  9.7   PROT 6.8 6.6  --  7.0   ALBUMIN 3.3* 3.2*  --  3.4*   ALKPHOS 62 68  --  67   BILITOT 0.2 0.1  --  0.2   ALT 9* 11  --  11   AST 14 11  --  14   ESTGFRAFRICA >60 >60  --  >60   EGFRNONAA >60 >60  --  >60   MG 1.8 1.9 1.7  --    PHOS 4.3 4.0  4.6*  --      No results for input(s): COLORU, APPEARANCEUA, PHUR, SPECGRAV, PROTEINUA, GLUCUA, KETONESU, BILIRUBINUA, OCCULTUA, UROBILINOGEN, NITRITE, LEUKOCYTESUR, RBCUA, WBCUA, BACTERIA, SQUAMEPITHEL, HYALINECASTS, GRANULARCAST, MICROCMT in the last 168 hours.    Invalid input(s): SPECIMENU, AMORPHOUSU  No results for input(s): TROPONINI, CPK, CPKMB in the last 168 hours.  No results for input(s): PT, INR, APTT in the last 168 hours.  No results for input(s): TSH, A1VRYEE, B1RWRVU, THYROIDAB, FREET4 in the last 168 hours.  CT Head Without Contrast    Result Date: 5/13/2022  EXAMINATION: CT HEAD WITHOUT CONTRAST CLINICAL HISTORY: Mental status change, unknown cause; TECHNIQUE: Low dose axial images were obtained through the head.  Coronal and sagittal reformations were also performed. Contrast was not administered. COMPARISON: CT examination of the brain without contrast March 30, 2022 FINDINGS: The ventricular system, sulcal pattern and parenchymal attenuation characteristics are consistent with chronic change appearing similar to the prior examination without evidence for superimposed acute process.  There are areas consistent with remote lacunar-type ischemic change.  Chronic appearing white matter change noted. There is no evidence for intracranial mass, mass effect or midline shift and there is no evidence for acute intracranial hemorrhage.  Appropriate CSF spaces are seen at the skull base. The visualized orbits appear intact.  The mastoid air cells and middle ear cavity bilaterally appear appropriate.  Mild opacity along the external auditory canal likely represents cerumen.  Minimal paranasal sinus mucosal thickening noted.  The osseous structures appear intact.     Chronic changes are noted, there is no evidence for superimposed acute intracranial process. Electronically signed by: Abimael Eid Date:    05/13/2022 Time:    04:10    CT Chest Without Contrast    Result Date: 5/15/2022  EXAMINATION: CT  CHEST WITHOUT CONTRAST CLINICAL HISTORY: Abnormal xray - lung nodule, >= 1 cm; TECHNIQUE: Low dose axial images, sagittal and coronal reformations were obtained from the thoracic inlet to the lung bases. Contrast was not administered. COMPARISON: Radiograph 05/13/2022 FINDINGS: Lungs: There is a right upper lobe partially cavitary mass in the posterior segment eroding through the major fissure into the superior segment of the right lower lobe (series 601, image 105).  The lesion measures 4.5 cm in transverse plane (series 2, image 54). There is an adjacent right upper lobe nodule measuring 0.9 cm (series 2, image 39), with the same imaging characteristics. Mild emphysematous lung changes. There is a large left upper lobe thin walled bulla measuring 11.4 cm (series 2, image 136), stable. Mediastinum: Thyroid gland is grossly unremarkable.  No mediastinal lymphadenopathy.  Limited assessment of the hilum without IV contrast.  Thoracic aorta is normal caliber.  Heart size within normal limits.  No pericardial or pleural effusion.  Mild scattered calcific atherosclerosis. Upper abdomen: Extensive pancreatic calcifications, probable sequela of chronic pancreatitis. Chest wall: Mild gynecomastia. Bones: No marrow replacement process.     4.5 cm right upper lobe cavitary mass with adjacent 9 mm satellite lesion, as above, concerning for infectious etiology/atypical pneumonia.  Malignancy could have a similar appearance. Large thin walled left upper lobe bulla, stable. Mild emphysematous lung changes. This report was flagged in Epic as abnormal. Electronically signed by: Juvencio Cardona MD Date:    05/15/2022 Time:    12:10    MRI Brain Without Contrast    Result Date: 5/16/2022  EXAMINATION: MRI BRAIN WITHOUT CONTRAST CLINICAL HISTORY: Mental status change, unknown cause; TECHNIQUE: Multiplanar multisequence MR imaging of the brain was performed without contrast. COMPARISON: CT head 05/13/2022 MRI 10/27/2021 FINDINGS: there  is central volume loss, similar to prior studies.  There is no evidence of hydrocephalus, mass effect, intracranial hemorrhage or acute infarct. Focal lesions within the periventricular white matter bilaterally are stable from the prior study consistent with prior nonspecific insult.  No new white matter lesions are noted. Normal arterial flow voids are preserved at the skull base. The visualized sinuses and mastoid air cells are clear.     Stable appearance of the brain as detailed above...  No acute intracranial process. Electronically signed by: Ibrahima Soria Date:    05/16/2022 Time:    11:55    X-Ray Chest AP Portable    Result Date: 5/13/2022  EXAMINATION: XR CHEST AP PORTABLE CLINICAL HISTORY: Sepsis; TECHNIQUE: Single frontal view of the chest was performed. COMPARISON: Chest radiograph 03/28/2022, CT chest 10/27/2021 FINDINGS: Cardiac monitoring leads overlie the chest.  Cardiac silhouette is not significantly enlarged.  There is a stable large thin walled cyst projecting over the left upper lung zone.  There is increased fullness of the right hilar region, not well appreciated on prior radiograph examination.  Additionally, there is a possible new 1.0 cm nodular opacity projecting over the right upper lung zone.  The remaining lungs demonstrate no new confluent airspace consolidation.  No significant volume of pleural fluid or pneumothorax appreciated.  Osseous structures appear grossly intact.     1.  Increased fullness of the right hilar region, not well appreciated on prior radiograph examination as well as possible new 1.0 cm nodular opacity projecting over the right upper lung zone.  Given short-term interval development, this could relate to underlying infectious process, however neoplastic process not excluded.  Consider further assessment with dedicated chest CT. 2.  Stable large thin walled left upper lung zone cyst. Electronically signed by: Lenny Pereira MD Date:    05/13/2022  Time:    03:45    Microbiology Results (last 7 days)       Procedure Component Value Units Date/Time    AFB Culture & Smear [[id number]] Collected: 05/17/22 1616    Order Status: Completed Specimen: Respiratory from Sputum Updated: 05/18/22 2127     AFB Culture & Smear Culture in progress     AFB CULTURE STAIN No acid fast bacilli seen.    AFB Culture & Smear [[id number]]     Order Status: No result Specimen: Body Fluid from Sputum, Expectorated     Culture, Respiratory with Gram Stain [[id number]]  (Abnormal)  (Susceptibility) Collected: 05/15/22 1710    Order Status: Completed Specimen: Respiratory from Sputum Updated: 05/18/22 1014     Respiratory Culture No Pseudomonas isolated.      METHICILLIN RESISTANT STAPHYLOCOCCUS AUREUS  Few  Normal respiratory armida also present       Gram Stain (Respiratory) <10 epithelial cells per low power field.     Gram Stain (Respiratory) Rare WBC's     Gram Stain (Respiratory) Rare Gram positive cocci     Gram Stain (Respiratory) Rare Gram negative rods    Blood culture x two cultures. Draw prior to antibiotics. [[id number]] Collected: 05/13/22 0255    Order Status: Completed Specimen: Blood from Peripheral, Antecubital, Right Updated: 05/18/22 1012     Blood Culture, Routine No growth after 5 days.    Narrative:      Aerobic and anaerobic    Blood culture x two cultures. Draw prior to antibiotics. [[id number]] Collected: 05/13/22 0311    Order Status: Completed Specimen: Blood from Peripheral, Forearm, Right Updated: 05/18/22 1012     Blood Culture, Routine No growth after 5 days.    Narrative:      Aerobic and anaerobic    AFB Culture & Smear [[id number]] Collected: 05/17/22 1616    Order Status: Sent Specimen: Respiratory from Sputum Updated: 05/17/22 1616                Assessment/Plan:      Pneumonia  Pt afebrile but w/ leokocytosis and new findings on CXR   Initiated Broad spec abx in ED w/ Vanc/cef/flagyl after 1 day switched to augmentin/azithromycin for 2 days then cultures  showing MRSA so put back on Vanc for 1 day 5/17 and transitioned to IV Clinda for 7 days on 5/19  Bld cultures drawn in ED w/ NGTD  CT chest w/ 4.5 cm right upper lobe cavitary mass with adjacent 9 mm satellite lesion which is concerning for infectious etiology/atypical pneumonia as well as a large thin walled left upper lobe bulla  Quant Gold neg AFB x1 w/o AFB 2x pending as well as TB PCR    Plan:  Pulm consulted  Rec cnt pneumonia abx but await 3x AFB smears and TB PCR  Cnt Clinda for 7 days end 5/25   Cnt to monitor for fevers and repeat cultures/imaging      AMS (altered mental status)  Pt w/ AMS on admit thought to be related to DKA  Uncertain baseline mentation though previous CVA in 2013  After DKA resolution pt still altered  UDS and EtOH neg on admit   Ammonia slightly elevated to 61  CT head w/o any new intracranial abnormalities  Folate 11.2  VitB12 415  Thiamine 88  B6 6  TSH 0.409  RPR nonreactive  HIV neg   MRI brain w/o w/ no acute intracranial abnormalities   EEG showed generalized cerebral dysfunction but no signs of seizure activity    Pt returned to baseline and has been AOx 3 since 5/17      Epilepsy  Restart home keppra and depakote  EEG done 5/16 which showed no seizure or epileptiform activity     Hypertension  Patient not on any home anti-HTN medication  Plan  Losartan 25mg daily  Monitor Vitals      Type 2 diabetes mellitus, with long-term current use of insulin  Patient uses Humalin 70/30 to control BG  Plan  MDSSI  Detemir 25U Subq nightly  POCT BG Q4h  Diabetic Diet      ETOH abuse  Pt w/ known EtOH abuse hx  Ethanol neg on admit  Hx of EtOH seizures    Plan:  CIWA q4   Ativan PRN for CIWA >8  Thiamine and multivitamin daily         VTE Risk Mitigation (From admission, onward)         Ordered     IP VTE LOW RISK PATIENT  Once         05/13/22 0530     Place sequential compression device  Until discontinued         05/13/22 0530                Discharge Planning   SHABANA: 5/14/2022     Code  Status: Full Code   Is the patient medically ready for discharge?:     Reason for patient still in hospital (select all that apply): Patient trending condition  Discharge Plan A: Shelter   Discharge Delays: None known at this time      Corby Grande MD  Department of [facility] Medicine   [facility]

## 2022-05-24 LAB
ALBUMIN SERPL BCP-MCNC: 3.4 G/DL (ref 3.5–5.2)
ALP SERPL-CCNC: 66 U/L (ref 55–135)
ALT SERPL W/O P-5'-P-CCNC: 11 U/L (ref 10–44)
ANION GAP SERPL CALC-SCNC: 11 MMOL/L (ref 8–16)
AST SERPL-CCNC: 14 U/L (ref 10–40)
BASOPHILS # BLD AUTO: 0.07 K/UL (ref 0–0.2)
BASOPHILS NFR BLD: 0.8 % (ref 0–1.9)
BILIRUB SERPL-MCNC: 0.1 MG/DL (ref 0.1–1)
BUN SERPL-MCNC: 18 MG/DL (ref 6–20)
CALCIUM SERPL-MCNC: 9.7 MG/DL (ref 8.7–10.5)
CHLORIDE SERPL-SCNC: 98 MMOL/L (ref 95–110)
CO2 SERPL-SCNC: 28 MMOL/L (ref 23–29)
CREAT SERPL-MCNC: 0.9 MG/DL (ref 0.5–1.4)
DIFFERENTIAL METHOD: ABNORMAL
EOSINOPHIL # BLD AUTO: 0.3 K/UL (ref 0–0.5)
EOSINOPHIL NFR BLD: 3 % (ref 0–8)
ERYTHROCYTE [DISTWIDTH] IN BLOOD BY AUTOMATED COUNT: 12.5 % (ref 11.5–14.5)
EST. GFR  (AFRICAN AMERICAN): >60 ML/MIN/1.73 M^2
EST. GFR  (NON AFRICAN AMERICAN): >60 ML/MIN/1.73 M^2
GLUCOSE SERPL-MCNC: 135 MG/DL (ref 70–110)
HCT VFR BLD AUTO: 34.4 % (ref 40–54)
HGB BLD-MCNC: 12 G/DL (ref 14–18)
IMM GRANULOCYTES # BLD AUTO: 0.05 K/UL (ref 0–0.04)
IMM GRANULOCYTES NFR BLD AUTO: 0.6 % (ref 0–0.5)
LYMPHOCYTES # BLD AUTO: 3 K/UL (ref 1–4.8)
LYMPHOCYTES NFR BLD: 33.8 % (ref 18–48)
MCH RBC QN AUTO: 31.7 PG (ref 27–31)
MCHC RBC AUTO-ENTMCNC: 34.9 G/DL (ref 32–36)
MCV RBC AUTO: 91 FL (ref 82–98)
MONOCYTES # BLD AUTO: 0.8 K/UL (ref 0.3–1)
MONOCYTES NFR BLD: 9.6 % (ref 4–15)
NEUTROPHILS # BLD AUTO: 4.6 K/UL (ref 1.8–7.7)
NEUTROPHILS NFR BLD: 52.2 % (ref 38–73)
NRBC BLD-RTO: 0 /100 WBC
PLATELET # BLD AUTO: 382 K/UL (ref 150–450)
PMV BLD AUTO: 9 FL (ref 9.2–12.9)
POCT GLUCOSE: 137 MG/DL (ref 70–110)
POCT GLUCOSE: 166 MG/DL (ref 70–110)
POCT GLUCOSE: 402 MG/DL (ref 70–110)
POTASSIUM SERPL-SCNC: 4.7 MMOL/L (ref 3.5–5.1)
PROT SERPL-MCNC: 7.1 G/DL (ref 6–8.4)
RBC # BLD AUTO: 3.78 M/UL (ref 4.6–6.2)
SODIUM SERPL-SCNC: 137 MMOL/L (ref 136–145)
WBC # BLD AUTO: 8.77 K/UL (ref 3.9–12.7)

## 2022-05-24 PROCEDURE — 25000003 PHARM REV CODE 250: Performed by: STUDENT IN AN ORGANIZED HEALTH CARE EDUCATION/TRAINING PROGRAM

## 2022-05-24 PROCEDURE — 80053 COMPREHEN METABOLIC PANEL: CPT | Performed by: STUDENT IN AN ORGANIZED HEALTH CARE EDUCATION/TRAINING PROGRAM

## 2022-05-24 PROCEDURE — S4991 NICOTINE PATCH NONLEGEND: HCPCS | Performed by: STUDENT IN AN ORGANIZED HEALTH CARE EDUCATION/TRAINING PROGRAM

## 2022-05-24 PROCEDURE — 36415 COLL VENOUS BLD VENIPUNCTURE: CPT | Performed by: STUDENT IN AN ORGANIZED HEALTH CARE EDUCATION/TRAINING PROGRAM

## 2022-05-24 PROCEDURE — 11000001 HC ACUTE MED/SURG PRIVATE ROOM

## 2022-05-24 PROCEDURE — 27000207 HC ISOLATION

## 2022-05-24 PROCEDURE — 85025 COMPLETE CBC W/AUTO DIFF WBC: CPT | Performed by: STUDENT IN AN ORGANIZED HEALTH CARE EDUCATION/TRAINING PROGRAM

## 2022-05-24 RX ADMIN — LEVETIRACETAM 750 MG: 250 TABLET, FILM COATED ORAL at 09:05

## 2022-05-24 RX ADMIN — CLINDAMYCIN IN 5 PERCENT DEXTROSE 600 MG: 12 INJECTION, SOLUTION INTRAVENOUS at 12:05

## 2022-05-24 RX ADMIN — THERA TABS 1 TABLET: TAB at 09:05

## 2022-05-24 RX ADMIN — LEVETIRACETAM 750 MG: 250 TABLET, FILM COATED ORAL at 08:05

## 2022-05-24 RX ADMIN — INSULIN ASPART 5 UNITS: 100 INJECTION, SOLUTION INTRAVENOUS; SUBCUTANEOUS at 08:05

## 2022-05-24 RX ADMIN — NICOTINE 1 PATCH: 14 PATCH, EXTENDED RELEASE TRANSDERMAL at 09:05

## 2022-05-24 RX ADMIN — DIVALPROEX SODIUM 1000 MG: 250 TABLET, EXTENDED RELEASE ORAL at 10:05

## 2022-05-24 RX ADMIN — CLINDAMYCIN IN 5 PERCENT DEXTROSE 600 MG: 12 INJECTION, SOLUTION INTRAVENOUS at 02:05

## 2022-05-24 RX ADMIN — CLINDAMYCIN IN 5 PERCENT DEXTROSE 600 MG: 12 INJECTION, SOLUTION INTRAVENOUS at 07:05

## 2022-05-24 RX ADMIN — INSULIN DETEMIR 25 UNITS: 100 INJECTION, SOLUTION SUBCUTANEOUS at 08:05

## 2022-05-24 RX ADMIN — INSULIN ASPART 2 UNITS: 100 INJECTION, SOLUTION INTRAVENOUS; SUBCUTANEOUS at 12:05

## 2022-05-24 RX ADMIN — DIVALPROEX SODIUM 1000 MG: 250 TABLET, EXTENDED RELEASE ORAL at 08:05

## 2022-05-24 RX ADMIN — THIAMINE HCL TAB 100 MG 100 MG: 100 TAB at 09:05

## 2022-05-24 RX ADMIN — LOSARTAN POTASSIUM 25 MG: 25 TABLET, FILM COATED ORAL at 09:05

## 2022-05-24 NOTE — PHYSICIAN QUERY
PT Name: Terrance Chavez  MR #: [id number]    DOCUMENTATION CLARIFICATION     CDS/: Raghavendra Fleming RN, CCDS      Contact information:   [email or web address]      This form is a permanent document in the medical record.     Query Date: May 24, 2022    By submitting this query, we are merely seeking further clarification of documentation. Please utilize your independent clinical judgment when addressing the question(s) below.    The Medical Record contains the following:   Indicators   Supporting Clinical Findings Location in Medical Record   x AMS, Confusion,  LOC, etc.  Altered Mental Status :   EMS called by [facility] to  pt.  Pt found in airport parking lot;  Confused but directable ;      5/13 Acute Encephalopathy  - seems to be improving since presentation but unclear etiology.    5/15 Neuro: Suspect multifactorial process given underlying infectious and metabolic concerns.   5/17Neuro : Mentation improving, currently alert and oriented x 4    5/14 HM: Pt w/ AMS on admit thought to be related to DKA  5/20 HM: Pt returned to baseline and has been AOx 3 since 5/17 5/13 ED provider note        Pulmonary/Critical Care Attending with Team    Neurology        [facility] medicine ()         x Acute/Chronic Illness admitted with hyperglycemia, elevated beta-hydroxybutyrate, and anion gap. Improved with insulin and IVFs. Mentation improving, continue to monitor.  ;  --pmhx of T2DM, HTN, CVA, neurocognitive impairment and substance abuse disorder         5/13 H&P,    x Radiology Findings 5/13 CT head : Chronic changes are noted, there is no evidence for superimposed acute intracranial process.    CT head    x Electrolyte Imbalance 5/13   Glucose: 304;     5/14 glucose: 38  5/18 glucose: 477  5/20 glucose : 230 Labs      Medication      Treatment             x Other 5/16 EEG: Abnormal EEG due to mild generalized non-specific cerebral dysfunction.  No electrographic seizures or indications of seizure   VB.338/  53.8/  24/   28.9 Epilepsy procedure       Point of care        The noted clinical guidelines are only system guidelines and do not replace the providers clinical judgment.    The National Freedom of Neurologic Disorders and Stroke (NINDS) of the NIH describes encephalopathy as any diffuse disease of the brain that alters brain function or structure.    Provider, please specify the diagnosis for Altered Mental Status associated with above clinical findings.    [   ] Metabolic Encephalopathy - Due to electrolyte imbalance, metabolic derangements, or infectious processes, includes Septic Encephalopathy, Uremic Encephalopathy   [ x  ] Encephalopathy, unspecified      [   ] Other Encephalopathy (please specify): ____________________   [   ] Other neurological condition- Includes Post-ictal altered mental status (please specify condition): __________   [   ]  Clinically Undetermined     Please document in your progress notes daily for the duration of treatment until resolved, and include in your discharge summary.    References:  CANDACE Antoine RN, CCDS. (). Notes from the Instructor: Encephalopathy tips. Retrieved 2020, from [email or web address]    ICD-10-CM/PCS [employer] Integrated Codebook (Version V.20.8.10.0) [Computer software]. (). Retrieved 2020.    Form No. 05762

## 2022-05-24 NOTE — PLAN OF CARE
Problem: Adult Inpatient Plan of Care  Goal: Plan of Care Review  Outcome: Ongoing, Progressing   Patient is alert, oriented X3, care plan explained to patient, no evidence of learning.     On room air, O2 sat maintain 100%, no respiratory distress noted. No cardiac monitor order.     Deny nausea/vomiting/diarrhea. Pt still constantly seeking food, glucose monitored. Due medications given.     Maintain fall risk precaution, bed in lowest position, bed alarm on. Call light/personal items in reach. Instructed patient call for help as needed. Will continue to monitor.

## 2022-05-24 NOTE — PROGRESS NOTES
[facility] Medicine  Progress Note    Patient Name: Terrance Chavez  MRN: [id number]  Patient Class: IP- Inpatient   Admission Date: 5/13/2022  Length of Stay: 9 days  Attending Physician: Terrance Avelar MD  Primary Care Provider: Primary Doctor No        Subjective:     Principal Problem:Hyperglycemia        HPI:  Patient is a 42 yr old male with pmhx of T2DM, HTN, CVA, neurocognitive impairment and substance abuse disorder presenting to the ED for Altered mental status. [place], brought in by EMS. On arrival patient was only oriented to person but not place,time or situation. Patient blood glucose was elevated on arrival. Initial labs showed small acidosis. Patient was provided 2L LR in ED and his mentation improved. Patient had a fever in the ED , and elevated WBC  of 29.9. Can not rule out infection exacerbating or causing change in mental status. Patient received 2g ceftriaxone in ED. Patient denies chest pain, shortness of breath, headache, nausea and vomiting. Admitted to [facility] for observation and correction of glucose.      Overview/Hospital Course:  Pt was admitted to the FM service for DKA and AMS. Pt was initially found to have elevated BG as well as Beta hydroxybutyrate and AG. He was started on an insulin drip as well as giving his long acting insulin. His DKA quickly resolved and then was continued to work up for his AMS and new lung findings. On admission he had a CXR which showed a new R Upper Lung nodule as well as hilar haziness and a known L upper lung cyst. A CT was recommned for f/u which showed a 4.5 cm R Upper Lobe and satellite 9mm lesion. Pulm was consulted and pt was worked up for potential pneumonia vs TB he was started on a 5 day course of augmentin and azithromycin which he will complete on 5/18. Quant gold as well as AFBs were collected which showed............On admission he had a CT head which did not  show any acute intracranial abnormalities as well as EtOH and UDS which were neg. It was unclear as to what his BL mentation is as previous notes had documented normal mentation and AO x4. His AMS was worked up with HIV/RPR/B12/Folate which were wnl and B1/6 pending. Neurology was consulted to determine if there was any other potential causes of AMS and rec an MRI and EEG. His MRI again did not show any acute abnormalities and EEG which showed generalized cerebral dysfxn but no epileptiform activity. The pt's mentation slowly improved throughout his admission and was AO x3 at time of d/c. Pt was educated on his chronic conditions and SW gave him multiple resources for his outpt care. He was given strict return precautions and all questions were answered.      Interval History: NAEON. Pt w/o any complaints this AM. His TB PCR has resulted neg and is still waiting for 2 more AFB. Will complete course of abx tomorrow.     Review of Systems   Constitutional:  Negative for chills, fatigue and fever.   HENT:  Negative for congestion, rhinorrhea and sore throat.    Eyes:  Negative for visual disturbance.   Respiratory:  Negative for cough and shortness of breath.    Cardiovascular:  Negative for chest pain.   Gastrointestinal:  Negative for abdominal pain, nausea and vomiting.   Genitourinary:  Negative for difficulty urinating.   Skin:  Negative for rash and wound.   Neurological:  Negative for weakness and headaches.   Objective:     Vital Signs (Most Recent):  Temp: 97.3 °F (36.3 °C) (05/24/22 0549)  Pulse: 65 (05/24/22 0819)  Resp: 18 (05/24/22 0819)  BP: 119/68 (05/24/22 0819)  SpO2: 100 % (05/24/22 0819) Vital Signs (24h Range):  Temp:  [97.3 °F (36.3 °C)-97.6 °F (36.4 °C)] 97.3 °F (36.3 °C)  Pulse:  [65-85] 65  Resp:  [17-20] 18  SpO2:  [99 %-100 %] 100 %  BP: (105-119)/(62-79) 119/68     Weight: 72.6 kg (160 lb 0.9 oz)  Body mass index is 25.07 kg/m².  No intake or output data in the 24 hours ending 05/24/22 3906    Physical Exam  Vitals and nursing note reviewed.   Constitutional:       General: He is not in acute distress.     Appearance: Normal appearance. He is not ill-appearing.   HENT:      Mouth/Throat:      Pharynx: Oropharynx is clear.   Eyes:      General:         Right eye: No discharge.         Left eye: No discharge.      Conjunctiva/sclera: Conjunctivae normal.   Cardiovascular:      Rate and Rhythm: Normal rate and regular rhythm.      Heart sounds: Normal heart sounds.   Pulmonary:      Effort: Pulmonary effort is normal. No respiratory distress.      Breath sounds: Normal breath sounds. No wheezing.   Abdominal:      Palpations: Abdomen is soft.      Tenderness: There is no abdominal tenderness.   Skin:     General: Skin is warm.   Neurological:      Mental Status: He is alert and oriented to person, place, and time.   Psychiatric:         Behavior: Behavior normal.     Recent Labs   Lab 05/22/22  0429 05/23/22  1305 05/24/22  0450   WBC 7.65 7.04 8.77   HGB 11.2* 12.4* 12.0*   HCT 32.8* 36.6* 34.4*   MCV 92 94 91   RBC 3.55* 3.91* 3.78*   MCH 31.5* 31.7* 31.7*   MCHC 34.1 33.9 34.9   RDW 12.3 12.6 12.5    388 382   MPV 9.1* 9.3 9.0*   GRAN 41.9  3.2 44.4  3.1 52.2  4.6   LYMPH 46.1  3.5 42.5  3.0 33.8  3.0   MONO 8.2  0.6 8.4  0.6 9.6  0.8   EOSINOPHIL 2.0 3.4 3.0   BASOPHIL 0.9 0.7 0.8     Recent Labs   Lab 05/21/22  0254 05/22/22  0429 05/23/22  0510 05/23/22  1305 05/24/22  0450    137  --  136 137   K 4.7 4.7  --  5.0 4.7   CL 97 95  --  96 98   CO2 29 30*  --  30* 28   ANIONGAP 11 12  --  10 11   BUN 12 13  --  13 18   CREATININE 0.7 0.8  --  0.8 0.9   * 247*  --  176* 135*   CALCIUM 9.9 10.0  --  9.7 9.7   PROT 6.8 6.6  --  7.0 7.1   ALBUMIN 3.3* 3.2*  --  3.4* 3.4*   ALKPHOS 62 68  --  67 66   BILITOT 0.2 0.1  --  0.2 0.1   ALT 9* 11  --  11 11   AST 14 11  --  14 14   ESTGFRAFRICA >60 >60  --  >60 >60   EGFRNONAA >60 >60  --  >60 >60   MG 1.8 1.9 1.7  --   --    PHOS 4.3  4.0 4.6*  --   --      No results for input(s): COLORU, APPEARANCEUA, PHUR, SPECGRAV, PROTEINUA, GLUCUA, KETONESU, BILIRUBINUA, OCCULTUA, UROBILINOGEN, NITRITE, LEUKOCYTESUR, RBCUA, WBCUA, BACTERIA, SQUAMEPITHEL, HYALINECASTS, GRANULARCAST, MICROCMT in the last 168 hours.    Invalid input(s): SPECIMENU, AMORPHOUSU  No results for input(s): TROPONINI, CPK, CPKMB in the last 168 hours.  No results for input(s): PT, INR, APTT in the last 168 hours.  No results for input(s): TSH, P1LXTFI, K1QSQWZ, THYROIDAB, FREET4 in the last 168 hours.  CT Head Without Contrast    Result Date: 5/13/2022  EXAMINATION: CT HEAD WITHOUT CONTRAST CLINICAL HISTORY: Mental status change, unknown cause; TECHNIQUE: Low dose axial images were obtained through the head.  Coronal and sagittal reformations were also performed. Contrast was not administered. COMPARISON: CT examination of the brain without contrast March 30, 2022 FINDINGS: The ventricular system, sulcal pattern and parenchymal attenuation characteristics are consistent with chronic change appearing similar to the prior examination without evidence for superimposed acute process.  There are areas consistent with remote lacunar-type ischemic change.  Chronic appearing white matter change noted. There is no evidence for intracranial mass, mass effect or midline shift and there is no evidence for acute intracranial hemorrhage.  Appropriate CSF spaces are seen at the skull base. The visualized orbits appear intact.  The mastoid air cells and middle ear cavity bilaterally appear appropriate.  Mild opacity along the external auditory canal likely represents cerumen.  Minimal paranasal sinus mucosal thickening noted.  The osseous structures appear intact.     Chronic changes are noted, there is no evidence for superimposed acute intracranial process. Electronically signed by: Abimael Eid Date:    05/13/2022 Time:    04:10    CT Chest Without Contrast    Result Date:  5/15/2022  EXAMINATION: CT CHEST WITHOUT CONTRAST CLINICAL HISTORY: Abnormal xray - lung nodule, >= 1 cm; TECHNIQUE: Low dose axial images, sagittal and coronal reformations were obtained from the thoracic inlet to the lung bases. Contrast was not administered. COMPARISON: Radiograph 05/13/2022 FINDINGS: Lungs: There is a right upper lobe partially cavitary mass in the posterior segment eroding through the major fissure into the superior segment of the right lower lobe (series 601, image 105).  The lesion measures 4.5 cm in transverse plane (series 2, image 54). There is an adjacent right upper lobe nodule measuring 0.9 cm (series 2, image 39), with the same imaging characteristics. Mild emphysematous lung changes. There is a large left upper lobe thin walled bulla measuring 11.4 cm (series 2, image 136), stable. Mediastinum: Thyroid gland is grossly unremarkable.  No mediastinal lymphadenopathy.  Limited assessment of the hilum without IV contrast.  Thoracic aorta is normal caliber.  Heart size within normal limits.  No pericardial or pleural effusion.  Mild scattered calcific atherosclerosis. Upper abdomen: Extensive pancreatic calcifications, probable sequela of chronic pancreatitis. Chest wall: Mild gynecomastia. Bones: No marrow replacement process.     4.5 cm right upper lobe cavitary mass with adjacent 9 mm satellite lesion, as above, concerning for infectious etiology/atypical pneumonia.  Malignancy could have a similar appearance. Large thin walled left upper lobe bulla, stable. Mild emphysematous lung changes. This report was flagged in Epic as abnormal. Electronically signed by: Juvencio Cardona MD Date:    05/15/2022 Time:    12:10    MRI Brain Without Contrast    Result Date: 5/16/2022  EXAMINATION: MRI BRAIN WITHOUT CONTRAST CLINICAL HISTORY: Mental status change, unknown cause; TECHNIQUE: Multiplanar multisequence MR imaging of the brain was performed without contrast. COMPARISON: CT head 05/13/2022 MRI  10/27/2021 FINDINGS: there is central volume loss, similar to prior studies.  There is no evidence of hydrocephalus, mass effect, intracranial hemorrhage or acute infarct. Focal lesions within the periventricular white matter bilaterally are stable from the prior study consistent with prior nonspecific insult.  No new white matter lesions are noted. Normal arterial flow voids are preserved at the skull base. The visualized sinuses and mastoid air cells are clear.     Stable appearance of the brain as detailed above...  No acute intracranial process. Electronically signed by: Ibrahima Soria Date:    05/16/2022 Time:    11:55    X-Ray Chest AP Portable    Result Date: 5/13/2022  EXAMINATION: XR CHEST AP PORTABLE CLINICAL HISTORY: Sepsis; TECHNIQUE: Single frontal view of the chest was performed. COMPARISON: Chest radiograph 03/28/2022, CT chest 10/27/2021 FINDINGS: Cardiac monitoring leads overlie the chest.  Cardiac silhouette is not significantly enlarged.  There is a stable large thin walled cyst projecting over the left upper lung zone.  There is increased fullness of the right hilar region, not well appreciated on prior radiograph examination.  Additionally, there is a possible new 1.0 cm nodular opacity projecting over the right upper lung zone.  The remaining lungs demonstrate no new confluent airspace consolidation.  No significant volume of pleural fluid or pneumothorax appreciated.  Osseous structures appear grossly intact.     1.  Increased fullness of the right hilar region, not well appreciated on prior radiograph examination as well as possible new 1.0 cm nodular opacity projecting over the right upper lung zone.  Given short-term interval development, this could relate to underlying infectious process, however neoplastic process not excluded.  Consider further assessment with dedicated chest CT. 2.  Stable large thin walled left upper lung zone cyst. Electronically signed by: Lenny Pereira MD  Date:    05/13/2022 Time:    03:45    Microbiology Results (last 7 days)       Procedure Component Value Units Date/Time    AFB Culture & Smear [[id number]] Collected: 05/17/22 1616    Order Status: Completed Specimen: Respiratory from Sputum Updated: 05/18/22 2127     AFB Culture & Smear Culture in progress     AFB CULTURE STAIN No acid fast bacilli seen.    AFB Culture & Smear [[id number]]     Order Status: No result Specimen: Body Fluid from Sputum, Expectorated     Culture, Respiratory with Gram Stain [[id number]]  (Abnormal)  (Susceptibility) Collected: 05/15/22 1710    Order Status: Completed Specimen: Respiratory from Sputum Updated: 05/18/22 1014     Respiratory Culture No Pseudomonas isolated.      METHICILLIN RESISTANT STAPHYLOCOCCUS AUREUS  Few  Normal respiratory armida also present       Gram Stain (Respiratory) <10 epithelial cells per low power field.     Gram Stain (Respiratory) Rare WBC's     Gram Stain (Respiratory) Rare Gram positive cocci     Gram Stain (Respiratory) Rare Gram negative rods    Blood culture x two cultures. Draw prior to antibiotics. [[id number]] Collected: 05/13/22 0255    Order Status: Completed Specimen: Blood from Peripheral, Antecubital, Right Updated: 05/18/22 1012     Blood Culture, Routine No growth after 5 days.    Narrative:      Aerobic and anaerobic    Blood culture x two cultures. Draw prior to antibiotics. [[id number]] Collected: 05/13/22 0311    Order Status: Completed Specimen: Blood from Peripheral, Forearm, Right Updated: 05/18/22 1012     Blood Culture, Routine No growth after 5 days.    Narrative:      Aerobic and anaerobic    AFB Culture & Smear [[id number]] Collected: 05/17/22 1616    Order Status: Sent Specimen: Respiratory from Sputum Updated: 05/17/22 1616                Assessment/Plan:      Pneumonia  Pt afebrile but w/ leokocytosis and new findings on CXR   Initiated Broad spec abx in ED w/ Vanc/cef/flagyl after 1 day switched to augmentin/azithromycin for 2  days then cultures showing MRSA so put back on Vanc for 1 day 5/17 and transitioned to IV Clinda for 7 days on 5/19  Bld cultures drawn in ED w/ NGTD  CT chest w/ 4.5 cm right upper lobe cavitary mass with adjacent 9 mm satellite lesion which is concerning for infectious etiology/atypical pneumonia as well as a large thin walled left upper lobe bulla  Quant Gold and TB PCR neg AFB x1 w/o AFB 2x pending     Plan:  Pulm consulted  Rec cnt pneumonia abx but await 3x AFB smears and TB PCR  Cnt Clinda for 7 days end 5/25   Cnt to monitor for fevers and repeat cultures/imaging      AMS (altered mental status)  Pt w/ AMS on admit thought to be related to DKA  Uncertain baseline mentation though previous CVA in 2013  After DKA resolution pt still altered  UDS and EtOH neg on admit   Ammonia slightly elevated to 61  CT head w/o any new intracranial abnormalities  Folate 11.2  VitB12 415  Thiamine 88  B6 6  TSH 0.409  RPR nonreactive  HIV neg   MRI brain w/o w/ no acute intracranial abnormalities   EEG showed generalized cerebral dysfunction but no signs of seizure activity    Pt returned to baseline and has been AOx 3 since 5/17      Epilepsy  Restart home keppra and depakote  EEG done 5/16 which showed no seizure or epileptiform activity     Hypertension  Patient not on any home anti-HTN medication  Plan  Losartan 25mg daily  Monitor Vitals      Type 2 diabetes mellitus, with long-term current use of insulin  Patient uses Humalin 70/30 to control BG  Plan  MDSSI  Detemir 25U Subq nightly  POCT BG Q4h  Diabetic Diet      ETOH abuse  Pt w/ known EtOH abuse hx  Ethanol neg on admit  Hx of EtOH seizures    Plan:  CIWA q4   Ativan PRN for CIWA >8  Thiamine and multivitamin daily         VTE Risk Mitigation (From admission, onward)         Ordered     IP VTE LOW RISK PATIENT  Once         05/13/22 0530     Place sequential compression device  Until discontinued         05/13/22 0530                Discharge Planning   SHABANA:  5/14/2022     Code Status: Full Code   Is the patient medically ready for discharge?:     Reason for patient still in hospital (select all that apply): Patient trending condition  Discharge Plan A: Shelter   Discharge Delays: None known at this time      Corby Grande MD  Department of [facility] Medicine   [facility]

## 2022-05-24 NOTE — PLAN OF CARE
Problem: Adult Inpatient Plan of Care  Goal: Plan of Care Review  Outcome: Ongoing, Progressing   Virtual Nurse: Review of care plan, labs, notes and orders, will be available if needed.

## 2022-05-24 NOTE — SUBJECTIVE & OBJECTIVE
Interval History: NAEON. Pt w/o any complaints this AM. His TB PCR has resulted neg and is still waiting for 2 more AFB. Will complete course of abx tomorrow.     Review of Systems   Constitutional:  Negative for chills, fatigue and fever.   HENT:  Negative for congestion, rhinorrhea and sore throat.    Eyes:  Negative for visual disturbance.   Respiratory:  Negative for cough and shortness of breath.    Cardiovascular:  Negative for chest pain.   Gastrointestinal:  Negative for abdominal pain, nausea and vomiting.   Genitourinary:  Negative for difficulty urinating.   Skin:  Negative for rash and wound.   Neurological:  Negative for weakness and headaches.   Objective:     Vital Signs (Most Recent):  Temp: 97.3 °F (36.3 °C) (05/24/22 0549)  Pulse: 65 (05/24/22 0819)  Resp: 18 (05/24/22 0819)  BP: 119/68 (05/24/22 0819)  SpO2: 100 % (05/24/22 0819) Vital Signs (24h Range):  Temp:  [97.3 °F (36.3 °C)-97.6 °F (36.4 °C)] 97.3 °F (36.3 °C)  Pulse:  [65-85] 65  Resp:  [17-20] 18  SpO2:  [99 %-100 %] 100 %  BP: (105-119)/(62-79) 119/68     Weight: 72.6 kg (160 lb 0.9 oz)  Body mass index is 25.07 kg/m².  No intake or output data in the 24 hours ending 05/24/22 0839   Physical Exam  Vitals and nursing note reviewed.   Constitutional:       General: He is not in acute distress.     Appearance: Normal appearance. He is not ill-appearing.   HENT:      Mouth/Throat:      Pharynx: Oropharynx is clear.   Eyes:      General:         Right eye: No discharge.         Left eye: No discharge.      Conjunctiva/sclera: Conjunctivae normal.   Cardiovascular:      Rate and Rhythm: Normal rate and regular rhythm.      Heart sounds: Normal heart sounds.   Pulmonary:      Effort: Pulmonary effort is normal. No respiratory distress.      Breath sounds: Normal breath sounds. No wheezing.   Abdominal:      Palpations: Abdomen is soft.      Tenderness: There is no abdominal tenderness.   Skin:     General: Skin is warm.   Neurological:       Mental Status: He is alert and oriented to person, place, and time.   Psychiatric:         Behavior: Behavior normal.     Recent Labs   Lab 05/22/22  0429 05/23/22  1305 05/24/22  0450   WBC 7.65 7.04 8.77   HGB 11.2* 12.4* 12.0*   HCT 32.8* 36.6* 34.4*   MCV 92 94 91   RBC 3.55* 3.91* 3.78*   MCH 31.5* 31.7* 31.7*   MCHC 34.1 33.9 34.9   RDW 12.3 12.6 12.5    388 382   MPV 9.1* 9.3 9.0*   GRAN 41.9  3.2 44.4  3.1 52.2  4.6   LYMPH 46.1  3.5 42.5  3.0 33.8  3.0   MONO 8.2  0.6 8.4  0.6 9.6  0.8   EOSINOPHIL 2.0 3.4 3.0   BASOPHIL 0.9 0.7 0.8     Recent Labs   Lab 05/21/22  0254 05/22/22  0429 05/23/22  0510 05/23/22  1305 05/24/22  0450    137  --  136 137   K 4.7 4.7  --  5.0 4.7   CL 97 95  --  96 98   CO2 29 30*  --  30* 28   ANIONGAP 11 12  --  10 11   BUN 12 13  --  13 18   CREATININE 0.7 0.8  --  0.8 0.9   * 247*  --  176* 135*   CALCIUM 9.9 10.0  --  9.7 9.7   PROT 6.8 6.6  --  7.0 7.1   ALBUMIN 3.3* 3.2*  --  3.4* 3.4*   ALKPHOS 62 68  --  67 66   BILITOT 0.2 0.1  --  0.2 0.1   ALT 9* 11  --  11 11   AST 14 11  --  14 14   ESTGFRAFRICA >60 >60  --  >60 >60   EGFRNONAA >60 >60  --  >60 >60   MG 1.8 1.9 1.7  --   --    PHOS 4.3 4.0 4.6*  --   --      No results for input(s): COLORU, APPEARANCEUA, PHUR, SPECGRAV, PROTEINUA, GLUCUA, KETONESU, BILIRUBINUA, OCCULTUA, UROBILINOGEN, NITRITE, LEUKOCYTESUR, RBCUA, WBCUA, BACTERIA, SQUAMEPITHEL, HYALINECASTS, GRANULARCAST, MICROCMT in the last 168 hours.    Invalid input(s): SPECIMENU, AMORPHOUSU  No results for input(s): TROPONINI, CPK, CPKMB in the last 168 hours.  No results for input(s): PT, INR, APTT in the last 168 hours.  No results for input(s): TSH, M8LEZDR, C9JBIFQ, THYROIDAB, FREET4 in the last 168 hours.  CT Head Without Contrast    Result Date: 5/13/2022  EXAMINATION: CT HEAD WITHOUT CONTRAST CLINICAL HISTORY: Mental status change, unknown cause; TECHNIQUE: Low dose axial images were obtained through the head.  Coronal and  sagittal reformations were also performed. Contrast was not administered. COMPARISON: CT examination of the brain without contrast March 30, 2022 FINDINGS: The ventricular system, sulcal pattern and parenchymal attenuation characteristics are consistent with chronic change appearing similar to the prior examination without evidence for superimposed acute process.  There are areas consistent with remote lacunar-type ischemic change.  Chronic appearing white matter change noted. There is no evidence for intracranial mass, mass effect or midline shift and there is no evidence for acute intracranial hemorrhage.  Appropriate CSF spaces are seen at the skull base. The visualized orbits appear intact.  The mastoid air cells and middle ear cavity bilaterally appear appropriate.  Mild opacity along the external auditory canal likely represents cerumen.  Minimal paranasal sinus mucosal thickening noted.  The osseous structures appear intact.     Chronic changes are noted, there is no evidence for superimposed acute intracranial process. Electronically signed by: Abimael Eid Date:    05/13/2022 Time:    04:10    CT Chest Without Contrast    Result Date: 5/15/2022  EXAMINATION: CT CHEST WITHOUT CONTRAST CLINICAL HISTORY: Abnormal xray - lung nodule, >= 1 cm; TECHNIQUE: Low dose axial images, sagittal and coronal reformations were obtained from the thoracic inlet to the lung bases. Contrast was not administered. COMPARISON: Radiograph 05/13/2022 FINDINGS: Lungs: There is a right upper lobe partially cavitary mass in the posterior segment eroding through the major fissure into the superior segment of the right lower lobe (series 601, image 105).  The lesion measures 4.5 cm in transverse plane (series 2, image 54). There is an adjacent right upper lobe nodule measuring 0.9 cm (series 2, image 39), with the same imaging characteristics. Mild emphysematous lung changes. There is a large left upper lobe thin walled bulla measuring  11.4 cm (series 2, image 136), stable. Mediastinum: Thyroid gland is grossly unremarkable.  No mediastinal lymphadenopathy.  Limited assessment of the hilum without IV contrast.  Thoracic aorta is normal caliber.  Heart size within normal limits.  No pericardial or pleural effusion.  Mild scattered calcific atherosclerosis. Upper abdomen: Extensive pancreatic calcifications, probable sequela of chronic pancreatitis. Chest wall: Mild gynecomastia. Bones: No marrow replacement process.     4.5 cm right upper lobe cavitary mass with adjacent 9 mm satellite lesion, as above, concerning for infectious etiology/atypical pneumonia.  Malignancy could have a similar appearance. Large thin walled left upper lobe bulla, stable. Mild emphysematous lung changes. This report was flagged in Epic as abnormal. Electronically signed by: Juvencio Cardona MD Date:    05/15/2022 Time:    12:10    MRI Brain Without Contrast    Result Date: 5/16/2022  EXAMINATION: MRI BRAIN WITHOUT CONTRAST CLINICAL HISTORY: Mental status change, unknown cause; TECHNIQUE: Multiplanar multisequence MR imaging of the brain was performed without contrast. COMPARISON: CT head 05/13/2022 MRI 10/27/2021 FINDINGS: there is central volume loss, similar to prior studies.  There is no evidence of hydrocephalus, mass effect, intracranial hemorrhage or acute infarct. Focal lesions within the periventricular white matter bilaterally are stable from the prior study consistent with prior nonspecific insult.  No new white matter lesions are noted. Normal arterial flow voids are preserved at the skull base. The visualized sinuses and mastoid air cells are clear.     Stable appearance of the brain as detailed above...  No acute intracranial process. Electronically signed by: Ibrahima Soria Date:    05/16/2022 Time:    11:55    X-Ray Chest AP Portable    Result Date: 5/13/2022  EXAMINATION: XR CHEST AP PORTABLE CLINICAL HISTORY: Sepsis; TECHNIQUE: Single frontal view of the  chest was performed. COMPARISON: Chest radiograph 03/28/2022, CT chest 10/27/2021 FINDINGS: Cardiac monitoring leads overlie the chest.  Cardiac silhouette is not significantly enlarged.  There is a stable large thin walled cyst projecting over the left upper lung zone.  There is increased fullness of the right hilar region, not well appreciated on prior radiograph examination.  Additionally, there is a possible new 1.0 cm nodular opacity projecting over the right upper lung zone.  The remaining lungs demonstrate no new confluent airspace consolidation.  No significant volume of pleural fluid or pneumothorax appreciated.  Osseous structures appear grossly intact.     1.  Increased fullness of the right hilar region, not well appreciated on prior radiograph examination as well as possible new 1.0 cm nodular opacity projecting over the right upper lung zone.  Given short-term interval development, this could relate to underlying infectious process, however neoplastic process not excluded.  Consider further assessment with dedicated chest CT. 2.  Stable large thin walled left upper lung zone cyst. Electronically signed by: Lenny Pereira MD Date:    05/13/2022 Time:    03:45    Microbiology Results (last 7 days)       Procedure Component Value Units Date/Time    AFB Culture & Smear [[id number]] Collected: 05/17/22 1616    Order Status: Completed Specimen: Respiratory from Sputum Updated: 05/18/22 2127     AFB Culture & Smear Culture in progress     AFB CULTURE STAIN No acid fast bacilli seen.    AFB Culture & Smear [[id number]]     Order Status: No result Specimen: Body Fluid from Sputum, Expectorated     Culture, Respiratory with Gram Stain [[id number]]  (Abnormal)  (Susceptibility) Collected: 05/15/22 1710    Order Status: Completed Specimen: Respiratory from Sputum Updated: 05/18/22 1014     Respiratory Culture No Pseudomonas isolated.      METHICILLIN RESISTANT STAPHYLOCOCCUS AUREUS  Few  Normal respiratory armida  also present       Gram Stain (Respiratory) <10 epithelial cells per low power field.     Gram Stain (Respiratory) Rare WBC's     Gram Stain (Respiratory) Rare Gram positive cocci     Gram Stain (Respiratory) Rare Gram negative rods    Blood culture x two cultures. Draw prior to antibiotics. [[id number]] Collected: 05/13/22 0255    Order Status: Completed Specimen: Blood from Peripheral, Antecubital, Right Updated: 05/18/22 1012     Blood Culture, Routine No growth after 5 days.    Narrative:      Aerobic and anaerobic    Blood culture x two cultures. Draw prior to antibiotics. [[id number]] Collected: 05/13/22 0311    Order Status: Completed Specimen: Blood from Peripheral, Forearm, Right Updated: 05/18/22 1012     Blood Culture, Routine No growth after 5 days.    Narrative:      Aerobic and anaerobic    AFB Culture & Smear [[id number]] Collected: 05/17/22 1616    Order Status: Sent Specimen: Respiratory from Sputum Updated: 05/17/22 1616

## 2022-05-24 NOTE — ASSESSMENT & PLAN NOTE
Pt afebrile but w/ leokocytosis and new findings on CXR   Initiated Broad spec abx in ED w/ Vanc/cef/flagyl after 1 day switched to augmentin/azithromycin for 2 days then cultures showing MRSA so put back on Vanc for 1 day 5/17 and transitioned to IV Clinda for 7 days on 5/19  Bld cultures drawn in ED w/ NGTD  CT chest w/ 4.5 cm right upper lobe cavitary mass with adjacent 9 mm satellite lesion which is concerning for infectious etiology/atypical pneumonia as well as a large thin walled left upper lobe bulla  Quant Gold and TB PCR neg AFB x1 w/o AFB 2x pending     Plan:  Pulm consulted  Rec cnt pneumonia abx but await 3x AFB smears and TB PCR  Cnt Clinda for 7 days end 5/25   Cnt to monitor for fevers and repeat cultures/imaging

## 2022-05-25 LAB
ALBUMIN SERPL BCP-MCNC: 3.2 G/DL (ref 3.5–5.2)
ALP SERPL-CCNC: 52 U/L (ref 55–135)
ALT SERPL W/O P-5'-P-CCNC: 12 U/L (ref 10–44)
ANION GAP SERPL CALC-SCNC: 11 MMOL/L (ref 8–16)
AST SERPL-CCNC: 14 U/L (ref 10–40)
BASOPHILS # BLD AUTO: 0.07 K/UL (ref 0–0.2)
BASOPHILS NFR BLD: 1 % (ref 0–1.9)
BILIRUB SERPL-MCNC: 0.2 MG/DL (ref 0.1–1)
BUN SERPL-MCNC: 12 MG/DL (ref 6–20)
CALCIUM SERPL-MCNC: 9.9 MG/DL (ref 8.7–10.5)
CHLORIDE SERPL-SCNC: 99 MMOL/L (ref 95–110)
CO2 SERPL-SCNC: 28 MMOL/L (ref 23–29)
CREAT SERPL-MCNC: 0.7 MG/DL (ref 0.5–1.4)
DIFFERENTIAL METHOD: ABNORMAL
EOSINOPHIL # BLD AUTO: 0.3 K/UL (ref 0–0.5)
EOSINOPHIL NFR BLD: 3.8 % (ref 0–8)
ERYTHROCYTE [DISTWIDTH] IN BLOOD BY AUTOMATED COUNT: 12.6 % (ref 11.5–14.5)
EST. GFR  (AFRICAN AMERICAN): >60 ML/MIN/1.73 M^2
EST. GFR  (NON AFRICAN AMERICAN): >60 ML/MIN/1.73 M^2
GLUCOSE SERPL-MCNC: 101 MG/DL (ref 70–110)
HCT VFR BLD AUTO: 34.5 % (ref 40–54)
HGB BLD-MCNC: 12.2 G/DL (ref 14–18)
IMM GRANULOCYTES # BLD AUTO: 0.03 K/UL (ref 0–0.04)
IMM GRANULOCYTES NFR BLD AUTO: 0.4 % (ref 0–0.5)
LYMPHOCYTES # BLD AUTO: 3.6 K/UL (ref 1–4.8)
LYMPHOCYTES NFR BLD: 48.3 % (ref 18–48)
MCH RBC QN AUTO: 32.1 PG (ref 27–31)
MCHC RBC AUTO-ENTMCNC: 35.4 G/DL (ref 32–36)
MCV RBC AUTO: 91 FL (ref 82–98)
MONOCYTES # BLD AUTO: 0.5 K/UL (ref 0.3–1)
MONOCYTES NFR BLD: 7.1 % (ref 4–15)
NEUTROPHILS # BLD AUTO: 2.9 K/UL (ref 1.8–7.7)
NEUTROPHILS NFR BLD: 39.4 % (ref 38–73)
NRBC BLD-RTO: 0 /100 WBC
PLATELET # BLD AUTO: 354 K/UL (ref 150–450)
PMV BLD AUTO: 9.3 FL (ref 9.2–12.9)
POCT GLUCOSE: 106 MG/DL (ref 70–110)
POCT GLUCOSE: 175 MG/DL (ref 70–110)
POCT GLUCOSE: 311 MG/DL (ref 70–110)
POTASSIUM SERPL-SCNC: 4.1 MMOL/L (ref 3.5–5.1)
PROT SERPL-MCNC: 7.2 G/DL (ref 6–8.4)
RBC # BLD AUTO: 3.8 M/UL (ref 4.6–6.2)
SODIUM SERPL-SCNC: 138 MMOL/L (ref 136–145)
WBC # BLD AUTO: 7.35 K/UL (ref 3.9–12.7)

## 2022-05-25 PROCEDURE — 85025 COMPLETE CBC W/AUTO DIFF WBC: CPT | Performed by: STUDENT IN AN ORGANIZED HEALTH CARE EDUCATION/TRAINING PROGRAM

## 2022-05-25 PROCEDURE — 25000003 PHARM REV CODE 250: Performed by: STUDENT IN AN ORGANIZED HEALTH CARE EDUCATION/TRAINING PROGRAM

## 2022-05-25 PROCEDURE — 80053 COMPREHEN METABOLIC PANEL: CPT | Performed by: STUDENT IN AN ORGANIZED HEALTH CARE EDUCATION/TRAINING PROGRAM

## 2022-05-25 PROCEDURE — 36415 COLL VENOUS BLD VENIPUNCTURE: CPT | Performed by: STUDENT IN AN ORGANIZED HEALTH CARE EDUCATION/TRAINING PROGRAM

## 2022-05-25 PROCEDURE — S4991 NICOTINE PATCH NONLEGEND: HCPCS | Performed by: STUDENT IN AN ORGANIZED HEALTH CARE EDUCATION/TRAINING PROGRAM

## 2022-05-25 PROCEDURE — C9399 UNCLASSIFIED DRUGS OR BIOLOG: HCPCS | Performed by: STUDENT IN AN ORGANIZED HEALTH CARE EDUCATION/TRAINING PROGRAM

## 2022-05-25 PROCEDURE — 11000001 HC ACUTE MED/SURG PRIVATE ROOM

## 2022-05-25 RX ORDER — BLOOD-GLUCOSE CONTROL, NORMAL
1 EACH MISCELLANEOUS 2 TIMES DAILY WITH MEALS
Qty: 100 EACH | Refills: 11 | Status: SHIPPED | OUTPATIENT
Start: 2022-05-25 | End: 2022-05-26 | Stop reason: SDUPTHER

## 2022-05-25 RX ORDER — PEN NEEDLE, DIABETIC 30 GX3/16"
NEEDLE, DISPOSABLE MISCELLANEOUS
Qty: 100 EACH | Refills: 3 | Status: SHIPPED | OUTPATIENT
Start: 2022-05-25 | End: 2022-05-26 | Stop reason: SDUPTHER

## 2022-05-25 RX ORDER — DEXTROSE 4 G
TABLET,CHEWABLE ORAL
Qty: 1 EACH | Refills: 0 | Status: SHIPPED | OUTPATIENT
Start: 2022-05-25 | End: 2022-05-26 | Stop reason: SDUPTHER

## 2022-05-25 RX ORDER — CLINDAMYCIN HYDROCHLORIDE 300 MG/1
600 CAPSULE ORAL ONCE
Qty: 2 CAPSULE | Refills: 0 | Status: SHIPPED | OUTPATIENT
Start: 2022-05-25 | End: 2022-05-26

## 2022-05-25 RX ORDER — CLINDAMYCIN HYDROCHLORIDE 150 MG/1
600 CAPSULE ORAL EVERY 6 HOURS
Status: DISCONTINUED | OUTPATIENT
Start: 2022-05-25 | End: 2022-05-26 | Stop reason: HOSPADM

## 2022-05-25 RX ORDER — DIVALPROEX SODIUM 500 MG/1
1000 TABLET, FILM COATED, EXTENDED RELEASE ORAL EVERY 12 HOURS
Qty: 120 TABLET | Refills: 11 | Status: SHIPPED | OUTPATIENT
Start: 2022-05-25 | End: 2022-05-26 | Stop reason: SDUPTHER

## 2022-05-25 RX ORDER — LEVETIRACETAM 750 MG/1
750 TABLET ORAL 2 TIMES DAILY
Qty: 60 TABLET | Refills: 2 | Status: SHIPPED | OUTPATIENT
Start: 2022-05-25 | End: 2022-05-25 | Stop reason: SDUPTHER

## 2022-05-25 RX ORDER — LEVETIRACETAM 750 MG/1
750 TABLET ORAL 2 TIMES DAILY
Qty: 60 TABLET | Refills: 2 | Status: SHIPPED | OUTPATIENT
Start: 2022-05-25 | End: 2022-05-26 | Stop reason: SDUPTHER

## 2022-05-25 RX ORDER — LOSARTAN POTASSIUM 25 MG/1
25 TABLET ORAL DAILY
Qty: 90 TABLET | Refills: 3 | Status: SHIPPED | OUTPATIENT
Start: 2022-05-26 | End: 2022-05-26 | Stop reason: SDUPTHER

## 2022-05-25 RX ADMIN — CLINDAMYCIN HYDROCHLORIDE 600 MG: 150 CAPSULE ORAL at 10:05

## 2022-05-25 RX ADMIN — NICOTINE 1 PATCH: 14 PATCH, EXTENDED RELEASE TRANSDERMAL at 09:05

## 2022-05-25 RX ADMIN — DIVALPROEX SODIUM 1000 MG: 250 TABLET, EXTENDED RELEASE ORAL at 10:05

## 2022-05-25 RX ADMIN — INSULIN ASPART 4 UNITS: 100 INJECTION, SOLUTION INTRAVENOUS; SUBCUTANEOUS at 10:05

## 2022-05-25 RX ADMIN — LEVETIRACETAM 750 MG: 250 TABLET, FILM COATED ORAL at 09:05

## 2022-05-25 RX ADMIN — INSULIN DETEMIR 25 UNITS: 100 INJECTION, SOLUTION SUBCUTANEOUS at 10:05

## 2022-05-25 RX ADMIN — CLINDAMYCIN IN 5 PERCENT DEXTROSE 600 MG: 12 INJECTION, SOLUTION INTRAVENOUS at 03:05

## 2022-05-25 RX ADMIN — THERA TABS 1 TABLET: TAB at 09:05

## 2022-05-25 RX ADMIN — LEVETIRACETAM 750 MG: 250 TABLET, FILM COATED ORAL at 10:05

## 2022-05-25 RX ADMIN — DIVALPROEX SODIUM 1000 MG: 250 TABLET, EXTENDED RELEASE ORAL at 09:05

## 2022-05-25 RX ADMIN — THIAMINE HCL TAB 100 MG 100 MG: 100 TAB at 09:05

## 2022-05-25 RX ADMIN — LOSARTAN POTASSIUM 25 MG: 25 TABLET, FILM COATED ORAL at 09:05

## 2022-05-25 RX ADMIN — CLINDAMYCIN IN 5 PERCENT DEXTROSE 600 MG: 12 INJECTION, SOLUTION INTRAVENOUS at 11:05

## 2022-05-25 NOTE — NURSING
Report received for Fidel SALINAS , care assumed . Pt lying in bed awake with patent IV , respiration unlabored on room air , no signs or symptoms of distress the patient board has been updated with RN information and plan for today . Pt has no questions or concerns at this time . Fall/ safety precautions in place.

## 2022-05-25 NOTE — PROGRESS NOTES
"[facility]                    Pharmacy       Discharge Medication Education    Patient ACCEPTED medication education. Pharmacy has provided education on the name, indication, and possible side effects of the medication(s) prescribed, using teach-back method.     The following medications have also been discussed, during this admission.        Medication List        START taking these medications      clindamycin 300 MG capsule  Commonly known as: CLEOCIN  Take 2 capsules (600 mg total) by mouth once. for 1 dose     losartan 25 MG tablet  Commonly known as: COZAAR  Take 1 tablet (25 mg total) by mouth once daily.  Start taking on: May 26, 2022            CONTINUE taking these medications      BD ULTRA-FINE RAMSEY PEN NEEDLE 32 gauge x 5/32" Ndle  Generic drug: pen needle, diabetic  Injecting twice daily     blood-glucose meter kit  Use as instructed     divalproex  MG Tb24  Commonly known as: DEPAKOTE  Take 2 tablets (1,000 mg total) by mouth every 12 (twelve) hours.     insulin NPH/Reg human 100 unit/mL (70-30) Inpn pen  Commonly known as: HumuLIN (70/30)  Inject 25 units into the skin with breakfast and 15 units with dinner or evening meal. Please see your primary care physician for further adjustments as needed.     levETIRAcetam 750 MG Tab  Commonly known as: KEPPRA  Take 1 tablet (750 mg total) by mouth 2 (two) times daily.     ONETOUCH DELICA PLUS LANCET 30 gauge Misc  Generic drug: lancets  1 lancet by Misc.(Non-Drug; Combo Route) route 2 (two) times daily with meals.     ONETOUCH VERIO TEST STRIPS Strp  Generic drug: blood sugar diagnostic  Use to test blood glucose 2 (two) times daily with meals.               Where to Get Your Medications        These medications were sent to [facility]  [address], [place] [address]      Hours: Mon-Fri, 8a-5:30p Phone: [phone]   clindamycin 300 MG capsule  losartan 25 MG tablet          Thank you  Arnaldo Booth, " PharmD  [phone]
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
## 2022-05-25 NOTE — PLAN OF CARE
SW met with pt at bedside to complete final assessment. Pt will [facility]  [facility] [address] [facility] [facility] [place] [place] [facility] [address] [phone]. ABDIRAHMAN called shelter at [phone]. SW printed pt's negative TB and Covid test and place it in pt's packet to be admitted. ABDIRAHMAN also printed pt's facesheet to be used as an additional form of ID. SW has pt's f/u appts listed on avs. Rounds completed on pt.  All questions addressed.  Bedside nurse to discuss d/c medications.  Discussed importance to attend all f/u appts and take medications as prescribed.  Verbalized understanding.    10:38 ABDIRAHMAN set up WC van transportation with an ETA of 12pm.     ROB Harper  [phone]    Future Appointments   Date Time Provider Department Center   5/27/2022 10:00 [facility]   6/22/2022  8:20 AM Merle Oliva MD [facility]        05/25/22 0961   Final Note   Assessment Type Final Discharge Note   Anticipated Discharge Disposition Home   What phone number can be called within the next 1-3 days to see how you are doing after discharge? [phone]   Hospital Resources/Appts/Education Provided Appointments scheduled and added to AVS   Post-Acute Status   Post-Acute Authorization Placement;Other  (Shelter)   Coverage MEDICAID   Discharge Delays None known at this time

## 2022-05-25 NOTE — PROGRESS NOTES
"[facility] - Telemetry  Adult Nutrition  Progress Note    SUMMARY       Recommendations    Recommendation:   1. Encourage intake at meals as tolerated.   2. Monitor weight/labs.   3. RD to continue to follow to monitor po intake    Goals:  Pt will tolerate diet with at least 50-75% intake at meals by RD follow up  Nutrition Goal Status: progressing towards goal  Communication of RD Recs: reviewed with RN    Assessment and Plan  No nutrition dx at this time     Malnutrition Assessment  Unable to assess NFPE 2/2 pt on isolation      Reason for Assessment  Reason For Assessment: RD follow-up  Diagnosis:  (hyperglycemia)  Relevant Medical History: DM, stroke, seizures  General Information Comments: Pt on 2000 ADA with 100% intake at recent meals. Pt on isolation for possible TB. Unable to assess NFPE 2/2 isolation. [place] 18-skin intact. No recent weight loss noted.  Nutrition Discharge Planning: pt to d/c on ADA diet    Nutrition Risk Screen  Nutrition Risk Screen: no indicators present    Nutrition/Diet History  Food Preferences: no Amish or cultural food prefs identified  Factors Affecting Nutritional Intake: None identified at this time    Anthropometrics  Temp: 97.6 °F (36.4 °C)  Height Method: Stated  Height: 5' 7" (170.2 cm)  Height (inches): 67 in  Weight Method: Bed Scale  Weight: 72.6 kg (160 lb 0.9 oz)  Weight (lb): 160.06 lb  Ideal Body Weight (IBW), Male: 148 lb  % Ideal Body Weight, Male (lb): 108.15 %  BMI (Calculated): 25.1  BMI Grade: 25 - 29.9 - overweight    Lab/Procedures/Meds  Pertinent Labs Reviewed: reviewed  Pertinent Labs Comments: Alb 3.2L  Pertinent Medications Reviewed: reviewed  Pertinent Medications Comments: insulin, MVI, thiamine    Estimated/Assessed Needs  Weight Used For Calorie Calculations: 72.6 kg (160 lb 0.9 oz)  Energy Calorie Requirements (kcal): 2178 (30 kcal/kg)  Energy Need Method: Kcal/kg  Protein Requirements: 72g (1.0g/kg)  Weight Used For Protein Calculations: 72.6 kg (160 " lb 0.9 oz)  Estimated Fluid Requirement Method: RDA Method  RDA Method (mL): 2178     Nutrition Prescription Ordered  Current Diet Order: 2000 ADA    Evaluation of Received Nutrient/Fluid Intake  I/O: 500/4  Energy Calories Required: meeting needs  Protein Required: meeting needs  Fluid Required: meeting needs  Comments: LBM 5/22  % Intake of Estimated Energy Needs: 75 - 100 %  % Meal Intake: 75 - 100 %    Nutrition Risk  Level of Risk/Frequency of Follow-up:  (2xweekly)     Monitor and Evaluation  Food and Nutrient Intake: food and beverage intake  Food and Nutrient Adminstration: diet order  Physical Activity and Function: nutrition-related ADLs and IADLs  Anthropometric Measurements: weight  Biochemical Data, Medical Tests and Procedures: electrolyte and renal panel  Nutrition-Focused Physical Findings: overall appearance     Nutrition Follow-Up  RD Follow-up?: Yes

## 2022-05-25 NOTE — DISCHARGE SUMMARY
[facility] Medicine  Discharge Summary      Patient Name: Terrance Chavez  MRN: [id number]  Patient Class: IP- Inpatient  Admission Date: 5/13/2022  Hospital Length of Stay: 10 days  Discharge Date and Time:  05/25/2022 11:13 AM  Attending Physician: Terrance Avelar MD   Discharging Provider: Corby Grande MD  Primary Care Provider: Primary Doctor No      HPI:   Patient is a 42 yr old male with pmhx of T2DM, HTN, CVA, neurocognitive impairment and substance abuse disorder presenting to the ED for Altered mental status. [place], brought in by EMS. On arrival patient was only oriented to person but not place,time or situation. Patient blood glucose was elevated on arrival. Initial labs showed small acidosis. Patient was provided 2L LR in ED and his mentation improved. Patient had a fever in the ED , and elevated WBC  of 29.9. Can not rule out infection exacerbating or causing change in mental status. Patient received 2g ceftriaxone in ED. Patient denies chest pain, shortness of breath, headache, nausea and vomiting. Admitted to [facility]  for observation and correction of glucose.    Physical Exam  Vitals and nursing note reviewed.   Constitutional:       General: He is not in acute distress.     Appearance: Normal appearance.   HENT:      Head: Normocephalic and atraumatic.      Right Ear: External ear normal.      Left Ear: External ear normal.      Nose: Nose normal. No congestion.      Mouth/Throat:      Mouth: Mucous membranes are moist.      Pharynx: Oropharynx is clear.   Eyes:      Conjunctiva/sclera: Conjunctivae normal.      Pupils: Pupils are equal, round, and reactive to light.   Cardiovascular:      Rate and Rhythm: Normal rate and regular rhythm.      Pulses: Normal pulses.      Heart sounds: Normal heart sounds. No murmur heard.    No gallop.   Pulmonary:      Effort: Pulmonary effort is normal.      Breath sounds: Normal  breath sounds. No wheezing, rhonchi or rales.   Abdominal:      General: Bowel sounds are normal.      Palpations: Abdomen is soft.      Tenderness: There is no abdominal tenderness.   Musculoskeletal:         General: No swelling. Normal range of motion.      Cervical back: Normal range of motion.      Right lower leg: No edema.      Left lower leg: No edema.   Lymphadenopathy:      Cervical: No cervical adenopathy.   Skin:     General: Skin is warm and dry.      Capillary Refill: Capillary refill takes less than 2 seconds.   Neurological:      Mental Status: He is alert and oriented to person, place, and time. Mental status is at baseline.      Cranial Nerves: No cranial nerve deficit.       * No surgery found *      Hospital Course:   Pt was admitted to the  service for DKA and AMS. Pt was initially found to have elevated BG as well as Beta hydroxybutyrate and AG. He was started on an insulin drip as well as giving his long acting insulin. His DKA quickly resolved and then was continued to work up for his AMS and new lung findings. On admission he had a CXR which showed a new R Upper Lung nodule as well as hilar haziness and a known L upper lung cyst. A CT was recommned for f/u which showed a 4.5 cm R Upper Lobe and satellite 9mm lesion. Pulm was consulted and pt was worked up for potential pneumonia vs TB he was started on a 5 day course of augmentin and azithromycin. Quant gold, TB PCR, as well as AFBs were collected which were neg. His resp cultures grew MRSA and he was transitioned to a course of Clindamycin for 7 days ending 5/25. It was determined he was unreliable to completethis course at d/c so for his safety he would complete this course IV while inpt and have his final dose PO while at the shelter found for him by [facility]. On admission he had a CT head which did not show any acute intracranial abnormalities as well as EtOH and UDS which were neg. It was unclear as to what his BL mentation is as previous  notes had documented normal mentation and AO x4. His AMS was worked up with HIV/RPR/B12/Folate which were wnl and B1/6 pending. Neurology was consulted to determine if there was any other potential causes of AMS and rec an MRI and EEG. His MRI again did not show any acute abnormalities and EEG which showed generalized cerebral dysfxn but no epileptiform activity. The pt's mentation slowly improved throughout his admission and was AO x3 at time of d/c. Pt was educated on his chronic conditions and SW gave him multiple resources for his outpt care. He was given strict return precautions and all questions were answered.       Goals of Care Treatment Preferences:  Code Status: Full Code      Consults:   Consults (From admission, onward)        Status Ordering Provider     Inpatient consult to Pulmonology  Once        Provider:  (Not yet assigned)    Completed VIRGILIO PASCUAL     Inpatient consult to Neurology  Once        Provider:  (Not yet assigned)    Completed VIRGILIO PASCUAL     Inpatient consult to Pulmonology  Once        Provider:  (Not yet assigned)    Completed ANDRE MENDEZ          No new Assessment & Plan notes have been filed under this hospital service since the last note was generated.  Service: Hospital Medicine    Final Active Diagnoses:    Diagnosis Date Noted POA    Homelessness [Z59.00] 05/21/2022 Not Applicable    Cavitary pneumonia [J18.9, J98.4]  Yes    Pneumonia [J18.9] 05/14/2022 Unknown    AMS (altered mental status) [R41.82]  Unknown    Epilepsy [G40.909] 02/18/2021 Yes    Type 2 diabetes mellitus, with long-term current use of insulin [E11.9, Z79.4] 02/12/2021 Not Applicable     Chronic    Hypertension [I10] 02/12/2021 Yes     Chronic    ETOH abuse [F10.10] 02/12/2021 Unknown      Problems Resolved During this Admission:    Diagnosis Date Noted Date Resolved POA    PRINCIPAL PROBLEM:  Hyperglycemia [R73.9] 05/13/2022 05/14/2022 Yes       Discharged Condition: stable    Disposition: Home  or Self Care    Follow Up:   Follow-up Information     Marilyn Hawkins DPM Follow up on 6/2/2022.    Specialties: Podiatry, Wound Care  Why: [facility] 6/2/22 @9am  Contact information:  [address]  [address]  [place] [address]  [phone]                       Patient Instructions:      Diet diabetic     Notify your health care provider if you experience any of the following:  temperature >100.4     Notify your health care provider if you experience any of the following:  persistent nausea and vomiting or diarrhea     Notify your health care provider if you experience any of the following:  severe uncontrolled pain     Notify your health care provider if you experience any of the following:  redness, tenderness, or signs of infection (pain, swelling, redness, odor or green/yellow discharge around incision site)     Notify your health care provider if you experience any of the following:  difficulty breathing or increased cough     Notify your health care provider if you experience any of the following:  severe persistent headache     Notify your health care provider if you experience any of the following:  worsening rash     Notify your health care provider if you experience any of the following:  persistent dizziness, light-headedness, or visual disturbances     Notify your health care provider if you experience any of the following:  increased confusion or weakness     Activity as tolerated       Significant Diagnostic Studies:   Recent Labs   Lab 05/23/22  1305 05/24/22  0450 05/25/22  0443   WBC 7.04 8.77 7.35   HGB 12.4* 12.0* 12.2*   HCT 36.6* 34.4* 34.5*   MCV 94 91 91   RBC 3.91* 3.78* 3.80*   MCH 31.7* 31.7* 32.1*   MCHC 33.9 34.9 35.4   RDW 12.6 12.5 12.6    382 354   MPV 9.3 9.0* 9.3   GRAN 44.4  3.1 52.2  4.6 39.4  2.9   LYMPH 42.5  3.0 33.8  3.0 48.3*  3.6   MONO 8.4  0.6 9.6  0.8 7.1  0.5   EOSINOPHIL 3.4 3.0 3.8   BASOPHIL 0.7 0.8 1.0     Recent Labs   Lab 05/21/22  0254  05/22/22  0429 05/23/22  0510 05/23/22  1305 05/24/22  0450 05/25/22  0443    137  --  136 137 138   K 4.7 4.7  --  5.0 4.7 4.1   CL 97 95  --  96 98 99   CO2 29 30*  --  30* 28 28   ANIONGAP 11 12  --  10 11 11   BUN 12 13  --  13 18 12   CREATININE 0.7 0.8  --  0.8 0.9 0.7   * 247*  --  176* 135* 101   CALCIUM 9.9 10.0  --  9.7 9.7 9.9   PROT 6.8 6.6  --  7.0 7.1 7.2   ALBUMIN 3.3* 3.2*  --  3.4* 3.4* 3.2*   ALKPHOS 62 68  --  67 66 52*   BILITOT 0.2 0.1  --  0.2 0.1 0.2   ALT 9* 11  --  11 11 12   AST 14 11  --  14 14 14   ESTGFRAFRICA >60 >60  --  >60 >60 >60   EGFRNONAA >60 >60  --  >60 >60 >60   MG 1.8 1.9 1.7  --   --   --    PHOS 4.3 4.0 4.6*  --   --   --      No results for input(s): COLORU, APPEARANCEUA, PHUR, SPECGRAV, PROTEINUA, GLUCUA, KETONESU, BILIRUBINUA, OCCULTUA, UROBILINOGEN, NITRITE, LEUKOCYTESUR, RBCUA, WBCUA, BACTERIA, SQUAMEPITHEL, HYALINECASTS, GRANULARCAST, MICROCMT in the last 168 hours.    Invalid input(s): SPECIMENU, AMORPHOUSU  No results for input(s): TROPONINI, CPK, CPKMB in the last 168 hours.  No results for input(s): PT, INR, APTT in the last 168 hours.  No results for input(s): TSH, T7MMMGR, V3ZNJRC, THYROIDAB, FREET4 in the last 168 hours.  CT Head Without Contrast    Result Date: 5/13/2022  EXAMINATION: CT HEAD WITHOUT CONTRAST CLINICAL HISTORY: Mental status change, unknown cause; TECHNIQUE: Low dose axial images were obtained through the head.  Coronal and sagittal reformations were also performed. Contrast was not administered. COMPARISON: CT examination of the brain without contrast March 30, 2022 FINDINGS: The ventricular system, sulcal pattern and parenchymal attenuation characteristics are consistent with chronic change appearing similar to the prior examination without evidence for superimposed acute process.  There are areas consistent with remote lacunar-type ischemic change.  Chronic appearing white matter change noted. There is no evidence for  intracranial mass, mass effect or midline shift and there is no evidence for acute intracranial hemorrhage.  Appropriate CSF spaces are seen at the skull base. The visualized orbits appear intact.  The mastoid air cells and middle ear cavity bilaterally appear appropriate.  Mild opacity along the external auditory canal likely represents cerumen.  Minimal paranasal sinus mucosal thickening noted.  The osseous structures appear intact.     Chronic changes are noted, there is no evidence for superimposed acute intracranial process. Electronically signed by: Abimael Eid Date:    05/13/2022 Time:    04:10    CT Chest Without Contrast    Result Date: 5/15/2022  EXAMINATION: CT CHEST WITHOUT CONTRAST CLINICAL HISTORY: Abnormal xray - lung nodule, >= 1 cm; TECHNIQUE: Low dose axial images, sagittal and coronal reformations were obtained from the thoracic inlet to the lung bases. Contrast was not administered. COMPARISON: Radiograph 05/13/2022 FINDINGS: Lungs: There is a right upper lobe partially cavitary mass in the posterior segment eroding through the major fissure into the superior segment of the right lower lobe (series 601, image 105).  The lesion measures 4.5 cm in transverse plane (series 2, image 54). There is an adjacent right upper lobe nodule measuring 0.9 cm (series 2, image 39), with the same imaging characteristics. Mild emphysematous lung changes. There is a large left upper lobe thin walled bulla measuring 11.4 cm (series 2, image 136), stable. Mediastinum: Thyroid gland is grossly unremarkable.  No mediastinal lymphadenopathy.  Limited assessment of the hilum without IV contrast.  Thoracic aorta is normal caliber.  Heart size within normal limits.  No pericardial or pleural effusion.  Mild scattered calcific atherosclerosis. Upper abdomen: Extensive pancreatic calcifications, probable sequela of chronic pancreatitis. Chest wall: Mild gynecomastia. Bones: No marrow replacement process.     4.5 cm right  upper lobe cavitary mass with adjacent 9 mm satellite lesion, as above, concerning for infectious etiology/atypical pneumonia.  Malignancy could have a similar appearance. Large thin walled left upper lobe bulla, stable. Mild emphysematous lung changes. This report was flagged in Epic as abnormal. Electronically signed by: Juvencio Cardona MD Date:    05/15/2022 Time:    12:10    MRI Brain Without Contrast    Result Date: 5/16/2022  EXAMINATION: MRI BRAIN WITHOUT CONTRAST CLINICAL HISTORY: Mental status change, unknown cause; TECHNIQUE: Multiplanar multisequence MR imaging of the brain was performed without contrast. COMPARISON: CT head 05/13/2022 MRI 10/27/2021 FINDINGS: there is central volume loss, similar to prior studies.  There is no evidence of hydrocephalus, mass effect, intracranial hemorrhage or acute infarct. Focal lesions within the periventricular white matter bilaterally are stable from the prior study consistent with prior nonspecific insult.  No new white matter lesions are noted. Normal arterial flow voids are preserved at the skull base. The visualized sinuses and mastoid air cells are clear.     Stable appearance of the brain as detailed above...  No acute intracranial process. Electronically signed by: Ibrahima Soria Date:    05/16/2022 Time:    11:55    X-Ray Chest AP Portable    Result Date: 5/13/2022  EXAMINATION: XR CHEST AP PORTABLE CLINICAL HISTORY: Sepsis; TECHNIQUE: Single frontal view of the chest was performed. COMPARISON: Chest radiograph 03/28/2022, CT chest 10/27/2021 FINDINGS: Cardiac monitoring leads overlie the chest.  Cardiac silhouette is not significantly enlarged.  There is a stable large thin walled cyst projecting over the left upper lung zone.  There is increased fullness of the right hilar region, not well appreciated on prior radiograph examination.  Additionally, there is a possible new 1.0 cm nodular opacity projecting over the right upper lung zone.  The remaining lungs  "demonstrate no new confluent airspace consolidation.  No significant volume of pleural fluid or pneumothorax appreciated.  Osseous structures appear grossly intact.     1.  Increased fullness of the right hilar region, not well appreciated on prior radiograph examination as well as possible new 1.0 cm nodular opacity projecting over the right upper lung zone.  Given short-term interval development, this could relate to underlying infectious process, however neoplastic process not excluded.  Consider further assessment with dedicated chest CT. 2.  Stable large thin walled left upper lung zone cyst. Electronically signed by: Lenny Pereira MD Date:    05/13/2022 Time:    03:45    Microbiology Results (last 7 days)     Procedure Component Value Units Date/Time    AFB Culture & Smear [[id number]] Collected: 05/17/22 1616    Order Status: Completed Specimen: Respiratory from Sputum Updated: 05/18/22 2127     AFB Culture & Smear Culture in progress     AFB CULTURE STAIN No acid fast bacilli seen.    AFB Culture & Smear [[id number]]     Order Status: No result Specimen: Body Fluid from Sputum, Expectorated             Pending Diagnostic Studies:     None         Medications:  Reconciled Home Medications:      Medication List      START taking these medications    clindamycin 300 MG capsule  Commonly known as: CLEOCIN  Take 2 capsules (600 mg total) by mouth once. for 1 dose     losartan 25 MG tablet  Commonly known as: COZAAR  Take 1 tablet (25 mg total) by mouth once daily.  Start taking on: May 26, 2022        CONTINUE taking these medications    BD ULTRA-FINE RAMSEY PEN NEEDLE 32 gauge x 5/32" Ndle  Generic drug: pen needle, diabetic  Injecting twice daily     blood-glucose meter kit  Use as instructed     divalproex  MG Tb24  Commonly known as: DEPAKOTE  Take 2 tablets (1,000 mg total) by mouth every 12 (twelve) hours.     insulin NPH/Reg human 100 unit/mL (70-30) Inpn pen  Commonly known as: HumuLIN (70/30)  Inject " 25 units into the skin with breakfast and 15 units with dinner or evening meal. Please see your primary care physician for further adjustments as needed.     levETIRAcetam 750 MG Tab  Commonly known as: KEPPRA  Take 1 tablet (750 mg total) by mouth 2 (two) times daily.     ONETOUCH DELICA PLUS LANCET 30 gauge Misc  Generic drug: lancets  1 lancet by Misc.(Non-Drug; Combo Route) route 2 (two) times daily with meals.     ONETOUCH VERIO TEST STRIPS Strp  Generic drug: blood sugar diagnostic  Use to test blood glucose 2 (two) times daily with meals.            Indwelling Lines/Drains at time of discharge:   Lines/Drains/Airways     None                 Time spent on the discharge of patient: 35 minutes         Corby Grande MD  Department of Hospital Medicine  [facility]

## 2022-05-25 NOTE — PLAN OF CARE
Recommendation:   1. Encourage intake at meals as tolerated.   2. Monitor weight/labs.   3. RD to continue to follow to monitor po intake    Goals:  Pt will tolerate diet with at least 50-75% intake at meals by RD follow up  Nutrition Goal Status: progressing towards goal
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
## 2022-05-25 NOTE — PLAN OF CARE
Problem: Adult Inpatient Plan of Care  Goal: Plan of Care Review  Outcome: Ongoing, Progressing   Patient is alert, oriented X3. Care plan explained to patient, he verbalized understanding.     On room air, O2 sat maintain 100%, no respiratory distress noted. No cardiac monitor ordered.     Deny nausea/vomiting/diarrhea. No pain complaint. Due medications given.     Maintain airborne/droplet/contact precaution, maintain fall risk precaution, bed in lowest position, bed alarm on. Call light/personal items in reach. Instructed patient call for help as needed. Will continue to monitor.

## 2022-05-26 ENCOUNTER — TELEPHONE (OUTPATIENT)
Dept: NEUROLOGY | Facility: CLINIC | Age: 42
End: 2022-05-26

## 2022-05-26 ENCOUNTER — HOSPITAL ENCOUNTER (EMERGENCY)
Facility: OTHER | Age: 42
Discharge: HOME | End: 2022-05-26
Attending: EMERGENCY MEDICINE
Payer: MEDICAID

## 2022-05-26 VITALS
BODY MASS INDEX: 25.12 KG/M2 | SYSTOLIC BLOOD PRESSURE: 107 MMHG | HEIGHT: 67 IN | WEIGHT: 160.06 LBS | RESPIRATION RATE: 18 BRPM | DIASTOLIC BLOOD PRESSURE: 67 MMHG | OXYGEN SATURATION: 100 % | TEMPERATURE: 98 F | HEART RATE: 93 BPM

## 2022-05-26 VITALS
RESPIRATION RATE: 15 BRPM | HEART RATE: 78 BPM | OXYGEN SATURATION: 100 % | SYSTOLIC BLOOD PRESSURE: 114 MMHG | DIASTOLIC BLOOD PRESSURE: 66 MMHG | TEMPERATURE: 98 F

## 2022-05-26 DIAGNOSIS — R56.9 SEIZURE: ICD-10-CM

## 2022-05-26 DIAGNOSIS — R55 SYNCOPE, UNSPECIFIED SYNCOPE TYPE: Primary | ICD-10-CM
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
## 2022-05-26 DIAGNOSIS — R41.82 ALTERED MENTAL STATUS: ICD-10-CM

## 2022-05-26 DIAGNOSIS — S42.031A CLOSED DISPLACED FRACTURE OF ACROMIAL END OF RIGHT CLAVICLE, INITIAL ENCOUNTER: ICD-10-CM

## 2022-05-26 LAB
ALBUMIN SERPL BCP-MCNC: 3.3 G/DL (ref 3.5–5.2)
ALBUMIN SERPL BCP-MCNC: 3.9 G/DL (ref 3.5–5.2)
ALP SERPL-CCNC: 53 U/L (ref 55–135)
ALP SERPL-CCNC: 59 U/L (ref 55–135)
ALT SERPL W/O P-5'-P-CCNC: 14 U/L (ref 10–44)
ALT SERPL W/O P-5'-P-CCNC: 8 U/L (ref 10–44)
AMPHET+METHAMPHET UR QL: NEGATIVE
ANION GAP SERPL CALC-SCNC: 11 MMOL/L (ref 8–16)
ANION GAP SERPL CALC-SCNC: 16 MMOL/L (ref 8–16)
AST SERPL-CCNC: 11 U/L (ref 10–40)
AST SERPL-CCNC: 24 U/L (ref 10–40)
BARBITURATES UR QL SCN>200 NG/ML: NEGATIVE
BASOPHILS # BLD AUTO: 0.05 K/UL (ref 0–0.2)
BASOPHILS # BLD AUTO: 0.05 K/UL (ref 0–0.2)
BASOPHILS NFR BLD: 0.3 % (ref 0–1.9)
BASOPHILS NFR BLD: 0.7 % (ref 0–1.9)
BENZODIAZ UR QL SCN>200 NG/ML: NEGATIVE
BILIRUB SERPL-MCNC: 0.2 MG/DL (ref 0.1–1)
BILIRUB SERPL-MCNC: 0.3 MG/DL (ref 0.1–1)
BILIRUB UR QL STRIP: NEGATIVE
BNP SERPL-MCNC: <10 PG/ML (ref 0–99)
BUN SERPL-MCNC: 16 MG/DL (ref 6–20)
BUN SERPL-MCNC: 19 MG/DL (ref 6–20)
BZE UR QL SCN: NEGATIVE
CALCIUM SERPL-MCNC: 10 MG/DL (ref 8.7–10.5)
CALCIUM SERPL-MCNC: 10.2 MG/DL (ref 8.7–10.5)
CANNABINOIDS UR QL SCN: NEGATIVE
CHLORIDE SERPL-SCNC: 101 MMOL/L (ref 95–110)
CHLORIDE SERPL-SCNC: 99 MMOL/L (ref 95–110)
CLARITY UR: CLEAR
CO2 SERPL-SCNC: 22 MMOL/L (ref 23–29)
CO2 SERPL-SCNC: 27 MMOL/L (ref 23–29)
COLOR UR: YELLOW
CREAT SERPL-MCNC: 0.8 MG/DL (ref 0.5–1.4)
CREAT SERPL-MCNC: 1.1 MG/DL (ref 0.5–1.4)
CREAT UR-MCNC: 164.5 MG/DL (ref 23–375)
DIFFERENTIAL METHOD: ABNORMAL
DIFFERENTIAL METHOD: ABNORMAL
EOSINOPHIL # BLD AUTO: 0.1 K/UL (ref 0–0.5)
EOSINOPHIL # BLD AUTO: 0.3 K/UL (ref 0–0.5)
EOSINOPHIL NFR BLD: 0.8 % (ref 0–8)
EOSINOPHIL NFR BLD: 3.9 % (ref 0–8)
ERYTHROCYTE [DISTWIDTH] IN BLOOD BY AUTOMATED COUNT: 12.3 % (ref 11.5–14.5)
ERYTHROCYTE [DISTWIDTH] IN BLOOD BY AUTOMATED COUNT: 12.6 % (ref 11.5–14.5)
EST. GFR  (AFRICAN AMERICAN): >60 ML/MIN/1.73 M^2
EST. GFR  (AFRICAN AMERICAN): >60 ML/MIN/1.73 M^2
EST. GFR  (NON AFRICAN AMERICAN): >60 ML/MIN/1.73 M^2
EST. GFR  (NON AFRICAN AMERICAN): >60 ML/MIN/1.73 M^2
ETHANOL SERPL-MCNC: <10 MG/DL
GLUCOSE SERPL-MCNC: 111 MG/DL (ref 70–110)
GLUCOSE SERPL-MCNC: 225 MG/DL (ref 70–110)
GLUCOSE UR QL STRIP: NEGATIVE
HCT VFR BLD AUTO: 36.3 % (ref 40–54)
HCT VFR BLD AUTO: 40.7 % (ref 40–54)
HCV AB SERPL QL IA: NEGATIVE
HGB BLD-MCNC: 12.5 G/DL (ref 14–18)
HGB BLD-MCNC: 13.9 G/DL (ref 14–18)
HGB UR QL STRIP: NEGATIVE
HIV 1+2 AB+HIV1 P24 AG SERPL QL IA: NEGATIVE
IMM GRANULOCYTES # BLD AUTO: 0.02 K/UL (ref 0–0.04)
IMM GRANULOCYTES # BLD AUTO: 0.16 K/UL (ref 0–0.04)
IMM GRANULOCYTES NFR BLD AUTO: 0.3 % (ref 0–0.5)
IMM GRANULOCYTES NFR BLD AUTO: 0.9 % (ref 0–0.5)
KETONES UR QL STRIP: ABNORMAL
LEUKOCYTE ESTERASE UR QL STRIP: NEGATIVE
LYMPHOCYTES # BLD AUTO: 2.2 K/UL (ref 1–4.8)
LYMPHOCYTES # BLD AUTO: 3.3 K/UL (ref 1–4.8)
LYMPHOCYTES NFR BLD: 13 % (ref 18–48)
LYMPHOCYTES NFR BLD: 48.4 % (ref 18–48)
MAGNESIUM SERPL-MCNC: 1.7 MG/DL (ref 1.6–2.6)
MCH RBC QN AUTO: 31.3 PG (ref 27–31)
MCH RBC QN AUTO: 32.1 PG (ref 27–31)
MCHC RBC AUTO-ENTMCNC: 34.2 G/DL (ref 32–36)
MCHC RBC AUTO-ENTMCNC: 34.4 G/DL (ref 32–36)
MCV RBC AUTO: 91 FL (ref 82–98)
MCV RBC AUTO: 94 FL (ref 82–98)
METHADONE UR QL SCN>300 NG/ML: NEGATIVE
MONOCYTES # BLD AUTO: 0.5 K/UL (ref 0.3–1)
MONOCYTES # BLD AUTO: 1.1 K/UL (ref 0.3–1)
MONOCYTES NFR BLD: 6.4 % (ref 4–15)
MONOCYTES NFR BLD: 7.2 % (ref 4–15)
NEUTROPHILS # BLD AUTO: 13.3 K/UL (ref 1.8–7.7)
NEUTROPHILS # BLD AUTO: 2.7 K/UL (ref 1.8–7.7)
NEUTROPHILS NFR BLD: 39.5 % (ref 38–73)
NEUTROPHILS NFR BLD: 78.6 % (ref 38–73)
NITRITE UR QL STRIP: NEGATIVE
NRBC BLD-RTO: 0 /100 WBC
NRBC BLD-RTO: 0 /100 WBC
OPIATES UR QL SCN: NEGATIVE
PCP UR QL SCN>25 NG/ML: NEGATIVE
PH UR STRIP: 6 [PH] (ref 5–8)
PLATELET # BLD AUTO: 322 K/UL (ref 150–450)
PLATELET # BLD AUTO: 332 K/UL (ref 150–450)
PMV BLD AUTO: 9.3 FL (ref 9.2–12.9)
PMV BLD AUTO: 9.6 FL (ref 9.2–12.9)
POCT GLUCOSE: 102 MG/DL (ref 70–110)
POCT GLUCOSE: 172 MG/DL (ref 70–110)
POCT GLUCOSE: 95 MG/DL (ref 70–110)
POTASSIUM SERPL-SCNC: 4.5 MMOL/L (ref 3.5–5.1)
POTASSIUM SERPL-SCNC: 5 MMOL/L (ref 3.5–5.1)
PROT SERPL-MCNC: 6.8 G/DL (ref 6–8.4)
PROT SERPL-MCNC: 8 G/DL (ref 6–8.4)
PROT UR QL STRIP: ABNORMAL
RBC # BLD AUTO: 4 M/UL (ref 4.6–6.2)
RBC # BLD AUTO: 4.33 M/UL (ref 4.6–6.2)
SODIUM SERPL-SCNC: 137 MMOL/L (ref 136–145)
SODIUM SERPL-SCNC: 139 MMOL/L (ref 136–145)
SP GR UR STRIP: 1.02 (ref 1–1.03)
TOXICOLOGY INFORMATION: NORMAL
TROPONIN I SERPL DL<=0.01 NG/ML-MCNC: <0.006 NG/ML (ref 0–0.03)
URN SPEC COLLECT METH UR: ABNORMAL
UROBILINOGEN UR STRIP-ACNC: NEGATIVE EU/DL
WBC # BLD AUTO: 16.92 K/UL (ref 3.9–12.7)
WBC # BLD AUTO: 6.9 K/UL (ref 3.9–12.7)

## 2022-05-26 PROCEDURE — 36415 COLL VENOUS BLD VENIPUNCTURE: CPT | Performed by: STUDENT IN AN ORGANIZED HEALTH CARE EDUCATION/TRAINING PROGRAM

## 2022-05-26 PROCEDURE — 83735 ASSAY OF MAGNESIUM: CPT | Performed by: PHYSICIAN ASSISTANT

## 2022-05-26 PROCEDURE — 25000003 PHARM REV CODE 250: Performed by: STUDENT IN AN ORGANIZED HEALTH CARE EDUCATION/TRAINING PROGRAM

## 2022-05-26 PROCEDURE — 93010 ELECTROCARDIOGRAM REPORT: CPT | Mod: ,,, | Performed by: INTERNAL MEDICINE

## 2022-05-26 PROCEDURE — 93005 ELECTROCARDIOGRAM TRACING: CPT

## 2022-05-26 PROCEDURE — S4991 NICOTINE PATCH NONLEGEND: HCPCS | Performed by: STUDENT IN AN ORGANIZED HEALTH CARE EDUCATION/TRAINING PROGRAM

## 2022-05-26 PROCEDURE — 82962 GLUCOSE BLOOD TEST: CPT

## 2022-05-26 PROCEDURE — 80053 COMPREHEN METABOLIC PANEL: CPT | Performed by: STUDENT IN AN ORGANIZED HEALTH CARE EDUCATION/TRAINING PROGRAM

## 2022-05-26 PROCEDURE — 85025 COMPLETE CBC W/AUTO DIFF WBC: CPT | Mod: 91 | Performed by: PHYSICIAN ASSISTANT

## 2022-05-26 PROCEDURE — 25000003 PHARM REV CODE 250: Performed by: PHYSICIAN ASSISTANT

## 2022-05-26 PROCEDURE — 93010 EKG 12-LEAD: ICD-10-PCS | Mod: ,,, | Performed by: INTERNAL MEDICINE

## 2022-05-26 PROCEDURE — 85025 COMPLETE CBC W/AUTO DIFF WBC: CPT | Performed by: STUDENT IN AN ORGANIZED HEALTH CARE EDUCATION/TRAINING PROGRAM

## 2022-05-26 PROCEDURE — 81003 URINALYSIS AUTO W/O SCOPE: CPT | Mod: 59 | Performed by: PHYSICIAN ASSISTANT

## 2022-05-26 PROCEDURE — 80053 COMPREHEN METABOLIC PANEL: CPT | Mod: 91 | Performed by: PHYSICIAN ASSISTANT

## 2022-05-26 PROCEDURE — 99285 EMERGENCY DEPT VISIT HI MDM: CPT | Mod: 25

## 2022-05-26 PROCEDURE — 82077 ASSAY SPEC XCP UR&BREATH IA: CPT | Performed by: PHYSICIAN ASSISTANT

## 2022-05-26 PROCEDURE — 80307 DRUG TEST PRSMV CHEM ANLYZR: CPT | Performed by: PHYSICIAN ASSISTANT

## 2022-05-26 PROCEDURE — 84484 ASSAY OF TROPONIN QUANT: CPT | Performed by: PHYSICIAN ASSISTANT

## 2022-05-26 PROCEDURE — 96361 HYDRATE IV INFUSION ADD-ON: CPT

## 2022-05-26 PROCEDURE — 86803 HEPATITIS C AB TEST: CPT | Performed by: EMERGENCY MEDICINE

## 2022-05-26 PROCEDURE — 87389 HIV-1 AG W/HIV-1&-2 AB AG IA: CPT | Performed by: EMERGENCY MEDICINE

## 2022-05-26 PROCEDURE — 96360 HYDRATION IV INFUSION INIT: CPT

## 2022-05-26 PROCEDURE — 83880 ASSAY OF NATRIURETIC PEPTIDE: CPT | Performed by: PHYSICIAN ASSISTANT

## 2022-05-26 RX ORDER — DIVALPROEX SODIUM 500 MG/1
1000 TABLET, FILM COATED, EXTENDED RELEASE ORAL EVERY 12 HOURS
Qty: 120 TABLET | Refills: 11 | Status: SHIPPED | OUTPATIENT
Start: 2022-05-26 | End: 2024-01-10 | Stop reason: SDUPTHER

## 2022-05-26 RX ORDER — LEVETIRACETAM 750 MG/1
750 TABLET ORAL 2 TIMES DAILY
Qty: 60 TABLET | Refills: 2 | Status: SHIPPED | OUTPATIENT
Start: 2022-05-26 | End: 2024-01-10 | Stop reason: SDUPTHER

## 2022-05-26 RX ORDER — ACETAMINOPHEN 500 MG
1000 TABLET ORAL
Status: COMPLETED | OUTPATIENT
Start: 2022-05-26 | End: 2022-05-26

## 2022-05-26 RX ORDER — LOSARTAN POTASSIUM 25 MG/1
25 TABLET ORAL DAILY
Qty: 90 TABLET | Refills: 3 | Status: SHIPPED | OUTPATIENT
Start: 2022-05-26 | End: 2024-01-10 | Stop reason: SDUPTHER

## 2022-05-26 RX ORDER — BLOOD-GLUCOSE CONTROL, NORMAL
1 EACH MISCELLANEOUS 2 TIMES DAILY WITH MEALS
Qty: 100 EACH | Refills: 11 | Status: SHIPPED | OUTPATIENT
Start: 2022-05-26 | End: 2024-01-10

## 2022-05-26 RX ORDER — PEN NEEDLE, DIABETIC 30 GX3/16"
NEEDLE, DISPOSABLE MISCELLANEOUS
Qty: 100 EACH | Refills: 3 | Status: SHIPPED | OUTPATIENT
Start: 2022-05-26

## 2022-05-26 RX ORDER — DEXTROSE 4 G
TABLET,CHEWABLE ORAL
Qty: 1 EACH | Refills: 0 | Status: SHIPPED | OUTPATIENT
Start: 2022-05-26 | End: 2024-01-10 | Stop reason: SDUPTHER

## 2022-05-26 RX ORDER — ONDANSETRON 2 MG/ML
4 INJECTION INTRAMUSCULAR; INTRAVENOUS
Status: DISCONTINUED | OUTPATIENT
Start: 2022-05-26 | End: 2022-05-26

## 2022-05-26 RX ADMIN — LEVETIRACETAM 750 MG: 250 TABLET, FILM COATED ORAL at 09:05

## 2022-05-26 RX ADMIN — ACETAMINOPHEN 1000 MG: 500 TABLET, FILM COATED ORAL at 08:05

## 2022-05-26 RX ADMIN — THERA TABS 1 TABLET: TAB at 09:05

## 2022-05-26 RX ADMIN — CLINDAMYCIN HYDROCHLORIDE 600 MG: 150 CAPSULE ORAL at 11:05

## 2022-05-26 RX ADMIN — DIVALPROEX SODIUM 1000 MG: 250 TABLET, EXTENDED RELEASE ORAL at 09:05

## 2022-05-26 RX ADMIN — SODIUM CHLORIDE 1000 ML: 0.9 INJECTION, SOLUTION INTRAVENOUS at 06:05

## 2022-05-26 RX ADMIN — NICOTINE 1 PATCH: 14 PATCH, EXTENDED RELEASE TRANSDERMAL at 09:05

## 2022-05-26 RX ADMIN — THIAMINE HCL TAB 100 MG 100 MG: 100 TAB at 09:05

## 2022-05-26 RX ADMIN — CLINDAMYCIN HYDROCHLORIDE 600 MG: 150 CAPSULE ORAL at 05:05

## 2022-05-26 RX ADMIN — LOSARTAN POTASSIUM 25 MG: 25 TABLET, FILM COATED ORAL at 09:05

## 2022-05-26 NOTE — DISCHARGE SUMMARY
[facility] Medicine  Discharge Summary      Patient Name: Terrance Chavez  MRN: [id number]  Patient Class: IP- Inpatient  Admission Date: 5/13/2022  Hospital Length of Stay: 11 days  Discharge Date and Time: No discharge date for patient encounter.  Attending Physician: Terrance Avelar MD   Discharging Provider: Corby Grande MD  Primary Care Provider: Primary Doctor No      HPI:   Patient is a 42 yr old male with pmhx of T2DM, HTN, CVA, neurocognitive impairment and substance abuse disorder presenting to the ED for Altered mental status. [place], brought in by EMS. On arrival patient was only oriented to person but not place,time or situation. Patient blood glucose was elevated on arrival. Initial labs showed small acidosis. Patient was provided 2L LR in ED and his mentation improved. Patient had a fever in the ED , and elevated WBC  of 29.9. Can not rule out infection exacerbating or causing change in mental status. Patient received 2g ceftriaxone in ED. Patient denies chest pain, shortness of breath, headache, nausea and vomiting. Admitted to [facility]  for observation and correction of glucose.    Physical Exam  Vitals and nursing note reviewed.   Constitutional:       General: He is not in acute distress.     Appearance: He is not ill-appearing.   HENT:      Head: Normocephalic and atraumatic.      Right Ear: External ear normal.      Left Ear: External ear normal.      Nose: Nose normal.      Mouth/Throat:      Mouth: Mucous membranes are moist.      Pharynx: Oropharynx is clear.   Eyes:      Extraocular Movements: Extraocular movements intact.      Conjunctiva/sclera: Conjunctivae normal.      Pupils: Pupils are equal, round, and reactive to light.   Cardiovascular:      Rate and Rhythm: Normal rate and regular rhythm.      Pulses: Normal pulses.      Heart sounds: Normal heart sounds.   Pulmonary:      Effort: Pulmonary effort is  normal.      Breath sounds: Normal breath sounds. No wheezing, rhonchi or rales.   Abdominal:      General: Bowel sounds are normal.      Palpations: Abdomen is soft.      Tenderness: There is no abdominal tenderness.   Musculoskeletal:         General: No swelling or tenderness. Normal range of motion.      Cervical back: Normal range of motion. No rigidity.   Lymphadenopathy:      Cervical: No cervical adenopathy.   Skin:     General: Skin is warm.      Capillary Refill: Capillary refill takes less than 2 seconds.   Neurological:      General: No focal deficit present.      Mental Status: He is alert and oriented to person, place, and time. Mental status is at baseline.      Cranial Nerves: No cranial nerve deficit.         * No surgery found *      Hospital Course:   Pt was admitted to the  service for DKA and AMS. Pt was initially found to have elevated BG as well as Beta hydroxybutyrate and AG. He was started on an insulin drip as well as giving his long acting insulin. His DKA quickly resolved and then was continued to work up for his AMS and new lung findings. On admission he had a CXR which showed a new R Upper Lung nodule as well as hilar haziness and a known L upper lung cyst. A CT was recommned for f/u which showed a 4.5 cm R Upper Lobe and satellite 9mm lesion. Pulm was consulted and pt was worked up for potential pneumonia vs TB he was started on a 5 day course of augmentin and azithromycin. Quant gold, TB PCR, as well as AFBs were collected which were neg. His resp cultures grew MRSA and he was transitioned to a course of Clindamycin for 7 days ending 5/25. It was determined he was unreliable to completethis course at d/c so for his safety he would complete this course IV while inpt and have his final dose PO while at the shelter found for him by ABDIRAHMAN. On admission he had a CT head which did not show any acute intracranial abnormalities as well as EtOH and UDS which were neg. It was unclear as to what  his BL mentation is as previous notes had documented normal mentation and AO x4. His AMS was worked up with HIV/RPR/B12/Folate which were wnl and B1/6 pending. Neurology was consulted to determine if there was any other potential causes of AMS and rec an MRI and EEG. His MRI again did not show any acute abnormalities and EEG which showed generalized cerebral dysfxn but no epileptiform activity. The pt's mentation slowly improved throughout his admission and was AO x3 at time of d/c. Pt was educated on his chronic conditions and SW gave him multiple resources for his outpt care. He was given strict return precautions and all questions were answered.       Goals of Care Treatment Preferences:  Code Status: Full Code      Consults:   Consults (From admission, onward)        Status Ordering Provider     Inpatient consult to Pulmonology  Once        Provider:  (Not yet assigned)    Completed VIRGILIO PASCUAL     Inpatient consult to Neurology  Once        Provider:  (Not yet assigned)    Completed VIRGILIO PASCUAL     Inpatient consult to Pulmonology  Once        Provider:  (Not yet assigned)    Completed ANDRE MENDEZ          No new Assessment & Plan notes have been filed under this hospital service since the last note was generated.  Service: Hospital Medicine    Final Active Diagnoses:    Diagnosis Date Noted POA    Homelessness [Z59.00] 05/21/2022 Not Applicable    Cavitary pneumonia [J18.9, J98.4]  Yes    Pneumonia [J18.9] 05/14/2022 Unknown    AMS (altered mental status) [R41.82]  Unknown    Epilepsy [G40.909] 02/18/2021 Yes    Type 2 diabetes mellitus, with long-term current use of insulin [E11.9, Z79.4] 02/12/2021 Not Applicable     Chronic    Hypertension [I10] 02/12/2021 Yes     Chronic    ETOH abuse [F10.10] 02/12/2021 Unknown      Problems Resolved During this Admission:    Diagnosis Date Noted Date Resolved POA    PRINCIPAL PROBLEM:  Hyperglycemia [R73.9] 05/13/2022 05/14/2022 Yes       Discharged  Condition: stable    Disposition: Home or Self Care    Follow Up:   Follow-up Information     Marilyn Hawkins DPM Follow up on 6/2/2022.    Specialties: Podiatry, Wound Care  Why: [facility] 6/2/22 @9am  Contact information:  [address]  [address]  [place] [address]  [phone]                       Patient Instructions:      Diet diabetic     Diet diabetic     Notify your health care provider if you experience any of the following:  temperature >100.4     Notify your health care provider if you experience any of the following:  persistent nausea and vomiting or diarrhea     Notify your health care provider if you experience any of the following:  severe uncontrolled pain     Notify your health care provider if you experience any of the following:  redness, tenderness, or signs of infection (pain, swelling, redness, odor or green/yellow discharge around incision site)     Notify your health care provider if you experience any of the following:  difficulty breathing or increased cough     Notify your health care provider if you experience any of the following:  severe persistent headache     Notify your health care provider if you experience any of the following:  worsening rash     Notify your health care provider if you experience any of the following:  persistent dizziness, light-headedness, or visual disturbances     Notify your health care provider if you experience any of the following:  increased confusion or weakness     Notify your health care provider if you experience any of the following:  temperature >100.4     Notify your health care provider if you experience any of the following:  persistent nausea and vomiting or diarrhea     Notify your health care provider if you experience any of the following:  severe uncontrolled pain     Notify your health care provider if you experience any of the following:  redness, tenderness, or signs of infection (pain, swelling, redness, odor or green/yellow discharge  around incision site)     Notify your health care provider if you experience any of the following:  difficulty breathing or increased cough     Notify your health care provider if you experience any of the following:  severe persistent headache     Notify your health care provider if you experience any of the following:  worsening rash     Notify your health care provider if you experience any of the following:  persistent dizziness, light-headedness, or visual disturbances     Notify your health care provider if you experience any of the following:  increased confusion or weakness     Activity as tolerated       Significant Diagnostic Studies:   Recent Labs   Lab 05/24/22  0450 05/25/22 0443 05/26/22 0426   WBC 8.77 7.35 6.90   HGB 12.0* 12.2* 12.5*   HCT 34.4* 34.5* 36.3*   MCV 91 91 91   RBC 3.78* 3.80* 4.00*   MCH 31.7* 32.1* 31.3*   MCHC 34.9 35.4 34.4   RDW 12.5 12.6 12.3    354 322   MPV 9.0* 9.3 9.3   GRAN 52.2  4.6 39.4  2.9 39.5  2.7   LYMPH 33.8  3.0 48.3*  3.6 48.4*  3.3   MONO 9.6  0.8 7.1  0.5 7.2  0.5   EOSINOPHIL 3.0 3.8 3.9   BASOPHIL 0.8 1.0 0.7     Recent Labs   Lab 05/21/22  0254 05/22/22  0429 05/23/22  0510 05/23/22  1305 05/24/22  0450 05/25/22 0443 05/26/22  0426    137  --    < > 137 138 137   K 4.7 4.7  --    < > 4.7 4.1 4.5   CL 97 95  --    < > 98 99 99   CO2 29 30*  --    < > 28 28 27   ANIONGAP 11 12  --    < > 11 11 11   BUN 12 13  --    < > 18 12 16   CREATININE 0.7 0.8  --    < > 0.9 0.7 0.8   * 247*  --    < > 135* 101 225*   CALCIUM 9.9 10.0  --    < > 9.7 9.9 10.0   PROT 6.8 6.6  --    < > 7.1 7.2 6.8   ALBUMIN 3.3* 3.2*  --    < > 3.4* 3.2* 3.3*   ALKPHOS 62 68  --    < > 66 52* 53*   BILITOT 0.2 0.1  --    < > 0.1 0.2 0.2   ALT 9* 11  --    < > 11 12 8*   AST 14 11  --    < > 14 14 11   ESTGFRAFRICA >60 >60  --    < > >60 >60 >60   EGFRNONAA >60 >60  --    < > >60 >60 >60   MG 1.8 1.9 1.7  --   --   --   --    PHOS 4.3 4.0 4.6*  --   --   --   --      < > = values in this interval not displayed.     No results for input(s): COLORU, APPEARANCEUA, PHUR, SPECGRAV, PROTEINUA, GLUCUA, KETONESU, BILIRUBINUA, OCCULTUA, UROBILINOGEN, NITRITE, LEUKOCYTESUR, RBCUA, WBCUA, BACTERIA, SQUAMEPITHEL, HYALINECASTS, GRANULARCAST, MICROCMT in the last 168 hours.    Invalid input(s): SPECIMENU, AMORPHOUSU  No results for input(s): TROPONINI, CPK, CPKMB in the last 168 hours.  No results for input(s): PT, INR, APTT in the last 168 hours.  No results for input(s): TSH, L4ESKBJ, C1BVXFM, THYROIDAB, FREET4 in the last 168 hours.  CT Head Without Contrast    Result Date: 5/13/2022  EXAMINATION: CT HEAD WITHOUT CONTRAST CLINICAL HISTORY: Mental status change, unknown cause; TECHNIQUE: Low dose axial images were obtained through the head.  Coronal and sagittal reformations were also performed. Contrast was not administered. COMPARISON: CT examination of the brain without contrast March 30, 2022 FINDINGS: The ventricular system, sulcal pattern and parenchymal attenuation characteristics are consistent with chronic change appearing similar to the prior examination without evidence for superimposed acute process.  There are areas consistent with remote lacunar-type ischemic change.  Chronic appearing white matter change noted. There is no evidence for intracranial mass, mass effect or midline shift and there is no evidence for acute intracranial hemorrhage.  Appropriate CSF spaces are seen at the skull base. The visualized orbits appear intact.  The mastoid air cells and middle ear cavity bilaterally appear appropriate.  Mild opacity along the external auditory canal likely represents cerumen.  Minimal paranasal sinus mucosal thickening noted.  The osseous structures appear intact.     Chronic changes are noted, there is no evidence for superimposed acute intracranial process. Electronically signed by: Abimael Eid Date:    05/13/2022 Time:    04:10    CT Chest Without  Contrast    Result Date: 5/15/2022  EXAMINATION: CT CHEST WITHOUT CONTRAST CLINICAL HISTORY: Abnormal xray - lung nodule, >= 1 cm; TECHNIQUE: Low dose axial images, sagittal and coronal reformations were obtained from the thoracic inlet to the lung bases. Contrast was not administered. COMPARISON: Radiograph 05/13/2022 FINDINGS: Lungs: There is a right upper lobe partially cavitary mass in the posterior segment eroding through the major fissure into the superior segment of the right lower lobe (series 601, image 105).  The lesion measures 4.5 cm in transverse plane (series 2, image 54). There is an adjacent right upper lobe nodule measuring 0.9 cm (series 2, image 39), with the same imaging characteristics. Mild emphysematous lung changes. There is a large left upper lobe thin walled bulla measuring 11.4 cm (series 2, image 136), stable. Mediastinum: Thyroid gland is grossly unremarkable.  No mediastinal lymphadenopathy.  Limited assessment of the hilum without IV contrast.  Thoracic aorta is normal caliber.  Heart size within normal limits.  No pericardial or pleural effusion.  Mild scattered calcific atherosclerosis. Upper abdomen: Extensive pancreatic calcifications, probable sequela of chronic pancreatitis. Chest wall: Mild gynecomastia. Bones: No marrow replacement process.     4.5 cm right upper lobe cavitary mass with adjacent 9 mm satellite lesion, as above, concerning for infectious etiology/atypical pneumonia.  Malignancy could have a similar appearance. Large thin walled left upper lobe bulla, stable. Mild emphysematous lung changes. This report was flagged in Epic as abnormal. Electronically signed by: Juvencio Cardona MD Date:    05/15/2022 Time:    12:10    MRI Brain Without Contrast    Result Date: 5/16/2022  EXAMINATION: MRI BRAIN WITHOUT CONTRAST CLINICAL HISTORY: Mental status change, unknown cause; TECHNIQUE: Multiplanar multisequence MR imaging of the brain was performed without contrast. COMPARISON:  CT head 05/13/2022 MRI 10/27/2021 FINDINGS: there is central volume loss, similar to prior studies.  There is no evidence of hydrocephalus, mass effect, intracranial hemorrhage or acute infarct. Focal lesions within the periventricular white matter bilaterally are stable from the prior study consistent with prior nonspecific insult.  No new white matter lesions are noted. Normal arterial flow voids are preserved at the skull base. The visualized sinuses and mastoid air cells are clear.     Stable appearance of the brain as detailed above...  No acute intracranial process. Electronically signed by: Ibrahima Soria Date:    05/16/2022 Time:    11:55    X-Ray Chest AP Portable    Result Date: 5/13/2022  EXAMINATION: XR CHEST AP PORTABLE CLINICAL HISTORY: Sepsis; TECHNIQUE: Single frontal view of the chest was performed. COMPARISON: Chest radiograph 03/28/2022, CT chest 10/27/2021 FINDINGS: Cardiac monitoring leads overlie the chest.  Cardiac silhouette is not significantly enlarged.  There is a stable large thin walled cyst projecting over the left upper lung zone.  There is increased fullness of the right hilar region, not well appreciated on prior radiograph examination.  Additionally, there is a possible new 1.0 cm nodular opacity projecting over the right upper lung zone.  The remaining lungs demonstrate no new confluent airspace consolidation.  No significant volume of pleural fluid or pneumothorax appreciated.  Osseous structures appear grossly intact.     1.  Increased fullness of the right hilar region, not well appreciated on prior radiograph examination as well as possible new 1.0 cm nodular opacity projecting over the right upper lung zone.  Given short-term interval development, this could relate to underlying infectious process, however neoplastic process not excluded.  Consider further assessment with dedicated chest CT. 2.  Stable large thin walled left upper lung zone cyst. Electronically signed  "by: Lenny Pereira MD Date:    05/13/2022 Time:    03:45    Microbiology Results (last 7 days)     ** No results found for the last 168 hours. **            Pending Diagnostic Studies:     None         Medications:  Reconciled Home Medications:      Medication List      START taking these medications    blood-glucose meter Misc  use as directed  Replaces: blood-glucose meter kit     clindamycin 300 MG capsule  Commonly known as: CLEOCIN  Take 2 capsules (600 mg total) by mouth once. for 1 dose     losartan 25 MG tablet  Commonly known as: COZAAR  Take 1 tablet (25 mg total) by mouth once daily.        CHANGE how you take these medications    divalproex  MG Tb24  Commonly known as: DEPAKOTE  Take 2 tablets (1,000 mg total) by mouth every 12 (twelve) hours.  What changed: Another medication with the same name was removed. Continue taking this medication, and follow the directions you see here.        CONTINUE taking these medications    BD ULTRA-FINE RAMSEY PEN NEEDLE 32 gauge x 5/32" Ndle  Generic drug: pen needle, diabetic  Injecting twice daily     HumuLIN 70/30 U-100 KwikPen 100 unit/mL (70-30) Inpn pen  Generic drug: insulin NPH/Reg human  Inject 25 units into the skin with breakfast and 15 units with dinner or evening meal. Please see your primary care physician for further adjustments as needed.     levETIRAcetam 750 MG Tab  Commonly known as: KEPPRA  Take 1 tablet (750 mg total) by mouth 2 (two) times daily.     ONETOUCH DELICA PLUS LANCET 30 gauge Misc  Generic drug: lancets  1 lancet by Misc.(Non-Drug; Combo Route) route 2 (two) times daily with meals.     ONETOUCH VERIO TEST STRIPS Strp  Generic drug: blood sugar diagnostic  Use to test blood glucose 2 (two) times daily with meals.        STOP taking these medications    blood-glucose meter kit  Replaced by: blood-glucose meter Misc            Indwelling Lines/Drains at time of discharge:   Lines/Drains/Airways     None                 Time spent on " the discharge of patient: 35 minutes         Corby Grande MD  Department of [facility] Medicine  [facility]

## 2022-05-26 NOTE — PLAN OF CARE
SW placed order for pt's WC Van transportation to [facility] [address] [place], [place] - [address] with an estimated ETA of 12pm. ABDIRAHMAN will meet with pt shortly for final assessment.    ROB Harper  [phone]    Future Appointments   Date Time Provider Department Center   5/27/2022 10:00 AM [facility]   6/22/2022  8:20 AM Merle Oliva MD [facility]        05/26/22 0816   Post-Acute Status   Post-Acute Authorization Placement   Discharge Plan   Discharge Plan A Shelter

## 2022-05-26 NOTE — SUBJECTIVE & OBJECTIVE
Interval History: NAEON. Pt to complete his course of abx today and will leave today.    Review of Systems   Constitutional:  Negative for chills, fatigue and fever.   HENT:  Negative for congestion, rhinorrhea and sore throat.    Eyes:  Negative for visual disturbance.   Respiratory:  Negative for cough and shortness of breath.    Cardiovascular:  Negative for chest pain.   Gastrointestinal:  Negative for abdominal pain, nausea and vomiting.   Genitourinary:  Negative for difficulty urinating.   Skin:  Negative for rash and wound.   Neurological:  Negative for weakness and headaches.   Objective:     Vital Signs (Most Recent):  Temp: 97.8 °F (36.6 °C) (05/26/22 0802)  Pulse: 66 (05/26/22 0802)  Resp: 18 (05/26/22 0802)  BP: 118/69 (05/26/22 0802)  SpO2: 100 % (05/26/22 0802) Vital Signs (24h Range):  Temp:  [97.6 °F (36.4 °C)-97.8 °F (36.6 °C)] 97.8 °F (36.6 °C)  Pulse:  [63-72] 66  Resp:  [17-20] 18  SpO2:  [95 %-100 %] 100 %  BP: (102-118)/(66-77) 118/69     Weight: 72.6 kg (160 lb 0.9 oz)  Body mass index is 25.07 kg/m².  No intake or output data in the 24 hours ending 05/26/22 0852   Physical Exam  Vitals and nursing note reviewed.   Constitutional:       General: He is not in acute distress.     Appearance: Normal appearance. He is not ill-appearing.   HENT:      Mouth/Throat:      Pharynx: Oropharynx is clear.   Eyes:      General:         Right eye: No discharge.         Left eye: No discharge.      Conjunctiva/sclera: Conjunctivae normal.   Cardiovascular:      Rate and Rhythm: Normal rate and regular rhythm.      Heart sounds: Normal heart sounds.   Pulmonary:      Effort: Pulmonary effort is normal. No respiratory distress.      Breath sounds: Normal breath sounds. No wheezing.   Abdominal:      Palpations: Abdomen is soft.      Tenderness: There is no abdominal tenderness.   Skin:     General: Skin is warm.   Neurological:      Mental Status: He is alert and oriented to person, place, and time.    Psychiatric:         Behavior: Behavior normal.       Recent Labs   Lab 05/24/22  0450 05/25/22  0443 05/26/22  0426   WBC 8.77 7.35 6.90   HGB 12.0* 12.2* 12.5*   HCT 34.4* 34.5* 36.3*   MCV 91 91 91   RBC 3.78* 3.80* 4.00*   MCH 31.7* 32.1* 31.3*   MCHC 34.9 35.4 34.4   RDW 12.5 12.6 12.3    354 322   MPV 9.0* 9.3 9.3   GRAN 52.2  4.6 39.4  2.9 39.5  2.7   LYMPH 33.8  3.0 48.3*  3.6 48.4*  3.3   MONO 9.6  0.8 7.1  0.5 7.2  0.5   EOSINOPHIL 3.0 3.8 3.9   BASOPHIL 0.8 1.0 0.7     Recent Labs   Lab 05/21/22  0254 05/22/22  0429 05/23/22  0510 05/23/22  1305 05/24/22  0450 05/25/22  0443 05/26/22  0426    137  --    < > 137 138 137   K 4.7 4.7  --    < > 4.7 4.1 4.5   CL 97 95  --    < > 98 99 99   CO2 29 30*  --    < > 28 28 27   ANIONGAP 11 12  --    < > 11 11 11   BUN 12 13  --    < > 18 12 16   CREATININE 0.7 0.8  --    < > 0.9 0.7 0.8   * 247*  --    < > 135* 101 225*   CALCIUM 9.9 10.0  --    < > 9.7 9.9 10.0   PROT 6.8 6.6  --    < > 7.1 7.2 6.8   ALBUMIN 3.3* 3.2*  --    < > 3.4* 3.2* 3.3*   ALKPHOS 62 68  --    < > 66 52* 53*   BILITOT 0.2 0.1  --    < > 0.1 0.2 0.2   ALT 9* 11  --    < > 11 12 8*   AST 14 11  --    < > 14 14 11   ESTGFRAFRICA >60 >60  --    < > >60 >60 >60   EGFRNONAA >60 >60  --    < > >60 >60 >60   MG 1.8 1.9 1.7  --   --   --   --    PHOS 4.3 4.0 4.6*  --   --   --   --     < > = values in this interval not displayed.     No results for input(s): COLORU, APPEARANCEUA, PHUR, SPECGRAV, PROTEINUA, GLUCUA, KETONESU, BILIRUBINUA, OCCULTUA, UROBILINOGEN, NITRITE, LEUKOCYTESUR, RBCUA, WBCUA, BACTERIA, SQUAMEPITHEL, HYALINECASTS, GRANULARCAST, MICROCMT in the last 168 hours.    Invalid input(s): SPECIMENU, AMORPHOUSU  No results for input(s): TROPONINI, CPK, CPKMB in the last 168 hours.  No results for input(s): PT, INR, APTT in the last 168 hours.  No results for input(s): TSH, H9VGYLI, E0DTTDP, THYROIDAB, FREET4 in the last 168 hours.  CT Head Without  Contrast    Result Date: 5/13/2022  EXAMINATION: CT HEAD WITHOUT CONTRAST CLINICAL HISTORY: Mental status change, unknown cause; TECHNIQUE: Low dose axial images were obtained through the head.  Coronal and sagittal reformations were also performed. Contrast was not administered. COMPARISON: CT examination of the brain without contrast March 30, 2022 FINDINGS: The ventricular system, sulcal pattern and parenchymal attenuation characteristics are consistent with chronic change appearing similar to the prior examination without evidence for superimposed acute process.  There are areas consistent with remote lacunar-type ischemic change.  Chronic appearing white matter change noted. There is no evidence for intracranial mass, mass effect or midline shift and there is no evidence for acute intracranial hemorrhage.  Appropriate CSF spaces are seen at the skull base. The visualized orbits appear intact.  The mastoid air cells and middle ear cavity bilaterally appear appropriate.  Mild opacity along the external auditory canal likely represents cerumen.  Minimal paranasal sinus mucosal thickening noted.  The osseous structures appear intact.     Chronic changes are noted, there is no evidence for superimposed acute intracranial process. Electronically signed by: Abimael Eid Date:    05/13/2022 Time:    04:10    CT Chest Without Contrast    Result Date: 5/15/2022  EXAMINATION: CT CHEST WITHOUT CONTRAST CLINICAL HISTORY: Abnormal xray - lung nodule, >= 1 cm; TECHNIQUE: Low dose axial images, sagittal and coronal reformations were obtained from the thoracic inlet to the lung bases. Contrast was not administered. COMPARISON: Radiograph 05/13/2022 FINDINGS: Lungs: There is a right upper lobe partially cavitary mass in the posterior segment eroding through the major fissure into the superior segment of the right lower lobe (series 601, image 105).  The lesion measures 4.5 cm in transverse plane (series 2, image 54). There is  an adjacent right upper lobe nodule measuring 0.9 cm (series 2, image 39), with the same imaging characteristics. Mild emphysematous lung changes. There is a large left upper lobe thin walled bulla measuring 11.4 cm (series 2, image 136), stable. Mediastinum: Thyroid gland is grossly unremarkable.  No mediastinal lymphadenopathy.  Limited assessment of the hilum without IV contrast.  Thoracic aorta is normal caliber.  Heart size within normal limits.  No pericardial or pleural effusion.  Mild scattered calcific atherosclerosis. Upper abdomen: Extensive pancreatic calcifications, probable sequela of chronic pancreatitis. Chest wall: Mild gynecomastia. Bones: No marrow replacement process.     4.5 cm right upper lobe cavitary mass with adjacent 9 mm satellite lesion, as above, concerning for infectious etiology/atypical pneumonia.  Malignancy could have a similar appearance. Large thin walled left upper lobe bulla, stable. Mild emphysematous lung changes. This report was flagged in Epic as abnormal. Electronically signed by: Juvencio Cardona MD Date:    05/15/2022 Time:    12:10    MRI Brain Without Contrast    Result Date: 5/16/2022  EXAMINATION: MRI BRAIN WITHOUT CONTRAST CLINICAL HISTORY: Mental status change, unknown cause; TECHNIQUE: Multiplanar multisequence MR imaging of the brain was performed without contrast. COMPARISON: CT head 05/13/2022 MRI 10/27/2021 FINDINGS: there is central volume loss, similar to prior studies.  There is no evidence of hydrocephalus, mass effect, intracranial hemorrhage or acute infarct. Focal lesions within the periventricular white matter bilaterally are stable from the prior study consistent with prior nonspecific insult.  No new white matter lesions are noted. Normal arterial flow voids are preserved at the skull base. The visualized sinuses and mastoid air cells are clear.     Stable appearance of the brain as detailed above...  No acute intracranial process. Electronically signed  by: Ibrahima Soria Date:    05/16/2022 Time:    11:55    X-Ray Chest AP Portable    Result Date: 5/13/2022  EXAMINATION: XR CHEST AP PORTABLE CLINICAL HISTORY: Sepsis; TECHNIQUE: Single frontal view of the chest was performed. COMPARISON: Chest radiograph 03/28/2022, CT chest 10/27/2021 FINDINGS: Cardiac monitoring leads overlie the chest.  Cardiac silhouette is not significantly enlarged.  There is a stable large thin walled cyst projecting over the left upper lung zone.  There is increased fullness of the right hilar region, not well appreciated on prior radiograph examination.  Additionally, there is a possible new 1.0 cm nodular opacity projecting over the right upper lung zone.  The remaining lungs demonstrate no new confluent airspace consolidation.  No significant volume of pleural fluid or pneumothorax appreciated.  Osseous structures appear grossly intact.     1.  Increased fullness of the right hilar region, not well appreciated on prior radiograph examination as well as possible new 1.0 cm nodular opacity projecting over the right upper lung zone.  Given short-term interval development, this could relate to underlying infectious process, however neoplastic process not excluded.  Consider further assessment with dedicated chest CT. 2.  Stable large thin walled left upper lung zone cyst. Electronically signed by: Lenny Pereira MD Date:    05/13/2022 Time:    03:45    Microbiology Results (last 7 days)       ** No results found for the last 168 hours. **

## 2022-05-26 NOTE — ED TRIAGE NOTES
"Pt was found by nursing staff lying on sidewalk, covered in feces.  Pt states he was recently discharged from hospital and sent via wheelchair van to [facility] at about 1320 today, states he remembers walking around the parking lot, but states, "I must have passed out," as he does note recall what happened.  Pt currently answering questions appropriately, aao x4.  Pt denies any ETOH or drugs since being dropped off today.  "

## 2022-05-26 NOTE — PLAN OF CARE
SW met pt at bedside to complete final assessment. [facility] [id number] [facility] [place] [facility] [address] [facility] 12pm via WC Van. Sw gave pt list with Medicaid transportation number listed on it to be able to arrange a ride to and from his follow up appts. Pt has f/u appts listed on avs. Rounds completed on pt.  All questions addressed.  Bedside nurse to discuss d/c medications.  Discussed importance to attend all f/u appts and take medications as prescribed.  Verbalized understanding.    ROB Harper  [phone]      Future Appointments   Date Time Provider Department Center   5/27/2022 10:00 AM [facility]   6/22/2022  8:20 AM Merle Oliva MD [facility]        05/26/22 0980   Final Note   Assessment Type Final Discharge Note   Anticipated Discharge Disposition Home   What phone number can be called within the next 1-3 days to see how you are doing after discharge? [phone]   Hospital Resources/Appts/Education Provided Appointments scheduled and added to AVS   Post-Acute Status   Coverage Medicaid   Discharge Delays (!) Ambulance Transport/Facility Transport

## 2022-05-26 NOTE — TELEPHONE ENCOUNTER
----- Message from Saritha Bravo sent at 5/26/2022 10:32 AM CDT -----  Regarding: FW: hosp f/u  Good morning,  Discharge nurse is requesting in-person hospital f/u visit date and time to give to patient prior to discharge.   Thanks  ----- Message -----  From: Saritha Bravo  Sent: 5/25/2022  12:38 PM CDT  To: [facility] Staff  Subject: hosp f/u                                               Good afternoon,  Mr. Franks discharge nurse is requesting patient appointment is rescheduled from a virtual appointment with Dr. Oliva to an in-person visit.    Mr. Franks appointment is currently scheduled for 6/22/22 at 8:20.   Thank you for assisting me in this matter.               DX: Hyperglycemia        Thanks,     Saritha Mayfield/Tito Discharge

## 2022-05-26 NOTE — PLAN OF CARE
ABDIRAHMAN called Radha [phone] with the transport center. She said pt transportation is 15-20 minutes away. ABDIRAHMAN will follow.    Otilio Loza, MSW  [phone]     05/26/22 1229   Post-Acute Status   Post-Acute Authorization Placement   Discharge Plan   Discharge Plan A Shelter

## 2022-05-26 NOTE — PROGRESS NOTES
[facility] Medicine  Progress Note    Patient Name: Terrance Chavez  MRN: [id number]  Patient Class: IP- Inpatient   Admission Date: 5/13/2022  Length of Stay: 11 days  Attending Physician: Terrance Avelar MD  Primary Care Provider: Primary Doctor No        Subjective:     Principal Problem:Hyperglycemia        HPI:  Patient is a 42 yr old male with pmhx of T2DM, HTN, CVA, neurocognitive impairment and substance abuse disorder presenting to the ED for Altered mental status. [place], brought in by EMS. On arrival patient was only oriented to person but not place,time or situation. Patient blood glucose was elevated on arrival. Initial labs showed small acidosis. Patient was provided 2L LR in ED and his mentation improved. Patient had a fever in the ED , and elevated WBC  of 29.9. Can not rule out infection exacerbating or causing change in mental status. Patient received 2g ceftriaxone in ED. Patient denies chest pain, shortness of breath, headache, nausea and vomiting. Admitted to [facility] for observation and correction of glucose.      Overview/Hospital Course:  Pt was admitted to the FM service for DKA and AMS. Pt was initially found to have elevated BG as well as Beta hydroxybutyrate and AG. He was started on an insulin drip as well as giving his long acting insulin. His DKA quickly resolved and then was continued to work up for his AMS and new lung findings. On admission he had a CXR which showed a new R Upper Lung nodule as well as hilar haziness and a known L upper lung cyst. A CT was recommned for f/u which showed a 4.5 cm R Upper Lobe and satellite 9mm lesion. Pulm was consulted and pt was worked up for potential pneumonia vs TB he was started on a 5 day course of augmentin and azithromycin. Quant gold, TB PCR, as well as AFBs were collected which were neg. His resp cultures grew MRSA and he was transitioned to a course of  Clindamycin for 7 days ending 5/25. It was determined he was unreliable to completethis course at d/c so for his safety he would complete this course IV while inpt and have his final dose PO while at the shelter found for him by SW. On admission he had a CT head which did not show any acute intracranial abnormalities as well as EtOH and UDS which were neg. It was unclear as to what his BL mentation is as previous notes had documented normal mentation and AO x4. His AMS was worked up with HIV/RPR/B12/Folate which were wnl and B1/6 pending. Neurology was consulted to determine if there was any other potential causes of AMS and rec an MRI and EEG. His MRI again did not show any acute abnormalities and EEG which showed generalized cerebral dysfxn but no epileptiform activity. The pt's mentation slowly improved throughout his admission and was AO x3 at time of d/c. Pt was educated on his chronic conditions and SW gave him multiple resources for his outpt care. He was given strict return precautions and all questions were answered.      Interval History: NAEON. Pt to complete his course of abx today and will leave today.    Review of Systems   Constitutional:  Negative for chills, fatigue and fever.   HENT:  Negative for congestion, rhinorrhea and sore throat.    Eyes:  Negative for visual disturbance.   Respiratory:  Negative for cough and shortness of breath.    Cardiovascular:  Negative for chest pain.   Gastrointestinal:  Negative for abdominal pain, nausea and vomiting.   Genitourinary:  Negative for difficulty urinating.   Skin:  Negative for rash and wound.   Neurological:  Negative for weakness and headaches.   Objective:     Vital Signs (Most Recent):  Temp: 97.8 °F (36.6 °C) (05/26/22 0802)  Pulse: 66 (05/26/22 0802)  Resp: 18 (05/26/22 0802)  BP: 118/69 (05/26/22 0802)  SpO2: 100 % (05/26/22 0802) Vital Signs (24h Range):  Temp:  [97.6 °F (36.4 °C)-97.8 °F (36.6 °C)] 97.8 °F (36.6 °C)  Pulse:  [63-72] 66  Resp:   [17-20] 18  SpO2:  [95 %-100 %] 100 %  BP: (102-118)/(66-77) 118/69     Weight: 72.6 kg (160 lb 0.9 oz)  Body mass index is 25.07 kg/m².  No intake or output data in the 24 hours ending 05/26/22 0852   Physical Exam  Vitals and nursing note reviewed.   Constitutional:       General: He is not in acute distress.     Appearance: Normal appearance. He is not ill-appearing.   HENT:      Mouth/Throat:      Pharynx: Oropharynx is clear.   Eyes:      General:         Right eye: No discharge.         Left eye: No discharge.      Conjunctiva/sclera: Conjunctivae normal.   Cardiovascular:      Rate and Rhythm: Normal rate and regular rhythm.      Heart sounds: Normal heart sounds.   Pulmonary:      Effort: Pulmonary effort is normal. No respiratory distress.      Breath sounds: Normal breath sounds. No wheezing.   Abdominal:      Palpations: Abdomen is soft.      Tenderness: There is no abdominal tenderness.   Skin:     General: Skin is warm.   Neurological:      Mental Status: He is alert and oriented to person, place, and time.   Psychiatric:         Behavior: Behavior normal.       Recent Labs   Lab 05/24/22  0450 05/25/22 0443 05/26/22  0426   WBC 8.77 7.35 6.90   HGB 12.0* 12.2* 12.5*   HCT 34.4* 34.5* 36.3*   MCV 91 91 91   RBC 3.78* 3.80* 4.00*   MCH 31.7* 32.1* 31.3*   MCHC 34.9 35.4 34.4   RDW 12.5 12.6 12.3    354 322   MPV 9.0* 9.3 9.3   GRAN 52.2  4.6 39.4  2.9 39.5  2.7   LYMPH 33.8  3.0 48.3*  3.6 48.4*  3.3   MONO 9.6  0.8 7.1  0.5 7.2  0.5   EOSINOPHIL 3.0 3.8 3.9   BASOPHIL 0.8 1.0 0.7     Recent Labs   Lab 05/21/22  0254 05/22/22  0429 05/23/22  0510 05/23/22  1305 05/24/22  0450 05/25/22  0443 05/26/22  0426    137  --    < > 137 138 137   K 4.7 4.7  --    < > 4.7 4.1 4.5   CL 97 95  --    < > 98 99 99   CO2 29 30*  --    < > 28 28 27   ANIONGAP 11 12  --    < > 11 11 11   BUN 12 13  --    < > 18 12 16   CREATININE 0.7 0.8  --    < > 0.9 0.7 0.8   * 247*  --    < > 135* 101  225*   CALCIUM 9.9 10.0  --    < > 9.7 9.9 10.0   PROT 6.8 6.6  --    < > 7.1 7.2 6.8   ALBUMIN 3.3* 3.2*  --    < > 3.4* 3.2* 3.3*   ALKPHOS 62 68  --    < > 66 52* 53*   BILITOT 0.2 0.1  --    < > 0.1 0.2 0.2   ALT 9* 11  --    < > 11 12 8*   AST 14 11  --    < > 14 14 11   ESTGFRAFRICA >60 >60  --    < > >60 >60 >60   EGFRNONAA >60 >60  --    < > >60 >60 >60   MG 1.8 1.9 1.7  --   --   --   --    PHOS 4.3 4.0 4.6*  --   --   --   --     < > = values in this interval not displayed.     No results for input(s): COLORU, APPEARANCEUA, PHUR, SPECGRAV, PROTEINUA, GLUCUA, KETONESU, BILIRUBINUA, OCCULTUA, UROBILINOGEN, NITRITE, LEUKOCYTESUR, RBCUA, WBCUA, BACTERIA, SQUAMEPITHEL, HYALINECASTS, GRANULARCAST, MICROCMT in the last 168 hours.    Invalid input(s): SPECIMENU, AMORPHOUSU  No results for input(s): TROPONINI, CPK, CPKMB in the last 168 hours.  No results for input(s): PT, INR, APTT in the last 168 hours.  No results for input(s): TSH, N7DZOEU, L6RISHA, THYROIDAB, FREET4 in the last 168 hours.  CT Head Without Contrast    Result Date: 5/13/2022  EXAMINATION: CT HEAD WITHOUT CONTRAST CLINICAL HISTORY: Mental status change, unknown cause; TECHNIQUE: Low dose axial images were obtained through the head.  Coronal and sagittal reformations were also performed. Contrast was not administered. COMPARISON: CT examination of the brain without contrast March 30, 2022 FINDINGS: The ventricular system, sulcal pattern and parenchymal attenuation characteristics are consistent with chronic change appearing similar to the prior examination without evidence for superimposed acute process.  There are areas consistent with remote lacunar-type ischemic change.  Chronic appearing white matter change noted. There is no evidence for intracranial mass, mass effect or midline shift and there is no evidence for acute intracranial hemorrhage.  Appropriate CSF spaces are seen at the skull base. The visualized orbits appear intact.  The  mastoid air cells and middle ear cavity bilaterally appear appropriate.  Mild opacity along the external auditory canal likely represents cerumen.  Minimal paranasal sinus mucosal thickening noted.  The osseous structures appear intact.     Chronic changes are noted, there is no evidence for superimposed acute intracranial process. Electronically signed by: Abimael Eid Date:    05/13/2022 Time:    04:10    CT Chest Without Contrast    Result Date: 5/15/2022  EXAMINATION: CT CHEST WITHOUT CONTRAST CLINICAL HISTORY: Abnormal xray - lung nodule, >= 1 cm; TECHNIQUE: Low dose axial images, sagittal and coronal reformations were obtained from the thoracic inlet to the lung bases. Contrast was not administered. COMPARISON: Radiograph 05/13/2022 FINDINGS: Lungs: There is a right upper lobe partially cavitary mass in the posterior segment eroding through the major fissure into the superior segment of the right lower lobe (series 601, image 105).  The lesion measures 4.5 cm in transverse plane (series 2, image 54). There is an adjacent right upper lobe nodule measuring 0.9 cm (series 2, image 39), with the same imaging characteristics. Mild emphysematous lung changes. There is a large left upper lobe thin walled bulla measuring 11.4 cm (series 2, image 136), stable. Mediastinum: Thyroid gland is grossly unremarkable.  No mediastinal lymphadenopathy.  Limited assessment of the hilum without IV contrast.  Thoracic aorta is normal caliber.  Heart size within normal limits.  No pericardial or pleural effusion.  Mild scattered calcific atherosclerosis. Upper abdomen: Extensive pancreatic calcifications, probable sequela of chronic pancreatitis. Chest wall: Mild gynecomastia. Bones: No marrow replacement process.     4.5 cm right upper lobe cavitary mass with adjacent 9 mm satellite lesion, as above, concerning for infectious etiology/atypical pneumonia.  Malignancy could have a similar appearance. Large thin walled left upper  lobe bulla, stable. Mild emphysematous lung changes. This report was flagged in Epic as abnormal. Electronically signed by: Juvencio Cardona MD Date:    05/15/2022 Time:    12:10    MRI Brain Without Contrast    Result Date: 5/16/2022  EXAMINATION: MRI BRAIN WITHOUT CONTRAST CLINICAL HISTORY: Mental status change, unknown cause; TECHNIQUE: Multiplanar multisequence MR imaging of the brain was performed without contrast. COMPARISON: CT head 05/13/2022 MRI 10/27/2021 FINDINGS: there is central volume loss, similar to prior studies.  There is no evidence of hydrocephalus, mass effect, intracranial hemorrhage or acute infarct. Focal lesions within the periventricular white matter bilaterally are stable from the prior study consistent with prior nonspecific insult.  No new white matter lesions are noted. Normal arterial flow voids are preserved at the skull base. The visualized sinuses and mastoid air cells are clear.     Stable appearance of the brain as detailed above...  No acute intracranial process. Electronically signed by: Ibrahima Soria Date:    05/16/2022 Time:    11:55    X-Ray Chest AP Portable    Result Date: 5/13/2022  EXAMINATION: XR CHEST AP PORTABLE CLINICAL HISTORY: Sepsis; TECHNIQUE: Single frontal view of the chest was performed. COMPARISON: Chest radiograph 03/28/2022, CT chest 10/27/2021 FINDINGS: Cardiac monitoring leads overlie the chest.  Cardiac silhouette is not significantly enlarged.  There is a stable large thin walled cyst projecting over the left upper lung zone.  There is increased fullness of the right hilar region, not well appreciated on prior radiograph examination.  Additionally, there is a possible new 1.0 cm nodular opacity projecting over the right upper lung zone.  The remaining lungs demonstrate no new confluent airspace consolidation.  No significant volume of pleural fluid or pneumothorax appreciated.  Osseous structures appear grossly intact.     1.  Increased fullness of the  right hilar region, not well appreciated on prior radiograph examination as well as possible new 1.0 cm nodular opacity projecting over the right upper lung zone.  Given short-term interval development, this could relate to underlying infectious process, however neoplastic process not excluded.  Consider further assessment with dedicated chest CT. 2.  Stable large thin walled left upper lung zone cyst. Electronically signed by: Lenny Pereira MD Date:    05/13/2022 Time:    03:45    Microbiology Results (last 7 days)       ** No results found for the last 168 hours. **                Assessment/Plan:      Pneumonia  Pt afebrile but w/ leokocytosis and new findings on CXR   Initiated Broad spec abx in ED w/ Vanc/cef/flagyl after 1 day switched to augmentin/azithromycin for 2 days then cultures showing MRSA so put back on Vanc for 1 day 5/17 and transitioned to IV Clinda for 7 days on 5/19  Bld cultures drawn in ED w/ NGTD  CT chest w/ 4.5 cm right upper lobe cavitary mass with adjacent 9 mm satellite lesion which is concerning for infectious etiology/atypical pneumonia as well as a large thin walled left upper lobe bulla  Quant Gold and TB PCR neg AFB x1 w/o AFB 2x pending     Plan:  Pulm consulted  Rec cnt pneumonia abx but await 3x AFB smears and TB PCR  Cnt Clinda for 7 days end 5/25   Cnt to monitor for fevers and repeat cultures/imaging      AMS (altered mental status)  Pt w/ AMS on admit thought to be related to DKA  Uncertain baseline mentation though previous CVA in 2013  After DKA resolution pt still altered  UDS and EtOH neg on admit   Ammonia slightly elevated to 61  CT head w/o any new intracranial abnormalities  Folate 11.2  VitB12 415  Thiamine 88  B6 6  TSH 0.409  RPR nonreactive  HIV neg   MRI brain w/o w/ no acute intracranial abnormalities   EEG showed generalized cerebral dysfunction but no signs of seizure activity    Pt returned to baseline and has been AOx 3 since 5/17      Epilepsy  Restart  home keppra and depakote  EEG done 5/16 which showed no seizure or epileptiform activity     Hypertension  Patient not on any home anti-HTN medication  Plan  Losartan 25mg daily  Monitor Vitals      Type 2 diabetes mellitus, with long-term current use of insulin  Patient uses Humalin 70/30 to control BG  Plan  MDSSI  Detemir 25U Subq nightly  POCT BG Q4h  Diabetic Diet      ETOH abuse  Pt w/ known EtOH abuse hx  Ethanol neg on admit  Hx of EtOH seizures    Plan:  CIWA q4   Ativan PRN for CIWA >8  Thiamine and multivitamin daily         VTE Risk Mitigation (From admission, onward)         Ordered     IP VTE LOW RISK PATIENT  Once         05/13/22 0530     Place sequential compression device  Until discontinued         05/13/22 0530                Discharge Planning   SHABANA: 5/26/2022     Code Status: Full Code   Is the patient medically ready for discharge?:     Reason for patient still in hospital (select all that apply): Patient trending condition  Discharge Plan A: Shelter   Discharge Delays: None known at this time      Corby Grande MD  Department of Hospital Medicine   [facility]

## 2022-05-26 NOTE — ED PROVIDER NOTES
Encounter Date: 5/26/2022       History     Chief Complaint   Patient presents with    Rapid Response     Pt found by nursing staff lying on the sidewalk, covered in feces. Pt states he does not remember what happened and does is not sure what happened. Pt noted to have hospital band on and upon review is noted to have been discharged from [facility] via wheelchair to the [facility] at approx [id number] this afternoon. Patient denies eating or drinking anything since being dropped off.       42-year-old male history T2DM, HTN, CVA, neurocognitive impairment and substance abuse disorder presents ER for evaluation of altered mental status.  Patient was found on the sidewalk covered in feces and not remembering what happened.  Patient states that he was just discharged from the hospital earlier today.  He was supposed to go to the shelter.  Patient states that he remembers getting to the shelter and then walking around when he became lightheaded.  He states that he does not recall anything after that.  He does report some pain to the right shoulder with an abrasion to the site.  He denies alcohol or drug use since discharge from the hospital today.  Does not use any blood thinners.  Patient states he has not ate or drank anything since this morning.     The history is provided by the patient.     Review of patient's allergies indicates:  No Known Allergies  Past Medical History:   Diagnosis Date    Diabetes mellitus     Seizures     Stroke 2013     Past Surgical History:   Procedure Laterality Date    TRANSESOPHAGEAL ECHOCARDIOGRAPHY N/A 6/1/2021    Procedure: ECHOCARDIOGRAM, TRANSESOPHAGEAL;  Surgeon: Neo Diagnostic Provider;  Location: [facility] EP LAB;  Service: Anesthesiology;  Laterality: N/A;     No family history on file.  Social History     Tobacco Use    Smoking status: Current Some Day Smoker     Packs/day: 0.25     Years: 24.00     Pack years: 6.00     Types: Cigarettes, Cigars     Start date: 1998     Smokeless tobacco: Never Used    Tobacco comment: Pt states currently smoking 3 cigs/day. He declines referral to Ambulatory Smoking Cessation Program   Substance Use Topics    Alcohol use: Yes     Alcohol/week: 30.0 standard drinks     Types: 30 Shots of liquor per week    Drug use: Not Currently     Review of Systems   Constitutional: Negative for chills and fever.   HENT: Negative for congestion.    Eyes: Negative for visual disturbance.   Respiratory: Negative for shortness of breath.    Cardiovascular: Negative for chest pain.   Gastrointestinal: Negative for abdominal pain, nausea and vomiting.   Genitourinary: Negative for dysuria and flank pain.   Musculoskeletal: Positive for arthralgias. Negative for myalgias.   Skin: Negative for rash.   Allergic/Immunologic: Negative for immunocompromised state.   Neurological: Positive for dizziness and syncope. Negative for weakness and numbness.   Hematological: Does not bruise/bleed easily.   Psychiatric/Behavioral: Negative for confusion.       Physical Exam     Initial Vitals [05/26/22 1749]   BP Pulse Resp Temp SpO2   (!) 89/59 79 16 97.8 °F (36.6 °C) 100 %      MAP       --         Physical Exam    Vitals reviewed.  Constitutional: He appears well-developed and well-nourished. He is not diaphoretic. No distress.   HENT:   Head: Normocephalic and atraumatic.   Eyes: Conjunctivae and EOM are normal.   Neck: Neck supple.   Cardiovascular: Normal rate, regular rhythm, normal heart sounds and intact distal pulses.   Pulmonary/Chest: Breath sounds normal. No respiratory distress.   Abdominal: Abdomen is soft. He exhibits no distension. There is no abdominal tenderness. There is no rebound.   Musculoskeletal:         General: Normal range of motion.      Right shoulder: Bony tenderness (with small abrasion to the right posterior shoulder ) present. Normal range of motion.      Cervical back: Neck supple.     Neurological: He is alert and oriented to person, place,  and time. He has normal strength. No sensory deficit. Coordination and gait normal. GCS eye subscore is 4. GCS verbal subscore is 5. GCS motor subscore is 6.   Skin: Skin is warm. No rash noted. No erythema.         ED Course   Procedures  Labs Reviewed   CBC W/ AUTO DIFFERENTIAL - Abnormal; Notable for the following components:       Result Value    WBC 16.92 (*)     RBC 4.33 (*)     Hemoglobin 13.9 (*)     MCH 32.1 (*)     Immature Granulocytes 0.9 (*)     Gran # (ANC) 13.3 (*)     Immature Grans (Abs) 0.16 (*)     Mono # 1.1 (*)     Gran % 78.6 (*)     Lymph % 13.0 (*)     All other components within normal limits    Narrative:     Release to patient->Immediate   COMPREHENSIVE METABOLIC PANEL - Abnormal; Notable for the following components:    CO2 22 (*)     Glucose 111 (*)     All other components within normal limits    Narrative:     Release to patient->Immediate   URINALYSIS, REFLEX TO URINE CULTURE - Abnormal; Notable for the following components:    Protein, UA Trace (*)     Ketones, UA Trace (*)     All other components within normal limits    Narrative:     Specimen Source->Urine   HIV 1 / 2 ANTIBODY    Narrative:     Release to patient->Immediate   HEPATITIS C ANTIBODY    Narrative:     Release to patient->Immediate   B-TYPE NATRIURETIC PEPTIDE    Narrative:     Release to patient->Immediate   MAGNESIUM    Narrative:     Release to patient->Immediate   TROPONIN I    Narrative:     Release to patient->Immediate   ALCOHOL,MEDICAL (ETHANOL)    Narrative:     Release to patient->Immediate   DRUG SCREEN PANEL, URINE EMERGENCY    Narrative:     Specimen Source->Urine   POCT GLUCOSE   POCT GLUCOSE MONITORING CONTINUOUS          Imaging Results          CT Head Without Contrast (Final result)  Result time 05/26/22 19:46:17    Final result by Irma Goncalves MD (05/26/22 19:46:17)                 Impression:      No acute intracranial abnormality detected. No significant change.      Electronically signed  by: Irma Goncalves  Date:    05/26/2022  Time:    19:46             Narrative:    EXAMINATION:  CT OF THE HEAD WITHOUT    CLINICAL HISTORY:  Memory loss;Facial trauma, blunt;    TECHNIQUE:  5 mm unenhanced axial images were obtained from the skull base to the vertex.    COMPARISON:  MRI brain 05/16/2022 and CT head 05/13/2022    FINDINGS:  Stable cerebral atrophy and chronic small vessel ischemic changes are present.  There is no acute intracranial hemorrhage, territorial infarct, mass effect, or midline shift.  There are remote appearing infarcts in the left basal ganglia, left corona radiata, anterior limb of the right internal capsule.  The visualized paranasal sinuses are clear.                               X-ray Shoulder 2 or More Views Right (Final result)  Result time 05/26/22 19:29:47   Procedure changed from X-Ray Shoulder Trauma Right     Final result by Pete Porter MD (05/26/22 19:29:47)                 Impression:      1. Distal right clavicular fracture as above.      Electronically signed by: Pete Porter MD  Date:    05/26/2022  Time:    19:29             Narrative:    EXAMINATION:  XR SHOULDER COMPLETE 2 OR MORE VIEWS RIGHT    CLINICAL HISTORY:  pain ;Unspecified fall, initial encounter    TECHNIQUE:  Two or three views of the right shoulder were performed.    COMPARISON:  None    FINDINGS:  Three views right shoulder.    There is acute displaced fracture involving the distal aspect of the clavicle.  The acromioclavicular joint is otherwise intact.  No glenohumeral dislocation.  No acute displaced rib fracture.  The visualized lung zones are clear.                               X-Ray Chest AP Portable (Final result)  Result time 05/26/22 19:29:05    Final result by Pete Porter MD (05/26/22 19:29:05)                 Impression:      1. Emphysematous changes, similar to the previous examination.  Nodular foci identified previous exam are less conspicuous on current exam however  please see CT 05/15/2022.      Electronically signed by: Pete Porter MD  Date:    05/26/2022  Time:    19:29             Narrative:    EXAMINATION:  XR CHEST AP PORTABLE    CLINICAL HISTORY:  Altered mental status, unspecified    TECHNIQUE:  Single frontal view of the chest was performed.    COMPARISON:  05/13/2022, CT 05/15/2022    FINDINGS:  The cardiomediastinal silhouette is not enlarged noting calcification of the aorta..  There is no pleural effusion.  The trachea is midline.  The lungs are symmetrically expanded bilaterally with coarse interstitial attenuation and scattered regions of bandlike atelectasis or scarring.  There is bullous change projected over the left upper lung zone, similar to the previous exam..  Increased parenchymal attenuation within the right hilar region on prior exam is less conspicuous on current exam as well as previously measured nodular focus projected over the right upper lung zone.  No new large focal consolidation.  There is no pneumothorax.  The osseous structures are remarkable for degenerative change..                                 Medications   sodium chloride 0.9% bolus 1,000 mL (0 mLs Intravenous Stopped 5/26/22 2009)   acetaminophen tablet 1,000 mg (1,000 mg Oral Given 5/26/22 2008)           APC / Resident Notes:   Patient seen in the ER promptly upon arrival.  He is afebrile, no acute distress.  Patient is currently alert oriented x4 on my evaluation.  Small abrasion noted to the right shoulder with some mild tenderness surrounding the site.  Range of motion of the shoulder fully intact.  No focal neurological deficit on exam.  No spinous process tenderness.  Heart and lung sounds normal.  Abdomen soft, nondistended, nontender.  Patient was covered in feces on initial arrival.    CBG 95. EKG shows normal sinus rhythm at a rate of 72 beats per minute.    Chart review:  Patient had recent hospitalization on 5/13-5/26.  On initial arrival was found to be altered was  admitted to hospital medicine at [facility] for AMS and DKA.  Had extensive evaluation and workup.  Was found to have MRSA sputum culture.  Was covered with clindamycin during hospital stay.  Patient had workup for TB versus pneumonia and pulmonology was consulted at the time.  TB workup negative  Evaluated by neurology. Patient also underwent CT head, MRI and EEG which were fairly unremarkable with EEG which showed generalized cerebral dysfxn but no epileptiform activity.  As per note patient was alert and oriented x3 upon discharge.  Social work consulted and patient was placed at shelter    Lab work today shows leukocytosis of 16,000 otherwise unremarkable.  Chemistries unremarkable.  Cardiac workup was normal today.  Urine drug screen negative.  Alcohol level unremarkable.  CT of the head is negative for acute intracranial hemorrhage.  X-ray of the chest is negative for acute pulmonary pathology.  Emphysematous changes noted.  Similar chronic findings.    X-ray of shoulder concerning for distal right clavicular fracture.  Patient was placed into a sling for support.  He was given Tylenol for pain.    On reassessment patient is resting comfortably.  He was able to ambulate in the ER without difficulty.  [facility] was called but they are currently not accepting patient as it is passed 6:00 pm.  Admissions was called who stated that they will accept patient.    Patient was given printout of all his prescriptions from earlier today.  Discharge instructions given.  Patient advised to take Tylenol for pain as needed.  He was given strict return precautions ED.  Stable for discharge and close follow-up at this time. The care of this patient was overseen by attending physician who agrees with treatment, plan, and disposition.    Disclaimer: This note has been generated using voice-recognition software. There may be typographical errors that have been missed during proof-reading.                   Clinical  "Impression:   Final diagnoses:  [R41.82] Altered mental status  [W19.XXXA] Fall  [S42.031A] Closed displaced fracture of acromial end of right clavicle, initial encounter  [R55] Syncope, unspecified syncope type (Primary)          ED Disposition Condition    Discharge Stable        ED Prescriptions     Medication Sig Dispense Start Date End Date Auth. Provider    divalproex ER (DEPAKOTE) 500 MG Tb24 Take 2 tablets (1,000 mg total) by mouth every 12 (twelve) hours. 120 tablet 5/26/2022 5/26/2023 Suzette Simon PA-C    blood-glucose meter Misc use as directed 1 each 5/26/2022 5/26/2023 Suzette Simon PA-C    blood sugar diagnostic (ONETOUCH VERIO TEST STRIPS) Strp Use to test blood glucose 2 (two) times daily with meals. 100 each 5/26/2022  Suzette Simon PA-C    losartan (COZAAR) 25 MG tablet Take 1 tablet (25 mg total) by mouth once daily. 90 tablet 5/26/2022 5/26/2023 Suzette Simon PA-C    pen needle, diabetic 32 gauge x 5/32" Ndle Injecting twice daily 100 each 5/26/2022  Suzette Simon PA-C    levETIRAcetam (KEPPRA) 750 MG Tab Take 1 tablet (750 mg total) by mouth 2 (two) times daily. 60 tablet 5/26/2022 8/24/2022 Suzette Simon PA-C    lancets 30 gauge Misc 1 lancet by Misc.(Non-Drug; Combo Route) route 2 (two) times daily with meals. 100 each 5/26/2022  Suzette Simon PA-C    insulin NPH/Reg human (HUMULIN, 70/30,) 100 unit/mL (70-30) InPn pen Inject 25 units into the skin with breakfast and 15 units with dinner or evening meal. Please see your primary care physician for further adjustments as needed. 15 mL 5/26/2022  Suzette Simon PA-C        Follow-up Information     Follow up With Specialties Details Why Contact [facility] Surgical Oncology, Orthopedic Surgery, Genetics, Physical Medicine and Rehabilitation, Occupational Therapy, Radiology   [address] [facility] [address]  [phone]             Suzette Simon PA-C  05/26/22 2144    "

## 2022-05-27 NOTE — DISCHARGE INSTRUCTIONS
Take tylenol for pain. Use the sling for comfort. Follow up with orthopedist for your clavicle fracture.     CLEARED for Shelter placement. TB screening negative.

## 2022-05-27 NOTE — ED NOTES
Called [facility] (homeless shelter) emergency hot-line and was informed there is a bed available for patient to stay tonight. RAHEL Simon PA-C notified.

## 2022-06-13 ENCOUNTER — TELEPHONE (OUTPATIENT)
Dept: PULMONOLOGY | Facility: HOSPITAL | Age: 42
End: 2022-06-13
Payer: MEDICAID

## 2022-06-13 NOTE — TELEPHONE ENCOUNTER
Attempted to call pt at [phone], phone number listed in chart. No answer, LVM to call [facility]. Called sister Dot at [phone], who states she has not heard from his Brother Terrance since April, when she last dropped him off at [facility] rehab. She does not know if he has a working phone number or active number.    Trying to reach pt regarding AFB culture and smear results showing MAIC. If able to contact or reach, pt, will need to set up with [facility] lung clinic for f/u.    Ted Dyer MD  [facility] FM, PGY-2

## 2022-06-23 ENCOUNTER — TELEPHONE (OUTPATIENT)
Dept: PULMONOLOGY | Facility: HOSPITAL | Age: 42
End: 2022-06-23
Payer: MEDICAID

## 2022-06-23 NOTE — TELEPHONE ENCOUNTER
Attempted to reach pt at phone number's listed in contact information. [phone] (Mobile) number not in service. Reached pt's sister Dto who states she has not heard from Terrance yet since last we last spoke. Neither Pete or Luiza, 2 other siblings, have heard from Terrance. Dot confirmed she still has [facility] clinic phone number for Terrance Chavez to call back in order to arrange outpt pulm treatmtment for MAIC infection.     Ted Dyer MD  [facility] FM, PGY-2

## 2022-07-29 ENCOUNTER — TELEPHONE (OUTPATIENT)
Dept: HEPATOLOGY | Facility: HOSPITAL | Age: 42
End: 2022-07-29
Payer: MEDICAID

## 2022-07-29 LAB — ACID FAST SUSCEPTIBILITY, SLOW GROWER: ABNORMAL

## 2022-11-06 ENCOUNTER — HOSPITAL ENCOUNTER (INPATIENT)
Facility: HOSPITAL | Age: 42
LOS: 5 days | Discharge: HOME | DRG: 101 | End: 2022-11-11
Attending: STUDENT IN AN ORGANIZED HEALTH CARE EDUCATION/TRAINING PROGRAM | Admitting: STUDENT IN AN ORGANIZED HEALTH CARE EDUCATION/TRAINING PROGRAM
Payer: MEDICAID

## 2022-11-06 DIAGNOSIS — R07.9 CHEST PAIN: ICD-10-CM

## 2022-11-06 DIAGNOSIS — G40.909 SEIZURE DISORDER: Primary | ICD-10-CM

## 2022-11-06 DIAGNOSIS — E11.65 TYPE 2 DIABETES MELLITUS WITH HYPERGLYCEMIA, WITHOUT LONG-TERM CURRENT USE OF INSULIN: ICD-10-CM

## 2022-11-06 DIAGNOSIS — R56.9 SEIZURE: ICD-10-CM

## 2022-11-06 DIAGNOSIS — E87.29 STARVATION KETOACIDOSIS: ICD-10-CM

## 2022-11-06 DIAGNOSIS — T73.0XXA STARVATION KETOACIDOSIS: ICD-10-CM

## 2022-11-06 DIAGNOSIS — G93.40 ENCEPHALOPATHY: ICD-10-CM

## 2022-11-06 DIAGNOSIS — R53.81 DEBILITY: ICD-10-CM

## 2022-11-06 PROCEDURE — 96375 TX/PRO/DX INJ NEW DRUG ADDON: CPT

## 2022-11-06 PROCEDURE — 63600175 PHARM REV CODE 636 W HCPCS

## 2022-11-06 PROCEDURE — 99291 CRITICAL CARE FIRST HOUR: CPT

## 2022-11-06 PROCEDURE — 96374 THER/PROPH/DIAG INJ IV PUSH: CPT

## 2022-11-06 PROCEDURE — 12000002 HC ACUTE/MED SURGE SEMI-PRIVATE ROOM

## 2022-11-06 RX ORDER — LORAZEPAM 2 MG/ML
INJECTION INTRAMUSCULAR
Status: COMPLETED
Start: 2022-11-06 | End: 2022-11-06

## 2022-11-06 RX ORDER — LORAZEPAM 2 MG/ML
2 INJECTION INTRAMUSCULAR
Status: COMPLETED | OUTPATIENT
Start: 2022-11-06 | End: 2022-11-06

## 2022-11-06 RX ORDER — LEVETIRACETAM 500 MG/5ML
1000 INJECTION, SOLUTION, CONCENTRATE INTRAVENOUS ONCE
Status: COMPLETED | OUTPATIENT
Start: 2022-11-06 | End: 2022-11-07

## 2022-11-06 RX ADMIN — LORAZEPAM 2 MG: 2 INJECTION INTRAMUSCULAR at 11:11

## 2022-11-06 RX ADMIN — LORAZEPAM 2 MG: 2 INJECTION INTRAMUSCULAR; INTRAVENOUS at 11:11

## 2022-11-07 PROBLEM — R41.82 ALTERED MENTAL STATE: Status: ACTIVE | Noted: 2022-11-07

## 2022-11-07 PROBLEM — T73.0XXA STARVATION KETOACIDOSIS: Status: ACTIVE | Noted: 2022-11-07

## 2022-11-07 PROBLEM — E87.29 HIGH ANION GAP METABOLIC ACIDOSIS: Status: ACTIVE | Noted: 2022-11-07

## 2022-11-07 PROBLEM — G40.909 SEIZURE DISORDER: Status: ACTIVE | Noted: 2022-11-07

## 2022-11-07 LAB
ALBUMIN SERPL BCP-MCNC: 4.4 G/DL (ref 3.5–5.2)
ALLENS TEST: ABNORMAL
ALP SERPL-CCNC: 73 U/L (ref 55–135)
ALT SERPL W/O P-5'-P-CCNC: 31 U/L (ref 10–44)
AMPHET+METHAMPHET UR QL: NEGATIVE
ANION GAP SERPL CALC-SCNC: 17 MMOL/L (ref 8–16)
ANION GAP SERPL CALC-SCNC: 26 MMOL/L (ref 8–16)
APAP SERPL-MCNC: <3 UG/ML (ref 10–20)
AST SERPL-CCNC: 30 U/L (ref 10–40)
B-OH-BUTYR BLD STRIP-SCNC: 5.4 MMOL/L (ref 0–0.5)
BARBITURATES UR QL SCN>200 NG/ML: NEGATIVE
BASOPHILS # BLD AUTO: 0.05 K/UL (ref 0–0.2)
BASOPHILS NFR BLD: 0.4 % (ref 0–1.9)
BENZODIAZ UR QL SCN>200 NG/ML: NEGATIVE
BILIRUB SERPL-MCNC: 0.8 MG/DL (ref 0.1–1)
BILIRUB UR QL STRIP: NEGATIVE
BUN SERPL-MCNC: 16 MG/DL (ref 6–20)
BUN SERPL-MCNC: 18 MG/DL (ref 6–20)
BZE UR QL SCN: NEGATIVE
CALCIUM SERPL-MCNC: 9.4 MG/DL (ref 8.7–10.5)
CALCIUM SERPL-MCNC: 9.8 MG/DL (ref 8.7–10.5)
CANNABINOIDS UR QL SCN: NEGATIVE
CHLORIDE SERPL-SCNC: 102 MMOL/L (ref 95–110)
CHLORIDE SERPL-SCNC: 104 MMOL/L (ref 95–110)
CLARITY UR: CLEAR
CO2 SERPL-SCNC: 13 MMOL/L (ref 23–29)
CO2 SERPL-SCNC: 19 MMOL/L (ref 23–29)
COLOR UR: YELLOW
CREAT SERPL-MCNC: 1.1 MG/DL (ref 0.5–1.4)
CREAT SERPL-MCNC: 1.1 MG/DL (ref 0.5–1.4)
CREAT UR-MCNC: 62.5 MG/DL (ref 23–375)
DELSYS: ABNORMAL
DIFFERENTIAL METHOD: ABNORMAL
EOSINOPHIL # BLD AUTO: 0 K/UL (ref 0–0.5)
EOSINOPHIL NFR BLD: 0.2 % (ref 0–8)
ERYTHROCYTE [DISTWIDTH] IN BLOOD BY AUTOMATED COUNT: 14.3 % (ref 11.5–14.5)
EST. GFR  (NO RACE VARIABLE): >60 ML/MIN/1.73 M^2
EST. GFR  (NO RACE VARIABLE): >60 ML/MIN/1.73 M^2
ESTIMATED AVG GLUCOSE: 183 MG/DL (ref 68–131)
ETHANOL SERPL-MCNC: <10 MG/DL
GLUCOSE SERPL-MCNC: 239 MG/DL (ref 70–110)
GLUCOSE SERPL-MCNC: 246 MG/DL (ref 70–110)
GLUCOSE UR QL STRIP: NEGATIVE
HBA1C MFR BLD: 8 % (ref 4–5.6)
HCO3 UR-SCNC: 21.4 MMOL/L (ref 24–28)
HCT VFR BLD AUTO: 41.4 % (ref 40–54)
HGB BLD-MCNC: 14.2 G/DL (ref 14–18)
HGB UR QL STRIP: NEGATIVE
IMM GRANULOCYTES # BLD AUTO: 0.12 K/UL (ref 0–0.04)
IMM GRANULOCYTES NFR BLD AUTO: 1 % (ref 0–0.5)
KETONES UR QL STRIP: ABNORMAL
LACTATE SERPL-SCNC: 1.6 MMOL/L (ref 0.5–2.2)
LEUKOCYTE ESTERASE UR QL STRIP: NEGATIVE
LYMPHOCYTES # BLD AUTO: 1.9 K/UL (ref 1–4.8)
LYMPHOCYTES NFR BLD: 15.5 % (ref 18–48)
MAGNESIUM SERPL-MCNC: 2.2 MG/DL (ref 1.6–2.6)
MCH RBC QN AUTO: 29.6 PG (ref 27–31)
MCHC RBC AUTO-ENTMCNC: 34.3 G/DL (ref 32–36)
MCV RBC AUTO: 86 FL (ref 82–98)
METHADONE UR QL SCN>300 NG/ML: NEGATIVE
MODE: ABNORMAL
MONOCYTES # BLD AUTO: 1.1 K/UL (ref 0.3–1)
MONOCYTES NFR BLD: 9.1 % (ref 4–15)
NEUTROPHILS # BLD AUTO: 9.1 K/UL (ref 1.8–7.7)
NEUTROPHILS NFR BLD: 73.8 % (ref 38–73)
NITRITE UR QL STRIP: NEGATIVE
NRBC BLD-RTO: 0 /100 WBC
OPIATES UR QL SCN: NEGATIVE
PCO2 BLDA: 40.5 MMHG (ref 35–45)
PCP UR QL SCN>25 NG/ML: NEGATIVE
PH SMN: 7.33 [PH] (ref 7.35–7.45)
PH UR STRIP: 5 [PH] (ref 5–8)
PHOSPHATE SERPL-MCNC: 4.9 MG/DL (ref 2.7–4.5)
PLATELET # BLD AUTO: 288 K/UL (ref 150–450)
PMV BLD AUTO: 9.7 FL (ref 9.2–12.9)
PO2 BLDA: 27 MMHG (ref 40–60)
POC BE: -4 MMOL/L
POC SATURATED O2: 47 % (ref 95–100)
POC TCO2: 23 MMOL/L (ref 24–29)
POCT GLUCOSE: 217 MG/DL (ref 70–110)
POCT GLUCOSE: 222 MG/DL (ref 70–110)
POCT GLUCOSE: 256 MG/DL (ref 70–110)
POCT GLUCOSE: 257 MG/DL (ref 70–110)
POTASSIUM SERPL-SCNC: 4.2 MMOL/L (ref 3.5–5.1)
POTASSIUM SERPL-SCNC: 4.3 MMOL/L (ref 3.5–5.1)
PROT SERPL-MCNC: 8.1 G/DL (ref 6–8.4)
PROT UR QL STRIP: ABNORMAL
RBC # BLD AUTO: 4.79 M/UL (ref 4.6–6.2)
SALICYLATES SERPL-MCNC: <5 MG/DL (ref 15–30)
SAMPLE: ABNORMAL
SITE: ABNORMAL
SODIUM SERPL-SCNC: 140 MMOL/L (ref 136–145)
SODIUM SERPL-SCNC: 141 MMOL/L (ref 136–145)
SP GR UR STRIP: 1.01 (ref 1–1.03)
TOXICOLOGY INFORMATION: NORMAL
URN SPEC COLLECT METH UR: ABNORMAL
UROBILINOGEN UR STRIP-ACNC: NEGATIVE EU/DL
WBC # BLD AUTO: 12.27 K/UL (ref 3.9–12.7)

## 2022-11-07 PROCEDURE — 84100 ASSAY OF PHOSPHORUS: CPT | Performed by: STUDENT IN AN ORGANIZED HEALTH CARE EDUCATION/TRAINING PROGRAM

## 2022-11-07 PROCEDURE — 95813 EEG EXTND MNTR 61-119 MIN: CPT | Mod: 26,,, | Performed by: PSYCHIATRY & NEUROLOGY

## 2022-11-07 PROCEDURE — 63600175 PHARM REV CODE 636 W HCPCS: Performed by: STUDENT IN AN ORGANIZED HEALTH CARE EDUCATION/TRAINING PROGRAM

## 2022-11-07 PROCEDURE — 83605 ASSAY OF LACTIC ACID: CPT | Performed by: STUDENT IN AN ORGANIZED HEALTH CARE EDUCATION/TRAINING PROGRAM

## 2022-11-07 PROCEDURE — 93010 ELECTROCARDIOGRAM REPORT: CPT | Mod: ,,, | Performed by: INTERNAL MEDICINE

## 2022-11-07 PROCEDURE — 83036 HEMOGLOBIN GLYCOSYLATED A1C: CPT | Performed by: STUDENT IN AN ORGANIZED HEALTH CARE EDUCATION/TRAINING PROGRAM

## 2022-11-07 PROCEDURE — 93005 ELECTROCARDIOGRAM TRACING: CPT

## 2022-11-07 PROCEDURE — 82010 KETONE BODYS QUAN: CPT | Performed by: STUDENT IN AN ORGANIZED HEALTH CARE EDUCATION/TRAINING PROGRAM

## 2022-11-07 PROCEDURE — 95813 PR EEG, EXTENDED, 61-119 MINS: ICD-10-PCS | Mod: 26,,, | Performed by: PSYCHIATRY & NEUROLOGY

## 2022-11-07 PROCEDURE — 85025 COMPLETE CBC W/AUTO DIFF WBC: CPT | Performed by: STUDENT IN AN ORGANIZED HEALTH CARE EDUCATION/TRAINING PROGRAM

## 2022-11-07 PROCEDURE — 80048 BASIC METABOLIC PNL TOTAL CA: CPT | Mod: XB | Performed by: STUDENT IN AN ORGANIZED HEALTH CARE EDUCATION/TRAINING PROGRAM

## 2022-11-07 PROCEDURE — 93010 EKG 12-LEAD: ICD-10-PCS | Mod: ,,, | Performed by: INTERNAL MEDICINE

## 2022-11-07 PROCEDURE — 11000001 HC ACUTE MED/SURG PRIVATE ROOM

## 2022-11-07 PROCEDURE — 82077 ASSAY SPEC XCP UR&BREATH IA: CPT | Performed by: STUDENT IN AN ORGANIZED HEALTH CARE EDUCATION/TRAINING PROGRAM

## 2022-11-07 PROCEDURE — 99223 1ST HOSP IP/OBS HIGH 75: CPT | Mod: ,,, | Performed by: STUDENT IN AN ORGANIZED HEALTH CARE EDUCATION/TRAINING PROGRAM

## 2022-11-07 PROCEDURE — 95813 EEG EXTND MNTR 61-119 MIN: CPT

## 2022-11-07 PROCEDURE — 80307 DRUG TEST PRSMV CHEM ANLYZR: CPT | Performed by: STUDENT IN AN ORGANIZED HEALTH CARE EDUCATION/TRAINING PROGRAM

## 2022-11-07 PROCEDURE — 80179 DRUG ASSAY SALICYLATE: CPT | Performed by: STUDENT IN AN ORGANIZED HEALTH CARE EDUCATION/TRAINING PROGRAM

## 2022-11-07 PROCEDURE — S5010 5% DEXTROSE AND 0.45% SALINE: HCPCS | Performed by: STUDENT IN AN ORGANIZED HEALTH CARE EDUCATION/TRAINING PROGRAM

## 2022-11-07 PROCEDURE — 81003 URINALYSIS AUTO W/O SCOPE: CPT | Mod: 59 | Performed by: STUDENT IN AN ORGANIZED HEALTH CARE EDUCATION/TRAINING PROGRAM

## 2022-11-07 PROCEDURE — 25000003 PHARM REV CODE 250: Performed by: STUDENT IN AN ORGANIZED HEALTH CARE EDUCATION/TRAINING PROGRAM

## 2022-11-07 PROCEDURE — 99223 PR INITIAL HOSPITAL CARE,LEVL III: ICD-10-PCS | Mod: ,,, | Performed by: STUDENT IN AN ORGANIZED HEALTH CARE EDUCATION/TRAINING PROGRAM

## 2022-11-07 PROCEDURE — 83735 ASSAY OF MAGNESIUM: CPT | Performed by: STUDENT IN AN ORGANIZED HEALTH CARE EDUCATION/TRAINING PROGRAM

## 2022-11-07 PROCEDURE — 80053 COMPREHEN METABOLIC PANEL: CPT | Performed by: STUDENT IN AN ORGANIZED HEALTH CARE EDUCATION/TRAINING PROGRAM

## 2022-11-07 PROCEDURE — 80143 DRUG ASSAY ACETAMINOPHEN: CPT | Performed by: STUDENT IN AN ORGANIZED HEALTH CARE EDUCATION/TRAINING PROGRAM

## 2022-11-07 RX ORDER — MAG HYDROX/ALUMINUM HYD/SIMETH 200-200-20
30 SUSPENSION, ORAL (FINAL DOSE FORM) ORAL 4 TIMES DAILY PRN
Status: DISCONTINUED | OUTPATIENT
Start: 2022-11-07 | End: 2022-11-11 | Stop reason: HOSPADM

## 2022-11-07 RX ORDER — GLUCAGON 1 MG
1 KIT INJECTION
Status: DISCONTINUED | OUTPATIENT
Start: 2022-11-07 | End: 2022-11-08

## 2022-11-07 RX ORDER — LANOLIN ALCOHOL/MO/W.PET/CERES
800 CREAM (GRAM) TOPICAL
Status: DISCONTINUED | OUTPATIENT
Start: 2022-11-07 | End: 2022-11-11 | Stop reason: HOSPADM

## 2022-11-07 RX ORDER — SODIUM,POTASSIUM PHOSPHATES 280-250MG
2 POWDER IN PACKET (EA) ORAL
Status: DISCONTINUED | OUTPATIENT
Start: 2022-11-07 | End: 2022-11-11 | Stop reason: HOSPADM

## 2022-11-07 RX ORDER — ACETAMINOPHEN 325 MG/1
650 TABLET ORAL EVERY 8 HOURS PRN
Status: DISCONTINUED | OUTPATIENT
Start: 2022-11-07 | End: 2022-11-11 | Stop reason: HOSPADM

## 2022-11-07 RX ORDER — NALOXONE HCL 0.4 MG/ML
0.02 VIAL (ML) INJECTION
Status: DISCONTINUED | OUTPATIENT
Start: 2022-11-07 | End: 2022-11-11 | Stop reason: HOSPADM

## 2022-11-07 RX ORDER — IBUPROFEN 200 MG
24 TABLET ORAL
Status: DISCONTINUED | OUTPATIENT
Start: 2022-11-07 | End: 2022-11-11 | Stop reason: HOSPADM

## 2022-11-07 RX ORDER — ACETAMINOPHEN 325 MG/1
650 TABLET ORAL EVERY 4 HOURS PRN
Status: DISCONTINUED | OUTPATIENT
Start: 2022-11-07 | End: 2022-11-11 | Stop reason: HOSPADM

## 2022-11-07 RX ORDER — HYDROCODONE BITARTRATE AND ACETAMINOPHEN 5; 325 MG/1; MG/1
1 TABLET ORAL EVERY 6 HOURS PRN
Status: DISCONTINUED | OUTPATIENT
Start: 2022-11-07 | End: 2022-11-11 | Stop reason: HOSPADM

## 2022-11-07 RX ORDER — DEXTROSE MONOHYDRATE AND SODIUM CHLORIDE 5; .45 G/100ML; G/100ML
INJECTION, SOLUTION INTRAVENOUS CONTINUOUS
Status: DISCONTINUED | OUTPATIENT
Start: 2022-11-07 | End: 2022-11-07

## 2022-11-07 RX ORDER — SIMETHICONE 80 MG
1 TABLET,CHEWABLE ORAL 4 TIMES DAILY PRN
Status: DISCONTINUED | OUTPATIENT
Start: 2022-11-07 | End: 2022-11-11 | Stop reason: HOSPADM

## 2022-11-07 RX ORDER — ONDANSETRON 2 MG/ML
4 INJECTION INTRAMUSCULAR; INTRAVENOUS EVERY 8 HOURS PRN
Status: DISCONTINUED | OUTPATIENT
Start: 2022-11-07 | End: 2022-11-11 | Stop reason: HOSPADM

## 2022-11-07 RX ORDER — INSULIN ASPART 100 [IU]/ML
0-5 INJECTION, SOLUTION INTRAVENOUS; SUBCUTANEOUS EVERY 6 HOURS PRN
Status: DISCONTINUED | OUTPATIENT
Start: 2022-11-07 | End: 2022-11-08

## 2022-11-07 RX ORDER — GLUCAGON 1 MG
1 KIT INJECTION
Status: DISCONTINUED | OUTPATIENT
Start: 2022-11-07 | End: 2022-11-11 | Stop reason: HOSPADM

## 2022-11-07 RX ORDER — SODIUM CHLORIDE 0.9 % (FLUSH) 0.9 %
10 SYRINGE (ML) INJECTION EVERY 12 HOURS PRN
Status: DISCONTINUED | OUTPATIENT
Start: 2022-11-07 | End: 2022-11-11 | Stop reason: HOSPADM

## 2022-11-07 RX ORDER — SODIUM CHLORIDE, SODIUM LACTATE, POTASSIUM CHLORIDE, CALCIUM CHLORIDE 600; 310; 30; 20 MG/100ML; MG/100ML; MG/100ML; MG/100ML
INJECTION, SOLUTION INTRAVENOUS CONTINUOUS
Status: DISCONTINUED | OUTPATIENT
Start: 2022-11-07 | End: 2022-11-11

## 2022-11-07 RX ORDER — IPRATROPIUM BROMIDE AND ALBUTEROL SULFATE 2.5; .5 MG/3ML; MG/3ML
3 SOLUTION RESPIRATORY (INHALATION) EVERY 4 HOURS PRN
Status: DISCONTINUED | OUTPATIENT
Start: 2022-11-07 | End: 2022-11-11 | Stop reason: HOSPADM

## 2022-11-07 RX ORDER — LEVETIRACETAM 500 MG/5ML
1000 INJECTION, SOLUTION, CONCENTRATE INTRAVENOUS EVERY 12 HOURS
Status: DISCONTINUED | OUTPATIENT
Start: 2022-11-07 | End: 2022-11-11

## 2022-11-07 RX ORDER — PROCHLORPERAZINE EDISYLATE 5 MG/ML
5 INJECTION INTRAMUSCULAR; INTRAVENOUS EVERY 6 HOURS PRN
Status: DISCONTINUED | OUTPATIENT
Start: 2022-11-07 | End: 2022-11-11 | Stop reason: HOSPADM

## 2022-11-07 RX ORDER — TALC
6 POWDER (GRAM) TOPICAL NIGHTLY PRN
Status: DISCONTINUED | OUTPATIENT
Start: 2022-11-07 | End: 2022-11-11 | Stop reason: HOSPADM

## 2022-11-07 RX ORDER — INSULIN ASPART 100 [IU]/ML
0-5 INJECTION, SOLUTION INTRAVENOUS; SUBCUTANEOUS
Status: DISCONTINUED | OUTPATIENT
Start: 2022-11-07 | End: 2022-11-11 | Stop reason: HOSPADM

## 2022-11-07 RX ORDER — BISACODYL 10 MG
10 SUPPOSITORY, RECTAL RECTAL DAILY PRN
Status: DISCONTINUED | OUTPATIENT
Start: 2022-11-07 | End: 2022-11-11 | Stop reason: HOSPADM

## 2022-11-07 RX ORDER — IBUPROFEN 200 MG
16 TABLET ORAL
Status: DISCONTINUED | OUTPATIENT
Start: 2022-11-07 | End: 2022-11-11 | Stop reason: HOSPADM

## 2022-11-07 RX ORDER — POLYETHYLENE GLYCOL 3350 17 G/17G
17 POWDER, FOR SOLUTION ORAL DAILY
Status: DISCONTINUED | OUTPATIENT
Start: 2022-11-07 | End: 2022-11-10

## 2022-11-07 RX ORDER — LORAZEPAM 2 MG/ML
2 INJECTION INTRAMUSCULAR
Status: DISCONTINUED | OUTPATIENT
Start: 2022-11-07 | End: 2022-11-11 | Stop reason: HOSPADM

## 2022-11-07 RX ADMIN — INSULIN ASPART 3 UNITS: 100 INJECTION, SOLUTION INTRAVENOUS; SUBCUTANEOUS at 01:11

## 2022-11-07 RX ADMIN — SODIUM CHLORIDE, SODIUM LACTATE, POTASSIUM CHLORIDE, AND CALCIUM CHLORIDE: .6; .31; .03; .02 INJECTION, SOLUTION INTRAVENOUS at 07:11

## 2022-11-07 RX ADMIN — LEVETIRACETAM 1000 MG: 100 INJECTION, SOLUTION INTRAVENOUS at 09:11

## 2022-11-07 RX ADMIN — DEXTROSE AND SODIUM CHLORIDE: 5; .45 INJECTION, SOLUTION INTRAVENOUS at 05:11

## 2022-11-07 RX ADMIN — LEVETIRACETAM 1000 MG: 100 INJECTION, SOLUTION INTRAVENOUS at 12:11

## 2022-11-07 RX ADMIN — SODIUM CHLORIDE, SODIUM LACTATE, POTASSIUM CHLORIDE, AND CALCIUM CHLORIDE: .6; .31; .03; .02 INJECTION, SOLUTION INTRAVENOUS at 09:11

## 2022-11-07 RX ADMIN — SODIUM CHLORIDE 1000 ML: 0.9 INJECTION, SOLUTION INTRAVENOUS at 03:11

## 2022-11-07 NOTE — H&P
"[facility] Medicine  History & Physical    Patient Name: Cesar Hernandez  MRN: [id number]  Patient Class: IP- Inpatient  Admission Date: 11/6/2022  Attending Physician: Charles Kraft DO   Primary Care Provider: No primary care provider on file.         Patient information was obtained from patient and ER records.     Subjective:     Principal Problem:Seizure disorder    Chief Complaint:   Chief Complaint   Patient presents with    Seizures     EMS called to Brother's parking lot for witnessed seizure. Pt has hx of seizures. Pt reports compliance with meds but is unsure of name.        HPI: This is a 42 year old male with a PMHx of seizure disorder, homelessness who presented with a seizure.     History is limited as patient is altered. Per EMS, the patient had a witnessed seizure episode in a gas station's parking lot and was still in postictal stat upon EMS arrival. The patient reports that he came to the hospital because of a "stroke" but when corrected says he came because of a seizure. He endorses having a history of seizure but denies being on any medications. While at this ED facility, the patient had another witnessed seizure episode. Labs showed low bicarb (13), hyperglycemia (239) and an elevated anion gap (26). CT head showed mild periventricular white matter disease. Neurology recommended EEG and MRI brain. He was given 1L of NS, ativan 2 mg and Keppra 1 g. He was admitted for further management.       Past Medical History:   Diagnosis Date    Seizure disorder        No past surgical history on file.    Review of patient's allergies indicates:  No Known Allergies    No current facility-administered medications on file prior to encounter.     No current outpatient medications on file prior to encounter.     Family History    None       Tobacco Use    Smoking status: Not on file    Smokeless tobacco: Not on file   Substance and Sexual Activity    Alcohol use: Not on file    " Drug use: Not on file    Sexual activity: Not on file     Review of Systems   Unable to perform ROS: Mental status change   Neurological:  Positive for seizures.   Objective:     Vital Signs (Most Recent):  Temp: 98.6 °F (37 °C) (11/06/22 2313)  Pulse: 91 (11/07/22 0332)  Resp: 18 (11/06/22 2313)  BP: (!) 145/99 (11/07/22 0332)  SpO2: 100 % (11/06/22 2313)   Vital Signs (24h Range):  Temp:  [98.6 °F (37 °C)] 98.6 °F (37 °C)  Pulse:  [82-91] 91  Resp:  [18] 18  SpO2:  [100 %] 100 %  BP: (137-145)/(82-99) 145/99     Weight: 68 kg (150 lb)  Body mass index is 21.52 kg/m².    Physical Exam  Vitals and nursing note reviewed.   Constitutional:       General: He is not in acute distress.     Appearance: Normal appearance. He is not ill-appearing.   HENT:      Head: Normocephalic and atraumatic.      Nose: Nose normal.      Mouth/Throat:      Mouth: Mucous membranes are moist.   Eyes:      Pupils: Pupils are equal, round, and reactive to light.   Cardiovascular:      Rate and Rhythm: Normal rate.      Pulses: Normal pulses.      Heart sounds: No murmur heard.  Pulmonary:      Effort: Pulmonary effort is normal. No respiratory distress.   Abdominal:      General: Abdomen is flat.      Palpations: Abdomen is soft.      Tenderness: There is no abdominal tenderness.   Musculoskeletal:      Right lower leg: No edema.      Left lower leg: No edema.      Comments: Left hip pain, limits movement of left lower extremity    Skin:     General: Skin is warm.      Capillary Refill: Capillary refill takes less than 2 seconds.   Neurological:      Mental Status: He is alert.      Comments: A&Ox1.   Intermittently follows commands  Blinks to threat   Able to move all extremities.          CRANIAL NERVES     CN III, IV, VI   Pupils are equal, round, and reactive to light.     Significant Labs: All pertinent labs within the past 24 hours have been reviewed.    Significant Imaging: I have reviewed all pertinent imaging results/findings  within the past 24 hours.    Assessment/Plan:     * Seizure disorder  History of seizure disorder, not on medications   Presented with a seizure    Plan:   - Keppra 1 g BID  - Ativan PRN   - Neurology consult   - EEG  - MRI brain     Altered mental state  Likely related to post ictal state   F/U brain MRI  Neurology consult     Starvation ketoacidosis  History is limited given AMS  Labs showed low bicarb (13), hyperglycemia (239) and an elevated anion gap (26), elevated beta-hydroxybutarate (5.4). VBG demosntrated a PH of 7.33. UA has +3 ketones.     Plan:   IVF  Monitor BMPs       VTE Risk Mitigation (From admission, onward)         Ordered     IP VTE LOW RISK PATIENT  Once         11/07/22 0312     Place sequential compression device  Until discontinued         11/07/22 0312                   Pavel Trent MD  Department of Hospital Medicine   [facility] [place] - Emergency Dept

## 2022-11-07 NOTE — CONSULTS
[facility] S[facility]  Neurology  Consult Note    Patient Name: Cesar Hernandez  MRN: [id number]  Admission Date: 11/6/2022  Hospital Length of Stay: 0 days  Code Status: Full Code   Attending Provider: Kayley Kraft Do  Consulting Provider: Blessing Do MD  Primary Care Physician: No primary care provider on file.  Principal Problem:Seizure disorder    Inpatient consult to Neurology  Consult performed by: Blessing Do MD  Consult ordered by: Claudia Dias DO      Subjective:     Chief Complaint:  seizures    HPI:   43 yo M w hx of seizures (unclear seizure history or what AED) who had 2 witnessed seizures. Unclear characteristics of the seizure. Had one episode of urinary incontinence per chart review.    Labs with hyperglycemia with low bicarb abd elevated anion gap. 5.4 beta hydroxybutyrate, tox negative. UA with ketones and trace protein. CTH negative for acute intracranial processes.     Continued to be disoriented in ER and admitted for seizure workup. Loaded w Keppra 1g and placed on Keppra 1g BID.    Unclear baseline.      Past Medical History:   Diagnosis Date    Seizure disorder        No past surgical history on file.    Review of patient's allergies indicates:  No Known Allergies    Current Neurological Medications: Keppra 1g BID    No current facility-administered medications on file prior to encounter.     No current outpatient medications on file prior to encounter.      Family History    None       Tobacco Use    Smoking status: Not on file    Smokeless tobacco: Not on file   Substance and Sexual Activity    Alcohol use: Not on file    Drug use: Not on file    Sexual activity: Not on file     ROS: negative other than stated in HPI    Objective:     Vital Signs (Most Recent):  Temp: 97.1 °F (36.2 °C) (11/07/22 1140)  Pulse: 83 (11/07/22 1140)  Resp: 17 (11/07/22 1140)  BP: 139/78 (11/07/22 1140)  SpO2: 100 % (11/07/22 1140) Vital Signs (24h Range):  Temp:  [97.1 °F (36.2 °C)-98.6 °F (37  °C)] 97.1 °F (36.2 °C)  Pulse:  [82-91] 83  Resp:  [17-18] 17  SpO2:  [98 %-100 %] 100 %  BP: (137-157)/() 139/78     Weight: 62.8 kg (138 lb 7.2 oz)  Body mass index is 19.87 kg/m².    Gen: in NAD, laying in bed covered up in blankets  HEENT: NCAT, MMM    Gen: sleeping. Awaken and oriented to person and year. Not to place.   Attn: normal  Speech: no evidence of dysarthria  CN: unable to assess    Patient not cooperative on exam. Moves his extremities spontaneously    Coordination not assessd  Gait not assessed    Labs and imaging:  A1C pending  TSH and lipid panel pending  Thiamine pending    Labs with hyperglycemia with low bicarb abd elevated anion gap. 5.4 beta hydroxybutyrate, tox negative. UA with ketones and trace protein.     CTH personally reviewed- negative for acute intracranial processes.     MRI brain w chronic microvascular changes and remote infarcts    Assessment and Plan:     41 yo m w unclear seizure history who had 2 witnessed seizures, provoking factor likely 2/2 to hyperglycemia. MRI brain showing patient w microvascular changes and remote infarcts, likely 2/2 to glucose. Pending workup for small vessel disease - check TSH, LDL, A1C, also thiamine def given patient homelessness.     Brought in for monitoring 2/2 to continued altered status, this is likely patient baseline.    Placed on keppra 1g BID, EEG results pending and will optimize patient stroke stratification.       Active Diagnoses:    Diagnosis Date Noted POA    PRINCIPAL PROBLEM:  Seizure disorder [G40.909] 11/07/2022 Unknown    Starvation ketoacidosis [T73.0XXA, E87.29] 11/07/2022 Unknown    Altered mental state [R41.82] 11/07/2022 Unknown      Problems Resolved During this Admission:       VTE Risk Mitigation (From admission, onward)           Ordered     IP VTE LOW RISK PATIENT  Once         11/07/22 0312     Place sequential compression device  Until discontinued         11/07/22 0312                    Thank you for your  consult. I will follow-up with patient. Please contact us if you have any additional questions.    Blessing Do MD  Neurology  [facility] Surg

## 2022-11-07 NOTE — NURSING
Discussed  with D5 1/2 NaCL infusing also has elevated hydroxybutyrate. Orders received to change iv fluids to Lactated Ringers.don't used sliding scale until recheck CBG in one hour. No nausea or vomiting noted

## 2022-11-07 NOTE — ED NOTES
Pt postictal and only oriented x1. Pt able to follow simple commands. Pt connected to cardiac monitor and PERRLA. Seizure precautions in place, bed in lowest position.

## 2022-11-07 NOTE — PROGRESS NOTES
CM attempted to complete discharge planning assessment but pt appeared confused. CM will follow up.

## 2022-11-07 NOTE — ASSESSMENT & PLAN NOTE
History is limited given AMS  Labs showed low bicarb (13), hyperglycemia (239) and an elevated anion gap (26), elevated beta-hydroxybutarate (5.4). VBG demosntrated a PH of 7.33. UA has +3 ketones.     Plan:   IVF  Monitor BMPs

## 2022-11-07 NOTE — NURSING
Report received and care assumed.Oriented to self.Discussed plan of care with patient. No evident of learning.

## 2022-11-07 NOTE — CARE UPDATE
42 year old male with a PMHx of seizure disorder, homelessness admitted on 11/06/2022 for seizures. Also found to have likely starvation ketoacidosis. Continue fluids. SLP consulted to evaluate.  CT head showed mild periventricular white matter disease. UDS negative. Alcohol < 10. Neurology consulted. EEG and MRI brain pending. Continue IV keppra.     Of note, patient was alert and oriented only to self. Able to answer some questions appropriately and followed some commands.     I reviewed the patient's medications, past medical/surgical history and the H&P note. I agree with the physical exam and assessment and plan.

## 2022-11-07 NOTE — ED NOTES
Pt informs nurse that he does have a history of seizures but does not recall what medications he takes for his diagnosis of seizures. He declines seeing a neurologist. Pt denies any other medical diagnosis.

## 2022-11-07 NOTE — ASSESSMENT & PLAN NOTE
History of seizure disorder, not on medications   Presented with a seizure    Plan:   - Keppra 1 g BID  - Ativan PRN   - Neurology consult   - EEG  - MRI brain

## 2022-11-07 NOTE — NURSING
Patient arrived to floor via stretcher. Patient refuses to move over to hospital bed. Patient moved to hospital bed by staff. Admit assessment completed. Skin assessment completed with Dynesta Lpn. Vital signs are stable.

## 2022-11-07 NOTE — ED PROVIDER NOTES
Encounter Date: 11/6/2022    SCRIBE #1 NOTE: I, VICKY TORRES, am scribing for, and in the presence of,  Claudia Dias DO. I have scribed the following portions of the note - Other sections scribed: HPI, ROS, PE, MDM.     History     Chief Complaint   Patient presents with    Seizures     EMS called to [employer] parking lot for witnessed seizure. Pt has hx of seizures. Pt reports compliance with meds but is unsure of name.     This is a 42-year-old male patient with a PMHx of Seizures who present so the ED with a chief complaint of seizures onset PTA. Per EMS personnel, the patient had a witnessed seizure episode in a [employer] parking lot and was still in postictal stat upon EMS arrival. The patient does not remember anything and is unsure what anticonvulsant he takes. EMS states that they did not administer any medications, and the patient has a GCS of 14. While at this ED facility, the patient had another witnessed seizure episode.     The history is provided by the EMS personnel. The history is limited by the condition of the patient. No  was used.   Review of patient's allergies indicates:  No Known Allergies  No past medical history on file.  No past surgical history on file.  No family history on file.     Review of Systems   Unable to perform ROS: Acuity of condition   Skin:  Negative for wound.   Neurological:  Positive for seizures. Negative for facial asymmetry.   All other systems reviewed and are negative.    Physical Exam     Initial Vitals [11/06/22 2313]   BP Pulse Resp Temp SpO2   137/82 82 18 98.6 °F (37 °C) 100 %      MAP       --         Physical Exam    Nursing note and vitals reviewed.  Constitutional: Vital signs are normal. He appears well-developed. He is diaphoretic.  Non-toxic appearance.   Patient is actively seizing.    HENT:   Head: Normocephalic and atraumatic.   Mouth/Throat: Uvula is midline, oropharynx is clear and moist and mucous membranes  are normal.   Eyes: Conjunctivae and EOM are normal. Pupils are equal, round, and reactive to light. No scleral icterus.   Neck: Neck supple.   Normal range of motion.  Cardiovascular:  Normal rate, regular rhythm, normal heart sounds and intact distal pulses.           Pulmonary/Chest: Breath sounds normal. No respiratory distress.   Abdominal: Abdomen is soft. He exhibits no distension. There is no abdominal tenderness. There is no rebound and no guarding.   Genitourinary:    Genitourinary Comments: Patient was incontinent of urine during seizure     Musculoskeletal:         General: No edema. Normal range of motion.      Cervical back: Normal, normal range of motion and neck supple. No rigidity. No spinous process tenderness.      Thoracic back: Normal.      Lumbar back: Tenderness present. No swelling, edema, deformity or signs of trauma.     Neurological: He is alert. He has normal strength. No sensory deficit. He displays seizure activity.   Skin: Skin is warm. No rash noted. No erythema.       ED Course   Critical Care    Date/Time: 11/7/2022 2:48 AM  Performed by: Claudia Dias DO  Authorized by: Claudia Dias DO   Direct patient critical care time: 35 minutes  Additional history critical care time: 10 minutes  Ordering / reviewing critical care time: 10 minutes  Documentation critical care time: 10 minutes  Consulting other physicians critical care time: 5 minutes  Total critical care time (exclusive of procedural time) : 70 minutes  Critical care time was exclusive of separately billable procedures and treating other patients and teaching time.  Critical care was necessary to treat or prevent imminent or life-threatening deterioration of the following conditions: CNS failure or compromise.  Critical care was time spent personally by me on the following activities: development of treatment plan with patient or surrogate, discussions with consultants, evaluation of patient's response  to treatment, examination of patient, obtaining history from patient or surrogate, ordering and performing treatments and interventions, ordering and review of laboratory studies, ordering and review of radiographic studies, pulse oximetry and re-evaluation of patient's condition.      Labs Reviewed   CBC W/ AUTO DIFFERENTIAL - Abnormal; Notable for the following components:       Result Value    Immature Granulocytes 1.0 (*)     Gran # (ANC) 9.1 (*)     Immature Grans (Abs) 0.12 (*)     Mono # 1.1 (*)     Gran % 73.8 (*)     Lymph % 15.5 (*)     All other components within normal limits   COMPREHENSIVE METABOLIC PANEL - Abnormal; Notable for the following components:    CO2 13 (*)     Glucose 239 (*)     Anion Gap 26 (*)     All other components within normal limits   PHOSPHORUS - Abnormal; Notable for the following components:    Phosphorus 4.9 (*)     All other components within normal limits   MAGNESIUM   URINALYSIS, REFLEX TO URINE CULTURE   ALCOHOL,MEDICAL (ETHANOL)   BETA - HYDROXYBUTYRATE, SERUM     EKG Readings: (Independently Interpreted)   Normal sinus rhythm with a rate of 100 beats per minute, normal axis and intervals, nonspecific T-wave inversions in V1, no obvious acute ST segment changes.  No old EKG in epic to compare     Imaging Results              CT Head Without Contrast (Final result)  Result time 11/07/22 00:24:24      Final result by Jac Gutierrez MD (11/07/22 00:24:24)                   Impression:      Mild periventricular white matter disease.  This is most commonly seen with chronic microvascular ischemic disease, however would be advanced for patient's age.  If clinical concern for acute infarction, MRI follow-up may be obtained.      Electronically signed by: Jac Gutierrez MD  Date:    11/07/2022  Time:    00:24               Narrative:    EXAMINATION:  CT HEAD WITHOUT CONTRAST    CLINICAL HISTORY:  Mental status change, unknown cause;    TECHNIQUE:  Low dose axial images were  obtained through the head.  Coronal and sagittal reformations were also performed. Contrast was not administered.    COMPARISON:  None.    FINDINGS:  There is mild periventricular parenchymal hypoattenuation, most pronounced involving the left corona radiata.  No evidence of acute/recent major vascular distribution cerebral infarction, intraparenchymal hemorrhage, or intra-axial space occupying lesion. The ventricular system is normal in size and configuration with no evidence of hydrocephalus. No effacement of the skull-base cisterns. No abnormal extra-axial fluid collections or blood products. Visualized paranasal sinuses and mastoid air cells are clear. The calvarium shows no significant abnormality.                                       Medications   sodium chloride 0.9% bolus 1,000 mL (has no administration in time range)   LORazepam injection 2 mg (2 mg Intravenous Given 11/6/22 2330)   levETIRAcetam injection 1,000 mg (1,000 mg Intravenous Given 11/7/22 0035)     Medical Decision Making:   History:   Old Medical Records: I decided to obtain old medical records.  Independently Interpreted Test(s):   I have ordered and independently interpreted EKG Reading(s) - see prior notes  Clinical Tests:   Lab Tests: Ordered and Reviewed  Radiological Study: Ordered and Reviewed  Medical Tests: Ordered and Reviewed  ED Management:   [facility]  This is an emergent evaluation of a 42 y.o. with a PMHx of Seizures who present so the ED with a chief complaint of seizures onset PTA. Initial vitals in the ED [11/06/22 2313]  BP: 137/82  Pulse: 82  Resp: 18  Temp: 98.6 °F (37 °C)  SpO2: 100 % .     Physical exam noted above. DDx includes but is not limited to unprovoked seizure versus provoke seizures, electrolyte abnormality, dehydration, intracranial hemorrhage. Also considered but clinically less likely to be meningitis, stroke. Will obtain labs and imaging including CBC, CMP, UA, urine drug screen, ethanol level, magnesium,  phosphorus, CT head without contrast. Will also provide IV Ativan for seizure. He was also loaded with Keppra. Will continue to monitor and frequently reassess pending results of labs, treatments and final disposition.    Patient is aware of plan and is amenable.     Claudia Dias D.O  EMERGENCY MEDICINE  11:28 PM 11/06/2022    UPDATE  Labs reveal a bicarb of 13 with the glucose of 239 anion gap of 26.  Remainder of labs unremarkable.  Will obtain beta hydroxybutyrate, VBG.  Will also treat with IV fluid hydration.  CT head shows microvascular ischemic disease.  On reassessment, patient more alert however minimally answering questions and follow commands.  He does endorse having a history of seizures and taking medications for his seizures but is unable to tell me what medication he takes and when he last took this medication.  There is no history in epic.    Given history presentation, case was discussed with Neurology, Dr. Do, who recommended patient being admitted to the hospital for Neurology evaluation later today.  She also recommended the place an order for MRI without contrast as well as an EEG.    Claudia Dias D.O  EMERGENCY MEDICINE   2:38 AM 11/07/2022           This chart was completed using dictation software, as a result there may be some transcription errors         Scribe Attestation:   Scribe #1: I performed the above scribed service and the documentation accurately describes the services I performed. I attest to the accuracy of the note.                   Clinical Impression:   Final diagnoses:  [R56.9] Seizure   Scribe attestation: I, Claudia Dias, DO, personally performed the services described in this documentation. All medical record entries made by the scribe were at my direction and in my presence.  I have reviewed the chart and agree that the record reflects my personal performance and is accurate and complete.           Claudia Dias  DO  11/07/22 0249

## 2022-11-07 NOTE — SUBJECTIVE & OBJECTIVE
Past Medical History:   Diagnosis Date    Seizure disorder        No past surgical history on file.    Review of patient's allergies indicates:  No Known Allergies    No current facility-administered medications on file prior to encounter.     No current outpatient medications on file prior to encounter.     Family History    None       Tobacco Use    Smoking status: Not on file    Smokeless tobacco: Not on file   Substance and Sexual Activity    Alcohol use: Not on file    Drug use: Not on file    Sexual activity: Not on file     Review of Systems   Unable to perform ROS: Mental status change   Neurological:  Positive for seizures.   Objective:     Vital Signs (Most Recent):  Temp: 98.6 °F (37 °C) (11/06/22 2313)  Pulse: 91 (11/07/22 0332)  Resp: 18 (11/06/22 2313)  BP: (!) 145/99 (11/07/22 0332)  SpO2: 100 % (11/06/22 2313)   Vital Signs (24h Range):  Temp:  [98.6 °F (37 °C)] 98.6 °F (37 °C)  Pulse:  [82-91] 91  Resp:  [18] 18  SpO2:  [100 %] 100 %  BP: (137-145)/(82-99) 145/99     Weight: 68 kg (150 lb)  Body mass index is 21.52 kg/m².    Physical Exam  Vitals and nursing note reviewed.   Constitutional:       General: He is not in acute distress.     Appearance: Normal appearance. He is not ill-appearing.   HENT:      Head: Normocephalic and atraumatic.      Nose: Nose normal.      Mouth/Throat:      Mouth: Mucous membranes are moist.   Eyes:      Pupils: Pupils are equal, round, and reactive to light.   Cardiovascular:      Rate and Rhythm: Normal rate.      Pulses: Normal pulses.      Heart sounds: No murmur heard.  Pulmonary:      Effort: Pulmonary effort is normal. No respiratory distress.   Abdominal:      General: Abdomen is flat.      Palpations: Abdomen is soft.      Tenderness: There is no abdominal tenderness.   Musculoskeletal:      Right lower leg: No edema.      Left lower leg: No edema.      Comments: Left hip pain, limits movement of left lower extremity    Skin:     General: Skin is warm.       Capillary Refill: Capillary refill takes less than 2 seconds.   Neurological:      Mental Status: He is alert.      Comments: A&Ox1.   Follows commands  Blinks to threat   Able to move all extremities.          CRANIAL NERVES     CN III, IV, VI   Pupils are equal, round, and reactive to light.     Significant Labs: All pertinent labs within the past 24 hours have been reviewed.    Significant Imaging: I have reviewed all pertinent imaging results/findings within the past 24 hours.

## 2022-11-07 NOTE — HPI
"This is a 42 year old male with a PMHx of seizure disorder, homelessness who presented with a seizure.     History is limited as patient is altered. Per EMS, the patient had a witnessed seizure episode in a [employer]'s parking lot and was still in postictal stat upon EMS arrival. The patient reports that he came to the hospital because of a "stroke" but when corrected says he came because of a seizure. He endorses having a history of seizure but denies being on any medications. While at this ED facility, the patient had another witnessed seizure episode. Labs showed low bicarb (13), hyperglycemia (239) and an elevated anion gap (26). CT head showed mild periventricular white matter disease. Neurology recommended EEG and MRI brain. He was given 1L of NS, ativan 2 mg and Keppra 1 g. He was admitted for further management.   "

## 2022-11-07 NOTE — ED NOTES
Pt covered in urine, pt cleaned and placed soiled clothing in belongings bag. Placed pt in paper scrubs and socks and provided warm clean linen.

## 2022-11-07 NOTE — ED NOTES
Upon entering pt room, puddle of unknown substance is on the floor. Pt asked if he urinated on himself and he denies. Pt scrub pants wet and covered in urine, will provide pt with wipes and another pair of scrubs to change into. Assisted pt with placing genitals into urinal, pt informed of the need of urine specimen, pt verbalizes understanding and appears to fall back asleep.

## 2022-11-08 PROBLEM — E11.65 TYPE 2 DIABETES MELLITUS WITH HYPERGLYCEMIA, WITHOUT LONG-TERM CURRENT USE OF INSULIN: Status: ACTIVE | Noted: 2022-11-08

## 2022-11-08 LAB
ANION GAP SERPL CALC-SCNC: 13 MMOL/L (ref 8–16)
BUN SERPL-MCNC: 8 MG/DL (ref 6–20)
CALCIUM SERPL-MCNC: 9.6 MG/DL (ref 8.7–10.5)
CHLORIDE SERPL-SCNC: 105 MMOL/L (ref 95–110)
CHOLEST SERPL-MCNC: 186 MG/DL (ref 120–199)
CHOLEST/HDLC SERPL: 4.7 {RATIO} (ref 2–5)
CO2 SERPL-SCNC: 24 MMOL/L (ref 23–29)
CREAT SERPL-MCNC: 0.8 MG/DL (ref 0.5–1.4)
EST. GFR  (NO RACE VARIABLE): >60 ML/MIN/1.73 M^2
GLUCOSE SERPL-MCNC: 247 MG/DL (ref 70–110)
HDLC SERPL-MCNC: 40 MG/DL (ref 40–75)
HDLC SERPL: 21.5 % (ref 20–50)
LDLC SERPL CALC-MCNC: 115.6 MG/DL (ref 63–159)
NONHDLC SERPL-MCNC: 146 MG/DL
POCT GLUCOSE: 202 MG/DL (ref 70–110)
POCT GLUCOSE: 208 MG/DL (ref 70–110)
POCT GLUCOSE: 211 MG/DL (ref 70–110)
POCT GLUCOSE: 218 MG/DL (ref 70–110)
POCT GLUCOSE: 231 MG/DL (ref 70–110)
POCT GLUCOSE: 311 MG/DL (ref 70–110)
POTASSIUM SERPL-SCNC: 3.7 MMOL/L (ref 3.5–5.1)
SODIUM SERPL-SCNC: 142 MMOL/L (ref 136–145)
TRIGL SERPL-MCNC: 152 MG/DL (ref 30–150)
TSH SERPL DL<=0.005 MIU/L-ACNC: 0.55 UIU/ML (ref 0.4–4)

## 2022-11-08 PROCEDURE — 99233 PR SUBSEQUENT HOSPITAL CARE,LEVL III: ICD-10-PCS | Mod: ,,, | Performed by: STUDENT IN AN ORGANIZED HEALTH CARE EDUCATION/TRAINING PROGRAM

## 2022-11-08 PROCEDURE — 11000001 HC ACUTE MED/SURG PRIVATE ROOM

## 2022-11-08 PROCEDURE — 80048 BASIC METABOLIC PNL TOTAL CA: CPT | Performed by: STUDENT IN AN ORGANIZED HEALTH CARE EDUCATION/TRAINING PROGRAM

## 2022-11-08 PROCEDURE — 84425 ASSAY OF VITAMIN B-1: CPT | Performed by: STUDENT IN AN ORGANIZED HEALTH CARE EDUCATION/TRAINING PROGRAM

## 2022-11-08 PROCEDURE — 63600175 PHARM REV CODE 636 W HCPCS: Performed by: STUDENT IN AN ORGANIZED HEALTH CARE EDUCATION/TRAINING PROGRAM

## 2022-11-08 PROCEDURE — 80061 LIPID PANEL: CPT | Performed by: STUDENT IN AN ORGANIZED HEALTH CARE EDUCATION/TRAINING PROGRAM

## 2022-11-08 PROCEDURE — 36415 COLL VENOUS BLD VENIPUNCTURE: CPT | Performed by: STUDENT IN AN ORGANIZED HEALTH CARE EDUCATION/TRAINING PROGRAM

## 2022-11-08 PROCEDURE — 99233 SBSQ HOSP IP/OBS HIGH 50: CPT | Mod: ,,, | Performed by: STUDENT IN AN ORGANIZED HEALTH CARE EDUCATION/TRAINING PROGRAM

## 2022-11-08 PROCEDURE — 25000003 PHARM REV CODE 250: Performed by: STUDENT IN AN ORGANIZED HEALTH CARE EDUCATION/TRAINING PROGRAM

## 2022-11-08 PROCEDURE — 92610 EVALUATE SWALLOWING FUNCTION: CPT

## 2022-11-08 PROCEDURE — 97161 PT EVAL LOW COMPLEX 20 MIN: CPT

## 2022-11-08 PROCEDURE — 84443 ASSAY THYROID STIM HORMONE: CPT | Performed by: STUDENT IN AN ORGANIZED HEALTH CARE EDUCATION/TRAINING PROGRAM

## 2022-11-08 RX ADMIN — LEVETIRACETAM 1000 MG: 100 INJECTION, SOLUTION INTRAVENOUS at 08:11

## 2022-11-08 RX ADMIN — INSULIN ASPART 2 UNITS: 100 INJECTION, SOLUTION INTRAVENOUS; SUBCUTANEOUS at 05:11

## 2022-11-08 RX ADMIN — SODIUM CHLORIDE, SODIUM LACTATE, POTASSIUM CHLORIDE, AND CALCIUM CHLORIDE: .6; .31; .03; .02 INJECTION, SOLUTION INTRAVENOUS at 05:11

## 2022-11-08 RX ADMIN — INSULIN ASPART 2 UNITS: 100 INJECTION, SOLUTION INTRAVENOUS; SUBCUTANEOUS at 06:11

## 2022-11-08 RX ADMIN — INSULIN ASPART 2 UNITS: 100 INJECTION, SOLUTION INTRAVENOUS; SUBCUTANEOUS at 08:11

## 2022-11-08 RX ADMIN — INSULIN ASPART 1 UNITS: 100 INJECTION, SOLUTION INTRAVENOUS; SUBCUTANEOUS at 01:11

## 2022-11-08 RX ADMIN — INSULIN ASPART 2 UNITS: 100 INJECTION, SOLUTION INTRAVENOUS; SUBCUTANEOUS at 01:11

## 2022-11-08 RX ADMIN — HYDROCODONE BITARTRATE AND ACETAMINOPHEN 1 TABLET: 5; 325 TABLET ORAL at 08:11

## 2022-11-08 NOTE — PLAN OF CARE
Problem: Seizure, Active Management  Goal: Absence of Seizure/Seizure-Related Injury  Outcome: Ongoing, Progressing  Intervention: Prevent Seizure-Related Injury  Flowsheets (Taken 11/8/2022 1110)  Aspiration Prevention During Seizure: positioned on side during seizure  Seizure Precautions:   activity supervised   clutter-free environment maintained   emergency equipment at bedside     Problem: Fall Injury Risk  Goal: Absence of Fall and Fall-Related Injury  Outcome: Ongoing, Progressing  Intervention: Identify and Manage Contributors  Flowsheets (Taken 11/8/2022 1110)  Self-Care Promotion:   safe use of adaptive equipment encouraged   meal set-up provided  Medication Review/Management: medications reviewed  Intervention: Promote Injury-Free Environment  Flowsheets (Taken 11/8/2022 1110)  Safety Promotion/Fall Prevention:   assistive device/personal item within reach   bed alarm set   commode/urinal/bedpan at bedside   Fall Risk reviewed with patient/family   Fall Risk signage in place   nonskid shoes/socks when out of bed   side rails raised x 2   room near unit station   instructed to call staff for mobility     Problem: Diabetes Comorbidity  Goal: Blood Glucose Level Within Targeted Range  Outcome: Ongoing, Progressing  Intervention: Monitor and Manage Glycemia  Flowsheets (Taken 11/8/2022 1110)  Glycemic Management:   blood glucose monitored   insulin dose matched to carbohydrate intake

## 2022-11-08 NOTE — ASSESSMENT & PLAN NOTE
Patient's FSGs are uncontrolled due to hyperglycemia on current medication regimen.  Last A1c reviewed-   Lab Results   Component Value Date    HGBA1C 8.0 (H) 11/07/2022     Most recent fingerstick glucose reviewed-   Recent Labs   Lab 11/07/22  1703 11/08/22  0054 11/08/22  0602 11/08/22  0722   POCTGLUCOSE 217* 231* 211* 218*       Antihyperglycemics (From admission, onward)    Start     Stop Route Frequency Ordered    11/07/22 1420  insulin aspart U-100 pen 0-5 Units         -- SubQ Every 6 hours PRN 11/07/22 1320    11/07/22 0412  insulin aspart U-100 pen 0-5 Units         -- SubQ Before meals & nightly PRN 11/07/22 0312        Hold Oral hypoglycemics while patient is in the hospital.  SSI for now  DM education at discharge

## 2022-11-08 NOTE — PROGRESS NOTES
[facility] Surg  Neurology  Progress note    Patient Name: Cesar Hernandez  MRN: [id number]  Admission Date: 11/6/2022  Hospital Length of Stay: 1 days  Code Status: Full Code   Attending Provider: Joseph Lopez MD  Consulting Provider: Blessing Do MD  Primary Care Physician: To Obtain Unable  Principal Problem:Seizure disorder    Progress Note  Subjective:     Chief Complaint:  seizures    HPI:   41 yo M w hx of seizures (unclear seizure history or what AED) who had 2 witnessed seizures. Unclear characteristics of the seizure. Had one episode of urinary incontinence per chart review.     Labs with hyperglycemia with low bicarb abd elevated anion gap. 5.4 beta hydroxybutyrate, tox negative. UA with ketones and trace protein. CTH negative for acute intracranial processes.      Continued to be disoriented in ER and admitted for seizure workup. Loaded w Keppra 1g and placed on Keppra 1g BID.     Unclear baseline.      Interim period: patient states he is not hurting anywhere. MRI and EEG obtained yesterday.     Past Medical History:   Diagnosis Date    Seizure disorder     Type 2 diabetes mellitus with hyperglycemia, without long-term current use of insulin 11/8/2022       No past surgical history on file.    Review of patient's allergies indicates:  No Known Allergies    Current Neurological Medications: Keppra 1g BID    No current facility-administered medications on file prior to encounter.     No current outpatient medications on file prior to encounter.      Family History    None       Tobacco Use    Smoking status: Not on file    Smokeless tobacco: Not on file   Substance and Sexual Activity    Alcohol use: Not on file    Drug use: Not on file    Sexual activity: Not on file     ROS: negative other than stated in HPI    Objective:     Vital Signs (Most Recent):  Temp: 98 °F (36.7 °C) (11/08/22 1108)  Pulse: 75 (11/08/22 1108)  Resp: 17 (11/08/22 1108)  BP: 136/78 (11/08/22 1108)  SpO2: 95 % (11/08/22 1108)  Vital Signs (24h Range):  Temp:  [98 °F (36.7 °C)-98.7 °F (37.1 °C)] 98 °F (36.7 °C)  Pulse:  [75-95] 75  Resp:  [17-20] 17  SpO2:  [95 %-99 %] 95 %  BP: (121-144)/(66-89) 136/78     Weight: 62.8 kg (138 lb 7.2 oz)  Body mass index is 19.87 kg/m².    Gen: in NAD, laying in bed covered up in blankets  HEENT: NCAT, MMM     Gen: Awaken and oriented to person and year. Not to place. Becomes disgruntled and doesn't answer  Attn: normal  Speech: no evidence of dysarthria  CN: symmetric facies      Patient not cooperative on exam. Moves his extremities spontaneously     Coordination not assessd  Gait not assessed     Labs and imaging:  A1C 8  TSH wnl  Ldl 115  Thiamine pending    Labs with hyperglycemia with low bicarb abd elevated anion gap. 5.4 beta hydroxybutyrate, tox negative. UA with ketones and trace protein.      CTH personally reviewed- negative for acute intracranial processes.      MRI brain w chronic microvascular changes and hx of remote lacunar infarcts    EEG w frequent R predominantly frontal discharges and moderate encephalopathy      Assessment and Plan:     41 yo m w unclear seizure history who had 2 witnessed seizures. MRI brain showing patient w microvascular changes and remote infarcts, likely etiology 2/2 to small vessel disease.    EEG showing frequent R predominantly frontal discharges and moderate encephalopathy, no electrographic seizures.     Recommendations:  Continue on Keppra 1g BID for seizure prevention, seizure precautions on this patient.  Management of small vessel disease causing chronic microvascular changes and the lacunar infarcts seen on MRI. Start patient on aspirin 81 for stroke prevention and high intensity statin with goal < 70. Given patient w concern at this time for embolic etiology for his MRI findings, can hold off on any vessel imaging at this time. Patient w no dizziness on exam when he cooperates.   Suspect patient largely at his baseline given hx of homelessness.      Remainder of care deferred to primary team.   Active Diagnoses:    Diagnosis Date Noted POA    PRINCIPAL PROBLEM:  Seizure disorder [G40.909] 11/07/2022 Yes    Type 2 diabetes mellitus with hyperglycemia, without long-term current use of insulin [E11.65] 11/08/2022 Yes    Starvation ketoacidosis [T73.0XXA, E87.29] 11/07/2022 Yes    Altered mental state [R41.82] 11/07/2022 Yes      Problems Resolved During this Admission:       VTE Risk Mitigation (From admission, onward)           Ordered     IP VTE LOW RISK PATIENT  Once         11/07/22 0312     Place sequential compression device  Until discontinued         11/07/22 0312                    Thank you for your consult. I will sign off. Please contact us if you have any additional questions.    Blessing Do MD  Neurology  [facility]

## 2022-11-08 NOTE — PLAN OF CARE
Patient is homeless and has no relatives in the area.  Appears confused  when asked about the past.  Says he does not particularly like shelters.  Says he was in a NH in [place] but [place] unable to find.  [place] will send [place] ref[place].  [place] sent 133 NH referrals.    Plan A:  NH placement  Plan B:  Shelter    [facility] - Med Surg  Initial Discharge Assessment       Primary Care Provider: To Obtain Unable    Admission Diagnosis: Seizure [R56.9]  Chest pain [R07.9]    Admission Date: 11/6/2022  Expected Discharge Date: 11/9/2022    Discharge Barriers Identified: No family/friends to help    Payor: MEDICAID / Plan: MEDICAID OF [place] / Product Type: Government /     No emergency contact information on file.    Discharge Plan A: New Nursing Home placement - [facility] care facility  Discharge Plan B: Shelter    No Pharmacies Listed    Initial Assessment (most recent)       Adult Discharge Assessment - 11/08/22 0952          Discharge Assessment    Assessment Type Discharge Planning Assessment     Source of Information patient     Communicated [place] with patient/caregiver Yes     Reason For Admission seizure     Lives With facility resident     [facility]     Do you expect to return to your current living situation? Yes     Do you have help at home or someone to help you manage your care at home? No     Prior to hospitilization cognitive status: Unable to Assess     Current cognitive status: Alert/Oriented     Walking or Climbing Stairs Difficulty none     Dressing/Bathing Difficulty none     Equipment Currently Used at Home none     Readmission within 30 days? No     Patient currently being followed by outpatient case management? No     Do you currently have service(s) that help you manage your care at home? No     Do you take prescription medications? No     Do you have prescription coverage? Yes     Coverage MEDICAID - MEDICAID [place]     Do you have any problems affording any of your prescribed medications?  TBD     How do you get to doctors appointments? public transportation;other (see comments)     Are you on dialysis? No     Do you take coumadin? No     Discharge Plan A New Nursing Home placement - [facility] care facility     Discharge Plan B Shelter     DME Needed Upon Discharge  other (see comments)     Discharge Plan discussed with: Patient     Discharge Barriers Identified No family/friends to help        Relationship/Environment    Name(s) of Who Lives With Patient none in the area

## 2022-11-08 NOTE — PLAN OF CARE
Problem: Physical Therapy  Goal: Physical Therapy Goal  Description: Goals to be met by: 11/15/22     Patient will increase functional independence with mobility by performin. Supine to sit with Modified [place]  2. Sit to stand transfer to be assessed   3. Gait  to be assessed  2022 1039 by Minnie Mckeon, PT  Outcome: Ongoing, Progressing    Limited initial PT evaluation performed today 2/2 subjective c/o pain.  Pt  refusing to participate and denies any PT needs.  Ongoing assessment pending pt progress for Disposition and DME.

## 2022-11-08 NOTE — PLAN OF CARE
Problem: Fall Injury Risk  Goal: Absence of Fall and Fall-Related Injury  Intervention: Identify and Manage Contributors  Flowsheets (Taken 11/7/2022 1827)  Self-Care Promotion: BADL personal objects within reach     Problem: Fall Injury Risk  Goal: Absence of Fall and Fall-Related Injury  Intervention: Promote Injury-Free Environment  Flowsheets (Taken 11/7/2022 1827)  Safety Promotion/Fall Prevention:   assistive device/personal item within reach   bed alarm set   instructed to call staff for mobility   toileting scheduled   Supervised toileting - stay within arms reach   side rails raised x 2   Fall Risk reviewed with patient/family   Fall Risk signage in place   diversional activities provided   nonskid shoes/socks when out of bed     Problem: Seizure, Active Management  Goal: Absence of Seizure/Seizure-Related Injury  Intervention: Prevent Seizure-Related Injury  Flowsheets (Taken 11/7/2022 1827)  Seizure Precautions:   activity supervised   side rails padded

## 2022-11-08 NOTE — SUBJECTIVE & OBJECTIVE
LORENEEON.   +confusion     Review of Systems   Unable to perform ROS: Mental status change   Neurological:  Positive for seizures.   Objective:     Vital Signs (Most Recent):  Temp: 98.4 °F (36.9 °C) (11/08/22 0720)  Pulse: 84 (11/08/22 0720)  Resp: 17 (11/08/22 0720)  BP: 131/69 (11/08/22 0720)  SpO2: 98 % (11/08/22 0720)   Vital Signs (24h Range):  Temp:  [97.1 °F (36.2 °C)-98.7 °F (37.1 °C)] 98.4 °F (36.9 °C)  Pulse:  [78-95] 84  Resp:  [17-18] 17  SpO2:  [96 %-100 %] 98 %  BP: (121-144)/(66-89) 131/69     Weight: 62.8 kg (138 lb 7.2 oz)  Body mass index is 19.87 kg/m².    Physical Exam  Vitals and nursing note reviewed.   Constitutional:       General: He is not in acute distress.     Appearance: Normal appearance. He is not ill-appearing.   HENT:      Head: Normocephalic and atraumatic.      Nose: Nose normal.      Mouth/Throat:      Mouth: Mucous membranes are moist.   Eyes:      Pupils: Pupils are equal, round, and reactive to light.   Cardiovascular:      Rate and Rhythm: Normal rate.      Pulses: Normal pulses.      Heart sounds: No murmur heard.  Pulmonary:      Effort: Pulmonary effort is normal. No respiratory distress.   Abdominal:      General: Abdomen is flat.      Palpations: Abdomen is soft.      Tenderness: There is no abdominal tenderness.   Musculoskeletal:      Right lower leg: No edema.      Left lower leg: No edema.      Comments: Left hip pain, limits movement of left lower extremity    Skin:     General: Skin is warm.      Capillary Refill: Capillary refill takes less than 2 seconds.   Neurological:      Mental Status: He is alert.      Comments: A&Ox1.   Follows commands  Blinks to threat   Able to move all extremities.          CRANIAL NERVES     CN III, IV, VI   Pupils are equal, round, and reactive to light.         Recent Results (from the past 24 hour(s))   POCT glucose    Collection Time: 11/07/22 11:41 AM   Result Value Ref Range    POCT Glucose 256 (H) 70 - 110 mg/dL   POCT glucose     Collection Time: 11/07/22  5:03 PM   Result Value Ref Range    POCT Glucose 217 (H) 70 - 110 mg/dL   POCT glucose    Collection Time: 11/08/22 12:54 AM   Result Value Ref Range    POCT Glucose 231 (H) 70 - 110 mg/dL   Basic Metabolic Panel    Collection Time: 11/08/22  4:30 AM   Result Value Ref Range    Sodium 142 136 - 145 mmol/L    Potassium 3.7 3.5 - 5.1 mmol/L    Chloride 105 95 - 110 mmol/L    CO2 24 23 - 29 mmol/L    Glucose 247 (H) 70 - 110 mg/dL    BUN 8 6 - 20 mg/dL    Creatinine 0.8 0.5 - 1.4 mg/dL    Calcium 9.6 8.7 - 10.5 mg/dL    Anion Gap 13 8 - 16 mmol/L    eGFR >60 >60 mL/min/1.73 m^2   TSH    Collection Time: 11/08/22  4:30 AM   Result Value Ref Range    TSH 0.551 0.400 - 4.000 uIU/mL   Lipid panel    Collection Time: 11/08/22  4:30 AM   Result Value Ref Range    Cholesterol 186 120 - 199 mg/dL    Triglycerides 152 (H) 30 - 150 mg/dL    HDL 40 40 - 75 mg/dL    LDL Cholesterol 115.6 63.0 - 159.0 mg/dL    HDL/Cholesterol Ratio 21.5 20.0 - 50.0 %    Total Cholesterol/HDL Ratio 4.7 2.0 - 5.0    Non-HDL Cholesterol 146 mg/dL   POCT glucose    Collection Time: 11/08/22  6:02 AM   Result Value Ref Range    POCT Glucose 211 (H) 70 - 110 mg/dL   POCT glucose    Collection Time: 11/08/22  7:22 AM   Result Value Ref Range    POCT Glucose 218 (H) 70 - 110 mg/dL       Microbiology Results (last 7 days)       ** No results found for the last 168 hours. **             Imaging Results              MRI Brain Without Contrast (Final result)  Result time 11/07/22 16:24:18      Final result by Darshan Lopes MD (11/07/22 16:24:18)                   Impression:      1. No acute infarct or intracranial hemorrhage definitively seen allowing for motion limited exam significantly degrading multiple sequences.  Further evaluation/follow-up as warranted.  2. Involutional change and suspected sequela of mild chronic microvascular ischemic change, somewhat advanced for age.  3. Right caudate/anterior internal capsule and left  corona radiata remote lacunar type infarcts.  4. Ventriculomegaly slightly out of proportion to sulcal atrophy, nonspecific but can be seen with normal pressure hydrocephalus.      Electronically signed by: Darshan Lopes MD  Date:    11/07/2022  Time:    16:24               Narrative:    EXAMINATION:  MRI BRAIN WITHOUT CONTRAST    CLINICAL HISTORY:  Seizure, new-onset, no history of trauma;.    TECHNIQUE:  Multiplanar multisequence MR imaging of the brain was performed without contrast.    COMPARISON:  Head CT earlier same day    FINDINGS:  Patient motion artifact significantly degrades multiple sequences.    Intracranial compartment: Brain appears normally formed.    The ventricles are midline and stable in overall size and configuration without distortion by mass effect or acute hydrocephalus noting ventriculomegaly out of proportion to sulcal atrophy.  No extra-axial blood or fluid collections definitively seen.    Remote lacunar type infarcts at the right caudate extending to the anterior limb right internal capsule and also at the left corona radiata noting small focus of ex vacuo dilatation of the mid body of the left lateral ventricle.  Minimal periventricular white matter T2/FLAIR hyperintense signal likely sequela of chronic microvascular ischemic change, advanced for age.  No mass lesion, acute hemorrhage, edema or acute infarct definitively seen allowing for limitations.    Normal vascular flow voids are preserved.  The sella and parasellar regions are within normal limits.    Skull/extracranial contents (limited evaluation): Bone marrow signal intensity is normal.  Craniocervical junction is within normal limits.  Bilateral orbits are within normal limits.  Paranasal sinuses and mastoid air cells are clear.                                       X-Ray Hip 2 or 3 views Left (with Pelvis when performed) (Final result)  Result time 11/07/22 04:44:06      Final result by Gerard Hayes MD (11/07/22  04:44:06)                   Impression:      Chronic changes are noted, there is no evidence for acute fracture or dislocation, close clinical and historical correlation is needed to determine need for additional follow-up.      Electronically signed by: Gerard Hayes  Date:    11/07/2022  Time:    04:44               Narrative:    EXAMINATION:  XR HIP WITH PELVIS WHEN PERFORMED, 2 OR 3 VIEWS LEFT    CLINICAL HISTORY:  pain;    TECHNIQUE:  AP view of the pelvis and frog leg lateral view of the left hip were performed.    COMPARISON:  None    FINDINGS:  The visualized osseous structures demonstrate chronic change.  There are chronic changes of the visualized lower lumbar spine and sacroiliac joints, and hip joints.  There is no evidence for hip dislocation.  On close evaluation of available imaging, there is no evidence for osseous destructive process or acute fracture deformity.  Vascular calcifications are noted.                                       CT Head Without Contrast (Final result)  Result time 11/07/22 00:24:24      Final result by Jac Gutierrez MD (11/07/22 00:24:24)                   Impression:      Mild periventricular white matter disease.  This is most commonly seen with chronic microvascular ischemic disease, however would be advanced for patient's age.  If clinical concern for acute infarction, MRI follow-up may be obtained.      Electronically signed by: Jac Gutierrez MD  Date:    11/07/2022  Time:    00:24               Narrative:    EXAMINATION:  CT HEAD WITHOUT CONTRAST    CLINICAL HISTORY:  Mental status change, unknown cause;    TECHNIQUE:  Low dose axial images were obtained through the head.  Coronal and sagittal reformations were also performed. Contrast was not administered.    COMPARISON:  None.    FINDINGS:  There is mild periventricular parenchymal hypoattenuation, most pronounced involving the left corona radiata.  No evidence of acute/recent major vascular distribution cerebral  infarction, intraparenchymal hemorrhage, or intra-axial space occupying lesion. The ventricular system is normal in size and configuration with no evidence of hydrocephalus. No effacement of the skull-base cisterns. No abnormal extra-axial fluid collections or blood products. Visualized paranasal sinuses and mastoid air cells are clear. The calvarium shows no significant abnormality.

## 2022-11-08 NOTE — PT/OT/SLP PROGRESS
Occupational Therapy      Patient Name:  Cesar Hernandez   MRN:  [id number]    Patient not seen today secondary to the following:     Checked on patient earlier this morning; patient declined participating.  Checked on patient again at 10:06 AM; patient was sleeping and declined to participate again.  Will follow-up tomorrow or as able.     11/8/2022

## 2022-11-08 NOTE — ASSESSMENT & PLAN NOTE
Likely related to post ictal state   F/U brain MRI  Neurology consult   Neuro checks, Nurse swallow assess, SLP, PT, OT ordered

## 2022-11-08 NOTE — PROGRESS NOTES
"[facility]  Progress Note    Patient Name: Cesar Hernandez  MRN: [id number]  Patient Class: IP- Inpatient   Admission Date: 11/6/2022  Length of Stay: 1 days  Attending Physician: Joseph Lopez MD  Primary Care Provider: To Obtain Unable        Subjective:     Principal Problem:Seizure disorder        HPI:  This is a 42 year old male with a PMHx of seizure disorder, homelessness who presented with a seizure.     History is limited as patient is altered. Per EMS, the patient had a witnessed seizure episode in a gas station's parking lot and was still in postictal stat upon EMS arrival. The patient reports that he came to the hospital because of a "stroke" but when corrected says he came because of a seizure. He endorses having a history of seizure but denies being on any medications. While at this ED facility, the patient had another witnessed seizure episode. Labs showed low bicarb (13), hyperglycemia (239) and an elevated anion gap (26). CT head showed mild periventricular white matter disease. Neurology recommended EEG and MRI brain. He was given 1L of NS, ativan 2 mg and Keppra 1 g. He was admitted for further management.       Overview/Hospital Course:  42 year old male with a PMHx of seizure disorder, homelessness admitted on 11/06/2022 for seizures. Also found to have likely starvation ketoacidosis. Continue fluids. SLP consulted to evaluate.  CT head showed mild periventricular white matter disease. UDS negative. Alcohol < 10. Neurology consulted. EEG and MRI brain pending. Continue IV keppra. Of note, patient was alert and oriented only to self. Able to answer some questions appropriately and followed some commands.  Neuro checks, Nurse swallow assess, SLP, PT, OT ordered. New Onset DM, SSI ordered.       NAEON.   +confusion, "1993"    Review of Systems   Unable to perform ROS: Mental status change   Neurological:  Positive for seizures.   Objective:     Vital Signs (Most " Recent):  Temp: 98.4 °F (36.9 °C) (11/08/22 0720)  Pulse: 84 (11/08/22 0720)  Resp: 17 (11/08/22 0720)  BP: 131/69 (11/08/22 0720)  SpO2: 98 % (11/08/22 0720)   Vital Signs (24h Range):  Temp:  [97.1 °F (36.2 °C)-98.7 °F (37.1 °C)] 98.4 °F (36.9 °C)  Pulse:  [78-95] 84  Resp:  [17-18] 17  SpO2:  [96 %-100 %] 98 %  BP: (121-144)/(66-89) 131/69     Weight: 62.8 kg (138 lb 7.2 oz)  Body mass index is 19.87 kg/m².    Physical Exam  Vitals and nursing note reviewed.   Constitutional:       General: He is not in acute distress.     Appearance: Normal appearance. He is not ill-appearing.   HENT:      Head: Normocephalic and atraumatic.      Nose: Nose normal.      Mouth/Throat:      Mouth: Mucous membranes are moist.   Eyes:      Pupils: Pupils are equal, round, and reactive to light.   Cardiovascular:      Rate and Rhythm: Normal rate.      Pulses: Normal pulses.      Heart sounds: No murmur heard.  Pulmonary:      Effort: Pulmonary effort is normal. No respiratory distress.   Abdominal:      General: Abdomen is flat.      Palpations: Abdomen is soft.      Tenderness: There is no abdominal tenderness.   Musculoskeletal:      Right lower leg: No edema.      Left lower leg: No edema.      Comments: Left hip pain, limits movement of left lower extremity    Skin:     General: Skin is warm.      Capillary Refill: Capillary refill takes less than 2 seconds.   Neurological:      Mental Status: He is alert.      Comments: A&Ox1.   Follows commands  Blinks to threat   Able to move all extremities.          CRANIAL NERVES     CN III, IV, VI   Pupils are equal, round, and reactive to light.         Recent Results (from the past 24 hour(s))   POCT glucose    Collection Time: 11/07/22 11:41 AM   Result Value Ref Range    POCT Glucose 256 (H) 70 - 110 mg/dL   POCT glucose    Collection Time: 11/07/22  5:03 PM   Result Value Ref Range    POCT Glucose 217 (H) 70 - 110 mg/dL   POCT glucose    Collection Time: 11/08/22 12:54 AM   Result  Value Ref Range    POCT Glucose 231 (H) 70 - 110 mg/dL   Basic Metabolic Panel    Collection Time: 11/08/22  4:30 AM   Result Value Ref Range    Sodium 142 136 - 145 mmol/L    Potassium 3.7 3.5 - 5.1 mmol/L    Chloride 105 95 - 110 mmol/L    CO2 24 23 - 29 mmol/L    Glucose 247 (H) 70 - 110 mg/dL    BUN 8 6 - 20 mg/dL    Creatinine 0.8 0.5 - 1.4 mg/dL    Calcium 9.6 8.7 - 10.5 mg/dL    Anion Gap 13 8 - 16 mmol/L    eGFR >60 >60 mL/min/1.73 m^2   TSH    Collection Time: 11/08/22  4:30 AM   Result Value Ref Range    TSH 0.551 0.400 - 4.000 uIU/mL   Lipid panel    Collection Time: 11/08/22  4:30 AM   Result Value Ref Range    Cholesterol 186 120 - 199 mg/dL    Triglycerides 152 (H) 30 - 150 mg/dL    HDL 40 40 - 75 mg/dL    LDL Cholesterol 115.6 63.0 - 159.0 mg/dL    HDL/Cholesterol Ratio 21.5 20.0 - 50.0 %    Total Cholesterol/HDL Ratio 4.7 2.0 - 5.0    Non-HDL Cholesterol 146 mg/dL   POCT glucose    Collection Time: 11/08/22  6:02 AM   Result Value Ref Range    POCT Glucose 211 (H) 70 - 110 mg/dL   POCT glucose    Collection Time: 11/08/22  7:22 AM   Result Value Ref Range    POCT Glucose 218 (H) 70 - 110 mg/dL       Microbiology Results (last 7 days)       ** No results found for the last 168 hours. **             Imaging Results              MRI Brain Without Contrast (Final result)  Result time 11/07/22 16:24:18      Final result by Darshan Lopes MD (11/07/22 16:24:18)                   Impression:      1. No acute infarct or intracranial hemorrhage definitively seen allowing for motion limited exam significantly degrading multiple sequences.  Further evaluation/follow-up as warranted.  2. Involutional change and suspected sequela of mild chronic microvascular ischemic change, somewhat advanced for age.  3. Right caudate/anterior internal capsule and left corona radiata remote lacunar type infarcts.  4. Ventriculomegaly slightly out of proportion to sulcal atrophy, nonspecific but can be seen with normal pressure  hydrocephalus.      Electronically signed by: Darshan Lopes MD  Date:    11/07/2022  Time:    16:24               Narrative:    EXAMINATION:  MRI BRAIN WITHOUT CONTRAST    CLINICAL HISTORY:  Seizure, new-onset, no history of trauma;.    TECHNIQUE:  Multiplanar multisequence MR imaging of the brain was performed without contrast.    COMPARISON:  Head CT earlier same day    FINDINGS:  Patient motion artifact significantly degrades multiple sequences.    Intracranial compartment: Brain appears normally formed.    The ventricles are midline and stable in overall size and configuration without distortion by mass effect or acute hydrocephalus noting ventriculomegaly out of proportion to sulcal atrophy.  No extra-axial blood or fluid collections definitively seen.    Remote lacunar type infarcts at the right caudate extending to the anterior limb right internal capsule and also at the left corona radiata noting small focus of ex vacuo dilatation of the mid body of the left lateral ventricle.  Minimal periventricular white matter T2/FLAIR hyperintense signal likely sequela of chronic microvascular ischemic change, advanced for age.  No mass lesion, acute hemorrhage, edema or acute infarct definitively seen allowing for limitations.    Normal vascular flow voids are preserved.  The sella and parasellar regions are within normal limits.    Skull/extracranial contents (limited evaluation): Bone marrow signal intensity is normal.  Craniocervical junction is within normal limits.  Bilateral orbits are within normal limits.  Paranasal sinuses and mastoid air cells are clear.                                       X-Ray Hip 2 or 3 views Left (with Pelvis when performed) (Final result)  Result time 11/07/22 04:44:06      Final result by Gerard Hayes MD (11/07/22 04:44:06)                   Impression:      Chronic changes are noted, there is no evidence for acute fracture or dislocation, close clinical and historical  correlation is needed to determine need for additional follow-up.      Electronically signed by: Gerard Hayes  Date:    11/07/2022  Time:    04:44               Narrative:    EXAMINATION:  XR HIP WITH PELVIS WHEN PERFORMED, 2 OR 3 VIEWS LEFT    CLINICAL HISTORY:  pain;    TECHNIQUE:  AP view of the pelvis and frog leg lateral view of the left hip were performed.    COMPARISON:  None    FINDINGS:  The visualized osseous structures demonstrate chronic change.  There are chronic changes of the visualized lower lumbar spine and sacroiliac joints, and hip joints.  There is no evidence for hip dislocation.  On close evaluation of available imaging, there is no evidence for osseous destructive process or acute fracture deformity.  Vascular calcifications are noted.                                       CT Head Without Contrast (Final result)  Result time 11/07/22 00:24:24      Final result by Jac Gutierrez MD (11/07/22 00:24:24)                   Impression:      Mild periventricular white matter disease.  This is most commonly seen with chronic microvascular ischemic disease, however would be advanced for patient's age.  If clinical concern for acute infarction, MRI follow-up may be obtained.      Electronically signed by: Jac Gutierrez MD  Date:    11/07/2022  Time:    00:24               Narrative:    EXAMINATION:  CT HEAD WITHOUT CONTRAST    CLINICAL HISTORY:  Mental status change, unknown cause;    TECHNIQUE:  Low dose axial images were obtained through the head.  Coronal and sagittal reformations were also performed. Contrast was not administered.    COMPARISON:  None.    FINDINGS:  There is mild periventricular parenchymal hypoattenuation, most pronounced involving the left corona radiata.  No evidence of acute/recent major vascular distribution cerebral infarction, intraparenchymal hemorrhage, or intra-axial space occupying lesion. The ventricular system is normal in size and configuration with no evidence  of hydrocephalus. No effacement of the skull-base cisterns. No abnormal extra-axial fluid collections or blood products. Visualized paranasal sinuses and mastoid air cells are clear. The calvarium shows no significant abnormality.                                            Assessment/Plan:      * Seizure disorder  History of seizure disorder, not on medications   Presented with a seizure    Plan:   - Keppra 1 g BID  - Ativan PRN   - Neurology consult   - EEG  - MRI brain     Type 2 diabetes mellitus with hyperglycemia, without long-term current use of insulin  Patient's FSGs are uncontrolled due to hyperglycemia on current medication regimen.  Last A1c reviewed-   Lab Results   Component Value Date    HGBA1C 8.0 (H) 11/07/2022     Most recent fingerstick glucose reviewed-   Recent Labs   Lab 11/07/22  1703 11/08/22  0054 11/08/22  0602 11/08/22  0722   POCTGLUCOSE 217* 231* 211* 218*       Antihyperglycemics (From admission, onward)      Start     Stop Route Frequency Ordered    11/07/22 1420  insulin aspart U-100 pen 0-5 Units         -- SubQ Every 6 hours PRN 11/07/22 1320    11/07/22 0412  insulin aspart U-100 pen 0-5 Units         -- SubQ Before meals & nightly PRN 11/07/22 0312          Hold Oral hypoglycemics while patient is in the hospital.  SSI for now  DM education at discharge    Altered mental state  Likely related to post ictal state   F/U brain MRI  Neurology consult   Neuro checks, Nurse swallow assess, SLP, PT, OT ordered    Starvation ketoacidosis  History is limited given AMS  Labs showed low bicarb (13), hyperglycemia (239) and an elevated anion gap (26), elevated beta-hydroxybutarate (5.4). VBG demosntrated a PH of 7.33. UA has +3 ketones.     Plan:   IVF  Monitor BMPs       VTE Risk Mitigation (From admission, onward)           Ordered     IP VTE LOW RISK PATIENT  Once         11/07/22 0312     Place sequential compression device  Until discontinued         11/07/22 0312                     Discharge Planning   [place]AP[place]: 11/9/2022     Code Status: Full Code   Is the patient medically ready for discharge?:     Reason for patient still in hospital (select all that apply): Patient trending condition and Treatment                     Joseph Lopez MD  Department of Hospital Medicine   [facility]

## 2022-11-08 NOTE — PROCEDURES
Extended Routine EEG Report    Cesar Hernandez  [id number]  1980    DATE OF SERVICE: 11/7/2022  REASON FOR CONSULT:  42-year-old man found down with abnormal movements.  Evaluate for evidence of epileptiform activity.    METHODOLOGY   Electroencephalographic (EEG) recording is with electrodes placed according to the International 10-20 placement system.  Thirty two (32) channels of digital signal (sampling rate of 512/sec) including T1 and T2 was simultaneously recorded from the scalp and may include  EKG, EMG, and/or eye monitors.  Recording band pass was 0.1 to 512 hz.  Digital video recording of the patient is simultaneously recorded with the EEG.  The patient is instructed report clinical symptoms which may occur during the recording session.  EEG and video recording is stored and archived in digital format. Activation procedures which include photic stimulation, hyperventilation and instructing patients to perform simple task are done in selected patients.    The EEG is displayed on a monitor screen and can be reviewed using different montages.  Computer assisted analysis is employed to detect spike and electrographic seizure activity.   The entire record is submitted for computer analysis.  The entire recording is visually reviewed and the times identified by computer analysis as being spikes or seizures are reviewed again.  Compresses spectral analysis (CSA) is also performed on the activity recorded from each individual channel.  This is displayed as a power display of frequencies from 0 to 30 Hz over time.   The CSA is reviewed looking for asymmetries in power between homologous areas of the scalp and then compared with the original EEG recording.     SHADOW software is also utilized in the review of this study.  This software suite analyzes the EEG recording in multiple domains.  Coherence and rhythmicity is computed to identify EEG sections which may contain organized seizures.  Each channel undergoes  analysis to detect presence of spike and sharp waves which have special and morphological characteristic of epileptic activity.  The routine EEG recording is converted from spacial into frequency domain.  This is then displayed comparing homologous areas to identify areas of significant asymmetry.  Algorithm to identify non-cortically generated artifact is used to separate eye movement, EMG and other artifact from the EEG.      RECORDING TIMES  Start on 11/07/2022 at 10:15 a.m.  Stop on 11/07/2022 at 11:33 a.m.  A total of 1 hour and 15 minutes of EEG recording is obtained.    EEG FINDINGS  Background activity:   The background is continuous, relatively symmetric, predominantly theta activity with plenty of intermittent generalized and multifocal slowing seen bilaterally.  There are frequent sporadic right frontal discharges with a broad field into the left frontal region which on occasion occur in rapid rhythmic runs for approximately 1-2 seconds.    Sleep:  There are periods with poorly formed sleep architecture.    Activation procedures:   On testing by the EEG technician, the patient is awake, breathing room air, intermittently follows commands, and answers some orientation questions correctly.  He knows the year and his date of birth, but he does not know his location, why he is in the hospital, or the president.      Cardiac Monitor:   Heart rate appears generally regular on a single lead EKG.    Impression:   This is an abnormal extended routine EEG because of frequent right predominant frontal discharges with a broad field bilaterally consistent with focal cortical dysfunction/irritation and a potential seizure focus in this area.  There is generalized background slowing consistent with diffuse cortical dysfunction and a moderate encephalopathy.  There are no discrete seizures during the recording session.    Ruby Barnes MD PhD  Neurology-Epilepsy  [facility].

## 2022-11-08 NOTE — HOSPITAL COURSE
42 year old male with a PMHx of seizure disorder, homelessness admitted on 11/06/2022 for seizures. Also found to have likely starvation ketoacidosis. Continue fluids. SLP consulted to evaluate.  CT head showed mild periventricular white matter disease. UDS negative. Alcohol < 10. Neurology consulted. EEG and MRI brain pending. Continue IV keppra. Of note, patient was alert and oriented only to self. Able to answer some questions appropriately and followed some commands.  Neuro checks, Nurse swallow assess, SLP, PT, OT ordered. New Onset DM, SSI ordered.     Insulin titrated up, not on previously, will need to add add discharge.  Patient has agreed to to got NH. Patient is Medically ready to go. Awaiting placement.

## 2022-11-08 NOTE — NURSING
Patient asking for water. Patient drank cup of water with no issues. Dr. Brasher notified, new order placed for clear liquid diet and advance as tolerated.

## 2022-11-08 NOTE — PT/OT/SLP EVAL
"Physical Therapy Evaluation    Patient Name:  Cesar Hernandez   MRN:  [id number]    Recommendations:     Discharge Recommendations:   (Ongoing, most likely no skilled PT needs)   Discharge Equipment Recommendations:  (Ongoing, most likely none)   Barriers to discharge:  Pt is homeless and has not ambulated yet    Assessment:     Cesar Hernandez is a 42 y.o. male admitted with a medical diagnosis of Seizure disorder.  He presents with the following impairments/functional limitations:  impaired functional mobility, pain, decreased safety awareness, weakness .    Rehab Prognosis: Fair; patient would benefit from acute skilled PT services to address these deficits and reach maximum level of function.    Recent Surgery: * No surgery found *      Plan:     During this hospitalization, patient to be seen  (2-3x/wk) to address the identified rehab impairments via gait training, therapeutic activities, therapeutic exercises and progress toward the following goals:    Plan of Care Expires:  11/15/22    Subjective     Chief Complaint: pain  Patient/Family Comments/goals: Pt refused to sit at EOB following limited attempt to roll.  Pt refused to scoot to HOB after limited attempt.  "It hurts"  "I don't need any Physical Therapy"  Pain/Comfort:  Pain Rating 1:  (Not rated, C/O pain with attempts at mobility)  Location - Orientation 1:  (L ribs?, L UE?)    Patients cultural, spiritual, Scientologist conflicts given the current situation: no    Living Environment:  Pt is homeless per chart  Prior to admission, patients level of function was Independent?.  Equipment used at home: none.  DME owned (not currently used): none.  Upon discharge, patient will have assistance from ?.    Objective:     Communicated with nsg prior to session.  Patient found  L SL  with HOB elevated and pillow/covers over Head  with bed alarm, peripheral IV  upon PT entry to room.    General Precautions: Standard, fall   Orthopedic Precautions:N/A   Braces: " N/A  Respiratory Status: Room air    Exams:  Cognitive Exam:  Patient is oriented to Person  Gross Motor Coordination:  limited 2/2 subjective c/o pain  Postural Exam:  Patient presented with the following abnormalities:    -       Rounded shoulders  -       Forward head  Skin Integrity/Edema:      -       Skin integrity: Visible skin intact  -       Edema: None noted    RLE ROM: Knee flexion and Dflx WFL's  LLE ROM: Knee flexion and Dflx WFL's  B LE strength able to flex/Hip/knee in supine, unable to bridge  Functional Mobility:  Bed Mobility:     Rolling Left:  total assistance and with VC/TC for reaching for  Scooting: dependence and with Vc/TC for reaching for HB and bridging to HOB in supine with bed in reverse trendelenburg   Bridging: Unable  Supine to Sit: dependence and with adjustable bed  Sit to Supine: dependence and with adjustable bed      AM-PAC 6 CLICK MOBILITY  Total Score:8       Treatment & Education:  Educated pt on PT POC, NEEDS REINFORCEMENT    Patient left  HOB elevated  with all lines intact, call button in reach, and bed alarm on.    GOALS:   Multidisciplinary Problems       Physical Therapy Goals          Problem: Physical Therapy    Goal Priority Disciplines Outcome Goal Variances Interventions   Physical Therapy Goal     PT, PT/OT Ongoing, Progressing     Description: Goals to be met by: 11/15/22     Patient will increase functional independence with mobility by performin. Supine to sit with Modified [place]  2. Sit to stand transfer to be assessed   3. Gait  to be assessed                       History:     Past Medical History:   Diagnosis Date    Seizure disorder     Type 2 diabetes mellitus with hyperglycemia, without long-term current use of insulin 2022       No past surgical history on file.    Time Tracking:     PT Received On: 22  PT Start Time: 945     PT Stop Time: 956  PT Total Time (min): 11 min     Billable Minutes: Evaluation 11      2022

## 2022-11-08 NOTE — PLAN OF CARE
Patient remains free from injury and falls this shift. Patient becoming more alert as shift progressed. Still only oriented to self. No seizures. Patient irritable, refuses care, but eventually will allow care to be performed. IVF infusing. Patient placed clear liquid diet. Plan of care continued.

## 2022-11-09 LAB
ANION GAP SERPL CALC-SCNC: 8 MMOL/L (ref 8–16)
BUN SERPL-MCNC: 5 MG/DL (ref 6–20)
CALCIUM SERPL-MCNC: 9.3 MG/DL (ref 8.7–10.5)
CHLORIDE SERPL-SCNC: 102 MMOL/L (ref 95–110)
CO2 SERPL-SCNC: 29 MMOL/L (ref 23–29)
CREAT SERPL-MCNC: 0.7 MG/DL (ref 0.5–1.4)
EST. GFR  (NO RACE VARIABLE): >60 ML/MIN/1.73 M^2
GLUCOSE SERPL-MCNC: 235 MG/DL (ref 70–110)
POCT GLUCOSE: 225 MG/DL (ref 70–110)
POCT GLUCOSE: 257 MG/DL (ref 70–110)
POCT GLUCOSE: 287 MG/DL (ref 70–110)
POCT GLUCOSE: 330 MG/DL (ref 70–110)
POTASSIUM SERPL-SCNC: 3.9 MMOL/L (ref 3.5–5.1)
SODIUM SERPL-SCNC: 139 MMOL/L (ref 136–145)

## 2022-11-09 PROCEDURE — 97110 THERAPEUTIC EXERCISES: CPT

## 2022-11-09 PROCEDURE — 63600175 PHARM REV CODE 636 W HCPCS: Performed by: STUDENT IN AN ORGANIZED HEALTH CARE EDUCATION/TRAINING PROGRAM

## 2022-11-09 PROCEDURE — 36415 COLL VENOUS BLD VENIPUNCTURE: CPT | Performed by: STUDENT IN AN ORGANIZED HEALTH CARE EDUCATION/TRAINING PROGRAM

## 2022-11-09 PROCEDURE — 11000001 HC ACUTE MED/SURG PRIVATE ROOM

## 2022-11-09 PROCEDURE — 25000003 PHARM REV CODE 250: Performed by: STUDENT IN AN ORGANIZED HEALTH CARE EDUCATION/TRAINING PROGRAM

## 2022-11-09 PROCEDURE — 80048 BASIC METABOLIC PNL TOTAL CA: CPT | Performed by: STUDENT IN AN ORGANIZED HEALTH CARE EDUCATION/TRAINING PROGRAM

## 2022-11-09 RX ORDER — ENOXAPARIN SODIUM 100 MG/ML
40 INJECTION SUBCUTANEOUS EVERY 24 HOURS
Status: DISCONTINUED | OUTPATIENT
Start: 2022-11-09 | End: 2022-11-11 | Stop reason: HOSPADM

## 2022-11-09 RX ADMIN — SODIUM CHLORIDE, SODIUM LACTATE, POTASSIUM CHLORIDE, AND CALCIUM CHLORIDE: .6; .31; .03; .02 INJECTION, SOLUTION INTRAVENOUS at 10:11

## 2022-11-09 RX ADMIN — INSULIN DETEMIR 4 UNITS: 100 INJECTION, SOLUTION SUBCUTANEOUS at 08:11

## 2022-11-09 RX ADMIN — POLYETHYLENE GLYCOL 3350 17 G: 17 POWDER, FOR SOLUTION ORAL at 08:11

## 2022-11-09 RX ADMIN — ENOXAPARIN SODIUM 40 MG: 100 INJECTION SUBCUTANEOUS at 08:11

## 2022-11-09 RX ADMIN — LEVETIRACETAM 1000 MG: 100 INJECTION, SOLUTION INTRAVENOUS at 08:11

## 2022-11-09 RX ADMIN — SODIUM CHLORIDE, SODIUM LACTATE, POTASSIUM CHLORIDE, AND CALCIUM CHLORIDE: .6; .31; .03; .02 INJECTION, SOLUTION INTRAVENOUS at 08:11

## 2022-11-09 RX ADMIN — SODIUM CHLORIDE, SODIUM LACTATE, POTASSIUM CHLORIDE, AND CALCIUM CHLORIDE: .6; .31; .03; .02 INJECTION, SOLUTION INTRAVENOUS at 12:11

## 2022-11-09 RX ADMIN — INSULIN ASPART 3 UNITS: 100 INJECTION, SOLUTION INTRAVENOUS; SUBCUTANEOUS at 08:11

## 2022-11-09 RX ADMIN — INSULIN ASPART 2 UNITS: 100 INJECTION, SOLUTION INTRAVENOUS; SUBCUTANEOUS at 04:11

## 2022-11-09 RX ADMIN — HYDROCODONE BITARTRATE AND ACETAMINOPHEN 1 TABLET: 5; 325 TABLET ORAL at 11:11

## 2022-11-09 RX ADMIN — INSULIN ASPART 1 UNITS: 100 INJECTION, SOLUTION INTRAVENOUS; SUBCUTANEOUS at 08:11

## 2022-11-09 RX ADMIN — INSULIN ASPART 4 UNITS: 100 INJECTION, SOLUTION INTRAVENOUS; SUBCUTANEOUS at 12:11

## 2022-11-09 NOTE — PT/OT/SLP EVAL
Speech Language Pathology Evaluation  Bedside Swallow    Patient Name:  Cesar Hernandez   MRN:  [id number]  Admitting Diagnosis: Seizure disorder    Recommendations:                 General Recommendations:  Follow-up not indicated  Diet recommendations:  Regular, Thin   Aspiration Precautions: 1 bite/sip at a time   General Precautions: Standard,    Communication strategies:  calm tone    History:     Past Medical History:   Diagnosis Date    Seizure disorder     Type 2 diabetes mellitus with hyperglycemia, without long-term current use of insulin 11/8/2022       No past surgical history on file.    Social History: Patient is unhoused    Prior diet: unrestricted     Subjective   Pt reporting he does not have issues swallow and reports cognitive linguistic skills are at baseline, he appeared irritated by ST eval and offer limited responses when ST attempted to engage in informal conversation. Responses were somewhat delayed however per neuro note Pt with chronic ischemic changes and is likely at cognitive baseline.   Patient goals: unable to report    Pain/Comfort:  Pain Rating 1: 0/10    Respiratory Status: Room air    Objective:     Oral Musculature Evaluation  Oral Musculature: WFL  Dentition: present and adequate  Mucosal Quality: good  Oral Labial Strength and Mobility: WFL  Lingual Strength and Mobility: WFL  Voice Prior to PO Intake: wfl  Oral Musculature Comments: wfl    Bedside Swallow Eval:   Consistencies Assessed:  Thin liquids X5 via straw  Solids X2 grapes      Oral Phase:   Increased oral prep    Pharyngeal Phase:   no overt clinical signs/symptoms of aspiration    Compensatory Strategies  None    Treatment: please note silent aspiration cannot be r/o at bedside.     Assessment:     Cesar Hernandez is a 42 y.o. male with dx of Sz he presents with functional swallow with slightly increased oral prep and cognitive linguistic deficits which are at likely baseline per neuro findings.     Goals:    Multidisciplinary Problems       SLP Goals       Not on file              Multidisciplinary Problems (Resolved)          Problem: SLP    Goal Priority Disciplines Outcome   SLP Goal   (Resolved)    Low SLP Met   Description: Pt will participate in SLP eval (met 11/8)                       Plan:     Plan of Care expires:  11/08/22  Plan of Care reviewed with:  patient   SLP Follow-Up:  No       Discharge recommendations:    no further ST is warranted  Barriers to Discharge:  None    Time Tracking:     SLP Treatment Date:   11/08/22  Speech Start Time:  1750  Speech Stop Time:  1758     Speech Total Time (min):  8 min    Billable Minutes: Eval Swallow and Oral Function 8    11/08/2022

## 2022-11-09 NOTE — PT/OT/SLP PROGRESS
"Physical Therapy Treatment    Patient Name:  Cesar Hernandez   MRN:  [id number]    Recommendations:     Discharge Recommendations:   (PT at next level of care)   Discharge Equipment Recommendations:  (Ongoing most likely none)   Barriers to discharge:  Pt is homeless    Assessment:     Cesar Hernandez is a 42 y.o. male admitted with a medical diagnosis of Seizure disorder.  He presents with the following impairments/functional limitations:  impaired functional mobility, pain, decreased safety awareness, weakness Pt progressing toward PT goals.    Rehab Prognosis: Good; patient would benefit from acute skilled PT services to address these deficits and reach maximum level of function.    Recent Surgery: * No surgery found *      Plan:     During this hospitalization, patient to be seen  (2-3x/wk) to address the identified rehab impairments via gait training, therapeutic activities, therapeutic exercises and progress toward the following goals:    Plan of Care Expires:  11/15/22    Subjective     Chief Complaint: pain  Pt does not want to get up out of bed.  "You're aggravating me.  Patient/Family Comments/goals: "I know I can walk",   Pt agreeable to mobility after max education and encouragement    Pain/Comfort:  Pain Rating 1:  (Not rated)  Location 1:  (L hip)  Pain Addressed 1: Reposition, Distraction, Cessation of Activity      Objective:     Communicated with nsg prior to session.  Patient found HOB elevated with bed alarm, peripheral IV upon PT entry to room.     General Precautions: Standard, fall   Orthopedic Precautions:N/A   Braces: N/A  Respiratory Status: Room air     Functional Mobility:  Bed Mobility:     Rolling Right: contact guard assistance and with VC/TC for reaching for BR and logrolling  Scooting: stand by assistance  Supine to Sit: minimum assistance and with VC/TC for body mechanics  Sit to Supine: minimum assistance and with VC/TC for body mechanics  Transfers:     Sit to Stand:  stand by " assistance with no AD  Gait: CGA approx 20'  Balance: Fair+ sit, Fair stand      AM-PAC 6 CLICK MOBILITY  Turning over in bed (including adjusting bedclothes, sheets and blankets)?: 3  Sitting down on and standing up from a chair with arms (e.g., wheelchair, bedside commode, etc.): 3  Moving from lying on back to sitting on the side of the bed?: 3  Moving to and from a bed to a chair (including a wheelchair)?: 3  Need to walk in hospital room?: 3  Climbing 3-5 steps with a railing?: 2  Basic Mobility Total Score: 17       Treatment & Education:  Educated pt on PT POC and importance of mobility on recovery.  Educated pt to perform B AP's, TKE's, and HS's while in bed.  All Need Reinforcement    Patient left HOB elevated with all lines intact, call button in reach, and bed alarm on..    GOALS:   Multidisciplinary Problems       Physical Therapy Goals          Problem: Physical Therapy    Goal Priority Disciplines Outcome Goal Variances Interventions   Physical Therapy Goal     PT, PT/OT Ongoing, Progressing     Description: Goals to be met by: 11/15/22     Patient will increase functional independence with mobility by performin. Supine to sit with Modified [place]  2. Sit to stand transfer Modified Independent   3. Gait  x 150' Mod I  4. B LE TE with Supervsion                        Time Tracking:     PT Received On: 22  PT Start Time: 1636     PT Stop Time: 1650  PT Total Time (min): 14 min     Billable Minutes: Evaluation 14    Treatment Type: Treatment  PT/PTA: PT     PTA Visit Number: 0     2022

## 2022-11-09 NOTE — PLAN OF CARE
Problem: Skin Injury Risk Increased  Goal: Skin Health and Integrity  Outcome: Ongoing, Progressing  Intervention: Optimize Skin Protection  Flowsheets (Taken 11/9/2022 1047)  Pressure Reduction Techniques: frequent weight shift encouraged  Skin Protection:   adhesive use limited   incontinence pads utilized   tubing/devices free from skin contact  Head of Bed (HOB) Positioning: HOB at 30-45 degrees  Intervention: Promote and Optimize Oral Intake  Flowsheets (Taken 11/9/2022 1047)  Oral Nutrition Promotion: calorie-dense foods provided     Problem: Seizure, Active Management  Goal: Absence of Seizure/Seizure-Related Injury  Outcome: Ongoing, Progressing  Intervention: Prevent Seizure-Related Injury  Flowsheets (Taken 11/9/2022 1047)  Aspiration Prevention During Seizure:   positioned on side during seizure   suctioned secretions/vomitus  Seizure Precautions:   activity supervised   clutter-free environment maintained   emergency equipment at bedside     Problem: Fall Injury Risk  Goal: Absence of Fall and Fall-Related Injury  Outcome: Ongoing, Progressing  Intervention: Identify and Manage Contributors  Flowsheets (Taken 11/9/2022 1047)  Self-Care Promotion: safe use of adaptive equipment encouraged  Medication Review/Management: medications reviewed  Intervention: Promote Injury-Free Environment  Flowsheets (Taken 11/9/2022 1047)  Safety Promotion/Fall Prevention:   assistive device/personal item within reach   bed alarm set   Fall Risk reviewed with patient/family   Fall Risk signage in place   medications reviewed   nonskid shoes/socks when out of bed   side rails raised x 2   room near unit station   instructed to call staff for mobility     Problem: Diabetes Comorbidity  Goal: Blood Glucose Level Within Targeted Range  Outcome: Ongoing, Progressing  Intervention: Monitor and Manage Glycemia  Flowsheets (Taken 11/9/2022 1047)  Glycemic Management: blood glucose monitored

## 2022-11-09 NOTE — PROGRESS NOTES
"[facility]  Progress Note    Patient Name: Cesar Hernandez  MRN: [id number]  Patient Class: IP- Inpatient   Admission Date: 11/6/2022  Length of Stay: 2 days  Attending Physician: Joseph Lopez MD  Primary Care Provider: To Obtain Unable        Subjective:     Principal Problem:Seizure disorder        HPI:  This is a 42 year old male with a PMHx of seizure disorder, homelessness who presented with a seizure.     History is limited as patient is altered. Per EMS, the patient had a witnessed seizure episode in a gas station's parking lot and was still in postictal stat upon EMS arrival. The patient reports that he came to the hospital because of a "stroke" but when corrected says he came because of a seizure. He endorses having a history of seizure but denies being on any medications. While at this ED facility, the patient had another witnessed seizure episode. Labs showed low bicarb (13), hyperglycemia (239) and an elevated anion gap (26). CT head showed mild periventricular white matter disease. Neurology recommended EEG and MRI brain. He was given 1L of NS, ativan 2 mg and Keppra 1 g. He was admitted for further management.       Overview/Hospital Course:  42 year old male with a PMHx of seizure disorder, homelessness admitted on 11/06/2022 for seizures. Also found to have likely starvation ketoacidosis. Continue fluids. SLP consulted to evaluate.  CT head showed mild periventricular white matter disease. UDS negative. Alcohol < 10. Neurology consulted. EEG and MRI brain pending. Continue IV keppra. Of note, patient was alert and oriented only to self. Able to answer some questions appropriately and followed some commands.  Neuro checks, Nurse swallow assess, SLP, PT, OT ordered. New Onset DM, SSI ordered.       NAEON.   +confusion   Able to say he is in hospital and knows name.  Unclear year (1990's)    Review of Systems   Unable to perform ROS: Mental status change   Neurological:  " Positive for seizures.       Objective:     Vital Signs (Most Recent):  Temp: 97.3 °F (36.3 °C) (11/09/22 0411)  Pulse: 73 (11/09/22 0411)  Resp: 17 (11/09/22 0411)  BP: 139/69 (11/09/22 0411)  SpO2: 95 % (11/09/22 0411)   Vital Signs (24h Range):  Temp:  [97.3 °F (36.3 °C)-98.7 °F (37.1 °C)] 97.3 °F (36.3 °C)  Pulse:  [73-84] 73  Resp:  [16-20] 17  SpO2:  [95 %-99 %] 95 %  BP: (129-139)/(69-87) 139/69     Weight: 62.8 kg (138 lb 7.2 oz)  Body mass index is 19.87 kg/m².    Physical Exam  Vitals and nursing note reviewed.   Constitutional:       General: He is not in acute distress.     Appearance: Normal appearance. He is not ill-appearing.   HENT:      Head: Normocephalic and atraumatic.      Nose: Nose normal.      Mouth/Throat:      Mouth: Mucous membranes are moist.   Eyes:      Pupils: Pupils are equal, round, and reactive to light.   Cardiovascular:      Rate and Rhythm: Normal rate.      Pulses: Normal pulses.      Heart sounds: No murmur heard.  Pulmonary:      Effort: Pulmonary effort is normal. No respiratory distress.   Abdominal:      General: Abdomen is flat.      Palpations: Abdomen is soft.      Tenderness: There is no abdominal tenderness.   Musculoskeletal:      Right lower leg: No edema.      Left lower leg: No edema.      Comments: Left hip pain, limits movement of left lower extremity    Skin:     General: Skin is warm.      Capillary Refill: Capillary refill takes less than 2 seconds.   Neurological:      Mental Status: He is alert.      Comments: A&Ox1.   Follows commands  Blinks to threat   Able to move all extremities.          CRANIAL NERVES     CN III, IV, VI   Pupils are equal, round, and reactive to light.         Recent Results (from the past 24 hour(s))   POCT glucose    Collection Time: 11/08/22  7:22 AM   Result Value Ref Range    POCT Glucose 218 (H) 70 - 110 mg/dL   POCT glucose    Collection Time: 11/08/22 11:09 AM   Result Value Ref Range    POCT Glucose 202 (H) 70 - 110 mg/dL    POCT glucose    Collection Time: 11/08/22  4:25 PM   Result Value Ref Range    POCT Glucose 208 (H) 70 - 110 mg/dL   POCT glucose    Collection Time: 11/08/22  7:50 PM   Result Value Ref Range    POCT Glucose 311 (H) 70 - 110 mg/dL   Basic Metabolic Panel    Collection Time: 11/09/22  4:41 AM   Result Value Ref Range    Sodium 139 136 - 145 mmol/L    Potassium 3.9 3.5 - 5.1 mmol/L    Chloride 102 95 - 110 mmol/L    CO2 29 23 - 29 mmol/L    Glucose 235 (H) 70 - 110 mg/dL    BUN 5 (L) 6 - 20 mg/dL    Creatinine 0.7 0.5 - 1.4 mg/dL    Calcium 9.3 8.7 - 10.5 mg/dL    Anion Gap 8 8 - 16 mmol/L    eGFR >60 >60 mL/min/1.73 m^2       Microbiology Results (last 7 days)       ** No results found for the last 168 hours. **             Imaging Results              MRI Brain Without Contrast (Final result)  Result time 11/07/22 16:24:18      Final result by Darshan Lopes MD (11/07/22 16:24:18)                   Impression:      1. No acute infarct or intracranial hemorrhage definitively seen allowing for motion limited exam significantly degrading multiple sequences.  Further evaluation/follow-up as warranted.  2. Involutional change and suspected sequela of mild chronic microvascular ischemic change, somewhat advanced for age.  3. Right caudate/anterior internal capsule and left corona radiata remote lacunar type infarcts.  4. Ventriculomegaly slightly out of proportion to sulcal atrophy, nonspecific but can be seen with normal pressure hydrocephalus.      Electronically signed by: Darshan Lopes MD  Date:    11/07/2022  Time:    16:24               Narrative:    EXAMINATION:  MRI BRAIN WITHOUT CONTRAST    CLINICAL HISTORY:  Seizure, new-onset, no history of trauma;.    TECHNIQUE:  Multiplanar multisequence MR imaging of the brain was performed without contrast.    COMPARISON:  Head CT earlier same day    FINDINGS:  Patient motion artifact significantly degrades multiple sequences.    Intracranial compartment: Brain appears  normally formed.    The ventricles are midline and stable in overall size and configuration without distortion by mass effect or acute hydrocephalus noting ventriculomegaly out of proportion to sulcal atrophy.  No extra-axial blood or fluid collections definitively seen.    Remote lacunar type infarcts at the right caudate extending to the anterior limb right internal capsule and also at the left corona radiata noting small focus of ex vacuo dilatation of the mid body of the left lateral ventricle.  Minimal periventricular white matter T2/FLAIR hyperintense signal likely sequela of chronic microvascular ischemic change, advanced for age.  No mass lesion, acute hemorrhage, edema or acute infarct definitively seen allowing for limitations.    Normal vascular flow voids are preserved.  The sella and parasellar regions are within normal limits.    Skull/extracranial contents (limited evaluation): Bone marrow signal intensity is normal.  Craniocervical junction is within normal limits.  Bilateral orbits are within normal limits.  Paranasal sinuses and mastoid air cells are clear.                                       X-Ray Hip 2 or 3 views Left (with Pelvis when performed) (Final result)  Result time 11/07/22 04:44:06      Final result by Gerard Hayes MD (11/07/22 04:44:06)                   Impression:      Chronic changes are noted, there is no evidence for acute fracture or dislocation, close clinical and historical correlation is needed to determine need for additional follow-up.      Electronically signed by: Gerard Hayes  Date:    11/07/2022  Time:    04:44               Narrative:    EXAMINATION:  XR HIP WITH PELVIS WHEN PERFORMED, 2 OR 3 VIEWS LEFT    CLINICAL HISTORY:  pain;    TECHNIQUE:  AP view of the pelvis and frog leg lateral view of the left hip were performed.    COMPARISON:  None    FINDINGS:  The visualized osseous structures demonstrate chronic change.  There are chronic changes of the  visualized lower lumbar spine and sacroiliac joints, and hip joints.  There is no evidence for hip dislocation.  On close evaluation of available imaging, there is no evidence for osseous destructive process or acute fracture deformity.  Vascular calcifications are noted.                                       CT Head Without Contrast (Final result)  Result time 11/07/22 00:24:24      Final result by Jac Gutierrez MD (11/07/22 00:24:24)                   Impression:      Mild periventricular white matter disease.  This is most commonly seen with chronic microvascular ischemic disease, however would be advanced for patient's age.  If clinical concern for acute infarction, MRI follow-up may be obtained.      Electronically signed by: Jac Gutierrez MD  Date:    11/07/2022  Time:    00:24               Narrative:    EXAMINATION:  CT HEAD WITHOUT CONTRAST    CLINICAL HISTORY:  Mental status change, unknown cause;    TECHNIQUE:  Low dose axial images were obtained through the head.  Coronal and sagittal reformations were also performed. Contrast was not administered.    COMPARISON:  None.    FINDINGS:  There is mild periventricular parenchymal hypoattenuation, most pronounced involving the left corona radiata.  No evidence of acute/recent major vascular distribution cerebral infarction, intraparenchymal hemorrhage, or intra-axial space occupying lesion. The ventricular system is normal in size and configuration with no evidence of hydrocephalus. No effacement of the skull-base cisterns. No abnormal extra-axial fluid collections or blood products. Visualized paranasal sinuses and mastoid air cells are clear. The calvarium shows no significant abnormality.                                            Assessment/Plan:      * Seizure disorder  History of seizure disorder, not on medications   Presented with a seizure    Plan:   - Keppra 1 g BID  - Ativan PRN   - Neurology consult   - EEG  - MRI brain     Type 2 diabetes  mellitus with hyperglycemia, without long-term current use of insulin  Patient's FSGs are uncontrolled due to hyperglycemia on current medication regimen.  Last A1c reviewed-   Lab Results   Component Value Date    HGBA1C 8.0 (H) 11/07/2022     Most recent fingerstick glucose reviewed-   Recent Labs   Lab 11/07/22  1703 11/08/22  0054 11/08/22  0602 11/08/22  0722   POCTGLUCOSE 217* 231* 211* 218*       Antihyperglycemics (From admission, onward)    Start     Stop Route Frequency Ordered    11/07/22 1420  insulin aspart U-100 pen 0-5 Units         -- SubQ Every 6 hours PRN 11/07/22 1320    11/07/22 0412  insulin aspart U-100 pen 0-5 Units         -- SubQ Before meals & nightly PRN 11/07/22 0312        Hold Oral hypoglycemics while patient is in the hospital.  SSI for now  DM education at discharge    Altered mental state  Likely related to post ictal state   F/U brain MRI  Neurology consult   Neuro checks, Nurse swallow assess, SLP, PT, OT ordered    Starvation ketoacidosis  History is limited given AMS  Labs showed low bicarb (13), hyperglycemia (239) and an elevated anion gap (26), elevated beta-hydroxybutarate (5.4). VBG demosntrated a PH of 7.33. UA has +3 ketones.     Plan:   IVF  Monitor BMPs       VTE Risk Mitigation (From admission, onward)         Ordered     enoxaparin injection 40 mg  Daily         11/09/22 0706     IP VTE LOW RISK PATIENT  Once         11/07/22 0312     Place sequential compression device  Until discontinued         11/07/22 0312                Discharge Planning   CHAPINCITO: 11/9/2022     Code Status: Full Code   Is the patient medically ready for discharge?:     Reason for patient still in hospital (select all that apply): Patient trending condition and Treatment  Discharge Plan A: New Nursing Home placement - retirement care facility                  Joseph Lopez MD  Department of Hospital Medicine   [facility]

## 2022-11-09 NOTE — SUBJECTIVE & OBJECTIVE
ALIVIA.   +confusion   Able to say he is in hospital and knows name.  Unclear year (1990's)    Review of Systems   Unable to perform ROS: Mental status change   Neurological:  Positive for seizures.       Objective:     Vital Signs (Most Recent):  Temp: 97.3 °F (36.3 °C) (11/09/22 0411)  Pulse: 73 (11/09/22 0411)  Resp: 17 (11/09/22 0411)  BP: 139/69 (11/09/22 0411)  SpO2: 95 % (11/09/22 0411)   Vital Signs (24h Range):  Temp:  [97.3 °F (36.3 °C)-98.7 °F (37.1 °C)] 97.3 °F (36.3 °C)  Pulse:  [73-84] 73  Resp:  [16-20] 17  SpO2:  [95 %-99 %] 95 %  BP: (129-139)/(69-87) 139/69     Weight: 62.8 kg (138 lb 7.2 oz)  Body mass index is 19.87 kg/m².    Physical Exam  Vitals and nursing note reviewed.   Constitutional:       General: He is not in acute distress.     Appearance: Normal appearance. He is not ill-appearing.   HENT:      Head: Normocephalic and atraumatic.      Nose: Nose normal.      Mouth/Throat:      Mouth: Mucous membranes are moist.   Eyes:      Pupils: Pupils are equal, round, and reactive to light.   Cardiovascular:      Rate and Rhythm: Normal rate.      Pulses: Normal pulses.      Heart sounds: No murmur heard.  Pulmonary:      Effort: Pulmonary effort is normal. No respiratory distress.   Abdominal:      General: Abdomen is flat.      Palpations: Abdomen is soft.      Tenderness: There is no abdominal tenderness.   Musculoskeletal:      Right lower leg: No edema.      Left lower leg: No edema.      Comments: Left hip pain, limits movement of left lower extremity    Skin:     General: Skin is warm.      Capillary Refill: Capillary refill takes less than 2 seconds.   Neurological:      Mental Status: He is alert.      Comments: A&Ox1.   Follows commands  Blinks to threat   Able to move all extremities.          CRANIAL NERVES     CN III, IV, VI   Pupils are equal, round, and reactive to light.         Recent Results (from the past 24 hour(s))   POCT glucose    Collection Time: 11/08/22  7:22 AM   Result  Value Ref Range    POCT Glucose 218 (H) 70 - 110 mg/dL   POCT glucose    Collection Time: 11/08/22 11:09 AM   Result Value Ref Range    POCT Glucose 202 (H) 70 - 110 mg/dL   POCT glucose    Collection Time: 11/08/22  4:25 PM   Result Value Ref Range    POCT Glucose 208 (H) 70 - 110 mg/dL   POCT glucose    Collection Time: 11/08/22  7:50 PM   Result Value Ref Range    POCT Glucose 311 (H) 70 - 110 mg/dL   Basic Metabolic Panel    Collection Time: 11/09/22  4:41 AM   Result Value Ref Range    Sodium 139 136 - 145 mmol/L    Potassium 3.9 3.5 - 5.1 mmol/L    Chloride 102 95 - 110 mmol/L    CO2 29 23 - 29 mmol/L    Glucose 235 (H) 70 - 110 mg/dL    BUN 5 (L) 6 - 20 mg/dL    Creatinine 0.7 0.5 - 1.4 mg/dL    Calcium 9.3 8.7 - 10.5 mg/dL    Anion Gap 8 8 - 16 mmol/L    eGFR >60 >60 mL/min/1.73 m^2       Microbiology Results (last 7 days)       ** No results found for the last 168 hours. **             Imaging Results              MRI Brain Without Contrast (Final result)  Result time 11/07/22 16:24:18      Final result by Darshan Lopes MD (11/07/22 16:24:18)                   Impression:      1. No acute infarct or intracranial hemorrhage definitively seen allowing for motion limited exam significantly degrading multiple sequences.  Further evaluation/follow-up as warranted.  2. Involutional change and suspected sequela of mild chronic microvascular ischemic change, somewhat advanced for age.  3. Right caudate/anterior internal capsule and left corona radiata remote lacunar type infarcts.  4. Ventriculomegaly slightly out of proportion to sulcal atrophy, nonspecific but can be seen with normal pressure hydrocephalus.      Electronically signed by: Darshan Lopes MD  Date:    11/07/2022  Time:    16:24               Narrative:    EXAMINATION:  MRI BRAIN WITHOUT CONTRAST    CLINICAL HISTORY:  Seizure, new-onset, no history of trauma;.    TECHNIQUE:  Multiplanar multisequence MR imaging of the brain was performed without  contrast.    COMPARISON:  Head CT earlier same day    FINDINGS:  Patient motion artifact significantly degrades multiple sequences.    Intracranial compartment: Brain appears normally formed.    The ventricles are midline and stable in overall size and configuration without distortion by mass effect or acute hydrocephalus noting ventriculomegaly out of proportion to sulcal atrophy.  No extra-axial blood or fluid collections definitively seen.    Remote lacunar type infarcts at the right caudate extending to the anterior limb right internal capsule and also at the left corona radiata noting small focus of ex vacuo dilatation of the mid body of the left lateral ventricle.  Minimal periventricular white matter T2/FLAIR hyperintense signal likely sequela of chronic microvascular ischemic change, advanced for age.  No mass lesion, acute hemorrhage, edema or acute infarct definitively seen allowing for limitations.    Normal vascular flow voids are preserved.  The sella and parasellar regions are within normal limits.    Skull/extracranial contents (limited evaluation): Bone marrow signal intensity is normal.  Craniocervical junction is within normal limits.  Bilateral orbits are within normal limits.  Paranasal sinuses and mastoid air cells are clear.                                       X-Ray Hip 2 or 3 views Left (with Pelvis when performed) (Final result)  Result time 11/07/22 04:44:06      Final result by Gerard Hayes MD (11/07/22 04:44:06)                   Impression:      Chronic changes are noted, there is no evidence for acute fracture or dislocation, close clinical and historical correlation is needed to determine need for additional follow-up.      Electronically signed by: Gerard Hayes  Date:    11/07/2022  Time:    04:44               Narrative:    EXAMINATION:  XR HIP WITH PELVIS WHEN PERFORMED, 2 OR 3 VIEWS LEFT    CLINICAL HISTORY:  pain;    TECHNIQUE:  AP view of the pelvis and frog leg lateral  view of the left hip were performed.    COMPARISON:  None    FINDINGS:  The visualized osseous structures demonstrate chronic change.  There are chronic changes of the visualized lower lumbar spine and sacroiliac joints, and hip joints.  There is no evidence for hip dislocation.  On close evaluation of available imaging, there is no evidence for osseous destructive process or acute fracture deformity.  Vascular calcifications are noted.                                       CT Head Without Contrast (Final result)  Result time 11/07/22 00:24:24      Final result by Jac Gutierrez MD (11/07/22 00:24:24)                   Impression:      Mild periventricular white matter disease.  This is most commonly seen with chronic microvascular ischemic disease, however would be advanced for patient's age.  If clinical concern for acute infarction, MRI follow-up may be obtained.      Electronically signed by: Jac Gutierrez MD  Date:    11/07/2022  Time:    00:24               Narrative:    EXAMINATION:  CT HEAD WITHOUT CONTRAST    CLINICAL HISTORY:  Mental status change, unknown cause;    TECHNIQUE:  Low dose axial images were obtained through the head.  Coronal and sagittal reformations were also performed. Contrast was not administered.    COMPARISON:  None.    FINDINGS:  There is mild periventricular parenchymal hypoattenuation, most pronounced involving the left corona radiata.  No evidence of acute/recent major vascular distribution cerebral infarction, intraparenchymal hemorrhage, or intra-axial space occupying lesion. The ventricular system is normal in size and configuration with no evidence of hydrocephalus. No effacement of the skull-base cisterns. No abnormal extra-axial fluid collections or blood products. Visualized paranasal sinuses and mastoid air cells are clear. The calvarium shows no significant abnormality.

## 2022-11-09 NOTE — PLAN OF CARE
Problem: Physical Therapy  Goal: Physical Therapy Goal  Description: Goals to be met by: 11/15/22     Patient will increase functional independence with mobility by performin. Supine to sit with Modified [place]  2. Sit to stand transfer Modified Independent   3. Gait  x 150' Mod I  4. B LE TE with Supervsion   Outcome: Ongoing, Progressing   Continue with POC, goals adjusted appropriately.  PT at next level of care recommended.  Ongoing assessment pending pt progress for DME, most likely none.  OK for OOB to chair and ambulate with nursing staff and RW to bathroom.

## 2022-11-09 NOTE — PT/OT/SLP PROGRESS
Occupational Therapy      Patient Name:  Cesar Hernandez   MRN:  [id number]    11:03 AM: Patient not seen today secondary to the patient being fatigued and unwilling to participate.  Patient repeatedly asked for the evaluating OT to return later.  Patient stated that his legs hurt but declined getting OOB.  Patient's care team is aware of this.  When the treating OT was leaving the patient's room, Dr. Lopez/patient's care team were entering to speak to him.  As two attempts to evaluate this patient were made this morning, the treating OT will follow-up tomorrow or as able.    11/9/2022

## 2022-11-09 NOTE — PLAN OF CARE
Patient remains free from injury and falls. Alert and oriented to self. Able to state he's at hospital at times, other times he is unable to. Answers some questions appropriately. Told me he takes insulin for his diabetes. IVF infusing. Sleeping in intervals. Plan of care continued.

## 2022-11-10 LAB
POCT GLUCOSE: 156 MG/DL (ref 70–110)
POCT GLUCOSE: 164 MG/DL (ref 70–110)
POCT GLUCOSE: 235 MG/DL (ref 70–110)
POCT GLUCOSE: 269 MG/DL (ref 70–110)

## 2022-11-10 PROCEDURE — 11000001 HC ACUTE MED/SURG PRIVATE ROOM

## 2022-11-10 PROCEDURE — 99900035 HC TECH TIME PER 15 MIN (STAT)

## 2022-11-10 PROCEDURE — 63600175 PHARM REV CODE 636 W HCPCS: Performed by: STUDENT IN AN ORGANIZED HEALTH CARE EDUCATION/TRAINING PROGRAM

## 2022-11-10 PROCEDURE — 25000003 PHARM REV CODE 250: Performed by: STUDENT IN AN ORGANIZED HEALTH CARE EDUCATION/TRAINING PROGRAM

## 2022-11-10 RX ORDER — INSULIN ASPART 100 [IU]/ML
2 INJECTION, SOLUTION INTRAVENOUS; SUBCUTANEOUS 3 TIMES DAILY
Refills: 0
Start: 2022-11-10 | End: 2022-11-11 | Stop reason: HOSPADM

## 2022-11-10 RX ORDER — LEVETIRACETAM 1000 MG/1
1000 TABLET ORAL 2 TIMES DAILY
Qty: 180 TABLET | Refills: 3
Start: 2022-11-10 | End: 2022-11-11 | Stop reason: SDUPTHER

## 2022-11-10 RX ORDER — INSULIN ASPART 100 [IU]/ML
2 INJECTION, SOLUTION INTRAVENOUS; SUBCUTANEOUS
Status: DISCONTINUED | OUTPATIENT
Start: 2022-11-10 | End: 2022-11-11 | Stop reason: HOSPADM

## 2022-11-10 RX ADMIN — INSULIN ASPART 3 UNITS: 100 INJECTION, SOLUTION INTRAVENOUS; SUBCUTANEOUS at 11:11

## 2022-11-10 RX ADMIN — INSULIN ASPART 2 UNITS: 100 INJECTION, SOLUTION INTRAVENOUS; SUBCUTANEOUS at 07:11

## 2022-11-10 RX ADMIN — LEVETIRACETAM 1000 MG: 100 INJECTION, SOLUTION INTRAVENOUS at 08:11

## 2022-11-10 RX ADMIN — SODIUM CHLORIDE, SODIUM LACTATE, POTASSIUM CHLORIDE, AND CALCIUM CHLORIDE: .6; .31; .03; .02 INJECTION, SOLUTION INTRAVENOUS at 11:11

## 2022-11-10 RX ADMIN — ACETAMINOPHEN 650 MG: 325 TABLET ORAL at 11:11

## 2022-11-10 RX ADMIN — INSULIN ASPART 2 UNITS: 100 INJECTION, SOLUTION INTRAVENOUS; SUBCUTANEOUS at 11:11

## 2022-11-10 RX ADMIN — INSULIN ASPART 2 UNITS: 100 INJECTION, SOLUTION INTRAVENOUS; SUBCUTANEOUS at 04:11

## 2022-11-10 RX ADMIN — ENOXAPARIN SODIUM 40 MG: 100 INJECTION SUBCUTANEOUS at 04:11

## 2022-11-10 NOTE — PLAN OF CARE
Problem: Seizure, Active Management  Goal: Absence of Seizure/Seizure-Related Injury  Outcome: Ongoing, Progressing  Intervention: Prevent Seizure-Related Injury  Flowsheets (Taken 11/10/2022 0921)  Aspiration Prevention During Seizure:   positioned on side during seizure   suctioned secretions/vomitus  Seizure Precautions:   clutter-free environment maintained   emergency equipment at bedside     Problem: Diabetes Comorbidity  Goal: Blood Glucose Level Within Targeted Range  Outcome: Ongoing, Progressing  Intervention: Monitor and Manage Glycemia  Flowsheets (Taken 11/10/2022 0921)  Glycemic Management: blood glucose monitored

## 2022-11-10 NOTE — DISCHARGE SUMMARY
"[facility] Medicine  Discharge Summary      Patient Name: Cesar Hernandez  MRN: [id number]  [facility]: [id number]  Patient Class: IP- Inpatient  Admission Date: 11/6/2022  Hospital Length of Stay: 3 days  Discharge Date and Time:  11/10/2022 7:36 AM  Attending Physician: Joseph Lopez MD   Discharging Provider: Joseph Lopez MD  Primary Care Provider: To Obtain Unable    Primary Care Team: Networked reference to record PCT     HPI:   This is a 42 year old male with a PMHx of seizure disorder, homelessness who presented with a seizure.     History is limited as patient is altered. Per EMS, the patient had a witnessed seizure episode in a gas station's parking lot and was still in postictal stat upon EMS arrival. The patient reports that he came to the hospital because of a "stroke" but when corrected says he came because of a seizure. He endorses having a history of seizure but denies being on any medications. While at this ED facility, the patient had another witnessed seizure episode. Labs showed low bicarb (13), hyperglycemia (239) and an elevated anion gap (26). CT head showed mild periventricular white matter disease. Neurology recommended EEG and MRI brain. He was given 1L of NS, ativan 2 mg and Keppra 1 g. He was admitted for further management.       * No surgery found *      Hospital Course:   42 year old male with a PMHx of seizure disorder, homelessness admitted on 11/06/2022 for seizures. Also found to have likely starvation ketoacidosis. Continue fluids. SLP consulted to evaluate.  CT head showed mild periventricular white matter disease. UDS negative. Alcohol < 10. Neurology consulted. EEG and MRI brain pending. Continue IV keppra. Of note, patient was alert and oriented only to self. Able to answer some questions appropriately and followed some commands.  Neuro checks, Nurse swallow assess, SLP, PT, OT ordered. New Onset DM, SSI ordered.     Insulin titrated up, not on previously, will need " to add add discharge.  Patient has agreed to to got NH. Patient is Medically ready to go. Awaiting placement.     Unable to get him disability or in NH placement, will get to shelter.    On low dose insulin here, do not feel comfortable with patients ability to use insulin properly. Will start on metformin and glyburide. Keppra as well. Will make all BID to reduce confusion.           Review of Systems   Unable to perform ROS: Mental status change   Neurological:  Positive for seizures.         Objective:      Vital Signs (Most Recent):  Temp: 98.1 °F (36.7 °C) (11/10/22 0721)  Pulse: 78 (11/10/22 0721)  Resp: 18 (11/10/22 0721)  BP: 116/66 (11/10/22 0721)  SpO2: 95 % (11/10/22 0721)    Vital Signs (24h Range):  Temp:  [98 °F (36.7 °C)-98.5 °F (36.9 °C)] 98.1 °F (36.7 °C)  Pulse:  [74-82] 78  Resp:  [17-20] 18  SpO2:  [95 %-98 %] 95 %  BP: (116-141)/(66-90) 116/66      Weight: 60.4 kg (133 lb 2.5 oz)  Body mass index is 19.11 kg/m².     Physical Exam  Vitals and nursing note reviewed.   Constitutional:       General: He is not in acute distress.     Appearance: Normal appearance. He is not ill-appearing.   HENT:      Head: Normocephalic and atraumatic.      Nose: Nose normal.      Mouth/Throat:      Mouth: Mucous membranes are moist.   Eyes:      Pupils: Pupils are equal, round, and reactive to light.   Cardiovascular:      Rate and Rhythm: Normal rate.      Pulses: Normal pulses.      Heart sounds: No murmur heard.  Pulmonary:      Effort: Pulmonary effort is normal. No respiratory distress.   Abdominal:      General: Abdomen is flat.      Palpations: Abdomen is soft.      Tenderness: There is no abdominal tenderness.   Musculoskeletal:      Right lower leg: No edema.      Left lower leg: No edema.      Comments: Left hip pain, limits movement of left lower extremity    Skin:     General: Skin is warm.      Capillary Refill: Capillary refill takes less than 2 seconds.   Neurological:      Mental Status: He is  alert.      Comments: A&Ox1.   Follows commands  Blinks to threat   Able to move all extremities.         Goals of Care Treatment Preferences:  Code Status: Full Code      Consults:   Consults (From admission, onward)          Status Ordering Provider     Inpatient consult to Neurology  Once        Provider:  Blessing Do MD    Completed MENDY JALLOH            No new Assessment & Plan notes have been filed under this hospital service since the last note was generated.  Service: Hospital Medicine    Final Active Diagnoses:    Diagnosis Date Noted POA    PRINCIPAL PROBLEM:  Seizure disorder [G40.909] 11/07/2022 Yes    Type 2 diabetes mellitus with hyperglycemia, without long-term current use of insulin [E11.65] 11/08/2022 Yes    Starvation ketoacidosis [T73.0XXA, E87.29] 11/07/2022 Yes    Altered mental state [R41.82] 11/07/2022 Yes      Problems Resolved During this Admission:       Discharged Condition: good    Disposition:     Follow Up:    Patient Instructions:      Ambulatory referral/consult to Internal Medicine   Standing Status: Future   Referral Priority: Routine Referral Type: Consultation   Referral Reason: Specialty Services Required   Requested Specialty: Internal Medicine   Number of Visits Requested: 1     Ambulatory referral/consult to Neurology   Standing Status: Future   Referral Priority: Routine Referral Type: Consultation   Referral Reason: Specialty Services Required   Requested Specialty: Neurology   Number of Visits Requested: 1       Significant Diagnostic Studies:     Recent Results (from the past 100 hour(s))   CBC auto differential    Collection Time: 11/07/22 12:33 AM   Result Value Ref Range    WBC 12.27 3.90 - 12.70 K/uL    RBC 4.79 4.60 - 6.20 M/uL    Hemoglobin 14.2 14.0 - 18.0 g/dL    Hematocrit 41.4 40.0 - 54.0 %    MCV 86 82 - 98 fL    MCH 29.6 27.0 - 31.0 pg    MCHC 34.3 32.0 - 36.0 g/dL    RDW 14.3 11.5 - 14.5 %    Platelets 288 150 - 450 K/uL    MPV 9.7 9.2 -  12.9 fL    Immature Granulocytes 1.0 (H) 0.0 - 0.5 %    Gran # (ANC) 9.1 (H) 1.8 - 7.7 K/uL    Immature Grans (Abs) 0.12 (H) 0.00 - 0.04 K/uL    Lymph # 1.9 1.0 - 4.8 K/uL    Mono # 1.1 (H) 0.3 - 1.0 K/uL    Eos # 0.0 0.0 - 0.5 K/uL    Baso # 0.05 0.00 - 0.20 K/uL    nRBC 0 0 /100 WBC    Gran % 73.8 (H) 38.0 - 73.0 %    Lymph % 15.5 (L) 18.0 - 48.0 %    Mono % 9.1 4.0 - 15.0 %    Eosinophil % 0.2 0.0 - 8.0 %    Basophil % 0.4 0.0 - 1.9 %    Differential Method Automated    Comprehensive metabolic panel    Collection Time: 11/07/22 12:33 AM   Result Value Ref Range    Sodium 141 136 - 145 mmol/L    Potassium 4.2 3.5 - 5.1 mmol/L    Chloride 102 95 - 110 mmol/L    CO2 13 (L) 23 - 29 mmol/L    Glucose 239 (H) 70 - 110 mg/dL    BUN 18 6 - 20 mg/dL    Creatinine 1.1 0.5 - 1.4 mg/dL    Calcium 9.8 8.7 - 10.5 mg/dL    Total Protein 8.1 6.0 - 8.4 g/dL    Albumin 4.4 3.5 - 5.2 g/dL    Total Bilirubin 0.8 0.1 - 1.0 mg/dL    Alkaline Phosphatase 73 55 - 135 U/L    AST 30 10 - 40 U/L    ALT 31 10 - 44 U/L    Anion Gap 26 (H) 8 - 16 mmol/L    eGFR >60 >60 mL/min/1.73 m^2   Magnesium    Collection Time: 11/07/22 12:33 AM   Result Value Ref Range    Magnesium 2.2 1.6 - 2.6 mg/dL   Phosphorus    Collection Time: 11/07/22 12:33 AM   Result Value Ref Range    Phosphorus 4.9 (H) 2.7 - 4.5 mg/dL   Ethanol    Collection Time: 11/07/22  3:17 AM   Result Value Ref Range    Alcohol, Serum <10 <10 mg/dL   Beta - Hydroxybutyrate, Serum    Collection Time: 11/07/22  3:17 AM   Result Value Ref Range    Beta-Hydroxybutyrate 5.4 (H) 0.0 - 0.5 mmol/L   ISTAT PROCEDURE    Collection Time: 11/07/22  3:19 AM   Result Value Ref Range    POC PH 7.331 (L) 7.35 - 7.45    POC PCO2 40.5 35 - 45 mmHg    POC PO2 27 (L) 40 - 60 mmHg    POC HCO3 21.4 (L) 24 - 28 mmol/L    POC BE -4 -2 to 2 mmol/L    POC SATURATED O2 47 (L) 95 - 100 %    POC TCO2 23 (L) 24 - 29 mmol/L    Sample VENOUS     Site Other     Allens Test N/A     DelSys Room Air     Mode SPONT     Urinalysis, Reflex to Urine Culture Urine, Clean Catch    Collection Time: 11/07/22  3:57 AM    Specimen: Urine   Result Value Ref Range    Specimen UA Urine, Catheterized     Color, UA Yellow Yellow, Straw, Bianca    Appearance, UA Clear Clear    pH, UA 5.0 5.0 - 8.0    Specific Gravity, UA 1.015 1.005 - 1.030    Protein, UA Trace (A) Negative    Glucose, UA Negative Negative    Ketones, UA 3+ (A) Negative    Bilirubin (UA) Negative Negative    Occult Blood UA Negative Negative    Nitrite, UA Negative Negative    Urobilinogen, UA Negative <2.0 EU/dL    Leukocytes, UA Negative Negative   Drug screen panel, emergency    Collection Time: 11/07/22  3:57 AM   Result Value Ref Range    Benzodiazepines Negative Negative    Methadone metabolites Negative Negative    Cocaine (Metab.) Negative Negative    Opiate Scrn, Ur Negative Negative    Barbiturate Screen, Ur Negative Negative    Amphetamine Screen, Ur Negative Negative    THC Negative Negative    Phencyclidine Negative Negative    Creatinine, Urine 62.5 23.0 - 375.0 mg/dL    Toxicology Information SEE COMMENT    POCT glucose    Collection Time: 11/07/22  5:22 AM   Result Value Ref Range    POCT Glucose 222 (H) 70 - 110 mg/dL   Lactic acid, plasma    Collection Time: 11/07/22  6:33 AM   Result Value Ref Range    Lactate (Lactic Acid) 1.6 0.5 - 2.2 mmol/L   Basic metabolic panel    Collection Time: 11/07/22  6:33 AM   Result Value Ref Range    Sodium 140 136 - 145 mmol/L    Potassium 4.3 3.5 - 5.1 mmol/L    Chloride 104 95 - 110 mmol/L    CO2 19 (L) 23 - 29 mmol/L    Glucose 246 (H) 70 - 110 mg/dL    BUN 16 6 - 20 mg/dL    Creatinine 1.1 0.5 - 1.4 mg/dL    Calcium 9.4 8.7 - 10.5 mg/dL    Anion Gap 17 (H) 8 - 16 mmol/L    eGFR >60 >60 mL/min/1.73 m^2   Acetaminophen level    Collection Time: 11/07/22  6:33 AM   Result Value Ref Range    Acetaminophen (Tylenol), Serum <3.0 (L) 10.0 - 20.0 ug/mL   Salicylate level    Collection Time: 11/07/22  6:33 AM   Result Value Ref  Range    Salicylate Lvl <5.0 (L) 15.0 - 30.0 mg/dL   Hemoglobin A1c    Collection Time: 11/07/22  6:33 AM   Result Value Ref Range    Hemoglobin A1C 8.0 (H) 4.0 - 5.6 %    Estimated Avg Glucose 183 (H) 68 - 131 mg/dL   POCT glucose    Collection Time: 11/07/22  7:28 AM   Result Value Ref Range    POCT Glucose 257 (H) 70 - 110 mg/dL   POCT glucose    Collection Time: 11/07/22 11:41 AM   Result Value Ref Range    POCT Glucose 256 (H) 70 - 110 mg/dL   POCT glucose    Collection Time: 11/07/22  5:03 PM   Result Value Ref Range    POCT Glucose 217 (H) 70 - 110 mg/dL   POCT glucose    Collection Time: 11/08/22 12:54 AM   Result Value Ref Range    POCT Glucose 231 (H) 70 - 110 mg/dL   Basic Metabolic Panel    Collection Time: 11/08/22  4:30 AM   Result Value Ref Range    Sodium 142 136 - 145 mmol/L    Potassium 3.7 3.5 - 5.1 mmol/L    Chloride 105 95 - 110 mmol/L    CO2 24 23 - 29 mmol/L    Glucose 247 (H) 70 - 110 mg/dL    BUN 8 6 - 20 mg/dL    Creatinine 0.8 0.5 - 1.4 mg/dL    Calcium 9.6 8.7 - 10.5 mg/dL    Anion Gap 13 8 - 16 mmol/L    eGFR >60 >60 mL/min/1.73 m^2   TSH    Collection Time: 11/08/22  4:30 AM   Result Value Ref Range    TSH 0.551 0.400 - 4.000 uIU/mL   Lipid panel    Collection Time: 11/08/22  4:30 AM   Result Value Ref Range    Cholesterol 186 120 - 199 mg/dL    Triglycerides 152 (H) 30 - 150 mg/dL    HDL 40 40 - 75 mg/dL    LDL Cholesterol 115.6 63.0 - 159.0 mg/dL    HDL/Cholesterol Ratio 21.5 20.0 - 50.0 %    Total Cholesterol/HDL Ratio 4.7 2.0 - 5.0    Non-HDL Cholesterol 146 mg/dL   POCT glucose    Collection Time: 11/08/22  6:02 AM   Result Value Ref Range    POCT Glucose 211 (H) 70 - 110 mg/dL   POCT glucose    Collection Time: 11/08/22  7:22 AM   Result Value Ref Range    POCT Glucose 218 (H) 70 - 110 mg/dL   POCT glucose    Collection Time: 11/08/22 11:09 AM   Result Value Ref Range    POCT Glucose 202 (H) 70 - 110 mg/dL   POCT glucose    Collection Time: 11/08/22  4:25 PM   Result Value Ref  Range    POCT Glucose 208 (H) 70 - 110 mg/dL   POCT glucose    Collection Time: 11/08/22  7:50 PM   Result Value Ref Range    POCT Glucose 311 (H) 70 - 110 mg/dL   Basic Metabolic Panel    Collection Time: 11/09/22  4:41 AM   Result Value Ref Range    Sodium 139 136 - 145 mmol/L    Potassium 3.9 3.5 - 5.1 mmol/L    Chloride 102 95 - 110 mmol/L    CO2 29 23 - 29 mmol/L    Glucose 235 (H) 70 - 110 mg/dL    BUN 5 (L) 6 - 20 mg/dL    Creatinine 0.7 0.5 - 1.4 mg/dL    Calcium 9.3 8.7 - 10.5 mg/dL    Anion Gap 8 8 - 16 mmol/L    eGFR >60 >60 mL/min/1.73 m^2   POCT glucose    Collection Time: 11/09/22  7:14 AM   Result Value Ref Range    POCT Glucose 257 (H) 70 - 110 mg/dL   POCT glucose    Collection Time: 11/09/22 11:07 AM   Result Value Ref Range    POCT Glucose 330 (H) 70 - 110 mg/dL   POCT glucose    Collection Time: 11/09/22  4:12 PM   Result Value Ref Range    POCT Glucose 225 (H) 70 - 110 mg/dL   POCT glucose    Collection Time: 11/09/22  7:31 PM   Result Value Ref Range    POCT Glucose 287 (H) 70 - 110 mg/dL   POCT glucose    Collection Time: 11/10/22  7:19 AM   Result Value Ref Range    POCT Glucose 235 (H) 70 - 110 mg/dL       Microbiology Results (last 7 days)       ** No results found for the last 168 hours. **            Imaging Results              MRI Brain Without Contrast (Final result)  Result time 11/07/22 16:24:18      Final result by Darshan Lopes MD (11/07/22 16:24:18)                   Impression:      1. No acute infarct or intracranial hemorrhage definitively seen allowing for motion limited exam significantly degrading multiple sequences.  Further evaluation/follow-up as warranted.  2. Involutional change and suspected sequela of mild chronic microvascular ischemic change, somewhat advanced for age.  3. Right caudate/anterior internal capsule and left corona radiata remote lacunar type infarcts.  4. Ventriculomegaly slightly out of proportion to sulcal atrophy, nonspecific but can be seen with  normal pressure hydrocephalus.      Electronically signed by: Darshan Lopes MD  Date:    11/07/2022  Time:    16:24               Narrative:    EXAMINATION:  MRI BRAIN WITHOUT CONTRAST    CLINICAL HISTORY:  Seizure, new-onset, no history of trauma;.    TECHNIQUE:  Multiplanar multisequence MR imaging of the brain was performed without contrast.    COMPARISON:  Head CT earlier same day    FINDINGS:  Patient motion artifact significantly degrades multiple sequences.    Intracranial compartment: Brain appears normally formed.    The ventricles are midline and stable in overall size and configuration without distortion by mass effect or acute hydrocephalus noting ventriculomegaly out of proportion to sulcal atrophy.  No extra-axial blood or fluid collections definitively seen.    Remote lacunar type infarcts at the right caudate extending to the anterior limb right internal capsule and also at the left corona radiata noting small focus of ex vacuo dilatation of the mid body of the left lateral ventricle.  Minimal periventricular white matter T2/FLAIR hyperintense signal likely sequela of chronic microvascular ischemic change, advanced for age.  No mass lesion, acute hemorrhage, edema or acute infarct definitively seen allowing for limitations.    Normal vascular flow voids are preserved.  The sella and parasellar regions are within normal limits.    Skull/extracranial contents (limited evaluation): Bone marrow signal intensity is normal.  Craniocervical junction is within normal limits.  Bilateral orbits are within normal limits.  Paranasal sinuses and mastoid air cells are clear.                                       X-Ray Hip 2 or 3 views Left (with Pelvis when performed) (Final result)  Result time 11/07/22 04:44:06      Final result by Gerard Hayes MD (11/07/22 04:44:06)                   Impression:      Chronic changes are noted, there is no evidence for acute fracture or dislocation, close clinical and  historical correlation is needed to determine need for additional follow-up.      Electronically signed by: Gerard Hayes  Date:    11/07/2022  Time:    04:44               Narrative:    EXAMINATION:  XR HIP WITH PELVIS WHEN PERFORMED, 2 OR 3 VIEWS LEFT    CLINICAL HISTORY:  pain;    TECHNIQUE:  AP view of the pelvis and frog leg lateral view of the left hip were performed.    COMPARISON:  None    FINDINGS:  The visualized osseous structures demonstrate chronic change.  There are chronic changes of the visualized lower lumbar spine and sacroiliac joints, and hip joints.  There is no evidence for hip dislocation.  On close evaluation of available imaging, there is no evidence for osseous destructive process or acute fracture deformity.  Vascular calcifications are noted.                                       CT Head Without Contrast (Final result)  Result time 11/07/22 00:24:24      Final result by Jac Gutierrez MD (11/07/22 00:24:24)                   Impression:      Mild periventricular white matter disease.  This is most commonly seen with chronic microvascular ischemic disease, however would be advanced for patient's age.  If clinical concern for acute infarction, MRI follow-up may be obtained.      Electronically signed by: Jac Gutierrez MD  Date:    11/07/2022  Time:    00:24               Narrative:    EXAMINATION:  CT HEAD WITHOUT CONTRAST    CLINICAL HISTORY:  Mental status change, unknown cause;    TECHNIQUE:  Low dose axial images were obtained through the head.  Coronal and sagittal reformations were also performed. Contrast was not administered.    COMPARISON:  None.    FINDINGS:  There is mild periventricular parenchymal hypoattenuation, most pronounced involving the left corona radiata.  No evidence of acute/recent major vascular distribution cerebral infarction, intraparenchymal hemorrhage, or intra-axial space occupying lesion. The ventricular system is normal in size and configuration with no  evidence of hydrocephalus. No effacement of the skull-base cisterns. No abnormal extra-axial fluid collections or blood products. Visualized paranasal sinuses and mastoid air cells are clear. The calvarium shows no significant abnormality.                                          Pending Diagnostic Studies:       Procedure Component Value Units Date/Time    Vitamin B1 [[id number]] Collected: 11/08/22 0430    Order Status: Sent Lab Status: In process Updated: 11/08/22 0533    Specimen: Blood            Medications:  Reconciled Home Medications:      Medication List        START taking these medications      glyBURIDE 5 MG tablet  Commonly known as: DIABETA  Take 1 tablet (5 mg total) by mouth 2 (two) times daily with meals.     levETIRAcetam 1000 MG tablet  Commonly known as: KEPPRA  Take 1 tablet (1,000 mg total) by mouth 2 (two) times daily.     metFORMIN 500 MG tablet  Commonly known as: GLUCOPHAGE  Take 2 tablets (1,000 mg total) by mouth 2 (two) times daily with meals.              Indwelling Lines/Drains at time of discharge:   Lines/Drains/Airways       None                   Time spent on the discharge of patient: 35 minutes         Joseph Lopez MD  Department of Hospital Medicine  [facility] Surg

## 2022-11-10 NOTE — PLAN OF CARE
[place] sent a secure chat requesting hospitalist to take out discharge, due to no accepting NH.  Patient says he does not get a check.  The last plae he was at [facility] in [place], [place]. July, says she filled out forms for disability and at that time he did not qualify.  Patient informed of plan B to discharge to a shelter. Last shelter he was in [address] [place] [address] [phone].   11/10/22 1543   Post-Acute Status   Post-Acute Authorization Placement   Post-Acute Placement Status Pending post-acute provider review/more information requested   Coverage In State Medicaid  Payor:   MEDICAID - MEDICAID [facility]   Discharge Delays None known at this time   Discharge Plan   Discharge Plan A New Nursing Home placement - group home care facility   Discharge Plan B Shelter

## 2022-11-10 NOTE — PLAN OF CARE
[facility]     Department of Hospital Medicine     [address] [place] [address]     [phone] after hours  [phone] fax       NURSING HOME ORDERS    11/11/2022    Admit to Nursing Home:  Regular Bed           Diagnoses:  Active Hospital Problems    Diagnosis  POA    *Seizure disorder [G40.909]  Yes     Priority: 1 - High    Type 2 diabetes mellitus with hyperglycemia, without long-term current use of insulin [E11.65]  Yes    Starvation ketoacidosis [T73.0XXA, E87.29]  Yes    Altered mental state [R41.82]  Yes      Resolved Hospital Problems   No resolved problems to display.       Patient is homebound due to:  Seizure disorder    Allergies:Review of patient's allergies indicates:  No Known Allergies    Vitals:       Routine    Diet: diabetic diet, 2000 luis f    Acitivities:     - Up in a chair each morning as tolerated      LABS:  Per facility protocol    Nursing Precautions:     - Aspiration precautions:             -  Upright 90 degrees befor during and after meals    - Fall precautions per nursing home protocol   - Seizure precaution per retirement protocol   - Decubitus precautions:        -  for positioning   - Pressure reducing foam mattress   - Turn patient every two hours. Use wedge pillows to anchor patient    CONSULTS:     Physical Therapy to evaluate and treat     Occupational Therapy to evaluate and treat      MISCELLANEOUS CARE:       Routine Skin for Bedridden Patients:  Apply moisture barrier cream to all    skin folds and wet areas in perineal area daily and after baths and                           all bowel movements.                     DIABETES CARE:        Check blood sugar:     Fingerstick blood sugar AC and HS   Fingerstick blood sugar every 6 hours if unable to eat      Report CBG < 60 or > 400 to physician.                                          Insulin Sliding Scale          Glucose  Novolog Insulin Subcutaneous        0  - 60   Orange juice or glucose tablet, hold insulin      No insulin   201-250  2 units   251-300  4 units   301-350  6 units   351-400  8 units   >400   10 units then call physician      Medications: Discontinue all previous medication orders, if any. See new list below.       Medication List        START taking these medications      insulin aspart U-100 100 unit/mL (3 mL) Inpn pen  Commonly known as: NovoLOG  Inject 2 Units into the skin 3 (three) times daily.     insulin detemir U-100 100 unit/mL (3 mL) Inpn pen  Commonly known as: Levemir FLEXTOUCH  Inject 8 Units into the skin once daily.     levETIRAcetam 1000 MG tablet  Commonly known as: KEPPRA  Take 1 tablet (1,000 mg total) by mouth 2 (two) times daily.                    _________________________________  Joseph Lopez MD  11/11/2022

## 2022-11-10 NOTE — PLAN OF CARE
7:30 AM No accepting NH at present.  [place] obtained patient's social security number .  Patient says he was in a NH before but does not remember the name.    8:00 AM [place] called a locet in [phone] #, John at [facility], [place] then faxed in passr [phone], awaiting 142.     11/10/22 0800   Post-Acute Status   Post-Acute Authorization Placement   Post-Acute Placement Status Referrals Sent   Coverage MEDICAID - MEDICAID [facility] Resources/Appts/Education Provided Provided patient/caregiver with written discharge plan information;Appointments scheduled and added to AVS   Discharge Delays None known at this time   Discharge Plan   Discharge Plan A New Nursing Home placement - prison care facility;Shelter   Discharge Plan B Skilled Nursing Facility;New Nursing Home placement - prison care facility;Shelter

## 2022-11-10 NOTE — PT/OT/SLP PROGRESS
"Occupational Therapy      Patient Name:  Cesar Hernandez   MRN:  [id number]    11:38 AM: Patient not seen today secondary to being in pain; patient stated that he was "hurting all over" and that he did not want to do therapy.  Nurse informed.  This is the third consecutive day that patient has refused OT evaluation/Tx.  Orders have been discontinued.   informed.     11/10/2022  "

## 2022-11-10 NOTE — PLAN OF CARE
Problem: Adult Inpatient Plan of Care  Goal: Optimal Comfort and Wellbeing  Outcome: Ongoing, Progressing     Problem: Adult Inpatient Plan of Care  Goal: Patient-Specific Goal (Individualized)  Outcome: Ongoing, Progressing     Problem: Adult Inpatient Plan of Care  Goal: Plan of Care Review  Outcome: Ongoing, Progressing     Pt free from falls or any further trauma through out the shift. Prescribed medication administered. Complaints of pain, prn medication given. Inj LR infusing at 75 ml/hr. Pt in no distress. Will continue to monitor.

## 2022-11-10 NOTE — SUBJECTIVE & OBJECTIVE
ALIVIA.   Able to say he is in hospital and knows name.  Unclear year (1990's). Baseline     Review of Systems   Unable to perform ROS: Mental status change   Neurological:  Positive for seizures.       Objective:     Vital Signs (Most Recent):  Temp: 98.1 °F (36.7 °C) (11/10/22 0721)  Pulse: 78 (11/10/22 0721)  Resp: 18 (11/10/22 0721)  BP: 116/66 (11/10/22 0721)  SpO2: 95 % (11/10/22 0721)   Vital Signs (24h Range):  Temp:  [98 °F (36.7 °C)-98.5 °F (36.9 °C)] 98.1 °F (36.7 °C)  Pulse:  [74-82] 78  Resp:  [17-20] 18  SpO2:  [95 %-98 %] 95 %  BP: (116-141)/(66-90) 116/66     Weight: 60.4 kg (133 lb 2.5 oz)  Body mass index is 19.11 kg/m².    Physical Exam  Vitals and nursing note reviewed.   Constitutional:       General: He is not in acute distress.     Appearance: Normal appearance. He is not ill-appearing.   HENT:      Head: Normocephalic and atraumatic.      Nose: Nose normal.      Mouth/Throat:      Mouth: Mucous membranes are moist.   Eyes:      Pupils: Pupils are equal, round, and reactive to light.   Cardiovascular:      Rate and Rhythm: Normal rate.      Pulses: Normal pulses.      Heart sounds: No murmur heard.  Pulmonary:      Effort: Pulmonary effort is normal. No respiratory distress.   Abdominal:      General: Abdomen is flat.      Palpations: Abdomen is soft.      Tenderness: There is no abdominal tenderness.   Musculoskeletal:      Right lower leg: No edema.      Left lower leg: No edema.      Comments: Left hip pain, limits movement of left lower extremity    Skin:     General: Skin is warm.      Capillary Refill: Capillary refill takes less than 2 seconds.   Neurological:      Mental Status: He is alert.      Comments: A&Ox1.   Follows commands  Blinks to threat   Able to move all extremities.          CRANIAL NERVES     CN III, IV, VI   Pupils are equal, round, and reactive to light.         Recent Results (from the past 24 hour(s))   POCT glucose    Collection Time: 11/09/22 11:07 AM   Result  Value Ref Range    POCT Glucose 330 (H) 70 - 110 mg/dL   POCT glucose    Collection Time: 11/09/22  4:12 PM   Result Value Ref Range    POCT Glucose 225 (H) 70 - 110 mg/dL   POCT glucose    Collection Time: 11/09/22  7:31 PM   Result Value Ref Range    POCT Glucose 287 (H) 70 - 110 mg/dL   POCT glucose    Collection Time: 11/10/22  7:19 AM   Result Value Ref Range    POCT Glucose 235 (H) 70 - 110 mg/dL       Microbiology Results (last 7 days)       ** No results found for the last 168 hours. **             Imaging Results              MRI Brain Without Contrast (Final result)  Result time 11/07/22 16:24:18      Final result by Darshan Lopes MD (11/07/22 16:24:18)                   Impression:      1. No acute infarct or intracranial hemorrhage definitively seen allowing for motion limited exam significantly degrading multiple sequences.  Further evaluation/follow-up as warranted.  2. Involutional change and suspected sequela of mild chronic microvascular ischemic change, somewhat advanced for age.  3. Right caudate/anterior internal capsule and left corona radiata remote lacunar type infarcts.  4. Ventriculomegaly slightly out of proportion to sulcal atrophy, nonspecific but can be seen with normal pressure hydrocephalus.      Electronically signed by: Darshan Lopes MD  Date:    11/07/2022  Time:    16:24               Narrative:    EXAMINATION:  MRI BRAIN WITHOUT CONTRAST    CLINICAL HISTORY:  Seizure, new-onset, no history of trauma;.    TECHNIQUE:  Multiplanar multisequence MR imaging of the brain was performed without contrast.    COMPARISON:  Head CT earlier same day    FINDINGS:  Patient motion artifact significantly degrades multiple sequences.    Intracranial compartment: Brain appears normally formed.    The ventricles are midline and stable in overall size and configuration without distortion by mass effect or acute hydrocephalus noting ventriculomegaly out of proportion to sulcal atrophy.  No extra-axial  blood or fluid collections definitively seen.    Remote lacunar type infarcts at the right caudate extending to the anterior limb right internal capsule and also at the left corona radiata noting small focus of ex vacuo dilatation of the mid body of the left lateral ventricle.  Minimal periventricular white matter T2/FLAIR hyperintense signal likely sequela of chronic microvascular ischemic change, advanced for age.  No mass lesion, acute hemorrhage, edema or acute infarct definitively seen allowing for limitations.    Normal vascular flow voids are preserved.  The sella and parasellar regions are within normal limits.    Skull/extracranial contents (limited evaluation): Bone marrow signal intensity is normal.  Craniocervical junction is within normal limits.  Bilateral orbits are within normal limits.  Paranasal sinuses and mastoid air cells are clear.                                       X-Ray Hip 2 or 3 views Left (with Pelvis when performed) (Final result)  Result time 11/07/22 04:44:06      Final result by Gerard Hayes MD (11/07/22 04:44:06)                   Impression:      Chronic changes are noted, there is no evidence for acute fracture or dislocation, close clinical and historical correlation is needed to determine need for additional follow-up.      Electronically signed by: Gerard Hayes  Date:    11/07/2022  Time:    04:44               Narrative:    EXAMINATION:  XR HIP WITH PELVIS WHEN PERFORMED, 2 OR 3 VIEWS LEFT    CLINICAL HISTORY:  pain;    TECHNIQUE:  AP view of the pelvis and frog leg lateral view of the left hip were performed.    COMPARISON:  None    FINDINGS:  The visualized osseous structures demonstrate chronic change.  There are chronic changes of the visualized lower lumbar spine and sacroiliac joints, and hip joints.  There is no evidence for hip dislocation.  On close evaluation of available imaging, there is no evidence for osseous destructive process or acute fracture  deformity.  Vascular calcifications are noted.                                       CT Head Without Contrast (Final result)  Result time 11/07/22 00:24:24      Final result by Jac Gutierrez MD (11/07/22 00:24:24)                   Impression:      Mild periventricular white matter disease.  This is most commonly seen with chronic microvascular ischemic disease, however would be advanced for patient's age.  If clinical concern for acute infarction, MRI follow-up may be obtained.      Electronically signed by: Jac Gutierrez MD  Date:    11/07/2022  Time:    00:24               Narrative:    EXAMINATION:  CT HEAD WITHOUT CONTRAST    CLINICAL HISTORY:  Mental status change, unknown cause;    TECHNIQUE:  Low dose axial images were obtained through the head.  Coronal and sagittal reformations were also performed. Contrast was not administered.    COMPARISON:  None.    FINDINGS:  There is mild periventricular parenchymal hypoattenuation, most pronounced involving the left corona radiata.  No evidence of acute/recent major vascular distribution cerebral infarction, intraparenchymal hemorrhage, or intra-axial space occupying lesion. The ventricular system is normal in size and configuration with no evidence of hydrocephalus. No effacement of the skull-base cisterns. No abnormal extra-axial fluid collections or blood products. Visualized paranasal sinuses and mastoid air cells are clear. The calvarium shows no significant abnormality.

## 2022-11-10 NOTE — PROGRESS NOTES
"[facility] Medicine  Progress Note    Patient Name: Cesar Hernandez  MRN: [id number]  Patient Class: IP- Inpatient   Admission Date: 11/6/2022  Length of Stay: 3 days  Attending Physician: Joseph Lopez MD  Primary Care Provider: To Obtain Unable        Subjective:     Principal Problem:Seizure disorder        HPI:  This is a 42 year old male with a PMHx of seizure disorder, homelessness who presented with a seizure.     History is limited as patient is altered. Per EMS, the patient had a witnessed seizure episode in a gas station's parking lot and was still in postictal stat upon EMS arrival. The patient reports that he came to the hospital because of a "stroke" but when corrected says he came because of a seizure. He endorses having a history of seizure but denies being on any medications. While at this ED facility, the patient had another witnessed seizure episode. Labs showed low bicarb (13), hyperglycemia (239) and an elevated anion gap (26). CT head showed mild periventricular white matter disease. Neurology recommended EEG and MRI brain. He was given 1L of NS, ativan 2 mg and Keppra 1 g. He was admitted for further management.       Overview/Hospital Course:  42 year old male with a PMHx of seizure disorder, homelessness admitted on 11/06/2022 for seizures. Also found to have likely starvation ketoacidosis. Continue fluids. SLP consulted to evaluate.  CT head showed mild periventricular white matter disease. UDS negative. Alcohol < 10. Neurology consulted. EEG and MRI brain pending. Continue IV keppra. Of note, patient was alert and oriented only to self. Able to answer some questions appropriately and followed some commands.  Neuro checks, Nurse swallow assess, SLP, PT, OT ordered. New Onset DM, SSI ordered.     Insulin titrated up, not on previously, will need to add add discharge.  Patient has agreed to to got NH. Patient is Medically ready to go. Awaiting placement.       SHILPAON.   Able to " say he is in hospital and knows name.  Unclear year (1990's). Baseline     Review of Systems   Unable to perform ROS: Mental status change   Neurological:  Positive for seizures.       Objective:     Vital Signs (Most Recent):  Temp: 98.1 °F (36.7 °C) (11/10/22 0721)  Pulse: 78 (11/10/22 0721)  Resp: 18 (11/10/22 0721)  BP: 116/66 (11/10/22 0721)  SpO2: 95 % (11/10/22 0721)   Vital Signs (24h Range):  Temp:  [98 °F (36.7 °C)-98.5 °F (36.9 °C)] 98.1 °F (36.7 °C)  Pulse:  [74-82] 78  Resp:  [17-20] 18  SpO2:  [95 %-98 %] 95 %  BP: (116-141)/(66-90) 116/66     Weight: 60.4 kg (133 lb 2.5 oz)  Body mass index is 19.11 kg/m².    Physical Exam  Vitals and nursing note reviewed.   Constitutional:       General: He is not in acute distress.     Appearance: Normal appearance. He is not ill-appearing.   HENT:      Head: Normocephalic and atraumatic.      Nose: Nose normal.      Mouth/Throat:      Mouth: Mucous membranes are moist.   Eyes:      Pupils: Pupils are equal, round, and reactive to light.   Cardiovascular:      Rate and Rhythm: Normal rate.      Pulses: Normal pulses.      Heart sounds: No murmur heard.  Pulmonary:      Effort: Pulmonary effort is normal. No respiratory distress.   Abdominal:      General: Abdomen is flat.      Palpations: Abdomen is soft.      Tenderness: There is no abdominal tenderness.   Musculoskeletal:      Right lower leg: No edema.      Left lower leg: No edema.      Comments: Left hip pain, limits movement of left lower extremity    Skin:     General: Skin is warm.      Capillary Refill: Capillary refill takes less than 2 seconds.   Neurological:      Mental Status: He is alert.      Comments: A&Ox1.   Follows commands  Blinks to threat   Able to move all extremities.          CRANIAL NERVES     CN III, IV, VI   Pupils are equal, round, and reactive to light.         Recent Results (from the past 24 hour(s))   POCT glucose    Collection Time: 11/09/22 11:07 AM   Result Value Ref Range     POCT Glucose 330 (H) 70 - 110 mg/dL   POCT glucose    Collection Time: 11/09/22  4:12 PM   Result Value Ref Range    POCT Glucose 225 (H) 70 - 110 mg/dL   POCT glucose    Collection Time: 11/09/22  7:31 PM   Result Value Ref Range    POCT Glucose 287 (H) 70 - 110 mg/dL   POCT glucose    Collection Time: 11/10/22  7:19 AM   Result Value Ref Range    POCT Glucose 235 (H) 70 - 110 mg/dL       Microbiology Results (last 7 days)       ** No results found for the last 168 hours. **             Imaging Results              MRI Brain Without Contrast (Final result)  Result time 11/07/22 16:24:18      Final result by Darshan Lopes MD (11/07/22 16:24:18)                   Impression:      1. No acute infarct or intracranial hemorrhage definitively seen allowing for motion limited exam significantly degrading multiple sequences.  Further evaluation/follow-up as warranted.  2. Involutional change and suspected sequela of mild chronic microvascular ischemic change, somewhat advanced for age.  3. Right caudate/anterior internal capsule and left corona radiata remote lacunar type infarcts.  4. Ventriculomegaly slightly out of proportion to sulcal atrophy, nonspecific but can be seen with normal pressure hydrocephalus.      Electronically signed by: Darshan Lopes MD  Date:    11/07/2022  Time:    16:24               Narrative:    EXAMINATION:  MRI BRAIN WITHOUT CONTRAST    CLINICAL HISTORY:  Seizure, new-onset, no history of trauma;.    TECHNIQUE:  Multiplanar multisequence MR imaging of the brain was performed without contrast.    COMPARISON:  Head CT earlier same day    FINDINGS:  Patient motion artifact significantly degrades multiple sequences.    Intracranial compartment: Brain appears normally formed.    The ventricles are midline and stable in overall size and configuration without distortion by mass effect or acute hydrocephalus noting ventriculomegaly out of proportion to sulcal atrophy.  No extra-axial blood or fluid  collections definitively seen.    Remote lacunar type infarcts at the right caudate extending to the anterior limb right internal capsule and also at the left corona radiata noting small focus of ex vacuo dilatation of the mid body of the left lateral ventricle.  Minimal periventricular white matter T2/FLAIR hyperintense signal likely sequela of chronic microvascular ischemic change, advanced for age.  No mass lesion, acute hemorrhage, edema or acute infarct definitively seen allowing for limitations.    Normal vascular flow voids are preserved.  The sella and parasellar regions are within normal limits.    Skull/extracranial contents (limited evaluation): Bone marrow signal intensity is normal.  Craniocervical junction is within normal limits.  Bilateral orbits are within normal limits.  Paranasal sinuses and mastoid air cells are clear.                                       X-Ray Hip 2 or 3 views Left (with Pelvis when performed) (Final result)  Result time 11/07/22 04:44:06      Final result by Gerard Hayes MD (11/07/22 04:44:06)                   Impression:      Chronic changes are noted, there is no evidence for acute fracture or dislocation, close clinical and historical correlation is needed to determine need for additional follow-up.      Electronically signed by: Gerard Hayes  Date:    11/07/2022  Time:    04:44               Narrative:    EXAMINATION:  XR HIP WITH PELVIS WHEN PERFORMED, 2 OR 3 VIEWS LEFT    CLINICAL HISTORY:  pain;    TECHNIQUE:  AP view of the pelvis and frog leg lateral view of the left hip were performed.    COMPARISON:  None    FINDINGS:  The visualized osseous structures demonstrate chronic change.  There are chronic changes of the visualized lower lumbar spine and sacroiliac joints, and hip joints.  There is no evidence for hip dislocation.  On close evaluation of available imaging, there is no evidence for osseous destructive process or acute fracture deformity.  Vascular  calcifications are noted.                                       CT Head Without Contrast (Final result)  Result time 11/07/22 00:24:24      Final result by Jac Gutierrez MD (11/07/22 00:24:24)                   Impression:      Mild periventricular white matter disease.  This is most commonly seen with chronic microvascular ischemic disease, however would be advanced for patient's age.  If clinical concern for acute infarction, MRI follow-up may be obtained.      Electronically signed by: Jac Gutierrez MD  Date:    11/07/2022  Time:    00:24               Narrative:    EXAMINATION:  CT HEAD WITHOUT CONTRAST    CLINICAL HISTORY:  Mental status change, unknown cause;    TECHNIQUE:  Low dose axial images were obtained through the head.  Coronal and sagittal reformations were also performed. Contrast was not administered.    COMPARISON:  None.    FINDINGS:  There is mild periventricular parenchymal hypoattenuation, most pronounced involving the left corona radiata.  No evidence of acute/recent major vascular distribution cerebral infarction, intraparenchymal hemorrhage, or intra-axial space occupying lesion. The ventricular system is normal in size and configuration with no evidence of hydrocephalus. No effacement of the skull-base cisterns. No abnormal extra-axial fluid collections or blood products. Visualized paranasal sinuses and mastoid air cells are clear. The calvarium shows no significant abnormality.                                            Assessment/Plan:      * Seizure disorder  History of seizure disorder, not on medications   Presented with a seizure    Plan:   - Keppra 1 g BID  - Ativan PRN   - Neurology consult   - EEG  - MRI brain     Type 2 diabetes mellitus with hyperglycemia, without long-term current use of insulin  Patient's FSGs are uncontrolled due to hyperglycemia on current medication regimen.  Last A1c reviewed-   Lab Results   Component Value Date    HGBA1C 8.0 (H) 11/07/2022     Most  recent fingerstick glucose reviewed-   Recent Labs   Lab 11/07/22  1703 11/08/22  0054 11/08/22  0602 11/08/22  0722   POCTGLUCOSE 217* 231* 211* 218*       Antihyperglycemics (From admission, onward)      Start     Stop Route Frequency Ordered    11/07/22 1420  insulin aspart U-100 pen 0-5 Units         -- SubQ Every 6 hours PRN 11/07/22 1320    11/07/22 0412  insulin aspart U-100 pen 0-5 Units         -- SubQ Before meals & nightly PRN 11/07/22 0312          Hold Oral hypoglycemics while patient is in the hospital.  SSI for now  DM education at discharge    Altered mental state  Likely related to post ictal state   F/U brain MRI  Neurology consult   Neuro checks, Nurse swallow assess, SLP, PT, OT ordered    Starvation ketoacidosis  History is limited given AMS  Labs showed low bicarb (13), hyperglycemia (239) and an elevated anion gap (26), elevated beta-hydroxybutarate (5.4). VBG demosntrated a PH of 7.33. UA has +3 ketones.     Plan:   IVF  Monitor BMPs       VTE Risk Mitigation (From admission, onward)           Ordered     enoxaparin injection 40 mg  Daily         11/09/22 0706     IP VTE LOW RISK PATIENT  Once         11/07/22 0312     Place sequential compression device  Until discontinued         11/07/22 0312                    Discharge Planning   CHAPIN[place]O: 11/9/2022     Code Status: Full Code   Is the patient medically ready for discharge?:     Reason for patient still in hospital (select all that apply): Pending disposition  Discharge Plan A: New Nursing Home placement - senior care care facility                  Joseph Lopez MD  Department of Hospital Medicine   [facility] - [place], [place] - [facility]

## 2022-11-11 VITALS
OXYGEN SATURATION: 97 % | HEART RATE: 86 BPM | SYSTOLIC BLOOD PRESSURE: 122 MMHG | TEMPERATURE: 98 F | RESPIRATION RATE: 18 BRPM | DIASTOLIC BLOOD PRESSURE: 73 MMHG | HEIGHT: 70 IN | WEIGHT: 133.19 LBS | BODY MASS INDEX: 19.07 KG/M2

## 2022-11-11 LAB
POCT GLUCOSE: 159 MG/DL (ref 70–110)
POCT GLUCOSE: 180 MG/DL (ref 70–110)
VIT B1 BLD-MCNC: 57 UG/L (ref 38–122)

## 2022-11-11 PROCEDURE — 63600175 PHARM REV CODE 636 W HCPCS: Performed by: STUDENT IN AN ORGANIZED HEALTH CARE EDUCATION/TRAINING PROGRAM

## 2022-11-11 PROCEDURE — 97116 GAIT TRAINING THERAPY: CPT

## 2022-11-11 PROCEDURE — 25000003 PHARM REV CODE 250: Performed by: STUDENT IN AN ORGANIZED HEALTH CARE EDUCATION/TRAINING PROGRAM

## 2022-11-11 RX ORDER — METFORMIN HYDROCHLORIDE 500 MG/1
1000 TABLET ORAL 2 TIMES DAILY WITH MEALS
Qty: 360 TABLET | Refills: 3 | Status: SHIPPED | OUTPATIENT
Start: 2022-11-11 | End: 2023-11-11

## 2022-11-11 RX ORDER — LEVETIRACETAM 1000 MG/1
1000 TABLET ORAL 2 TIMES DAILY
Qty: 180 TABLET | Refills: 3 | Status: SHIPPED | OUTPATIENT
Start: 2022-11-11 | End: 2023-11-11

## 2022-11-11 RX ORDER — GLYBURIDE 5 MG/1
10 TABLET ORAL
Qty: 180 TABLET | Refills: 3 | Status: SHIPPED | OUTPATIENT
Start: 2022-11-11 | End: 2022-11-11 | Stop reason: SDUPTHER

## 2022-11-11 RX ORDER — GLYBURIDE 5 MG/1
5 TABLET ORAL 2 TIMES DAILY WITH MEALS
Qty: 180 TABLET | Refills: 3 | Status: SHIPPED | OUTPATIENT
Start: 2022-11-11 | End: 2023-11-11

## 2022-11-11 RX ORDER — LEVETIRACETAM 500 MG/1
1000 TABLET ORAL 2 TIMES DAILY
Status: DISCONTINUED | OUTPATIENT
Start: 2022-11-11 | End: 2022-11-11 | Stop reason: HOSPADM

## 2022-11-11 RX ADMIN — INSULIN ASPART 2 UNITS: 100 INJECTION, SOLUTION INTRAVENOUS; SUBCUTANEOUS at 11:11

## 2022-11-11 RX ADMIN — LEVETIRACETAM 1000 MG: 500 TABLET, FILM COATED ORAL at 08:11

## 2022-11-11 RX ADMIN — ACETAMINOPHEN 650 MG: 325 TABLET ORAL at 11:11

## 2022-11-11 RX ADMIN — INSULIN ASPART 2 UNITS: 100 INJECTION, SOLUTION INTRAVENOUS; SUBCUTANEOUS at 08:11

## 2022-11-11 RX ADMIN — SODIUM CHLORIDE, SODIUM LACTATE, POTASSIUM CHLORIDE, AND CALCIUM CHLORIDE: .6; .31; .03; .02 INJECTION, SOLUTION INTRAVENOUS at 12:11

## 2022-11-11 NOTE — PLAN OF CARE
There are no accepting Nursing homes due to patient's financial status.  He was at [facility] in [place], [place] where July in admissions assisted with his disability application and he was denied.  There are 4 RUST that include [facility], there billing office and the facility says the patient was a good patient ;however Medicaid denied his claim for disability and they ate the cost previously and are not able to do it again.    The patient previously went to [facility] on [address] [place] [address], Phone [phone] and will receive a ride there.   11/11/22 0903   Post-Acute Status   Post-Acute Placement Status Pending payor medical review/second level review  (No accepting facilities due to financial status)   Coverage In State Medicaid  Payor:   MEDICAID - MEDICAID [facility]   Discharge Delays None known at this time   Discharge Plan   Discharge Plan A Shelter   Discharge Plan B Shelter

## 2022-11-11 NOTE — PLAN OF CARE
[place] called [facility] [phone], talked with Mel regarding rolling walker.  Says need straight medicaid auth which could take up to a week.  This will hold up the discharge.  Patient will receive meds and a ride [facility] [address] [place].  TN escalated to situation to Tash Supervisor, [place] awaiting call back.     11/11/22 1254   Post-Acute Status   Post-Acute Authorization HME   Post-Acute Placement Status Discharge Plan Changed   HME Status Pending payor review   Coverage In State Medicaid  Payor:   MEDICAID - MEDICAID   Hospital Resources/Appts/Education Provided Provided patient/caregiver with written discharge plan information   Discharge Delays (!) Home Medical Equipment (Insurance, Delivery)   Discharge Plan   Discharge Plan A Shelter   Discharge Plan B Shelter

## 2022-11-11 NOTE — NURSING
Reviewed discharge instructions with patient. Instructions includes follow up appointments,EMS and when to seeks additional medical help. No acute distress noted. Informed him of addition resources. Patient informed transportation will be provided to [facility] when walker arrive on unit.

## 2022-11-11 NOTE — NURSING
Report received and care assumed. Discussed plan of care and safety with patient . Reviewed call system. No acute distress noted  Oriented to self and date but not situation.

## 2022-11-11 NOTE — PLAN OF CARE
Problem: Adult Inpatient Plan of Care  Goal: Plan of Care Review  Outcome: Ongoing, Progressing     Problem: Skin Injury Risk Increased  Goal: Skin Health and Integrity  Outcome: Ongoing, Progressing     Problem: Fall Injury Risk  Goal: Absence of Fall and Fall-Related Injury  Outcome: Ongoing, Progressing     Problem: Diabetes Comorbidity  Goal: Blood Glucose Level Within Targeted Range  Outcome: Ongoing, Progressing     Pt free from falls or any further trauma through out the shift. Prescribed medication administered. No complaints of pain. Inj LR infusing at 75 ml/hr. Pt in no distress. Will continue to monitor.

## 2022-11-11 NOTE — PT/OT/SLP PROGRESS
"Physical Therapy Treatment    Patient Name:  Cesar eHrnandez   MRN:  [id number]    Recommendations:     Discharge Recommendations:   (PT at next level of care, Outpatient vs PT at NH)   Discharge Equipment Recommendations: walker, rolling   Barriers to discharge:  Pt is homeless    Assessment:     Cesar Hernandez is a 42 y.o. male admitted with a medical diagnosis of Seizure disorder.  He presents with the following impairments/functional limitations:  gait instability, weakness, impaired endurance, impaired balance, decreased lower extremity function, impaired coordination, decreased safety awareness, impaired self care skills, impaired cognition, pain, impaired functional mobility, decreased coordination Pt limited today 2/2 subjective c/o severe pain in L thigh and decreased motivation.    Rehab Prognosis: Fair; patient would benefit from acute skilled PT services to address these deficits and reach maximum level of function.    Recent Surgery: * No surgery found *      Plan:     During this hospitalization, patient to be seen  (2-3x/wk) to address the identified rehab impairments via gait training, therapeutic activities, therapeutic exercises and progress toward the following goals:    Plan of Care Expires:  11/15/22    Subjective     Chief Complaint: pain  Patient/Family Comments/goals: Pt became agitated after ambulating approx 10', "I'm not doing anymore, I'm done"  Pain/Comfort:  Pain Rating 1: 10/10  Location - Orientation 1:  (L thigh)  Pain Addressed 1: Reposition, Distraction, Cessation of Activity      Objective:     Patient found with covers over head with soiled diaper in right sidelying with bed alarm upon PT entry to room.     General Precautions: Standard, fall   Orthopedic Precautions:N/A   Braces: N/A  Respiratory Status: Room air     Functional Mobility:  Bed Mobility:     Rolling Left:  maximal assistance and with Vc/TC for reaching for BR  Scooting: stand by assistance  Supine to Sit: maximal " assistance and with VC/TC for hand placement and body mechanics  Sit to Supine: stand by assistance  Transfers:     Sit to Stand:  minimum assistance and with Vc/TC for hand placement with rolling walker  Gait: Gait training with RW and SBA with VC for sequencing, distribution of weight through UE's to walker and walker management/safety.  Pt ambulates with decreased vineet and step length, but no antalgic gait  Balance: Good-/Fair+      AM-PAC 6 CLICK MOBILITY  Turning over in bed (including adjusting bedclothes, sheets and blankets)?: 3  Sitting down on and standing up from a chair with arms (e.g., wheelchair, bedside commode, etc.): 3  Moving from lying on back to sitting on the side of the bed?: 2  Moving to and from a bed to a chair (including a wheelchair)?: 3  Need to walk in hospital room?: 3  Climbing 3-5 steps with a railing?: 1  Basic Mobility Total Score: 15       Treatment & Education:  Bed mobility, transfer, and Gait training as above.  Pt with decreased effort for all activities and easily agitated    Patient left left sidelying with call button in reach and bed alarm on..    GOALS:   Multidisciplinary Problems       Physical Therapy Goals          Problem: Physical Therapy    Goal Priority Disciplines Outcome Goal Variances Interventions   Physical Therapy Goal     PT, PT/OT Ongoing, Progressing     Description: Goals to be met by: 11/15/22     Patient will increase functional independence with mobility by performin. Supine to sit with Modified [place]  2. Sit to stand transfer Modified Independent   3. Gait  x 150' Mod I  4. B LE TE with Supervsion                        Time Tracking:     PT Received On: 22  PT Start Time: 907     PT Stop Time: 918  PT Total Time (min): 11 min     Billable Minutes: Gait Training 11    Treatment Type: Treatment  PT/PTA: PT     PTA Visit Number: 0     2022

## 2022-11-11 NOTE — PLAN OF CARE
"  Problem: Physical Therapy  Goal: Physical Therapy Goal  Description: Goals to be met by: 11/15/22     Patient will increase functional independence with mobility by performin. Supine to sit with Modified [place]  2. Sit to stand transfer Modified Independent   3. Gait  x 150' Mod I  4. B LE TE with Supervsion   Outcome: Ongoing, Progressing   Pt making slow steady progress toward PT goals.  Limited by Subjective c/o severe L thigh pain"10/10" and decreased motivation.  Pt ambulated approx 20' with RW and SBA.  Ambulation limited 2/2 pt becoming agitated and returning to bed.  RW recommended at time of D/C.    "

## 2022-11-12 NOTE — PLAN OF CARE
[facility] - Med Surg  Discharge Final Note  Received r/w in room. TN informed med nurse Brooke that patient is cleared for discharge from [facility].  Nurse called for taxi to transport patient to [facility] on [address] [place]  [address].  Daria in intake at the Shelter.[facility] on [address] [place] [address], Phone [phone]  Primary Care Provider: [facility]    Expected Discharge Date: 11/11/2022    Final Discharge Note (most recent)       Final Note - 11/11/22 1254          Final Note    Assessment Type Final Discharge Note     Anticipated Discharge Disposition --   [facility] on [address]    What phone number can be called within the next 1-3 days to see how you are doing after discharge? --   see chart    Hospital Resources/Appts/Education Provided Provided patient/caregiver with written discharge plan information        Post-Acute Status    Post-Acute Authorization HME     Post-Acute Placement Status Discharge Plan Changed     HME Status Pending payor review     Coverage In State Medicaid  Payor:   MEDICAID - MEDICAID     Discharge Delays None known at this time                     Important Message from Medicare na             Contact Info       [facility]   Specialty: DME Provider    [address] [facility] [address]   Phone: [phone]       Next Steps: Follow up    Instructions: CARLOS    [facility]    [address]  [place], [place] [id number] [phone]       Next Steps: Follow up    Instructions: out patient services    [facility]    EMERGENCY - [place] [id number] [facility] [address]       Next Steps: Follow up    Instructions: If symptoms worsen    [facility] - Emergency Dept   Specialty: Emergency Medicine    [facility] [address]   Phone: [phone]       Next Steps: Follow up

## 2022-11-13 ENCOUNTER — HOSPITAL ENCOUNTER (EMERGENCY)
Facility: OTHER | Age: 42
Discharge: HOME | End: 2022-11-13
Attending: EMERGENCY MEDICINE
Payer: MEDICAID

## 2022-11-13 VITALS
SYSTOLIC BLOOD PRESSURE: 131 MMHG | HEART RATE: 88 BPM | HEIGHT: 67 IN | OXYGEN SATURATION: 97 % | RESPIRATION RATE: 16 BRPM | DIASTOLIC BLOOD PRESSURE: 78 MMHG | WEIGHT: 160 LBS | BODY MASS INDEX: 25.11 KG/M2 | TEMPERATURE: 98 F

## 2022-11-13 DIAGNOSIS — Z59.02 UNSHELTERED HOMELESSNESS: ICD-10-CM

## 2022-11-13 DIAGNOSIS — T68.XXXA HYPOTHERMIA DUE TO COLD ENVIRONMENT: Primary | ICD-10-CM

## 2022-11-13 LAB — POCT GLUCOSE: 186 MG/DL (ref 70–110)

## 2022-11-13 PROCEDURE — 99282 EMERGENCY DEPT VISIT SF MDM: CPT

## 2022-11-13 PROCEDURE — 82962 GLUCOSE BLOOD TEST: CPT

## 2022-11-13 SDOH — SOCIAL DETERMINANTS OF HEALTH (SDOH): UNSHELTERED HOMELESSNESS: Z59.02

## 2022-11-13 NOTE — ED PROVIDER NOTES
Encounter Date: 11/13/2022       History     Chief Complaint   Patient presents with    Leg Pain     Bilateral leg pain     Patient with multiple recurrent visits to this emergency department with varying complaints really he is here because he is homeless and has not been able to get into the shelter for whatever reasons.  I spoke to the [facility] shelter which is close by and they state that they only do intake from 4:00 p.m. to 6:00 p.m. and close intake at 6:00 p.m..  Outside temperature is 42° and the patient is freezing cold.  We have seeded him in a chair with 3 warm blankets around him in a 4th blanket over the top of his head.  I provided him with warm liquids drank (Zain).  No real physical complaints.  Patient is shivering.    Review of patient's allergies indicates:  No Known Allergies  Past Medical History:   Diagnosis Date    Diabetes mellitus     Seizures     Stroke 2013     Past Surgical History:   Procedure Laterality Date    TRANSESOPHAGEAL ECHOCARDIOGRAPHY N/A 6/1/2021    Procedure: ECHOCARDIOGRAM, TRANSESOPHAGEAL;  Surgeon: [facility] Diagnostic Provider;  Location: [facility] EP LAB;  Service: Anesthesiology;  Laterality: N/A;     History reviewed. No pertinent family history.  Social History     Tobacco Use    Smoking status: Some Days     Packs/day: 0.25     Years: 24.00     Pack years: 6.00     Types: Cigarettes, Cigars     Start date: 1998    Smokeless tobacco: Never    Tobacco comments:     Pt states currently smoking 3 cigs/day. He declines referral to Ambulatory Smoking Cessation Program   Substance Use Topics    Alcohol use: Yes     Alcohol/week: 30.0 standard drinks     Types: 30 Shots of liquor per week    Drug use: Not Currently     Review of Systems   Reason unable to perform ROS: Unreliable.     Physical Exam     Initial Vitals [11/13/22 0442]   BP Pulse Resp Temp SpO2   131/78 88 16 97.8 °F (36.6 °C) 97 %      MAP       --         Physical Exam    Nursing note and vitals  reviewed.  Constitutional: He appears well-developed and well-nourished. He is not diaphoretic. No distress.   Shivering   HENT:   Head: Normocephalic and atraumatic.   Eyes: Conjunctivae and EOM are normal.   Neck: Neck supple.   Normal range of motion.  Cardiovascular:  Normal rate, regular rhythm and normal heart sounds.           No murmur heard.  Pulmonary/Chest: Breath sounds normal. No respiratory distress.   Abdominal: Abdomen is soft. There is no abdominal tenderness.   Musculoskeletal:         General: Normal range of motion.      Cervical back: Normal range of motion and neck supple.     Neurological: He is alert and oriented to person, place, and time. He has normal strength.   Psychiatric:   Not suicidal       ED Course   Procedures  Labs Reviewed   POCT GLUCOSE - Abnormal; Notable for the following components:       Result Value    POCT Glucose 186 (*)     All other components within normal limits          Imaging Results    None          Medications - No data to display  Medical Decision Making:   Initial Assessment:   Homeless patient with cold Ambien temperature is at risk for hypothermia.  It is 5:00 a.m. and 42° outside.  Will attempt to consult  but it is Sunday and no  in the hospital.  I spoke to [facility] shelter which is very nearby but they do not open up until 4 and close at 6:00 p.m..  Will sign out to morning doctor to allow temperature is to rise although they are going to be pretty cold all day long and see if  will come in and help place the patient or gave him options.                        Clinical Impression:   Final diagnoses:  [T68.XXXA] Hypothermia due to cold environment (Primary)  [Z59.02] Unsheltered homelessness      ED Disposition Condition    Discharge Stable          ED Prescriptions    None       Follow-up Information       Follow up With Specialties Details Why Contact Info        See shelter referrals             Kris  MD Wilber  11/13/22 2864

## 2022-11-13 NOTE — ED TRIAGE NOTES
"Pt c/o bilateral leg pain, states "it's cold outside and I have nowhere to go"     LOC: The patient is alert, and oriented to self, place, time, and situation.  Speech is appropriate and clear.      APPEARANCE: Patient resting comfortably in no acute distress.      SKIN: The skin is cold to touch; color consistent with ethnicity, intact; no breakdown or bruising noted.      MUSCULOSKELETAL:  MAEAW;      RESPIRATORY: Respirations are non-labored with normal effort and rate. Denies cough.      CARDIAC:  Normal rate; normotensive.  No peripheral edema noted. No complaints of chest pain at this time.      ABDOMEN: Pt denies abdominal pain, nausea, vomiting, diarrhea or constipation.     NEUROLOGIC: Follows commands;  Pt denies headache, lightheadedness, dizziness or loss of consciousness.         "

## 2023-01-27 LAB
ACID FAST MOD KINY STN SPEC: ABNORMAL
MYCOBACTERIUM SPEC QL CULT: ABNORMAL
MYCOBACTERIUM SPEC QL CULT: ABNORMAL

## 2023-06-12 ENCOUNTER — HOSPITAL ENCOUNTER (EMERGENCY)
Facility: OTHER | Age: 43
Discharge: HOME | End: 2023-06-13
Attending: EMERGENCY MEDICINE
Payer: MEDICAID

## 2023-06-12 DIAGNOSIS — R53.83 FATIGUE: ICD-10-CM

## 2023-06-12 DIAGNOSIS — E87.6 HYPOKALEMIA: Primary | ICD-10-CM

## 2023-06-12 LAB — POCT GLUCOSE: 141 MG/DL (ref 70–110)

## 2023-06-12 PROCEDURE — 87389 HIV-1 AG W/HIV-1&-2 AB AG IA: CPT | Performed by: EMERGENCY MEDICINE

## 2023-06-12 PROCEDURE — 99284 EMERGENCY DEPT VISIT MOD MDM: CPT | Mod: 25

## 2023-06-12 PROCEDURE — 82962 GLUCOSE BLOOD TEST: CPT

## 2023-06-12 PROCEDURE — 25000003 PHARM REV CODE 250: Performed by: EMERGENCY MEDICINE

## 2023-06-12 PROCEDURE — 83735 ASSAY OF MAGNESIUM: CPT | Performed by: EMERGENCY MEDICINE

## 2023-06-12 PROCEDURE — 82550 ASSAY OF CK (CPK): CPT | Performed by: EMERGENCY MEDICINE

## 2023-06-12 PROCEDURE — 86803 HEPATITIS C AB TEST: CPT | Performed by: EMERGENCY MEDICINE

## 2023-06-12 PROCEDURE — 93010 EKG 12-LEAD: ICD-10-PCS | Mod: ,,, | Performed by: INTERNAL MEDICINE

## 2023-06-12 PROCEDURE — 85025 COMPLETE CBC W/AUTO DIFF WBC: CPT | Performed by: EMERGENCY MEDICINE

## 2023-06-12 PROCEDURE — 93005 ELECTROCARDIOGRAM TRACING: CPT

## 2023-06-12 PROCEDURE — 82010 KETONE BODYS QUAN: CPT | Performed by: EMERGENCY MEDICINE

## 2023-06-12 PROCEDURE — 96360 HYDRATION IV INFUSION INIT: CPT

## 2023-06-12 PROCEDURE — 81000 URINALYSIS NONAUTO W/SCOPE: CPT | Performed by: EMERGENCY MEDICINE

## 2023-06-12 PROCEDURE — 80053 COMPREHEN METABOLIC PANEL: CPT | Performed by: EMERGENCY MEDICINE

## 2023-06-12 PROCEDURE — 93010 ELECTROCARDIOGRAM REPORT: CPT | Mod: ,,, | Performed by: INTERNAL MEDICINE

## 2023-06-12 RX ORDER — SODIUM CHLORIDE 9 MG/ML
1000 INJECTION, SOLUTION INTRAVENOUS
Status: COMPLETED | OUTPATIENT
Start: 2023-06-12 | End: 2023-06-13

## 2023-06-12 RX ADMIN — SODIUM CHLORIDE 1000 ML: 9 INJECTION, SOLUTION INTRAVENOUS at 11:06

## 2023-06-13 VITALS
OXYGEN SATURATION: 97 % | TEMPERATURE: 98 F | HEART RATE: 91 BPM | RESPIRATION RATE: 16 BRPM | SYSTOLIC BLOOD PRESSURE: 137 MMHG | DIASTOLIC BLOOD PRESSURE: 89 MMHG

## 2023-06-13 LAB
ALBUMIN SERPL BCP-MCNC: 3.6 G/DL (ref 3.5–5.2)
ALP SERPL-CCNC: 81 U/L (ref 55–135)
ALT SERPL W/O P-5'-P-CCNC: 14 U/L (ref 10–44)
ANION GAP SERPL CALC-SCNC: 15 MMOL/L (ref 8–16)
ANION GAP SERPL CALC-SCNC: 20 MMOL/L (ref 8–16)
AST SERPL-CCNC: 15 U/L (ref 10–40)
B-OH-BUTYR BLD STRIP-SCNC: 0.4 MMOL/L (ref 0–0.5)
BACTERIA #/AREA URNS HPF: NORMAL /HPF
BASOPHILS # BLD AUTO: 0.03 K/UL (ref 0–0.2)
BASOPHILS NFR BLD: 0.4 % (ref 0–1.9)
BILIRUB SERPL-MCNC: 0.2 MG/DL (ref 0.1–1)
BILIRUB UR QL STRIP: NEGATIVE
BUN SERPL-MCNC: 4 MG/DL (ref 6–20)
BUN SERPL-MCNC: 5 MG/DL (ref 6–20)
CALCIUM SERPL-MCNC: 8.2 MG/DL (ref 8.7–10.5)
CALCIUM SERPL-MCNC: 9.2 MG/DL (ref 8.7–10.5)
CHLORIDE SERPL-SCNC: 107 MMOL/L (ref 95–110)
CHLORIDE SERPL-SCNC: 112 MMOL/L (ref 95–110)
CK SERPL-CCNC: 67 U/L (ref 20–200)
CLARITY UR: CLEAR
CO2 SERPL-SCNC: 16 MMOL/L (ref 23–29)
CO2 SERPL-SCNC: 18 MMOL/L (ref 23–29)
COLOR UR: YELLOW
CREAT SERPL-MCNC: 0.6 MG/DL (ref 0.5–1.4)
CREAT SERPL-MCNC: 0.8 MG/DL (ref 0.5–1.4)
CTP QC/QA: YES
CTP QC/QA: YES
DIFFERENTIAL METHOD: ABNORMAL
EOSINOPHIL # BLD AUTO: 0.1 K/UL (ref 0–0.5)
EOSINOPHIL NFR BLD: 2 % (ref 0–8)
ERYTHROCYTE [DISTWIDTH] IN BLOOD BY AUTOMATED COUNT: 17.4 % (ref 11.5–14.5)
EST. GFR  (NO RACE VARIABLE): >60 ML/MIN/1.73 M^2
EST. GFR  (NO RACE VARIABLE): >60 ML/MIN/1.73 M^2
GLUCOSE SERPL-MCNC: 101 MG/DL (ref 70–110)
GLUCOSE SERPL-MCNC: 152 MG/DL (ref 70–110)
GLUCOSE UR QL STRIP: ABNORMAL
HCT VFR BLD AUTO: 40.6 % (ref 40–54)
HCV AB SERPL QL IA: NEGATIVE
HGB BLD-MCNC: 13.9 G/DL (ref 14–18)
HGB UR QL STRIP: NEGATIVE
HIV 1+2 AB+HIV1 P24 AG SERPL QL IA: NEGATIVE
IMM GRANULOCYTES # BLD AUTO: 0.04 K/UL (ref 0–0.04)
IMM GRANULOCYTES NFR BLD AUTO: 0.6 % (ref 0–0.5)
KETONES UR QL STRIP: ABNORMAL
LEUKOCYTE ESTERASE UR QL STRIP: NEGATIVE
LYMPHOCYTES # BLD AUTO: 2.9 K/UL (ref 1–4.8)
LYMPHOCYTES NFR BLD: 40.8 % (ref 18–48)
MAGNESIUM SERPL-MCNC: 2.1 MG/DL (ref 1.6–2.6)
MCH RBC QN AUTO: 29 PG (ref 27–31)
MCHC RBC AUTO-ENTMCNC: 34.2 G/DL (ref 32–36)
MCV RBC AUTO: 85 FL (ref 82–98)
MICROSCOPIC COMMENT: NORMAL
MONOCYTES # BLD AUTO: 0.5 K/UL (ref 0.3–1)
MONOCYTES NFR BLD: 7 % (ref 4–15)
NEUTROPHILS # BLD AUTO: 3.5 K/UL (ref 1.8–7.7)
NEUTROPHILS NFR BLD: 49.2 % (ref 38–73)
NITRITE UR QL STRIP: NEGATIVE
NRBC BLD-RTO: 0 /100 WBC
PH UR STRIP: 6 [PH] (ref 5–8)
PLATELET # BLD AUTO: 304 K/UL (ref 150–450)
PMV BLD AUTO: 8.7 FL (ref 9.2–12.9)
POC MOLECULAR INFLUENZA A AGN: NEGATIVE
POC MOLECULAR INFLUENZA B AGN: NEGATIVE
POTASSIUM SERPL-SCNC: 3.2 MMOL/L (ref 3.5–5.1)
POTASSIUM SERPL-SCNC: 3.3 MMOL/L (ref 3.5–5.1)
PROT SERPL-MCNC: 7.3 G/DL (ref 6–8.4)
PROT UR QL STRIP: NEGATIVE
RBC # BLD AUTO: 4.79 M/UL (ref 4.6–6.2)
RBC #/AREA URNS HPF: 0 /HPF (ref 0–4)
SARS-COV-2 RDRP RESP QL NAA+PROBE: NEGATIVE
SODIUM SERPL-SCNC: 143 MMOL/L (ref 136–145)
SODIUM SERPL-SCNC: 145 MMOL/L (ref 136–145)
SP GR UR STRIP: 1.02 (ref 1–1.03)
SQUAMOUS #/AREA URNS HPF: 1 /HPF
URN SPEC COLLECT METH UR: ABNORMAL
UROBILINOGEN UR STRIP-ACNC: NEGATIVE EU/DL
WBC # BLD AUTO: 7.01 K/UL (ref 3.9–12.7)
WBC #/AREA URNS HPF: 1 /HPF (ref 0–5)
YEAST URNS QL MICRO: NORMAL

## 2023-06-13 PROCEDURE — 80048 BASIC METABOLIC PNL TOTAL CA: CPT | Performed by: EMERGENCY MEDICINE

## 2023-06-13 PROCEDURE — 96361 HYDRATE IV INFUSION ADD-ON: CPT

## 2023-06-13 PROCEDURE — 25000003 PHARM REV CODE 250: Performed by: EMERGENCY MEDICINE

## 2023-06-13 RX ORDER — SODIUM CHLORIDE 9 MG/ML
1000 INJECTION, SOLUTION INTRAVENOUS
Status: COMPLETED | OUTPATIENT
Start: 2023-06-13 | End: 2023-06-13

## 2023-06-13 RX ORDER — POTASSIUM CHLORIDE 20 MEQ/1
40 TABLET, EXTENDED RELEASE ORAL
Status: COMPLETED | OUTPATIENT
Start: 2023-06-13 | End: 2023-06-13

## 2023-06-13 RX ORDER — POTASSIUM CHLORIDE 20 MEQ/1
20 TABLET, EXTENDED RELEASE ORAL
Status: DISCONTINUED | OUTPATIENT
Start: 2023-06-13 | End: 2023-06-13

## 2023-06-13 RX ADMIN — SODIUM CHLORIDE 1000 ML: 9 INJECTION, SOLUTION INTRAVENOUS at 01:06

## 2023-06-13 RX ADMIN — POTASSIUM CHLORIDE 40 MEQ: 1500 TABLET, EXTENDED RELEASE ORAL at 01:06

## 2023-06-13 NOTE — ED PROVIDER NOTES
Encounter Date: 6/12/2023    SCRIBE #1 NOTE: I, Gutierrez Gottlieb, am scribing for, and in the presence of,  Nan Ward MD. I have scribed the following portions of the note - Other sections scribed: HPI, ROS, Physical Exam, EKG.     History     Chief Complaint   Patient presents with    Fatigue     Pt arrives via EMS with c/o fatigue.      Terrance Chavez is a 43 y.o. male with a past medical history of diabetes who presents to the ED via EMS for evaluation of fatigue. Patient is insulin dependent and reports he takes 20 units each morning, last dose yesterday. He endorses alcohol use today but denies being a daily drinker. He denies drug use. He denies shortness of breath, cough, fever, chills, nausea, vomiting, or diarrhea.    The history is provided by the patient and the EMS personnel.   Review of patient's allergies indicates:  No Known Allergies  Past Medical History:   Diagnosis Date    Diabetes mellitus     Seizures     Stroke 2013     Past Surgical History:   Procedure Laterality Date    TRANSESOPHAGEAL ECHOCARDIOGRAPHY N/A 6/1/2021    Procedure: ECHOCARDIOGRAM, TRANSESOPHAGEAL;  Surgeon: [facility] Diagnostic Provider;  Location: [facility] EP LAB;  Service: Anesthesiology;  Laterality: N/A;     History reviewed. No pertinent family history.  Social History     Tobacco Use    Smoking status: Some Days     Packs/day: 0.25     Years: 24.00     Pack years: 6.00     Types: Cigarettes, Cigars     Start date: 1998    Smokeless tobacco: Never    Tobacco comments:     Pt states currently smoking 3 cigs/day. He declines referral to Ambulatory Smoking Cessation Program   Substance Use Topics    Alcohol use: Not Currently     Alcohol/week: 30.0 standard drinks     Types: 30 Shots of liquor per week    Drug use: Not Currently     Review of Systems   Constitutional:  Positive for fatigue. Negative for chills and fever.   HENT:  Negative for sore throat.    Eyes:  Negative for redness.   Respiratory:  Negative for cough and  shortness of breath.    Cardiovascular:  Negative for chest pain.   Gastrointestinal:  Negative for abdominal pain, diarrhea, nausea and vomiting.   Genitourinary:  Negative for dysuria and hematuria.   Musculoskeletal:  Negative for back pain.   Skin:  Negative for rash.   Neurological:  Negative for headaches.   All other systems reviewed and are negative.    Physical Exam     Initial Vitals [06/12/23 2222]   BP Pulse Resp Temp SpO2   107/76 107 16 98 °F (36.7 °C) 99 %      MAP       --         Physical Exam    Nursing note and vitals reviewed.  Constitutional: He appears well-developed and well-nourished. He is not diaphoretic. No distress.   HENT:   Head: Normocephalic and atraumatic.   Dry mucous membranes.   Eyes: Conjunctivae and EOM are normal. Pupils are equal, round, and reactive to light.   Neck: Neck supple.   Normal range of motion.  Cardiovascular:  Normal rate, regular rhythm, normal heart sounds and intact distal pulses.           Pulmonary/Chest: Breath sounds normal. No respiratory distress.   Abdominal: Abdomen is soft. There is no abdominal tenderness.   Musculoskeletal:      Cervical back: Normal range of motion and neck supple.     Neurological: He is alert and oriented to person, place, and time. He has normal strength. GCS score is 15. GCS eye subscore is 4. GCS verbal subscore is 5. GCS motor subscore is 6.   Skin: Skin is warm and dry. Capillary refill takes less than 2 seconds. No rash noted. No pallor.   Psychiatric: He has a normal mood and affect. Thought content normal.       ED Course   Procedures  Labs Reviewed   CBC W/ AUTO DIFFERENTIAL - Abnormal; Notable for the following components:       Result Value    Hemoglobin 13.9 (*)     RDW 17.4 (*)     MPV 8.7 (*)     Immature Granulocytes 0.6 (*)     All other components within normal limits    Narrative:     Release to patient->Immediate   COMPREHENSIVE METABOLIC PANEL - Abnormal; Notable for the following components:    Potassium 3.2  (*)     CO2 16 (*)     Glucose 152 (*)     BUN 5 (*)     Anion Gap 20 (*)     All other components within normal limits    Narrative:     Release to patient->Immediate   URINALYSIS, REFLEX TO URINE CULTURE - Abnormal; Notable for the following components:    Glucose, UA 4+ (*)     Ketones, UA 1+ (*)     All other components within normal limits    Narrative:     Specimen Source->Urine   BASIC METABOLIC PANEL - Abnormal; Notable for the following components:    Potassium 3.3 (*)     Chloride 112 (*)     CO2 18 (*)     BUN 4 (*)     Calcium 8.2 (*)     All other components within normal limits   POCT GLUCOSE - Abnormal; Notable for the following components:    POCT Glucose 141 (*)     All other components within normal limits   HIV 1 / 2 ANTIBODY    Narrative:     Release to patient->Immediate   HEPATITIS C ANTIBODY    Narrative:     Release to patient->Immediate   CK    Narrative:     Release to patient->Immediate   MAGNESIUM    Narrative:     Release to patient->Immediate   BETA - HYDROXYBUTYRATE, SERUM   URINALYSIS MICROSCOPIC    Narrative:     Specimen Source->Urine   POCT INFLUENZA A/B MOLECULAR   SARS-COV-2 RDRP GENE   POCT GLUCOSE MONITORING CONTINUOUS     EKG Readings: (Independently Interpreted)   Previous EKG: Compared with most recent EKG Previous EKG Date: 05/26/2022.   Sinus tachycardia, rate of 101. Normal intervals. No significant ST or T Wave changes compared to previous EKG on 05/26/2022.     Imaging Results    None          Medications   0.9%  NaCl infusion (0 mLs Intravenous Stopped 6/13/23 0111)   0.9%  NaCl infusion (0 mLs Intravenous Stopped 6/13/23 0249)   potassium chloride SA CR tablet 40 mEq (40 mEq Oral Given 6/13/23 0112)     Medical Decision Making:   Clinical Tests:   Lab Tests: Ordered and Reviewed  Medical Tests: Ordered and Reviewed  Additional MDM:   Comments: 43-year-old insulin-dependent diabetic male presents complaining of generalized weakness.  He denies any other specific  symptoms.  Triage vital signs significant for mild tachycardia with a heart rate of 107 but otherwise within normal limits.  Initial blood glucose was less than 200.  Physical exam was unremarkable.    Initial blood work significant for potassium of 3.2, bicarb of 16 and anion gap of 20.  CBC unremarkable.  COVID and flu both negative.  After 2 L of IV fluids anion gap closed and his bicarb improved.  Potassium slightly improved after oral repletion.  CPK normal.  Beta hydroxy normal as well.  Patient was observed on a cardiac monitor for several hours without any events and was discharged home in stable condition.  Indications for seeking re-evaluation reviewed..      Scribe Attestation:   Scribe #1: I performed the above scribed service and the documentation accurately describes the services I performed. I attest to the accuracy of the note.            Physician Attestation for Scribe: I, Nan Ward  , reviewed documentation as scribed in my presence, which is both accurate and complete.       Clinical Impression:   Final diagnoses:  [R53.83] Fatigue  [E87.6] Hypokalemia (Primary)        ED Disposition Condition    Discharge Stable          ED Prescriptions    None       Follow-up Information       Follow up With Specialties Details Why Contact Info    primary care  Schedule an appointment as soon as possible for a visit                Nan Ward MD  06/13/23 0602

## 2023-06-13 NOTE — ED TRIAGE NOTES
Pt presents to the ER via EMS with complaints of fatigue. Pt reports being feeling weak, tired, lightheaded, and dizzy.

## 2023-12-13 ENCOUNTER — HOSPITAL ENCOUNTER (EMERGENCY)
Facility: OTHER | Age: 43
Discharge: HOME | End: 2023-12-13
Attending: EMERGENCY MEDICINE
Payer: MEDICAID

## 2023-12-13 VITALS
DIASTOLIC BLOOD PRESSURE: 72 MMHG | HEART RATE: 75 BPM | TEMPERATURE: 98 F | SYSTOLIC BLOOD PRESSURE: 140 MMHG | HEIGHT: 65 IN | RESPIRATION RATE: 18 BRPM | WEIGHT: 150 LBS | BODY MASS INDEX: 24.99 KG/M2 | OXYGEN SATURATION: 100 %

## 2023-12-13 DIAGNOSIS — R73.9 HYPERGLYCEMIA: Primary | ICD-10-CM

## 2023-12-13 DIAGNOSIS — E13.69 OTHER SPECIFIED DIABETES MELLITUS WITH OTHER SPECIFIED COMPLICATION, WITH LONG-TERM CURRENT USE OF INSULIN: ICD-10-CM

## 2023-12-13 DIAGNOSIS — I10 HYPERTENSION, UNSPECIFIED TYPE: ICD-10-CM

## 2023-12-13 DIAGNOSIS — Z79.4 OTHER SPECIFIED DIABETES MELLITUS WITH OTHER SPECIFIED COMPLICATION, WITH LONG-TERM CURRENT USE OF INSULIN: ICD-10-CM

## 2023-12-13 LAB
ANION GAP SERPL CALC-SCNC: 10 MMOL/L (ref 8–16)
BACTERIA #/AREA URNS HPF: NORMAL /HPF
BASOPHILS # BLD AUTO: 0.04 K/UL (ref 0–0.2)
BASOPHILS NFR BLD: 0.7 % (ref 0–1.9)
BILIRUB UR QL STRIP: NEGATIVE
BUN SERPL-MCNC: 11 MG/DL (ref 6–20)
CALCIUM SERPL-MCNC: 10 MG/DL (ref 8.7–10.5)
CHLORIDE SERPL-SCNC: 96 MMOL/L (ref 95–110)
CLARITY UR: CLEAR
CO2 SERPL-SCNC: 25 MMOL/L (ref 23–29)
COLOR UR: COLORLESS
CREAT SERPL-MCNC: 1 MG/DL (ref 0.5–1.4)
DIFFERENTIAL METHOD: NORMAL
EOSINOPHIL # BLD AUTO: 0.1 K/UL (ref 0–0.5)
EOSINOPHIL NFR BLD: 1.2 % (ref 0–8)
ERYTHROCYTE [DISTWIDTH] IN BLOOD BY AUTOMATED COUNT: 14.3 % (ref 11.5–14.5)
EST. GFR  (NO RACE VARIABLE): >60 ML/MIN/1.73 M^2
GLUCOSE SERPL-MCNC: 427 MG/DL (ref 70–110)
GLUCOSE UR QL STRIP: ABNORMAL
HCT VFR BLD AUTO: 41.7 % (ref 40–54)
HGB BLD-MCNC: 14.4 G/DL (ref 14–18)
HGB UR QL STRIP: NEGATIVE
IMM GRANULOCYTES # BLD AUTO: 0.01 K/UL (ref 0–0.04)
IMM GRANULOCYTES NFR BLD AUTO: 0.2 % (ref 0–0.5)
KETONES UR QL STRIP: NEGATIVE
LEUKOCYTE ESTERASE UR QL STRIP: NEGATIVE
LYMPHOCYTES # BLD AUTO: 2 K/UL (ref 1–4.8)
LYMPHOCYTES NFR BLD: 35.4 % (ref 18–48)
MCH RBC QN AUTO: 30.7 PG (ref 27–31)
MCHC RBC AUTO-ENTMCNC: 34.5 G/DL (ref 32–36)
MCV RBC AUTO: 89 FL (ref 82–98)
MICROSCOPIC COMMENT: NORMAL
MONOCYTES # BLD AUTO: 0.5 K/UL (ref 0.3–1)
MONOCYTES NFR BLD: 8.7 % (ref 4–15)
NEUTROPHILS # BLD AUTO: 3.1 K/UL (ref 1.8–7.7)
NEUTROPHILS NFR BLD: 53.8 % (ref 38–73)
NITRITE UR QL STRIP: NEGATIVE
NRBC BLD-RTO: 0 /100 WBC
PH UR STRIP: 7 [PH] (ref 5–8)
PLATELET # BLD AUTO: 307 K/UL (ref 150–450)
PMV BLD AUTO: 9.2 FL (ref 9.2–12.9)
POCT GLUCOSE: 195 MG/DL (ref 70–110)
POCT GLUCOSE: 425 MG/DL (ref 70–110)
POTASSIUM SERPL-SCNC: 4.6 MMOL/L (ref 3.5–5.1)
PROT UR QL STRIP: NEGATIVE
RBC # BLD AUTO: 4.69 M/UL (ref 4.6–6.2)
RBC #/AREA URNS HPF: 2 /HPF (ref 0–4)
SODIUM SERPL-SCNC: 131 MMOL/L (ref 136–145)
SP GR UR STRIP: 1.01 (ref 1–1.03)
URN SPEC COLLECT METH UR: ABNORMAL
UROBILINOGEN UR STRIP-ACNC: NEGATIVE EU/DL
WBC # BLD AUTO: 5.73 K/UL (ref 3.9–12.7)
WBC #/AREA URNS HPF: 1 /HPF (ref 0–5)
YEAST URNS QL MICRO: NORMAL

## 2023-12-13 PROCEDURE — 81000 URINALYSIS NONAUTO W/SCOPE: CPT | Performed by: EMERGENCY MEDICINE

## 2023-12-13 PROCEDURE — 96361 HYDRATE IV INFUSION ADD-ON: CPT

## 2023-12-13 PROCEDURE — 82962 GLUCOSE BLOOD TEST: CPT

## 2023-12-13 PROCEDURE — 63600175 PHARM REV CODE 636 W HCPCS: Performed by: EMERGENCY MEDICINE

## 2023-12-13 PROCEDURE — 80048 BASIC METABOLIC PNL TOTAL CA: CPT | Performed by: EMERGENCY MEDICINE

## 2023-12-13 PROCEDURE — 99284 EMERGENCY DEPT VISIT MOD MDM: CPT | Mod: 25

## 2023-12-13 PROCEDURE — 25000003 PHARM REV CODE 250: Performed by: EMERGENCY MEDICINE

## 2023-12-13 PROCEDURE — 96374 THER/PROPH/DIAG INJ IV PUSH: CPT

## 2023-12-13 PROCEDURE — 85025 COMPLETE CBC W/AUTO DIFF WBC: CPT | Performed by: EMERGENCY MEDICINE

## 2023-12-13 RX ADMIN — SODIUM CHLORIDE 1000 ML: 9 INJECTION, SOLUTION INTRAVENOUS at 02:12

## 2023-12-13 RX ADMIN — SODIUM CHLORIDE 1000 ML: 9 INJECTION, SOLUTION INTRAVENOUS at 01:12

## 2023-12-13 RX ADMIN — INSULIN HUMAN 8 UNITS: 100 INJECTION, SOLUTION PARENTERAL at 02:12

## 2023-12-13 NOTE — ED PROVIDER NOTES
Encounter Date: 12/13/2023       History     Chief Complaint   Patient presents with    Hyperglycemia     Pt sent from [facility] for elevated CBG.      This is a 43-year-old man with a past medical history significant for diabetes as well as epilepsy presenting for evaluation of elevated blood glucose from [facility] rehabilitation facility.  He had sugar check this morning it was greater than 500.  Of note is that he is prescribed Levemir 25u QAM and is not received such over last 3 days.    He was evaluated at an outside facility yesterday for an elevated blood glucose.      Review of patient's allergies indicates:  No Known Allergies  Past Medical History:   Diagnosis Date    Diabetes mellitus     Seizures     Stroke 2013     Past Surgical History:   Procedure Laterality Date    TRANSESOPHAGEAL ECHOCARDIOGRAPHY N/A 6/1/2021    Procedure: ECHOCARDIOGRAM, TRANSESOPHAGEAL;  Surgeon: [facility] Diagnostic Provider;  Location: [facility] EP LAB;  Service: Anesthesiology;  Laterality: N/A;     No family history on file.  Social History     Tobacco Use    Smoking status: Some Days     Current packs/day: 0.25     Average packs/day: 0.3 packs/day for 26.0 years (6.5 ttl pk-yrs)     Types: Cigarettes, Cigars     Start date: 1998    Smokeless tobacco: Never    Tobacco comments:     Pt states currently smoking 3 cigs/day. He declines referral to Ambulatory Smoking Cessation Program   Substance Use Topics    Alcohol use: Not Currently     Alcohol/week: 30.0 standard drinks of alcohol     Types: 30 Shots of liquor per week    Drug use: Not Currently     Review of Systems  Constitutional-no fever  HEENT-no congestion  Eyes-no redness  Respiratory-no shortness of breath  Cardio-no chest pain  GI-no abdominal pain  Endocrine-no cold intolerance  -no difficulty urinating  MSK-no myalgias  Skin-no rashes  Allergy-no environmental allergy  Neurologic-, no headache  Hematology-no swollen nodes  Behavioral-no confusion  Physical Exam      Initial Vitals [12/13/23 1216]   BP Pulse Resp Temp SpO2   135/83 93 18 98.2 °F (36.8 °C) 99 %      MAP       --         Physical Exam  Constitutional: Well appearing, no distress.  Eyes: Conjunctivae normal.  ENT       Head: Normocephalic, atraumatic.       Nose: Normal external appearance        Mouth/Throat: no strigulous respirations   Hematological/Lymphatic/Immunilogical: no visible lymphadenopathy   Cardiovascular: Normal rate,   Respiratory: Normal respiratory effort.   Gastrointestinal: non distended   Musculoskeletal: Normal range of motion in all extremities. No obvious deformities or swelling.  Neurologic: Alert, oriented. Normal speech and language. No gross focal neurologic deficits are appreciated.  Skin: Skin is warm, dry. No rash noted.  Psychiatric: Mood and affect are normal.   ED Course   Procedures  Labs Reviewed   BASIC METABOLIC PANEL - Abnormal; Notable for the following components:       Result Value    Sodium 131 (*)     Glucose 427 (*)     All other components within normal limits   URINALYSIS, REFLEX TO URINE CULTURE - Abnormal; Notable for the following components:    Color, UA Colorless (*)     Glucose, UA 4+ (*)     All other components within normal limits    Narrative:     Specimen Source->Urine   POCT GLUCOSE - Abnormal; Notable for the following components:    POCT Glucose 425 (*)     All other components within normal limits   POCT GLUCOSE - Abnormal; Notable for the following components:    POCT Glucose 195 (*)     All other components within normal limits   CBC W/ AUTO DIFFERENTIAL   URINALYSIS MICROSCOPIC    Narrative:     Specimen Source->Urine          Imaging Results    None          Medications   sodium chloride 0.9% bolus 1,000 mL 1,000 mL (0 mLs Intravenous Stopped 12/13/23 1458)   insulin regular injection 8 Units 0.08 mL (8 Units Intravenous Given 12/13/23 1423)   sodium chloride 0.9% bolus 1,000 mL 1,000 mL (0 mLs Intravenous Stopped 12/13/23 1534)     Medical  Decision Making  Ddx- HHS, DKA, hyperglycemia, htn    Problems Addressed:  Hyperglycemia: acute illness or injury  Hypertension, unspecified type: chronic illness or injury with exacerbation, progression, or side effects of treatment  Other specified diabetes mellitus with other specified complication, with long-term current use of insulin: chronic illness or injury with exacerbation, progression, or side effects of treatment    Amount and/or Complexity of Data Reviewed  External Data Reviewed: labs and notes.     Details: Evaluated yesterday for hyperglcyemia at outside facility  Labs: ordered. Decision-making details documented in ED Course.    Risk  OTC drugs.  Prescription drug management.  Drug therapy requiring intensive monitoring for toxicity.  Diagnosis or treatment significantly limited by social determinants of health.  Risk Details: Chronic health issues, homelessness and substance abuse comoplicate this salvador care.               ED Course as of 12/14/23 1116   Wed Dec 13, 2023   1404 Glucose greater than 400, potassium normal, will plan for administration of insulin intravenously.  Anion gap normal, low suspicion for DKA [TK]      ED Course User Index  [TK] Carlos Manuel Clark MD                           Clinical Impression:  Final diagnoses:  [R73.9] Hyperglycemia (Primary)  [I10] Hypertension, unspecified type  [E13.69, Z79.4] Other specified diabetes mellitus with other specified complication, with long-term current use of insulin          ED Disposition Condition    Discharge Stable          ED Prescriptions    None       Follow-up Information       Follow up With Specialties Details Why Contact Info    [facility] - Emergency Dept Emergency Medicine Go to  As needed, For a follow up visit about today [address] [id number] [phone]             Carlos Manuel Clark MD  12/14/23 1116

## 2023-12-13 NOTE — DISCHARGE INSTRUCTIONS
Mr. Chavez,    Thank you for letting me care for you today! It was nice meeting you, and I hope you feel better soon.   If you would like access to your chart and what was done today please utilize the [facility].   Please come back to [facility] for all of your future medical needs.    Our goal in the emergency department is to always give you outstanding care and exceptional service. You may receive a survey by mail or e-mail in the next week regarding your experience in our ED. We would greatly appreciate you completing and returning the survey. Your feedback provides us with a way to recognize our staff who give very good care and it helps us learn how to improve when your experience was below our aspiration of excellence.     Sincerely,    Carlos Manuel Clark MD  Board Certified Emergency Physician

## 2023-12-13 NOTE — ED TRIAGE NOTES
"Pt sent to ED from [facility] with complaints of "high sugars." Hx of DM. Reports compliance with insulin but states nurse did not give him prescribed insulin this morning. Pt denies complaints at this time.   "

## 2023-12-15 ENCOUNTER — HOSPITAL ENCOUNTER (EMERGENCY)
Facility: OTHER | Age: 43
Discharge: HOME | End: 2023-12-15
Attending: EMERGENCY MEDICINE
Payer: MEDICAID

## 2023-12-15 VITALS
WEIGHT: 161 LBS | BODY MASS INDEX: 26.79 KG/M2 | OXYGEN SATURATION: 100 % | SYSTOLIC BLOOD PRESSURE: 122 MMHG | HEART RATE: 78 BPM | TEMPERATURE: 98 F | DIASTOLIC BLOOD PRESSURE: 78 MMHG | RESPIRATION RATE: 18 BRPM

## 2023-12-15 DIAGNOSIS — R73.9 HYPERGLYCEMIA: Primary | ICD-10-CM

## 2023-12-15 LAB
ALBUMIN SERPL BCP-MCNC: 3.9 G/DL (ref 3.5–5.2)
ALP SERPL-CCNC: 79 U/L (ref 55–135)
ALT SERPL W/O P-5'-P-CCNC: 23 U/L (ref 10–44)
ANION GAP SERPL CALC-SCNC: 13 MMOL/L (ref 8–16)
AST SERPL-CCNC: 17 U/L (ref 10–40)
B-OH-BUTYR BLD STRIP-SCNC: 0.2 MMOL/L (ref 0–0.5)
BACTERIA #/AREA URNS HPF: NORMAL /HPF
BASOPHILS # BLD AUTO: 0.05 K/UL (ref 0–0.2)
BASOPHILS NFR BLD: 0.8 % (ref 0–1.9)
BILIRUB SERPL-MCNC: 0.4 MG/DL (ref 0.1–1)
BILIRUB UR QL STRIP: NEGATIVE
BUN SERPL-MCNC: 10 MG/DL (ref 6–20)
CALCIUM SERPL-MCNC: 9.8 MG/DL (ref 8.7–10.5)
CHLORIDE SERPL-SCNC: 99 MMOL/L (ref 95–110)
CLARITY UR: CLEAR
CO2 SERPL-SCNC: 22 MMOL/L (ref 23–29)
COLOR UR: YELLOW
CREAT SERPL-MCNC: 0.8 MG/DL (ref 0.5–1.4)
DIFFERENTIAL METHOD: ABNORMAL
EOSINOPHIL # BLD AUTO: 0.1 K/UL (ref 0–0.5)
EOSINOPHIL NFR BLD: 1.1 % (ref 0–8)
ERYTHROCYTE [DISTWIDTH] IN BLOOD BY AUTOMATED COUNT: 14.1 % (ref 11.5–14.5)
EST. GFR  (NO RACE VARIABLE): >60 ML/MIN/1.73 M^2
GLUCOSE SERPL-MCNC: 269 MG/DL (ref 70–110)
GLUCOSE UR QL STRIP: ABNORMAL
HCT VFR BLD AUTO: 42.4 % (ref 40–54)
HGB BLD-MCNC: 14.5 G/DL (ref 14–18)
HGB UR QL STRIP: NEGATIVE
HYALINE CASTS #/AREA URNS LPF: 1 /LPF
IMM GRANULOCYTES # BLD AUTO: 0.03 K/UL (ref 0–0.04)
IMM GRANULOCYTES NFR BLD AUTO: 0.5 % (ref 0–0.5)
KETONES UR QL STRIP: ABNORMAL
LEUKOCYTE ESTERASE UR QL STRIP: NEGATIVE
LYMPHOCYTES # BLD AUTO: 1 K/UL (ref 1–4.8)
LYMPHOCYTES NFR BLD: 15.2 % (ref 18–48)
MCH RBC QN AUTO: 30.9 PG (ref 27–31)
MCHC RBC AUTO-ENTMCNC: 34.2 G/DL (ref 32–36)
MCV RBC AUTO: 90 FL (ref 82–98)
MICROSCOPIC COMMENT: NORMAL
MONOCYTES # BLD AUTO: 0.7 K/UL (ref 0.3–1)
MONOCYTES NFR BLD: 10.7 % (ref 4–15)
NEUTROPHILS # BLD AUTO: 4.6 K/UL (ref 1.8–7.7)
NEUTROPHILS NFR BLD: 71.7 % (ref 38–73)
NITRITE UR QL STRIP: NEGATIVE
NRBC BLD-RTO: 0 /100 WBC
PH UR STRIP: 6 [PH] (ref 5–8)
PLATELET # BLD AUTO: 314 K/UL (ref 150–450)
PMV BLD AUTO: 9.5 FL (ref 9.2–12.9)
POCT GLUCOSE: 228 MG/DL (ref 70–110)
POCT GLUCOSE: 249 MG/DL (ref 70–110)
POTASSIUM SERPL-SCNC: 3.7 MMOL/L (ref 3.5–5.1)
PROT SERPL-MCNC: 7.8 G/DL (ref 6–8.4)
PROT UR QL STRIP: ABNORMAL
RBC # BLD AUTO: 4.7 M/UL (ref 4.6–6.2)
SODIUM SERPL-SCNC: 134 MMOL/L (ref 136–145)
SP GR UR STRIP: 1.02 (ref 1–1.03)
URN SPEC COLLECT METH UR: ABNORMAL
UROBILINOGEN UR STRIP-ACNC: NEGATIVE EU/DL
WBC # BLD AUTO: 6.45 K/UL (ref 3.9–12.7)
WBC #/AREA URNS HPF: 0 /HPF (ref 0–5)
YEAST URNS QL MICRO: NORMAL

## 2023-12-15 PROCEDURE — 80053 COMPREHEN METABOLIC PANEL: CPT | Performed by: NURSE PRACTITIONER

## 2023-12-15 PROCEDURE — 85025 COMPLETE CBC W/AUTO DIFF WBC: CPT | Performed by: NURSE PRACTITIONER

## 2023-12-15 PROCEDURE — 81000 URINALYSIS NONAUTO W/SCOPE: CPT | Performed by: NURSE PRACTITIONER

## 2023-12-15 PROCEDURE — 99284 EMERGENCY DEPT VISIT MOD MDM: CPT | Mod: 25

## 2023-12-15 PROCEDURE — 82010 KETONE BODYS QUAN: CPT | Performed by: NURSE PRACTITIONER

## 2023-12-15 PROCEDURE — 25000003 PHARM REV CODE 250: Performed by: NURSE PRACTITIONER

## 2023-12-15 PROCEDURE — 96360 HYDRATION IV INFUSION INIT: CPT

## 2023-12-15 PROCEDURE — 82962 GLUCOSE BLOOD TEST: CPT

## 2023-12-15 PROCEDURE — 96361 HYDRATE IV INFUSION ADD-ON: CPT

## 2023-12-15 RX ADMIN — SODIUM CHLORIDE 1000 ML: 9 INJECTION, SOLUTION INTRAVENOUS at 02:12

## 2023-12-15 NOTE — ED TRIAGE NOTES
"Pt arrived with c/o hyperglycemia.  PT states his BS was in the 500s yesterday.  States he went to [facility], but the wait was really long, so he was never seen.  Pt's  today in triage.  Pt states he takes his insulin as rx'd.  Pt denies any n/v, dizziness, weakness, visual changes, urinary symptoms, or any other medical symptoms, states, "[facility] sent me because they weren't comfortable with my blood sugar being that high."  Pt answering questions appropriately, speaking in complete sentences, respirations even and unlabored.  Aao x 4.    "

## 2023-12-15 NOTE — FIRST PROVIDER EVALUATION
Emergency Department TeleTriage Encounter Note      CHIEF COMPLAINT    Chief Complaint   Patient presents with    Hyperglycemia     Sent here from [facility] for hyperglycemia on yesterday. Pt denies any symptoms at this time.       VITAL SIGNS   Initial Vitals [12/15/23 1248]   BP Pulse Resp Temp SpO2   128/84 95 16 98.1 °F (36.7 °C) 99 %      MAP       --            ALLERGIES    Review of patient's allergies indicates:  No Known Allergies    PROVIDER TRIAGE NOTE  Verbal consent for the teletriage evaluation was given by the patient at the start of the evaluation.  All efforts will be made to maintain patient's privacy during the evaluation.      This is a teletriage evaluation of a 43 y.o. male presenting to the ED with c/o elevated blood sugar. No complaints at present.  Limited physical exam via telehealth: The patient is awake, alert, answering questions appropriately and is not in respiratory distress.  As the Teletriage provider, I performed an initial assessment and ordered appropriate labs and imaging studies, if any, to facilitate the patient's care once placed in the ED. Once a room is available, care and a full evaluation will be completed by an alternate ED provider.  Any additional orders and the final disposition will be determined by that provider.  All imaging and labs will not be followed-up by the Teletriage Team, including myself.          ORDERS  Labs Reviewed - No data to display    ED Orders (720h ago, onward)      Start Ordered     Status Ordering Provider    12/15/23 1400 12/15/23 1305  Vital signs  Every 2 hours         Ordered VARGHESE CORRALES    12/15/23 1315 12/15/23 1305  sodium chloride 0.9% bolus 1,000 mL 1,000 mL  ED 1 Time         Ordered VARGHESE CORRALES    12/15/23 1305 12/15/23 1305  Cardiac Monitoring - Adult  Continuous         Ordered VARGHESE CORRALES    12/15/23 1305 12/15/23 1305  Pulse Oximetry Continuous  Continuous         Ordered VARGHESE CORRALES    12/15/23 1305 12/15/23 1305   Saline lock IV  Once         Ordered VARGHESE CORRALES    12/15/23 1305 12/15/23 1305  CBC auto differential  STAT         Ordered VARGHESE CORRALES    12/15/23 1305 12/15/23 1305  Comprehensive metabolic panel  STAT         Ordered VARGHESE CORRALES    12/15/23 1305 12/15/23 1305  Beta - Hydroxybutyrate, Serum  STAT         Ordered VARGHESE CORRALES    12/15/23 1305 12/15/23 1305  Urinalysis, Reflex to Urine Culture Urine, Clean Catch  STAT         Ordered VARGHESE CORRALES              Virtual Visit Note: The provider triage portion of this emergency department evaluation and documentation was performed via [employer], a HIPAA-compliant telemedicine application, in concert with a tele-presenter in the room. A face to face patient evaluation with one of my colleagues will occur once the patient is placed in an emergency department room.      DISCLAIMER: This note was prepared with "[employer]" voice recognition transcription software. Garbled syntax, mangled pronouns, and other bizarre constructions may be attributed to that software system.

## 2023-12-15 NOTE — ED PROVIDER NOTES
Encounter Date: 12/15/2023    SCRIBE #1 NOTE: I, Dariel Sanchez, am scribing for, and in the presence of,  Jake Gonsales MD. I have scribed the following portions of the note - Other sections scribed: HPI, ROS, & PE.       History     Chief Complaint   Patient presents with    Hyperglycemia     Sent here from [facility] for hyperglycemia on yesterday. Pt denies any symptoms at this time.     Time seen by provider: 2:25 PM    This is a 43 y.o. male with a history of DM and seizures who presents with complaint of hyperglycemia, identified during a daily fingerstick taken at [facility], where this patient has lived for the past week due to recovery from alcohol abuse. He is compliant with his medication but does not have a specific diabetes-related diet at [facility]. He reports urinary frequency but not excessive thirst. No nausea or vomiting. He has not had any recent seizures. This is the extent of the patient's complaints at this time.    The history is provided by the patient.     Review of patient's allergies indicates:  No Known Allergies  Past Medical History:   Diagnosis Date    Diabetes mellitus     Hypertension     Seizures     Stroke 2013     Past Surgical History:   Procedure Laterality Date    TRANSESOPHAGEAL ECHOCARDIOGRAPHY N/A 6/1/2021    Procedure: ECHOCARDIOGRAM, TRANSESOPHAGEAL;  Surgeon: [facility] Diagnostic Provider;  Location: [facility] EP LAB;  Service: Anesthesiology;  Laterality: N/A;     History reviewed. No pertinent family history.  Social History     Tobacco Use    Smoking status: Some Days     Current packs/day: 0.25     Average packs/day: 0.3 packs/day for 26.0 years (6.5 ttl pk-yrs)     Types: Cigarettes, Cigars     Start date: 1998    Smokeless tobacco: Never    Tobacco comments:     Pt states currently smoking 3 cigs/day. He declines referral to Ambulatory Smoking Cessation Program   Substance Use Topics    Alcohol use: Not Currently     Alcohol/week: 30.0 standard drinks of  alcohol     Types: 30 Shots of liquor per week    Drug use: Not Currently     Review of Systems   Constitutional:  Negative for fever.   HENT:  Negative for congestion.    Eyes:  Negative for redness.   Respiratory:  Negative for shortness of breath.    Cardiovascular:  Negative for chest pain.   Gastrointestinal:  Negative for abdominal pain, nausea and vomiting.   Endocrine: Negative for polydipsia.        Positive for hyperglycemia.   Genitourinary:  Positive for frequency. Negative for dysuria.   Skin:  Negative for rash.   Neurological:  Negative for headaches.   Psychiatric/Behavioral:  Negative for confusion.        Physical Exam     Initial Vitals [12/15/23 1248]   BP Pulse Resp Temp SpO2   128/84 95 16 98.1 °F (36.7 °C) 99 %      MAP       --         Physical Exam    Nursing note and vitals reviewed.  Constitutional: He appears well-developed and well-nourished. He is not diaphoretic. No distress.   Disheveled.   HENT:   Head: Normocephalic and atraumatic.   Dry mucous membranes.   Eyes: Conjunctivae are normal. No scleral icterus.   Neck: Neck supple.   Cardiovascular:  Normal rate, regular rhythm, normal heart sounds and intact distal pulses.           No murmur heard.  Pulmonary/Chest: Breath sounds normal. No respiratory distress. He has no wheezes. He has no rhonchi. He has no rales.   Abdominal: Abdomen is soft. There is no abdominal tenderness. There is no rebound and no guarding.   Musculoskeletal:         General: No edema.      Cervical back: Neck supple.     Neurological: He is alert and oriented to person, place, and time.   Skin: Skin is warm and dry.   Psychiatric: He has a normal mood and affect.         ED Course   Procedures  Labs Reviewed   CBC W/ AUTO DIFFERENTIAL - Abnormal; Notable for the following components:       Result Value    Lymph % 15.2 (*)     All other components within normal limits   COMPREHENSIVE METABOLIC PANEL - Abnormal; Notable for the following components:    Sodium  134 (*)     CO2 22 (*)     Glucose 269 (*)     All other components within normal limits   URINALYSIS, REFLEX TO URINE CULTURE - Abnormal; Notable for the following components:    Protein, UA Trace (*)     Glucose, UA 3+ (*)     Ketones, UA 2+ (*)     All other components within normal limits    Narrative:     Specimen Source->Urine   POCT GLUCOSE - Abnormal; Notable for the following components:    POCT Glucose 228 (*)     All other components within normal limits   BETA - HYDROXYBUTYRATE, SERUM   URINALYSIS MICROSCOPIC    Narrative:     Specimen Source->Urine          Imaging Results    None          Medications   sodium chloride 0.9% bolus 1,000 mL 1,000 mL (0 mLs Intravenous Stopped 12/15/23 2986)     Medical Decision Making      43-year-old male with history of DM, seizure sent by [facility] rehab facility for hyperglycemia.  Patient has been there for about a week, has been sent here multiple times since then for hyperglycemia, including 3 days ago at [facility] and 2 days ago here.  I reviewed his medication list should from rehab facility and he takes Levemir but no other DM medications.  Per extensive chart review, he was previously on Lantus and short-acting insulin as well.  He has been eating regular food there, which is likely contributing to his hyperglycemia.  He is relatively asymptomatic, endorses some increased urinary frequency but no GI symptoms, vomiting, or other complaints, no recent illness or fevers.  On exam he is well-appearing with normal vitals, no abdominal tenderness or other concerning exam findings. No sign infectious etiology. Will check basic labs to ensure no severe hyperglycemia or associated SPENCER/DKA, treat with IVF and reassess.    Initial workup reassuring with glucose on , improved to 228 after IVF, with no associated SPENCER or DKA.  No sign UTI, elevated WBC, anemia or other concerning workup findings.  I suspect his hyperglycemia is from inadequate insulin dosing (he likely should  be on Levemir b.i.d. or Lantus daily with sliding scale NovoLog) and from poor compliance with diabetic diet.  Patient educated on need for better diet and will follow up with PCP to adjust his insulin, sent back to rehab facility in stable condition.                Scribe Attestation:   Scribe #1: I performed the above scribed service and the documentation accurately describes the services I performed. I attest to the accuracy of the note.    I, Dr. Jake Gonsales, personally performed the services described in this documentation. All medical record entries made by the scribe were at my direction and in my presence.  I have reviewed the chart and agree that the record reflects my personal performance and is accurate and complete. Jake Gonsales MD.                    Medical Decision Making:   History:   Old Medical Records: I decided to obtain old medical records.  Clinical Tests:   Lab Tests: Ordered and Reviewed             Clinical Impression:  Final diagnoses:  [R73.9] Hyperglycemia (Primary)          ED Disposition Condition    Discharge Stable          ED Prescriptions    None       Follow-up Information       Follow up With Specialties Details Why Contact Info    [facility] - Emergency Dept Emergency Medicine Go to  As needed [address]  [facility] [address] [phone]             Jake Gonsales MD  12/15/23 9035

## 2023-12-16 ENCOUNTER — HOSPITAL ENCOUNTER (EMERGENCY)
Facility: OTHER | Age: 43
Discharge: HOME | End: 2023-12-16
Attending: EMERGENCY MEDICINE
Payer: MEDICAID

## 2023-12-16 VITALS
TEMPERATURE: 98 F | RESPIRATION RATE: 18 BRPM | SYSTOLIC BLOOD PRESSURE: 121 MMHG | HEART RATE: 94 BPM | BODY MASS INDEX: 26.82 KG/M2 | HEIGHT: 65 IN | DIASTOLIC BLOOD PRESSURE: 77 MMHG | WEIGHT: 161 LBS | OXYGEN SATURATION: 99 %

## 2023-12-16 DIAGNOSIS — R73.9 HYPERGLYCEMIA: ICD-10-CM

## 2023-12-16 DIAGNOSIS — Z79.4 TYPE 2 DIABETES MELLITUS WITH HYPERGLYCEMIA, WITH LONG-TERM CURRENT USE OF INSULIN: Primary | ICD-10-CM

## 2023-12-16 DIAGNOSIS — Z91.199 NONCOMPLIANCE WITH DIABETES TREATMENT: ICD-10-CM

## 2023-12-16 DIAGNOSIS — E11.65 TYPE 2 DIABETES MELLITUS WITH HYPERGLYCEMIA, WITH LONG-TERM CURRENT USE OF INSULIN: Primary | ICD-10-CM

## 2023-12-16 LAB
ALBUMIN SERPL BCP-MCNC: 3.9 G/DL (ref 3.5–5.2)
ALP SERPL-CCNC: 88 U/L (ref 55–135)
ALT SERPL W/O P-5'-P-CCNC: 21 U/L (ref 10–44)
ANION GAP SERPL CALC-SCNC: 10 MMOL/L (ref 8–16)
AST SERPL-CCNC: 24 U/L (ref 10–40)
B-OH-BUTYR BLD STRIP-SCNC: 0 MMOL/L (ref 0–0.5)
BACTERIA #/AREA URNS HPF: NORMAL /HPF
BASOPHILS # BLD AUTO: 0.06 K/UL (ref 0–0.2)
BASOPHILS NFR BLD: 1.2 % (ref 0–1.9)
BILIRUB SERPL-MCNC: 0.2 MG/DL (ref 0.1–1)
BILIRUB UR QL STRIP: NEGATIVE
BUN SERPL-MCNC: 11 MG/DL (ref 6–20)
CALCIUM SERPL-MCNC: 9.8 MG/DL (ref 8.7–10.5)
CHLORIDE SERPL-SCNC: 96 MMOL/L (ref 95–110)
CLARITY UR: CLEAR
CO2 SERPL-SCNC: 24 MMOL/L (ref 23–29)
COLOR UR: COLORLESS
CREAT SERPL-MCNC: 1.1 MG/DL (ref 0.5–1.4)
DIFFERENTIAL METHOD: ABNORMAL
EOSINOPHIL # BLD AUTO: 0 K/UL (ref 0–0.5)
EOSINOPHIL NFR BLD: 0.4 % (ref 0–8)
ERYTHROCYTE [DISTWIDTH] IN BLOOD BY AUTOMATED COUNT: 14 % (ref 11.5–14.5)
EST. GFR  (NO RACE VARIABLE): >60 ML/MIN/1.73 M^2
GLUCOSE SERPL-MCNC: 501 MG/DL (ref 70–110)
GLUCOSE UR QL STRIP: ABNORMAL
HCO3 UR-SCNC: 35 MMOL/L (ref 24–28)
HCT VFR BLD AUTO: 41.5 % (ref 40–54)
HCT VFR BLD CALC: 44 %PCV (ref 36–54)
HGB BLD-MCNC: 14.3 G/DL (ref 14–18)
HGB BLD-MCNC: 15 G/DL
HGB UR QL STRIP: NEGATIVE
IMM GRANULOCYTES # BLD AUTO: 0.01 K/UL (ref 0–0.04)
IMM GRANULOCYTES NFR BLD AUTO: 0.2 % (ref 0–0.5)
KETONES UR QL STRIP: ABNORMAL
LEUKOCYTE ESTERASE UR QL STRIP: NEGATIVE
LYMPHOCYTES # BLD AUTO: 1.4 K/UL (ref 1–4.8)
LYMPHOCYTES NFR BLD: 26.1 % (ref 18–48)
MCH RBC QN AUTO: 30.8 PG (ref 27–31)
MCHC RBC AUTO-ENTMCNC: 34.5 G/DL (ref 32–36)
MCV RBC AUTO: 89 FL (ref 82–98)
MICROSCOPIC COMMENT: NORMAL
MONOCYTES # BLD AUTO: 1 K/UL (ref 0.3–1)
MONOCYTES NFR BLD: 19.7 % (ref 4–15)
NEUTROPHILS # BLD AUTO: 2.7 K/UL (ref 1.8–7.7)
NEUTROPHILS NFR BLD: 52.4 % (ref 38–73)
NITRITE UR QL STRIP: NEGATIVE
NRBC BLD-RTO: 0 /100 WBC
PCO2 BLDA: 63.9 MMHG (ref 35–45)
PH SMN: 7.35 [PH] (ref 7.35–7.45)
PH UR STRIP: 7 [PH] (ref 5–8)
PLATELET # BLD AUTO: 276 K/UL (ref 150–450)
PMV BLD AUTO: 9.6 FL (ref 9.2–12.9)
PO2 BLDA: 17 MMHG (ref 40–60)
POC BE: 9 MMOL/L
POC IONIZED CALCIUM: 1.26 MMOL/L (ref 1.06–1.42)
POC SATURATED O2: 21 % (ref 95–100)
POC TCO2: 37 MMOL/L (ref 24–29)
POCT GLUCOSE: 239 MG/DL (ref 70–110)
POCT GLUCOSE: 362 MG/DL (ref 70–110)
POCT GLUCOSE: 451 MG/DL (ref 70–110)
POCT GLUCOSE: 471 MG/DL (ref 70–110)
POTASSIUM BLD-SCNC: 4.7 MMOL/L (ref 3.5–5.1)
POTASSIUM SERPL-SCNC: 5.1 MMOL/L (ref 3.5–5.1)
PROT SERPL-MCNC: 8.3 G/DL (ref 6–8.4)
PROT UR QL STRIP: NEGATIVE
RBC # BLD AUTO: 4.64 M/UL (ref 4.6–6.2)
SAMPLE: ABNORMAL
SODIUM BLD-SCNC: 132 MMOL/L (ref 136–145)
SODIUM SERPL-SCNC: 130 MMOL/L (ref 136–145)
SP GR UR STRIP: 1.02 (ref 1–1.03)
URN SPEC COLLECT METH UR: ABNORMAL
UROBILINOGEN UR STRIP-ACNC: NEGATIVE EU/DL
WBC # BLD AUTO: 5.17 K/UL (ref 3.9–12.7)
YEAST URNS QL MICRO: NORMAL

## 2023-12-16 PROCEDURE — 82800 BLOOD PH: CPT

## 2023-12-16 PROCEDURE — 99284 EMERGENCY DEPT VISIT MOD MDM: CPT | Mod: 25

## 2023-12-16 PROCEDURE — 96361 HYDRATE IV INFUSION ADD-ON: CPT

## 2023-12-16 PROCEDURE — 80053 COMPREHEN METABOLIC PANEL: CPT | Performed by: NURSE PRACTITIONER

## 2023-12-16 PROCEDURE — 96374 THER/PROPH/DIAG INJ IV PUSH: CPT

## 2023-12-16 PROCEDURE — 85025 COMPLETE CBC W/AUTO DIFF WBC: CPT | Performed by: NURSE PRACTITIONER

## 2023-12-16 PROCEDURE — 99900035 HC TECH TIME PER 15 MIN (STAT)

## 2023-12-16 PROCEDURE — 82803 BLOOD GASES ANY COMBINATION: CPT

## 2023-12-16 PROCEDURE — 82010 KETONE BODYS QUAN: CPT | Performed by: NURSE PRACTITIONER

## 2023-12-16 PROCEDURE — 63600175 PHARM REV CODE 636 W HCPCS: Performed by: EMERGENCY MEDICINE

## 2023-12-16 PROCEDURE — 82962 GLUCOSE BLOOD TEST: CPT | Mod: 91

## 2023-12-16 PROCEDURE — 25000003 PHARM REV CODE 250: Performed by: NURSE PRACTITIONER

## 2023-12-16 PROCEDURE — 81000 URINALYSIS NONAUTO W/SCOPE: CPT | Performed by: NURSE PRACTITIONER

## 2023-12-16 RX ADMIN — SODIUM CHLORIDE 1000 ML: 0.9 INJECTION, SOLUTION INTRAVENOUS at 06:12

## 2023-12-16 RX ADMIN — INSULIN HUMAN 6 UNITS: 100 INJECTION, SOLUTION PARENTERAL at 08:12

## 2023-12-17 NOTE — ED PROVIDER NOTES
Encounter Date: 12/16/2023    SCRIBE #1 NOTE: I, Kyra Franks, am scribing for, and in the presence of,  Jhony Martinez MD. I have scribed the following portions of the note - Other sections scribed: HPI, ROS, PE.       History     Chief Complaint   Patient presents with    Hyperglycemia     Reports high blood sugar level at [facility], was told he couldn't stay with a level that high and instructed to come to ed. Pt does not know what the number was. Reports increased urination, denies symptoms. Pt arrived to triage with a plate of food placed on triage nurses desk.      Time seen by provider: 7:03 PM    This is a 43 y.o. male who presents with the complaint of hyperglycemia. The Pt stays at [facility] for alcohol abuse and reports that they have not been giving him his insulin for the last 3 days. He states that they took the insulin he had on him when he got there. He endorses feeling light headed. He states he has been sober for approximately a week and a half. He denies any drug abuse. This is the extent of the patient's complaints at this time.       The history is provided by the patient.     Review of patient's allergies indicates:  No Known Allergies  Past Medical History:   Diagnosis Date    Diabetes mellitus     Hypertension     Seizures     Stroke 2013     Past Surgical History:   Procedure Laterality Date    TRANSESOPHAGEAL ECHOCARDIOGRAPHY N/A 6/1/2021    Procedure: ECHOCARDIOGRAM, TRANSESOPHAGEAL;  [facility];  Location: [facility] EP LAB;  Service: Anesthesiology;  Laterality: N/A;     No family history on file.  Social History     Tobacco Use    Smoking status: Some Days     Current packs/day: 0.25     Average packs/day: 0.3 packs/day for 26.0 years (6.5 ttl pk-yrs)     Types: Cigarettes, Cigars     Start date: 1998    Smokeless tobacco: Never    Tobacco comments:     Pt states currently smoking 3 cigs/day. He declines referral to Ambulatory Smoking Cessation Program    Substance Use Topics    Alcohol use: Not Currently     Alcohol/week: 30.0 standard drinks of alcohol     Types: 30 Shots of liquor per week    Drug use: Not Currently     Review of Systems  See HPI    Physical Exam     Initial Vitals [12/16/23 1647]   BP Pulse Resp Temp SpO2   121/81 91 18 98.1 °F (36.7 °C) 99 %      MAP       --         Physical Exam    Nursing note and vitals reviewed.  Constitutional: He appears well-developed and well-nourished. He is not diaphoretic. No distress.   HENT:   Head: Normocephalic and atraumatic.   Right Ear: External ear normal.   Left Ear: External ear normal.   Nose: Nose normal.   Dry mucous membranes.    Eyes: Conjunctivae and EOM are normal. Pupils are equal, round, and reactive to light. No scleral icterus.   No pallor   Neck: Neck supple. No JVD present.   Normal range of motion.  Cardiovascular:  Normal rate, regular rhythm, normal heart sounds and intact distal pulses.     Exam reveals no friction rub.       No murmur heard.  Pulmonary/Chest: Breath sounds normal. No respiratory distress. He has no wheezes. He has no rhonchi. He has no rales.   Musculoskeletal:         General: No edema. Normal range of motion.      Cervical back: Normal range of motion and neck supple.     Lymphadenopathy:     He has no cervical adenopathy.   Neurological: He is alert and oriented to person, place, and time. He has normal strength. No cranial nerve deficit or sensory deficit.   Skin: Skin is warm and dry. No rash noted.   Psychiatric: He has a normal mood and affect. His behavior is normal. Thought content normal.         ED Course   Procedures  Labs Reviewed   CBC W/ AUTO DIFFERENTIAL - Abnormal; Notable for the following components:       Result Value    Mono % 19.7 (*)     All other components within normal limits   COMPREHENSIVE METABOLIC PANEL - Abnormal; Notable for the following components:    Sodium 130 (*)     Glucose 501 (*)     All other components within normal limits     Narrative:     GLU    critical result(s) called and verbal readback obtained from   Juvencio Gottlieb RN by ANI 12/16/2023 19:49   URINALYSIS, REFLEX TO URINE CULTURE - Abnormal; Notable for the following components:    Color, UA Colorless (*)     Glucose, UA 4+ (*)     Ketones, UA Trace (*)     All other components within normal limits    Narrative:     Specimen Source->Urine   POCT GLUCOSE - Abnormal; Notable for the following components:    POCT Glucose 451 (*)     All other components within normal limits   ISTAT PROCEDURE - Abnormal; Notable for the following components:    POC PH 7.347 (*)     POC PCO2 63.9 (*)     POC PO2 17 (*)     POC HCO3 35.0 (*)     POC BE 9 (*)     POC Sodium 132 (*)     POC TCO2 37 (*)     All other components within normal limits   POCT GLUCOSE - Abnormal; Notable for the following components:    POCT Glucose 362 (*)     All other components within normal limits   POCT GLUCOSE - Abnormal; Notable for the following components:    POCT Glucose 239 (*)     All other components within normal limits   BETA - HYDROXYBUTYRATE, SERUM   URINALYSIS MICROSCOPIC    Narrative:     Specimen Source->Urine   POCT GLUCOSE MONITORING CONTINUOUS   POCT GLUCOSE MONITORING CONTINUOUS   POCT GLUCOSE MONITORING CONTINUOUS          Imaging Results              X-Ray Chest AP Portable (Final result)  Result time 12/16/23 18:59:22      Final result by Dilcia Aguilera MD (12/16/23 18:59:22)                   Impression:      No acute cardiopulmonary process identified.      Electronically signed by: Dilcia Aguilera MD  Date:    12/16/2023  Time:    18:59               Narrative:    EXAMINATION:  XR CHEST AP PORTABLE    CLINICAL HISTORY:  hyperglycemia;    TECHNIQUE:  Single frontal view of the chest was performed.    COMPARISON:  May 2022.    FINDINGS:  Cardiac silhouette is normal in size.  Lungs are symmetrically expanded.  Unchanged large left apical bulla is visualized.  No evidence of focal consolidative  process, pneumothorax, or significant pleural effusion.  No acute osseous abnormality identified.  Remote fracture deformity is seen at the distal aspect of the right clavicle.                                       Medications   sodium chloride 0.9% bolus 1,000 mL 1,000 mL (0 mLs Intravenous Stopped 12/16/23 1959)   sodium chloride 0.9% bolus 1,000 mL 1,000 mL (0 mLs Intravenous Stopped 12/16/23 1959)   insulin regular injection 6 Units 0.06 mL (6 Units Intravenous Given 12/16/23 2018)     Medical Decision Making  Amount and/or Complexity of Data Reviewed  External Data Reviewed: notes.  Labs: ordered. Decision-making details documented in ED Course.  Radiology: ordered.    Risk  OTC drugs.    Patient returns to the emergency department after glucose was high on an Accu-Chek performed at his rehab facility, [facility].  This is the 5th day in a row he has been seen in the ER for the same complaint.  It appears that he is still not receiving his insulin at the facility.  The patient is somewhat of a poor historian, poor insight but states that they took his insulin from him, apparently have locked up, and they are not giving a to him and whenever I knock on the nurses door they do not answer.  On exam he appeared mildly dehydrated but not acutely ill-appearing.  IV fluid repletion begun repeat laboratory studies do not show electrolyte derangement for acidosis.  No evidence of DKA.  After IV fluids his glucose is improved into the 300s, after dose of insulin he is now approaching 200.  He is feeling better now requesting discharge back to the facility.  I have again encouraged the patient to continue to attempt to receive his insulin therapy from the facility.  Also encouraged him to follow a more proper diabetic diet.          Scribe Attestation:   Scribe #1: I performed the above scribed service and the documentation accurately describes the services I performed. I attest to the accuracy of the note.               Physician Attestation for Scribe: I, MICHELLE, reviewed documentation as scribed in my presence, which is both accurate and complete.                  Clinical Impression:  Final diagnoses:  [R73.9] Hyperglycemia  [E11.65, Z79.4] Type 2 diabetes mellitus with hyperglycemia, with long-term current use of insulin (Primary)  [Z91.199] Noncompliance with diabetes treatment          ED Disposition Condition    Discharge Stable          ED Prescriptions    None       Follow-up Information       Follow up With Specialties Details Why Contact [facility] In 1 day  [address] [facility] [address]  [phone]               Jhony Martinez II, MD  12/16/23 0180

## 2023-12-17 NOTE — DISCHARGE INSTRUCTIONS
It is very important that you start taking your insulin again.  Please ask the facility to start administering this daily as previously directed.  Is also very important that you follow a diabetic diet, with minimal sugar and simple carbohydrates.

## 2023-12-17 NOTE — FIRST PROVIDER EVALUATION
Emergency Department TeleTriage Encounter Note      CHIEF COMPLAINT    Chief Complaint   Patient presents with    Hyperglycemia     Reports high blood sugar level at [facility], was told he couldn't stay with a level that high and instructed to come to ed. Pt does not know what the number was. Reports increased urination, denies symptoms. Pt arrived to triage with a plate of food placed on triage nurses desk.        VITAL SIGNS   Initial Vitals [12/16/23 1647]   BP Pulse Resp Temp SpO2   121/81 91 18 98.1 °F (36.7 °C) 99 %      MAP       --            ALLERGIES    Review of patient's allergies indicates:  No Known Allergies    PROVIDER TRIAGE NOTE  This is a teletriage evaluation of a 43 y.o. male presenting to the ED complaining of hyperglycemia. States that he was at [facility] and was told that his glucose is high. Was seen yesterday for same and has not had insulin since OR. States that he is feeling light-headed.     Alert, no distress.     Initial orders will be placed and care will be transferred to an alternate provider when patient is roomed for a full evaluation. Any additional orders and the final disposition will be determined by that provider.         ORDERS  Labs Reviewed   POCT GLUCOSE - Abnormal; Notable for the following components:       Result Value    POCT Glucose 451 (*)     All other components within normal limits       ED Orders (720h ago, onward)      Start Ordered     Status Ordering Provider    12/16/23 2000 12/16/23 1818  Vital signs  Every 2 hours         Ordered KORY REYNOSO N.    12/16/23 1830 12/16/23 1818  sodium chloride 0.9% bolus 1,000 mL 1,000 mL  ED 1 Time         Ordered KORY REYNOSO N.    12/16/23 1830 12/16/23 1818  sodium chloride 0.9% bolus 1,000 mL 1,000 mL  ED 1 Time         Ordered KORY REYNOSO N.    12/16/23 1818 12/16/23 1818  Cardiac Monitoring - Adult  Continuous         Ordered KORY REYNOSO N.    12/16/23 1818 12/16/23  1818  Pulse Oximetry Continuous  Continuous         Ordered KORY REYNOSO N.    12/16/23 1818 12/16/23 1818  Saline lock IV  Once         Ordered KORY REYNOSO N.    12/16/23 1818 12/16/23 1818  CBC auto differential  STAT         Ordered KORY REYNOSO N.    12/16/23 1818 12/16/23 1818  Comprehensive metabolic panel  STAT         Ordered ANGELES KORY N.    12/16/23 1818 12/16/23 1818  Beta - Hydroxybutyrate, Serum  STAT         Ordered ANGELES KORY N.    12/16/23 1818 12/16/23 1818  POCT glucose  Once         Ordered ANGELES KORY N.    12/16/23 1818 12/16/23 1818  Urinalysis, Reflex to Urine Culture Urine, Clean Catch  STAT         Ordered ANGELES KORY N.    12/16/23 1818 12/16/23 1818  EKG 12-lead  Once         Ordered KORY REYNOSO N.    12/16/23 1818 12/16/23 1818  X-Ray Chest AP Portable  1 time imaging         Ordered KORY REYNOSO N.    12/16/23 1818 12/16/23 1818  POCT Venous Blood Gas  Once        Comments: This test should be used for VBGs.  If using this order for other tests (K, creatinine, HCT, PT/INR, lactate etc)  ONLY do so in the case of an emergency or rapid response.Notify Physician if: see parameters below.      Ordered KORY REYNOSO N.    12/16/23 1649 12/16/23 1649  POCT glucose  Once         Final result EMERGENCY, DEPT PHYSICIAN              Virtual Visit Note: The provider triage portion of this emergency department evaluation and documentation was performed via [employer], a HIPAA-compliant telemedicine application, in concert with a tele-presenter in the room. A face to face patient evaluation with one of my colleagues will occur once the patient is placed in an emergency department room.      DISCLAIMER: This note was prepared with [employer] voice recognition transcription software. Garbled syntax, mangled pronouns, and other bizarre constructions may be attributed to that software system.

## 2023-12-17 NOTE — ED NOTES
Presents from [place] w/ unknown high blood sugar, pt reports he has not rec'd his insulin in 3 days  LOC: The patient is awake, alert and aware of environment with an appropriate affect, the patient is oriented x 3 and speaking appropriately.  APPEARANCE: Patient resting comfortably and in no acute distress, patient is clean and well groomed, patient's clothing is properly fastened.  SKIN: The skin is warm and dry, patient has normal skin turgor and dry mucus membranes, skin intact, no breakdown or brusing noted.  MUSKULOSKELETAL: Patient moving all extremities well, no obvious swelling or deformities noted.  RESPIRATORY: Airway is open and patent, respirations are spontaneous, patient has a normal effort and rate. Breath sounds are clear and equal bilaterally.  CARDIAC: Normal heart sounds. No peripheral edema.  ABDOMEN: Soft and non tender to palpation, no distention noted.

## 2023-12-21 ENCOUNTER — HOSPITAL ENCOUNTER (EMERGENCY)
Facility: OTHER | Age: 43
Discharge: HOME | End: 2023-12-21
Attending: EMERGENCY MEDICINE
Payer: MEDICAID

## 2023-12-21 VITALS
DIASTOLIC BLOOD PRESSURE: 91 MMHG | BODY MASS INDEX: 22.66 KG/M2 | WEIGHT: 136 LBS | OXYGEN SATURATION: 98 % | HEIGHT: 65 IN | TEMPERATURE: 98 F | RESPIRATION RATE: 18 BRPM | SYSTOLIC BLOOD PRESSURE: 148 MMHG | HEART RATE: 72 BPM

## 2023-12-21 DIAGNOSIS — R73.9 HYPERGLYCEMIA: Primary | ICD-10-CM

## 2023-12-21 DIAGNOSIS — R35.89 POLYURIA: ICD-10-CM

## 2023-12-21 LAB
ANION GAP SERPL CALC-SCNC: 11 MMOL/L (ref 8–16)
B-OH-BUTYR BLD STRIP-SCNC: 0.1 MMOL/L (ref 0–0.5)
BACTERIA #/AREA URNS HPF: NORMAL /HPF
BASOPHILS # BLD AUTO: 0.02 K/UL (ref 0–0.2)
BASOPHILS NFR BLD: 0.3 % (ref 0–1.9)
BILIRUB UR QL STRIP: NEGATIVE
BUN SERPL-MCNC: 12 MG/DL (ref 6–20)
CALCIUM SERPL-MCNC: 10 MG/DL (ref 8.7–10.5)
CHLORIDE SERPL-SCNC: 98 MMOL/L (ref 95–110)
CLARITY UR: CLEAR
CO2 SERPL-SCNC: 26 MMOL/L (ref 23–29)
COLOR UR: COLORLESS
CREAT SERPL-MCNC: 1 MG/DL (ref 0.5–1.4)
DIFFERENTIAL METHOD: ABNORMAL
EOSINOPHIL # BLD AUTO: 0.1 K/UL (ref 0–0.5)
EOSINOPHIL NFR BLD: 1 % (ref 0–8)
ERYTHROCYTE [DISTWIDTH] IN BLOOD BY AUTOMATED COUNT: 13.7 % (ref 11.5–14.5)
EST. GFR  (NO RACE VARIABLE): >60 ML/MIN/1.73 M^2
FIO2: 21
GLUCOSE SERPL-MCNC: 411 MG/DL (ref 70–110)
GLUCOSE UR QL STRIP: ABNORMAL
HCO3 UR-SCNC: 28.6 MMOL/L (ref 24–28)
HCT VFR BLD AUTO: 39.8 % (ref 40–54)
HCT VFR BLD CALC: 43 %PCV (ref 36–54)
HGB BLD-MCNC: 13.8 G/DL (ref 14–18)
HGB BLD-MCNC: 15 G/DL
HGB UR QL STRIP: NEGATIVE
IMM GRANULOCYTES # BLD AUTO: 0.05 K/UL (ref 0–0.04)
IMM GRANULOCYTES NFR BLD AUTO: 0.7 % (ref 0–0.5)
KETONES UR QL STRIP: ABNORMAL
LEUKOCYTE ESTERASE UR QL STRIP: NEGATIVE
LYMPHOCYTES # BLD AUTO: 2.1 K/UL (ref 1–4.8)
LYMPHOCYTES NFR BLD: 28 % (ref 18–48)
MCH RBC QN AUTO: 30.7 PG (ref 27–31)
MCHC RBC AUTO-ENTMCNC: 34.7 G/DL (ref 32–36)
MCV RBC AUTO: 89 FL (ref 82–98)
MICROSCOPIC COMMENT: NORMAL
MONOCYTES # BLD AUTO: 0.5 K/UL (ref 0.3–1)
MONOCYTES NFR BLD: 6.5 % (ref 4–15)
NEUTROPHILS # BLD AUTO: 4.9 K/UL (ref 1.8–7.7)
NEUTROPHILS NFR BLD: 63.5 % (ref 38–73)
NITRITE UR QL STRIP: NEGATIVE
NRBC BLD-RTO: 0 /100 WBC
PCO2 BLDA: 43.6 MMHG (ref 35–45)
PH SMN: 7.43 [PH] (ref 7.35–7.45)
PH UR STRIP: 7 [PH] (ref 5–8)
PLATELET # BLD AUTO: 284 K/UL (ref 150–450)
PMV BLD AUTO: 9.8 FL (ref 9.2–12.9)
PO2 BLDA: 28 MMHG (ref 40–60)
POC BE: 4 MMOL/L
POC SATURATED O2: 55 % (ref 95–100)
POC TCO2: 30 MMOL/L (ref 24–29)
POCT GLUCOSE: 320 MG/DL (ref 70–110)
POCT GLUCOSE: 368 MG/DL (ref 70–110)
POTASSIUM SERPL-SCNC: 4.9 MMOL/L (ref 3.5–5.1)
PROT UR QL STRIP: NEGATIVE
RBC # BLD AUTO: 4.49 M/UL (ref 4.6–6.2)
RBC #/AREA URNS HPF: 0 /HPF (ref 0–4)
SAMPLE: ABNORMAL
SODIUM SERPL-SCNC: 135 MMOL/L (ref 136–145)
SP GR UR STRIP: 1.02 (ref 1–1.03)
SQUAMOUS #/AREA URNS HPF: 0 /HPF
URN SPEC COLLECT METH UR: ABNORMAL
UROBILINOGEN UR STRIP-ACNC: NEGATIVE EU/DL
WBC # BLD AUTO: 7.65 K/UL (ref 3.9–12.7)
WBC #/AREA URNS HPF: 1 /HPF (ref 0–5)
YEAST URNS QL MICRO: NORMAL

## 2023-12-21 PROCEDURE — 36415 COLL VENOUS BLD VENIPUNCTURE: CPT | Performed by: EMERGENCY MEDICINE

## 2023-12-21 PROCEDURE — 82010 KETONE BODYS QUAN: CPT | Performed by: EMERGENCY MEDICINE

## 2023-12-21 PROCEDURE — 99284 EMERGENCY DEPT VISIT MOD MDM: CPT | Mod: 25

## 2023-12-21 PROCEDURE — 85025 COMPLETE CBC W/AUTO DIFF WBC: CPT | Performed by: EMERGENCY MEDICINE

## 2023-12-21 PROCEDURE — 96360 HYDRATION IV INFUSION INIT: CPT

## 2023-12-21 PROCEDURE — 25000003 PHARM REV CODE 250: Performed by: EMERGENCY MEDICINE

## 2023-12-21 PROCEDURE — 80048 BASIC METABOLIC PNL TOTAL CA: CPT | Performed by: EMERGENCY MEDICINE

## 2023-12-21 PROCEDURE — 81000 URINALYSIS NONAUTO W/SCOPE: CPT | Performed by: EMERGENCY MEDICINE

## 2023-12-21 PROCEDURE — 82962 GLUCOSE BLOOD TEST: CPT

## 2023-12-21 RX ORDER — SODIUM CHLORIDE 9 MG/ML
1000 INJECTION, SOLUTION INTRAVENOUS
Status: COMPLETED | OUTPATIENT
Start: 2023-12-21 | End: 2023-12-21

## 2023-12-21 RX ADMIN — SODIUM CHLORIDE 1000 ML: 9 INJECTION, SOLUTION INTRAVENOUS at 10:12

## 2023-12-21 NOTE — DISCHARGE INSTRUCTIONS
Your symptoms today are suggestive from uncontrolled diabetes.  Discuss with primary care for better control of your blood sugars.

## 2023-12-21 NOTE — ED TRIAGE NOTES
Pt to ED c/o urinary incontinence only while sleeping. Denies dysuria, urgency, frequency and incontinence during the day. Pt reports compliance with all medications. Pt AAO x4. VSS.  here in ED. Reports drinking 4 cups of milk this morning.

## 2023-12-21 NOTE — ED PROVIDER NOTES
"     Source of History:    The patient    Chief complaint:  Urinary Incontinence (Pt reports urinary incontinence while sleeping x 4 days. )      HPI:  Terrance Chavez is a 43 y.o. male   complaint of increased urination over the last 1 week.  Does have a history of diabetes.  He is currently in rehab for alcohol.  Denies any IV drug use.  Denies any back pain.    States he still able to control his urine but due to the excessive urination sometimes at night is not able to get to the bathroom in time.  The triage nurse stated urinary incontinence however on my history patient is just excessive urination and not incontinence.  Denies any dysuria.    This is the extent to the patients complaints today here in the emergency department.    ROS:   See HPI    Review of patient's allergies indicates:  No Known Allergies    PMH:  As per HPI and below:  Past Medical History:   Diagnosis Date    Diabetes mellitus     Hypertension     Seizures     Stroke 2013     Past Surgical History:   Procedure Laterality Date    TRANSESOPHAGEAL ECHOCARDIOGRAPHY N/A 6/1/2021    Procedure: ECHOCARDIOGRAM, TRANSESOPHAGEAL;  Surgeon: [facility] Diagnostic Provider;  Location: [facility] EP LAB;  Service: Anesthesiology;  Laterality: N/A;       Social History     Tobacco Use    Smoking status: Some Days     Current packs/day: 0.25     Average packs/day: 0.3 packs/day for 26.0 years (6.5 ttl pk-yrs)     Types: Cigarettes, Cigars     Start date: 1998    Smokeless tobacco: Never    Tobacco comments:     Pt states currently smoking 3 cigs/day. He declines referral to Ambulatory Smoking Cessation Program   Substance Use Topics    Alcohol use: Not Currently     Alcohol/week: 30.0 standard drinks of alcohol     Types: 30 Shots of liquor per week    Drug use: Not Currently       Physical Exam:    /77   Pulse 83   Temp 98.4 °F (36.9 °C) (Oral)   Resp 18   Ht 5' 5" (1.651 m)   Wt 61.7 kg (136 lb)   SpO2 99%   BMI 22.63 kg/m²   Nursing note and vital " signs reviewed.  Constitutional: No acute distress.  Nontoxic  Eyes: No conjunctival injection. Extraocular muscles intact.  Musculoskeletal: Good range of motion all joints.  No deformities.  Neck supple.  No meningismus. Neurovascularly intact.        Summary of Medical Records:      MDM/ Differential Dx:     A 43-year-old male with increased urination.  Is a diabetic and I suspect uncontrolled diabetes hyperglycemia as the cause of his excessive urination.  Will get a urinalysis also to evaluate for urinary tract infection.  No evidence to suggest infectious etiology such as pyelonephritis or any other intra-abdominal etiology such as appendicitis or colitis.  Again he does not have urinary incontinence and also with no history of IVDA or any back pain I have considered but do not suspect cauda equina or conus medullaris.   Will check labs to evaluate for DKA give IV fluids and observed.    ED Course as of 12/21/23 1117   Thu Dec 21, 2023   1002 POCT Glucose(!): 368 [SM]   1032 POC PH: 7.426 [SM]   1032 WBC: 7.65 [SM]   1032 Hemoglobin(!): 13.8 [SM]   1032 Platelet Count: 284 [SM]   1032 Beta-Hydroxybutyrate: 0.1 [SM]   1032 Glucose, UA(!): 4+ [SM]   1032 NITRITE UA: Negative [SM]   1032 Leukocytes, UA: Negative [SM]   1032  pH 7.42 and negative beta hydroxybutyrate.  No evidence of DKA.  Urinalysis is negative for nitrites as well as leukocytes with only 1 WBC no evidence of urinary tract infection.  I suspect the symptoms are due to uncontrolled hyperglycemia but no metabolic derangement.  Will give IV fluids and plan for discharge. [SM]   1116   Discussed the results with the patient.  Finishing his IV fluids.  States he is ready to go.  Will discharge with recommendations to improve blood sugar control at the [facility].      No further workup is indicated in the emergency department today.  I updated pt regarding results and I counseled pt regarding supportive care measures.  Diagnosis and treatment plan  explained to patient. I have answered all questions and the patient is satisfied with the plan of care. Patient discharged home in stable condition.  [SM]      ED Course User Index  [] Camden Thomson DO               Diagnostic Impression:    1. Hyperglycemia    2. Polyuria         ED Disposition Condition    Discharge Stable            ED Prescriptions    None       Follow-up Information    None          Camden Thomson,   12/21/23 4231

## 2024-01-09 PROCEDURE — 99284 EMERGENCY DEPT VISIT MOD MDM: CPT | Mod: 25

## 2024-01-10 ENCOUNTER — HOSPITAL ENCOUNTER (EMERGENCY)
Facility: OTHER | Age: 44
Discharge: HOME | End: 2024-01-10
Attending: EMERGENCY MEDICINE
Payer: MEDICAID

## 2024-01-10 VITALS
SYSTOLIC BLOOD PRESSURE: 139 MMHG | TEMPERATURE: 98 F | RESPIRATION RATE: 16 BRPM | OXYGEN SATURATION: 97 % | HEART RATE: 97 BPM | DIASTOLIC BLOOD PRESSURE: 72 MMHG

## 2024-01-10 DIAGNOSIS — R56.9 SEIZURE: ICD-10-CM

## 2024-01-10 DIAGNOSIS — R73.9 HYPERGLYCEMIA: ICD-10-CM

## 2024-01-10 DIAGNOSIS — Z76.0 MEDICATION REFILL: ICD-10-CM

## 2024-01-10 DIAGNOSIS — E86.0 DEHYDRATION: Primary | ICD-10-CM

## 2024-01-10 LAB
ALBUMIN SERPL BCP-MCNC: 3.8 G/DL (ref 3.5–5.2)
ALP SERPL-CCNC: 89 U/L (ref 55–135)
ALT SERPL W/O P-5'-P-CCNC: 24 U/L (ref 10–44)
ANION GAP SERPL CALC-SCNC: 20 MMOL/L (ref 8–16)
ANION GAP SERPL CALC-SCNC: 29 MMOL/L (ref 8–16)
AST SERPL-CCNC: 26 U/L (ref 10–40)
BACTERIA #/AREA URNS HPF: ABNORMAL /HPF
BASOPHILS # BLD AUTO: 0.05 K/UL (ref 0–0.2)
BASOPHILS NFR BLD: 0.4 % (ref 0–1.9)
BILIRUB SERPL-MCNC: 0.7 MG/DL (ref 0.1–1)
BILIRUB UR QL STRIP: ABNORMAL
BNP SERPL-MCNC: <10 PG/ML (ref 0–99)
BUN SERPL-MCNC: 26 MG/DL (ref 6–20)
BUN SERPL-MCNC: 29 MG/DL (ref 6–20)
CALCIUM SERPL-MCNC: 8.8 MG/DL (ref 8.7–10.5)
CALCIUM SERPL-MCNC: 9.9 MG/DL (ref 8.7–10.5)
CHLORIDE SERPL-SCNC: 88 MMOL/L (ref 95–110)
CHLORIDE SERPL-SCNC: 99 MMOL/L (ref 95–110)
CLARITY UR: CLEAR
CO2 SERPL-SCNC: 17 MMOL/L (ref 23–29)
CO2 SERPL-SCNC: 19 MMOL/L (ref 23–29)
COLOR UR: YELLOW
CREAT SERPL-MCNC: 1.4 MG/DL (ref 0.5–1.4)
CREAT SERPL-MCNC: 1.8 MG/DL (ref 0.5–1.4)
DIFFERENTIAL METHOD BLD: ABNORMAL
EOSINOPHIL # BLD AUTO: 0 K/UL (ref 0–0.5)
EOSINOPHIL NFR BLD: 0.1 % (ref 0–8)
ERYTHROCYTE [DISTWIDTH] IN BLOOD BY AUTOMATED COUNT: 13.1 % (ref 11.5–14.5)
EST. GFR  (NO RACE VARIABLE): 47 ML/MIN/1.73 M^2
EST. GFR  (NO RACE VARIABLE): >60 ML/MIN/1.73 M^2
ETHANOL SERPL-MCNC: <10 MG/DL
GLUCOSE SERPL-MCNC: 112 MG/DL (ref 70–110)
GLUCOSE SERPL-MCNC: 354 MG/DL (ref 70–110)
GLUCOSE UR QL STRIP: NEGATIVE
HCT VFR BLD AUTO: 39.8 % (ref 40–54)
HGB BLD-MCNC: 13.9 G/DL (ref 14–18)
HGB UR QL STRIP: ABNORMAL
HYALINE CASTS #/AREA URNS LPF: 7 /LPF
IMM GRANULOCYTES # BLD AUTO: 0.06 K/UL (ref 0–0.04)
IMM GRANULOCYTES NFR BLD AUTO: 0.5 % (ref 0–0.5)
KETONES UR QL STRIP: ABNORMAL
LACTATE SERPL-SCNC: 1.2 MMOL/L (ref 0.5–2.2)
LEUKOCYTE ESTERASE UR QL STRIP: NEGATIVE
LIPASE SERPL-CCNC: 62 U/L (ref 4–60)
LYMPHOCYTES # BLD AUTO: 1.8 K/UL (ref 1–4.8)
LYMPHOCYTES NFR BLD: 14.7 % (ref 18–48)
MAGNESIUM SERPL-MCNC: 1.7 MG/DL (ref 1.6–2.6)
MCH RBC QN AUTO: 30.2 PG (ref 27–31)
MCHC RBC AUTO-ENTMCNC: 34.9 G/DL (ref 32–36)
MCV RBC AUTO: 87 FL (ref 82–98)
MICROSCOPIC COMMENT: ABNORMAL
MONOCYTES # BLD AUTO: 1.3 K/UL (ref 0.3–1)
MONOCYTES NFR BLD: 10.3 % (ref 4–15)
NEUTROPHILS # BLD AUTO: 9.1 K/UL (ref 1.8–7.7)
NEUTROPHILS NFR BLD: 74 % (ref 38–73)
NITRITE UR QL STRIP: NEGATIVE
NRBC BLD-RTO: 0 /100 WBC
PH UR STRIP: 6 [PH] (ref 5–8)
PLATELET # BLD AUTO: 298 K/UL (ref 150–450)
PMV BLD AUTO: 10 FL (ref 9.2–12.9)
POTASSIUM SERPL-SCNC: 3.7 MMOL/L (ref 3.5–5.1)
POTASSIUM SERPL-SCNC: 4.2 MMOL/L (ref 3.5–5.1)
PROT SERPL-MCNC: 8 G/DL (ref 6–8.4)
PROT UR QL STRIP: ABNORMAL
RBC # BLD AUTO: 4.6 M/UL (ref 4.6–6.2)
RBC #/AREA URNS HPF: 1 /HPF (ref 0–4)
SODIUM SERPL-SCNC: 134 MMOL/L (ref 136–145)
SODIUM SERPL-SCNC: 138 MMOL/L (ref 136–145)
SP GR UR STRIP: 1.02 (ref 1–1.03)
SQUAMOUS #/AREA URNS HPF: 1 /HPF
TROPONIN I SERPL DL<=0.01 NG/ML-MCNC: 0.01 NG/ML (ref 0–0.03)
URN SPEC COLLECT METH UR: ABNORMAL
UROBILINOGEN UR STRIP-ACNC: NEGATIVE EU/DL
WBC # BLD AUTO: 12.33 K/UL (ref 3.9–12.7)
WBC #/AREA URNS HPF: 3 /HPF (ref 0–5)

## 2024-01-10 PROCEDURE — 83690 ASSAY OF LIPASE: CPT | Performed by: EMERGENCY MEDICINE

## 2024-01-10 PROCEDURE — 93005 ELECTROCARDIOGRAM TRACING: CPT

## 2024-01-10 PROCEDURE — 93010 ELECTROCARDIOGRAM REPORT: CPT | Mod: ,,, | Performed by: INTERNAL MEDICINE

## 2024-01-10 PROCEDURE — 25000003 PHARM REV CODE 250: Performed by: EMERGENCY MEDICINE

## 2024-01-10 PROCEDURE — 96375 TX/PRO/DX INJ NEW DRUG ADDON: CPT

## 2024-01-10 PROCEDURE — 80053 COMPREHEN METABOLIC PANEL: CPT | Performed by: EMERGENCY MEDICINE

## 2024-01-10 PROCEDURE — 81000 URINALYSIS NONAUTO W/SCOPE: CPT | Performed by: EMERGENCY MEDICINE

## 2024-01-10 PROCEDURE — 85025 COMPLETE CBC W/AUTO DIFF WBC: CPT | Performed by: EMERGENCY MEDICINE

## 2024-01-10 PROCEDURE — 83605 ASSAY OF LACTIC ACID: CPT | Performed by: EMERGENCY MEDICINE

## 2024-01-10 PROCEDURE — 96374 THER/PROPH/DIAG INJ IV PUSH: CPT

## 2024-01-10 PROCEDURE — 96361 HYDRATE IV INFUSION ADD-ON: CPT

## 2024-01-10 PROCEDURE — 82077 ASSAY SPEC XCP UR&BREATH IA: CPT | Performed by: EMERGENCY MEDICINE

## 2024-01-10 PROCEDURE — 84484 ASSAY OF TROPONIN QUANT: CPT | Performed by: EMERGENCY MEDICINE

## 2024-01-10 PROCEDURE — 83735 ASSAY OF MAGNESIUM: CPT | Performed by: EMERGENCY MEDICINE

## 2024-01-10 PROCEDURE — 83880 ASSAY OF NATRIURETIC PEPTIDE: CPT | Performed by: EMERGENCY MEDICINE

## 2024-01-10 PROCEDURE — 63600175 PHARM REV CODE 636 W HCPCS: Performed by: EMERGENCY MEDICINE

## 2024-01-10 PROCEDURE — 80048 BASIC METABOLIC PNL TOTAL CA: CPT | Mod: XB | Performed by: EMERGENCY MEDICINE

## 2024-01-10 RX ORDER — LEVETIRACETAM 500 MG/5ML
1000 INJECTION, SOLUTION, CONCENTRATE INTRAVENOUS
Status: COMPLETED | OUTPATIENT
Start: 2024-01-10 | End: 2024-01-10

## 2024-01-10 RX ORDER — LOSARTAN POTASSIUM 25 MG/1
25 TABLET ORAL DAILY
Qty: 90 TABLET | Refills: 3 | Status: SHIPPED | OUTPATIENT
Start: 2024-01-10 | End: 2025-01-09

## 2024-01-10 RX ORDER — BLOOD-GLUCOSE CONTROL, NORMAL
1 EACH MISCELLANEOUS 2 TIMES DAILY WITH MEALS
Qty: 100 EACH | Refills: 11 | Status: SHIPPED | OUTPATIENT
Start: 2024-01-10

## 2024-01-10 RX ORDER — DEXTROSE 4 G
TABLET,CHEWABLE ORAL
Qty: 1 EACH | Refills: 0 | Status: SHIPPED | OUTPATIENT
Start: 2024-01-10 | End: 2025-01-09

## 2024-01-10 RX ORDER — METOCLOPRAMIDE HYDROCHLORIDE 5 MG/ML
5 INJECTION INTRAMUSCULAR; INTRAVENOUS
Status: COMPLETED | OUTPATIENT
Start: 2024-01-10 | End: 2024-01-10

## 2024-01-10 RX ORDER — SODIUM CHLORIDE 9 MG/ML
1000 INJECTION, SOLUTION INTRAVENOUS
Status: COMPLETED | OUTPATIENT
Start: 2024-01-10 | End: 2024-01-10

## 2024-01-10 RX ORDER — DIVALPROEX SODIUM 500 MG/1
1000 TABLET, FILM COATED, EXTENDED RELEASE ORAL EVERY 12 HOURS
Qty: 120 TABLET | Refills: 11 | Status: SHIPPED | OUTPATIENT
Start: 2024-01-10 | End: 2025-01-09

## 2024-01-10 RX ORDER — LEVETIRACETAM 750 MG/1
750 TABLET ORAL 2 TIMES DAILY
Qty: 60 TABLET | Refills: 2 | Status: SHIPPED | OUTPATIENT
Start: 2024-01-10 | End: 2024-04-09

## 2024-01-10 RX ORDER — ONDANSETRON 2 MG/ML
4 INJECTION INTRAMUSCULAR; INTRAVENOUS
Status: COMPLETED | OUTPATIENT
Start: 2024-01-10 | End: 2024-01-10

## 2024-01-10 RX ADMIN — SODIUM CHLORIDE 1000 ML: 9 INJECTION, SOLUTION INTRAVENOUS at 01:01

## 2024-01-10 RX ADMIN — LEVETIRACETAM 1000 MG: 500 INJECTION, SOLUTION INTRAVENOUS at 05:01

## 2024-01-10 RX ADMIN — SODIUM CHLORIDE 1000 ML: 9 INJECTION, SOLUTION INTRAVENOUS at 04:01

## 2024-01-10 RX ADMIN — METOCLOPRAMIDE 5 MG: 5 INJECTION, SOLUTION INTRAMUSCULAR; INTRAVENOUS at 03:01

## 2024-01-10 RX ADMIN — ONDANSETRON 4 MG: 2 INJECTION INTRAMUSCULAR; INTRAVENOUS at 01:01

## 2024-01-10 RX ADMIN — INSULIN HUMAN 4 UNITS: 100 INJECTION, SOLUTION PARENTERAL at 02:01

## 2024-01-10 RX ADMIN — SODIUM CHLORIDE 1000 ML: 9 INJECTION, SOLUTION INTRAVENOUS at 02:01
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
## 2024-04-18 ENCOUNTER — HOSPITAL ENCOUNTER (EMERGENCY)
Facility: HOSPITAL | Age: 44
Discharge: HOME | End: 2024-04-18
Attending: EMERGENCY MEDICINE
Payer: MEDICAID

## 2024-04-18 VITALS
RESPIRATION RATE: 19 BRPM | DIASTOLIC BLOOD PRESSURE: 90 MMHG | WEIGHT: 136 LBS | BODY MASS INDEX: 22.66 KG/M2 | HEIGHT: 65 IN | SYSTOLIC BLOOD PRESSURE: 144 MMHG | OXYGEN SATURATION: 100 % | TEMPERATURE: 97 F | HEART RATE: 67 BPM

## 2024-04-18 DIAGNOSIS — E11.65 UNCONTROLLED TYPE 2 DIABETES MELLITUS WITH HYPERGLYCEMIA: Primary | ICD-10-CM

## 2024-04-18 DIAGNOSIS — R42 DIZZINESS: ICD-10-CM

## 2024-04-18 LAB
ALBUMIN SERPL BCP-MCNC: 3.7 G/DL (ref 3.5–5.2)
ALLENS TEST: ABNORMAL
ALP SERPL-CCNC: 83 U/L (ref 55–135)
ALT SERPL W/O P-5'-P-CCNC: 21 U/L (ref 10–44)
ANION GAP SERPL CALC-SCNC: 9 MMOL/L (ref 8–16)
AST SERPL-CCNC: 20 U/L (ref 10–40)
B-OH-BUTYR BLD STRIP-SCNC: 2.3 MMOL/L (ref 0–0.5)
BACTERIA #/AREA URNS HPF: NORMAL /HPF
BASOPHILS # BLD AUTO: 0.03 K/UL (ref 0–0.2)
BASOPHILS NFR BLD: 0.6 % (ref 0–1.9)
BILIRUB SERPL-MCNC: 0.4 MG/DL (ref 0.1–1)
BILIRUB UR QL STRIP: NEGATIVE
BUN SERPL-MCNC: 7 MG/DL (ref 6–20)
CALCIUM SERPL-MCNC: 9.7 MG/DL (ref 8.7–10.5)
CHLORIDE SERPL-SCNC: 96 MMOL/L (ref 95–110)
CLARITY UR: CLEAR
CO2 SERPL-SCNC: 25 MMOL/L (ref 23–29)
COLOR UR: COLORLESS
CREAT SERPL-MCNC: 0.8 MG/DL (ref 0.5–1.4)
DIFFERENTIAL METHOD BLD: ABNORMAL
EOSINOPHIL # BLD AUTO: 0 K/UL (ref 0–0.5)
EOSINOPHIL NFR BLD: 0.8 % (ref 0–8)
ERYTHROCYTE [DISTWIDTH] IN BLOOD BY AUTOMATED COUNT: 13.7 % (ref 11.5–14.5)
EST. GFR  (NO RACE VARIABLE): >60 ML/MIN/1.73 M^2
GLUCOSE SERPL-MCNC: 377 MG/DL (ref 70–110)
GLUCOSE UR QL STRIP: ABNORMAL
HCO3 UR-SCNC: 28.4 MMOL/L (ref 24–28)
HCT VFR BLD AUTO: 37.8 % (ref 40–54)
HGB BLD-MCNC: 13.2 G/DL (ref 14–18)
HGB UR QL STRIP: NEGATIVE
IMM GRANULOCYTES # BLD AUTO: 0.02 K/UL (ref 0–0.04)
IMM GRANULOCYTES NFR BLD AUTO: 0.4 % (ref 0–0.5)
KETONES UR QL STRIP: ABNORMAL
LEUKOCYTE ESTERASE UR QL STRIP: NEGATIVE
LYMPHOCYTES # BLD AUTO: 1.6 K/UL (ref 1–4.8)
LYMPHOCYTES NFR BLD: 31.6 % (ref 18–48)
MCH RBC QN AUTO: 30.6 PG (ref 27–31)
MCHC RBC AUTO-ENTMCNC: 34.9 G/DL (ref 32–36)
MCV RBC AUTO: 88 FL (ref 82–98)
MICROSCOPIC COMMENT: NORMAL
MONOCYTES # BLD AUTO: 0.5 K/UL (ref 0.3–1)
MONOCYTES NFR BLD: 8.9 % (ref 4–15)
NEUTROPHILS # BLD AUTO: 3 K/UL (ref 1.8–7.7)
NEUTROPHILS NFR BLD: 57.7 % (ref 38–73)
NITRITE UR QL STRIP: NEGATIVE
NRBC BLD-RTO: 0 /100 WBC
PCO2 BLDA: 53.6 MMHG (ref 35–45)
PH SMN: 7.33 [PH] (ref 7.35–7.45)
PH UR STRIP: 6 [PH] (ref 5–8)
PLATELET # BLD AUTO: 240 K/UL (ref 150–450)
PMV BLD AUTO: 9.6 FL (ref 9.2–12.9)
PO2 BLDA: 13 MMHG (ref 40–60)
POC BE: 1 MMOL/L
POC SATURATED O2: 13 % (ref 95–100)
POC TCO2: 30 MMOL/L (ref 24–29)
POCT GLUCOSE: 291 MG/DL (ref 70–110)
POCT GLUCOSE: 366 MG/DL (ref 70–110)
POTASSIUM SERPL-SCNC: 4.5 MMOL/L (ref 3.5–5.1)
PROT SERPL-MCNC: 7.1 G/DL (ref 6–8.4)
PROT UR QL STRIP: NEGATIVE
RBC # BLD AUTO: 4.32 M/UL (ref 4.6–6.2)
SAMPLE: ABNORMAL
SITE: ABNORMAL
SODIUM SERPL-SCNC: 130 MMOL/L (ref 136–145)
SP GR UR STRIP: 1.02 (ref 1–1.03)
URN SPEC COLLECT METH UR: ABNORMAL
UROBILINOGEN UR STRIP-ACNC: NEGATIVE EU/DL
WBC # BLD AUTO: 5.19 K/UL (ref 3.9–12.7)
YEAST URNS QL MICRO: NORMAL

## 2024-04-18 PROCEDURE — 93010 ELECTROCARDIOGRAM REPORT: CPT | Mod: ,,, | Performed by: INTERNAL MEDICINE

## 2024-04-18 PROCEDURE — 63600175 PHARM REV CODE 636 W HCPCS: Performed by: EMERGENCY MEDICINE

## 2024-04-18 PROCEDURE — 80053 COMPREHEN METABOLIC PANEL: CPT | Performed by: EMERGENCY MEDICINE

## 2024-04-18 PROCEDURE — 82803 BLOOD GASES ANY COMBINATION: CPT

## 2024-04-18 PROCEDURE — 93005 ELECTROCARDIOGRAM TRACING: CPT

## 2024-04-18 PROCEDURE — 81000 URINALYSIS NONAUTO W/SCOPE: CPT | Performed by: EMERGENCY MEDICINE

## 2024-04-18 PROCEDURE — 96360 HYDRATION IV INFUSION INIT: CPT

## 2024-04-18 PROCEDURE — 82010 KETONE BODYS QUAN: CPT | Performed by: EMERGENCY MEDICINE

## 2024-04-18 PROCEDURE — 99284 EMERGENCY DEPT VISIT MOD MDM: CPT | Mod: 25

## 2024-04-18 PROCEDURE — 85025 COMPLETE CBC W/AUTO DIFF WBC: CPT | Performed by: EMERGENCY MEDICINE

## 2024-04-18 PROCEDURE — 82962 GLUCOSE BLOOD TEST: CPT

## 2024-04-18 PROCEDURE — 99900035 HC TECH TIME PER 15 MIN (STAT)

## 2024-04-18 RX ADMIN — SODIUM CHLORIDE, POTASSIUM CHLORIDE, SODIUM LACTATE AND CALCIUM CHLORIDE 1000 ML: 600; 310; 30; 20 INJECTION, SOLUTION INTRAVENOUS at 01:04

## 2024-04-18 NOTE — DISCHARGE INSTRUCTIONS
We know that you have many choices and are honored that you chose us. We hope that we met or exceeded your expectations and goals for this visit and will keep the [facility] family in mind for your future needs and those of your family and friends.     - Dr. Jalloh
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
## 2024-04-18 NOTE — ED TRIAGE NOTES
BIB EMS due to being hyperglycemic. Patient unable to follow diabetic protocol due to be unstable when home. Per EMS, BG was 496 prior to arrival.

## 2024-04-19 LAB
OHS QRS DURATION: 80 MS
OHS QTC CALCULATION: 439 MS